# Patient Record
Sex: MALE | Race: WHITE | NOT HISPANIC OR LATINO
[De-identification: names, ages, dates, MRNs, and addresses within clinical notes are randomized per-mention and may not be internally consistent; named-entity substitution may affect disease eponyms.]

---

## 2017-06-01 ENCOUNTER — APPOINTMENT (OUTPATIENT)
Dept: NEUROSURGERY | Facility: CLINIC | Age: 82
End: 2017-06-01

## 2017-07-28 ENCOUNTER — APPOINTMENT (OUTPATIENT)
Dept: CT IMAGING | Facility: CLINIC | Age: 82
End: 2017-07-28

## 2017-08-16 ENCOUNTER — APPOINTMENT (OUTPATIENT)
Dept: UROLOGY | Facility: CLINIC | Age: 82
End: 2017-08-16
Payer: MEDICARE

## 2017-08-16 VITALS
DIASTOLIC BLOOD PRESSURE: 76 MMHG | OXYGEN SATURATION: 95 % | SYSTOLIC BLOOD PRESSURE: 152 MMHG | HEART RATE: 68 BPM | TEMPERATURE: 98 F

## 2017-08-16 VITALS — BODY MASS INDEX: 29.38 KG/M2 | WEIGHT: 185 LBS | HEIGHT: 66.5 IN

## 2017-08-16 DIAGNOSIS — N28.89 OTHER SPECIFIED DISORDERS OF KIDNEY AND URETER: ICD-10-CM

## 2017-08-16 PROCEDURE — 99213 OFFICE O/P EST LOW 20 MIN: CPT

## 2017-10-12 ENCOUNTER — OUTPATIENT (OUTPATIENT)
Dept: OUTPATIENT SERVICES | Facility: HOSPITAL | Age: 82
LOS: 1 days | Discharge: HOME | End: 2017-10-12
Payer: MEDICARE

## 2017-10-12 VITALS
TEMPERATURE: 98 F | SYSTOLIC BLOOD PRESSURE: 136 MMHG | HEIGHT: 66 IN | OXYGEN SATURATION: 98 % | RESPIRATION RATE: 20 BRPM | DIASTOLIC BLOOD PRESSURE: 82 MMHG | HEART RATE: 75 BPM

## 2017-10-12 DIAGNOSIS — E11.22 TYPE 2 DIABETES MELLITUS WITH DIABETIC CHRONIC KIDNEY DISEASE: ICD-10-CM

## 2017-10-12 DIAGNOSIS — Z96.641 PRESENCE OF RIGHT ARTIFICIAL HIP JOINT: Chronic | ICD-10-CM

## 2017-10-12 DIAGNOSIS — Z01.818 ENCOUNTER FOR OTHER PREPROCEDURAL EXAMINATION: ICD-10-CM

## 2017-10-12 DIAGNOSIS — M96.1 POSTLAMINECTOMY SYNDROME, NOT ELSEWHERE CLASSIFIED: ICD-10-CM

## 2017-10-12 DIAGNOSIS — D62 ACUTE POSTHEMORRHAGIC ANEMIA: ICD-10-CM

## 2017-10-12 DIAGNOSIS — K50.90 CROHN'S DISEASE, UNSPECIFIED, WITHOUT COMPLICATIONS: ICD-10-CM

## 2017-10-12 DIAGNOSIS — D69.59 OTHER SECONDARY THROMBOCYTOPENIA: ICD-10-CM

## 2017-10-12 DIAGNOSIS — N17.0 ACUTE KIDNEY FAILURE WITH TUBULAR NECROSIS: ICD-10-CM

## 2017-10-12 DIAGNOSIS — M48.062 SPINAL STENOSIS, LUMBAR REGION WITH NEUROGENIC CLAUDICATION: ICD-10-CM

## 2017-10-12 DIAGNOSIS — Z90.49 ACQUIRED ABSENCE OF OTHER SPECIFIED PARTS OF DIGESTIVE TRACT: Chronic | ICD-10-CM

## 2017-10-12 DIAGNOSIS — Z98.1 ARTHRODESIS STATUS: Chronic | ICD-10-CM

## 2017-10-12 DIAGNOSIS — N18.3 CHRONIC KIDNEY DISEASE, STAGE 3 (MODERATE): ICD-10-CM

## 2017-10-12 LAB
ANION GAP SERPL CALC-SCNC: 5 MMOL/L — SIGNIFICANT CHANGE UP (ref 5–17)
APPEARANCE UR: CLEAR — SIGNIFICANT CHANGE UP
APTT BLD: 26.9 SEC — LOW (ref 27.5–37.4)
BACTERIA # UR AUTO: NEGATIVE — SIGNIFICANT CHANGE UP
BASOPHILS # BLD AUTO: 0.1 K/UL — SIGNIFICANT CHANGE UP (ref 0–0.2)
BASOPHILS NFR BLD AUTO: 0.7 % — SIGNIFICANT CHANGE UP (ref 0–2)
BILIRUB UR-MCNC: NEGATIVE — SIGNIFICANT CHANGE UP
BLD GP AB SCN SERPL QL: SIGNIFICANT CHANGE UP
BUN SERPL-MCNC: 41 MG/DL — HIGH (ref 7–23)
CALCIUM SERPL-MCNC: 10.4 MG/DL — HIGH (ref 8.5–10.1)
CHLORIDE SERPL-SCNC: 108 MMOL/L — SIGNIFICANT CHANGE UP (ref 96–108)
CO2 SERPL-SCNC: 29 MMOL/L — SIGNIFICANT CHANGE UP (ref 22–31)
COLOR SPEC: YELLOW — SIGNIFICANT CHANGE UP
CREAT SERPL-MCNC: 1.46 MG/DL — HIGH (ref 0.5–1.3)
DIFF PNL FLD: NEGATIVE — SIGNIFICANT CHANGE UP
EOSINOPHIL # BLD AUTO: 0.3 K/UL — SIGNIFICANT CHANGE UP (ref 0–0.5)
EOSINOPHIL NFR BLD AUTO: 2.9 % — SIGNIFICANT CHANGE UP (ref 0–6)
EPI CELLS # UR: SIGNIFICANT CHANGE UP
GLUCOSE SERPL-MCNC: 108 MG/DL — HIGH (ref 70–99)
GLUCOSE UR QL: NEGATIVE MG/DL — SIGNIFICANT CHANGE UP
HCT VFR BLD CALC: 38.7 % — LOW (ref 39–50)
HGB BLD-MCNC: 12.9 G/DL — LOW (ref 13–17)
INR BLD: 1 RATIO — SIGNIFICANT CHANGE UP (ref 0.88–1.16)
KETONES UR-MCNC: NEGATIVE — SIGNIFICANT CHANGE UP
LEUKOCYTE ESTERASE UR-ACNC: NEGATIVE — SIGNIFICANT CHANGE UP
LYMPHOCYTES # BLD AUTO: 1.5 K/UL — SIGNIFICANT CHANGE UP (ref 1–3.3)
LYMPHOCYTES # BLD AUTO: 15 % — SIGNIFICANT CHANGE UP (ref 13–44)
MCHC RBC-ENTMCNC: 30.9 PG — SIGNIFICANT CHANGE UP (ref 27–34)
MCHC RBC-ENTMCNC: 33.2 GM/DL — SIGNIFICANT CHANGE UP (ref 32–36)
MCV RBC AUTO: 92.9 FL — SIGNIFICANT CHANGE UP (ref 80–100)
MONOCYTES # BLD AUTO: 0.7 K/UL — SIGNIFICANT CHANGE UP (ref 0–0.9)
MONOCYTES NFR BLD AUTO: 6.7 % — SIGNIFICANT CHANGE UP (ref 2–14)
NEUTROPHILS # BLD AUTO: 7.4 K/UL — SIGNIFICANT CHANGE UP (ref 1.8–7.4)
NEUTROPHILS NFR BLD AUTO: 74.7 % — SIGNIFICANT CHANGE UP (ref 43–77)
NITRITE UR-MCNC: NEGATIVE — SIGNIFICANT CHANGE UP
PH UR: 5 — SIGNIFICANT CHANGE UP (ref 5–8)
PLATELET # BLD AUTO: 243 K/UL — SIGNIFICANT CHANGE UP (ref 150–400)
POTASSIUM SERPL-MCNC: 4.5 MMOL/L — SIGNIFICANT CHANGE UP (ref 3.5–5.3)
POTASSIUM SERPL-SCNC: 4.5 MMOL/L — SIGNIFICANT CHANGE UP (ref 3.5–5.3)
PROT UR-MCNC: 15 MG/DL
PROTHROM AB SERPL-ACNC: 10.8 SEC — SIGNIFICANT CHANGE UP (ref 9.8–12.7)
RBC # BLD: 4.17 M/UL — LOW (ref 4.2–5.8)
RBC # FLD: 13.6 % — SIGNIFICANT CHANGE UP (ref 10.3–14.5)
RBC CASTS # UR COMP ASSIST: NEGATIVE /HPF — SIGNIFICANT CHANGE UP (ref 0–4)
SODIUM SERPL-SCNC: 142 MMOL/L — SIGNIFICANT CHANGE UP (ref 135–145)
SP GR SPEC: 1.01 — SIGNIFICANT CHANGE UP (ref 1.01–1.02)
TYPE + AB SCN PNL BLD: SIGNIFICANT CHANGE UP
UROBILINOGEN FLD QL: NEGATIVE MG/DL — SIGNIFICANT CHANGE UP
WBC # BLD: 9.9 K/UL — SIGNIFICANT CHANGE UP (ref 3.8–10.5)
WBC # FLD AUTO: 9.9 K/UL — SIGNIFICANT CHANGE UP (ref 3.8–10.5)
WBC UR QL: SIGNIFICANT CHANGE UP

## 2017-10-12 PROCEDURE — 71020: CPT | Mod: 26

## 2017-10-12 NOTE — H&P PST ADULT - PMH
CAD (coronary artery disease)    Depression    Diabetes mellitus    Hearing loss  left hearing aid, right ear deaf  HTN (hypertension)    Hypercholesterolemia    Hypothyroidism    Intervertebral disc disorder    Macular degeneration  right eye  Obesity    Stented coronary artery  2 stents, 20 yrs ago, 15 yrs ago CAD (coronary artery disease)    Crohn disease    Depression    Diabetes mellitus    Hearing loss  left hearing aid, right ear deaf  HTN (hypertension)    Hypercholesterolemia    Hypothyroidism    Intervertebral disc disorder    Macular degeneration  right eye  Obesity    Prostate CA    Stented coronary artery  2 stents, 20 yrs ago, 15 yrs ago

## 2017-10-12 NOTE — H&P PST ADULT - HISTORY OF PRESENT ILLNESS
81 y/o male with intervertebral discs disorder of lumbar region. S/P lumbar fusion about 10 yrs ago. Complain of lower back pain that radiates to lower legs with numbness to feet. Takes Tramadol with mild pain relief. "I was also getting shots until the last ones, they are not working anymore." Here today for PST for lumbar exploration, removal of hardware, laminectomy, instrumentation, fusion.

## 2017-10-12 NOTE — H&P PST ADULT - PSH
History of hip replacement, total, right  Revision  History of hip replacement, total, right    S/P colon resection    S/P lumbar fusion

## 2017-10-12 NOTE — H&P PST ADULT - ASSESSMENT
83 y/o male with intervertebral discs disorder of lumbar region. S/P lumbar fusion about 10 yrs ago. Complain of lower back pain that radiates to lower legs with numbness to feet. Takes Tramadol with mild pain relief. "I was also getting shots until the last ones, they are not working anymore." Scheduled for lumbar exploration, removal of hardware, laminectomy, instrumentation, fusion with Dr. Platt.    Plan  1. Stop all NSAIDS, herbal supplements and vitamins for 7 days.  2. NPO at midnight.  3. Take the following medications (thyroxine, buspar with small sips of water on the morning of your procedure/surgery.  4. Use EZ sponges as directed  5. Use mupirocin as directed 81 y/o male with intervertebral discs disorder of lumbar region. S/P lumbar fusion about 10 yrs ago. Complain of lower back pain that radiates to lower legs with numbness to feet. Takes Tramadol with mild pain relief. "I was also getting shots until the last ones, they are not working anymore." Scheduled for lumbar exploration, removal of hardware, laminectomy, instrumentation, fusion with Dr. Platt.    Plan  1. Stop all NSAIDS, herbal supplements and vitamins for 7 days.  2. NPO at midnight.  3. Take the following medications (thyroxine, buspar) with small sips of water on the morning of your procedure/surgery.  4. Use EZ sponges as directed  5. Use mupirocin as directed

## 2017-10-12 NOTE — H&P PST ADULT - NSANTHOSAYNRD_GEN_A_CORE
No. EVANGELINA screening performed.  STOP BANG Legend: 0-2 = LOW Risk; 3-4 = INTERMEDIATE Risk; 5-8 = HIGH Risk

## 2017-10-13 LAB
MRSA PCR RESULT.: SIGNIFICANT CHANGE UP
S AUREUS DNA NOSE QL NAA+PROBE: SIGNIFICANT CHANGE UP

## 2017-10-18 RX ORDER — OXYCODONE HYDROCHLORIDE 5 MG/1
10 TABLET ORAL ONCE
Qty: 0 | Refills: 0 | Status: DISCONTINUED | OUTPATIENT
Start: 2017-10-19 | End: 2017-10-19

## 2017-10-18 RX ORDER — FAMOTIDINE 10 MG/ML
20 INJECTION INTRAVENOUS ONCE
Qty: 0 | Refills: 0 | Status: COMPLETED | OUTPATIENT
Start: 2017-10-19 | End: 2017-10-19

## 2017-10-18 RX ORDER — SODIUM CHLORIDE 9 MG/ML
3 INJECTION INTRAMUSCULAR; INTRAVENOUS; SUBCUTANEOUS EVERY 8 HOURS
Qty: 0 | Refills: 0 | Status: DISCONTINUED | OUTPATIENT
Start: 2017-10-19 | End: 2017-10-27

## 2017-10-19 ENCOUNTER — RESULT REVIEW (OUTPATIENT)
Age: 82
End: 2017-10-19

## 2017-10-19 ENCOUNTER — INPATIENT (INPATIENT)
Facility: HOSPITAL | Age: 82
LOS: 7 days | Discharge: NURSING, REHAB OR LONG-TERM CARE | End: 2017-10-27
Attending: ORTHOPAEDIC SURGERY | Admitting: ORTHOPAEDIC SURGERY
Payer: MEDICARE

## 2017-10-19 VITALS
HEART RATE: 50 BPM | TEMPERATURE: 98 F | RESPIRATION RATE: 16 BRPM | DIASTOLIC BLOOD PRESSURE: 74 MMHG | OXYGEN SATURATION: 100 % | SYSTOLIC BLOOD PRESSURE: 151 MMHG | WEIGHT: 184.97 LBS | HEIGHT: 66 IN

## 2017-10-19 DIAGNOSIS — Z96.641 PRESENCE OF RIGHT ARTIFICIAL HIP JOINT: Chronic | ICD-10-CM

## 2017-10-19 DIAGNOSIS — Z90.49 ACQUIRED ABSENCE OF OTHER SPECIFIED PARTS OF DIGESTIVE TRACT: Chronic | ICD-10-CM

## 2017-10-19 DIAGNOSIS — Z98.1 ARTHRODESIS STATUS: Chronic | ICD-10-CM

## 2017-10-19 LAB
ANION GAP SERPL CALC-SCNC: 10 MMOL/L — SIGNIFICANT CHANGE UP (ref 5–17)
BASOPHILS # BLD AUTO: 0.1 K/UL — SIGNIFICANT CHANGE UP (ref 0–0.2)
BASOPHILS NFR BLD AUTO: 0.3 % — SIGNIFICANT CHANGE UP (ref 0–2)
BUN SERPL-MCNC: 31 MG/DL — HIGH (ref 7–23)
CALCIUM SERPL-MCNC: 7.5 MG/DL — LOW (ref 8.5–10.1)
CHLORIDE SERPL-SCNC: 114 MMOL/L — HIGH (ref 96–108)
CO2 SERPL-SCNC: 19 MMOL/L — LOW (ref 22–31)
CREAT SERPL-MCNC: 1.47 MG/DL — HIGH (ref 0.5–1.3)
EOSINOPHIL # BLD AUTO: 0 K/UL — SIGNIFICANT CHANGE UP (ref 0–0.5)
EOSINOPHIL NFR BLD AUTO: 0.1 % — SIGNIFICANT CHANGE UP (ref 0–6)
GLUCOSE SERPL-MCNC: 203 MG/DL — HIGH (ref 70–99)
HCT VFR BLD CALC: 43.3 % — SIGNIFICANT CHANGE UP (ref 39–50)
HGB BLD-MCNC: 14.6 G/DL — SIGNIFICANT CHANGE UP (ref 13–17)
LYMPHOCYTES # BLD AUTO: 1 K/UL — SIGNIFICANT CHANGE UP (ref 1–3.3)
LYMPHOCYTES # BLD AUTO: 5.7 % — LOW (ref 13–44)
MCHC RBC-ENTMCNC: 31.5 PG — SIGNIFICANT CHANGE UP (ref 27–34)
MCHC RBC-ENTMCNC: 33.8 GM/DL — SIGNIFICANT CHANGE UP (ref 32–36)
MCV RBC AUTO: 93.1 FL — SIGNIFICANT CHANGE UP (ref 80–100)
MONOCYTES # BLD AUTO: 0.7 K/UL — SIGNIFICANT CHANGE UP (ref 0–0.9)
MONOCYTES NFR BLD AUTO: 4.1 % — SIGNIFICANT CHANGE UP (ref 2–14)
NEUTROPHILS # BLD AUTO: 15.8 K/UL — HIGH (ref 1.8–7.4)
NEUTROPHILS NFR BLD AUTO: 89.9 % — HIGH (ref 43–77)
PLATELET # BLD AUTO: 140 K/UL — LOW (ref 150–400)
POTASSIUM SERPL-MCNC: 4.5 MMOL/L — SIGNIFICANT CHANGE UP (ref 3.5–5.3)
POTASSIUM SERPL-SCNC: 4.5 MMOL/L — SIGNIFICANT CHANGE UP (ref 3.5–5.3)
RBC # BLD: 4.65 M/UL — SIGNIFICANT CHANGE UP (ref 4.2–5.8)
RBC # FLD: 13.7 % — SIGNIFICANT CHANGE UP (ref 10.3–14.5)
SODIUM SERPL-SCNC: 143 MMOL/L — SIGNIFICANT CHANGE UP (ref 135–145)
WBC # BLD: 17.5 K/UL — HIGH (ref 3.8–10.5)
WBC # FLD AUTO: 17.5 K/UL — HIGH (ref 3.8–10.5)

## 2017-10-19 PROCEDURE — 88304 TISSUE EXAM BY PATHOLOGIST: CPT | Mod: 26

## 2017-10-19 RX ORDER — VERAPAMIL HCL 240 MG
240 CAPSULE, EXTENDED RELEASE PELLETS 24 HR ORAL DAILY
Qty: 0 | Refills: 0 | Status: DISCONTINUED | OUTPATIENT
Start: 2017-10-19 | End: 2017-10-22

## 2017-10-19 RX ORDER — ACETAMINOPHEN 500 MG
650 TABLET ORAL EVERY 4 HOURS
Qty: 0 | Refills: 0 | Status: DISCONTINUED | OUTPATIENT
Start: 2017-10-19 | End: 2017-10-27

## 2017-10-19 RX ORDER — DEXTROSE MONOHYDRATE, SODIUM CHLORIDE, AND POTASSIUM CHLORIDE 50; .745; 4.5 G/1000ML; G/1000ML; G/1000ML
1000 INJECTION, SOLUTION INTRAVENOUS
Qty: 0 | Refills: 0 | Status: DISCONTINUED | OUTPATIENT
Start: 2017-10-19 | End: 2017-10-20

## 2017-10-19 RX ORDER — ACETAMINOPHEN 500 MG
1000 TABLET ORAL ONCE
Qty: 0 | Refills: 0 | Status: DISCONTINUED | OUTPATIENT
Start: 2017-10-19 | End: 2017-10-19

## 2017-10-19 RX ORDER — OXYCODONE AND ACETAMINOPHEN 5; 325 MG/1; MG/1
1 TABLET ORAL EVERY 4 HOURS
Qty: 0 | Refills: 0 | Status: DISCONTINUED | OUTPATIENT
Start: 2017-10-19 | End: 2017-10-21

## 2017-10-19 RX ORDER — LEVOTHYROXINE SODIUM 125 MCG
75 TABLET ORAL DAILY
Qty: 0 | Refills: 0 | Status: DISCONTINUED | OUTPATIENT
Start: 2017-10-19 | End: 2017-10-27

## 2017-10-19 RX ORDER — LISINOPRIL 2.5 MG/1
20 TABLET ORAL DAILY
Qty: 0 | Refills: 0 | Status: DISCONTINUED | OUTPATIENT
Start: 2017-10-19 | End: 2017-10-20

## 2017-10-19 RX ORDER — HYDROMORPHONE HYDROCHLORIDE 2 MG/ML
0.5 INJECTION INTRAMUSCULAR; INTRAVENOUS; SUBCUTANEOUS
Qty: 0 | Refills: 0 | Status: DISCONTINUED | OUTPATIENT
Start: 2017-10-19 | End: 2017-10-21

## 2017-10-19 RX ORDER — ONDANSETRON 8 MG/1
4 TABLET, FILM COATED ORAL EVERY 6 HOURS
Qty: 0 | Refills: 0 | Status: DISCONTINUED | OUTPATIENT
Start: 2017-10-19 | End: 2017-10-21

## 2017-10-19 RX ORDER — CYCLOBENZAPRINE HYDROCHLORIDE 10 MG/1
10 TABLET, FILM COATED ORAL EVERY 8 HOURS
Qty: 0 | Refills: 0 | Status: DISCONTINUED | OUTPATIENT
Start: 2017-10-19 | End: 2017-10-27

## 2017-10-19 RX ORDER — OXYCODONE AND ACETAMINOPHEN 5; 325 MG/1; MG/1
2 TABLET ORAL EVERY 4 HOURS
Qty: 0 | Refills: 0 | Status: DISCONTINUED | OUTPATIENT
Start: 2017-10-19 | End: 2017-10-26

## 2017-10-19 RX ORDER — MUPIROCIN 20 MG/G
1 OINTMENT TOPICAL
Qty: 0 | Refills: 0 | COMMUNITY

## 2017-10-19 RX ORDER — ATORVASTATIN CALCIUM 80 MG/1
20 TABLET, FILM COATED ORAL AT BEDTIME
Qty: 0 | Refills: 0 | Status: DISCONTINUED | OUTPATIENT
Start: 2017-10-19 | End: 2017-10-27

## 2017-10-19 RX ORDER — OXYCODONE HYDROCHLORIDE 5 MG/1
5 TABLET ORAL EVERY 4 HOURS
Qty: 0 | Refills: 0 | Status: DISCONTINUED | OUTPATIENT
Start: 2017-10-19 | End: 2017-10-19

## 2017-10-19 RX ORDER — HYDROCHLOROTHIAZIDE 25 MG
25 TABLET ORAL DAILY
Qty: 0 | Refills: 0 | Status: DISCONTINUED | OUTPATIENT
Start: 2017-10-19 | End: 2017-10-20

## 2017-10-19 RX ORDER — METFORMIN HYDROCHLORIDE 850 MG/1
500 TABLET ORAL DAILY
Qty: 0 | Refills: 0 | Status: DISCONTINUED | OUTPATIENT
Start: 2017-10-19 | End: 2017-10-20

## 2017-10-19 RX ORDER — HYDROMORPHONE HYDROCHLORIDE 2 MG/ML
30 INJECTION INTRAMUSCULAR; INTRAVENOUS; SUBCUTANEOUS
Qty: 0 | Refills: 0 | Status: DISCONTINUED | OUTPATIENT
Start: 2017-10-19 | End: 2017-10-21

## 2017-10-19 RX ORDER — NALOXONE HYDROCHLORIDE 4 MG/.1ML
0.1 SPRAY NASAL
Qty: 0 | Refills: 0 | Status: DISCONTINUED | OUTPATIENT
Start: 2017-10-19 | End: 2017-10-21

## 2017-10-19 RX ORDER — ONDANSETRON 8 MG/1
4 TABLET, FILM COATED ORAL ONCE
Qty: 0 | Refills: 0 | Status: DISCONTINUED | OUTPATIENT
Start: 2017-10-19 | End: 2017-10-19

## 2017-10-19 RX ORDER — SENNA PLUS 8.6 MG/1
2 TABLET ORAL AT BEDTIME
Qty: 0 | Refills: 0 | Status: DISCONTINUED | OUTPATIENT
Start: 2017-10-19 | End: 2017-10-22

## 2017-10-19 RX ORDER — DOCUSATE SODIUM 100 MG
100 CAPSULE ORAL THREE TIMES A DAY
Qty: 0 | Refills: 0 | Status: DISCONTINUED | OUTPATIENT
Start: 2017-10-19 | End: 2017-10-22

## 2017-10-19 RX ORDER — HYDROMORPHONE HYDROCHLORIDE 2 MG/ML
2 INJECTION INTRAMUSCULAR; INTRAVENOUS; SUBCUTANEOUS EVERY 4 HOURS
Qty: 0 | Refills: 0 | Status: DISCONTINUED | OUTPATIENT
Start: 2017-10-19 | End: 2017-10-26

## 2017-10-19 RX ORDER — SODIUM CHLORIDE 9 MG/ML
1000 INJECTION INTRAMUSCULAR; INTRAVENOUS; SUBCUTANEOUS
Qty: 0 | Refills: 0 | Status: DISCONTINUED | OUTPATIENT
Start: 2017-10-19 | End: 2017-10-19

## 2017-10-19 RX ADMIN — HYDROMORPHONE HYDROCHLORIDE 0.5 MILLIGRAM(S): 2 INJECTION INTRAMUSCULAR; INTRAVENOUS; SUBCUTANEOUS at 18:40

## 2017-10-19 RX ADMIN — SODIUM CHLORIDE 75 MILLILITER(S): 9 INJECTION INTRAMUSCULAR; INTRAVENOUS; SUBCUTANEOUS at 17:41

## 2017-10-19 RX ADMIN — OXYCODONE HYDROCHLORIDE 10 MILLIGRAM(S): 5 TABLET ORAL at 07:35

## 2017-10-19 RX ADMIN — FAMOTIDINE 20 MILLIGRAM(S): 10 INJECTION INTRAVENOUS at 07:35

## 2017-10-19 RX ADMIN — SODIUM CHLORIDE 3 MILLILITER(S): 9 INJECTION INTRAMUSCULAR; INTRAVENOUS; SUBCUTANEOUS at 21:20

## 2017-10-19 RX ADMIN — HYDROMORPHONE HYDROCHLORIDE 30 MILLILITER(S): 2 INJECTION INTRAMUSCULAR; INTRAVENOUS; SUBCUTANEOUS at 17:16

## 2017-10-19 NOTE — BRIEF OPERATIVE NOTE - PROCEDURE
<<-----Click on this checkbox to enter Procedure Laminectomy and fusion of lumbar spine with instrumentation  10/19/2017    Active  ALICIA

## 2017-10-19 NOTE — PROVIDER CONTACT NOTE (OTHER) - SITUATION
Dr. Dent updated on pts status. BP 92/63, urine output 20-25ml/hr, 350ml bloody drainage total from hmv since surgery. Pt received very sleepy from PACU.

## 2017-10-19 NOTE — BRIEF OPERATIVE NOTE - PRE-OP DX
Lumbar stenosis with neurogenic claudication  10/19/2017    Active  [place] Platt  Postlaminectomy syndrome of lumbar region  10/19/2017    Active  P[place]enix Platt

## 2017-10-19 NOTE — PATIENT PROFILE ADULT. - PMH
CAD (coronary artery disease)    Crohn disease    Depression    Diabetes mellitus    Hearing loss  left hearing aid, right ear deaf  HTN (hypertension)    Hypercholesterolemia    Hypothyroidism    Intervertebral disc disorder    Macular degeneration  right eye  Obesity    Prostate CA    Stented coronary artery  2 stents, 20 yrs ago, 15 yrs ago

## 2017-10-20 DIAGNOSIS — M96.1 POSTLAMINECTOMY SYNDROME, NOT ELSEWHERE CLASSIFIED: ICD-10-CM

## 2017-10-20 LAB
ALBUMIN SERPL ELPH-MCNC: 1.7 G/DL — LOW (ref 3.3–5)
ALP SERPL-CCNC: 28 U/L — LOW (ref 40–120)
ALT FLD-CCNC: 29 U/L — SIGNIFICANT CHANGE UP (ref 12–78)
ANION GAP SERPL CALC-SCNC: 6 MMOL/L — SIGNIFICANT CHANGE UP (ref 5–17)
ANION GAP SERPL CALC-SCNC: 7 MMOL/L — SIGNIFICANT CHANGE UP (ref 5–17)
ANION GAP SERPL CALC-SCNC: 9 MMOL/L — SIGNIFICANT CHANGE UP (ref 5–17)
AST SERPL-CCNC: 50 U/L — HIGH (ref 15–37)
BILIRUB SERPL-MCNC: 0.4 MG/DL — SIGNIFICANT CHANGE UP (ref 0.2–1.2)
BUN SERPL-MCNC: 34 MG/DL — HIGH (ref 7–23)
BUN SERPL-MCNC: 34 MG/DL — HIGH (ref 7–23)
BUN SERPL-MCNC: 35 MG/DL — HIGH (ref 7–23)
CALCIUM SERPL-MCNC: 6.4 MG/DL — CRITICAL LOW (ref 8.5–10.1)
CALCIUM SERPL-MCNC: 6.5 MG/DL — CRITICAL LOW (ref 8.5–10.1)
CALCIUM SERPL-MCNC: 6.8 MG/DL — LOW (ref 8.5–10.1)
CHLORIDE SERPL-SCNC: 115 MMOL/L — HIGH (ref 96–108)
CHLORIDE SERPL-SCNC: 117 MMOL/L — HIGH (ref 96–108)
CHLORIDE SERPL-SCNC: 119 MMOL/L — HIGH (ref 96–108)
CO2 SERPL-SCNC: 18 MMOL/L — LOW (ref 22–31)
CO2 SERPL-SCNC: 19 MMOL/L — LOW (ref 22–31)
CO2 SERPL-SCNC: 22 MMOL/L — SIGNIFICANT CHANGE UP (ref 22–31)
CREAT SERPL-MCNC: 2.09 MG/DL — HIGH (ref 0.5–1.3)
CREAT SERPL-MCNC: 2.18 MG/DL — HIGH (ref 0.5–1.3)
CREAT SERPL-MCNC: 2.36 MG/DL — HIGH (ref 0.5–1.3)
GLUCOSE BLDC GLUCOMTR-MCNC: 122 MG/DL — HIGH (ref 70–99)
GLUCOSE BLDC GLUCOMTR-MCNC: 132 MG/DL — HIGH (ref 70–99)
GLUCOSE BLDC GLUCOMTR-MCNC: 176 MG/DL — HIGH (ref 70–99)
GLUCOSE BLDC GLUCOMTR-MCNC: 200 MG/DL — HIGH (ref 70–99)
GLUCOSE SERPL-MCNC: 138 MG/DL — HIGH (ref 70–99)
GLUCOSE SERPL-MCNC: 150 MG/DL — HIGH (ref 70–99)
GLUCOSE SERPL-MCNC: 183 MG/DL — HIGH (ref 70–99)
HCT VFR BLD CALC: 32.1 % — LOW (ref 39–50)
HGB BLD-MCNC: 11 G/DL — LOW (ref 13–17)
MCHC RBC-ENTMCNC: 31.6 PG — SIGNIFICANT CHANGE UP (ref 27–34)
MCHC RBC-ENTMCNC: 34.2 GM/DL — SIGNIFICANT CHANGE UP (ref 32–36)
MCV RBC AUTO: 92.4 FL — SIGNIFICANT CHANGE UP (ref 80–100)
PLATELET # BLD AUTO: 137 K/UL — LOW (ref 150–400)
POTASSIUM SERPL-MCNC: 4.2 MMOL/L — SIGNIFICANT CHANGE UP (ref 3.5–5.3)
POTASSIUM SERPL-MCNC: 4.7 MMOL/L — SIGNIFICANT CHANGE UP (ref 3.5–5.3)
POTASSIUM SERPL-MCNC: 5.4 MMOL/L — HIGH (ref 3.5–5.3)
POTASSIUM SERPL-SCNC: 4.2 MMOL/L — SIGNIFICANT CHANGE UP (ref 3.5–5.3)
POTASSIUM SERPL-SCNC: 4.7 MMOL/L — SIGNIFICANT CHANGE UP (ref 3.5–5.3)
POTASSIUM SERPL-SCNC: 5.4 MMOL/L — HIGH (ref 3.5–5.3)
PROT SERPL-MCNC: 3.8 GM/DL — LOW (ref 6–8.3)
RBC # BLD: 3.47 M/UL — LOW (ref 4.2–5.8)
RBC # FLD: 13.7 % — SIGNIFICANT CHANGE UP (ref 10.3–14.5)
SODIUM SERPL-SCNC: 143 MMOL/L — SIGNIFICANT CHANGE UP (ref 135–145)
SODIUM SERPL-SCNC: 144 MMOL/L — SIGNIFICANT CHANGE UP (ref 135–145)
SODIUM SERPL-SCNC: 145 MMOL/L — SIGNIFICANT CHANGE UP (ref 135–145)
TROPONIN I SERPL-MCNC: 0.03 NG/ML — SIGNIFICANT CHANGE UP (ref 0.01–0.04)
TROPONIN I SERPL-MCNC: 0.04 NG/ML — SIGNIFICANT CHANGE UP (ref 0.01–0.04)
WBC # BLD: 15.2 K/UL — HIGH (ref 3.8–10.5)
WBC # FLD AUTO: 15.2 K/UL — HIGH (ref 3.8–10.5)

## 2017-10-20 PROCEDURE — 93010 ELECTROCARDIOGRAM REPORT: CPT

## 2017-10-20 RX ORDER — SODIUM CHLORIDE 9 MG/ML
500 INJECTION INTRAMUSCULAR; INTRAVENOUS; SUBCUTANEOUS ONCE
Qty: 0 | Refills: 0 | Status: COMPLETED | OUTPATIENT
Start: 2017-10-20 | End: 2017-10-20

## 2017-10-20 RX ORDER — SODIUM CHLORIDE 9 MG/ML
1000 INJECTION INTRAMUSCULAR; INTRAVENOUS; SUBCUTANEOUS
Qty: 0 | Refills: 0 | Status: DISCONTINUED | OUTPATIENT
Start: 2017-10-20 | End: 2017-10-22

## 2017-10-20 RX ORDER — DEXTROSE 50 % IN WATER 50 %
25 SYRINGE (ML) INTRAVENOUS ONCE
Qty: 0 | Refills: 0 | Status: DISCONTINUED | OUTPATIENT
Start: 2017-10-20 | End: 2017-10-27

## 2017-10-20 RX ORDER — SODIUM CHLORIDE 9 MG/ML
1000 INJECTION, SOLUTION INTRAVENOUS
Qty: 0 | Refills: 0 | Status: DISCONTINUED | OUTPATIENT
Start: 2017-10-20 | End: 2017-10-22

## 2017-10-20 RX ORDER — SODIUM CHLORIDE 9 MG/ML
1000 INJECTION INTRAMUSCULAR; INTRAVENOUS; SUBCUTANEOUS ONCE
Qty: 0 | Refills: 0 | Status: COMPLETED | OUTPATIENT
Start: 2017-10-20 | End: 2017-10-20

## 2017-10-20 RX ORDER — GLUCAGON INJECTION, SOLUTION 0.5 MG/.1ML
1 INJECTION, SOLUTION SUBCUTANEOUS ONCE
Qty: 0 | Refills: 0 | Status: DISCONTINUED | OUTPATIENT
Start: 2017-10-20 | End: 2017-10-27

## 2017-10-20 RX ORDER — INSULIN LISPRO 100/ML
VIAL (ML) SUBCUTANEOUS
Qty: 0 | Refills: 0 | Status: DISCONTINUED | OUTPATIENT
Start: 2017-10-20 | End: 2017-10-27

## 2017-10-20 RX ORDER — DEXTROSE 50 % IN WATER 50 %
12.5 SYRINGE (ML) INTRAVENOUS ONCE
Qty: 0 | Refills: 0 | Status: DISCONTINUED | OUTPATIENT
Start: 2017-10-20 | End: 2017-10-27

## 2017-10-20 RX ORDER — INSULIN LISPRO 100/ML
VIAL (ML) SUBCUTANEOUS AT BEDTIME
Qty: 0 | Refills: 0 | Status: DISCONTINUED | OUTPATIENT
Start: 2017-10-20 | End: 2017-10-27

## 2017-10-20 RX ORDER — SODIUM CHLORIDE 9 MG/ML
1000 INJECTION INTRAMUSCULAR; INTRAVENOUS; SUBCUTANEOUS ONCE
Qty: 0 | Refills: 0 | Status: DISCONTINUED | OUTPATIENT
Start: 2017-10-20 | End: 2017-10-21

## 2017-10-20 RX ORDER — SODIUM CHLORIDE 9 MG/ML
1000 INJECTION INTRAMUSCULAR; INTRAVENOUS; SUBCUTANEOUS
Qty: 0 | Refills: 0 | Status: DISCONTINUED | OUTPATIENT
Start: 2017-10-20 | End: 2017-10-21

## 2017-10-20 RX ORDER — CALCIUM GLUCONATE 100 MG/ML
1 VIAL (ML) INTRAVENOUS ONCE
Qty: 0 | Refills: 0 | Status: COMPLETED | OUTPATIENT
Start: 2017-10-20 | End: 2017-10-20

## 2017-10-20 RX ORDER — COLESTIPOL HCL 5 G
1 GRANULES (GRAM) ORAL DAILY
Qty: 0 | Refills: 0 | Status: DISCONTINUED | OUTPATIENT
Start: 2017-10-20 | End: 2017-10-27

## 2017-10-20 RX ORDER — DEXTROSE 50 % IN WATER 50 %
1 SYRINGE (ML) INTRAVENOUS ONCE
Qty: 0 | Refills: 0 | Status: DISCONTINUED | OUTPATIENT
Start: 2017-10-20 | End: 2017-10-27

## 2017-10-20 RX ADMIN — Medication 200 GRAM(S): at 13:50

## 2017-10-20 RX ADMIN — Medication 75 MICROGRAM(S): at 12:23

## 2017-10-20 RX ADMIN — Medication 10 MILLIGRAM(S): at 23:09

## 2017-10-20 RX ADMIN — SENNA PLUS 2 TABLET(S): 8.6 TABLET ORAL at 23:10

## 2017-10-20 RX ADMIN — SODIUM CHLORIDE 1000 MILLILITER(S): 9 INJECTION INTRAMUSCULAR; INTRAVENOUS; SUBCUTANEOUS at 13:45

## 2017-10-20 RX ADMIN — Medication 1 TABLET(S): at 12:25

## 2017-10-20 RX ADMIN — Medication 1: at 12:26

## 2017-10-20 RX ADMIN — Medication 1 GRAM(S): at 12:23

## 2017-10-20 RX ADMIN — CYCLOBENZAPRINE HYDROCHLORIDE 10 MILLIGRAM(S): 10 TABLET, FILM COATED ORAL at 14:57

## 2017-10-20 RX ADMIN — ATORVASTATIN CALCIUM 20 MILLIGRAM(S): 80 TABLET, FILM COATED ORAL at 23:09

## 2017-10-20 RX ADMIN — SODIUM CHLORIDE 125 MILLILITER(S): 9 INJECTION INTRAMUSCULAR; INTRAVENOUS; SUBCUTANEOUS at 23:08

## 2017-10-20 RX ADMIN — CYCLOBENZAPRINE HYDROCHLORIDE 10 MILLIGRAM(S): 10 TABLET, FILM COATED ORAL at 23:09

## 2017-10-20 RX ADMIN — SODIUM CHLORIDE 1000 MILLILITER(S): 9 INJECTION INTRAMUSCULAR; INTRAVENOUS; SUBCUTANEOUS at 03:43

## 2017-10-20 RX ADMIN — Medication 100 MILLIGRAM(S): at 23:08

## 2017-10-20 RX ADMIN — SODIUM CHLORIDE 3 MILLILITER(S): 9 INJECTION INTRAMUSCULAR; INTRAVENOUS; SUBCUTANEOUS at 18:48

## 2017-10-20 RX ADMIN — DEXTROSE MONOHYDRATE, SODIUM CHLORIDE, AND POTASSIUM CHLORIDE 125 MILLILITER(S): 50; .745; 4.5 INJECTION, SOLUTION INTRAVENOUS at 04:53

## 2017-10-20 RX ADMIN — SODIUM CHLORIDE 3 MILLILITER(S): 9 INJECTION INTRAMUSCULAR; INTRAVENOUS; SUBCUTANEOUS at 05:33

## 2017-10-20 RX ADMIN — SODIUM CHLORIDE 3 MILLILITER(S): 9 INJECTION INTRAMUSCULAR; INTRAVENOUS; SUBCUTANEOUS at 23:10

## 2017-10-20 RX ADMIN — SODIUM CHLORIDE 125 MILLILITER(S): 9 INJECTION INTRAMUSCULAR; INTRAVENOUS; SUBCUTANEOUS at 08:25

## 2017-10-20 RX ADMIN — SODIUM CHLORIDE 1000 MILLILITER(S): 9 INJECTION INTRAMUSCULAR; INTRAVENOUS; SUBCUTANEOUS at 12:25

## 2017-10-20 NOTE — PHYSICAL THERAPY INITIAL EVALUATION ADULT - GENERAL OBSERVATIONS, REHAB EVAL
pt was found lying in bed, pt is willing to participate in PT, pt with tray,tel,cb,tele monitors,pulse oxy,BP cuff,o2,caballero,hemovac, PCA and IV pump

## 2017-10-20 NOTE — PHYSICAL THERAPY INITIAL EVALUATION ADULT - LIVES WITH, PROFILE
alone/has 14 steps to enter house with steps, 10 steps can be accessed through elevator but still has to tk 4 steps to enter house

## 2017-10-20 NOTE — PHYSICAL THERAPY INITIAL EVALUATION ADULT - CRITERIA FOR SKILLED THERAPEUTIC INTERVENTIONS
risk reduction/prevention/rehab potential/anticipated discharge recommendation/impairments found/therapy frequency/anticipated equipment needs at discharge

## 2017-10-20 NOTE — PHYSICAL THERAPY INITIAL EVALUATION ADULT - PERTINENT HX OF CURRENT PROBLEM, REHAB EVAL
Postlaminectomy syndrome of lumbar region, Lumbar stenosis with neurogenic claudication,  lumbar spinal stenosis who has been having increasing LBP with radiation down his legs.  He was admitted for elective lumbar laminectomy with fusion.  His hospital course was complicated by hypovolumic hypotension and acute blood loss anemia

## 2017-10-20 NOTE — PHYSICAL THERAPY INITIAL EVALUATION ADULT - DIAGNOSIS, PT EVAL
Postlaminectomy syndrome of lumbar region, Lumbar stenosis with neurogenic claudication, s/p lumbar lami with fusion, hypotension, weakness

## 2017-10-20 NOTE — PROGRESS NOTE ADULT - SUBJECTIVE AND OBJECTIVE BOX
[facility]                                                            Orthopedic Sp[facility] Progress Note      POST OPERATIVE DAY #: 1  STATUS POST:            Lumbar lami fusion TLIF     Pre-Op Dx: Postlaminectomy syndrome of lumbar region  Lumbar stenosis with neurogenic claudication    Post-Op Dx:  Postlaminectomy syndrome of lumbar region  Lumbar stenosis with neurogenic claudication    Prin. Procedure:   Lumbar lami fusion TLIF     SUBJECTIVE: Patient seen and examined.   c/o LBP    Pain (0-10): 8-9  Current Pain Management:  [x ] PCA   [ ] Po Analgesics [ ] IM /IV Anagesics     Vital Signs Last 24 Hrs  T(C): 36.5 (20 Oct 2017 08:26), Max: 37.9 (20 Oct 2017 05:37)  T(F): 97.7 (20 Oct 2017 08:26), Max: 100.2 (20 Oct 2017 05:37)  HR: 83 (20 Oct 2017 06:00) (68 - 91)  BP: 79/40 (20 Oct 2017 06:00) (78/39 - 142/71)  BP(mean): 49 (20 Oct 2017 06:00) (44 - 59)  RR: 14 (20 Oct 2017 06:00) (10 - 21)  SpO2: 90% (20 Oct 2017 04:00) (90% - 100%)  I&O's Detail    19 Oct 2017 07:01  -  20 Oct 2017 07:00  --------------------------------------------------------  IN:    Other: 4500 mL  Total IN: 4500 mL    OUT:    Drain: 780 mL.....left in    Indwelling Catheter - Urethral: 220 mL    Other: 305 mL  Total OUT: 1305 mL    Total NET: 3195 mL          OBJECTIVE: c/o LBP  moves all  4 ext        Wound /Dressing: intact    Lumbar: ROM :limited  Neurological: A/O x    3           Sensation: [ x] intact to light touch  [ ] decreased:          Motor exam: [ x ]          [ x Upper extremity    Delt      Bicp       Tri      Wrist ext  Wrst Flex       Digit Ext Digit Flex                                     R         5/5        5/5        5/5       5/5            5/5            5/5       5/5          5/5                                     L          5/5        5/5        5/5       5/5            5/5            5/5       5/5          5/5         [ x] Lower ext.     Hip Flx  Hip Ext   Hip Abd  Hip Add Quad   Hamstrg   TA       EHL      GS                              R        5/5        5/5        5/5             5/5        5/5        5/5        5/5     5/5      5/5                              L         5/5        5/5        5/5             5/5        5/5        5/5        5/5     5/5      5/5                                                        [ x] Vascular :  wnl           Tension Signs: neg           RADIOLOGY & ADDITIONAL STUDIES:                                               LABS:                        11.0   15.2  )-----------( 137      ( 20 Oct 2017 05:09 )             32.1     10-20    143  |  115<H>  |  34<H>  ----------------------------<  150<H>  5.4<H>   |  22  |  2.09<H>    Ca    6.8<L>      20 Oct 2017 05:09    TPro  3.8<L>  /  Alb  1.7<L>  /  TBili  0.4  /  DBili  x   /  AST  50<H>  /  ALT  29  /  AlkPhos  28<L>  10-20            A/P :  82y Male   s/p:   Lumbar lami fusion TLIF   -    Pain control satisfactory  -    DVT ppx: SCDs    -    Abx:  completed  -    Review labs as indicated   .;as above  -    Physical Therapy try to mobilize  -    Weight bearing status: full  -    Dispo: Home [ ]     Rehab [x ]

## 2017-10-21 LAB
ANION GAP SERPL CALC-SCNC: 6 MMOL/L — SIGNIFICANT CHANGE UP (ref 5–17)
BASOPHILS # BLD AUTO: 0.1 K/UL — SIGNIFICANT CHANGE UP (ref 0–0.2)
BASOPHILS NFR BLD AUTO: 0.7 % — SIGNIFICANT CHANGE UP (ref 0–2)
BUN SERPL-MCNC: 31 MG/DL — HIGH (ref 7–23)
CALCIUM SERPL-MCNC: 6.8 MG/DL — LOW (ref 8.5–10.1)
CHLORIDE SERPL-SCNC: 118 MMOL/L — HIGH (ref 96–108)
CO2 SERPL-SCNC: 21 MMOL/L — LOW (ref 22–31)
CREAT SERPL-MCNC: 1.88 MG/DL — HIGH (ref 0.5–1.3)
EOSINOPHIL # BLD AUTO: 0.1 K/UL — SIGNIFICANT CHANGE UP (ref 0–0.5)
EOSINOPHIL NFR BLD AUTO: 1.1 % — SIGNIFICANT CHANGE UP (ref 0–6)
GLUCOSE BLDC GLUCOMTR-MCNC: 134 MG/DL — HIGH (ref 70–99)
GLUCOSE BLDC GLUCOMTR-MCNC: 139 MG/DL — HIGH (ref 70–99)
GLUCOSE BLDC GLUCOMTR-MCNC: 185 MG/DL — HIGH (ref 70–99)
GLUCOSE BLDC GLUCOMTR-MCNC: 246 MG/DL — HIGH (ref 70–99)
GLUCOSE SERPL-MCNC: 128 MG/DL — HIGH (ref 70–99)
HBA1C BLD-MCNC: 7.1 % — HIGH (ref 4–5.6)
HCT VFR BLD CALC: 28.1 % — LOW (ref 39–50)
HGB BLD-MCNC: 9.6 G/DL — LOW (ref 13–17)
LYMPHOCYTES # BLD AUTO: 0.7 K/UL — LOW (ref 1–3.3)
LYMPHOCYTES # BLD AUTO: 6.4 % — LOW (ref 13–44)
MCHC RBC-ENTMCNC: 31.7 PG — SIGNIFICANT CHANGE UP (ref 27–34)
MCHC RBC-ENTMCNC: 34.2 GM/DL — SIGNIFICANT CHANGE UP (ref 32–36)
MCV RBC AUTO: 92.7 FL — SIGNIFICANT CHANGE UP (ref 80–100)
MONOCYTES # BLD AUTO: 0.8 K/UL — SIGNIFICANT CHANGE UP (ref 0–0.9)
MONOCYTES NFR BLD AUTO: 7.6 % — SIGNIFICANT CHANGE UP (ref 2–14)
NEUTROPHILS # BLD AUTO: 9 K/UL — HIGH (ref 1.8–7.4)
NEUTROPHILS NFR BLD AUTO: 84.3 % — HIGH (ref 43–77)
PLATELET # BLD AUTO: 128 K/UL — LOW (ref 150–400)
POTASSIUM SERPL-MCNC: 4.1 MMOL/L — SIGNIFICANT CHANGE UP (ref 3.5–5.3)
POTASSIUM SERPL-SCNC: 4.1 MMOL/L — SIGNIFICANT CHANGE UP (ref 3.5–5.3)
RBC # BLD: 3.03 M/UL — LOW (ref 4.2–5.8)
RBC # FLD: 14.2 % — SIGNIFICANT CHANGE UP (ref 10.3–14.5)
SODIUM SERPL-SCNC: 145 MMOL/L — SIGNIFICANT CHANGE UP (ref 135–145)
TROPONIN I SERPL-MCNC: 0.03 NG/ML — SIGNIFICANT CHANGE UP (ref 0.01–0.04)
WBC # BLD: 10.7 K/UL — HIGH (ref 3.8–10.5)
WBC # FLD AUTO: 10.7 K/UL — HIGH (ref 3.8–10.5)

## 2017-10-21 RX ORDER — TAMSULOSIN HYDROCHLORIDE 0.4 MG/1
0.4 CAPSULE ORAL AT BEDTIME
Qty: 0 | Refills: 0 | Status: DISCONTINUED | OUTPATIENT
Start: 2017-10-21 | End: 2017-10-24

## 2017-10-21 RX ADMIN — CYCLOBENZAPRINE HYDROCHLORIDE 10 MILLIGRAM(S): 10 TABLET, FILM COATED ORAL at 22:44

## 2017-10-21 RX ADMIN — SODIUM CHLORIDE 75 MILLILITER(S): 9 INJECTION INTRAMUSCULAR; INTRAVENOUS; SUBCUTANEOUS at 18:40

## 2017-10-21 RX ADMIN — Medication 1: at 12:22

## 2017-10-21 RX ADMIN — TAMSULOSIN HYDROCHLORIDE 0.4 MILLIGRAM(S): 0.4 CAPSULE ORAL at 22:43

## 2017-10-21 RX ADMIN — OXYCODONE AND ACETAMINOPHEN 1 TABLET(S): 5; 325 TABLET ORAL at 12:30

## 2017-10-21 RX ADMIN — OXYCODONE AND ACETAMINOPHEN 1 TABLET(S): 5; 325 TABLET ORAL at 11:11

## 2017-10-21 RX ADMIN — Medication 100 MILLIGRAM(S): at 14:12

## 2017-10-21 RX ADMIN — Medication 1 TABLET(S): at 11:10

## 2017-10-21 RX ADMIN — SODIUM CHLORIDE 125 MILLILITER(S): 9 INJECTION INTRAMUSCULAR; INTRAVENOUS; SUBCUTANEOUS at 08:17

## 2017-10-21 RX ADMIN — OXYCODONE AND ACETAMINOPHEN 2 TABLET(S): 5; 325 TABLET ORAL at 17:44

## 2017-10-21 RX ADMIN — SODIUM CHLORIDE 3 MILLILITER(S): 9 INJECTION INTRAMUSCULAR; INTRAVENOUS; SUBCUTANEOUS at 22:45

## 2017-10-21 RX ADMIN — OXYCODONE AND ACETAMINOPHEN 2 TABLET(S): 5; 325 TABLET ORAL at 23:15

## 2017-10-21 RX ADMIN — CYCLOBENZAPRINE HYDROCHLORIDE 10 MILLIGRAM(S): 10 TABLET, FILM COATED ORAL at 14:12

## 2017-10-21 RX ADMIN — ATORVASTATIN CALCIUM 20 MILLIGRAM(S): 80 TABLET, FILM COATED ORAL at 22:44

## 2017-10-21 RX ADMIN — OXYCODONE AND ACETAMINOPHEN 2 TABLET(S): 5; 325 TABLET ORAL at 22:44

## 2017-10-21 RX ADMIN — OXYCODONE AND ACETAMINOPHEN 2 TABLET(S): 5; 325 TABLET ORAL at 19:29

## 2017-10-21 RX ADMIN — SENNA PLUS 2 TABLET(S): 8.6 TABLET ORAL at 22:43

## 2017-10-21 RX ADMIN — Medication 10 MILLIGRAM(S): at 18:40

## 2017-10-21 RX ADMIN — Medication 1 GRAM(S): at 06:26

## 2017-10-21 RX ADMIN — SODIUM CHLORIDE 3 MILLILITER(S): 9 INJECTION INTRAMUSCULAR; INTRAVENOUS; SUBCUTANEOUS at 13:37

## 2017-10-21 RX ADMIN — Medication 100 MILLIGRAM(S): at 22:44

## 2017-10-21 NOTE — PROGRESS NOTE ADULT - SUBJECTIVE AND OBJECTIVE BOX
POST OPERATIVE DAY #:  2  STATUS POST: Lumbar Lami/ TLIF                     SUBJECTIVE: Patient seen and examined. Complaining of tanja-incisional pain. Blood pressure more stable this morning.    OBJECTIVE:     Vital Signs Last 24 Hrs  T(C): 37.9 (21 Oct 2017 05:54), Max: 37.9 (20 Oct 2017 18:45)  T(F): 100.2 (21 Oct 2017 05:54), Max: 100.3 (20 Oct 2017 18:45)  HR: 88 (21 Oct 2017 05:00) (86 - 106)  BP: 108/49 (21 Oct 2017 05:00) (72/31 - 124/51)  BP(mean): 65 (21 Oct 2017 05:00) (41 - 79)  RR: 19 (21 Oct 2017 05:00) (14 - 27)  SpO2: 95% (20 Oct 2017 23:00) (90% - 99%)    Awake and alert  HEENT: unremarkable  Neck: supple  Back:          Dressing: clean/dry/intact             Drains: output per shift = 90cc  Bilateral Upper Extremities:         Good Motor Strength         Sensation intact  Bilateral Lower Extremities:          Good Motor Strength          Sensation Intact                   I&O's Detail    19 Oct 2017 07:01  -  20 Oct 2017 07:00  --------------------------------------------------------  IN:    Other: 4500 mL  Total IN: 4500 mL    OUT:    Drain: 780 mL    Indwelling Catheter - Urethral: 220 mL    Other: 305 mL  Total OUT: 1305 mL    Total NET: 3195 mL      20 Oct 2017 07:01  -  21 Oct 2017 06:29  --------------------------------------------------------  IN:    sodium chloride 0.9%.: 1600 mL    sodium chloride 0.9%.: 1375 mL  Total IN: 2975 mL    OUT:    Drain: 360 mL    Indwelling Catheter - Urethral: 1000 mL  Total OUT: 1360 mL    Total NET: 1615 mL          LABS:                        11.0   15.2  )-----------( 137      ( 20 Oct 2017 05:09 )             32.1     10-20    145  |  119<H>  |  34<H>  ----------------------------<  138<H>  4.2   |  19<L>  |  2.18<H>    Ca    6.4<LL>      20 Oct 2017 17:16    TPro  3.8<L>  /  Alb  1.7<L>  /  TBili  0.4  /  DBili  x   /  AST  50<H>  /  ALT  29  /  AlkPhos  28<L>  10-20        A/P :  82y Male s/p Lumbar Lami/TLIF            POD # 2  -    Pain control, continue PCA  -    DVT ppx: SCDs    -    OOB as tolerated  -    Physical Therapy, re; ambulation  -    Continue hemovac  -    Advance diet  -    Continue present treatment

## 2017-10-21 NOTE — PROGRESS NOTE ADULT - SUBJECTIVE AND OBJECTIVE BOX
Patient is a 82y old  Male who presents with a chief complaint of "back pain", lumbar laminectomy, fusion, instrumentation (19 Oct 2017 07:11)  HPI: Patient is seen in cardiac consultation due to persistent post op hypotension after lumbar laminectomy/fusion. He had 3000 cc of intra-operative blood loss and was transfused 2 units of blood.  He denies chest pain or dyspnea. He is on IV Dilaudid PCA. He has a prior history of CAD with stents placed about 15 and 20 years ago. He is usually hypertensive and takes lisinopril-HCT and verapamil.    Pt is a 81 y/o with h/o HTN, type II diabetes, depression, hypercholesteremia hypothyroidism, depression, lumbar spinal stenosis who has been having increasing LBP with radiation down his legs.  He was admitted for elective lumbar laminectomy with fusion.  His hospital course was complicated by hypovolumic hypotension and acute blood loss anemia.  Post-op medicine consult called for comanagement.  Overnight he was hypotensive, s/p 2L NS boluses.  Presently c/o LBP from surgery, no CP or SOB.  Pt requesting his medications for his Crohn's.        Followup HPI:  10/21- No complaints. Patient nods in response to yes or no questions but will not speak or open his eyes to this examiner. .He denies chest pain or dyspnea. Nurses report he is uncooperative and verbally abusive to them.    PAST MEDICAL & SURGICAL HISTORY:  Prostate CA  Crohn disease  Hypothyroidism  Depression  Intervertebral disc disorder  Diabetes mellitus  Obesity  Hypercholesterolemia  HTN (hypertension)  Stented coronary artery: 2 stents, 20 yrs ago, 15 yrs ago  CAD (coronary artery disease)  Macular degeneration: right eye  Hearing loss: left hearing aid, right ear deaf  S/P colon resection  S/P lumbar fusion  History of hip replacement, total, right: Revision  History of hip replacement, total, right      Review of symptoms:  Negative except for as noted in today's HPI.      MEDICATIONS  (STANDING):  atorvastatin 20 milliGRAM(s) Oral at bedtime  busPIRone 10 milliGRAM(s) Oral two times a day  colestipol 1 Gram(s) Oral daily  cyclobenzaprine 10 milliGRAM(s) Oral every 8 hours  dextrose 5%. 1000 milliLiter(s) (50 mL/Hr) IV Continuous <Continuous>  dextrose 50% Injectable 12.5 Gram(s) IV Push once  dextrose 50% Injectable 25 Gram(s) IV Push once  dextrose 50% Injectable 25 Gram(s) IV Push once  docusate sodium 100 milliGRAM(s) Oral three times a day  HYDROmorphone PCA (1 mG/mL) 30 milliLiter(s) PCA Continuous PCA Continuous  insulin lispro (HumaLOG) corrective regimen sliding scale   SubCutaneous three times a day before meals  insulin lispro (HumaLOG) corrective regimen sliding scale   SubCutaneous at bedtime  levothyroxine 75 MICROGram(s) Oral daily  multivitamin 1 Tablet(s) Oral daily  senna 2 Tablet(s) Oral at bedtime  sodium chloride 0.9% Bolus 1000 milliLiter(s) IV Bolus once  sodium chloride 0.9% lock flush 3 milliLiter(s) IV Push every 8 hours  sodium chloride 0.9%. 1000 milliLiter(s) (125 mL/Hr) IV Continuous <Continuous>  sodium chloride 0.9%. 1000 milliLiter(s) (200 mL/Hr) IV Continuous <Continuous>  verapamil  milliGRAM(s) Oral daily    MEDICATIONS  (PRN):  acetaminophen   Tablet 650 milliGRAM(s) Oral every 4 hours PRN For Temp greater than 38 C (100.4 F)  acetaminophen   Tablet. 650 milliGRAM(s) Oral every 4 hours PRN Mild Pain (1 - 3)  dextrose Gel 1 Dose(s) Oral once PRN Blood Glucose LESS THAN 70 milliGRAM(s)/deciliter  glucagon  Injectable 1 milliGRAM(s) IntraMuscular once PRN Glucose LESS THAN 70 milligrams/deciliter  HYDROmorphone   Tablet 2 milliGRAM(s) Oral every 4 hours PRN Severe Pain (7 - 10)  HYDROmorphone PCA (1 mG/mL) Rescue Clinician Bolus 0.5 milliGRAM(s) IV Push every 15 minutes PRN for Pain Scale GREATER THAN 6  naloxone Injectable 0.1 milliGRAM(s) IV Push every 3 minutes PRN For ANY of the following changes in patient status:  A. RR LESS THAN 10 breaths per minute, B. Oxygen saturation LESS THAN 90%, C. Sedation score of 6  ondansetron Injectable 4 milliGRAM(s) IV Push every 6 hours PRN Nausea  oxyCODONE    5 mG/acetaminophen 325 mG 1 Tablet(s) Oral every 4 hours PRN Mild Pain (1 - 3)  oxyCODONE    5 mG/acetaminophen 325 mG 2 Tablet(s) Oral every 4 hours PRN Moderate Pain (4 - 6)          Vital Signs Last 24 Hrs  T(C): 37.9 (21 Oct 2017 05:54), Max: 37.9 (20 Oct 2017 18:45)  T(F): 100.2 (21 Oct 2017 05:54), Max: 100.3 (20 Oct 2017 18:45)  HR: 96 (21 Oct 2017 06:00) (86 - 106)  BP: 125/52 (21 Oct 2017 06:00) (72/31 - 125/52)  BP(mean): 68 (21 Oct 2017 06:00) (41 - 79)  RR: 20 (21 Oct 2017 06:00) (14 - 27)  SpO2: 95% (20 Oct 2017 23:00) (90% - 99%)    I&O's Summary    20 Oct 2017 07:01  -  21 Oct 2017 07:00  --------------------------------------------------------  IN: 3100 mL / OUT: 2040 mL / NET: 1060 mL        PHYSICAL EXAM  General Appearance: comfortable   HEENT:   Neck:   Lungs: clear  Heart: 1/6 systolic murmur LSB  Abdomen: nontender  Extremities: no peripheral edema  Neurologic:       INTERPRETATION OF TELEMETRY: sinus rhythm    ECG:    LABS:                          11.0   15.2  )-----------( 137      ( 20 Oct 2017 05:09 )             32.1     10-20    145  |  119<H>  |  34<H>  ----------------------------<  138<H>  4.2   |  19<L>  |  2.18<H>    Ca    6.4<LL>      20 Oct 2017 17:16    TPro  3.8<L>  /  Alb  1.7<L>  /  TBili  0.4  /  DBili  x   /  AST  50<H>  /  ALT  29  /  AlkPhos  28<L>  10-20    CARDIAC MARKERS ( 21 Oct 2017 00:00 )  0.030 ng/mL / x     / x     / x     / x      CARDIAC MARKERS ( 20 Oct 2017 20:21 )  0.036 ng/mL / x     / x     / x     / x      CARDIAC MARKERS ( 20 Oct 2017 17:16 )  0.030 ng/mL / x     / x     / x     / x                        RADIOLOGY & ADDITIONAL STUDIES:    IMPRESSION:  1.Hypotension resolved. No evidence of an acute cardiac event.   2.CAD- s/p stents x 2 about 15 and 20 years ago.  3.Acute kidney injury likely due to hypotension with renal hypoperfusion./ATN.  4. Lumbar laminectomy and fusion 10/19/2017.  5.DM  PLAN:  Continue atorvastatin. Remain off antihypertensives and observe BP and renal function. Suggest consider reducing or stopping IV Dilaudid since it may be contributing to the patient's behavioral issues.

## 2017-10-21 NOTE — PROGRESS NOTE ADULT - SUBJECTIVE AND OBJECTIVE BOX
BP improved, pt c/o LBP from surgery, increasing UO, no new complaints.    Vital Signs Last 24 Hrs  T(C): 37.6 (21 Oct 2017 08:19), Max: 37.9 (20 Oct 2017 18:45)  T(F): 99.6 (21 Oct 2017 08:19), Max: 100.3 (20 Oct 2017 18:45)  HR: 91 (21 Oct 2017 08:09) (88 - 106)  BP: 125/53 (21 Oct 2017 08:09) (72/31 - 125/53)  BP(mean): 70 (21 Oct 2017 08:09) (41 - 70)  RR: 24 (21 Oct 2017 08:09) (14 - 27)  SpO2: 97% (21 Oct 2017 08:09) (90% - 99%)    Daily     Daily Weight in k (21 Oct 2017 05:54)    I&O's Detail    20 Oct 2017 07:01  -  21 Oct 2017 07:00  --------------------------------------------------------  IN:    sodium chloride 0.9%: 1600 mL    sodium chloride 0.9%.: 1500 mL  Total IN: 3100 mL    OUT:    Drain: 490 mL    Indwelling Catheter - Urethral: 1550 mL  Total OUT: 2040 mL    Total NET: 1060 mL          CAPILLARY BLOOD GLUCOSE      POCT Blood Glucose.: 139 mg/dL (21 Oct 2017 08:09)  POCT Blood Glucose.: 122 mg/dL (20 Oct 2017 23:19)  POCT Blood Glucose.: 132 mg/dL (20 Oct 2017 18:22)  POCT Blood Glucose.: 176 mg/dL (20 Oct 2017 13:15)  POCT Blood Glucose.: 200 mg/dL (20 Oct 2017 12:21)          CARDIAC MARKERS ( 21 Oct 2017 00:00 )  0.030 ng/mL / x     / x     / x     / x      CARDIAC MARKERS ( 20 Oct 2017 20:21 )  0.036 ng/mL / x     / x     / x     / x      CARDIAC MARKERS ( 20 Oct 2017 17:16 )  0.030 ng/mL / x     / x     / x     / x                                  9.6    10.7  )-----------( 128      ( 21 Oct 2017 05:30 )             28.1       10-    145  |  118<H>  |  31<H>  ----------------------------<  128<H>  4.1   |  21<L>  |  1.88<H>    Ca    6.8<L>      21 Oct 2017 05:30    TPro  3.8<L>  /  Alb  1.7<L>  /  TBili  0.4  /  DBili  x   /  AST  50<H>  /  ALT  29  /  AlkPhos  28<L>  10-20          LIVER FUNCTIONS - ( 20 Oct 2017 05:09 )  Alb: 1.7 g/dL / Pro: 3.8 gm/dL / ALK PHOS: 28 U/L / ALT: 29 U/L / AST: 50 U/L / GGT: x                     MEDICATIONS  (STANDING):  atorvastatin 20 milliGRAM(s) Oral at bedtime  busPIRone 10 milliGRAM(s) Oral two times a day  colestipol 1 Gram(s) Oral daily  cyclobenzaprine 10 milliGRAM(s) Oral every 8 hours  dextrose 5%. 1000 milliLiter(s) (50 mL/Hr) IV Continuous <Continuous>  dextrose 50% Injectable 12.5 Gram(s) IV Push once  dextrose 50% Injectable 25 Gram(s) IV Push once  dextrose 50% Injectable 25 Gram(s) IV Push once  docusate sodium 100 milliGRAM(s) Oral three times a day  HYDROmorphone PCA (1 mG/mL) 30 milliLiter(s) PCA Continuous PCA Continuous  insulin lispro (HumaLOG) corrective regimen sliding scale   SubCutaneous three times a day before meals  insulin lispro (HumaLOG) corrective regimen sliding scale   SubCutaneous at bedtime  levothyroxine 75 MICROGram(s) Oral daily  multivitamin 1 Tablet(s) Oral daily  senna 2 Tablet(s) Oral at bedtime  sodium chloride 0.9% lock flush 3 milliLiter(s) IV Push every 8 hours  sodium chloride 0.9%. 1000 milliLiter(s) (75 mL/Hr) IV Continuous <Continuous>  verapamil  milliGRAM(s) Oral daily    MEDICATIONS  (PRN):  acetaminophen   Tablet 650 milliGRAM(s) Oral every 4 hours PRN For Temp greater than 38 C (100.4 F)  acetaminophen   Tablet. 650 milliGRAM(s) Oral every 4 hours PRN Mild Pain (1 - 3)  dextrose Gel 1 Dose(s) Oral once PRN Blood Glucose LESS THAN 70 milliGRAM(s)/deciliter  glucagon  Injectable 1 milliGRAM(s) IntraMuscular once PRN Glucose LESS THAN 70 milligrams/deciliter  HYDROmorphone   Tablet 2 milliGRAM(s) Oral every 4 hours PRN Severe Pain (7 - 10)  HYDROmorphone PCA (1 mG/mL) Rescue Clinician Bolus 0.5 milliGRAM(s) IV Push every 15 minutes PRN for Pain Scale GREATER THAN 6  naloxone Injectable 0.1 milliGRAM(s) IV Push every 3 minutes PRN For ANY of the following changes in patient status:  A. RR LESS THAN 10 breaths per minute, B. Oxygen saturation LESS THAN 90%, C. Sedation score of 6  ondansetron Injectable 4 milliGRAM(s) IV Push every 6 hours PRN Nausea  oxyCODONE    5 mG/acetaminophen 325 mG 1 Tablet(s) Oral every 4 hours PRN Mild Pain (1 - 3)  oxyCODONE    5 mG/acetaminophen 325 mG 2 Tablet(s) Oral every 4 hours PRN Moderate Pain (4 - 6)

## 2017-10-22 LAB
ANION GAP SERPL CALC-SCNC: 7 MMOL/L — SIGNIFICANT CHANGE UP (ref 5–17)
BASOPHILS # BLD AUTO: 0 K/UL — SIGNIFICANT CHANGE UP (ref 0–0.2)
BASOPHILS NFR BLD AUTO: 0.5 % — SIGNIFICANT CHANGE UP (ref 0–2)
BUN SERPL-MCNC: 26 MG/DL — HIGH (ref 7–23)
CALCIUM SERPL-MCNC: 7.3 MG/DL — LOW (ref 8.5–10.1)
CHLORIDE SERPL-SCNC: 118 MMOL/L — HIGH (ref 96–108)
CO2 SERPL-SCNC: 20 MMOL/L — LOW (ref 22–31)
CREAT SERPL-MCNC: 1.41 MG/DL — HIGH (ref 0.5–1.3)
EOSINOPHIL # BLD AUTO: 0.2 K/UL — SIGNIFICANT CHANGE UP (ref 0–0.5)
EOSINOPHIL NFR BLD AUTO: 2.4 % — SIGNIFICANT CHANGE UP (ref 0–6)
GLUCOSE BLDC GLUCOMTR-MCNC: 143 MG/DL — HIGH (ref 70–99)
GLUCOSE BLDC GLUCOMTR-MCNC: 158 MG/DL — HIGH (ref 70–99)
GLUCOSE BLDC GLUCOMTR-MCNC: 168 MG/DL — HIGH (ref 70–99)
GLUCOSE BLDC GLUCOMTR-MCNC: 196 MG/DL — HIGH (ref 70–99)
GLUCOSE SERPL-MCNC: 159 MG/DL — HIGH (ref 70–99)
HCT VFR BLD CALC: 25.2 % — LOW (ref 39–50)
HGB BLD-MCNC: 8.6 G/DL — LOW (ref 13–17)
LYMPHOCYTES # BLD AUTO: 0.8 K/UL — LOW (ref 1–3.3)
LYMPHOCYTES # BLD AUTO: 9.9 % — LOW (ref 13–44)
MCHC RBC-ENTMCNC: 31.4 PG — SIGNIFICANT CHANGE UP (ref 27–34)
MCHC RBC-ENTMCNC: 34 GM/DL — SIGNIFICANT CHANGE UP (ref 32–36)
MCV RBC AUTO: 92.5 FL — SIGNIFICANT CHANGE UP (ref 80–100)
MONOCYTES # BLD AUTO: 0.6 K/UL — SIGNIFICANT CHANGE UP (ref 0–0.9)
MONOCYTES NFR BLD AUTO: 6.6 % — SIGNIFICANT CHANGE UP (ref 2–14)
NEUTROPHILS # BLD AUTO: 6.7 K/UL — SIGNIFICANT CHANGE UP (ref 1.8–7.4)
NEUTROPHILS NFR BLD AUTO: 80.6 % — HIGH (ref 43–77)
PLATELET # BLD AUTO: 136 K/UL — LOW (ref 150–400)
POTASSIUM SERPL-MCNC: 3.9 MMOL/L — SIGNIFICANT CHANGE UP (ref 3.5–5.3)
POTASSIUM SERPL-SCNC: 3.9 MMOL/L — SIGNIFICANT CHANGE UP (ref 3.5–5.3)
RBC # BLD: 2.72 M/UL — LOW (ref 4.2–5.8)
RBC # FLD: 14.3 % — SIGNIFICANT CHANGE UP (ref 10.3–14.5)
SODIUM SERPL-SCNC: 145 MMOL/L — SIGNIFICANT CHANGE UP (ref 135–145)
WBC # BLD: 8.3 K/UL — SIGNIFICANT CHANGE UP (ref 3.8–10.5)
WBC # FLD AUTO: 8.3 K/UL — SIGNIFICANT CHANGE UP (ref 3.8–10.5)

## 2017-10-22 RX ORDER — VERAPAMIL HCL 240 MG
240 CAPSULE, EXTENDED RELEASE PELLETS 24 HR ORAL DAILY
Qty: 0 | Refills: 0 | Status: DISCONTINUED | OUTPATIENT
Start: 2017-10-22 | End: 2017-10-22

## 2017-10-22 RX ADMIN — OXYCODONE AND ACETAMINOPHEN 2 TABLET(S): 5; 325 TABLET ORAL at 06:05

## 2017-10-22 RX ADMIN — OXYCODONE AND ACETAMINOPHEN 2 TABLET(S): 5; 325 TABLET ORAL at 22:40

## 2017-10-22 RX ADMIN — ATORVASTATIN CALCIUM 20 MILLIGRAM(S): 80 TABLET, FILM COATED ORAL at 22:40

## 2017-10-22 RX ADMIN — SODIUM CHLORIDE 3 MILLILITER(S): 9 INJECTION INTRAMUSCULAR; INTRAVENOUS; SUBCUTANEOUS at 22:39

## 2017-10-22 RX ADMIN — Medication 1: at 12:42

## 2017-10-22 RX ADMIN — OXYCODONE AND ACETAMINOPHEN 2 TABLET(S): 5; 325 TABLET ORAL at 10:51

## 2017-10-22 RX ADMIN — CYCLOBENZAPRINE HYDROCHLORIDE 10 MILLIGRAM(S): 10 TABLET, FILM COATED ORAL at 22:40

## 2017-10-22 RX ADMIN — Medication 1: at 16:47

## 2017-10-22 RX ADMIN — CYCLOBENZAPRINE HYDROCHLORIDE 10 MILLIGRAM(S): 10 TABLET, FILM COATED ORAL at 14:19

## 2017-10-22 RX ADMIN — SODIUM CHLORIDE 3 MILLILITER(S): 9 INJECTION INTRAMUSCULAR; INTRAVENOUS; SUBCUTANEOUS at 14:18

## 2017-10-22 RX ADMIN — SODIUM CHLORIDE 3 MILLILITER(S): 9 INJECTION INTRAMUSCULAR; INTRAVENOUS; SUBCUTANEOUS at 05:32

## 2017-10-22 RX ADMIN — OXYCODONE AND ACETAMINOPHEN 2 TABLET(S): 5; 325 TABLET ORAL at 05:34

## 2017-10-22 RX ADMIN — OXYCODONE AND ACETAMINOPHEN 2 TABLET(S): 5; 325 TABLET ORAL at 19:25

## 2017-10-22 RX ADMIN — OXYCODONE AND ACETAMINOPHEN 2 TABLET(S): 5; 325 TABLET ORAL at 12:00

## 2017-10-22 RX ADMIN — OXYCODONE AND ACETAMINOPHEN 2 TABLET(S): 5; 325 TABLET ORAL at 23:10

## 2017-10-22 RX ADMIN — Medication 10 MILLIGRAM(S): at 18:35

## 2017-10-22 RX ADMIN — Medication 1: at 08:49

## 2017-10-22 RX ADMIN — OXYCODONE AND ACETAMINOPHEN 2 TABLET(S): 5; 325 TABLET ORAL at 18:35

## 2017-10-22 RX ADMIN — Medication 1 TABLET(S): at 12:43

## 2017-10-22 NOTE — PROGRESS NOTE ADULT - SUBJECTIVE AND OBJECTIVE BOX
Pt c/o LBP, no CP or SOB.    Vital Signs Last 24 Hrs  T(C): 36.8 (22 Oct 2017 08:00), Max: 37.9 (22 Oct 2017 06:49)  T(F): 98.3 (22 Oct 2017 08:00), Max: 100.3 (22 Oct 2017 06:49)  HR: 95 (22 Oct 2017 10:00) (71 - 111)  BP: 132/59 (22 Oct 2017 08:00) (101/47 - 132/59)  BP(mean): 75 (22 Oct 2017 08:00) (37 - 78)  RR: 31 (22 Oct 2017 10:00) (11 - 80)  SpO2: 91% (22 Oct 2017 08:00) (89% - 100%)    Daily     Daily     I&O's Detail    21 Oct 2017 07:01  -  22 Oct 2017 07:00  --------------------------------------------------------  IN:    sodium chloride 0.9%: 1850 mL  Total IN: 1850 mL    OUT:    Drain: 300 mL    Indwelling Catheter - Urethral: 800 mL    Voided: 1000 mL  Total OUT: 2100 mL    Total NET: -250 mL      22 Oct 2017 07:01  -  22 Oct 2017 10:13  --------------------------------------------------------  IN:  Total IN: 0 mL    OUT:    Indwelling Catheter - Urethral: 250 mL  Total OUT: 250 mL    Total NET: -250 mL          CAPILLARY BLOOD GLUCOSE      POCT Blood Glucose.: 196 mg/dL (22 Oct 2017 08:48)  POCT Blood Glucose.: 246 mg/dL (21 Oct 2017 22:52)  POCT Blood Glucose.: 134 mg/dL (21 Oct 2017 17:36)  POCT Blood Glucose.: 185 mg/dL (21 Oct 2017 11:44)          CARDIAC MARKERS ( 21 Oct 2017 00:00 )  0.030 ng/mL / x     / x     / x     / x      CARDIAC MARKERS ( 20 Oct 2017 20:21 )  0.036 ng/mL / x     / x     / x     / x      CARDIAC MARKERS ( 20 Oct 2017 17:16 )  0.030 ng/mL / x     / x     / x     / x                                  8.6    8.3   )-----------( 136      ( 22 Oct 2017 05:04 )             25.2       10-22    145  |  118<H>  |  26<H>  ----------------------------<  159<H>  3.9   |  20<L>  |  1.41<H>    Ca    7.3<L>      22 Oct 2017 05:04                        MEDICATIONS  (STANDING):  atorvastatin 20 milliGRAM(s) Oral at bedtime  busPIRone 10 milliGRAM(s) Oral two times a day  colestipol 1 Gram(s) Oral daily  cyclobenzaprine 10 milliGRAM(s) Oral every 8 hours  dextrose 50% Injectable 12.5 Gram(s) IV Push once  dextrose 50% Injectable 25 Gram(s) IV Push once  dextrose 50% Injectable 25 Gram(s) IV Push once  docusate sodium 100 milliGRAM(s) Oral three times a day  insulin lispro (HumaLOG) corrective regimen sliding scale   SubCutaneous three times a day before meals  insulin lispro (HumaLOG) corrective regimen sliding scale   SubCutaneous at bedtime  levothyroxine 75 MICROGram(s) Oral daily  multivitamin 1 Tablet(s) Oral daily  senna 2 Tablet(s) Oral at bedtime  sodium chloride 0.9% lock flush 3 milliLiter(s) IV Push every 8 hours  tamsulosin 0.4 milliGRAM(s) Oral at bedtime  verapamil  milliGRAM(s) Oral daily    MEDICATIONS  (PRN):  acetaminophen   Tablet 650 milliGRAM(s) Oral every 4 hours PRN For Temp greater than 38 C (100.4 F)  acetaminophen   Tablet. 650 milliGRAM(s) Oral every 4 hours PRN Mild Pain (1 - 3)  dextrose Gel 1 Dose(s) Oral once PRN Blood Glucose LESS THAN 70 milliGRAM(s)/deciliter  glucagon  Injectable 1 milliGRAM(s) IntraMuscular once PRN Glucose LESS THAN 70 milligrams/deciliter  HYDROmorphone   Tablet 2 milliGRAM(s) Oral every 4 hours PRN Severe Pain (7 - 10)  oxyCODONE    5 mG/acetaminophen 325 mG 1 Tablet(s) Oral every 4 hours PRN Mild Pain (1 - 3)  oxyCODONE    5 mG/acetaminophen 325 mG 2 Tablet(s) Oral every 4 hours PRN Moderate Pain (4 - 6)

## 2017-10-22 NOTE — PROGRESS NOTE ADULT - SUBJECTIVE AND OBJECTIVE BOX
Patient is a 82y old  Male who presents with a chief complaint of "back pain", lumbar laminectomy, fusion, instrumentation (19 Oct 2017 07:11)  HPI: Patient is seen in cardiac consultation due to persistent post op hypotension after lumbar laminectomy/fusion. He had 3000 cc of intra-operative blood loss and was transfused 2 units of blood.  He denies chest pain or dyspnea. He is on IV Dilaudid PCA. He has a prior history of CAD with stents placed about 15 and 20 years ago. He is usually hypertensive and takes lisinopril-HCT and verapamil.    Pt is a 81 y/o with h/o HTN, type II diabetes, depression, hypercholesteremia hypothyroidism, depression, lumbar spinal stenosis who has been having increasing LBP with radiation down his legs.  He was admitted for elective lumbar laminectomy with fusion.  His hospital course was complicated by hypovolumic hypotension and acute blood loss anemia.  Post-op medicine consult called for comanagement.  Overnight he was hypotensive, s/p 2L NS boluses.  Presently c/o LBP from surgery, no CP or SOB.  Pt requesting his medications for his Crohn's.      Followup HPI:  10/21- No complaints. Patient nods in response to yes or no questions but will not speak or open his eyes to this examiner. .He denies chest pain or dyspnea. Nurses report he is uncooperative and verbally abusive to them.  10/22- No complaints. Denies chest pain or dyspnea. Patient is off PCA since yesterday. He is much more cooperative.    PAST MEDICAL & SURGICAL HISTORY:  Prostate CA  Crohn disease  Hypothyroidism  Depression  Intervertebral disc disorder  Diabetes mellitus  Obesity  Hypercholesterolemia  HTN (hypertension)  Stented coronary artery: 2 stents, 20 yrs ago, 15 yrs ago  CAD (coronary artery disease)  Macular degeneration: right eye  Hearing loss: left hearing aid, right ear deaf  S/P colon resection  S/P lumbar fusion  History of hip replacement, total, right: Revision  History of hip replacement, total, right      Review of symptoms:  Negative except for as noted in today's HPI.      MEDICATIONS  (STANDING):  atorvastatin 20 milliGRAM(s) Oral at bedtime  busPIRone 10 milliGRAM(s) Oral two times a day  colestipol 1 Gram(s) Oral daily  cyclobenzaprine 10 milliGRAM(s) Oral every 8 hours  dextrose 5%. 1000 milliLiter(s) (50 mL/Hr) IV Continuous <Continuous>  dextrose 50% Injectable 12.5 Gram(s) IV Push once  dextrose 50% Injectable 25 Gram(s) IV Push once  dextrose 50% Injectable 25 Gram(s) IV Push once  docusate sodium 100 milliGRAM(s) Oral three times a day  insulin lispro (HumaLOG) corrective regimen sliding scale   SubCutaneous three times a day before meals  insulin lispro (HumaLOG) corrective regimen sliding scale   SubCutaneous at bedtime  levothyroxine 75 MICROGram(s) Oral daily  multivitamin 1 Tablet(s) Oral daily  senna 2 Tablet(s) Oral at bedtime  sodium chloride 0.9% lock flush 3 milliLiter(s) IV Push every 8 hours  sodium chloride 0.9%. 1000 milliLiter(s) (75 mL/Hr) IV Continuous <Continuous>  tamsulosin 0.4 milliGRAM(s) Oral at bedtime  verapamil  milliGRAM(s) Oral daily    MEDICATIONS  (PRN):  acetaminophen   Tablet 650 milliGRAM(s) Oral every 4 hours PRN For Temp greater than 38 C (100.4 F)  acetaminophen   Tablet. 650 milliGRAM(s) Oral every 4 hours PRN Mild Pain (1 - 3)  dextrose Gel 1 Dose(s) Oral once PRN Blood Glucose LESS THAN 70 milliGRAM(s)/deciliter  glucagon  Injectable 1 milliGRAM(s) IntraMuscular once PRN Glucose LESS THAN 70 milligrams/deciliter  HYDROmorphone   Tablet 2 milliGRAM(s) Oral every 4 hours PRN Severe Pain (7 - 10)  oxyCODONE    5 mG/acetaminophen 325 mG 1 Tablet(s) Oral every 4 hours PRN Mild Pain (1 - 3)  oxyCODONE    5 mG/acetaminophen 325 mG 2 Tablet(s) Oral every 4 hours PRN Moderate Pain (4 - 6)          Vital Signs Last 24 Hrs  T(C): 37.9 (22 Oct 2017 06:49), Max: 37.9 (22 Oct 2017 06:49)  T(F): 100.3 (22 Oct 2017 06:49), Max: 100.3 (22 Oct 2017 06:49)  HR: 83 (22 Oct 2017 06:00) (71 - 111)  BP: 119/50 (22 Oct 2017 06:00) (101/47 - 128/62)  BP(mean): 66 (22 Oct 2017 06:00) (37 - 78)  RR: 19 (22 Oct 2017 06:00) (11 - 80)  SpO2: 98% (22 Oct 2017 06:00) (89% - 100%)    I&O's Summary    21 Oct 2017 07:01  -  22 Oct 2017 07:00  --------------------------------------------------------  IN: 1775 mL / OUT: 2100 mL / NET: -325 mL        PHYSICAL EXAM  General Appearance: alert and oriented  HEENT:   Neck:   Lungs: clear  Heart: 1/6 systolic murmur LSB  Abdomen: nontender  Extremities: no edema  Neurologic:       INTERPRETATION OF TELEMETRY: sinus rhythm     ECG:    LABS:                          8.6    8.3   )-----------( 136      ( 22 Oct 2017 05:04 )             25.2     10-22    145  |  118<H>  |  26<H>  ----------------------------<  159<H>  3.9   |  20<L>  |  1.41<H>    Ca    7.3<L>      22 Oct 2017 05:04      CARDIAC MARKERS ( 21 Oct 2017 00:00 )  0.030 ng/mL / x     / x     / x     / x      CARDIAC MARKERS ( 20 Oct 2017 20:21 )  0.036 ng/mL / x     / x     / x     / x      CARDIAC MARKERS ( 20 Oct 2017 17:16 )  0.030 ng/mL / x     / x     / x     / x                        RADIOLOGY & ADDITIONAL STUDIES:    IMPRESSION:  1..CAD- s/p stents x 2 about 15 and 20 years ago .Stable.   2..Acute kidney injury likely due to hypotension with renal hypoperfusion./ATN- creatinine back to baseline.   3.Hypertension. Stable.   4.Acute anemia due to intraoperative blood loss.  5. Lumbar laminectomy and fusion 10/19/2017.  6.DM  PLAN:  Suggest hold verapamil if SBP less than 140. Continue atorvastatin. Mobilize patient per spine surgeon.

## 2017-10-22 NOTE — PROGRESS NOTE ADULT - SUBJECTIVE AND OBJECTIVE BOX
POST OPERATIVE DAY #:  3  STATUS POST:   Lumbar Lami / TLIF                   SUBJECTIVE: Patient seen and examined. Doing better today. Pain well controlled off PCA. Tolerating PO.    OBJECTIVE:     Vital Signs Last 24 Hrs  T(C): 37.9 (22 Oct 2017 06:49), Max: 37.9 (22 Oct 2017 06:49)  T(F): 100.3 (22 Oct 2017 06:49), Max: 100.3 (22 Oct 2017 06:49)  HR: 86 (22 Oct 2017 07:00) (71 - 111)  BP: 104/47 (22 Oct 2017 07:00) (101/47 - 128/62)  BP(mean): 60 (22 Oct 2017 07:00) (37 - 78)  RR: 17 (22 Oct 2017 07:00) (11 - 80)  SpO2: 95% (22 Oct 2017 07:00) (89% - 100%)    Awake and alert  HEENT: unremarkable  Neck: supple  Back:          Dressing: clean/dry/intact             Drains: output per shift = 110cc  Bilateral Upper Extremities:         Good Motor Strength         Sensation intact  Bilateral Lower Extremities:          Good Motor Strength          Sensation Intact                   I&O's Detail    21 Oct 2017 07:01  -  22 Oct 2017 07:00  --------------------------------------------------------  IN:    sodium chloride 0.9%.: 1775 mL  Total IN: 1775 mL    OUT:    Drain: 300 mL    Indwelling Catheter - Urethral: 800 mL    Voided: 1000 mL  Total OUT: 2100 mL    Total NET: -325 mL          LABS:                        8.6    8.3   )-----------( 136      ( 22 Oct 2017 05:04 )             25.2     10-22    145  |  118<H>  |  26<H>  ----------------------------<  159<H>  3.9   |  20<L>  |  1.41<H>    Ca    7.3<L>      22 Oct 2017 05:04      A/P :  82y Male s/p Lumbar Lami / TLIF            POD # 3   -    Pain control  -    DVT ppx: SCDs    -    OOB as tolerated  -    Physical Therapy, re; ambulation  -    Continue hemovac  -    DC [place], DC IVF  -    Continue present treatment

## 2017-10-23 LAB
ANION GAP SERPL CALC-SCNC: 7 MMOL/L — SIGNIFICANT CHANGE UP (ref 5–17)
BUN SERPL-MCNC: 19 MG/DL — SIGNIFICANT CHANGE UP (ref 7–23)
CALCIUM SERPL-MCNC: 7.9 MG/DL — LOW (ref 8.5–10.1)
CHLORIDE SERPL-SCNC: 117 MMOL/L — HIGH (ref 96–108)
CO2 SERPL-SCNC: 23 MMOL/L — SIGNIFICANT CHANGE UP (ref 22–31)
CREAT SERPL-MCNC: 1.25 MG/DL — SIGNIFICANT CHANGE UP (ref 0.5–1.3)
GLUCOSE BLDC GLUCOMTR-MCNC: 111 MG/DL — HIGH (ref 70–99)
GLUCOSE BLDC GLUCOMTR-MCNC: 129 MG/DL — HIGH (ref 70–99)
GLUCOSE BLDC GLUCOMTR-MCNC: 141 MG/DL — HIGH (ref 70–99)
GLUCOSE BLDC GLUCOMTR-MCNC: 195 MG/DL — HIGH (ref 70–99)
GLUCOSE SERPL-MCNC: 143 MG/DL — HIGH (ref 70–99)
HCT VFR BLD CALC: 25.4 % — LOW (ref 39–50)
HGB BLD-MCNC: 8.3 G/DL — LOW (ref 13–17)
MCHC RBC-ENTMCNC: 30.6 PG — SIGNIFICANT CHANGE UP (ref 27–34)
MCHC RBC-ENTMCNC: 32.9 GM/DL — SIGNIFICANT CHANGE UP (ref 32–36)
MCV RBC AUTO: 92.9 FL — SIGNIFICANT CHANGE UP (ref 80–100)
PLATELET # BLD AUTO: 168 K/UL — SIGNIFICANT CHANGE UP (ref 150–400)
POTASSIUM SERPL-MCNC: 3.9 MMOL/L — SIGNIFICANT CHANGE UP (ref 3.5–5.3)
POTASSIUM SERPL-SCNC: 3.9 MMOL/L — SIGNIFICANT CHANGE UP (ref 3.5–5.3)
RBC # BLD: 2.73 M/UL — LOW (ref 4.2–5.8)
RBC # FLD: 14.3 % — SIGNIFICANT CHANGE UP (ref 10.3–14.5)
SODIUM SERPL-SCNC: 147 MMOL/L — HIGH (ref 135–145)
SURGICAL PATHOLOGY FINAL REPORT - CH: SIGNIFICANT CHANGE UP
WBC # BLD: 7.5 K/UL — SIGNIFICANT CHANGE UP (ref 3.8–10.5)
WBC # FLD AUTO: 7.5 K/UL — SIGNIFICANT CHANGE UP (ref 3.8–10.5)

## 2017-10-23 RX ORDER — SENNA PLUS 8.6 MG/1
2 TABLET ORAL AT BEDTIME
Qty: 0 | Refills: 0 | Status: DISCONTINUED | OUTPATIENT
Start: 2017-10-23 | End: 2017-10-27

## 2017-10-23 RX ORDER — DOCUSATE SODIUM 100 MG
100 CAPSULE ORAL THREE TIMES A DAY
Qty: 0 | Refills: 0 | Status: DISCONTINUED | OUTPATIENT
Start: 2017-10-23 | End: 2017-10-27

## 2017-10-23 RX ORDER — VERAPAMIL HCL 240 MG
240 CAPSULE, EXTENDED RELEASE PELLETS 24 HR ORAL DAILY
Qty: 0 | Refills: 0 | Status: DISCONTINUED | OUTPATIENT
Start: 2017-10-23 | End: 2017-10-23

## 2017-10-23 RX ORDER — SODIUM CHLORIDE 9 MG/ML
1000 INJECTION INTRAMUSCULAR; INTRAVENOUS; SUBCUTANEOUS
Qty: 0 | Refills: 0 | Status: DISCONTINUED | OUTPATIENT
Start: 2017-10-23 | End: 2017-10-23

## 2017-10-23 RX ORDER — SODIUM CHLORIDE 9 MG/ML
1000 INJECTION, SOLUTION INTRAVENOUS
Qty: 0 | Refills: 0 | Status: DISCONTINUED | OUTPATIENT
Start: 2017-10-23 | End: 2017-10-27

## 2017-10-23 RX ADMIN — Medication 1 GRAM(S): at 06:39

## 2017-10-23 RX ADMIN — OXYCODONE AND ACETAMINOPHEN 2 TABLET(S): 5; 325 TABLET ORAL at 23:00

## 2017-10-23 RX ADMIN — CYCLOBENZAPRINE HYDROCHLORIDE 10 MILLIGRAM(S): 10 TABLET, FILM COATED ORAL at 14:20

## 2017-10-23 RX ADMIN — OXYCODONE AND ACETAMINOPHEN 2 TABLET(S): 5; 325 TABLET ORAL at 12:00

## 2017-10-23 RX ADMIN — OXYCODONE AND ACETAMINOPHEN 2 TABLET(S): 5; 325 TABLET ORAL at 06:15

## 2017-10-23 RX ADMIN — Medication 10 MILLIGRAM(S): at 17:27

## 2017-10-23 RX ADMIN — SODIUM CHLORIDE 3 MILLILITER(S): 9 INJECTION INTRAMUSCULAR; INTRAVENOUS; SUBCUTANEOUS at 22:29

## 2017-10-23 RX ADMIN — SODIUM CHLORIDE 3 MILLILITER(S): 9 INJECTION INTRAMUSCULAR; INTRAVENOUS; SUBCUTANEOUS at 14:17

## 2017-10-23 RX ADMIN — SODIUM CHLORIDE 3 MILLILITER(S): 9 INJECTION INTRAMUSCULAR; INTRAVENOUS; SUBCUTANEOUS at 05:58

## 2017-10-23 RX ADMIN — TAMSULOSIN HYDROCHLORIDE 0.4 MILLIGRAM(S): 0.4 CAPSULE ORAL at 22:32

## 2017-10-23 RX ADMIN — Medication 10 MILLIGRAM(S): at 06:16

## 2017-10-23 RX ADMIN — OXYCODONE AND ACETAMINOPHEN 2 TABLET(S): 5; 325 TABLET ORAL at 11:02

## 2017-10-23 RX ADMIN — OXYCODONE AND ACETAMINOPHEN 2 TABLET(S): 5; 325 TABLET ORAL at 17:28

## 2017-10-23 RX ADMIN — Medication 1 TABLET(S): at 14:20

## 2017-10-23 RX ADMIN — CYCLOBENZAPRINE HYDROCHLORIDE 10 MILLIGRAM(S): 10 TABLET, FILM COATED ORAL at 22:29

## 2017-10-23 RX ADMIN — Medication 75 MICROGRAM(S): at 06:16

## 2017-10-23 RX ADMIN — Medication 100 MILLIGRAM(S): at 22:29

## 2017-10-23 RX ADMIN — OXYCODONE AND ACETAMINOPHEN 2 TABLET(S): 5; 325 TABLET ORAL at 22:26

## 2017-10-23 RX ADMIN — CYCLOBENZAPRINE HYDROCHLORIDE 10 MILLIGRAM(S): 10 TABLET, FILM COATED ORAL at 06:16

## 2017-10-23 RX ADMIN — ATORVASTATIN CALCIUM 20 MILLIGRAM(S): 80 TABLET, FILM COATED ORAL at 22:29

## 2017-10-23 RX ADMIN — OXYCODONE AND ACETAMINOPHEN 2 TABLET(S): 5; 325 TABLET ORAL at 17:58

## 2017-10-23 RX ADMIN — SENNA PLUS 2 TABLET(S): 8.6 TABLET ORAL at 22:29

## 2017-10-23 RX ADMIN — OXYCODONE AND ACETAMINOPHEN 2 TABLET(S): 5; 325 TABLET ORAL at 06:42

## 2017-10-23 NOTE — PROGRESS NOTE ADULT - SUBJECTIVE AND OBJECTIVE BOX
[facility]                                                            Orthopedic Sp[facility] Progress Note      POST OPERATIVE DAY #: 4  STATUS POST:       fusion lami           Pre-Op Dx: Postlaminectomy syndrome of lumbar region  Lumbar stenosis with neurogenic claudication    Post-Op Dx:  Postlaminectomy syndrome of lumbar region  Lumbar stenosis with neurogenic claudication    Prin. Procedure:    fusion lami  revision    SUBJECTIVE: Patient seen and examined.     Pain (0-10): 3  Current Pain Management:  [ ] PCA   [x ] Po Analgesics [ ] IM /IV Anagesics     Vital Signs Last 24 Hrs  T(C): 37.1 (22 Oct 2017 20:50), Max: 37.4 (22 Oct 2017 16:46)  T(F): 98.7 (22 Oct 2017 20:50), Max: 99.4 (22 Oct 2017 16:46)  HR: 80 (23 Oct 2017 05:00) (78 - 109)  BP: 122/62 (23 Oct 2017 05:00) (105/48 - 136/58)  BP(mean): 76 (23 Oct 2017 05:00) (62 - 80)  RR: 22 (23 Oct 2017 05:00) (17 - 31)  SpO2: 96% (23 Oct 2017 02:00) (90% - 96%)  I&O's Detail    22 Oct 2017 07:01  -  23 Oct 2017 07:00  --------------------------------------------------------  IN:  Total IN: 0 mL    OUT:    Drain: 190 mL........left in place    Indwelling Catheter - Urethral: 250 mL    Voided: 1125 mL  Total OUT: 1565 mL    Total NET: -1565 mL          OBJECTIVE:looks good  BP stable        Wound /Dressing: clean/dry    Lumbar: ROM : limited  Neurological: A/O x  3             Sensation: [ x] intact to light touch  [ ] decreased:          Motor exam: [x  ]          [ x] Upper extremity    Delt      Bicp       Tri      Wrist ext  Wrst Flex       Digit Ext Digit Flex                                     R         5/5        5/5        5/5       5/5            5/5            5/5       5/5          5/5                                     L          5/5        5/5        5/5       5/5            5/5            5/5       5/5          5/5         [x ] Lower ext.     Hip Flx  Hip Ext   Hip Abd  Hip Add Quad   Hamstrg   TA       EHL      GS                              R        5/5        5/5        5/5             5/5        5/5        5/5        5/5     5/5      5/5                              L         5/5        5/5        5/5             5/5        5/5        5/5        5/5     5/5      5/5                                                        [ x] Vascular :  wnl           Tension Signs: neg          Long Tract Findings: neg    RADIOLOGY & ADDITIONAL STUDIES:                                               LABS:                        8.6    8.3   )-----------( 136      ( 22 Oct 2017 05:04 )             25.2     10-22    145  |  118<H>  |  26<H>  ----------------------------<  159<H>  3.9   |  20<L>  |  1.41<H>    Ca    7.3<L>      22 Oct 2017 05:04          Blood Culture:   Wound Culture:     A/P :  82y Male   s/p:    -    Pain control  -    DVT ppx: SCDs    -    Abx:    -    Review labs as indicated     -    Physical Therapy  -    Weight bearing status:   -    Dispo: Home [ ]     Rehab [ ]

## 2017-10-23 NOTE — PROGRESS NOTE ADULT - SUBJECTIVE AND OBJECTIVE BOX
Patient is a 82y old  Male who presents with a chief complaint of "back pain", lumbar laminectomy, fusion, instrumentation (19 Oct 2017 07:11)  Patient is seen in cardiac consultation due to persistent post op hypotension after lumbar laminectomy/fusion. He had 3000 cc of intra-operative blood loss and was transfused 2 units of blood.  He denies chest pain or dyspnea. He is on IV Dilaudid PCA. He has a prior history of CAD with stents placed about 15 and 20 years ago. He is usually hypertensive and takes lisinopril-HCT and verapamil.    Pt is a 81 y/o with h/o HTN, type II diabetes, depression, hypercholesteremia hypothyroidism, depression, lumbar spinal stenosis who has been having increasing LBP with radiation down his legs.  He was admitted for elective lumbar laminectomy with fusion.  His hospital course was complicated by hypovolumic hypotension and acute blood loss anemia.  Post-op medicine consult called for comanagement.  Overnight he was hypotensive, s/p 2L NS boluses.  Presently c/o LBP from surgery, no CP or SOB.  Pt requesting his medications for his Crohn's.        Followup HPI:  10/21- No complaints. Patient nods in response to yes or no questions but will not speak or open his eyes to this examiner. .He denies chest pain or dyspnea. Nurses report he is uncooperative and verbally abusive to them.  10/22- No complaints. Denies chest pain or dyspnea. Patient is off PCA since yesterday. He is much more cooperative.  10/23- No complaints. He did not received verapamil yesterday because BP did not meet parameters and it was subsequently discontinued.     PAST MEDICAL & SURGICAL HISTORY:  Prostate CA  Crohn disease  Hypothyroidism  Depression  Intervertebral disc disorder  Diabetes mellitus  Obesity  Hypercholesterolemia  HTN (hypertension)  Stented coronary artery: 2 stents, 20 yrs ago, 15 yrs ago  CAD (coronary artery disease)  Macular degeneration: right eye  Hearing loss: left hearing aid, right ear deaf  S/P colon resection  S/P lumbar fusion  History of hip replacement, total, right: Revision  History of hip replacement, total, right      Review of symptoms:  Negative except for as noted in today's HPI.      MEDICATIONS  (STANDING):  atorvastatin 20 milliGRAM(s) Oral at bedtime  bisacodyl Rectal Suppository - Peds 10 milliGRAM(s) Rectal daily  busPIRone 10 milliGRAM(s) Oral two times a day  colestipol 1 Gram(s) Oral daily  cyclobenzaprine 10 milliGRAM(s) Oral every 8 hours  dextrose 5%. 1000 milliLiter(s) (50 mL/Hr) IV Continuous <Continuous>  dextrose 50% Injectable 12.5 Gram(s) IV Push once  dextrose 50% Injectable 25 Gram(s) IV Push once  dextrose 50% Injectable 25 Gram(s) IV Push once  docusate sodium 100 milliGRAM(s) Oral three times a day  insulin lispro (HumaLOG) corrective regimen sliding scale   SubCutaneous three times a day before meals  insulin lispro (HumaLOG) corrective regimen sliding scale   SubCutaneous at bedtime  levothyroxine 75 MICROGram(s) Oral daily  multivitamin 1 Tablet(s) Oral daily  senna 2 Tablet(s) Oral at bedtime  sodium chloride 0.9% lock flush 3 milliLiter(s) IV Push every 8 hours  sodium chloride 0.9%. 1000 milliLiter(s) (75 mL/Hr) IV Continuous <Continuous>  tamsulosin 0.4 milliGRAM(s) Oral at bedtime    MEDICATIONS  (PRN):  acetaminophen   Tablet 650 milliGRAM(s) Oral every 4 hours PRN For Temp greater than 38 C (100.4 F)  acetaminophen   Tablet. 650 milliGRAM(s) Oral every 4 hours PRN Mild Pain (1 - 3)  dextrose Gel 1 Dose(s) Oral once PRN Blood Glucose LESS THAN 70 milliGRAM(s)/deciliter  glucagon  Injectable 1 milliGRAM(s) IntraMuscular once PRN Glucose LESS THAN 70 milligrams/deciliter  HYDROmorphone   Tablet 2 milliGRAM(s) Oral every 4 hours PRN Severe Pain (7 - 10)  oxyCODONE    5 mG/acetaminophen 325 mG 1 Tablet(s) Oral every 4 hours PRN Mild Pain (1 - 3)  oxyCODONE    5 mG/acetaminophen 325 mG 2 Tablet(s) Oral every 4 hours PRN Moderate Pain (4 - 6)          Vital Signs Last 24 Hrs  T(C): 37.1 (22 Oct 2017 20:50), Max: 37.4 (22 Oct 2017 16:46)  T(F): 98.7 (22 Oct 2017 20:50), Max: 99.4 (22 Oct 2017 16:46)  HR: 80 (23 Oct 2017 05:00) (78 - 109)  BP: 122/62 (23 Oct 2017 05:00) (105/48 - 136/58)  BP(mean): 76 (23 Oct 2017 05:00) (62 - 80)  RR: 22 (23 Oct 2017 05:00) (17 - 31)  SpO2: 96% (23 Oct 2017 02:00) (90% - 96%)    I&O's Summary    22 Oct 2017 07:01  -  23 Oct 2017 07:00  --------------------------------------------------------  IN: 0 mL / OUT: 1565 mL / NET: -1565 mL        PHYSICAL EXAM  General Appearance: comfortable  HEENT:   Neck:   Lungs: clear  Heart: 1/6 systolic murmur LSB  Abdomen: nontender  Extremities: no edema. Venodynes.  Neurologic:       INTERPRETATION OF TELEMETRY: RSR.    ECG:    LABS:                          8.6    8.3   )-----------( 136      ( 22 Oct 2017 05:04 )             25.2     10-22    145  |  118<H>  |  26<H>      I   22 Oct 2017 05:04                        RADIOLOGY & ADDITIONAL STUDIES:    IMPRESSION:  1..CAD- s/p stents x 2 about 15 and 20 years ago .Stable.   2..Acute kidney injury likely due to hypotension with renal hypoperfusion./ATN- creatinine back to baseline.   3.Hypertension. Stable off of antihypertensive meds.   4.Acute anemia due to intraoperative blood loss.  5. Lumbar laminectomy and fusion 10/19/2017.  6.DM  PLAN:  Continue atorvastatin. Observe BP and restart antihypertensives when BP is  140 systolic or greater. Mobilize patient per spine surgeon.  Will no longer follow.

## 2017-10-23 NOTE — PROGRESS NOTE ADULT - SUBJECTIVE AND OBJECTIVE BOX
Pt c/o LBP, uneventful night with no new complaints.    Vital Signs Last 24 Hrs  T(C): 37.1 (22 Oct 2017 20:50), Max: 37.4 (22 Oct 2017 16:46)  T(F): 98.7 (22 Oct 2017 20:50), Max: 99.4 (22 Oct 2017 16:46)  HR: 80 (23 Oct 2017 05:00) (78 - 109)  BP: 122/62 (23 Oct 2017 05:00) (105/48 - 136/58)  BP(mean): 76 (23 Oct 2017 05:00) (62 - 80)  RR: 22 (23 Oct 2017 05:00) (17 - 31)  SpO2: 96% (23 Oct 2017 02:00) (90% - 96%)    Daily     Daily     I&O's Detail    22 Oct 2017 07:01  -  23 Oct 2017 07:00  --------------------------------------------------------  IN:  Total IN: 0 mL    OUT:    Drain: 190 mL    Indwelling Catheter - Urethral: 250 mL    Voided: 1125 mL  Total OUT: 1565 mL    Total NET: -1565 mL          CAPILLARY BLOOD GLUCOSE      POCT Blood Glucose.: 141 mg/dL (23 Oct 2017 08:13)  POCT Blood Glucose.: 143 mg/dL (22 Oct 2017 22:44)  POCT Blood Glucose.: 158 mg/dL (22 Oct 2017 16:30)  POCT Blood Glucose.: 168 mg/dL (22 Oct 2017 11:53)                                      8.6    8.3   )-----------( 136      ( 22 Oct 2017 05:04 )             25.2       10-22    145  |  118<H>  |  26<H>  ----------------------------<  159<H>  3.9   |  20<L>  |  1.41<H>    Ca    7.3<L>      22 Oct 2017 05:04                        MEDICATIONS  (STANDING):  atorvastatin 20 milliGRAM(s) Oral at bedtime  bisacodyl Rectal Suppository - Peds 10 milliGRAM(s) Rectal daily  busPIRone 10 milliGRAM(s) Oral two times a day  colestipol 1 Gram(s) Oral daily  cyclobenzaprine 10 milliGRAM(s) Oral every 8 hours  dextrose 5%. 1000 milliLiter(s) (50 mL/Hr) IV Continuous <Continuous>  dextrose 50% Injectable 12.5 Gram(s) IV Push once  dextrose 50% Injectable 25 Gram(s) IV Push once  dextrose 50% Injectable 25 Gram(s) IV Push once  docusate sodium 100 milliGRAM(s) Oral three times a day  insulin lispro (HumaLOG) corrective regimen sliding scale   SubCutaneous three times a day before meals  insulin lispro (HumaLOG) corrective regimen sliding scale   SubCutaneous at bedtime  levothyroxine 75 MICROGram(s) Oral daily  multivitamin 1 Tablet(s) Oral daily  senna 2 Tablet(s) Oral at bedtime  sodium chloride 0.9% lock flush 3 milliLiter(s) IV Push every 8 hours  sodium chloride 0.9%. 1000 milliLiter(s) (75 mL/Hr) IV Continuous <Continuous>  tamsulosin 0.4 milliGRAM(s) Oral at bedtime    MEDICATIONS  (PRN):  acetaminophen   Tablet 650 milliGRAM(s) Oral every 4 hours PRN For Temp greater than 38 C (100.4 F)  acetaminophen   Tablet. 650 milliGRAM(s) Oral every 4 hours PRN Mild Pain (1 - 3)  dextrose Gel 1 Dose(s) Oral once PRN Blood Glucose LESS THAN 70 milliGRAM(s)/deciliter  glucagon  Injectable 1 milliGRAM(s) IntraMuscular once PRN Glucose LESS THAN 70 milligrams/deciliter  HYDROmorphone   Tablet 2 milliGRAM(s) Oral every 4 hours PRN Severe Pain (7 - 10)  oxyCODONE    5 mG/acetaminophen 325 mG 1 Tablet(s) Oral every 4 hours PRN Mild Pain (1 - 3)  oxyCODONE    5 mG/acetaminophen 325 mG 2 Tablet(s) Oral every 4 hours PRN Moderate Pain (4 - 6)

## 2017-10-24 DIAGNOSIS — R09.89 OTHER SPECIFIED SYMPTOMS AND SIGNS INVOLVING THE CIRCULATORY AND RESPIRATORY SYSTEMS: ICD-10-CM

## 2017-10-24 LAB
ANION GAP SERPL CALC-SCNC: 6 MMOL/L — SIGNIFICANT CHANGE UP (ref 5–17)
BUN SERPL-MCNC: 18 MG/DL — SIGNIFICANT CHANGE UP (ref 7–23)
CALCIUM SERPL-MCNC: 7.9 MG/DL — LOW (ref 8.5–10.1)
CHLORIDE SERPL-SCNC: 113 MMOL/L — HIGH (ref 96–108)
CO2 SERPL-SCNC: 24 MMOL/L — SIGNIFICANT CHANGE UP (ref 22–31)
CREAT SERPL-MCNC: 1.24 MG/DL — SIGNIFICANT CHANGE UP (ref 0.5–1.3)
GLUCOSE BLDC GLUCOMTR-MCNC: 142 MG/DL — HIGH (ref 70–99)
GLUCOSE BLDC GLUCOMTR-MCNC: 143 MG/DL — HIGH (ref 70–99)
GLUCOSE BLDC GLUCOMTR-MCNC: 167 MG/DL — HIGH (ref 70–99)
GLUCOSE BLDC GLUCOMTR-MCNC: 169 MG/DL — HIGH (ref 70–99)
GLUCOSE SERPL-MCNC: 128 MG/DL — HIGH (ref 70–99)
HCT VFR BLD CALC: 25.8 % — LOW (ref 39–50)
HGB BLD-MCNC: 8.9 G/DL — LOW (ref 13–17)
MCHC RBC-ENTMCNC: 32 PG — SIGNIFICANT CHANGE UP (ref 27–34)
MCHC RBC-ENTMCNC: 34.5 GM/DL — SIGNIFICANT CHANGE UP (ref 32–36)
MCV RBC AUTO: 92.8 FL — SIGNIFICANT CHANGE UP (ref 80–100)
PLATELET # BLD AUTO: 221 K/UL — SIGNIFICANT CHANGE UP (ref 150–400)
POTASSIUM SERPL-MCNC: 3.7 MMOL/L — SIGNIFICANT CHANGE UP (ref 3.5–5.3)
POTASSIUM SERPL-SCNC: 3.7 MMOL/L — SIGNIFICANT CHANGE UP (ref 3.5–5.3)
RBC # BLD: 2.78 M/UL — LOW (ref 4.2–5.8)
RBC # FLD: 14 % — SIGNIFICANT CHANGE UP (ref 10.3–14.5)
SODIUM SERPL-SCNC: 143 MMOL/L — SIGNIFICANT CHANGE UP (ref 135–145)
WBC # BLD: 8.2 K/UL — SIGNIFICANT CHANGE UP (ref 3.8–10.5)
WBC # FLD AUTO: 8.2 K/UL — SIGNIFICANT CHANGE UP (ref 3.8–10.5)

## 2017-10-24 PROCEDURE — 71010: CPT | Mod: 26

## 2017-10-24 RX ADMIN — Medication 10 MILLIGRAM(S): at 05:16

## 2017-10-24 RX ADMIN — OXYCODONE AND ACETAMINOPHEN 2 TABLET(S): 5; 325 TABLET ORAL at 13:46

## 2017-10-24 RX ADMIN — Medication 600 MILLIGRAM(S): at 17:59

## 2017-10-24 RX ADMIN — CYCLOBENZAPRINE HYDROCHLORIDE 10 MILLIGRAM(S): 10 TABLET, FILM COATED ORAL at 05:16

## 2017-10-24 RX ADMIN — Medication 1: at 17:53

## 2017-10-24 RX ADMIN — ATORVASTATIN CALCIUM 20 MILLIGRAM(S): 80 TABLET, FILM COATED ORAL at 20:24

## 2017-10-24 RX ADMIN — CYCLOBENZAPRINE HYDROCHLORIDE 10 MILLIGRAM(S): 10 TABLET, FILM COATED ORAL at 13:46

## 2017-10-24 RX ADMIN — SODIUM CHLORIDE 3 MILLILITER(S): 9 INJECTION INTRAMUSCULAR; INTRAVENOUS; SUBCUTANEOUS at 13:02

## 2017-10-24 RX ADMIN — Medication 100 MILLIGRAM(S): at 10:58

## 2017-10-24 RX ADMIN — HYDROMORPHONE HYDROCHLORIDE 2 MILLIGRAM(S): 2 INJECTION INTRAMUSCULAR; INTRAVENOUS; SUBCUTANEOUS at 20:24

## 2017-10-24 RX ADMIN — OXYCODONE AND ACETAMINOPHEN 2 TABLET(S): 5; 325 TABLET ORAL at 18:03

## 2017-10-24 RX ADMIN — Medication 1 GRAM(S): at 07:46

## 2017-10-24 RX ADMIN — OXYCODONE AND ACETAMINOPHEN 2 TABLET(S): 5; 325 TABLET ORAL at 05:13

## 2017-10-24 RX ADMIN — Medication 100 MILLIGRAM(S): at 17:58

## 2017-10-24 RX ADMIN — Medication 10 MILLIGRAM(S): at 17:58

## 2017-10-24 RX ADMIN — SODIUM CHLORIDE 3 MILLILITER(S): 9 INJECTION INTRAMUSCULAR; INTRAVENOUS; SUBCUTANEOUS at 05:17

## 2017-10-24 RX ADMIN — OXYCODONE AND ACETAMINOPHEN 2 TABLET(S): 5; 325 TABLET ORAL at 04:27

## 2017-10-24 RX ADMIN — CYCLOBENZAPRINE HYDROCHLORIDE 10 MILLIGRAM(S): 10 TABLET, FILM COATED ORAL at 20:24

## 2017-10-24 RX ADMIN — Medication 100 MILLIGRAM(S): at 05:16

## 2017-10-24 RX ADMIN — Medication 75 MICROGRAM(S): at 05:16

## 2017-10-24 RX ADMIN — Medication 100 MILLIGRAM(S): at 10:57

## 2017-10-24 RX ADMIN — Medication 1 TABLET(S): at 12:25

## 2017-10-24 RX ADMIN — Medication 600 MILLIGRAM(S): at 10:58

## 2017-10-24 NOTE — PROGRESS NOTE ADULT - SUBJECTIVE AND OBJECTIVE BOX
[facility]                                                            Orthopedic Sp[facility] Progress Note      POST OPERATIVE DAY #: 5  STATUS POST:      lami fusio           Pre-Op Dx: Postlaminectomy syndrome of lumbar region  Lumbar stenosis with neurogenic claudication    Post-Op Dx:  Postlaminectomy syndrome of lumbar region  Lumbar stenosis with neurogenic claudication    Prin. Procedure:    lami fusion      SUBJECTIVE: Patient seen and examined.   c/o LBP  c/o URI symptoms w cough  no leg pain  + flatus    Pain (0-10): 6  Current Pain Management:  [ ] PCA   [ x] Po Analgesics [ ] IM /IV Anagesics     Vital Signs Last 24 Hrs  T(C): 36.8 (24 Oct 2017 04:19), Max: 37.2 (23 Oct 2017 23:18)  T(F): 98.3 (24 Oct 2017 04:19), Max: 99 (23 Oct 2017 23:18)  HR: 80 (24 Oct 2017 04:19) (80 - 90)  BP: 120/59 (24 Oct 2017 04:19) (120/59 - 162/64)  BP(mean): 87 (23 Oct 2017 14:41) (82 - 94)  RR: 18 (24 Oct 2017 04:19) (15 - 19)  SpO2: 93% (24 Oct 2017 04:19) (92% - 96%)  I&O's Detail    23 Oct 2017 07:01  -  24 Oct 2017 07:00  --------------------------------------------------------  IN:    Oral Fluid: 350 mL  Total IN: 350 mL    OUT:    Drain: 220 mL..........90 cc last shift   left in place    Voided: 1200 mL  Total OUT: 1420 mL    Total NET: -1070 mL      24 Oct 2017 07:01  -  24 Oct 2017 09:32  --------------------------------------------------------  IN:    Oral Fluid: 240 mL  Total IN: 240 mL    OUT:    Voided: 300 mL  Total OUT: 300 mL    Total NET: -60 mL          OBJECTIVE: looks good    c/o cough    Wound /Dressing: changed  wound ok  drain left in place    Lumbar: ROM : stiff  Neurological: A/O x  3             Sensation: [x ] intact to light touch  [ ] decreased:          Motor exam: [ x ]          [ x] Upper extremity    Delt      Bicp       Tri      Wrist ext  Wrst Flex       Digit Ext Digit Flex                                     R         5/5        5/5        5/5       5/5            5/5            5/5       5/5          5/5                                     L          5/5        5/5        5/5       5/5            5/5            5/5       5/5          5/5         [ x] Lower ext.     Hip Flx  Hip Ext   Hip Abd  Hip Add Quad   Hamstrg   TA       EHL      GS                              R        5/5        5/5        5/5             5/5        5/5        5/5        5/5     5/5      5/5                              L         5/5        5/5        5/5             5/5        5/5        5/5        5/5     5/5      5/5                                                        [ x] Vascular : wnl            Tension Signs: neg          Long Tract Findings: neg    RADIOLOGY & ADDITIONAL STUDIES:                                               LABS:                        8.9    8.2   )-----------( 221      ( 24 Oct 2017 07:38 )......................STABLE             25.8     10-24    143  |  113<H>  |  18  ----------------------------<  128<H>  3.7   |  24  |  1.24    Ca    7.9<L>      24 Oct 2017 07:38            A/P :  82y Male   s/p:  lami fusion  -    Pain control..adequate  -    DVT ppx: SCDs    -    Abx:  completed  -    Review labs as indicated     -    Physical Therapy.......OOB with assist  -    Weight bearing status: full  -    Dispo: Home [ ]     Rehab x[ x]

## 2017-10-24 NOTE — PROGRESS NOTE ADULT - SUBJECTIVE AND OBJECTIVE BOX
Pt c/o sore throat coughing with yellow sputum, no CP, fevers or SOB.  Still having LBP, no using incentive spirometry.    Vital Signs Last 24 Hrs  T(C): 36.8 (24 Oct 2017 04:19), Max: 37.2 (23 Oct 2017 23:18)  T(F): 98.3 (24 Oct 2017 04:19), Max: 99 (23 Oct 2017 23:18)  HR: 80 (24 Oct 2017 04:19) (80 - 90)  BP: 120/59 (24 Oct 2017 04:19) (120/59 - 162/64)  BP(mean): 87 (23 Oct 2017 14:41) (82 - 94)  RR: 18 (24 Oct 2017 04:19) (15 - 19)  SpO2: 93% (24 Oct 2017 04:19) (92% - 96%)    Daily     Daily     I&O's Detail    23 Oct 2017 07:01  -  24 Oct 2017 07:00  --------------------------------------------------------  IN:    Oral Fluid: 350 mL  Total IN: 350 mL    OUT:    Drain: 220 mL    Voided: 1200 mL  Total OUT: 1420 mL    Total NET: -1070 mL      24 Oct 2017 07:01  -  24 Oct 2017 10:30  --------------------------------------------------------  IN:    Oral Fluid: 240 mL  Total IN: 240 mL    OUT:    Voided: 300 mL  Total OUT: 300 mL    Total NET: -60 mL          CAPILLARY BLOOD GLUCOSE      POCT Blood Glucose.: 142 mg/dL (24 Oct 2017 07:48)  POCT Blood Glucose.: 195 mg/dL (23 Oct 2017 22:21)  POCT Blood Glucose.: 111 mg/dL (23 Oct 2017 17:13)  POCT Blood Glucose.: 129 mg/dL (23 Oct 2017 13:05)                                      8.9    8.2   )-----------( 221      ( 24 Oct 2017 07:38 )             25.8       10-24    143  |  113<H>  |  18  ----------------------------<  128<H>  3.7   |  24  |  1.24    Ca    7.9<L>      24 Oct 2017 07:38                        MEDICATIONS  (STANDING):  atorvastatin 20 milliGRAM(s) Oral at bedtime  bisacodyl Suppository 10 milliGRAM(s) Rectal daily  busPIRone 10 milliGRAM(s) Oral two times a day  colestipol 1 Gram(s) Oral daily  cyclobenzaprine 10 milliGRAM(s) Oral every 8 hours  dextrose 5%. 1000 milliLiter(s) (50 mL/Hr) IV Continuous <Continuous>  dextrose 50% Injectable 12.5 Gram(s) IV Push once  dextrose 50% Injectable 25 Gram(s) IV Push once  dextrose 50% Injectable 25 Gram(s) IV Push once  docusate sodium 100 milliGRAM(s) Oral three times a day  doxycycline hyclate Capsule 100 milliGRAM(s) Oral every 12 hours  guaiFENesin  milliGRAM(s) Oral every 12 hours  insulin lispro (HumaLOG) corrective regimen sliding scale   SubCutaneous three times a day before meals  insulin lispro (HumaLOG) corrective regimen sliding scale   SubCutaneous at bedtime  levothyroxine 75 MICROGram(s) Oral daily  multivitamin 1 Tablet(s) Oral daily  senna 2 Tablet(s) Oral at bedtime  sodium chloride 0.9% lock flush 3 milliLiter(s) IV Push every 8 hours    MEDICATIONS  (PRN):  acetaminophen   Tablet 650 milliGRAM(s) Oral every 4 hours PRN For Temp greater than 38 C (100.4 F)  acetaminophen   Tablet. 650 milliGRAM(s) Oral every 4 hours PRN Mild Pain (1 - 3)  dextrose Gel 1 Dose(s) Oral once PRN Blood Glucose LESS THAN 70 milliGRAM(s)/deciliter  glucagon  Injectable 1 milliGRAM(s) IntraMuscular once PRN Glucose LESS THAN 70 milligrams/deciliter  guaiFENesin    Syrup 100 milliGRAM(s) Oral every 6 hours PRN Cough  HYDROmorphone   Tablet 2 milliGRAM(s) Oral every 4 hours PRN Severe Pain (7 - 10)  oxyCODONE    5 mG/acetaminophen 325 mG 1 Tablet(s) Oral every 4 hours PRN Mild Pain (1 - 3)  oxyCODONE    5 mG/acetaminophen 325 mG 2 Tablet(s) Oral every 4 hours PRN Moderate Pain (4 - 6)

## 2017-10-24 NOTE — PROGRESS NOTE ADULT - PROBLEM SELECTOR PLAN 1
needs PT  drain left in place; OOB walking  hold dc until drain removed and URI symptoms addressed and improved  dc flomax at pts request

## 2017-10-25 LAB
ANION GAP SERPL CALC-SCNC: 7 MMOL/L — SIGNIFICANT CHANGE UP (ref 5–17)
BUN SERPL-MCNC: 20 MG/DL — SIGNIFICANT CHANGE UP (ref 7–23)
CALCIUM SERPL-MCNC: 8.3 MG/DL — LOW (ref 8.5–10.1)
CHLORIDE SERPL-SCNC: 112 MMOL/L — HIGH (ref 96–108)
CO2 SERPL-SCNC: 23 MMOL/L — SIGNIFICANT CHANGE UP (ref 22–31)
CREAT SERPL-MCNC: 1.11 MG/DL — SIGNIFICANT CHANGE UP (ref 0.5–1.3)
GLUCOSE BLDC GLUCOMTR-MCNC: 130 MG/DL — HIGH (ref 70–99)
GLUCOSE BLDC GLUCOMTR-MCNC: 167 MG/DL — HIGH (ref 70–99)
GLUCOSE BLDC GLUCOMTR-MCNC: 169 MG/DL — HIGH (ref 70–99)
GLUCOSE BLDC GLUCOMTR-MCNC: 205 MG/DL — HIGH (ref 70–99)
GLUCOSE SERPL-MCNC: 134 MG/DL — HIGH (ref 70–99)
HCT VFR BLD CALC: 27.7 % — LOW (ref 39–50)
HGB BLD-MCNC: 9.4 G/DL — LOW (ref 13–17)
MCHC RBC-ENTMCNC: 31.3 PG — SIGNIFICANT CHANGE UP (ref 27–34)
MCHC RBC-ENTMCNC: 34 GM/DL — SIGNIFICANT CHANGE UP (ref 32–36)
MCV RBC AUTO: 92.1 FL — SIGNIFICANT CHANGE UP (ref 80–100)
PLATELET # BLD AUTO: 282 K/UL — SIGNIFICANT CHANGE UP (ref 150–400)
POTASSIUM SERPL-MCNC: 4.1 MMOL/L — SIGNIFICANT CHANGE UP (ref 3.5–5.3)
POTASSIUM SERPL-SCNC: 4.1 MMOL/L — SIGNIFICANT CHANGE UP (ref 3.5–5.3)
RBC # BLD: 3.01 M/UL — LOW (ref 4.2–5.8)
RBC # FLD: 13.7 % — SIGNIFICANT CHANGE UP (ref 10.3–14.5)
SODIUM SERPL-SCNC: 142 MMOL/L — SIGNIFICANT CHANGE UP (ref 135–145)
WBC # BLD: 10.1 K/UL — SIGNIFICANT CHANGE UP (ref 3.8–10.5)
WBC # FLD AUTO: 10.1 K/UL — SIGNIFICANT CHANGE UP (ref 3.8–10.5)

## 2017-10-25 RX ORDER — POLYETHYLENE GLYCOL 3350 17 G/17G
17 POWDER, FOR SOLUTION ORAL DAILY
Qty: 0 | Refills: 0 | Status: DISCONTINUED | OUTPATIENT
Start: 2017-10-25 | End: 2017-10-27

## 2017-10-25 RX ORDER — BUDESONIDE, MICRONIZED 100 %
0.5 POWDER (GRAM) MISCELLANEOUS EVERY 12 HOURS
Qty: 0 | Refills: 0 | Status: DISCONTINUED | OUTPATIENT
Start: 2017-10-25 | End: 2017-10-27

## 2017-10-25 RX ORDER — IPRATROPIUM/ALBUTEROL SULFATE 18-103MCG
3 AEROSOL WITH ADAPTER (GRAM) INHALATION EVERY 6 HOURS
Qty: 0 | Refills: 0 | Status: DISCONTINUED | OUTPATIENT
Start: 2017-10-25 | End: 2017-10-26

## 2017-10-25 RX ADMIN — CYCLOBENZAPRINE HYDROCHLORIDE 10 MILLIGRAM(S): 10 TABLET, FILM COATED ORAL at 13:37

## 2017-10-25 RX ADMIN — Medication 100 MILLIGRAM(S): at 17:47

## 2017-10-25 RX ADMIN — Medication 0.5 MILLIGRAM(S): at 08:31

## 2017-10-25 RX ADMIN — Medication 10 MILLIGRAM(S): at 17:49

## 2017-10-25 RX ADMIN — Medication 75 MICROGRAM(S): at 06:44

## 2017-10-25 RX ADMIN — Medication 1 TABLET(S): at 12:15

## 2017-10-25 RX ADMIN — Medication 3 MILLILITER(S): at 13:42

## 2017-10-25 RX ADMIN — Medication 40 MILLIGRAM(S): at 17:48

## 2017-10-25 RX ADMIN — SENNA PLUS 2 TABLET(S): 8.6 TABLET ORAL at 22:24

## 2017-10-25 RX ADMIN — HYDROMORPHONE HYDROCHLORIDE 2 MILLIGRAM(S): 2 INJECTION INTRAMUSCULAR; INTRAVENOUS; SUBCUTANEOUS at 15:24

## 2017-10-25 RX ADMIN — Medication 600 MILLIGRAM(S): at 17:46

## 2017-10-25 RX ADMIN — Medication 0.5 MILLIGRAM(S): at 19:45

## 2017-10-25 RX ADMIN — HYDROMORPHONE HYDROCHLORIDE 2 MILLIGRAM(S): 2 INJECTION INTRAMUSCULAR; INTRAVENOUS; SUBCUTANEOUS at 14:32

## 2017-10-25 RX ADMIN — Medication 40 MILLIGRAM(S): at 12:22

## 2017-10-25 RX ADMIN — SODIUM CHLORIDE 3 MILLILITER(S): 9 INJECTION INTRAMUSCULAR; INTRAVENOUS; SUBCUTANEOUS at 06:52

## 2017-10-25 RX ADMIN — HYDROMORPHONE HYDROCHLORIDE 2 MILLIGRAM(S): 2 INJECTION INTRAMUSCULAR; INTRAVENOUS; SUBCUTANEOUS at 20:00

## 2017-10-25 RX ADMIN — Medication 100 MILLIGRAM(S): at 22:24

## 2017-10-25 RX ADMIN — SODIUM CHLORIDE 3 MILLILITER(S): 9 INJECTION INTRAMUSCULAR; INTRAVENOUS; SUBCUTANEOUS at 13:35

## 2017-10-25 RX ADMIN — Medication 3 MILLILITER(S): at 19:46

## 2017-10-25 RX ADMIN — OXYCODONE AND ACETAMINOPHEN 2 TABLET(S): 5; 325 TABLET ORAL at 06:44

## 2017-10-25 RX ADMIN — HYDROMORPHONE HYDROCHLORIDE 2 MILLIGRAM(S): 2 INJECTION INTRAMUSCULAR; INTRAVENOUS; SUBCUTANEOUS at 10:05

## 2017-10-25 RX ADMIN — Medication 10 MILLIGRAM(S): at 06:44

## 2017-10-25 RX ADMIN — HYDROMORPHONE HYDROCHLORIDE 2 MILLIGRAM(S): 2 INJECTION INTRAMUSCULAR; INTRAVENOUS; SUBCUTANEOUS at 21:00

## 2017-10-25 RX ADMIN — HYDROMORPHONE HYDROCHLORIDE 2 MILLIGRAM(S): 2 INJECTION INTRAMUSCULAR; INTRAVENOUS; SUBCUTANEOUS at 09:30

## 2017-10-25 RX ADMIN — SODIUM CHLORIDE 3 MILLILITER(S): 9 INJECTION INTRAMUSCULAR; INTRAVENOUS; SUBCUTANEOUS at 05:36

## 2017-10-25 RX ADMIN — Medication 600 MILLIGRAM(S): at 09:30

## 2017-10-25 RX ADMIN — CYCLOBENZAPRINE HYDROCHLORIDE 10 MILLIGRAM(S): 10 TABLET, FILM COATED ORAL at 06:44

## 2017-10-25 RX ADMIN — Medication 3 MILLILITER(S): at 08:31

## 2017-10-25 RX ADMIN — Medication 100 MILLIGRAM(S): at 06:44

## 2017-10-25 RX ADMIN — ATORVASTATIN CALCIUM 20 MILLIGRAM(S): 80 TABLET, FILM COATED ORAL at 22:24

## 2017-10-25 RX ADMIN — Medication 1: at 17:22

## 2017-10-25 RX ADMIN — Medication 1: at 12:13

## 2017-10-25 RX ADMIN — OXYCODONE AND ACETAMINOPHEN 2 TABLET(S): 5; 325 TABLET ORAL at 01:42

## 2017-10-25 RX ADMIN — Medication 1 GRAM(S): at 12:14

## 2017-10-25 RX ADMIN — SODIUM CHLORIDE 3 MILLILITER(S): 9 INJECTION INTRAMUSCULAR; INTRAVENOUS; SUBCUTANEOUS at 22:16

## 2017-10-25 RX ADMIN — CYCLOBENZAPRINE HYDROCHLORIDE 10 MILLIGRAM(S): 10 TABLET, FILM COATED ORAL at 22:24

## 2017-10-25 NOTE — PROGRESS NOTE ADULT - SUBJECTIVE AND OBJECTIVE BOX
HPI: Pt is a 81 y/o with h/o HTN, type II diabetes, depression, hypercholesteremia hypothyroidism, depression, lumbar spinal stenosis who has been having increasing LBP with radiation down his legs.  He was admitted for elective lumbar laminectomy with fusion.  His hospital course was complicated by hypovolumic hypotension and acute blood loss anemia.   s/p 2L NS boluses.  Presently c/o LBP from surgery, no CP or SOB.       10/25/17 Patient with productive cough and sore throat today-oral ABX started. Had brief episode of nausea this am after meds-resolved. No pain or radicular pain    PAST MEDICAL & SURGICAL HISTORY:  Prostate CA  Crohn disease  Hypothyroidism  Depression  Intervertebral disc disorder  Diabetes mellitus  Obesity  Hypercholesterolemia  HTN (hypertension)  Stented coronary artery: 2 stents, 20 yrs ago, 15 yrs ago  CAD (coronary artery disease)  Macular degeneration: right eye  Hearing loss: left hearing aid, right ear deaf  S/P colon resection  S/P lumbar fusion  History of hip replacement, total, right: Revision  History of hip replacement, total, right      Allergies    penicillin (Hives)    Intolerances        MEDICATIONS  (STANDING):  atorvastatin 20 milliGRAM(s) Oral at bedtime  busPIRone 10 milliGRAM(s) Oral two times a day  colestipol 1 Gram(s) Oral daily  cyclobenzaprine 10 milliGRAM(s) Oral every 8 hours  docusate sodium 100 milliGRAM(s) Oral three times a day  hydrochlorothiazide 25 milliGRAM(s) Oral daily  HYDROmorphone PCA (1 mG/mL) 30 milliLiter(s) PCA Continuous PCA Continuous  levothyroxine 75 MICROGram(s) Oral daily  lisinopril 20 milliGRAM(s) Oral daily  metFORMIN 500 milliGRAM(s) Oral daily  multivitamin 1 Tablet(s) Oral daily  senna 2 Tablet(s) Oral at bedtime  sodium chloride 0.9% Bolus 1000 milliLiter(s) IV Bolus once  sodium chloride 0.9% lock flush 3 milliLiter(s) IV Push every 8 hours  sodium chloride 0.9% with potassium chloride 20 mEq/L 1000 milliLiter(s) (125 mL/Hr) IV Continuous <Continuous>  verapamil  milliGRAM(s) Oral daily    MEDICATIONS  (PRN):  acetaminophen   Tablet 650 milliGRAM(s) Oral every 4 hours PRN For Temp greater than 38 C (100.4 F)  acetaminophen   Tablet. 650 milliGRAM(s) Oral every 4 hours PRN Mild Pain (1 - 3)  HYDROmorphone   Tablet 2 milliGRAM(s) Oral every 4 hours PRN Severe Pain (7 - 10)  HYDROmorphone PCA (1 mG/mL) Rescue Clinician Bolus 0.5 milliGRAM(s) IV Push every 15 minutes PRN for Pain Scale GREATER THAN 6  naloxone Injectable 0.1 milliGRAM(s) IV Push every 3 minutes PRN For ANY of the following changes in patient status:  A. RR LESS THAN 10 breaths per minute, B. Oxygen saturation LESS THAN 90%, C. Sedation score of 6  ondansetron Injectable 4 milliGRAM(s) IV Push every 6 hours PRN Nausea  oxyCODONE    5 mG/acetaminophen 325 mG 1 Tablet(s) Oral every 4 hours PRN Mild Pain (1 - 3)  oxyCODONE    5 mG/acetaminophen 325 mG 2 Tablet(s) Oral every 4 hours PRN Moderate Pain (4 - 6)      FAMILY HISTORY:  No pertinent family history in first degree relatives      Social History: lives alone, no ETOH use, never smoked.      Vital Signs Last 24 Hrs  T(C): 37.6 (24 Oct 2017 23:48), Max: 37.6 (24 Oct 2017 23:48)  T(F): 99.6 (24 Oct 2017 23:48), Max: 99.6 (24 Oct 2017 23:48)  HR: 88 (25 Oct 2017 08:33) (88 - 94)  BP: 139/64 (24 Oct 2017 23:48) (139/64 - 157/70)  BP(mean): --  RR: 18 (24 Oct 2017 23:48) (18 - 18)  SpO2: 94% (24 Oct 2017 23:48) (94% - 94%)    PHYSICAL EXAM:  Awake and alert, answers appropriately. No significant pain, (+) productive cough  Tolerating diet  Voiding adequately  Dressing with minimal drainage around drain site  Hvac with decreasing drainage  Neuro without deficits.              Data                                     9.4    10.1  )-----------( 282      ( 25 Oct 2017 06:20 )             27.7     10-25    142  |  112<H>  |  20  ----------------------------<  134<H>  4.1   |  23  |  1.11    Ca    8.3<L>      25 Oct 2017 06:20

## 2017-10-25 NOTE — PROGRESS NOTE ADULT - SUBJECTIVE AND OBJECTIVE BOX
Pt seen earlier, feels slightly better, c/o wheezing, cough, no fevers or CP.    Vital Signs Last 24 Hrs  T(C): 37.6 (24 Oct 2017 23:48), Max: 37.6 (24 Oct 2017 23:48)  T(F): 99.6 (24 Oct 2017 23:48), Max: 99.6 (24 Oct 2017 23:48)  HR: 88 (25 Oct 2017 08:33) (88 - 94)  BP: 139/64 (24 Oct 2017 23:48) (139/64 - 157/70)  BP(mean): --  RR: 18 (24 Oct 2017 23:48) (18 - 18)  SpO2: 94% (24 Oct 2017 23:48) (94% - 94%)    Daily     Daily     I&O's Detail    24 Oct 2017 07:01  -  25 Oct 2017 07:00  --------------------------------------------------------  IN:    Oral Fluid: 480 mL  Total IN: 480 mL    OUT:    Drain: 67.5 mL    Voided: 300 mL  Total OUT: 367.5 mL    Total NET: 112.5 mL      25 Oct 2017 07:01  -  25 Oct 2017 11:01  --------------------------------------------------------  IN:    Oral Fluid: 240 mL  Total IN: 240 mL    OUT:  Total OUT: 0 mL    Total NET: 240 mL          CAPILLARY BLOOD GLUCOSE      POCT Blood Glucose.: 130 mg/dL (25 Oct 2017 07:45)  POCT Blood Glucose.: 143 mg/dL (24 Oct 2017 22:01)  POCT Blood Glucose.: 167 mg/dL (24 Oct 2017 16:29)  POCT Blood Glucose.: 169 mg/dL (24 Oct 2017 12:23)                                      9.4    10.1  )-----------( 282      ( 25 Oct 2017 06:20 )             27.7       10-25    142  |  112<H>  |  20  ----------------------------<  134<H>  4.1   |  23  |  1.11    Ca    8.3<L>      25 Oct 2017 06:20                        MEDICATIONS  (STANDING):  ALBUTerol/ipratropium for Nebulization 3 milliLiter(s) Nebulizer every 6 hours  atorvastatin 20 milliGRAM(s) Oral at bedtime  bisacodyl Suppository 10 milliGRAM(s) Rectal daily  buDESOnide   0.5 milliGRAM(s) Respule 0.5 milliGRAM(s) Inhalation every 12 hours  busPIRone 10 milliGRAM(s) Oral two times a day  colestipol 1 Gram(s) Oral daily  cyclobenzaprine 10 milliGRAM(s) Oral every 8 hours  dextrose 5%. 1000 milliLiter(s) (50 mL/Hr) IV Continuous <Continuous>  dextrose 50% Injectable 12.5 Gram(s) IV Push once  dextrose 50% Injectable 25 Gram(s) IV Push once  dextrose 50% Injectable 25 Gram(s) IV Push once  docusate sodium 100 milliGRAM(s) Oral three times a day  doxycycline hyclate Capsule 100 milliGRAM(s) Oral every 12 hours  guaiFENesin  milliGRAM(s) Oral every 12 hours  insulin lispro (HumaLOG) corrective regimen sliding scale   SubCutaneous three times a day before meals  insulin lispro (HumaLOG) corrective regimen sliding scale   SubCutaneous at bedtime  levothyroxine 75 MICROGram(s) Oral daily  methylPREDNISolone sodium succinate Injectable 40 milliGRAM(s) IV Push every 12 hours  multivitamin 1 Tablet(s) Oral daily  senna 2 Tablet(s) Oral at bedtime  sodium chloride 0.9% lock flush 3 milliLiter(s) IV Push every 8 hours    MEDICATIONS  (PRN):  acetaminophen   Tablet 650 milliGRAM(s) Oral every 4 hours PRN For Temp greater than 38 C (100.4 F)  acetaminophen   Tablet. 650 milliGRAM(s) Oral every 4 hours PRN Mild Pain (1 - 3)  dextrose Gel 1 Dose(s) Oral once PRN Blood Glucose LESS THAN 70 milliGRAM(s)/deciliter  glucagon  Injectable 1 milliGRAM(s) IntraMuscular once PRN Glucose LESS THAN 70 milligrams/deciliter  guaiFENesin    Syrup 100 milliGRAM(s) Oral every 6 hours PRN Cough  HYDROmorphone   Tablet 2 milliGRAM(s) Oral every 4 hours PRN Severe Pain (7 - 10)  oxyCODONE    5 mG/acetaminophen 325 mG 1 Tablet(s) Oral every 4 hours PRN Mild Pain (1 - 3)  oxyCODONE    5 mG/acetaminophen 325 mG 2 Tablet(s) Oral every 4 hours PRN Moderate Pain (4 - 6)  polyethylene glycol 3350 17 Gram(s) Oral daily PRN Constipation

## 2017-10-26 LAB
ANION GAP SERPL CALC-SCNC: 11 MMOL/L — SIGNIFICANT CHANGE UP (ref 5–17)
BUN SERPL-MCNC: 31 MG/DL — HIGH (ref 7–23)
CALCIUM SERPL-MCNC: 8.8 MG/DL — SIGNIFICANT CHANGE UP (ref 8.5–10.1)
CHLORIDE SERPL-SCNC: 108 MMOL/L — SIGNIFICANT CHANGE UP (ref 96–108)
CO2 SERPL-SCNC: 23 MMOL/L — SIGNIFICANT CHANGE UP (ref 22–31)
CREAT SERPL-MCNC: 1.23 MG/DL — SIGNIFICANT CHANGE UP (ref 0.5–1.3)
GLUCOSE BLDC GLUCOMTR-MCNC: 186 MG/DL — HIGH (ref 70–99)
GLUCOSE BLDC GLUCOMTR-MCNC: 190 MG/DL — HIGH (ref 70–99)
GLUCOSE BLDC GLUCOMTR-MCNC: 192 MG/DL — HIGH (ref 70–99)
GLUCOSE BLDC GLUCOMTR-MCNC: 211 MG/DL — HIGH (ref 70–99)
GLUCOSE SERPL-MCNC: 186 MG/DL — HIGH (ref 70–99)
HCT VFR BLD CALC: 30 % — LOW (ref 39–50)
HGB BLD-MCNC: 10.1 G/DL — LOW (ref 13–17)
MCHC RBC-ENTMCNC: 30.8 PG — SIGNIFICANT CHANGE UP (ref 27–34)
MCHC RBC-ENTMCNC: 33.6 GM/DL — SIGNIFICANT CHANGE UP (ref 32–36)
MCV RBC AUTO: 91.8 FL — SIGNIFICANT CHANGE UP (ref 80–100)
PLATELET # BLD AUTO: 421 K/UL — HIGH (ref 150–400)
POTASSIUM SERPL-MCNC: 3.9 MMOL/L — SIGNIFICANT CHANGE UP (ref 3.5–5.3)
POTASSIUM SERPL-SCNC: 3.9 MMOL/L — SIGNIFICANT CHANGE UP (ref 3.5–5.3)
RBC # BLD: 3.27 M/UL — LOW (ref 4.2–5.8)
RBC # FLD: 13.5 % — SIGNIFICANT CHANGE UP (ref 10.3–14.5)
SODIUM SERPL-SCNC: 142 MMOL/L — SIGNIFICANT CHANGE UP (ref 135–145)
WBC # BLD: 13.1 K/UL — HIGH (ref 3.8–10.5)
WBC # FLD AUTO: 13.1 K/UL — HIGH (ref 3.8–10.5)

## 2017-10-26 RX ORDER — IPRATROPIUM/ALBUTEROL SULFATE 18-103MCG
3 AEROSOL WITH ADAPTER (GRAM) INHALATION EVERY 4 HOURS
Qty: 0 | Refills: 0 | Status: DISCONTINUED | OUTPATIENT
Start: 2017-10-26 | End: 2017-10-27

## 2017-10-26 RX ORDER — TIOTROPIUM BROMIDE 18 UG/1
1 CAPSULE ORAL; RESPIRATORY (INHALATION) DAILY
Qty: 0 | Refills: 0 | Status: DISCONTINUED | OUTPATIENT
Start: 2017-10-26 | End: 2017-10-27

## 2017-10-26 RX ORDER — MONTELUKAST 4 MG/1
10 TABLET, CHEWABLE ORAL AT BEDTIME
Qty: 0 | Refills: 0 | Status: DISCONTINUED | OUTPATIENT
Start: 2017-10-26 | End: 2017-10-27

## 2017-10-26 RX ORDER — ALBUTEROL 90 UG/1
1 AEROSOL, METERED ORAL EVERY 4 HOURS
Qty: 0 | Refills: 0 | Status: DISCONTINUED | OUTPATIENT
Start: 2017-10-26 | End: 2017-10-27

## 2017-10-26 RX ADMIN — Medication 1 TABLET(S): at 12:18

## 2017-10-26 RX ADMIN — CYCLOBENZAPRINE HYDROCHLORIDE 10 MILLIGRAM(S): 10 TABLET, FILM COATED ORAL at 21:12

## 2017-10-26 RX ADMIN — Medication 40 MILLIGRAM(S): at 06:46

## 2017-10-26 RX ADMIN — Medication 75 MICROGRAM(S): at 06:18

## 2017-10-26 RX ADMIN — OXYCODONE AND ACETAMINOPHEN 2 TABLET(S): 5; 325 TABLET ORAL at 12:18

## 2017-10-26 RX ADMIN — Medication 1: at 17:43

## 2017-10-26 RX ADMIN — Medication 100 MILLIGRAM(S): at 17:46

## 2017-10-26 RX ADMIN — SODIUM CHLORIDE 3 MILLILITER(S): 9 INJECTION INTRAMUSCULAR; INTRAVENOUS; SUBCUTANEOUS at 14:17

## 2017-10-26 RX ADMIN — Medication 1: at 12:16

## 2017-10-26 RX ADMIN — HYDROMORPHONE HYDROCHLORIDE 2 MILLIGRAM(S): 2 INJECTION INTRAMUSCULAR; INTRAVENOUS; SUBCUTANEOUS at 02:58

## 2017-10-26 RX ADMIN — HYDROMORPHONE HYDROCHLORIDE 2 MILLIGRAM(S): 2 INJECTION INTRAMUSCULAR; INTRAVENOUS; SUBCUTANEOUS at 10:30

## 2017-10-26 RX ADMIN — OXYCODONE AND ACETAMINOPHEN 2 TABLET(S): 5; 325 TABLET ORAL at 13:00

## 2017-10-26 RX ADMIN — Medication 100 MILLIGRAM(S): at 06:18

## 2017-10-26 RX ADMIN — Medication 3 MILLILITER(S): at 11:49

## 2017-10-26 RX ADMIN — ATORVASTATIN CALCIUM 20 MILLIGRAM(S): 80 TABLET, FILM COATED ORAL at 21:12

## 2017-10-26 RX ADMIN — Medication 10 MILLIGRAM(S): at 06:18

## 2017-10-26 RX ADMIN — Medication 0.5 MILLIGRAM(S): at 07:51

## 2017-10-26 RX ADMIN — HYDROMORPHONE HYDROCHLORIDE 2 MILLIGRAM(S): 2 INJECTION INTRAMUSCULAR; INTRAVENOUS; SUBCUTANEOUS at 09:49

## 2017-10-26 RX ADMIN — Medication 1: at 08:16

## 2017-10-26 RX ADMIN — CYCLOBENZAPRINE HYDROCHLORIDE 10 MILLIGRAM(S): 10 TABLET, FILM COATED ORAL at 06:18

## 2017-10-26 RX ADMIN — Medication 3 MILLILITER(S): at 07:51

## 2017-10-26 RX ADMIN — Medication 40 MILLIGRAM(S): at 18:29

## 2017-10-26 RX ADMIN — Medication 1 GRAM(S): at 08:16

## 2017-10-26 RX ADMIN — Medication 3 MILLILITER(S): at 01:38

## 2017-10-26 RX ADMIN — CYCLOBENZAPRINE HYDROCHLORIDE 10 MILLIGRAM(S): 10 TABLET, FILM COATED ORAL at 14:16

## 2017-10-26 RX ADMIN — Medication 600 MILLIGRAM(S): at 06:18

## 2017-10-26 RX ADMIN — Medication 600 MILLIGRAM(S): at 17:46

## 2017-10-26 RX ADMIN — MONTELUKAST 10 MILLIGRAM(S): 4 TABLET, CHEWABLE ORAL at 21:12

## 2017-10-26 RX ADMIN — HYDROMORPHONE HYDROCHLORIDE 2 MILLIGRAM(S): 2 INJECTION INTRAMUSCULAR; INTRAVENOUS; SUBCUTANEOUS at 02:28

## 2017-10-26 RX ADMIN — SODIUM CHLORIDE 3 MILLILITER(S): 9 INJECTION INTRAMUSCULAR; INTRAVENOUS; SUBCUTANEOUS at 06:27

## 2017-10-26 RX ADMIN — SODIUM CHLORIDE 3 MILLILITER(S): 9 INJECTION INTRAMUSCULAR; INTRAVENOUS; SUBCUTANEOUS at 21:15

## 2017-10-26 NOTE — PROGRESS NOTE ADULT - RESPIRATORY COMMENTS
poor inspiratory efforts blt.
poor inspiratory efforts blt
blt wheezing, ronchi's with poor inspiratory efforts.
poor inspiratory efforts blt
poor inspiratory efforts

## 2017-10-26 NOTE — CONSULT NOTE ADULT - SUBJECTIVE AND OBJECTIVE BOX
HPI:    pat seen & examine & full consult.    PAST MEDICAL & SURGICAL HISTORY:  Prostate CA  Crohn disease  Hypothyroidism  Depression  Intervertebral disc disorder  Diabetes mellitus  Obesity  Hypercholesterolemia  HTN (hypertension)  Stented coronary artery: 2 stents, 20 yrs ago, 15 yrs ago  CAD (coronary artery disease)  Macular degeneration: right eye  Hearing loss: left hearing aid, right ear deaf  S/P colon resection  S/P lumbar fusion  History of hip replacement, total, right: Revision  History of hip replacement, total, right      Home Medications:  aspirin 81 mg oral tablet: 1 tab(s) orally once a day    instructed to foloow preop instructions as per surgeon and cardiologist (19 Oct 2017 07:22)  atorvastatin 20 mg oral tablet: 1 tab(s) orally once a day (19 Oct 2017 07:22)  busPIRone 10 mg oral tablet: 1 tab(s) orally 2 times a day (19 Oct 2017 07:22)  colestipol 1 g oral tablet: 1 tab(s) orally once a day (19 Oct 2017 07:22)  glimepiride 1 mg oral tablet: 1 tab(s) orally once a day (19 Oct 2017 07:22)  levothyroxine 75 mcg (0.075 mg) oral tablet: 1 tab(s) orally once a day (19 Oct 2017 07:22)  lisinopril-hydroCHLOROthiazide 20 mg-25 mg oral tablet: 1 tab(s) orally once a day (19 Oct 2017 07:22)  metFORMIN 500 mg oral tablet: 1 tab(s) orally 2 times a day (19 Oct 2017 07:22)  traMADol 50 mg oral tablet: 1 tab(s) orally 4 times a day (19 Oct 2017 07:22)  verapamil 200 mg/24 hours oral capsule, extended release: 1 cap(s) orally once a day  (19 Oct 2017 07:22)      MEDICATIONS  (STANDING):  ALBUTerol    90 MICROgram(s) HFA Inhaler 1 Puff(s) Inhalation every 4 hours  ALBUTerol/ipratropium for Nebulization 3 milliLiter(s) Nebulizer every 4 hours  atorvastatin 20 milliGRAM(s) Oral at bedtime  bisacodyl Suppository 10 milliGRAM(s) Rectal daily  buDESOnide   0.5 milliGRAM(s) Respule 0.5 milliGRAM(s) Inhalation every 12 hours  busPIRone 10 milliGRAM(s) Oral two times a day  colestipol 1 Gram(s) Oral daily  cyclobenzaprine 10 milliGRAM(s) Oral every 8 hours  dextrose 5%. 1000 milliLiter(s) (50 mL/Hr) IV Continuous <Continuous>  dextrose 50% Injectable 12.5 Gram(s) IV Push once  dextrose 50% Injectable 25 Gram(s) IV Push once  dextrose 50% Injectable 25 Gram(s) IV Push once  docusate sodium 100 milliGRAM(s) Oral three times a day  doxycycline hyclate Capsule 100 milliGRAM(s) Oral every 12 hours  guaiFENesin  milliGRAM(s) Oral every 12 hours  insulin lispro (HumaLOG) corrective regimen sliding scale   SubCutaneous three times a day before meals  insulin lispro (HumaLOG) corrective regimen sliding scale   SubCutaneous at bedtime  levothyroxine 75 MICROGram(s) Oral daily  methylPREDNISolone sodium succinate Injectable 40 milliGRAM(s) IV Push every 12 hours  montelukast 10 milliGRAM(s) Oral at bedtime  multivitamin 1 Tablet(s) Oral daily  senna 2 Tablet(s) Oral at bedtime  sodium chloride 0.9% lock flush 3 milliLiter(s) IV Push every 8 hours  tiotropium 18 MICROgram(s) Capsule 1 Capsule(s) Inhalation daily    MEDICATIONS  (PRN):  acetaminophen   Tablet 650 milliGRAM(s) Oral every 4 hours PRN For Temp greater than 38 C (100.4 F)  acetaminophen   Tablet. 650 milliGRAM(s) Oral every 4 hours PRN Mild Pain (1 - 3)  dextrose Gel 1 Dose(s) Oral once PRN Blood Glucose LESS THAN 70 milliGRAM(s)/deciliter  glucagon  Injectable 1 milliGRAM(s) IntraMuscular once PRN Glucose LESS THAN 70 milligrams/deciliter  guaiFENesin    Syrup 100 milliGRAM(s) Oral every 6 hours PRN Cough  HYDROmorphone   Tablet 2 milliGRAM(s) Oral every 4 hours PRN Severe Pain (7 - 10)  oxyCODONE    5 mG/acetaminophen 325 mG 1 Tablet(s) Oral every 4 hours PRN Mild Pain (1 - 3)  oxyCODONE    5 mG/acetaminophen 325 mG 2 Tablet(s) Oral every 4 hours PRN Moderate Pain (4 - 6)  polyethylene glycol 3350 17 Gram(s) Oral daily PRN Constipation      Allergies    penicillin (Hives)    Intolerances        SOCIAL HISTORY: Denies tobacco, etoh abuse or illicit drug use    FAMILY HISTORY:  No pertinent family history in first degree relatives      Vital Signs Last 24 Hrs  T(C): 37.5 (25 Oct 2017 23:38), Max: 37.9 (25 Oct 2017 17:34)  T(F): 99.5 (25 Oct 2017 23:38), Max: 100.2 (25 Oct 2017 17:34)  HR: 80 (26 Oct 2017 07:52) (80 - 96)  BP: 143/68 (25 Oct 2017 23:38) (143/68 - 159/71)  BP(mean): --  RR: 18 (25 Oct 2017 23:38) (18 - 18)  SpO2: 93% (25 Oct 2017 23:38) (90% - 93%)        REVIEW OF SYSTEMS:    CONSTITUTIONAL:  As per HPI.  HEENT:  Eyes:  No diplopia or blurred vision. ENT:  No earache, sore throat or runny nose.  CARDIOVASCULAR:  No pressure, squeezing, tightness, heaviness or aching about the chest, neck, axilla or epigastrium.  RESPIRATORY:  positive cough & shortness of breath,  GASTROINTESTINAL:  No nausea, vomiting or diarrhea.  GENITOURINARY:  No dysuria, frequency or urgency.  MUSCULOSKELETAL:  As per HPI.  SKIN:  No change in skin, hair or nails.  NEUROLOGIC:  No paresthesias, fasciculations, seizures or weakness.  PSYCHIATRIC:  No disorder of thought or mood.  ENDOCRINE:  No heat or cold intolerance, polyuria or polydipsia.  HEMATOLOGICAL:  No easy bruising or bleedings:  .     PHYSICAL EXAMINATION:    GENERAL APPEARANCE:  Pt. is not currently dyspneic, in no distress. Pt. is alert, oriented, and pleasant.  HEENT:  Pupils are normal and react normally. No icterus. Mucous membranes well colored.  NECK:  Supple. No lymphadenopathy. Jugular venous pressure not elevated. Carotids equal.   HEART:   The cardiac impulse has a normal quality. Regular. Normal S1 and S2. There are no murmurs, rubs or gallops noted  CHEST:  Chest is clear to auscultation. Normal respiratory effort.  ABDOMEN:  Soft and nontender.   EXTREMITIES:  There is no cyanosis, clubbing or edema.   SKIN:  No rash or significant lesions are noted.    LABS:                        9.4    10.1  )-----------( 282      ( 25 Oct 2017 06:20 )             27.7     10-25    142  |  112<H>  |  20  ----------------------------<  134<H>  4.1   |  23  |  1.11    Ca    8.3<L>      25 Oct 2017 06:20    RADIOLOGY & ADDITIONAL STUDIES:     cxr; clear lungs
Patient is a 82y old  Male who presents with a chief complaint of "back pain", lumbar laminectomy, fusion, instrumentation (19 Oct 2017 07:11)      HPI: Patient is seen in cardiac consultation due to persistent post op hypotension after lumbar laminectomy/fusion. He had 3000 cc of intra-operative blood loss and was transfused 2 units of blood.  He denies chest pain or dyspnea. He is on IV Dilaudid PCA. He has a prior history of CAD with stents placed about 15 and 20 years ago. He is usually hypertensive and takes lisinopril-HCT and verapamil.    Pt is a 83 y/o with h/o HTN, type II diabetes, depression, hypercholesteremia hypothyroidism, depression, lumbar spinal stenosis who has been having increasing LBP with radiation down his legs.  He was admitted for elective lumbar laminectomy with fusion.  His hospital course was complicated by hypovolumic hypotension and acute blood loss anemia.  Post-op medicine consult called for comanagement.  Overnight he was hypotensive, s/p 2L NS boluses.  Presently c/o LBP from surgery, no CP or SOB.  Pt requesting his medications for his Crohn's.        PAST MEDICAL & SURGICAL HISTORY:  Prostate CA  Crohn disease  Hypothyroidism  Depression  Intervertebral disc disorder  Diabetes mellitus  Obesity  Hypercholesterolemia  HTN (hypertension)  Stented coronary artery: 2 stents, 20 yrs ago, 15 yrs ago  CAD (coronary artery disease)  Macular degeneration: right eye  Hearing loss: left hearing aid, right ear deaf  S/P colon resection  S/P lumbar fusion  History of hip replacement, total, right: Revision  History of hip replacement, total, right        MEDICATIONS  (STANDING):  atorvastatin 20 milliGRAM(s) Oral at bedtime  busPIRone 10 milliGRAM(s) Oral two times a day  colestipol 1 Gram(s) Oral daily  cyclobenzaprine 10 milliGRAM(s) Oral every 8 hours  dextrose 5%. 1000 milliLiter(s) (50 mL/Hr) IV Continuous <Continuous>  dextrose 50% Injectable 12.5 Gram(s) IV Push once  dextrose 50% Injectable 25 Gram(s) IV Push once  dextrose 50% Injectable 25 Gram(s) IV Push once  docusate sodium 100 milliGRAM(s) Oral three times a day  HYDROmorphone PCA (1 mG/mL) 30 milliLiter(s) PCA Continuous PCA Continuous  insulin lispro (HumaLOG) corrective regimen sliding scale   SubCutaneous three times a day before meals  insulin lispro (HumaLOG) corrective regimen sliding scale   SubCutaneous at bedtime  levothyroxine 75 MICROGram(s) Oral daily  multivitamin 1 Tablet(s) Oral daily  senna 2 Tablet(s) Oral at bedtime  sodium chloride 0.9% Bolus 1000 milliLiter(s) IV Bolus once  sodium chloride 0.9% lock flush 3 milliLiter(s) IV Push every 8 hours  sodium chloride 0.9%. 1000 milliLiter(s) (125 mL/Hr) IV Continuous <Continuous>  sodium chloride 0.9%. 1000 milliLiter(s) (200 mL/Hr) IV Continuous <Continuous>  verapamil  milliGRAM(s) Oral daily    MEDICATIONS  (PRN):  acetaminophen   Tablet 650 milliGRAM(s) Oral every 4 hours PRN For Temp greater than 38 C (100.4 F)  acetaminophen   Tablet. 650 milliGRAM(s) Oral every 4 hours PRN Mild Pain (1 - 3)  dextrose Gel 1 Dose(s) Oral once PRN Blood Glucose LESS THAN 70 milliGRAM(s)/deciliter  glucagon  Injectable 1 milliGRAM(s) IntraMuscular once PRN Glucose LESS THAN 70 milligrams/deciliter  HYDROmorphone   Tablet 2 milliGRAM(s) Oral every 4 hours PRN Severe Pain (7 - 10)  HYDROmorphone PCA (1 mG/mL) Rescue Clinician Bolus 0.5 milliGRAM(s) IV Push every 15 minutes PRN for Pain Scale GREATER THAN 6  naloxone Injectable 0.1 milliGRAM(s) IV Push every 3 minutes PRN For ANY of the following changes in patient status:  A. RR LESS THAN 10 breaths per minute, B. Oxygen saturation LESS THAN 90%, C. Sedation score of 6  ondansetron Injectable 4 milliGRAM(s) IV Push every 6 hours PRN Nausea  oxyCODONE    5 mG/acetaminophen 325 mG 1 Tablet(s) Oral every 4 hours PRN Mild Pain (1 - 3)  oxyCODONE    5 mG/acetaminophen 325 mG 2 Tablet(s) Oral every 4 hours PRN Moderate Pain (4 - 6)      FAMILY HISTORY:  No pertinent family history in first degree relatives      SOCIAL HISTORY:  Tobacco-    former       Alcohol-   no           Illicit drugs-    no          Occupation-              Marital  status-  REVIEW OF SYSTEM:  Pertinent items are noted in HPI.    Vital Signs Last 24 Hrs  T(C): 36.1 (20 Oct 2017 14:28), Max: 37.9 (20 Oct 2017 05:37)  T(F): 97 (20 Oct 2017 14:28), Max: 100.2 (20 Oct 2017 05:37)  HR: 90 (20 Oct 2017 15:00) (68 - 97)  BP: 92/34 (20 Oct 2017 15:00) (72/31 - 142/71)  BP(mean): 47 (20 Oct 2017 15:00) (41 - 79)  RR: 21 (20 Oct 2017 15:00) (10 - 27)  SpO2: 98% (20 Oct 2017 15:00) (90% - 100%)    I&O's Summary    19 Oct 2017 07:01  -  20 Oct 2017 07:00  --------------------------------------------------------  IN: 4500 mL / OUT: 1305 mL / NET: 3195 mL    20 Oct 2017 07:01  -  20 Oct 2017 16:53  --------------------------------------------------------  IN: 400 mL / OUT: 150 mL / NET: 250 mL      PHYSICAL EXAM  General Appearance:  sleepy, mildly confused Appears well perfused. Skin warm and dry.  HEENT: PERRL, conjunctiva clear, EOM's intact, non injected pharynx, no exudate, Neck: Supple, , no adenopathy, thyroid: not enlarged, no carotid bruit or JVD  Back: not examined  Lungs: Clear to auscultation bilaterally, no adventitious breath sounds, normal   expiratory phase  Heart: Regular rate and rhythm, S1, S2 normal,2/6 systolic murmur LSB , no rub or gallop  Abdomen: Soft, non-tender, bowel sounds active , no hepatosplenomegaly  Extremities: no cyanosis or edema, no joint swelling  Skin: Skin color, texture normal, no rashes   Neurologic: no focal abnormality      INTERPRETATION OF TELEMETRY: RSR    ECG: sinus rhythm 96 BPM, RBBB , LAD, inferior MI age undetermined- no change from preop tracing.        LABS:                          11.0   15.2  )-----------( 137      ( 20 Oct 2017 05:09 )             32.1     10-20    144  |  117<H>  |  35<H>  ----------------------------<  183<H>  4.7   |  18<L>  |  2.36<H>    Ca    6.5<LL>      20 Oct 2017 12:37    TPro  3.8<L>  /  Alb  1.7<L>  /  TBili  0.4  /  DBili  x   /  AST  50<H>  /  ALT  29  /  AlkPhos  28<L>  10-20                      RADIOLOGY & ADDITIONAL STUDIES:    IMPRESSION:  1.Hypotension may be due to blood loss, but acute MI to be excluded. PCA may also be playing a role.   2.CAD- s/p stents x 2 about 15 and 20 years ago.  3.Acute kidney injury likely due to hypotension with renal hypoperfusion.  4. Lumbar laminectomy and fusion 10/19/2017.  5.DM    PLAN:  Obtain troponins x3. Follow CBC Agree with IV fluids. Avoid meds that cause hypotension. Consider ICU consult and use of pressors if BP less than 90 systolic. Will follow.
Patient is a 82y old  Male who presents with a chief complaint of "back pain"       HPI: Pt is a 83 y/o with h/o HTN, type II diabetes, depression, hypercholesteremia hypothyroidism, depression, lumbar spinal stenosis who has been having increasing LBP with radiation down his legs.  He was admitted for elective lumbar laminectomy with fusion.  His hospital course was complicated by hypovolumic hypotension and acute blood loss anemia.  Post-op medicine consult called for comanagement.  Overnight he was hypotensive, s/p 2L NS boluses.  Presently c/o LBP from surgery, no CP or SOB.  Pt requesting his medications for his Crohn's.        PAST MEDICAL & SURGICAL HISTORY:  Prostate CA  Crohn disease  Hypothyroidism  Depression  Intervertebral disc disorder  Diabetes mellitus  Obesity  Hypercholesterolemia  HTN (hypertension)  Stented coronary artery: 2 stents, 20 yrs ago, 15 yrs ago  CAD (coronary artery disease)  Macular degeneration: right eye  Hearing loss: left hearing aid, right ear deaf  S/P colon resection  S/P lumbar fusion  History of hip replacement, total, right: Revision  History of hip replacement, total, right      Allergies    penicillin (Hives)    Intolerances        MEDICATIONS  (STANDING):  atorvastatin 20 milliGRAM(s) Oral at bedtime  busPIRone 10 milliGRAM(s) Oral two times a day  colestipol 1 Gram(s) Oral daily  cyclobenzaprine 10 milliGRAM(s) Oral every 8 hours  docusate sodium 100 milliGRAM(s) Oral three times a day  hydrochlorothiazide 25 milliGRAM(s) Oral daily  HYDROmorphone PCA (1 mG/mL) 30 milliLiter(s) PCA Continuous PCA Continuous  levothyroxine 75 MICROGram(s) Oral daily  lisinopril 20 milliGRAM(s) Oral daily  metFORMIN 500 milliGRAM(s) Oral daily  multivitamin 1 Tablet(s) Oral daily  senna 2 Tablet(s) Oral at bedtime  sodium chloride 0.9% Bolus 1000 milliLiter(s) IV Bolus once  sodium chloride 0.9% lock flush 3 milliLiter(s) IV Push every 8 hours  sodium chloride 0.9% with potassium chloride 20 mEq/L 1000 milliLiter(s) (125 mL/Hr) IV Continuous <Continuous>  verapamil  milliGRAM(s) Oral daily    MEDICATIONS  (PRN):  acetaminophen   Tablet 650 milliGRAM(s) Oral every 4 hours PRN For Temp greater than 38 C (100.4 F)  acetaminophen   Tablet. 650 milliGRAM(s) Oral every 4 hours PRN Mild Pain (1 - 3)  HYDROmorphone   Tablet 2 milliGRAM(s) Oral every 4 hours PRN Severe Pain (7 - 10)  HYDROmorphone PCA (1 mG/mL) Rescue Clinician Bolus 0.5 milliGRAM(s) IV Push every 15 minutes PRN for Pain Scale GREATER THAN 6  naloxone Injectable 0.1 milliGRAM(s) IV Push every 3 minutes PRN For ANY of the following changes in patient status:  A. RR LESS THAN 10 breaths per minute, B. Oxygen saturation LESS THAN 90%, C. Sedation score of 6  ondansetron Injectable 4 milliGRAM(s) IV Push every 6 hours PRN Nausea  oxyCODONE    5 mG/acetaminophen 325 mG 1 Tablet(s) Oral every 4 hours PRN Mild Pain (1 - 3)  oxyCODONE    5 mG/acetaminophen 325 mG 2 Tablet(s) Oral every 4 hours PRN Moderate Pain (4 - 6)      FAMILY HISTORY:  No pertinent family history in first degree relatives      Social History: lives alone, no ETOH use, never smoked.      Vital Signs Last 24 Hrs  T(C): 37.9 (20 Oct 2017 05:37), Max: 37.9 (20 Oct 2017 05:37)  T(F): 100.2 (20 Oct 2017 05:37), Max: 100.2 (20 Oct 2017 05:37)  HR: 83 (20 Oct 2017 06:00) (68 - 91)  BP: 79/40 (20 Oct 2017 06:00) (78/39 - 142/71)  BP(mean): 49 (20 Oct 2017 06:00) (44 - 59)  RR: 14 (20 Oct 2017 06:00) (10 - 21)  SpO2: 90% (20 Oct 2017 04:00) (90% - 100%)    Daily     Daily Weight in k.2 (20 Oct 2017 05:37)    I&O's Summary    19 Oct 2017 07:01  -  20 Oct 2017 07:00  --------------------------------------------------------  IN: 4500 mL / OUT: 1305 mL / NET: 3195 mL          Data                          11.0   15.2  )-----------( 137      ( 20 Oct 2017 05:09 )             32.1       10    143  |  115<H>  |  34<H>  ----------------------------<  150<H>  5.4<H>   |  22  |  2.09<H>    Ca    6.8<L>      20 Oct 2017 05:09    TPro  3.8<L>  /  Alb  1.7<L>  /  TBili  0.4  /  DBili  x   /  AST  50<H>  /  ALT  29  /  AlkPhos  28<L>  10-20                LIVER FUNCTIONS - ( 20 Oct 2017 05:09 )  Alb: 1.7 g/dL / Pro: 3.8 gm/dL / ALK PHOS: 28 U/L / ALT: 29 U/L / AST: 50 U/L / GGT: x

## 2017-10-26 NOTE — PROGRESS NOTE ADULT - SUBJECTIVE AND OBJECTIVE BOX
Pt feels better, still with mild wheezing, sore throat, coughing and low grade fever.      Vital Signs Last 24 Hrs  T(C): 37.5 (25 Oct 2017 23:38), Max: 37.9 (25 Oct 2017 17:34)  T(F): 99.5 (25 Oct 2017 23:38), Max: 100.2 (25 Oct 2017 17:34)  HR: 80 (26 Oct 2017 07:52) (80 - 96)  BP: 143/68 (25 Oct 2017 23:38) (143/68 - 159/71)  BP(mean): --  RR: 18 (25 Oct 2017 23:38) (18 - 18)  SpO2: 93% (25 Oct 2017 23:38) (90% - 93%)    Daily     Daily     I&O's Detail    25 Oct 2017 07:01  -  26 Oct 2017 07:00  --------------------------------------------------------  IN:    Oral Fluid: 840 mL  Total IN: 840 mL    OUT:    Drain: 50 mL    Voided: 600 mL  Total OUT: 650 mL    Total NET: 190 mL      26 Oct 2017 07:01  -  26 Oct 2017 09:38  --------------------------------------------------------  IN:    Oral Fluid: 240 mL  Total IN: 240 mL    OUT:  Total OUT: 0 mL    Total NET: 240 mL          CAPILLARY BLOOD GLUCOSE      POCT Blood Glucose.: 190 mg/dL (26 Oct 2017 08:08)  POCT Blood Glucose.: 205 mg/dL (25 Oct 2017 22:19)  POCT Blood Glucose.: 167 mg/dL (25 Oct 2017 16:33)  POCT Blood Glucose.: 169 mg/dL (25 Oct 2017 11:44)                                      9.4    10.1  )-----------( 282      ( 25 Oct 2017 06:20 )             27.7       10-25    142  |  112<H>  |  20  ----------------------------<  134<H>  4.1   |  23  |  1.11    Ca    8.3<L>      25 Oct 2017 06:20                        MEDICATIONS  (STANDING):  ALBUTerol/ipratropium for Nebulization 3 milliLiter(s) Nebulizer every 6 hours  atorvastatin 20 milliGRAM(s) Oral at bedtime  bisacodyl Suppository 10 milliGRAM(s) Rectal daily  buDESOnide   0.5 milliGRAM(s) Respule 0.5 milliGRAM(s) Inhalation every 12 hours  busPIRone 10 milliGRAM(s) Oral two times a day  colestipol 1 Gram(s) Oral daily  cyclobenzaprine 10 milliGRAM(s) Oral every 8 hours  dextrose 5%. 1000 milliLiter(s) (50 mL/Hr) IV Continuous <Continuous>  dextrose 50% Injectable 12.5 Gram(s) IV Push once  dextrose 50% Injectable 25 Gram(s) IV Push once  dextrose 50% Injectable 25 Gram(s) IV Push once  docusate sodium 100 milliGRAM(s) Oral three times a day  doxycycline hyclate Capsule 100 milliGRAM(s) Oral every 12 hours  guaiFENesin  milliGRAM(s) Oral every 12 hours  insulin lispro (HumaLOG) corrective regimen sliding scale   SubCutaneous three times a day before meals  insulin lispro (HumaLOG) corrective regimen sliding scale   SubCutaneous at bedtime  levothyroxine 75 MICROGram(s) Oral daily  methylPREDNISolone sodium succinate Injectable 40 milliGRAM(s) IV Push every 12 hours  multivitamin 1 Tablet(s) Oral daily  senna 2 Tablet(s) Oral at bedtime  sodium chloride 0.9% lock flush 3 milliLiter(s) IV Push every 8 hours    MEDICATIONS  (PRN):  acetaminophen   Tablet 650 milliGRAM(s) Oral every 4 hours PRN For Temp greater than 38 C (100.4 F)  acetaminophen   Tablet. 650 milliGRAM(s) Oral every 4 hours PRN Mild Pain (1 - 3)  dextrose Gel 1 Dose(s) Oral once PRN Blood Glucose LESS THAN 70 milliGRAM(s)/deciliter  glucagon  Injectable 1 milliGRAM(s) IntraMuscular once PRN Glucose LESS THAN 70 milligrams/deciliter  guaiFENesin    Syrup 100 milliGRAM(s) Oral every 6 hours PRN Cough  HYDROmorphone   Tablet 2 milliGRAM(s) Oral every 4 hours PRN Severe Pain (7 - 10)  oxyCODONE    5 mG/acetaminophen 325 mG 1 Tablet(s) Oral every 4 hours PRN Mild Pain (1 - 3)  oxyCODONE    5 mG/acetaminophen 325 mG 2 Tablet(s) Oral every 4 hours PRN Moderate Pain (4 - 6)  polyethylene glycol 3350 17 Gram(s) Oral daily PRN Constipation

## 2017-10-26 NOTE — PROGRESS NOTE ADULT - SUBJECTIVE AND OBJECTIVE BOX
HPI: Pt is a 83 y/o with h/o HTN, type II diabetes, depression, hypercholesteremia hypothyroidism, depression, lumbar spinal stenosis who has been having increasing LBP with radiation down his legs.  He was admitted for elective lumbar laminectomy with fusion.  His hospital course was complicated by hypovolumic hypotension and acute blood loss anemia.   s/p 2L NS boluses.  Presently c/o LBP from surgery, no CP or SOB.       10/25/17 Patient with productive cough and sore throat today-oral ABX started. Had brief episode of nausea this am after meds-resolved. No pain or radicular pain    PAST MEDICAL & SURGICAL HISTORY:  Prostate CA  Crohn disease  Hypothyroidism  Depression  Intervertebral disc disorder  Diabetes mellitus  Obesity  Hypercholesterolemia  HTN (hypertension)  Stented coronary artery: 2 stents, 20 yrs ago, 15 yrs ago  CAD (coronary artery disease)  Macular degeneration: right eye  Hearing loss: left hearing aid, right ear deaf  S/P colon resection  S/P lumbar fusion  History of hip replacement, total, right: Revision  History of hip replacement, total, right      Allergies    penicillin (Hives)    Intolerances        MEDICATIONS  (STANDING):  atorvastatin 20 milliGRAM(s) Oral at bedtime  busPIRone 10 milliGRAM(s) Oral two times a day  colestipol 1 Gram(s) Oral daily  cyclobenzaprine 10 milliGRAM(s) Oral every 8 hours  docusate sodium 100 milliGRAM(s) Oral three times a day  hydrochlorothiazide 25 milliGRAM(s) Oral daily  HYDROmorphone PCA (1 mG/mL) 30 milliLiter(s) PCA Continuous PCA Continuous  levothyroxine 75 MICROGram(s) Oral daily  lisinopril 20 milliGRAM(s) Oral daily  metFORMIN 500 milliGRAM(s) Oral daily  multivitamin 1 Tablet(s) Oral daily  senna 2 Tablet(s) Oral at bedtime  sodium chloride 0.9% Bolus 1000 milliLiter(s) IV Bolus once  sodium chloride 0.9% lock flush 3 milliLiter(s) IV Push every 8 hours  sodium chloride 0.9% with potassium chloride 20 mEq/L 1000 milliLiter(s) (125 mL/Hr) IV Continuous <Continuous>  verapamil  milliGRAM(s) Oral daily    MEDICATIONS  (PRN):  acetaminophen   Tablet 650 milliGRAM(s) Oral every 4 hours PRN For Temp greater than 38 C (100.4 F)  acetaminophen   Tablet. 650 milliGRAM(s) Oral every 4 hours PRN Mild Pain (1 - 3)  HYDROmorphone   Tablet 2 milliGRAM(s) Oral every 4 hours PRN Severe Pain (7 - 10)  HYDROmorphone PCA (1 mG/mL) Rescue Clinician Bolus 0.5 milliGRAM(s) IV Push every 15 minutes PRN for Pain Scale GREATER THAN 6  naloxone Injectable 0.1 milliGRAM(s) IV Push every 3 minutes PRN For ANY of the following changes in patient status:  A. RR LESS THAN 10 breaths per minute, B. Oxygen saturation LESS THAN 90%, C. Sedation score of 6  ondansetron Injectable 4 milliGRAM(s) IV Push every 6 hours PRN Nausea  oxyCODONE    5 mG/acetaminophen 325 mG 1 Tablet(s) Oral every 4 hours PRN Mild Pain (1 - 3)  oxyCODONE    5 mG/acetaminophen 325 mG 2 Tablet(s) Oral every 4 hours PRN Moderate Pain (4 - 6)      FAMILY HISTORY:  No pertinent family history in first degree relatives      Social History: lives alone, no ETOH use, never smoked.      Vital Signs Last 24 Hrs  T(C): 37.6 (24 Oct 2017 23:48), Max: 37.6 (24 Oct 2017 23:48)  T(F): 99.6 (24 Oct 2017 23:48), Max: 99.6 (24 Oct 2017 23:48)  HR: 88 (25 Oct 2017 08:33) (88 - 94)  BP: 139/64 (24 Oct 2017 23:48) (139/64 - 157/70)  BP(mean): --  RR: 18 (24 Oct 2017 23:48) (18 - 18)  SpO2: 94% (24 Oct 2017 23:48) (94% - 94%)    PHYSICAL EXAM:  Awake and alert, answers appropriately. No significant pain, (+) productive cough  Tolerating diet  Voiding adequately  Dressing with minimal drainage around drain site  Hvac with decreasing drainage  Neuro without deficits.              Data                                     9.4    10.1  )-----------( 282      ( 25 Oct 2017 06:20 )             27.7     10-25    142  |  112<H>  |  20  ----------------------------<  134<H>  4.1   |  23  |  1.11    Ca    8.3<L>      25 Oct 2017 06:20 HPI: Pt is a 83 y/o with h/o HTN, type II diabetes, depression, hypercholesteremia hypothyroidism, depression, lumbar spinal stenosis who has been having increasing LBP with radiation down his legs.  He was admitted for elective lumbar laminectomy with fusion.  His hospital course was complicated by hypovolumic hypotension and acute blood loss anemia.   s/p 2L NS boluses.  Presently c/o LBP from surgery, no CP or SOB.       10/25/17 Patient with productive cough and sore throat today-oral ABX started. Had brief episode of nausea this am after meds-resolved. No pain or radicular pain    10/26/17 Patient with persistent cough-states he has not been ambulating for past few days.    PAST MEDICAL & SURGICAL HISTORY:  Prostate CA  Crohn disease  Hypothyroidism  Depression  Intervertebral disc disorder  Diabetes mellitus  Obesity  Hypercholesterolemia  HTN (hypertension)  Stented coronary artery: 2 stents, 20 yrs ago, 15 yrs ago  CAD (coronary artery disease)  Macular degeneration: right eye  Hearing loss: left hearing aid, right ear deaf  S/P colon resection  S/P lumbar fusion  History of hip replacement, total, right: Revision  History of hip replacement, total, right      Allergies    penicillin (Hives)    Intolerances        MEDICATIONS  (STANDING):  atorvastatin 20 milliGRAM(s) Oral at bedtime  busPIRone 10 milliGRAM(s) Oral two times a day  colestipol 1 Gram(s) Oral daily  cyclobenzaprine 10 milliGRAM(s) Oral every 8 hours  docusate sodium 100 milliGRAM(s) Oral three times a day  hydrochlorothiazide 25 milliGRAM(s) Oral daily  HYDROmorphone PCA (1 mG/mL) 30 milliLiter(s) PCA Continuous PCA Continuous  levothyroxine 75 MICROGram(s) Oral daily  lisinopril 20 milliGRAM(s) Oral daily  metFORMIN 500 milliGRAM(s) Oral daily  multivitamin 1 Tablet(s) Oral daily  senna 2 Tablet(s) Oral at bedtime  sodium chloride 0.9% Bolus 1000 milliLiter(s) IV Bolus once  sodium chloride 0.9% lock flush 3 milliLiter(s) IV Push every 8 hours  sodium chloride 0.9% with potassium chloride 20 mEq/L 1000 milliLiter(s) (125 mL/Hr) IV Continuous <Continuous>  verapamil  milliGRAM(s) Oral daily    MEDICATIONS  (PRN):  acetaminophen   Tablet 650 milliGRAM(s) Oral every 4 hours PRN For Temp greater than 38 C (100.4 F)  acetaminophen   Tablet. 650 milliGRAM(s) Oral every 4 hours PRN Mild Pain (1 - 3)  HYDROmorphone   Tablet 2 milliGRAM(s) Oral every 4 hours PRN Severe Pain (7 - 10)  HYDROmorphone PCA (1 mG/mL) Rescue Clinician Bolus 0.5 milliGRAM(s) IV Push every 15 minutes PRN for Pain Scale GREATER THAN 6  naloxone Injectable 0.1 milliGRAM(s) IV Push every 3 minutes PRN For ANY of the following changes in patient status:  A. RR LESS THAN 10 breaths per minute, B. Oxygen saturation LESS THAN 90%, C. Sedation score of 6  ondansetron Injectable 4 milliGRAM(s) IV Push every 6 hours PRN Nausea  oxyCODONE    5 mG/acetaminophen 325 mG 1 Tablet(s) Oral every 4 hours PRN Mild Pain (1 - 3)  oxyCODONE    5 mG/acetaminophen 325 mG 2 Tablet(s) Oral every 4 hours PRN Moderate Pain (4 - 6)      FAMILY HISTORY:  No pertinent family history in first degree relatives      Social History: lives alone, no ETOH use, never smoked.      Vital Signs Last 24 Hrs  T(C): 37.6 (24 Oct 2017 23:48), Max: 37.6 (24 Oct 2017 23:48)  T(F): 99.6 (24 Oct 2017 23:48), Max: 99.6 (24 Oct 2017 23:48)  HR: 88 (25 Oct 2017 08:33) (88 - 94)  BP: 139/64 (24 Oct 2017 23:48) (139/64 - 157/70)  BP(mean): --  RR: 18 (24 Oct 2017 23:48) (18 - 18)  SpO2: 94% (24 Oct 2017 23:48) (94% - 94%)    PHYSICAL EXAM:  Awake and alert, answers appropriately. No significant pain, (+) productive cough  Tolerating diet-no nausea today  Voiding adequately  Dressing with old bloody drainage around drain site-mostly dislodged  Hvac with minimal drainage-removed  Neuro without deficits.        Data                          9.4    10.1  )-----------( 282      ( 25 Oct 2017 06:20 )             27.7     10-25    142  |  112<H>  |  20  ----------------------------<  134<H>  4.1   |  23  |  1.11    Ca    8.3<L>      25 Oct 2017 06:20

## 2017-10-26 NOTE — CONSULT NOTE ADULT - ASSESSMENT
PROBLEMS:    Lumbar Stenosis with neurogenic claudication-s/p lumbar laminectomy with fusion  Acute Asthmatic Bronchitis with bronchospasm  Type II Diabetes  Hypothyroidism  Crohn's Disease    PLAN:    IV STEROIDS & DOXYCULIN  AEROSOLS  SINGULAIR  OOB  INCENTIVE SPIROMETRY  DVT PROPHYLASIX
Pt is a 83 y/o with h/o HTN, type II diabetes, depression, hypercholesteremia hypothyroidism, depression, lumbar spinal stenosis who has been having increasing LBP with radiation down his legs.  He was admitted for elective lumbar laminectomy with fusion.  His hospital course was complicated by hypovolumic hypotension and acute blood loss anemia.  Post-op medicine consult called for comanagement.      * Lumbar Stenosis with neurogenic claudication-s/p lumbar laminectomy with fusion.  Continue pain control, PT when able encourage use of incentive spirometry.  * Hypovolumic Hypotension-due to acute blood loss anemia, anesthesia and probably dehydration pre-op from HCTZ.  IVF bolus, monitor for now.  * Renal Failure-? ROSE ? Stage 3 CKD, given hypotension ROSE is likely due to ATN.  IVF, monitor renal function, maintain adequate MAP, repeat BMP today.  * Acute Blood Loss Anemia-surgical loss, monitor drain outpt  * Thrombocytopenia-due to consumption from above, monitor for now.  * Hyperkalemia-due to renal failure and potassium in IVF.  Stop IV potassium supplement, repeat labs today.  * Type II Diabetes-stop oral hypoglycemic agents, ISS for now.  * Hypothyroidism-synthroid  * Crohn's Disease-colestipol  * Proph- venodynes  * Disp-OOB, PT  * Comm-d/w pt, RN, Dr Platt yesterday.

## 2017-10-26 NOTE — PROGRESS NOTE ADULT - PROBLEM SELECTOR PLAN 1
needs PT  drain left in place; OOB walking  hold dc until drain removed and URI symptoms addressed and improved  dc flomax at pts request needs PT  OOB walking as tolerated  hold dc until URI symptoms improved  dc flomax at pts request

## 2017-10-27 ENCOUNTER — TRANSCRIPTION ENCOUNTER (OUTPATIENT)
Age: 82
End: 2017-10-27

## 2017-10-27 VITALS
TEMPERATURE: 98 F | OXYGEN SATURATION: 97 % | HEART RATE: 84 BPM | RESPIRATION RATE: 18 BRPM | DIASTOLIC BLOOD PRESSURE: 67 MMHG | SYSTOLIC BLOOD PRESSURE: 149 MMHG

## 2017-10-27 LAB
ANION GAP SERPL CALC-SCNC: 9 MMOL/L — SIGNIFICANT CHANGE UP (ref 5–17)
BUN SERPL-MCNC: 41 MG/DL — HIGH (ref 7–23)
CALCIUM SERPL-MCNC: 8.6 MG/DL — SIGNIFICANT CHANGE UP (ref 8.5–10.1)
CHLORIDE SERPL-SCNC: 109 MMOL/L — HIGH (ref 96–108)
CO2 SERPL-SCNC: 23 MMOL/L — SIGNIFICANT CHANGE UP (ref 22–31)
CREAT SERPL-MCNC: 1.24 MG/DL — SIGNIFICANT CHANGE UP (ref 0.5–1.3)
GLUCOSE BLDC GLUCOMTR-MCNC: 192 MG/DL — HIGH (ref 70–99)
GLUCOSE SERPL-MCNC: 212 MG/DL — HIGH (ref 70–99)
HCT VFR BLD CALC: 28.4 % — LOW (ref 39–50)
HGB BLD-MCNC: 9.6 G/DL — LOW (ref 13–17)
MCHC RBC-ENTMCNC: 31.4 PG — SIGNIFICANT CHANGE UP (ref 27–34)
MCHC RBC-ENTMCNC: 33.9 GM/DL — SIGNIFICANT CHANGE UP (ref 32–36)
MCV RBC AUTO: 92.6 FL — SIGNIFICANT CHANGE UP (ref 80–100)
PLATELET # BLD AUTO: 422 K/UL — HIGH (ref 150–400)
POTASSIUM SERPL-MCNC: 4.3 MMOL/L — SIGNIFICANT CHANGE UP (ref 3.5–5.3)
POTASSIUM SERPL-SCNC: 4.3 MMOL/L — SIGNIFICANT CHANGE UP (ref 3.5–5.3)
RBC # BLD: 3.06 M/UL — LOW (ref 4.2–5.8)
RBC # FLD: 14.1 % — SIGNIFICANT CHANGE UP (ref 10.3–14.5)
SODIUM SERPL-SCNC: 141 MMOL/L — SIGNIFICANT CHANGE UP (ref 135–145)
WBC # BLD: 15.7 K/UL — HIGH (ref 3.8–10.5)
WBC # FLD AUTO: 15.7 K/UL — HIGH (ref 3.8–10.5)

## 2017-10-27 RX ORDER — CYCLOBENZAPRINE HYDROCHLORIDE 10 MG/1
1 TABLET, FILM COATED ORAL
Qty: 0 | Refills: 0 | COMMUNITY
Start: 2017-10-27

## 2017-10-27 RX ORDER — MONTELUKAST 4 MG/1
1 TABLET, CHEWABLE ORAL
Qty: 0 | Refills: 0 | COMMUNITY
Start: 2017-10-27

## 2017-10-27 RX ORDER — OXYCODONE HYDROCHLORIDE 5 MG/1
10 TABLET ORAL EVERY 4 HOURS
Qty: 0 | Refills: 0 | Status: DISCONTINUED | OUTPATIENT
Start: 2017-10-27 | End: 2017-10-27

## 2017-10-27 RX ORDER — DOCUSATE SODIUM 100 MG
1 CAPSULE ORAL
Qty: 0 | Refills: 0 | COMMUNITY
Start: 2017-10-27

## 2017-10-27 RX ORDER — ALBUTEROL 90 UG/1
1 AEROSOL, METERED ORAL
Qty: 0 | Refills: 0 | COMMUNITY
Start: 2017-10-27

## 2017-10-27 RX ORDER — POLYETHYLENE GLYCOL 3350 17 G/17G
17 POWDER, FOR SOLUTION ORAL
Qty: 0 | Refills: 0 | COMMUNITY
Start: 2017-10-27

## 2017-10-27 RX ORDER — SENNA PLUS 8.6 MG/1
2 TABLET ORAL
Qty: 0 | Refills: 0 | COMMUNITY
Start: 2017-10-27

## 2017-10-27 RX ORDER — BUDESONIDE, MICRONIZED 100 %
0.5 POWDER (GRAM) MISCELLANEOUS
Qty: 0 | Refills: 0 | COMMUNITY
Start: 2017-10-27

## 2017-10-27 RX ORDER — OXYCODONE HYDROCHLORIDE 5 MG/1
1 TABLET ORAL
Qty: 0 | Refills: 0 | COMMUNITY
Start: 2017-10-27

## 2017-10-27 RX ADMIN — Medication 100 MILLIGRAM(S): at 06:39

## 2017-10-27 RX ADMIN — Medication 10 MILLIGRAM(S): at 06:39

## 2017-10-27 RX ADMIN — Medication 40 MILLIGRAM(S): at 06:39

## 2017-10-27 RX ADMIN — CYCLOBENZAPRINE HYDROCHLORIDE 10 MILLIGRAM(S): 10 TABLET, FILM COATED ORAL at 06:39

## 2017-10-27 RX ADMIN — Medication 3 MILLILITER(S): at 11:54

## 2017-10-27 RX ADMIN — SODIUM CHLORIDE 3 MILLILITER(S): 9 INJECTION INTRAMUSCULAR; INTRAVENOUS; SUBCUTANEOUS at 06:40

## 2017-10-27 RX ADMIN — Medication 75 MICROGRAM(S): at 06:39

## 2017-10-27 RX ADMIN — Medication 1 GRAM(S): at 07:01

## 2017-10-27 RX ADMIN — OXYCODONE AND ACETAMINOPHEN 2 TABLET(S): 5; 325 TABLET ORAL at 01:44

## 2017-10-27 RX ADMIN — Medication 1: at 07:51

## 2017-10-27 RX ADMIN — Medication 600 MILLIGRAM(S): at 06:39

## 2017-10-27 RX ADMIN — OXYCODONE HYDROCHLORIDE 10 MILLIGRAM(S): 5 TABLET ORAL at 07:17

## 2017-10-27 RX ADMIN — OXYCODONE AND ACETAMINOPHEN 2 TABLET(S): 5; 325 TABLET ORAL at 00:06

## 2017-10-27 NOTE — DISCHARGE NOTE ADULT - HOSPITAL COURSE
Patient admitted with progressive LBP and leg pain with weakness. Previous lami/fusion L4/5 several years ago. Noted adjacent level DDD and spinal stenosis. Patient failed nonoperative care and admitted for surgery. Underwent exploration fusion, removal hardware, lami L2-S1, TLIF L2/3 L3/4, L5/S1 with cages, fusion L2-S1 on 10/19/17. Patient lost 3 liters of blood intraoperatively. Required 2 units PC in OR and 1100 cc cell saver intraop to maintain pressure. Patient noted to have hypotension immediately post op requiring boluses and IVFs. Maintained on PCA for 2 days post op. Transferred to floor care on POD #4. PT initiated after patient stable. Hvac removed POD#7. Patient developed URI on POD #5 requiring po Doxy and inhalers. Placed on oral steroids by Pulmonologist. Symptoms improved by time of discharge.

## 2017-10-27 NOTE — PROGRESS NOTE ADULT - SUBJECTIVE AND OBJECTIVE BOX
Subjective:    pat much better, no more wheezing, c/o backach    Home Medications:  albuterol 90 mcg/inh inhalation aerosol: 1 puff(s) inhaled every 4 hours (27 Oct 2017 09:06)  aspirin 81 mg oral tablet: 1 tab(s) orally once a day    instructed to foloow preop instructions as per surgeon and cardiologist (19 Oct 2017 07:22)  atorvastatin 20 mg oral tablet: 1 tab(s) orally once a day (19 Oct 2017 07:22)  bisacodyl 10 mg rectal suppository: 1 suppository(ies) rectal once a day (27 Oct 2017 09:06)  budesonide 0.5 mg/2 mL inhalation suspension: 0.5 milligram(s) inhaled once a day (27 Oct 2017 09:06)  busPIRone 10 mg oral tablet: 1 tab(s) orally 2 times a day (19 Oct 2017 07:22)  colestipol 1 g oral tablet: 1 tab(s) orally once a day (19 Oct 2017 07:22)  cyclobenzaprine 10 mg oral tablet: 1 tab(s) orally every 8 hours (27 Oct 2017 09:06)  docusate sodium 100 mg oral capsule: 1 cap(s) orally 3 times a day (27 Oct 2017 09:06)  doxycycline monohydrate 100 mg oral capsule: 1 cap(s) orally every 12 hours (27 Oct 2017 09:06)  glimepiride 1 mg oral tablet: 1 tab(s) orally once a day (19 Oct 2017 07:22)  guaiFENesin 100 mg/5 mL oral liquid: 5 milliliter(s) orally every 6 hours, As needed, Cough (27 Oct 2017 09:06)  guaiFENesin 600 mg oral tablet, extended release: 1 tab(s) orally every 12 hours (27 Oct 2017 09:06)  levothyroxine 75 mcg (0.075 mg) oral tablet: 1 tab(s) orally once a day (19 Oct 2017 07:22)  lisinopril-hydroCHLOROthiazide 20 mg-25 mg oral tablet: 1 tab(s) orally once a day (19 Oct 2017 07:22)  metFORMIN 500 mg oral tablet: 1 tab(s) orally 2 times a day (19 Oct 2017 07:22)  methylPREDNISolone: 20 milligram(s) orally once a day (27 Oct 2017 09:06)  montelukast 10 mg oral tablet: 1 tab(s) orally once a day (at bedtime) (27 Oct 2017 09:06)  Multiple Vitamins oral tablet: 1 tab(s) orally once a day (27 Oct 2017 09:06)  oxyCODONE 10 mg oral tablet: 1 tab(s) orally every 4 hours, As needed, Moderate Pain (4 - 6) (27 Oct 2017 09:06)  polyethylene glycol 3350 oral powder for reconstitution: 17 gram(s) orally once a day, As needed, Constipation (27 Oct 2017 09:06)  senna oral tablet: 2 tab(s) orally once a day (at bedtime) (27 Oct 2017 09:06)  traMADol 50 mg oral tablet: 1 tab(s) orally 4 times a day (19 Oct 2017 07:22)  verapamil 200 mg/24 hours oral capsule, extended release: 1 cap(s) orally once a day  (19 Oct 2017 07:22)    MEDICATIONS  (STANDING):  ALBUTerol    90 MICROgram(s) HFA Inhaler 1 Puff(s) Inhalation every 4 hours  ALBUTerol/ipratropium for Nebulization 3 milliLiter(s) Nebulizer every 4 hours  atorvastatin 20 milliGRAM(s) Oral at bedtime  bisacodyl Suppository 10 milliGRAM(s) Rectal daily  buDESOnide   0.5 milliGRAM(s) Respule 0.5 milliGRAM(s) Inhalation every 12 hours  busPIRone 10 milliGRAM(s) Oral two times a day  colestipol 1 Gram(s) Oral daily  cyclobenzaprine 10 milliGRAM(s) Oral every 8 hours  dextrose 5%. 1000 milliLiter(s) (50 mL/Hr) IV Continuous <Continuous>  dextrose 50% Injectable 12.5 Gram(s) IV Push once  dextrose 50% Injectable 25 Gram(s) IV Push once  dextrose 50% Injectable 25 Gram(s) IV Push once  docusate sodium 100 milliGRAM(s) Oral three times a day  doxycycline hyclate Capsule 100 milliGRAM(s) Oral every 12 hours  guaiFENesin  milliGRAM(s) Oral every 12 hours  insulin lispro (HumaLOG) corrective regimen sliding scale   SubCutaneous three times a day before meals  insulin lispro (HumaLOG) corrective regimen sliding scale   SubCutaneous at bedtime  levothyroxine 75 MICROGram(s) Oral daily  methylPREDNISolone sodium succinate Injectable 40 milliGRAM(s) IV Push every 12 hours  montelukast 10 milliGRAM(s) Oral at bedtime  multivitamin 1 Tablet(s) Oral daily  senna 2 Tablet(s) Oral at bedtime  sodium chloride 0.9% lock flush 3 milliLiter(s) IV Push every 8 hours  tiotropium 18 MICROgram(s) Capsule 1 Capsule(s) Inhalation daily    MEDICATIONS  (PRN):  acetaminophen   Tablet 650 milliGRAM(s) Oral every 4 hours PRN For Temp greater than 38 C (100.4 F)  acetaminophen   Tablet. 650 milliGRAM(s) Oral every 4 hours PRN Mild Pain (1 - 3)  dextrose Gel 1 Dose(s) Oral once PRN Blood Glucose LESS THAN 70 milliGRAM(s)/deciliter  glucagon  Injectable 1 milliGRAM(s) IntraMuscular once PRN Glucose LESS THAN 70 milligrams/deciliter  guaiFENesin    Syrup 100 milliGRAM(s) Oral every 6 hours PRN Cough  oxyCODONE    IR 10 milliGRAM(s) Oral every 4 hours PRN Moderate Pain (4 - 6)  polyethylene glycol 3350 17 Gram(s) Oral daily PRN Constipation      Allergies    penicillin (Hives)    Intolerances        Vital Signs Last 24 Hrs  T(C): 36.6 (27 Oct 2017 05:16), Max: 37.3 (26 Oct 2017 11:11)  T(F): 97.9 (27 Oct 2017 05:16), Max: 99.1 (26 Oct 2017 11:11)  HR: 80 (27 Oct 2017 05:16) (80 - 89)  BP: 150/74 (27 Oct 2017 05:16) (138/68 - 164/68)  BP(mean): --  RR: 18 (27 Oct 2017 05:16) (18 - 18)  SpO2: 99% (27 Oct 2017 05:16) (96% - 99%)      PHYSICAL EXAMINATION:    NECK:  Supple. No lymphadenopathy. Jugular venous pressure not elevated. Carotids equal.   HEART:   The cardiac impulse has a normal quality. Reg., Nl S1 and S2.  There are no murmurs, rubs or gallops noted  CHEST:  Chest rhonchi to auscultation. Normal respiratory effort.  ABDOMEN:  Soft and nontender.   EXTREMITIES:  There is no edema.       LABS:                        9.6    15.7  )-----------( 422      ( 27 Oct 2017 05:58 )             28.4     10-27    141  |  109<H>  |  41<H>  ----------------------------<  212<H>  4.3   |  23  |  1.24    Ca    8.6      27 Oct 2017 05:58

## 2017-10-27 NOTE — PROGRESS NOTE ADULT - PSYCHIATRIC DETAILS
normal affect/normal behavior
normal behavior/normal affect
normal affect/normal behavior
normal affect/normal behavior
normal behavior/normal affect

## 2017-10-27 NOTE — DISCHARGE NOTE ADULT - PATIENT PORTAL LINK FT
“You can access the [employer] Patient Portal, offered by [facility], by registering with the following website: [email or web address]”

## 2017-10-27 NOTE — PROGRESS NOTE ADULT - NEGATIVE GENERAL SYMPTOMS
no anorexia/no chills/no sweating/no fever
no fever/no sweating/no chills/no anorexia
no sweating/no chills/no fever/no anorexia
no anorexia/no chills/no sweating/no fever
no fever/no sweating/no anorexia/no chills
no sweating/no fever/no chills/no anorexia
no fever/no chills/no sweating/no anorexia

## 2017-10-27 NOTE — PROGRESS NOTE ADULT - CARDIOVASCULAR DETAILS
positive S2/positive S1
positive S1/positive S2
positive S2/positive S1
positive S1/positive S2
positive S2/positive S1

## 2017-10-27 NOTE — DISCHARGE NOTE ADULT - PLAN OF CARE
improve pain and quality of life may shower   activity as tolerated normalize meds and diet modification diet modification and meds medication and diet modification medication

## 2017-10-27 NOTE — PROGRESS NOTE ADULT - MS EXT PE MLT D E PC
no cyanosis/no clubbing
pedal edema/no clubbing/no cyanosis
no clubbing/no cyanosis
no cyanosis/no clubbing
no clubbing/no cyanosis
no cyanosis/no clubbing
no cyanosis/no clubbing

## 2017-10-27 NOTE — PROGRESS NOTE ADULT - SUBJECTIVE AND OBJECTIVE BOX
Pt feels much better, less cough and SOB, wheezing resolved.  Pt wants to go to rehab.    Vital Signs Last 24 Hrs  T(C): 36.5 (27 Oct 2017 11:39), Max: 37.2 (26 Oct 2017 17:14)  T(F): 97.7 (27 Oct 2017 11:39), Max: 98.9 (26 Oct 2017 17:14)  HR: 84 (27 Oct 2017 11:39) (80 - 87)  BP: 149/67 (27 Oct 2017 11:39) (138/68 - 151/64)  BP(mean): --  RR: 18 (27 Oct 2017 11:39) (18 - 18)  SpO2: 97% (27 Oct 2017 11:39) (96% - 99%)    Daily     Daily     I&O's Detail    26 Oct 2017 07:01  -  27 Oct 2017 07:00  --------------------------------------------------------  IN:    Oral Fluid: 240 mL  Total IN: 240 mL    OUT:    Voided: 350 mL  Total OUT: 350 mL    Total NET: -110 mL          CAPILLARY BLOOD GLUCOSE      POCT Blood Glucose.: 192 mg/dL (27 Oct 2017 07:47)  POCT Blood Glucose.: 211 mg/dL (26 Oct 2017 21:08)  POCT Blood Glucose.: 186 mg/dL (26 Oct 2017 16:39)                                      9.6    15.7  )-----------( 422      ( 27 Oct 2017 05:58 )             28.4       10-27    141  |  109<H>  |  41<H>  ----------------------------<  212<H>  4.3   |  23  |  1.24    Ca    8.6      27 Oct 2017 05:58                        MEDICATIONS  (STANDING):  ALBUTerol    90 MICROgram(s) HFA Inhaler 1 Puff(s) Inhalation every 4 hours  ALBUTerol/ipratropium for Nebulization 3 milliLiter(s) Nebulizer every 4 hours  atorvastatin 20 milliGRAM(s) Oral at bedtime  bisacodyl Suppository 10 milliGRAM(s) Rectal daily  buDESOnide   0.5 milliGRAM(s) Respule 0.5 milliGRAM(s) Inhalation every 12 hours  busPIRone 10 milliGRAM(s) Oral two times a day  colestipol 1 Gram(s) Oral daily  cyclobenzaprine 10 milliGRAM(s) Oral every 8 hours  dextrose 5%. 1000 milliLiter(s) (50 mL/Hr) IV Continuous <Continuous>  dextrose 50% Injectable 12.5 Gram(s) IV Push once  dextrose 50% Injectable 25 Gram(s) IV Push once  dextrose 50% Injectable 25 Gram(s) IV Push once  docusate sodium 100 milliGRAM(s) Oral three times a day  doxycycline hyclate Capsule 100 milliGRAM(s) Oral every 12 hours  guaiFENesin  milliGRAM(s) Oral every 12 hours  insulin lispro (HumaLOG) corrective regimen sliding scale   SubCutaneous three times a day before meals  insulin lispro (HumaLOG) corrective regimen sliding scale   SubCutaneous at bedtime  levothyroxine 75 MICROGram(s) Oral daily  methylPREDNISolone sodium succinate Injectable 40 milliGRAM(s) IV Push every 12 hours  montelukast 10 milliGRAM(s) Oral at bedtime  multivitamin 1 Tablet(s) Oral daily  senna 2 Tablet(s) Oral at bedtime  sodium chloride 0.9% lock flush 3 milliLiter(s) IV Push every 8 hours  tiotropium 18 MICROgram(s) Capsule 1 Capsule(s) Inhalation daily    MEDICATIONS  (PRN):  acetaminophen   Tablet 650 milliGRAM(s) Oral every 4 hours PRN For Temp greater than 38 C (100.4 F)  acetaminophen   Tablet. 650 milliGRAM(s) Oral every 4 hours PRN Mild Pain (1 - 3)  dextrose Gel 1 Dose(s) Oral once PRN Blood Glucose LESS THAN 70 milliGRAM(s)/deciliter  glucagon  Injectable 1 milliGRAM(s) IntraMuscular once PRN Glucose LESS THAN 70 milligrams/deciliter  guaiFENesin    Syrup 100 milliGRAM(s) Oral every 6 hours PRN Cough  oxyCODONE    IR 10 milliGRAM(s) Oral every 4 hours PRN Moderate Pain (4 - 6)  polyethylene glycol 3350 17 Gram(s) Oral daily PRN Constipation

## 2017-10-27 NOTE — PROGRESS NOTE ADULT - RS GEN PE MLT RESP DETAILS PC
diminished breath sounds, L/good air movement/diminished breath sounds, R
diminished breath sounds, R/good air movement/diminished breath sounds, L
clear to auscultation bilaterally/breath sounds equal
good air movement/diminished breath sounds, L/diminished breath sounds, R
rhonchi/diminished breath sounds, L/wheezes/diminished breath sounds, R
diminished breath sounds, L/diminished breath sounds, R
diminished breath sounds, L/diminished breath sounds, R/wheezes

## 2017-10-27 NOTE — PROGRESS NOTE ADULT - COMMENTS
All other ROS negative

## 2017-10-27 NOTE — PROGRESS NOTE ADULT - ASSESSMENT
Pt is a 83 y/o with h/o HTN, type II diabetes, depression, hypercholesteremia hypothyroidism, depression, lumbar spinal stenosis who has been having increasing LBP with radiation down his legs.  He was admitted for elective lumbar laminectomy with fusion.  His hospital course was complicated by hypovolumic hypotension and acute blood loss anemia.  Post-op medicine consult called for comanagement.      * Lumbar Stenosis with neurogenic claudication-s/p lumbar laminectomy with fusion.  Continue pain control, PT, doing well, encourage use of incentive spirometry, monitor post-op labs.  * Hypovolumic Hypotension-due to acute blood loss anemia, anesthesia and probably dehydration pre-op from HCTZ.  Resolved, monitor for now, hold verapamil.  * Renal Failure-likely ROSE ? underlying Stage 3 CKD, given hypotension ROSE is likely due to ATN.  Renal fx improving, monitor renal function, maintain adequate MAP.  * Acute Blood Loss Anemia-surgical loss, monitor for now.  * Thrombocytopenia-due to consumption from above, monitor for now as its improving  * Type II Diabetes- ISS for now.  * Hypothyroidism-synthroid  * Crohn's Disease-colestipol  * Proph- Venodyne while drain in place  * Disp-OOB, PT, d/c planning, may transfer to .  * Comm-d/w pt, RN, Dr Platt
Pt is a 83 y/o with h/o HTN, type II diabetes, depression, hypercholesteremia hypothyroidism, depression, lumbar spinal stenosis who has been having increasing LBP with radiation down his legs.  He was admitted for elective lumbar laminectomy with fusion.  His hospital course was complicated by hypovolumic hypotension and acute blood loss anemia.  Post-op medicine consult called for comanagement.      * Lumbar Stenosis with neurogenic claudication-s/p lumbar laminectomy with fusion.  Continue pain control, PT, doing well, encourage use of incentive spirometry.  * Hypovolumic Hypotension-due to acute blood loss anemia, anesthesia and probably dehydration pre-op from HCTZ.  Resolved, monitor for now, continue to verapamil for now.  * Acute Bronchitis with reactive airway disease-clinically much better, taper off prednisone, complete doxycycline for total 7 days, CXR negative for pneumonia, encouraged use of incentive spirometry, Mucinex, nebs, steroids, chest PT OOB, mobilize. Pulmonary eval, repeat labs.  * Renal Failure-likely ROSE from hypotension/ ATN, resolved.  * Acute Blood Loss Anemia-surgical loss, monitor for now, stable.  * Thrombocytopenia-due to consumption from above, resolved.  * Type II Diabetes- ISS for now, will be higher on steroids.  * Hypothyroidism-synthroid  * Crohn's Disease-colestipol  * Disp-OOB, PT, d/c to rehab today  * Comm- d/w pt, RN, Mookie, Dr Mendoza
Pt is a 83 y/o with h/o HTN, type II diabetes, depression, hypercholesteremia hypothyroidism, depression, lumbar spinal stenosis who has been having increasing LBP with radiation down his legs.  He was admitted for elective lumbar laminectomy with fusion.  His hospital course was complicated by hypovolumic hypotension and acute blood loss anemia.  Post-op medicine consult called for comanagement.      * Lumbar Stenosis with neurogenic claudication-s/p lumbar laminectomy with fusion.  Continue pain control, PT, encourage use of incentive spirometry, monitor post-op labs, wean off pca per spine.  * Hypovolumic Hypotension-due to acute blood loss anemia, anesthesia and probably dehydration pre-op from HCTZ.  Resolved, dec IVF, monitor for now.  * Renal Failure-likely ROSE ? underlying Stage 3 CKD, given hypotension ROSE is likely due to ATN.  Renal fx improving, dec IVF, monitor renal function, maintain adequate MAP, repeat BMP in am.  * Acute Blood Loss Anemia-surgical loss, monitor for now.  * Thrombocytopenia-due to consumption from above, monitor for now.  * Hyperkalemia-due to renal failure and potassium in IVF, resolved.  * Type II Diabetes- ISS for now.  * Hypothyroidism-synthroid  * Crohn's Disease-colestipol  * Proph- venodynes  * Disp-OOB, PT  * Comm-d/w pt, RN staff
POD #6 lami/fusion,/TLIF L2-S1
POD #6 lami/fusion,/TLIF L2-S1
PROBLEMS:    Lumbar Stenosis with neurogenic claudication-s/p lumbar laminectomy with fusion  Acute Asthmatic Bronchitis with bronchospasm  Type II Diabetes  Hypothyroidism  Crohn's Disease    PLAN:    IV STEROIDS & DOXYCULIN  AEROSOLS  SINGULAIR  OOB  INCENTIVE SPIROMETRY  DVT PROPHYLASIX
post laminectomy syndrome  hypovolemia
s/p lami fusion doing well
stable post op    has URI symptoms
Pt is a 81 y/o with h/o HTN, type II diabetes, depression, hypercholesteremia hypothyroidism, depression, lumbar spinal stenosis who has been having increasing LBP with radiation down his legs.  He was admitted for elective lumbar laminectomy with fusion.  His hospital course was complicated by hypovolumic hypotension and acute blood loss anemia.  Post-op medicine consult called for comanagement.      * Lumbar Stenosis with neurogenic claudication-s/p lumbar laminectomy with fusion.  Continue pain control, PT, doing well, encourage use of incentive spirometry, monitor post-op labs and drain outpt.  * Hypovolumic Hypotension-due to acute blood loss anemia, anesthesia and probably dehydration pre-op from HCTZ.  Resolved, monitor for now, continue to verapamil for now.  * Acute Bronchitis-now with reactive airway disease, continue doxycycline, CXR negative for pneumonia, encouraged use of incentive spirometry, mucinex, nebs, steroids, chest PT OOB, mobilize. Pulmonary eval, repeat labs.  * Renal Failure-likely ROSE from hypotension/ ATN, resolved.  * Acute Blood Loss Anemia-surgical loss, monitor for now, stable.  * Thrombocytopenia-due to consumption from above, resolved.  * Type II Diabetes- ISS for now, will be higher on steroids.  * Hypothyroidism-synthroid  * Crohn's Disease-colestipol  * Proph- Venodyne while drain in place  * Disp-OOB, PT, d/c planning for rehab when medically stable, hopefully by tomorrow  * Comm-d/w pt, RN, Mookie, Dr Mendoza
Pt is a 81 y/o with h/o HTN, type II diabetes, depression, hypercholesteremia hypothyroidism, depression, lumbar spinal stenosis who has been having increasing LBP with radiation down his legs.  He was admitted for elective lumbar laminectomy with fusion.  His hospital course was complicated by hypovolumic hypotension and acute blood loss anemia.  Post-op medicine consult called for comanagement.      * Lumbar Stenosis with neurogenic claudication-s/p lumbar laminectomy with fusion.  Continue pain control, PT, doing well, encourage use of incentive spirometry, monitor post-op labs and drain outpt.  * Hypovolumic Hypotension-due to acute blood loss anemia, anesthesia and probably dehydration pre-op from HCTZ.  Resolved, monitor for now, continue to verapamil for now.  * Acute Bronchitis-now with reactive airway disease, continue doxycycline, CXR negative for pneumonia, encouraged use of incentive spirometry, mucinex, add nebs, steroids, chest PT OOB, mobilize.  * Renal Failure-likely ROSE from hypotension/ ATN, resolved.  * Acute Blood Loss Anemia-surgical loss, monitor for now, stable.  * Thrombocytopenia-due to consumption from above, resolved.  * Type II Diabetes- ISS for now, will be higher on steroids.  * Hypothyroidism-synthroid  * Crohn's Disease-colestipol  * Proph- Venodyne while drain in place  * Disp-OOB, PT, d/c planning for rehab when medically stable.  * Comm-d/w pt, RN, Dr Platt
Pt is a 81 y/o with h/o HTN, type II diabetes, depression, hypercholesteremia hypothyroidism, depression, lumbar spinal stenosis who has been having increasing LBP with radiation down his legs.  He was admitted for elective lumbar laminectomy with fusion.  His hospital course was complicated by hypovolumic hypotension and acute blood loss anemia.  Post-op medicine consult called for comanagement.      * Lumbar Stenosis with neurogenic claudication-s/p lumbar laminectomy with fusion.  Continue pain control, PT, encourage use of incentive spirometry, monitor post-op labs.  * Hypovolumic Hypotension-due to acute blood loss anemia, anesthesia and probably dehydration pre-op from HCTZ.  Resolved, monitor for now, stop verapamil.  * Renal Failure-likely ROSE ? underlying Stage 3 CKD, given hypotension ROSE is likely due to ATN.  Renal fx improving, monitor renal function, maintain adequate MAP, repeat BMP in am.  * Acute Blood Loss Anemia-surgical loss, monitor for now.  * Thrombocytopenia-due to consumption from above, monitor for now as its improving  * Type II Diabetes- ISS for now.  * Hypothyroidism-synthroid  * Crohn's Disease-colestipol  * Proph- Venodyne  * Disp-OOB, PT, d/c [facility], d/c planning.  * Comm-d/w pt, RN
Pt is a 81 y/o with h/o HTN, type II diabetes, depression, hypercholesteremia hypothyroidism, depression, lumbar spinal stenosis who has been having increasing LBP with radiation down his legs.  He was admitted for elective lumbar laminectomy with fusion.  His hospital course was complicated by hypovolumic hypotension and acute blood loss anemia.  Post-op medicine consult called for comanagement.      * Lumbar Stenosis with neurogenic claudication-s/p lumbar laminectomy with fusion.  Continue pain control, PT, doing well, encourage use of incentive spirometry, monitor post-op labs and drain outpt.  * Hypovolumic Hypotension-due to acute blood loss anemia, anesthesia and probably dehydration pre-op from HCTZ.  Resolved, monitor for now, continue to verapamil for now.  * Cough-suspect bronchitis, order CXR, encouraged use of incentive spirometry, add Mucinex doxycycline po, CXR, monitor.  OOB, mobilize.  * Renal Failure-likely ROSE from hypotension/ ATN.  Renal fx improving, monitor renal function, maintain adequate MAP.  * Acute Blood Loss Anemia-surgical loss, monitor for now.  * Thrombocytopenia-due to consumption from above, resolved.  * Type II Diabetes- ISS for now.  * Hypothyroidism-synthroid  * Crohn's Disease-colestipol  * Proph- Venodyne while drain in place  * Disp-OOB, PT, d/c planning for rehab  * Comm-d/w pt, RN, Dr Platt

## 2017-10-27 NOTE — PROGRESS NOTE ADULT - NEUROLOGICAL DETAILS
alert and oriented x 3

## 2017-10-27 NOTE — DISCHARGE NOTE ADULT - CARE PROVIDER_API CALL
Phoenix Platt), Orthopaedic Surgery  [address] [place] [address]  Phone: [phone]  Fax: [phone]

## 2017-10-27 NOTE — DISCHARGE NOTE ADULT - NS AS ACTIVITY OBS
Showering allowed/Stairs allowed/Walking-Outdoors allowed/Walking-Indoors allowed/No Heavy lifting/straining

## 2017-10-27 NOTE — DISCHARGE NOTE ADULT - CARE PLAN
Principal Discharge DX:	Intervertebral disc disorder  Goal:	improve pain and quality of life  Instructions for follow-up, activity and diet:	may shower   activity as tolerated  Secondary Diagnosis:	Coronary artery disease involving native heart without angina pectoris, unspecified vessel or lesion type  Goal:	normalize  Instructions for follow-up, activity and diet:	meds and diet modification  Secondary Diagnosis:	Type 2 diabetes mellitus without complication, without long-term current use of insulin  Goal:	normalize  Instructions for follow-up, activity and diet:	diet modification and meds  Secondary Diagnosis:	Hypercholesterolemia  Goal:	normalize  Instructions for follow-up, activity and diet:	diet modification and meds  Secondary Diagnosis:	Essential hypertension  Goal:	normalize  Instructions for follow-up, activity and diet:	medication and diet modification  Secondary Diagnosis:	Acquired hypothyroidism  Goal:	normalize  Instructions for follow-up, activity and diet:	medication

## 2017-10-27 NOTE — PROGRESS NOTE ADULT - PROVIDER SPECIALTY LIST ADULT
Cardiology
Internal Medicine
Orthopedics
Pulmonology
Cardiology
Cardiology
Internal Medicine

## 2017-10-27 NOTE — PROGRESS NOTE ADULT - GASTROINTESTINAL DETAILS
bowel sounds normal/soft/no distention/nontender
no distention/nontender/soft/bowel sounds normal
nontender/soft/no distention/bowel sounds normal
no distention/nontender/bowel sounds normal/soft
nontender/soft/bowel sounds normal/no distention
bowel sounds normal/nontender/no distention/soft
bowel sounds normal/no distention/nontender/soft

## 2017-10-27 NOTE — DISCHARGE NOTE ADULT - MEDICATION SUMMARY - MEDICATIONS TO STOP TAKING
I will STOP taking the medications listed below when I get home from the hospital:    mupirocin 2% nasal ointment  -- 1 application into nose 2 times a day I will STOP taking the medications listed below when I get home from the hospital:  None

## 2017-10-27 NOTE — DISCHARGE NOTE ADULT - MEDICATION SUMMARY - MEDICATIONS TO TAKE
I will START or STAY ON the medications listed below when I get home from the hospital:    methylPREDNISolone  -- 20 milligram(s) by mouth once a day  -- Indication: For Upper respiratory symptom    budesonide 0.5 mg/2 mL inhalation suspension  -- 0.5 milligram(s) inhaled once a day  -- Indication: For Upper respiratory symptom    traMADol 50 mg oral tablet  -- 1 tab(s) by mouth 4 times a day  -- Indication: For POST LAMINECTOMY SYNDROME    aspirin 81 mg oral tablet  -- 1 tab(s) by mouth once a day    instructed to foloow preop instructions as per surgeon and cardiologist  -- Indication: For CAD    oxyCODONE 10 mg oral tablet  -- 1 tab(s) by mouth every 4 hours, As needed, Moderate Pain (4 - 6)  -- Indication: For POST LAMINECTOMY SYNDROME    verapamil 200 mg/24 hours oral capsule, extended release  -- 1 cap(s) by mouth once a day   -- Indication: For htn    glimepiride 1 mg oral tablet  -- 1 tab(s) by mouth once a day  -- Indication: For DM    metFORMIN 500 mg oral tablet  -- 1 tab(s) by mouth 2 times a day  -- Indication: For DM    atorvastatin 20 mg oral tablet  -- 1 tab(s) by mouth once a day  -- Indication: For HDL    colestipol 1 g oral tablet  -- 1 tab(s) by mouth once a day  -- Indication: For HDL    lisinopril-hydroCHLOROthiazide 20 mg-25 mg oral tablet  -- 1 tab(s) by mouth once a day  -- Indication: For HTN    doxycycline monohydrate 100 mg oral capsule  -- 1 cap(s) by mouth every 12 hours  -- Indication: For Upper respiratory symptom    busPIRone 10 mg oral tablet  -- 1 tab(s) by mouth 2 times a day  -- Indication: For depression    albuterol 90 mcg/inh inhalation aerosol  -- 1 puff(s) inhaled every 4 hours  -- Indication: For Upper respiratory symptom    guaiFENesin 100 mg/5 mL oral liquid  -- 5 milliliter(s) by mouth every 6 hours, As needed, Cough  -- Indication: For Upper respiratory symptom    guaiFENesin 600 mg oral tablet, extended release  -- 1 tab(s) by mouth every 12 hours  -- Indication: For Upper respiratory symptom    bisacodyl 10 mg rectal suppository  -- 1 suppository(ies) rectally once a day  -- Indication: For POST LAMINECTOMY SYNDROME    docusate sodium 100 mg oral capsule  -- 1 cap(s) by mouth 3 times a day  -- Indication: For POST LAMINECTOMY SYNDROME    polyethylene glycol 3350 oral powder for reconstitution  -- 17 gram(s) by mouth once a day, As needed, Constipation  -- Indication: For POST LAMINECTOMY SYNDROME    senna oral tablet  -- 2 tab(s) by mouth once a day (at bedtime)  -- Indication: For POST LAMINECTOMY SYNDROME    montelukast 10 mg oral tablet  -- 1 tab(s) by mouth once a day (at bedtime)  -- Indication: For Upper respiratory symptom    cyclobenzaprine 10 mg oral tablet  -- 1 tab(s) by mouth every 8 hours  -- Indication: For POST LAMINECTOMY SYNDROME    levothyroxine 75 mcg (0.075 mg) oral tablet  -- 1 tab(s) by mouth once a day  -- Indication: For hypothyroidism    Multiple Vitamins oral tablet  -- 1 tab(s) by mouth once a day  -- Indication: For POST LAMINECTOMY SYNDROME I will START or STAY ON the medications listed below when I get home from the hospital:    budesonide 0.5 mg/2 mL inhalation suspension  -- 0.5 milligram(s) inhaled once a day  -- Indication: For Upper respiratory symptom    methylPREDNISolone  -- 20 milligram(s) by mouth once a day for 2 days than 10 mg daily for 2 days than stop.  -- Indication: For Upper respiratory symptom    traMADol 50 mg oral tablet  -- 1 tab(s) by mouth 4 times a day  -- Indication: For POST LAMINECTOMY SYNDROME    aspirin 81 mg oral tablet  -- 1 tab(s) by mouth once a day    instructed to foloow preop instructions as per surgeon and cardiologist  -- Indication: For CAD    oxyCODONE 10 mg oral tablet  -- 1 tab(s) by mouth every 4 hours, As needed, Moderate Pain (4 - 6)  -- Indication: For POST LAMINECTOMY SYNDROME    verapamil 200 mg/24 hours oral capsule, extended release  -- 1 cap(s) by mouth once a day   -- Indication: For htn    glimepiride 1 mg oral tablet  -- 1 tab(s) by mouth once a day  -- Indication: For DM    metFORMIN 500 mg oral tablet  -- 1 tab(s) by mouth 2 times a day  -- Indication: For DM    atorvastatin 20 mg oral tablet  -- 1 tab(s) by mouth once a day  -- Indication: For HDL    colestipol 1 g oral tablet  -- 1 tab(s) by mouth once a day  -- Indication: For HDL    lisinopril-hydroCHLOROthiazide 20 mg-25 mg oral tablet  -- 1 tab(s) by mouth once a day  -- Indication: For HTN    doxycycline monohydrate 100 mg oral capsule  -- 1 cap(s) by mouth every 12 hours for 4 more days  -- Indication: For Upper respiratory symptom    busPIRone 10 mg oral tablet  -- 1 tab(s) by mouth 2 times a day  -- Indication: For depression    albuterol 90 mcg/inh inhalation aerosol  -- 1 puff(s) inhaled every 4 hours  -- Indication: For Upper respiratory symptom    guaiFENesin 100 mg/5 mL oral liquid  -- 5 milliliter(s) by mouth every 6 hours, As needed, Cough  -- Indication: For Upper respiratory symptom    guaiFENesin 600 mg oral tablet, extended release  -- 1 tab(s) by mouth every 12 hours  -- Indication: For Upper respiratory symptom    bisacodyl 10 mg rectal suppository  -- 1 suppository(ies) rectally once a day  -- Indication: For POST LAMINECTOMY SYNDROME    docusate sodium 100 mg oral capsule  -- 1 cap(s) by mouth 3 times a day  -- Indication: For POST LAMINECTOMY SYNDROME    polyethylene glycol 3350 oral powder for reconstitution  -- 17 gram(s) by mouth once a day, As needed, Constipation  -- Indication: For POST LAMINECTOMY SYNDROME    senna oral tablet  -- 2 tab(s) by mouth once a day (at bedtime)  -- Indication: For POST LAMINECTOMY SYNDROME    montelukast 10 mg oral tablet  -- 1 tab(s) by mouth once a day (at bedtime)  -- Indication: For Upper respiratory symptom    cyclobenzaprine 10 mg oral tablet  -- 1 tab(s) by mouth every 8 hours  -- Indication: For POST LAMINECTOMY SYNDROME    levothyroxine 75 mcg (0.075 mg) oral tablet  -- 1 tab(s) by mouth once a day  -- Indication: For hypothyroidism    Multiple Vitamins oral tablet  -- 1 tab(s) by mouth once a day  -- Indication: For POST LAMINECTOMY SYNDROME

## 2017-10-27 NOTE — DISCHARGE NOTE ADULT - SECONDARY DIAGNOSIS.
Coronary artery disease involving native heart without angina pectoris, unspecified vessel or lesion type Type 2 diabetes mellitus without complication, without long-term current use of insulin Hypercholesterolemia Essential hypertension Acquired hypothyroidism

## 2017-11-01 ENCOUNTER — INPATIENT (INPATIENT)
Facility: HOSPITAL | Age: 82
LOS: 7 days | Discharge: OTHER INSTITUTION | DRG: 28 | End: 2017-11-09
Attending: HOSPITALIST | Admitting: HOSPITALIST
Payer: MEDICARE

## 2017-11-01 VITALS
RESPIRATION RATE: 16 BRPM | DIASTOLIC BLOOD PRESSURE: 71 MMHG | SYSTOLIC BLOOD PRESSURE: 146 MMHG | WEIGHT: 175.05 LBS | HEART RATE: 69 BPM | TEMPERATURE: 97 F | HEIGHT: 69 IN | OXYGEN SATURATION: 94 %

## 2017-11-01 DIAGNOSIS — H35.30 UNSPECIFIED MACULAR DEGENERATION: ICD-10-CM

## 2017-11-01 DIAGNOSIS — J18.9 PNEUMONIA, UNSPECIFIED ORGANISM: ICD-10-CM

## 2017-11-01 DIAGNOSIS — J98.11 ATELECTASIS: ICD-10-CM

## 2017-11-01 DIAGNOSIS — K50.90 CROHN'S DISEASE, UNSPECIFIED, WITHOUT COMPLICATIONS: ICD-10-CM

## 2017-11-01 DIAGNOSIS — F32.9 MAJOR DEPRESSIVE DISORDER, SINGLE EPISODE, UNSPECIFIED: ICD-10-CM

## 2017-11-01 DIAGNOSIS — E66.9 OBESITY, UNSPECIFIED: ICD-10-CM

## 2017-11-01 DIAGNOSIS — Z96.641 PRESENCE OF RIGHT ARTIFICIAL HIP JOINT: Chronic | ICD-10-CM

## 2017-11-01 DIAGNOSIS — M48.062 SPINAL STENOSIS, LUMBAR REGION WITH NEUROGENIC CLAUDICATION: ICD-10-CM

## 2017-11-01 DIAGNOSIS — Z85.46 PERSONAL HISTORY OF MALIGNANT NEOPLASM OF PROSTATE: ICD-10-CM

## 2017-11-01 DIAGNOSIS — E86.1 HYPOVOLEMIA: ICD-10-CM

## 2017-11-01 DIAGNOSIS — J69.0 PNEUMONITIS DUE TO INHALATION OF FOOD AND VOMIT: ICD-10-CM

## 2017-11-01 DIAGNOSIS — M51.36 OTHER INTERVERTEBRAL DISC DEGENERATION, LUMBAR REGION: ICD-10-CM

## 2017-11-01 DIAGNOSIS — M51.9 UNSPECIFIED THORACIC, THORACOLUMBAR AND LUMBOSACRAL INTERVERTEBRAL DISC DISORDER: ICD-10-CM

## 2017-11-01 DIAGNOSIS — N18.3 CHRONIC KIDNEY DISEASE, STAGE 3 (MODERATE): ICD-10-CM

## 2017-11-01 DIAGNOSIS — N17.9 ACUTE KIDNEY FAILURE, UNSPECIFIED: ICD-10-CM

## 2017-11-01 DIAGNOSIS — R41.82 ALTERED MENTAL STATUS, UNSPECIFIED: ICD-10-CM

## 2017-11-01 DIAGNOSIS — R06.2 WHEEZING: ICD-10-CM

## 2017-11-01 DIAGNOSIS — Z88.0 ALLERGY STATUS TO PENICILLIN: ICD-10-CM

## 2017-11-01 DIAGNOSIS — E87.5 HYPERKALEMIA: ICD-10-CM

## 2017-11-01 DIAGNOSIS — I10 ESSENTIAL (PRIMARY) HYPERTENSION: ICD-10-CM

## 2017-11-01 DIAGNOSIS — E78.00 PURE HYPERCHOLESTEROLEMIA, UNSPECIFIED: ICD-10-CM

## 2017-11-01 DIAGNOSIS — R33.9 RETENTION OF URINE, UNSPECIFIED: ICD-10-CM

## 2017-11-01 DIAGNOSIS — C61 MALIGNANT NEOPLASM OF PROSTATE: ICD-10-CM

## 2017-11-01 DIAGNOSIS — M96.1 POSTLAMINECTOMY SYNDROME, NOT ELSEWHERE CLASSIFIED: ICD-10-CM

## 2017-11-01 DIAGNOSIS — I95.81 POSTPROCEDURAL HYPOTENSION: ICD-10-CM

## 2017-11-01 DIAGNOSIS — E11.22 TYPE 2 DIABETES MELLITUS WITH DIABETIC CHRONIC KIDNEY DISEASE: ICD-10-CM

## 2017-11-01 DIAGNOSIS — D62 ACUTE POSTHEMORRHAGIC ANEMIA: ICD-10-CM

## 2017-11-01 DIAGNOSIS — E86.0 DEHYDRATION: ICD-10-CM

## 2017-11-01 DIAGNOSIS — E11.9 TYPE 2 DIABETES MELLITUS WITHOUT COMPLICATIONS: ICD-10-CM

## 2017-11-01 DIAGNOSIS — N32.0 BLADDER-NECK OBSTRUCTION: ICD-10-CM

## 2017-11-01 DIAGNOSIS — I25.10 ATHEROSCLEROTIC HEART DISEASE OF NATIVE CORONARY ARTERY WITHOUT ANGINA PECTORIS: ICD-10-CM

## 2017-11-01 DIAGNOSIS — N17.0 ACUTE KIDNEY FAILURE WITH TUBULAR NECROSIS: ICD-10-CM

## 2017-11-01 DIAGNOSIS — Z90.49 ACQUIRED ABSENCE OF OTHER SPECIFIED PARTS OF DIGESTIVE TRACT: Chronic | ICD-10-CM

## 2017-11-01 DIAGNOSIS — G93.41 METABOLIC ENCEPHALOPATHY: ICD-10-CM

## 2017-11-01 DIAGNOSIS — Z95.5 PRESENCE OF CORONARY ANGIOPLASTY IMPLANT AND GRAFT: ICD-10-CM

## 2017-11-01 DIAGNOSIS — G06.1 INTRASPINAL ABSCESS AND GRANULOMA: ICD-10-CM

## 2017-11-01 DIAGNOSIS — Z98.1 ARTHRODESIS STATUS: Chronic | ICD-10-CM

## 2017-11-01 DIAGNOSIS — J45.909 UNSPECIFIED ASTHMA, UNCOMPLICATED: ICD-10-CM

## 2017-11-01 DIAGNOSIS — D69.59 OTHER SECONDARY THROMBOCYTOPENIA: ICD-10-CM

## 2017-11-01 DIAGNOSIS — I12.9 HYPERTENSIVE CHRONIC KIDNEY DISEASE WITH STAGE 1 THROUGH STAGE 4 CHRONIC KIDNEY DISEASE, OR UNSPECIFIED CHRONIC KIDNEY DISEASE: ICD-10-CM

## 2017-11-01 DIAGNOSIS — Z29.9 ENCOUNTER FOR PROPHYLACTIC MEASURES, UNSPECIFIED: ICD-10-CM

## 2017-11-01 DIAGNOSIS — E03.9 HYPOTHYROIDISM, UNSPECIFIED: ICD-10-CM

## 2017-11-01 DIAGNOSIS — M51.26 OTHER INTERVERTEBRAL DISC DISPLACEMENT, LUMBAR REGION: ICD-10-CM

## 2017-11-01 LAB
ALBUMIN SERPL ELPH-MCNC: 2.5 G/DL — LOW (ref 3.3–5)
ALBUMIN SERPL ELPH-MCNC: 2.7 G/DL — LOW (ref 3.3–5)
ALP SERPL-CCNC: 78 U/L — SIGNIFICANT CHANGE UP (ref 40–120)
ALP SERPL-CCNC: 82 U/L — SIGNIFICANT CHANGE UP (ref 40–120)
ALT FLD-CCNC: 28 U/L — SIGNIFICANT CHANGE UP (ref 12–78)
ALT FLD-CCNC: 31 U/L — SIGNIFICANT CHANGE UP (ref 12–78)
AMMONIA BLD-MCNC: <17 UMOL/L — SIGNIFICANT CHANGE UP (ref 11–32)
ANION GAP SERPL CALC-SCNC: 10 MMOL/L — SIGNIFICANT CHANGE UP (ref 5–17)
ANION GAP SERPL CALC-SCNC: 10 MMOL/L — SIGNIFICANT CHANGE UP (ref 5–17)
APPEARANCE UR: CLEAR — SIGNIFICANT CHANGE UP
AST SERPL-CCNC: 19 U/L — SIGNIFICANT CHANGE UP (ref 15–37)
AST SERPL-CCNC: 21 U/L — SIGNIFICANT CHANGE UP (ref 15–37)
BACTERIA # UR AUTO: NEGATIVE — SIGNIFICANT CHANGE UP
BASOPHILS # BLD AUTO: 0.1 K/UL — SIGNIFICANT CHANGE UP (ref 0–0.2)
BASOPHILS # BLD AUTO: 0.1 K/UL — SIGNIFICANT CHANGE UP (ref 0–0.2)
BASOPHILS NFR BLD AUTO: 0.4 % — SIGNIFICANT CHANGE UP (ref 0–2)
BASOPHILS NFR BLD AUTO: 0.5 % — SIGNIFICANT CHANGE UP (ref 0–2)
BILIRUB SERPL-MCNC: 0.5 MG/DL — SIGNIFICANT CHANGE UP (ref 0.2–1.2)
BILIRUB SERPL-MCNC: 0.5 MG/DL — SIGNIFICANT CHANGE UP (ref 0.2–1.2)
BILIRUB UR-MCNC: NEGATIVE — SIGNIFICANT CHANGE UP
BUN SERPL-MCNC: 33 MG/DL — HIGH (ref 7–23)
BUN SERPL-MCNC: 34 MG/DL — HIGH (ref 7–23)
CALCIUM SERPL-MCNC: 8.9 MG/DL — SIGNIFICANT CHANGE UP (ref 8.5–10.1)
CALCIUM SERPL-MCNC: 9.2 MG/DL — SIGNIFICANT CHANGE UP (ref 8.5–10.1)
CHLORIDE SERPL-SCNC: 103 MMOL/L — SIGNIFICANT CHANGE UP (ref 96–108)
CHLORIDE SERPL-SCNC: 104 MMOL/L — SIGNIFICANT CHANGE UP (ref 96–108)
CO2 SERPL-SCNC: 26 MMOL/L — SIGNIFICANT CHANGE UP (ref 22–31)
CO2 SERPL-SCNC: 26 MMOL/L — SIGNIFICANT CHANGE UP (ref 22–31)
COLOR SPEC: YELLOW — SIGNIFICANT CHANGE UP
CREAT SERPL-MCNC: 1.3 MG/DL — SIGNIFICANT CHANGE UP (ref 0.5–1.3)
CREAT SERPL-MCNC: 1.4 MG/DL — HIGH (ref 0.5–1.3)
CRP SERPL-MCNC: 1.9 MG/DL — HIGH (ref 0–0.4)
DIFF PNL FLD: ABNORMAL
EOSINOPHIL # BLD AUTO: 0 K/UL — SIGNIFICANT CHANGE UP (ref 0–0.5)
EOSINOPHIL # BLD AUTO: 0.1 K/UL — SIGNIFICANT CHANGE UP (ref 0–0.5)
EOSINOPHIL NFR BLD AUTO: 0.2 % — SIGNIFICANT CHANGE UP (ref 0–6)
EOSINOPHIL NFR BLD AUTO: 0.6 % — SIGNIFICANT CHANGE UP (ref 0–6)
EPI CELLS # UR: NEGATIVE — SIGNIFICANT CHANGE UP
ERYTHROCYTE [SEDIMENTATION RATE] IN BLOOD: 38 MM/HR — HIGH (ref 0–20)
GLUCOSE SERPL-MCNC: 105 MG/DL — HIGH (ref 70–99)
GLUCOSE SERPL-MCNC: 88 MG/DL — SIGNIFICANT CHANGE UP (ref 70–99)
GLUCOSE UR QL: NEGATIVE — SIGNIFICANT CHANGE UP
HCT VFR BLD CALC: 30.3 % — LOW (ref 39–50)
HCT VFR BLD CALC: 33.8 % — LOW (ref 39–50)
HGB BLD-MCNC: 10.8 G/DL — LOW (ref 13–17)
HGB BLD-MCNC: 9.7 G/DL — LOW (ref 13–17)
KETONES UR-MCNC: NEGATIVE — SIGNIFICANT CHANGE UP
LACTATE SERPL-SCNC: 0.8 MMOL/L — SIGNIFICANT CHANGE UP (ref 0.7–2)
LEUKOCYTE ESTERASE UR-ACNC: NEGATIVE — SIGNIFICANT CHANGE UP
LYMPHOCYTES # BLD AUTO: 1.1 K/UL — SIGNIFICANT CHANGE UP (ref 1–3.3)
LYMPHOCYTES # BLD AUTO: 1.5 K/UL — SIGNIFICANT CHANGE UP (ref 1–3.3)
LYMPHOCYTES # BLD AUTO: 7 % — LOW (ref 13–44)
LYMPHOCYTES # BLD AUTO: 8 % — LOW (ref 13–44)
MCHC RBC-ENTMCNC: 30.7 PG — SIGNIFICANT CHANGE UP (ref 27–34)
MCHC RBC-ENTMCNC: 30.8 PG — SIGNIFICANT CHANGE UP (ref 27–34)
MCHC RBC-ENTMCNC: 31.8 GM/DL — LOW (ref 32–36)
MCHC RBC-ENTMCNC: 32 GM/DL — SIGNIFICANT CHANGE UP (ref 32–36)
MCV RBC AUTO: 96.2 FL — SIGNIFICANT CHANGE UP (ref 80–100)
MCV RBC AUTO: 96.4 FL — SIGNIFICANT CHANGE UP (ref 80–100)
MONOCYTES # BLD AUTO: 0.9 K/UL — SIGNIFICANT CHANGE UP (ref 0–0.9)
MONOCYTES # BLD AUTO: 0.9 K/UL — SIGNIFICANT CHANGE UP (ref 0–0.9)
MONOCYTES NFR BLD AUTO: 4.7 % — SIGNIFICANT CHANGE UP (ref 1–9)
MONOCYTES NFR BLD AUTO: 5.5 % — SIGNIFICANT CHANGE UP (ref 1–9)
NEUTROPHILS # BLD AUTO: 13.8 K/UL — HIGH (ref 1.8–7.4)
NEUTROPHILS # BLD AUTO: 16.2 K/UL — HIGH (ref 1.8–7.4)
NEUTROPHILS NFR BLD AUTO: 86.4 % — HIGH (ref 43–77)
NEUTROPHILS NFR BLD AUTO: 86.7 % — HIGH (ref 43–77)
NITRITE UR-MCNC: NEGATIVE — SIGNIFICANT CHANGE UP
NT-PROBNP SERPL-SCNC: 493 PG/ML — HIGH (ref 0–450)
PH UR: 5 — SIGNIFICANT CHANGE UP (ref 5–8)
PLATELET # BLD AUTO: 571 K/UL — HIGH (ref 150–400)
PLATELET # BLD AUTO: 574 K/UL — HIGH (ref 150–400)
POTASSIUM SERPL-MCNC: 3.7 MMOL/L — SIGNIFICANT CHANGE UP (ref 3.5–5.3)
POTASSIUM SERPL-MCNC: 3.9 MMOL/L — SIGNIFICANT CHANGE UP (ref 3.5–5.3)
POTASSIUM SERPL-SCNC: 3.7 MMOL/L — SIGNIFICANT CHANGE UP (ref 3.5–5.3)
POTASSIUM SERPL-SCNC: 3.9 MMOL/L — SIGNIFICANT CHANGE UP (ref 3.5–5.3)
PROT SERPL-MCNC: 5.9 G/DL — LOW (ref 6–8.3)
PROT SERPL-MCNC: 6.2 G/DL — SIGNIFICANT CHANGE UP (ref 6–8.3)
PROT UR-MCNC: NEGATIVE — SIGNIFICANT CHANGE UP
RAPID RVP RESULT: SIGNIFICANT CHANGE UP
RBC # BLD: 3.15 M/UL — LOW (ref 4.2–5.8)
RBC # BLD: 3.51 M/UL — LOW (ref 4.2–5.8)
RBC # FLD: 14.1 % — SIGNIFICANT CHANGE UP (ref 10.3–14.5)
RBC # FLD: 14.5 % — SIGNIFICANT CHANGE UP (ref 10.3–14.5)
RBC CASTS # UR COMP ASSIST: SIGNIFICANT CHANGE UP /HPF (ref 0–4)
SODIUM SERPL-SCNC: 139 MMOL/L — SIGNIFICANT CHANGE UP (ref 135–145)
SODIUM SERPL-SCNC: 140 MMOL/L — SIGNIFICANT CHANGE UP (ref 135–145)
SP GR SPEC: 1.01 — SIGNIFICANT CHANGE UP (ref 1.01–1.02)
T3 SERPL-MCNC: 64 NG/DL — LOW (ref 80–200)
T4 AB SER-ACNC: 6.9 UG/DL — SIGNIFICANT CHANGE UP (ref 4.6–12)
TSH SERPL-MCNC: 3.62 UIU/ML — SIGNIFICANT CHANGE UP (ref 0.36–3.74)
UROBILINOGEN FLD QL: NEGATIVE — SIGNIFICANT CHANGE UP
WBC # BLD: 15.9 K/UL — HIGH (ref 3.8–10.5)
WBC # BLD: 18.6 K/UL — HIGH (ref 3.8–10.5)
WBC # FLD AUTO: 15.9 K/UL — HIGH (ref 3.8–10.5)
WBC # FLD AUTO: 18.6 K/UL — HIGH (ref 3.8–10.5)
WBC UR QL: NEGATIVE — SIGNIFICANT CHANGE UP

## 2017-11-01 PROCEDURE — 99223 1ST HOSP IP/OBS HIGH 75: CPT | Mod: AI,GC

## 2017-11-01 PROCEDURE — 71010: CPT | Mod: 26

## 2017-11-01 PROCEDURE — 70450 CT HEAD/BRAIN W/O DYE: CPT | Mod: 26

## 2017-11-01 PROCEDURE — 93010 ELECTROCARDIOGRAM REPORT: CPT

## 2017-11-01 PROCEDURE — 99285 EMERGENCY DEPT VISIT HI MDM: CPT | Mod: 25

## 2017-11-01 RX ORDER — SENNA PLUS 8.6 MG/1
2 TABLET ORAL AT BEDTIME
Qty: 0 | Refills: 0 | Status: DISCONTINUED | OUTPATIENT
Start: 2017-11-01 | End: 2017-11-04

## 2017-11-01 RX ORDER — INSULIN LISPRO 100/ML
VIAL (ML) SUBCUTANEOUS AT BEDTIME
Qty: 0 | Refills: 0 | Status: DISCONTINUED | OUTPATIENT
Start: 2017-11-01 | End: 2017-11-02

## 2017-11-01 RX ORDER — DEXTROSE 50 % IN WATER 50 %
12.5 SYRINGE (ML) INTRAVENOUS ONCE
Qty: 0 | Refills: 0 | Status: DISCONTINUED | OUTPATIENT
Start: 2017-11-01 | End: 2017-11-04

## 2017-11-01 RX ORDER — LEVOTHYROXINE SODIUM 125 MCG
75 TABLET ORAL DAILY
Qty: 0 | Refills: 0 | Status: DISCONTINUED | OUTPATIENT
Start: 2017-11-01 | End: 2017-11-02

## 2017-11-01 RX ORDER — DOCUSATE SODIUM 100 MG
100 CAPSULE ORAL THREE TIMES A DAY
Qty: 0 | Refills: 0 | Status: DISCONTINUED | OUTPATIENT
Start: 2017-11-01 | End: 2017-11-04

## 2017-11-01 RX ORDER — DEXTROSE 50 % IN WATER 50 %
25 SYRINGE (ML) INTRAVENOUS ONCE
Qty: 0 | Refills: 0 | Status: DISCONTINUED | OUTPATIENT
Start: 2017-11-01 | End: 2017-11-04

## 2017-11-01 RX ORDER — ATORVASTATIN CALCIUM 80 MG/1
20 TABLET, FILM COATED ORAL AT BEDTIME
Qty: 0 | Refills: 0 | Status: DISCONTINUED | OUTPATIENT
Start: 2017-11-01 | End: 2017-11-04

## 2017-11-01 RX ORDER — ASPIRIN/CALCIUM CARB/MAGNESIUM 324 MG
81 TABLET ORAL DAILY
Qty: 0 | Refills: 0 | Status: DISCONTINUED | OUTPATIENT
Start: 2017-11-01 | End: 2017-11-02

## 2017-11-01 RX ORDER — SODIUM CHLORIDE 0.65 %
1 AEROSOL, SPRAY (ML) NASAL
Qty: 0 | Refills: 0 | Status: DISCONTINUED | OUTPATIENT
Start: 2017-11-01 | End: 2017-11-04

## 2017-11-01 RX ORDER — HYDROCHLOROTHIAZIDE 25 MG
25 TABLET ORAL DAILY
Qty: 0 | Refills: 0 | Status: DISCONTINUED | OUTPATIENT
Start: 2017-11-01 | End: 2017-11-01

## 2017-11-01 RX ORDER — SODIUM CHLORIDE 9 MG/ML
1000 INJECTION INTRAMUSCULAR; INTRAVENOUS; SUBCUTANEOUS
Qty: 0 | Refills: 0 | Status: DISCONTINUED | OUTPATIENT
Start: 2017-11-01 | End: 2017-11-01

## 2017-11-01 RX ORDER — SODIUM CHLORIDE 9 MG/ML
3 INJECTION INTRAMUSCULAR; INTRAVENOUS; SUBCUTANEOUS ONCE
Qty: 0 | Refills: 0 | Status: COMPLETED | OUTPATIENT
Start: 2017-11-01 | End: 2017-11-01

## 2017-11-01 RX ORDER — GLUCAGON INJECTION, SOLUTION 0.5 MG/.1ML
1 INJECTION, SOLUTION SUBCUTANEOUS ONCE
Qty: 0 | Refills: 0 | Status: DISCONTINUED | OUTPATIENT
Start: 2017-11-01 | End: 2017-11-04

## 2017-11-01 RX ORDER — VERAPAMIL HCL 240 MG
180 CAPSULE, EXTENDED RELEASE PELLETS 24 HR ORAL DAILY
Qty: 0 | Refills: 0 | Status: DISCONTINUED | OUTPATIENT
Start: 2017-11-01 | End: 2017-11-04

## 2017-11-01 RX ORDER — HEPARIN SODIUM 5000 [USP'U]/ML
5000 INJECTION INTRAVENOUS; SUBCUTANEOUS EVERY 12 HOURS
Qty: 0 | Refills: 0 | Status: DISCONTINUED | OUTPATIENT
Start: 2017-11-01 | End: 2017-11-02

## 2017-11-01 RX ORDER — SODIUM CHLORIDE 9 MG/ML
1000 INJECTION, SOLUTION INTRAVENOUS
Qty: 0 | Refills: 0 | Status: DISCONTINUED | OUTPATIENT
Start: 2017-11-01 | End: 2017-11-04

## 2017-11-01 RX ORDER — VERAPAMIL HCL 240 MG
160 CAPSULE, EXTENDED RELEASE PELLETS 24 HR ORAL DAILY
Qty: 0 | Refills: 0 | Status: DISCONTINUED | OUTPATIENT
Start: 2017-11-01 | End: 2017-11-01

## 2017-11-01 RX ORDER — MONTELUKAST 4 MG/1
10 TABLET, CHEWABLE ORAL AT BEDTIME
Qty: 0 | Refills: 0 | Status: DISCONTINUED | OUTPATIENT
Start: 2017-11-01 | End: 2017-11-04

## 2017-11-01 RX ORDER — TRAMADOL HYDROCHLORIDE 50 MG/1
50 TABLET ORAL
Qty: 0 | Refills: 0 | Status: DISCONTINUED | OUTPATIENT
Start: 2017-11-01 | End: 2017-11-01

## 2017-11-01 RX ORDER — DEXTROSE 50 % IN WATER 50 %
1 SYRINGE (ML) INTRAVENOUS ONCE
Qty: 0 | Refills: 0 | Status: DISCONTINUED | OUTPATIENT
Start: 2017-11-01 | End: 2017-11-04

## 2017-11-01 RX ORDER — INSULIN LISPRO 100/ML
VIAL (ML) SUBCUTANEOUS
Qty: 0 | Refills: 0 | Status: DISCONTINUED | OUTPATIENT
Start: 2017-11-01 | End: 2017-11-02

## 2017-11-01 RX ADMIN — TRAMADOL HYDROCHLORIDE 50 MILLIGRAM(S): 50 TABLET ORAL at 06:24

## 2017-11-01 RX ADMIN — HEPARIN SODIUM 5000 UNIT(S): 5000 INJECTION INTRAVENOUS; SUBCUTANEOUS at 18:15

## 2017-11-01 RX ADMIN — SODIUM CHLORIDE 3 MILLILITER(S): 9 INJECTION INTRAMUSCULAR; INTRAVENOUS; SUBCUTANEOUS at 02:32

## 2017-11-01 RX ADMIN — Medication 100 MILLIGRAM(S): at 06:47

## 2017-11-01 RX ADMIN — Medication 100 MILLIGRAM(S): at 13:59

## 2017-11-01 RX ADMIN — Medication 1 TABLET(S): at 13:59

## 2017-11-01 RX ADMIN — HEPARIN SODIUM 5000 UNIT(S): 5000 INJECTION INTRAVENOUS; SUBCUTANEOUS at 06:03

## 2017-11-01 RX ADMIN — Medication 600 MILLIGRAM(S): at 05:54

## 2017-11-01 RX ADMIN — Medication 10 MILLIGRAM(S): at 06:47

## 2017-11-01 RX ADMIN — Medication 75 MICROGRAM(S): at 06:24

## 2017-11-01 RX ADMIN — Medication 81 MILLIGRAM(S): at 13:59

## 2017-11-01 RX ADMIN — SODIUM CHLORIDE 75 MILLILITER(S): 9 INJECTION INTRAMUSCULAR; INTRAVENOUS; SUBCUTANEOUS at 08:58

## 2017-11-01 RX ADMIN — Medication 25 MILLIGRAM(S): at 06:04

## 2017-11-01 RX ADMIN — Medication 1 MILLIGRAM(S): at 05:57

## 2017-11-01 RX ADMIN — Medication 180 MILLIGRAM(S): at 06:48

## 2017-11-01 RX ADMIN — Medication 1 SPRAY(S): at 18:15

## 2017-11-01 RX ADMIN — Medication 1 MILLIGRAM(S): at 14:10

## 2017-11-01 NOTE — H&P ADULT - ASSESSMENT
82 year old male with PMHx of CAD, Crohn's disease, T2DM, HTN, HLD, and hypothyroidism admitted for pneumonia.

## 2017-11-01 NOTE — ED ADULT NURSE REASSESSMENT NOTE - NS ED NURSE REASSESS COMMENT FT1
pt agitated and medicated with [id number] ivp as ordered and am meds given pt admitted to floor awaiting disposition report given awaiting transfer
report given and pt still confused and agitated transfer to floor in no distress
pt stable wbc elevated pt reavaluated and medicated with levoquin 500 mg ivpb awaiting bed assignment

## 2017-11-01 NOTE — H&P ADULT - ATTENDING COMMENTS
82 year old male with PMHx of CAD, Crohn's disease, T2DM, HTN, HLD, and hypothyroidism admitted for pneumonia, AMS.  Pt not  himself per rehab.  Possibly altered.  Will give IV Abx for his pneumonia.  F/U Cultures and labs. 82 year old male with PMHx of CAD, Crohn's disease, T2DM, HTN, HLD, and hypothyroidism admitted for pneumonia, AMS.  Pt not  himself per rehab.  Possibly altered.  Will give IV Abx for his pneumonia.  Pt is altered, evaluate for metabolic encephalopahty.  Consulted Neuro.  Hold Tramadol.  F/U Cultures and labs.

## 2017-11-01 NOTE — H&P ADULT - HISTORY OF PRESENT ILLNESS
82 year old male with PMHx of CAD, Crohn's disease, T2DM, HTN, HLD, and hypothyroidism [facility]. Pt was in his rehab facility when the staff noticed that he was making nonsensical statements. Pt was able to answer questions at rehab but would make odd remarks, which prompted them to send him to the ED. He did not have any fevers, cough, nausea, vomiting, or diarrhea at the rehab facility. At baseline, pt is AAOx3 as per rehab staff. Pt was recently discharged from [facility] on 10/27/17 s/p laminectomy. His postoperative course was complicated by blood loss requiring transfusion as well as an URI requiring antibiotics and steroids. He was discharged on doxycycline and a steroid taper. At present, pt is difficult to arouse and constantly drifts into sleep. He responds to his name when called and follows instructions. However he does not answer any questions and the answers he gives are nonsensical.     In ED, VS were T-97.3 HR-69 BP-146/71 RR-16 SpO2-94. Labs were significant for WBC-18 Hb-10.8 Cr-1.4 Lactate-0.8. Urinalysis was unremarkable. CT Head showed no acute intracranial pathology. Preliminary CXR showerd right lower lobe consolidation.   In ED, pt recieved Levaquin 500mg x1. 82 year old male with PMHx of CAD, Crohn's disease, T2DM, HTN, HLD, and hypothyroidism [facility] Pt was in his rehab facility when the staff noticed that he was making nonsensical statements. Pt was able to answer questions at rehab but would make odd remarks, which prompted them to send him to the ED. He did not have any fevers, cough, nausea, vomiting, or diarrhea at the rehab facility. At baseline, pt is AAOx3 as per rehab staff. Pt was recently discharged from [facility] on 10/27/17 s/p laminectomy. His postoperative course was complicated by blood loss requiring transfusion as well as an URI requiring antibiotics and steroids. He was discharged on doxycycline and a steroid taper. At present, pt is difficult to arouse and constantly drifts into sleep. He responds to his name when called and follows instructions. However he does not answer any questions and the answers he gives are nonsensical.     In ED, VS were T-97.3 HR-69 BP-146/71 RR-16 SpO2-94. Labs were significant for WBC-18 Hb-10.8 Cr-1.4 Lactate-0.8. Urinalysis was unremarkable. CT Head showed no acute intracranial pathology. Preliminary CXR showerd right lower lobe infiltrate  In ED, pt recieved Levaquin 500mg x1.

## 2017-11-01 NOTE — H&P ADULT - PROBLEM SELECTOR PLAN 6
continue synthroid  f/u thyroid panel continue verapamil and HCTZ with hold parameters  hold lisinopril given ROSE

## 2017-11-01 NOTE — H&P ADULT - RS GEN PE MLT RESP DETAILS PC
diminished breath sounds, L/airway patent/no rales/no intercostal retractions/diminished breath sounds, R/respirations non-labored/rhonchi/wheezes

## 2017-11-01 NOTE — H&P ADULT - PROBLEM SELECTOR PLAN 3
s/p laminectomy on 10/25  continue tramadol hold tramadol as it may be cause of AMS  f/u neuro consult

## 2017-11-01 NOTE — CONSULT NOTE ADULT - SUBJECTIVE AND OBJECTIVE BOX
Date/Time Patient Seen:  		  Referring MD:   Data Reviewed	       Patient is a 82y old  Male who presents with a chief complaint of [facility] (01 Nov 2017 10:09)      Subjective/HPI  seen and examined  vs and meds reviewed, seen for c/o wheezing, frail and weak, neuro cx noted,     82 year old male with PMHx of CAD, Crohn's disease, T2DM, HTN, HLD, and hypothyroidism sent to ER from Health system for AMS. Pt was in his rehab facility when the staff noticed that he was making nonsensical statements. Pt was able to answer questions at rehab but would make odd remarks, which prompted them to send him to the ED. He did not have any fevers, cough, nausea, vomiting, or diarrhea at the rehab facility. At baseline, pt is AAOx3 as per rehab staff. Pt was recently discharged from [facility] on 10/27/17 s/p laminectomy. His postoperative course was complicated by blood loss requiring transfusion as well as an URI requiring antibiotics and steroids. He was discharged on doxycycline and a steroid taper. At present, pt is difficult to arouse and constantly drifts into sleep. He responds to his name when called and follows instructions. However he does not answer any questions and the answers he gives are nonsensical.        PAST MEDICAL & SURGICAL HISTORY:  Prostate CA  Crohn disease  Hypothyroidism  Depression  Intervertebral disc disorder  Diabetes mellitus  Obesity  Hypercholesterolemia  HTN (hypertension)  Stented coronary artery: 2 stents, 20 yrs ago, 15 yrs ago  CAD (coronary artery disease)  Macular degeneration: right eye  Glaucoma: right eye  Hearing loss: left hearing aid, right ear deaf  S/P colon resection  S/P lumbar fusion  History of hip replacement, total, right: Revision  History of hip replacement, total, right        Medication list         MEDICATIONS  (STANDING):  aspirin  chewable 81 milliGRAM(s) Oral daily  atorvastatin 20 milliGRAM(s) Oral at bedtime  busPIRone 10 milliGRAM(s) Oral two times a day  dextrose 5%. 1000 milliLiter(s) (50 mL/Hr) IV Continuous <Continuous>  dextrose 50% Injectable 12.5 Gram(s) IV Push once  dextrose 50% Injectable 25 Gram(s) IV Push once  dextrose 50% Injectable 25 Gram(s) IV Push once  docusate sodium 100 milliGRAM(s) Oral three times a day  guaiFENesin  milliGRAM(s) Oral every 12 hours  heparin  Injectable 5000 Unit(s) SubCutaneous every 12 hours  hydrochlorothiazide 25 milliGRAM(s) Oral daily  insulin lispro (HumaLOG) corrective regimen sliding scale   SubCutaneous three times a day before meals  insulin lispro (HumaLOG) corrective regimen sliding scale   SubCutaneous at bedtime  levothyroxine 75 MICROGram(s) Oral daily  LORazepam   Injectable 1 milliGRAM(s) IV Push once  montelukast 10 milliGRAM(s) Oral at bedtime  multivitamin 1 Tablet(s) Oral daily  senna 2 Tablet(s) Oral at bedtime  sodium chloride 0.65% Nasal 1 Spray(s) Both Nostrils two times a day  verapamil  milliGRAM(s) Oral daily    MEDICATIONS  (PRN):  dextrose Gel 1 Dose(s) Oral once PRN Blood Glucose LESS THAN 70 milliGRAM(s)/deciliter  glucagon  Injectable 1 milliGRAM(s) IntraMuscular once PRN Glucose LESS THAN 70 milligrams/deciliter         Vitals log        ICU Vital Signs Last 24 Hrs  T(C): 36.7 (01 Nov 2017 06:24), Max: 36.9 (01 Nov 2017 04:09)  T(F): 98 (01 Nov 2017 06:24), Max: 98.4 (01 Nov 2017 04:09)  HR: 82 (01 Nov 2017 06:24) (69 - 82)  BP: 159/77 (01 Nov 2017 06:24) (111/94 - 159/77)  BP(mean): --  ABP: --  ABP(mean): --  RR: 22 (01 Nov 2017 06:24) (16 - 22)  SpO2: 96% (01 Nov 2017 06:24) (94% - 96%)           Input and Output:  I&O's Detail      Lab Data                        9.7    15.9  )-----------( 571      ( 01 Nov 2017 07:46 )             30.3     11-01    140  |  104  |  33<H>  ----------------------------<  88  3.9   |  26  |  1.30    Ca    8.9      01 Nov 2017 07:46    TPro  5.9<L>  /  Alb  2.5<L>  /  TBili  0.5  /  DBili  x   /  AST  19  /  ALT  28  /  AlkPhos  78  11-01            Review of Systems	      Objective     Physical Examination    head at  heart - s1s2  lungs - dec BS  wheezing in upper airway  abd - soft      Pertinent Lab findings & Imaging      [place]:  NO   Adequate UO     I&O's Detail           Discussed with:     Cultures:	        Radiology

## 2017-11-01 NOTE — H&P ADULT - MUSCULOSKELETAL
detailed exam ROM intact/no calf tenderness/normal/no joint swelling/no joint erythema/no joint warmth

## 2017-11-01 NOTE — PROGRESS NOTE ADULT - PROBLEM SELECTOR PLAN 1
Unclear etiology, ammo is normal. CXR neg. leucocytosis improving  seen by neuro. will follow with further w/u   No sedation, asp precaution, HOB elevated.  fall prec, support with ADL, oral hygiene, skin care

## 2017-11-01 NOTE — H&P ADULT - PROBLEM SELECTOR PLAN 5
continue verapamil and HCTZ with hold parameters  hold lisinopril given ROSE accuchecks  hypoglycemia protocol  LDISS

## 2017-11-01 NOTE — ED ADULT NURSE NOTE - OBJECTIVE STATEMENT
received pt via EMS awake sent for AMS pt knows name and year and  pt evaluated ekg done and reviewed by Md blood work drawned and sent to lab pt straight cath for ua and cxs  fingerstick 101 mg/dl and taken to ct scan pt hard of hearing lt ear earring aide in place

## 2017-11-01 NOTE — H&P ADULT - PROBLEM SELECTOR PLAN 1
most likely nosocomial given recent hospital stay and rehab admission  does not meet SIRS criteria at this time  f/u CBC  f/u cultures continue Levaquin  does not meet SIRS criteria at this time  f/u CBC  f/u cultures

## 2017-11-01 NOTE — ED PROVIDER NOTE - OBJECTIVE STATEMENT
82 year old male with extensive PMH including DM, HTN, crohn's disease, CAD, prostate CA, presents with AMS.  Patient was admitted to [facility] 82 year old male with extensive PMH including DM, HTN, crohn's disease, CAD, prostate CA, presents with AMS.  Patient was admitted to [facility] 10/19-10/27/17 for spinal laminectomy.  His post-operative course was complicated by blood loss, hypotension requiring PRBCs and IVF and URI requiring abx and steroids.  He was sent to [facility] rehab after d/c for hospital.  Tonight, staff called EMS stating patient appeared confused was AO x 1 (ordinarily AO x 3), did not have an onset of start time.  No fevers, n/v/d, no cough.  He is a full code. PMD Dr. Lovett and Mely

## 2017-11-01 NOTE — PROGRESS NOTE ADULT - SUBJECTIVE AND OBJECTIVE BOX
Patient is a 82y old  Male who presents with a chief complaint of sent from [facility] for AMS (2017 10:09)      INTERVAL HPI: 82 year old male with PMHx of CAD, Crohn's disease, T2DM, HTN, HLD, and hypothyroidism admitted for AMS 2/2 to ? sepesis. pt still lethargic, not communicative.  OVERNIGHT EVENTS:  T(F): 98 (17 @ 06:24), Max: 98.4 (17 @ 04:09)  HR: 82 (17 @ 06:24) (69 - 82)  BP: 159/77 (17 @ 06:24) (111/94 - 159/77)  RR: 22 (17 @ 06:24) (16 - 22)  SpO2: 96% (17 @ 06:24) (94% - 96%)  Wt(kg): --  I&O's Summary      REVIEW OF SYSTEM:    UTO 2/2 to mental status    PHYSICAL EXAM:  GENERAL: NAD, well-developed, restless and non communicative  HEAD:  Atraumatic, Normocephalic  NECK: Supple, No JVD, Normal thyroid  HEART: Regular rate and rhythm; No murmurs, rubs, or gallops  RESPIRATORY: CTA B/L, No wheezing / rhonchi  ABDOMEN: Soft, Nontender, Nondistended; Bowel sounds present  NEUROLOGY: A&Ox0, moving all extremities.  EXTREMITIES:  2+ Peripheral Pulses, No clubbing, cyanosis, or edema  SKIN: warm, dry, normal color, no rash or abnormal lesions        LABS:                        9.7    15.9  )-----------( 571      ( 2017 07:46 )             30.3         140  |  104  |  33<H>  ----------------------------<  88  3.9   |  26  |  1.30    Ca    8.9      2017 07:46    TPro  5.9<L>  /  Alb  2.5<L>  /  TBili  0.5  /  DBili  x   /  AST  19  /  ALT  28  /  AlkPhos  78        Urinalysis Basic - ( 2017 03:18 )    Color: Yellow / Appearance: Clear / S.015 / pH: x  Gluc: x / Ketone: Negative  / Bili: Negative / Urobili: Negative   Blood: x / Protein: Negative / Nitrite: Negative   Leuk Esterase: Negative / RBC: 0-2 /HPF / WBC Negative   Sq Epi: x / Non Sq Epi: Negative / Bacteria: Negative      CAPILLARY BLOOD GLUCOSE  101 (2017 01:58)      POCT Blood Glucose.: 140 mg/dL (2017 16:33)  POCT Blood Glucose.: 101 mg/dL (2017 07:58)  POCT Blood Glucose.: 101 mg/dL (2017 02:12)              MEDICATIONS  (STANDING):  aspirin  chewable 81 milliGRAM(s) Oral daily  atorvastatin 20 milliGRAM(s) Oral at bedtime  busPIRone 10 milliGRAM(s) Oral two times a day  dextrose 5%. 1000 milliLiter(s) (50 mL/Hr) IV Continuous <Continuous>  dextrose 50% Injectable 12.5 Gram(s) IV Push once  dextrose 50% Injectable 25 Gram(s) IV Push once  dextrose 50% Injectable 25 Gram(s) IV Push once  docusate sodium 100 milliGRAM(s) Oral three times a day  guaiFENesin  milliGRAM(s) Oral every 12 hours  heparin  Injectable 5000 Unit(s) SubCutaneous every 12 hours  hydrochlorothiazide 25 milliGRAM(s) Oral daily  insulin lispro (HumaLOG) corrective regimen sliding scale   SubCutaneous three times a day before meals  insulin lispro (HumaLOG) corrective regimen sliding scale   SubCutaneous at bedtime  levothyroxine 75 MICROGram(s) Oral daily  montelukast 10 milliGRAM(s) Oral at bedtime  multivitamin 1 Tablet(s) Oral daily  senna 2 Tablet(s) Oral at bedtime  sodium chloride 0.65% Nasal 1 Spray(s) Both Nostrils two times a day  verapamil  milliGRAM(s) Oral daily    MEDICATIONS  (PRN):  dextrose Gel 1 Dose(s) Oral once PRN Blood Glucose LESS THAN 70 milliGRAM(s)/deciliter  glucagon  Injectable 1 milliGRAM(s) IntraMuscular once PRN Glucose LESS THAN 70 milligrams/deciliter

## 2017-11-01 NOTE — ED PROVIDER NOTE - DISPOSITION TYPE
Patient is requesting refills of oxycodone, Fentanyl and a new prescription of Narcan to be mailed to [facility] in [place]. Routing to MA pool for processing.    YADIRA LazarN, RN  Care Coordinator  [facility] [place]     ADMIT

## 2017-11-01 NOTE — CONSULT NOTE ADULT - ASSESSMENT
CMS -- unclear etiology at present -- check basic labs plan mri lumbar spine  hold pain meds  spoke to charisma and dimitry -- home -- [phone] cell

## 2017-11-01 NOTE — ED PROVIDER NOTE - PHYSICAL EXAMINATION
Answering questions appropriately, follows commands. At times, speaking nonsensical, stating "I had a wallet full of money because I'm going on vacation tomorrow."

## 2017-11-02 DIAGNOSIS — E66.9 OBESITY, UNSPECIFIED: ICD-10-CM

## 2017-11-02 DIAGNOSIS — K92.2 GASTROINTESTINAL HEMORRHAGE, UNSPECIFIED: ICD-10-CM

## 2017-11-02 DIAGNOSIS — K50.90 CROHN'S DISEASE, UNSPECIFIED, WITHOUT COMPLICATIONS: ICD-10-CM

## 2017-11-02 DIAGNOSIS — R11.10 VOMITING, UNSPECIFIED: ICD-10-CM

## 2017-11-02 LAB
ALBUMIN SERPL ELPH-MCNC: 2.4 G/DL — LOW (ref 3.3–5)
ALBUMIN SERPL ELPH-MCNC: 2.4 G/DL — LOW (ref 3.3–5)
ALP SERPL-CCNC: 89 U/L — SIGNIFICANT CHANGE UP (ref 40–120)
ALP SERPL-CCNC: 89 U/L — SIGNIFICANT CHANGE UP (ref 40–120)
ALT FLD-CCNC: 24 U/L — SIGNIFICANT CHANGE UP (ref 12–78)
ALT FLD-CCNC: 28 U/L — SIGNIFICANT CHANGE UP (ref 12–78)
AMYLASE P1 CFR SERPL: 66 U/L — SIGNIFICANT CHANGE UP (ref 25–115)
ANION GAP SERPL CALC-SCNC: 13 MMOL/L — SIGNIFICANT CHANGE UP (ref 5–17)
ANION GAP SERPL CALC-SCNC: 13 MMOL/L — SIGNIFICANT CHANGE UP (ref 5–17)
APTT BLD: 21.4 SEC — LOW (ref 27.5–37.4)
AST SERPL-CCNC: 17 U/L — SIGNIFICANT CHANGE UP (ref 15–37)
AST SERPL-CCNC: 21 U/L — SIGNIFICANT CHANGE UP (ref 15–37)
BASE EXCESS BLDA CALC-SCNC: 9.2 MMOL/L — HIGH (ref -2–2)
BILIRUB DIRECT SERPL-MCNC: 0.2 MG/DL — SIGNIFICANT CHANGE UP (ref 0.05–0.2)
BILIRUB INDIRECT FLD-MCNC: 0.4 MG/DL — SIGNIFICANT CHANGE UP (ref 0.2–1)
BILIRUB SERPL-MCNC: 0.6 MG/DL — SIGNIFICANT CHANGE UP (ref 0.2–1.2)
BILIRUB SERPL-MCNC: 0.6 MG/DL — SIGNIFICANT CHANGE UP (ref 0.2–1.2)
BLOOD GAS COMMENTS ARTERIAL: SIGNIFICANT CHANGE UP
BUN SERPL-MCNC: 28 MG/DL — HIGH (ref 7–23)
BUN SERPL-MCNC: 32 MG/DL — HIGH (ref 7–23)
CALCIUM SERPL-MCNC: 8.9 MG/DL — SIGNIFICANT CHANGE UP (ref 8.5–10.1)
CALCIUM SERPL-MCNC: 9 MG/DL — SIGNIFICANT CHANGE UP (ref 8.5–10.1)
CHLORIDE SERPL-SCNC: 98 MMOL/L — SIGNIFICANT CHANGE UP (ref 96–108)
CHLORIDE SERPL-SCNC: 99 MMOL/L — SIGNIFICANT CHANGE UP (ref 96–108)
CO2 SERPL-SCNC: 28 MMOL/L — SIGNIFICANT CHANGE UP (ref 22–31)
CO2 SERPL-SCNC: 29 MMOL/L — SIGNIFICANT CHANGE UP (ref 22–31)
CREAT SERPL-MCNC: 0.97 MG/DL — SIGNIFICANT CHANGE UP (ref 0.5–1.3)
CREAT SERPL-MCNC: 1.2 MG/DL — SIGNIFICANT CHANGE UP (ref 0.5–1.3)
CULTURE RESULTS: NO GROWTH — SIGNIFICANT CHANGE UP
FOLATE SERPL-MCNC: >20 NG/ML — SIGNIFICANT CHANGE UP (ref 4.8–24.2)
GLUCOSE SERPL-MCNC: 172 MG/DL — HIGH (ref 70–99)
GLUCOSE SERPL-MCNC: 173 MG/DL — HIGH (ref 70–99)
HCO3 BLDA-SCNC: 33 MMOL/L — HIGH (ref 23–27)
HCT VFR BLD CALC: 33.3 % — LOW (ref 39–50)
HCT VFR BLD CALC: 33.4 % — LOW (ref 39–50)
HCT VFR BLD CALC: 36.2 % — LOW (ref 39–50)
HGB BLD-MCNC: 10.7 G/DL — LOW (ref 13–17)
HGB BLD-MCNC: 10.8 G/DL — LOW (ref 13–17)
HGB BLD-MCNC: 11.5 G/DL — LOW (ref 13–17)
HOROWITZ INDEX BLDA+IHG-RTO: 21 — SIGNIFICANT CHANGE UP
INR BLD: 1.39 RATIO — HIGH (ref 0.88–1.16)
LACTATE SERPL-SCNC: 1 MMOL/L — SIGNIFICANT CHANGE UP (ref 0.7–2)
LACTATE SERPL-SCNC: 1.5 MMOL/L — SIGNIFICANT CHANGE UP (ref 0.7–2)
LIDOCAIN IGE QN: 140 U/L — SIGNIFICANT CHANGE UP (ref 73–393)
LYMPHOCYTES # BLD AUTO: 5 % — LOW (ref 13–44)
MAGNESIUM SERPL-MCNC: 1.8 MG/DL — SIGNIFICANT CHANGE UP (ref 1.6–2.6)
MCHC RBC-ENTMCNC: 30.3 PG — SIGNIFICANT CHANGE UP (ref 27–34)
MCHC RBC-ENTMCNC: 30.3 PG — SIGNIFICANT CHANGE UP (ref 27–34)
MCHC RBC-ENTMCNC: 30.4 PG — SIGNIFICANT CHANGE UP (ref 27–34)
MCHC RBC-ENTMCNC: 31.7 GM/DL — LOW (ref 32–36)
MCHC RBC-ENTMCNC: 32.1 GM/DL — SIGNIFICANT CHANGE UP (ref 32–36)
MCHC RBC-ENTMCNC: 32.3 GM/DL — SIGNIFICANT CHANGE UP (ref 32–36)
MCV RBC AUTO: 94 FL — SIGNIFICANT CHANGE UP (ref 80–100)
MCV RBC AUTO: 94.8 FL — SIGNIFICANT CHANGE UP (ref 80–100)
MCV RBC AUTO: 95.5 FL — SIGNIFICANT CHANGE UP (ref 80–100)
MONOCYTES NFR BLD AUTO: 5 % — SIGNIFICANT CHANGE UP (ref 1–9)
NEUTROPHILS NFR BLD AUTO: 89 % — HIGH (ref 43–77)
OB PNL STL: NEGATIVE — SIGNIFICANT CHANGE UP
PCO2 BLDA: 38 MMHG — SIGNIFICANT CHANGE UP (ref 32–46)
PH BLDA: 7.54 — HIGH (ref 7.35–7.45)
PHOSPHATE SERPL-MCNC: 3 MG/DL — SIGNIFICANT CHANGE UP (ref 2.5–4.5)
PLATELET # BLD AUTO: 615 K/UL — HIGH (ref 150–400)
PLATELET # BLD AUTO: 687 K/UL — HIGH (ref 150–400)
PLATELET # BLD AUTO: 691 K/UL — HIGH (ref 150–400)
PO2 BLDA: 40 MMHG — CRITICAL LOW (ref 74–108)
POTASSIUM SERPL-MCNC: 3.4 MMOL/L — LOW (ref 3.5–5.3)
POTASSIUM SERPL-MCNC: 3.5 MMOL/L — SIGNIFICANT CHANGE UP (ref 3.5–5.3)
POTASSIUM SERPL-SCNC: 3.4 MMOL/L — LOW (ref 3.5–5.3)
POTASSIUM SERPL-SCNC: 3.5 MMOL/L — SIGNIFICANT CHANGE UP (ref 3.5–5.3)
PROT SERPL-MCNC: 6 G/DL — SIGNIFICANT CHANGE UP (ref 6–8.3)
PROT SERPL-MCNC: 6.1 G/DL — SIGNIFICANT CHANGE UP (ref 6–8.3)
PROTHROM AB SERPL-ACNC: 15.2 SEC — HIGH (ref 9.8–12.7)
RBC # BLD: 3.53 M/UL — LOW (ref 4.2–5.8)
RBC # BLD: 3.54 M/UL — LOW (ref 4.2–5.8)
RBC # BLD: 3.79 M/UL — LOW (ref 4.2–5.8)
RBC # FLD: 14.2 % — SIGNIFICANT CHANGE UP (ref 10.3–14.5)
RBC # FLD: 14.3 % — SIGNIFICANT CHANGE UP (ref 10.3–14.5)
RBC # FLD: 14.4 % — SIGNIFICANT CHANGE UP (ref 10.3–14.5)
SAO2 % BLDA: 77 % — LOW (ref 92–96)
SODIUM SERPL-SCNC: 139 MMOL/L — SIGNIFICANT CHANGE UP (ref 135–145)
SODIUM SERPL-SCNC: 141 MMOL/L — SIGNIFICANT CHANGE UP (ref 135–145)
SPECIMEN SOURCE: SIGNIFICANT CHANGE UP
VIT B12 SERPL-MCNC: 994 PG/ML — HIGH (ref 243–894)
WBC # BLD: 18.8 K/UL — HIGH (ref 3.8–10.5)
WBC # BLD: 20.9 K/UL — HIGH (ref 3.8–10.5)
WBC # BLD: 21.5 K/UL — HIGH (ref 3.8–10.5)
WBC # FLD AUTO: 18.8 K/UL — HIGH (ref 3.8–10.5)
WBC # FLD AUTO: 20.9 K/UL — HIGH (ref 3.8–10.5)
WBC # FLD AUTO: 21.5 K/UL — HIGH (ref 3.8–10.5)

## 2017-11-02 PROCEDURE — 99233 SBSQ HOSP IP/OBS HIGH 50: CPT

## 2017-11-02 PROCEDURE — 72158 MRI LUMBAR SPINE W/O & W/DYE: CPT | Mod: 26

## 2017-11-02 PROCEDURE — 74176 CT ABD & PELVIS W/O CONTRAST: CPT | Mod: 26

## 2017-11-02 PROCEDURE — 71010: CPT | Mod: 26

## 2017-11-02 RX ORDER — ONDANSETRON 8 MG/1
4 TABLET, FILM COATED ORAL EVERY 6 HOURS
Qty: 0 | Refills: 0 | Status: DISCONTINUED | OUTPATIENT
Start: 2017-11-02 | End: 2017-11-04

## 2017-11-02 RX ORDER — LEVOTHYROXINE SODIUM 125 MCG
37.5 TABLET ORAL DAILY
Qty: 0 | Refills: 0 | Status: DISCONTINUED | OUTPATIENT
Start: 2017-11-02 | End: 2017-11-04

## 2017-11-02 RX ORDER — MEROPENEM 1 G/30ML
1000 INJECTION INTRAVENOUS EVERY 8 HOURS
Qty: 0 | Refills: 0 | Status: DISCONTINUED | OUTPATIENT
Start: 2017-11-02 | End: 2017-11-02

## 2017-11-02 RX ORDER — VANCOMYCIN HCL 1 G
1000 VIAL (EA) INTRAVENOUS EVERY 12 HOURS
Qty: 0 | Refills: 0 | Status: CANCELLED | OUTPATIENT
Start: 2017-11-03 | End: 2017-11-04

## 2017-11-02 RX ORDER — VANCOMYCIN HCL 1 G
1000 VIAL (EA) INTRAVENOUS ONCE
Qty: 0 | Refills: 0 | Status: COMPLETED | OUTPATIENT
Start: 2017-11-02 | End: 2017-11-02

## 2017-11-02 RX ORDER — IOHEXOL 300 MG/ML
500 INJECTION, SOLUTION INTRAVENOUS
Qty: 0 | Refills: 0 | Status: DISCONTINUED | OUTPATIENT
Start: 2017-11-02 | End: 2017-11-03

## 2017-11-02 RX ORDER — POTASSIUM CHLORIDE 20 MEQ
10 PACKET (EA) ORAL ONCE
Qty: 0 | Refills: 0 | Status: COMPLETED | OUTPATIENT
Start: 2017-11-02 | End: 2017-11-02

## 2017-11-02 RX ORDER — INSULIN LISPRO 100/ML
VIAL (ML) SUBCUTANEOUS EVERY 6 HOURS
Qty: 0 | Refills: 0 | Status: DISCONTINUED | OUTPATIENT
Start: 2017-11-02 | End: 2017-11-04

## 2017-11-02 RX ORDER — SODIUM CHLORIDE 9 MG/ML
1000 INJECTION INTRAMUSCULAR; INTRAVENOUS; SUBCUTANEOUS
Qty: 0 | Refills: 0 | Status: DISCONTINUED | OUTPATIENT
Start: 2017-11-02 | End: 2017-11-04

## 2017-11-02 RX ORDER — ACETAMINOPHEN 500 MG
650 TABLET ORAL EVERY 6 HOURS
Qty: 0 | Refills: 0 | Status: DISCONTINUED | OUTPATIENT
Start: 2017-11-02 | End: 2017-11-04

## 2017-11-02 RX ORDER — MEROPENEM 1 G/30ML
INJECTION INTRAVENOUS
Qty: 0 | Refills: 0 | Status: DISCONTINUED | OUTPATIENT
Start: 2017-11-02 | End: 2017-11-03

## 2017-11-02 RX ORDER — INSULIN LISPRO 100/ML
VIAL (ML) SUBCUTANEOUS EVERY 4 HOURS
Qty: 0 | Refills: 0 | Status: DISCONTINUED | OUTPATIENT
Start: 2017-11-02 | End: 2017-11-02

## 2017-11-02 RX ORDER — MEROPENEM 1 G/30ML
2000 INJECTION INTRAVENOUS ONCE
Qty: 0 | Refills: 0 | Status: COMPLETED | OUTPATIENT
Start: 2017-11-02 | End: 2017-11-02

## 2017-11-02 RX ORDER — MEROPENEM 1 G/30ML
1000 INJECTION INTRAVENOUS ONCE
Qty: 0 | Refills: 0 | Status: DISCONTINUED | OUTPATIENT
Start: 2017-11-02 | End: 2017-11-02

## 2017-11-02 RX ORDER — ALBUTEROL 90 UG/1
2.5 AEROSOL, METERED ORAL EVERY 6 HOURS
Qty: 0 | Refills: 0 | Status: DISCONTINUED | OUTPATIENT
Start: 2017-11-02 | End: 2017-11-04

## 2017-11-02 RX ORDER — PANTOPRAZOLE SODIUM 20 MG/1
40 TABLET, DELAYED RELEASE ORAL EVERY 12 HOURS
Qty: 0 | Refills: 0 | Status: DISCONTINUED | OUTPATIENT
Start: 2017-11-02 | End: 2017-11-04

## 2017-11-02 RX ORDER — MEROPENEM 1 G/30ML
INJECTION INTRAVENOUS
Qty: 0 | Refills: 0 | Status: DISCONTINUED | OUTPATIENT
Start: 2017-11-02 | End: 2017-11-02

## 2017-11-02 RX ORDER — VANCOMYCIN HCL 1 G
VIAL (EA) INTRAVENOUS
Qty: 0 | Refills: 0 | Status: DISCONTINUED | OUTPATIENT
Start: 2017-11-02 | End: 2017-11-04

## 2017-11-02 RX ADMIN — MEROPENEM 200 MILLIGRAM(S): 1 INJECTION INTRAVENOUS at 21:11

## 2017-11-02 RX ADMIN — SENNA PLUS 2 TABLET(S): 8.6 TABLET ORAL at 22:05

## 2017-11-02 RX ADMIN — ATORVASTATIN CALCIUM 20 MILLIGRAM(S): 80 TABLET, FILM COATED ORAL at 22:06

## 2017-11-02 RX ADMIN — Medication 1 SPRAY(S): at 05:16

## 2017-11-02 RX ADMIN — Medication 1: at 12:55

## 2017-11-02 RX ADMIN — Medication 100 MILLIGRAM(S): at 22:06

## 2017-11-02 RX ADMIN — Medication 100 MILLIEQUIVALENT(S): at 21:12

## 2017-11-02 RX ADMIN — ONDANSETRON 4 MILLIGRAM(S): 8 TABLET, FILM COATED ORAL at 05:15

## 2017-11-02 RX ADMIN — Medication 250 MILLIGRAM(S): at 18:57

## 2017-11-02 RX ADMIN — SODIUM CHLORIDE 75 MILLILITER(S): 9 INJECTION INTRAMUSCULAR; INTRAVENOUS; SUBCUTANEOUS at 05:16

## 2017-11-02 RX ADMIN — Medication 2 MILLIGRAM(S): at 14:39

## 2017-11-02 RX ADMIN — MONTELUKAST 10 MILLIGRAM(S): 4 TABLET, CHEWABLE ORAL at 22:06

## 2017-11-02 RX ADMIN — Medication 2: at 21:56

## 2017-11-02 RX ADMIN — Medication: at 17:18

## 2017-11-02 RX ADMIN — HEPARIN SODIUM 5000 UNIT(S): 5000 INJECTION INTRAVENOUS; SUBCUTANEOUS at 05:16

## 2017-11-02 NOTE — CONSULT NOTE ADULT - ATTENDING COMMENTS
Pt. is personally examined with the resident staff.  Personally spoke with Dr. Platt his surgeon.  Examined the patient in ICU.  He is lethargic and arousable and then follows command.  He is weaker in lower extremities 3+/5 b/l, (-) clonus and 4+/5 strength b/l in upper extremity.  His wound has minimal superficial dehiscence at a distal tip but overall does not look infected.  Therefore, I think the radiographic findings is likely going to be compressive hematoma more likely than compressive abscess. I don't have a prior recent neurologic exam to confirm if his exam is worsening or has been this way for atleast last few days and has been noted to be very limited since admission.    He is currently on a non-rebreather mask.  I spoke with the ICU hospitalist Dr. Yoo.  After examining patient my concern is patient's over physiologic stability to survive another trip to Operating room.  I am also concerned about his neurologic deficit.  However, I believe and I discussed with Medicine attending that patient's overall ability to survive the trip to operating room is more important.  From medicine they will involve both pulmonary and cardiology.  We will add case on for tomorrow and allow evaluation by anesthesia as well.  q3 hour neuro exam to evaluate for any worsening.  Multiple phone calls to patient's son who is on a out of town trip but spoke with his wife to discuss options and informed plan trip to OR if he becomes more stable.  He is at risk of permanency of neurologic defficit but his ability to survive is more important.

## 2017-11-02 NOTE — CONSULT NOTE ADULT - PROBLEM SELECTOR RECOMMENDATION 9
etiology unclear  moderately distended abdomen noted, no BMs since admission  repeat CT with oral contrast via NG tube to evaluate for obstruction/pseudo construction  NPO  if CT negative, will need EGD  monitor h/h, no significant decline in hgb to suggest large volume GI bleeding; protonix 40 mg IVP BID  transfuse as needed

## 2017-11-02 NOTE — PROVIDER CONTACT NOTE (CHANGE IN STATUS NOTIFICATION) - RECOMMENDATIONS
MD ordered CT of abdomen, OB of stool, and wound culture.
MD assessed patient and said to continue to monitor.

## 2017-11-02 NOTE — PROVIDER CONTACT NOTE (CHANGE IN STATUS NOTIFICATION) - SITUATION
Pt noted to have coffee ground emesis. Also surgical incision noted to be draining. MDs called to assess patient.

## 2017-11-02 NOTE — PROGRESS NOTE ADULT - PROBLEM SELECTOR PLAN 1
diff dx - volume overload, vs aspiration,   nebs PRN  monitor for volume overload  on IVF  monitor proBNP and clinical status  keep sat > 88 pct

## 2017-11-02 NOTE — PROVIDER CONTACT NOTE (CHANGE IN STATUS NOTIFICATION) - ASSESSMENT
Patient unable to be aroused for medication, found to have tenderness in abdomen, and blood clots noted in diaper. Patient did urinate clear yellow urine and defecated a medium sized brown BM. Patient agitated, stripping clothes off and trying to climb out of bed.

## 2017-11-02 NOTE — PROGRESS NOTE ADULT - PROBLEM SELECTOR PLAN 1
Unclear etiology, some improvement in MS  Ammo is normal. CXR neg. increase in leucocytosis  MRI of spine is pending. Ct head is neg  No sedation, asp precaution, HOB elevated.  fall prec, support with ADL, oral hygiene, skin care

## 2017-11-02 NOTE — CHART NOTE - NSCHARTNOTEFT_GEN_A_CORE
Called by RN because patient found to have vomiting coffee ground emesis. Patient seen and evaluated with coffee colored emesis over entire body and soaked through gown. Patient continues to be unarousable. As nurses were changing patient, the nurse noted laminectomy incision site not fully approximated.     T(C): 36.5 (11-02-17 @ 02:25), Max: 36.9 (11-01-17 @ 04:09)  HR: 100 (11-02-17 @ 02:25) (73 - 100)  BP: 162/87 (11-02-17 @ 02:25) (111/94 - 162/87)  RR: 21 (11-02-17 @ 02:25) (16 - 22)  SpO2: 94% (11-02-17 @ 02:25) (94% - 96%)    GENERAL:  male unarousable  HEAD: NC/AT  EYES: patient does not follow commands  ENMT: limited view of oropharynx clear but appears clear without erythema. Dark colored blood around mouth   NECK: supple, soft, no thyromegaly noted  LUNGS: Limited lung exam as patient does not follow commands, no wheezing or rhonchi appreciated  HEART: soft S1/S2, regular rate and rhythm, no murmurs noted, no lower extremity edema  GASTROINTESTINAL: abdomen is firm, questionable tenderness, mildly distended, normoactive bowel sounds, no palpable masses  INTEGUMENT: good skin turgor, no lesions noted  MUSCULOSKELETAL: no clubbing or cyanosis, no obvious deformity  NEUROLOGIC: awake, alert, oriented x3, good muscle tone in 4 extremities, no obvious sensory deficits  PSYCHIATRIC: mood is good, affect is congruent, linear and logical thought process  HEME/LYMPH: no palpable supraclavicular nodules, no obvious ecchymosis or petechiae     apply wet to dry dressing on laminectomy site     FOBT Called by RN because patient found to have vomiting coffee ground emesis. Patient seen and evaluated with coffee colored emesis appeared liquidy (no solid pieces when I evaluated patient) over entire body and soaked through gown. Patient continues to be unarousable. As nurses were changing patient, the nurse noted laminectomy incision site not fully approximated.     T(C): 36.5 (11-02-17 @ 02:25), Max: 36.9 (11-01-17 @ 04:09)  HR: 100 (11-02-17 @ 02:25) (73 - 100)  BP: 162/87 (11-02-17 @ 02:25) (111/94 - 162/87)  RR: 21 (11-02-17 @ 02:25) (16 - 22)  SpO2: 94% (11-02-17 @ 02:25) (94% - 96%)    GENERAL:  male unarousable  HEAD: NC/AT  EYES: patient does not follow commands  ENMT: limited view of oropharynx clear but appears clear without erythema. Dark colored blood around mouth   NECK: supple, soft, no thyromegaly noted  LUNGS: Limited lung exam as patient does not follow commands, no wheezing or rhonchi appreciated  HEART: soft S1/S2, regular rate and rhythm, no murmurs noted, no lower extremity edema  GASTROINTESTINAL: abdomen is firm, appears somewhat tender upon palpation of Right sided abdomen, however patient not arousable for physical exam, mildly distended, normactive bowel sounds, no palpable masses   INTEGUMENT: good skin turgor, no lesions noted  MUSCULOSKELETAL: laminectomy site on lumbar spine noted to be open at the bottom ~2cm with serosanguinous fluid oozing, no clubbing or cyanosis, no obvious deformity  NEUROLOGIC: unarousable     A/P: 82 year old male with PMHx of CAD, Crohn's disease, T2DM, HTN, HLD, and hypothyroidism admitted for pneumonia.   -coffee stained emesis, unclear etiology, H/H stat ordered which showed improving H/H 11.5/36.2.  - thrombocytosis noted, possibly elevated as acute phase reactant due to recent laminectomy 10/27/17.   -laminectomy site, apply wet to dry dressing on laminectomy site   -f/u lactate, amylase, lipase in AM   - FOBT performed. Patient had BM while FOBT was obtained. Doubt SBO.   - CTabdomen ordered STAT   -IV Zofran ordered for vomiting   -Management discussed with Dr. Espinoza (hospitalist) and Dr. Man (PGY-2). Called by RN because patient found to have vomiting coffee ground emesis. Patient seen and evaluated with coffee colored emesis appeared liquidy (no solid pieces when I evaluated patient) over entire body and soaked through gown. Patient continues to be unarousable. As nurses were changing patient, the nurse noted laminectomy incision site not fully approximated.     T(C): 36.5 (11-02-17 @ 02:25), Max: 36.9 (11-01-17 @ 04:09)  HR: 100 (11-02-17 @ 02:25) (73 - 100)  BP: 162/87 (11-02-17 @ 02:25) (111/94 - 162/87)  RR: 21 (11-02-17 @ 02:25) (16 - 22)  SpO2: 94% (11-02-17 @ 02:25) (94% - 96%)    GENERAL:  male unarousable  HEAD: NC/AT  EYES: patient does not follow commands  ENMT: limited view of oropharynx clear but appears clear without erythema. Dark colored blood around mouth   NECK: supple, soft, no thyromegaly noted  LUNGS: Limited lung exam as patient does not follow commands, no wheezing or rhonchi appreciated  HEART: soft S1/S2, regular rate and rhythm, no murmurs noted, no lower extremity edema  GASTROINTESTINAL: abdomen is firm, appears somewhat tender upon palpation of Right sided abdomen, however patient not arousable for physical exam, mildly distended, normactive bowel sounds, no palpable masses   INTEGUMENT: good skin turgor, no lesions noted  MUSCULOSKELETAL: laminectomy site on lumbar spine noted to be open at the bottom ~2cm with serosanguinous fluid oozing, no clubbing or cyanosis, no obvious deformity  NEUROLOGIC: unarousable     A/P: 82 year old male with PMHx of CAD, Crohn's disease, T2DM, HTN, HLD, and hypothyroidism admitted for pneumonia.   -coffee stained emesis, unclear etiology, H/H stat ordered which showed improving H/H 11.5/36.2.  - thrombocytosis noted, possibly elevated as acute phase reactant due to recent laminectomy 10/27/17.   -laminectomy site, apply wet to dry dressing on laminectomy site   -f/u lactate, amylase, lipase in AM   - FOBT performed. Patient had BM while FOBT was obtained. Doubt SBO.   - CTabdomen ordered STAT   -IV Zofran ordered for vomiting   -Management discussed with Dr. sEpinoza (hospitalist) and Dr. Man (PGY-2).    ADDENDUM (5:14AM): Called by RN because patient vomited again coffee colored emesis. CT abdomen results showed No small bowel obstruction. Trace bilateral pleural effusions. Cholelithiasis. Indeterminate left renal 3.2 cm isodense lesion, likely proteinaceous cyst. Follow-up outpatient nonemergent ultrasound is suggested for further characterization.  FOBT negative  CBC significant for WBC 18.8, likely multifactorial etiology, PNA vs. reactive leukocytosis s/p vomiting vs. possible infected surgical site wound?  IV Zofran given.   Patient may need lumbar puncture to r/o meningitis in setting of AMS and recent laminectomy procedure.   GI consult- Dr. Jackson  Patient made NPO.  NS @75 cc/hr for IV hydration   Patient may need Potassium repletion  Dr. Espinoza made aware of plan. Called by RN because patient found to have vomiting coffee ground emesis. Patient seen and evaluated with coffee colored emesis appeared liquidy (no solid pieces when I evaluated patient) over entire body and soaked through gown. Patient continues to be unarousable. As nurses were changing patient, the nurse noted laminectomy incision site not fully approximated.     T(C): 36.5 (11-02-17 @ 02:25), Max: 36.9 (11-01-17 @ 04:09)  HR: 100 (11-02-17 @ 02:25) (73 - 100)  BP: 162/87 (11-02-17 @ 02:25) (111/94 - 162/87)  RR: 21 (11-02-17 @ 02:25) (16 - 22)  SpO2: 94% (11-02-17 @ 02:25) (94% - 96%)    GENERAL:  male unarousable  HEAD: NC/AT  EYES: patient does not follow commands  ENMT: limited view of oropharynx clear but appears clear without erythema. Dark colored blood around mouth   NECK: supple, soft, no thyromegaly noted  LUNGS: Limited lung exam as patient does not follow commands, no wheezing or rhonchi appreciated  HEART: soft S1/S2, regular rate and rhythm, no murmurs noted, no lower extremity edema  GASTROINTESTINAL: abdomen is firm, appears somewhat tender upon palpation of Right sided abdomen, however patient not arousable for physical exam, mildly distended, normactive bowel sounds, no palpable masses   INTEGUMENT: good skin turgor, no lesions noted  MUSCULOSKELETAL: laminectomy site on lumbar spine noted to be open at the bottom ~2cm with serosanguinous fluid oozing, no clubbing or cyanosis, no obvious deformity  NEUROLOGIC: unarousable (unknown baseline mental status)     A/P: 82 year old male with PMHx of CAD, Crohn's disease, T2DM, HTN, HLD, and hypothyroidism admitted for pneumonia.   -coffee stained emesis, unclear etiology, H/H stat ordered which showed improving H/H 11.5/36.2.  - thrombocytosis noted, possibly elevated as acute phase reactant due to recent laminectomy 10/27/17.   -laminectomy site, apply wet to dry dressing on laminectomy site   -f/u lactate, amylase, lipase in AM   - FOBT performed. Patient had BM while FOBT was obtained. Doubt SBO.   - CTabdomen ordered STAT   -IV Zofran ordered for vomiting   -Management discussed with Dr. Espinoza (hospitalist) and Dr. Man (PGY-2).    ADDENDUM (5:14AM): Called by RN because patient vomited again coffee colored emesis. CT abdomen results showed No small bowel obstruction. Trace bilateral pleural effusions. Cholelithiasis. Indeterminate left renal 3.2 cm isodense lesion, likely proteinaceous cyst. Follow-up outpatient nonemergent ultrasound is suggested for further characterization.  FOBT negative  CBC significant for WBC 18.8, likely multifactorial etiology, PNA vs. reactive leukocytosis s/p vomiting vs. possible infected surgical site wound?  IV Zofran given.   Patient may need lumbar puncture to r/o meningitis in setting of AMS and recent laminectomy procedure.   GI consult- Dr. Jackson  Patient made NPO.  NS @75 cc/hr for IV hydration   Patient may need Potassium repletion  Dr. Espinoza made aware of plan. Called by RN because patient found to have vomiting coffee ground emesis. Patient seen and evaluated with coffee colored emesis appeared liquidy (no solid pieces when I evaluated patient) over entire body and soaked through gown. Patient continues to be unarousable. As nurses were changing patient, the nurse noted laminectomy incision site not fully approximated.     T(C): 36.5 (11-02-17 @ 02:25), Max: 36.9 (11-01-17 @ 04:09)  HR: 100 (11-02-17 @ 02:25) (73 - 100)  BP: 162/87 (11-02-17 @ 02:25) (111/94 - 162/87)  RR: 21 (11-02-17 @ 02:25) (16 - 22)  SpO2: 94% (11-02-17 @ 02:25) (94% - 96%)    GENERAL:  male unarousable  HEAD: NC/AT  EYES: patient does not follow commands  ENMT: limited view of oropharynx clear but appears clear without erythema. Dark colored blood around mouth   NECK: supple, soft, no thyromegaly noted  LUNGS: Limited lung exam as patient does not follow commands, no wheezing or rhonchi appreciated  HEART: soft S1/S2, regular rate and rhythm, no murmurs noted, no lower extremity edema  GASTROINTESTINAL: abdomen is firm, appears somewhat tender upon palpation of Right sided abdomen, however patient not arousable for physical exam, mildly distended, normactive bowel sounds, no palpable masses   INTEGUMENT: good skin turgor, no lesions noted  MUSCULOSKELETAL: laminectomy site on lumbar spine noted to be open at the bottom ~2cm with serosanguinous fluid oozing, no clubbing or cyanosis, no obvious deformity  NEUROLOGIC: unarousable (unknown baseline mental status)     A/P: 82 year old male with PMHx of CAD, Crohn's disease, T2DM, HTN, HLD, and hypothyroidism admitted for pneumonia.   -coffee stained emesis, unclear etiology, H/H stat ordered which showed improving H/H 11.5/36.2.  - thrombocytosis noted, possibly elevated as acute phase reactant due to recent laminectomy 10/27/17.   -laminectomy site, apply wet to dry dressing on laminectomy site   -f/u lactate, amylase, lipase in AM   - FOBT performed. Patient had BM while FOBT was obtained. Doubt SBO.   - CTabdomen ordered STAT   -IV Zofran ordered for vomiting   -Management discussed with Dr. Espinoza (hospitalist) and Dr. Man (PGY-2).    ADDENDUM (5:14AM): Called by RN because patient vomited again coffee colored emesis. CT abdomen results showed No small bowel obstruction. Trace bilateral pleural effusions. Cholelithiasis. Indeterminate left renal 3.2 cm isodense lesion, likely proteinaceous cyst. Follow-up outpatient nonemergent ultrasound is suggested for further characterization.  FOBT negative  CBC significant for WBC 18.8, likely multifactorial etiology, PNA vs. reactive leukocytosis s/p vomiting vs. possible infected surgical site wound?  IV Zofran given.   Patient may need lumbar puncture to r/o meningitis in setting of AMS and recent laminectomy procedure. f/u wound culture   GI consult- Dr. Jackson  Patient made NPO.  NS @75 cc/hr for IV hydration   Patient may need Potassium repletion  Dr. Espinoza made aware of plan.

## 2017-11-02 NOTE — PROGRESS NOTE ADULT - SUBJECTIVE AND OBJECTIVE BOX
Date/Time Patient Seen:  		  Referring MD:   Data Reviewed	       Patient is a 82y old  Male who presents with a chief complaint of sent from [facility] for AMS (01 Nov 2017 10:09)  in bed  seen and examined  vs and meds reviewed  arousable, confused,       Subjective/HPI     PAST MEDICAL & SURGICAL HISTORY:  Prostate CA  Crohn disease  Hypothyroidism  Depression  Intervertebral disc disorder  Diabetes mellitus  Obesity  Hypercholesterolemia  HTN (hypertension)  Stented coronary artery: 2 stents, 20 yrs ago, 15 yrs ago  CAD (coronary artery disease)  Macular degeneration: right eye  Glaucoma: right eye  Hearing loss: left hearing aid, right ear deaf  S/P colon resection  S/P lumbar fusion  History of hip replacement, total, right: Revision  History of hip replacement, total, right        Medication list         MEDICATIONS  (STANDING):  aspirin  chewable 81 milliGRAM(s) Oral daily  atorvastatin 20 milliGRAM(s) Oral at bedtime  busPIRone 10 milliGRAM(s) Oral two times a day  dextrose 5%. 1000 milliLiter(s) (50 mL/Hr) IV Continuous <Continuous>  dextrose 50% Injectable 12.5 Gram(s) IV Push once  dextrose 50% Injectable 25 Gram(s) IV Push once  dextrose 50% Injectable 25 Gram(s) IV Push once  docusate sodium 100 milliGRAM(s) Oral three times a day  guaiFENesin  milliGRAM(s) Oral every 12 hours  heparin  Injectable 5000 Unit(s) SubCutaneous every 12 hours  insulin lispro (HumaLOG) corrective regimen sliding scale   SubCutaneous three times a day before meals  insulin lispro (HumaLOG) corrective regimen sliding scale   SubCutaneous at bedtime  levoFLOXacin IVPB 500 milliGRAM(s) IV Intermittent every 24 hours  levothyroxine 75 MICROGram(s) Oral daily  montelukast 10 milliGRAM(s) Oral at bedtime  multivitamin 1 Tablet(s) Oral daily  senna 2 Tablet(s) Oral at bedtime  sodium chloride 0.65% Nasal 1 Spray(s) Both Nostrils two times a day  sodium chloride 0.9%. 1000 milliLiter(s) (75 mL/Hr) IV Continuous <Continuous>  verapamil  milliGRAM(s) Oral daily    MEDICATIONS  (PRN):  dextrose Gel 1 Dose(s) Oral once PRN Blood Glucose LESS THAN 70 milliGRAM(s)/deciliter  glucagon  Injectable 1 milliGRAM(s) IntraMuscular once PRN Glucose LESS THAN 70 milligrams/deciliter  ondansetron Injectable 4 milliGRAM(s) IV Push every 6 hours PRN Nausea and/or Vomiting         Vitals log        ICU Vital Signs Last 24 Hrs  T(C): 36.7 (02 Nov 2017 05:01), Max: 36.7 (01 Nov 2017 20:30)  T(F): 98.1 (02 Nov 2017 05:01), Max: 98.1 (02 Nov 2017 05:01)  HR: 96 (02 Nov 2017 05:01) (80 - 100)  BP: 162/83 (02 Nov 2017 05:01) (149/65 - 162/87)  BP(mean): --  ABP: --  ABP(mean): --  RR: 18 (02 Nov 2017 05:01) (18 - 21)  SpO2: 95% (02 Nov 2017 05:01) (94% - 95%)           Input and Output:  I&O's Detail      Lab Data                        10.7   21.5  )-----------( 615      ( 02 Nov 2017 05:21 )             33.4     11-02    139  |  98  |  28<H>  ----------------------------<  172<H>  3.5   |  28  |  0.97    Ca    9.0      02 Nov 2017 05:22    TPro  6.0  /  Alb  2.4<L>  /  TBili  0.6  /  DBili  .20  /  AST  21  /  ALT  28  /  AlkPhos  89  11-02            Review of Systems	      Objective     Physical Examination    head at  heart - s1s2  lungs - dec BS  abd - soft, protuberant      Pertinent Lab findings & Imaging      Luanne:  NO   Adequate UO     I&O's Detail           Discussed with:     Cultures:	        Radiology

## 2017-11-02 NOTE — PROGRESS NOTE ADULT - PROBLEM SELECTOR PLAN 5
s/p laminectomy on 10/25 and complicated by blood loss anemia.   received Blood transfusions.  Wound has incision small gap with sero sanguinous d/c. dressing of wound.

## 2017-11-02 NOTE — PROGRESS NOTE ADULT - SUBJECTIVE AND OBJECTIVE BOX
Patient seen and examined.     Vital Signs Last 24 Hrs  T(C): 37.6 (11-02-17 @ 18:41), Max: 37.9 (11-02-17 @ 15:56)  T(F): 99.6 (11-02-17 @ 18:41), Max: 100.3 (11-02-17 @ 15:56)  HR: 96 (11-02-17 @ 18:41) (80 - 105)  BP: 120/80 (11-02-17 @ 18:41) (120/80 - 162/87)  BP(mean): --  RR: 16 (11-02-17 @ 18:41) (16 - 21)  SpO2: 92% (11-02-17 @ 18:41) (90% - 95%)    Repeat neurological exam attempted.  Exam limited 2/2 mental status. Patient participation limited and inconsistent. Repeat sternal rub stimulation needed to get patient to cooperate. Patient able to move extremities x4.   Unable to accurately grade strength.   Not able to assess sensation.   Negative for clonus/babinski B/L LE.   Negative Reese B/L UE.   Rectal tone present but not able to get patient to perform valsalva.     81 yo M s/p TLIF L2-S1 w/ altered mental status and Lumbar abscess  Call put out to Dr. Platt, spoke briefly with him and explained situatuion, facilitated contact with Dr. Alvarez  Will update primary team when definitive plan formulated   Continue Broad spectrum IV abx per ID  Continue Care per ICU team  FU Cultures  Keep NPO  Hold DVT ppx  FU Clearance/optimization for possible OR   Will continue to monitor clinically   Further recs to follow Patient seen and examined.     Vital Signs Last 24 Hrs  T(C): 37.6 (11-02-17 @ 18:41), Max: 37.9 (11-02-17 @ 15:56)  T(F): 99.6 (11-02-17 @ 18:41), Max: 100.3 (11-02-17 @ 15:56)  HR: 96 (11-02-17 @ 18:41) (80 - 105)  BP: 120/80 (11-02-17 @ 18:41) (120/80 - 162/87)  BP(mean): --  RR: 16 (11-02-17 @ 18:41) (16 - 21)  SpO2: 92% (11-02-17 @ 18:41) (90% - 95%)    Repeat neurological exam attempted.  Exam limited 2/2 mental status. Patient participation limited and inconsistent. Repeat sternal rub stimulation needed to get patient to cooperate. Patient able to move extremities x4.   Unable to accurately grade strength.   Not able to assess sensation.   Negative for clonus/babinski B/L LE.   Negative Reese B/L UE.   Rectal tone present but not able to get patient to perform valsalva.     81 yo M s/p TLIF L2-S1 w/ altered mental status and Lumbar abscess  Call put out to Dr. Platt, spoke briefly with him and explained situatuion, facilitated contact with Dr. Alvarez  Possible transfer to [facility]   Will update primary team when definitive plan formulated   Continue Broad spectrum IV abx per ID  Continue Care per ICU team  FU Cultures  Keep NPO  Hold DVT ppx  FU Clearance/optimization for possible OR   Will continue to monitor clinically   Further recs to follow Patient seen and examined.     Vital Signs Last 24 Hrs  T(C): 37.6 (11-02-17 @ 18:41), Max: 37.9 (11-02-17 @ 15:56)  T(F): 99.6 (11-02-17 @ 18:41), Max: 100.3 (11-02-17 @ 15:56)  HR: 96 (11-02-17 @ 18:41) (80 - 105)  BP: 120/80 (11-02-17 @ 18:41) (120/80 - 162/87)  BP(mean): --  RR: 16 (11-02-17 @ 18:41) (16 - 21)  SpO2: 92% (11-02-17 @ 18:41) (90% - 95%)    Repeat neurological exam attempted.  Exam limited 2/2 mental status. Patient participation limited and inconsistent. Repeat sternal rub stimulation needed to get patient to cooperate. Patient able to move extremities x4.   Unable to accurately grade strength.   Not able to assess sensation.   Negative for clonus/babinski B/L LE.   Negative Reese B/L UE.   Rectal tone present but not able to get patient to perform valsalva.     81 yo M s/p TLIF L2-S1 w/ altered mental status and Lumbar abscess vs hematoma  Call put out to Dr. Platt, spoke briefly with him and explained situation facilitated contact with Dr. Alvarez  Will update primary team when definitive plan formulated   Continue Broad spectrum IV abx per ID  Continue Care per ICU team  FU Cultures  Keep NPO  Hold DVT ppx  FU Clearance/optimization for possible OR   Will continue to monitor clinically   Further recs to follow     Add:  Spoke with Dr. Alvarez who discussed patients case with Dr. Platt. Dr. Platt has OR privileges at [facility] and is willing to assist with washout. Case discussed with ICU NP/PA. Would prefer to take patient to OR tonight if medically stable. However, if not able to medically optimize patient tonight then will plan for OR tomorrow. Risk of terminal event must be weighed against risk of delay in washout of lumbar abscess vs hematoma. Will follow up with ICU NP/PA, and on call hospitalist.

## 2017-11-02 NOTE — CHART NOTE - NSCHARTNOTEFT_GEN_A_CORE
NG tube placed with return of     CXR orderd to follow up placement  Patient seen and examined at bedside. NG tube required for SBO. Patient states he has not had a BM in several days. Abdominal distension present   Multiple attempts to place NG tube made, unsuccessful due to patient altered mental status and pulling at ng tube. Patient was restrained and NG tube placed in right side with reflux of brown fluid.   Patient tolerated well without complications. Will continue to follow. RN to call/page if any changes. NG tube placed with return of     CXR orderd to follow up placement  Patient seen and examined at bedside. NG tube required for SBO. Patient states he has not had a BM in several days. Abdominal distension present   Multiple attempts to place NG tube made, unsuccessful due to patient altered mental status and pulling at ng tube. Patient was restrained and NG tube placed in right side with return of brown fluid. NG tube was attached to suction. CXR confirmed placement in stomach and need to be advanced.   No complications. Will continue to follow. RN to call/page if any changes.

## 2017-11-02 NOTE — CONSULT NOTE ADULT - SUBJECTIVE AND OBJECTIVE BOX
CHIEF COMPLAINT: Altered mental status    HPI:  82 yr old PMHx CAD, Crohn's dz, DM2, HTN, HLD, Hypothyroid, DDD recent hospitalization at [facility] 10/27/17 for laminectomy. That hospital course was complicated by intra-op blood loss of 3 liters requiring 2 units PRBC's and 1100 cc selfsaver and development of URI post op requiring doxycycline, bronchodilators and oral steriods. Eventually d/c'ed to rehab on doxy p.o. and tapered dose steroids from which he presented to [facility] on  with altered mental status described as confusion and inappropriate speech. At rehab pt reported as AOx3, CT brain without acute pathology. This hospital course c/b 2 espisodes of coffee ground emesis over night, agitation requiring ativan, and increase WBC to 21.5 from 18.8. Pt received MRI spine  which demonstrated collection in surgical bed after laminectomy consistent with Hx of abscess resulting in spinal stenosis at L3 level, without osteo noted. Consult called for altered Mental status, sepsis secondary to possible aspiration pmn and spinal abcess.        PAST MEDICAL & SURGICAL HISTORY:  Prostate CA  Crohn disease  Hypothyroidism  Depression  Intervertebral disc disorder  Diabetes mellitus  Obesity  Hypercholesterolemia  HTN (hypertension)  Stented coronary artery: 2 stents, 20 yrs ago, 15 yrs ago  CAD (coronary artery disease)  Macular degeneration: right eye  Hearing loss: left hearing aid, right ear deaf  S/P colon resection  S/P lumbar fusion  History of hip replacement, total, right: Revision  History of hip replacement, total, right      FAMILY HISTORY:  No pertinent family history in first degree relatives      SOCIAL HISTORY:  Smoking: [ ] Never Smoked [ ] Former Smoker (__ packs x ___ years) [ ] Current Smoker  (__ packs x ___ years)  Substance Use: [ ] Never Used [ ] Used ____  EtOH Use:  Marital Status: [ ] Single [ ]  [ ]  [ ]   Sexual History:   Occupation:  Recent Travel:  Country of Birth:  Advance Directives:    Allergies    penicillin (Hives)    Intolerances        HOME MEDICATIONS:    REVIEW OF SYSTEMS:  Constitutional: [ ] fevers [ ] chills [ ] weight loss [ ] weight gain  HEENT: [ ] dry eyes [ ] eye irritation [ ] postnasal drip [ ] nasal congestion  CV: [ ] chest pain [ ] orthopnea [ ] palpitations [ ] murmur  Resp: [ ] cough [ ] shortness of breath [ ] dyspnea [ ] wheezing [ ] sputum [ ] hemoptysis  GI: [ ] nausea [ ] vomiting [ ] diarrhea [ ] constipation [ ] abd pain [ ] dysphagia   : [ ] dysuria [ ] nocturia [ ] hematuria [ ] increased urinary frequency  Musculoskeletal: [ ] back pain [ ] myalgias [ ] arthralgias [ ] fracture  Skin: [ ] rash [ ] itch  Neurological: [ ] headache [ ] dizziness [ ] syncope [ ] weakness [ ] numbness  Psychiatric: [ ] anxiety [ ] depression  Endocrine: [ ] diabetes [ ] thyroid problem  Hematologic/Lymphatic: [ ] anemia [ ] bleeding problem  Allergic/Immunologic: [ ] itchy eyes [ ] nasal discharge [ ] hives [ ] angioedema  [ ] All other systems negative  [ ] Unable to assess ROS because ________    OBJECTIVE:  ICU Vital Signs Last 24 Hrs  T(C): 37.9 (2017 15:56), Max: 37.9 (2017 15:56)  T(F): 100.3 (2017 15:56), Max: 100.3 (2017 15:56)  HR: 105 (2017 15:56) (80 - 105)  BP: 132/76 (2017 15:56) (132/76 - 162/87)  BP(mean): --  ABP: --  ABP(mean): --  RR: 19 (2017 15:56) (18 - 21)  SpO2: 90% (2017 15:56) (90% - 95%)        CAPILLARY BLOOD GLUCOSE  101 (2017 01:58)      POCT Blood Glucose.: 166 mg/dL (2017 16:41)      PHYSICAL EXAM:  General:   HEENT:   Lymph Nodes:  Neck:   Respiratory:   Cardiovascular:   Abdomen:   Extremities:   Skin:   Neurological:  Psychiatry:    LINES:     HOSPITAL MEDICATIONS:  MEDICATIONS  (STANDING):  aspirin  chewable 81 milliGRAM(s) Oral daily  atorvastatin 20 milliGRAM(s) Oral at bedtime  busPIRone 10 milliGRAM(s) Oral two times a day  dextrose 5%. 1000 milliLiter(s) (50 mL/Hr) IV Continuous <Continuous>  dextrose 50% Injectable 12.5 Gram(s) IV Push once  dextrose 50% Injectable 25 Gram(s) IV Push once  dextrose 50% Injectable 25 Gram(s) IV Push once  docusate sodium 100 milliGRAM(s) Oral three times a day  guaiFENesin  milliGRAM(s) Oral every 12 hours  insulin lispro (HumaLOG) corrective regimen sliding scale   SubCutaneous three times a day before meals  insulin lispro (HumaLOG) corrective regimen sliding scale   SubCutaneous at bedtime  iohexol 300 mG (iodine)/mL Oral Solution 500 milliLiter(s) Oral every 1 hour  levothyroxine 75 MICROGram(s) Oral daily  meropenem IVPB      meropenem IVPB 2000 milliGRAM(s) IV Intermittent once  montelukast 10 milliGRAM(s) Oral at bedtime  multivitamin 1 Tablet(s) Oral daily  pantoprazole  Injectable 40 milliGRAM(s) IV Push every 12 hours  senna 2 Tablet(s) Oral at bedtime  sodium chloride 0.65% Nasal 1 Spray(s) Both Nostrils two times a day  sodium chloride 0.9%. 1000 milliLiter(s) (75 mL/Hr) IV Continuous <Continuous>  vancomycin  IVPB      vancomycin  IVPB 1000 milliGRAM(s) IV Intermittent once  verapamil  milliGRAM(s) Oral daily    MEDICATIONS  (PRN):  acetaminophen  Suppository 650 milliGRAM(s) Rectal every 6 hours PRN For Temp greater than 38 C (100.4 F)  ALBUTerol    0.083% 2.5 milliGRAM(s) Nebulizer every 6 hours PRN Shortness of Breath and/or Wheezing  dextrose Gel 1 Dose(s) Oral once PRN Blood Glucose LESS THAN 70 milliGRAM(s)/deciliter  glucagon  Injectable 1 milliGRAM(s) IntraMuscular once PRN Glucose LESS THAN 70 milligrams/deciliter  ondansetron Injectable 4 milliGRAM(s) IV Push every 6 hours PRN Nausea and/or Vomiting      LABS:                        10.8   20.9  )-----------( 691      ( 2017 18:14 )             33.3     Hgb Trend: 10.8<--, 10.7<--, 11.5<--, 9.7<--, 10.8<--  11-02    139  |  98  |  28<H>  ----------------------------<  172<H>  3.5   |  28  |  0.97    Ca    9.0      2017 05:22    TPro  6.0  /  Alb  2.4<L>  /  TBili  0.6  /  DBili  .20  /  AST  21  /  ALT  28  /  AlkPhos  89  11-02    Creatinine Trend: 0.97<--, 1.30<--, 1.40<--, 1.24<--, 1.23<--, 1.11<--    Urinalysis Basic - ( 2017 03:18 )    Color: Yellow / Appearance: Clear / S.015 / pH: x  Gluc: x / Ketone: Negative  / Bili: Negative / Urobili: Negative   Blood: x / Protein: Negative / Nitrite: Negative   Leuk Esterase: Negative / RBC: 0-2 /HPF / WBC Negative   Sq Epi: x / Non Sq Epi: Negative / Bacteria: Negative            MICROBIOLOGY:     RADIOLOGY:  [ ] Reviewed and interpreted by me    EKG:        Lisa ANP-BC (ext. [id number]) CHIEF COMPLAINT: Altered mental status    HPI:  82 yr old PMHx CAD, Crohn's dz, DM2, HTN, HLD, s/p coronary stents '1997, Hypothyroid, DDD recent hospitalization at [facility] 10/27/17 for laminectomy. That hospital course was complicated by intra-op blood loss of 3 liters requiring 2 units PRBC's and 1100 cc selfsaver and development of URI post op requiring doxycycline, bronchodilators and oral steriods. Eventually d/c'ed to rehab on doxy p.o. and tapered dose steroids from which he presented to [facility] on  with altered mental status described as confusion and inappropriate speech. At rehab pt reported as AOx3, CT brain without acute pathology. This hospital course c/b 2 espisodes of coffee ground emesis over night, agitation requiring ativan, and increase WBC to 21.5 from 18.8. Pt received MRI spine  which demonstrated collection in surgical bed after laminectomy consistent with Hx of abscess resulting in spinal stenosis at L3 level, without osteo noted. Consult called for altered Mental status, sepsis secondary to possible aspiration pmn and spinal abcess.        PAST MEDICAL & SURGICAL HISTORY:  Prostate CA  Crohn disease  Hypothyroidism  Depression  Intervertebral disc disorder  Diabetes mellitus  Obesity  Hypercholesterolemia  HTN (hypertension)  Stented coronary artery: 2 stents, 20 yrs ago, 15 yrs ago  CAD (coronary artery disease)  Macular degeneration: right eye  Hearing loss: left hearing aid, right ear deaf  S/P colon resection  S/P lumbar fusion  History of hip replacement, total, right: Revision  History of hip replacement, total, right      FAMILY HISTORY:  No pertinent family history in first degree relatives      SOCIAL HISTORY:  Smoking: [ ] Never Smoked [ ] Former Smoker (__ packs x ___ years) [ ] Current Smoker  (__ packs x ___ years)  Substance Use: [ ] Never Used [ ] Used ____  EtOH Use:  Marital Status: [ ] Single [ ]  [ ]  [ ]   Sexual History:   Occupation:  Recent Travel:  Country of Birth:  Advance Directives:    Allergies    penicillin (Hives)    Intolerances        HOME MEDICATIONS:    REVIEW OF SYSTEMS:    xxxx[ ] Unable to assess ROS because __aletered mental status______    OBJECTIVE:  ICU Vital Signs Last 24 Hrs  T(C): 37.9 (2017 15:56), Max: 37.9 (2017 15:56)  T(F): 100.3 (2017 15:56), Max: 100.3 (2017 15:56)  HR: 105 (2017 15:56) (80 - 105)  BP: 132/76 (2017 15:56) (132/76 - 162/87)  BP(mean): --  ABP: --  ABP(mean): --  RR: 19 (2017 15:56) (18 - 21)  SpO2: 90% (2017 15:56) (90% - 95%)        CAPILLARY BLOOD GLUCOSE  101 (2017 01:58)      POCT Blood Glucose.: 166 mg/dL (2017 16:41)      PHYSICAL EXAM:  Neuro:  lethargic, responsive to name by opening eyes and attempting to focus on examiner, follows simple command to take deep breaths, spontaneous purposeful movement of all 4 extremities 4/5, speech garbled and non sensible    Pulm:  Utilizing room air, SPO2 94 % able to take deep breaths spontaneously and upon command, breath sounds bilaterally with few bibasilar crackles bases to 1/4 up slight diminished at bases to 1/4 up    CV:  RRR, tachycardic at 10 4, s1/s2 without adventitious sounds appreciated, skin warm dry and intact, peripheral pulses palpable with radials 2+ bilat, dp/pt 2+/1+ bilat, digits warm to touch with good cap refill < 3 secs. with 1 + pitting edema in lower extremities    GI/:  abd obese soft non tender, + bowel sounds, bladder non distended non palpable                                           LINES:     HOSPITAL MEDICATIONS:  MEDICATIONS  (STANDING):  aspirin  chewable 81 milliGRAM(s) Oral daily  atorvastatin 20 milliGRAM(s) Oral at bedtime  busPIRone 10 milliGRAM(s) Oral two times a day  dextrose 5%. 1000 milliLiter(s) (50 mL/Hr) IV Continuous <Continuous>  dextrose 50% Injectable 12.5 Gram(s) IV Push once  dextrose 50% Injectable 25 Gram(s) IV Push once  dextrose 50% Injectable 25 Gram(s) IV Push once  docusate sodium 100 milliGRAM(s) Oral three times a day  guaiFENesin  milliGRAM(s) Oral every 12 hours  insulin lispro (HumaLOG) corrective regimen sliding scale   SubCutaneous three times a day before meals  insulin lispro (HumaLOG) corrective regimen sliding scale   SubCutaneous at bedtime  iohexol 300 mG (iodine)/mL Oral Solution 500 milliLiter(s) Oral every 1 hour  levothyroxine 75 MICROGram(s) Oral daily  meropenem IVPB      meropenem IVPB 2000 milliGRAM(s) IV Intermittent once  montelukast 10 milliGRAM(s) Oral at bedtime  multivitamin 1 Tablet(s) Oral daily  pantoprazole  Injectable 40 milliGRAM(s) IV Push every 12 hours  senna 2 Tablet(s) Oral at bedtime  sodium chloride 0.65% Nasal 1 Spray(s) Both Nostrils two times a day  sodium chloride 0.9%. 1000 milliLiter(s) (75 mL/Hr) IV Continuous <Continuous>  vancomycin  IVPB      vancomycin  IVPB 1000 milliGRAM(s) IV Intermittent once  verapamil  milliGRAM(s) Oral daily    MEDICATIONS  (PRN):  acetaminophen  Suppository 650 milliGRAM(s) Rectal every 6 hours PRN For Temp greater than 38 C (100.4 F)  ALBUTerol    0.083% 2.5 milliGRAM(s) Nebulizer every 6 hours PRN Shortness of Breath and/or Wheezing  dextrose Gel 1 Dose(s) Oral once PRN Blood Glucose LESS THAN 70 milliGRAM(s)/deciliter  glucagon  Injectable 1 milliGRAM(s) IntraMuscular once PRN Glucose LESS THAN 70 milligrams/deciliter  ondansetron Injectable 4 milliGRAM(s) IV Push every 6 hours PRN Nausea and/or Vomiting      LABS:                        10.8   20.9  )-----------( 691      ( 2017 18:14 )             33.3     Hgb Trend: 10.8<--, 10.7<--, 11.5<--, 9.7<--, 10.8<--  11-02    139  |  98  |  28<H>  ----------------------------<  172<H>  3.5   |  28  |  0.97    Ca    9.0      2017 05:22    TPro  6.0  /  Alb  2.4<L>  /  TBili  0.6  /  DBili  .20  /  AST  21  /  ALT  28  /  AlkPhos  89  11-    Creatinine Trend: 0.97<--, 1.30<--, 1.40<--, 1.24<--, 1.23<--, 1.11<--    Urinalysis Basic - ( 2017 03:18 )    Color: Yellow / Appearance: Clear / S.015 / pH: x  Gluc: x / Ketone: Negative  / Bili: Negative / Urobili: Negative   Blood: x / Protein: Negative / Nitrite: Negative   Leuk Esterase: Negative / RBC: 0-2 /HPF / WBC Negative   Sq Epi: x / Non Sq Epi: Negative / Bacteria: Negative            MICROBIOLOGY:     RADIOLOGY:  [ ] Reviewed and interpreted by me    EKG:        Lisa ANP-BC (ext. [id number])

## 2017-11-02 NOTE — PROGRESS NOTE ADULT - PROBLEM SELECTOR PLAN 2
monitor MS  neuro following  on emp ABX for PNA  monitor labs, replete lytes  supportive care  exact etiology is unclear

## 2017-11-02 NOTE — CHART NOTE - NSCHARTNOTEFT_GEN_A_CORE
Called by RN because RN noted "clots" in patient's diaper and patient seemed to be agitated and removing clothes. Patient was seen and evaluated lying comfortably in bed, not arousable upon sternal rub or name calling. Spoke to admitting resident who states that patient was not arousable at the time of admission. Additionally, as per chart review, Neurologist is following patient and the patient is being worked up for change in mental status. The patient's diaper with reported "clots" was disposed of before I was able to see patient. RN states that patient urinated a large amount after noticing blood clots in urine. H/H appears to be stable as per chart review. RN reports urine was isidro/yellow in color. Unable to obtain ROS due to patient's clinical condition.     T(C): 36.7 (11-01-17 @ 20:30), Max: 36.9 (11-01-17 @ 04:09)  HR: 80 (11-01-17 @ 20:30) (69 - 82)  BP: 149/65 (11-01-17 @ 20:30) (111/94 - 159/77)  RR: 18 (11-01-17 @ 20:30) (16 - 22)  SpO2: 95% (11-01-17 @ 20:30) (94% - 96%)    PE Limited due to patient's clinical condition.   GENERAL:  male appears lethargic, difficult to arouse. Patient appears to have involuntary upper extremity movements.   EYES: Pt. would not open eyes.   LUNGS: no wheezing or rhonchi appreciated  HEART: soft S1/S2, regular rate and rhythm, no murmurs noted, no lower extremity edema  GASTROINTESTINAL: abdomen is soft, nontender, nondistended, normoactive bowel sounds, no palpable masses  INTEGUMENT: good skin turgor  MUSCULOSKELETAL: no clubbing or cyanosis, no obvious deformity  NEUROLOGIC: not arousable     A/P: 82 year old male with PMHx of CAD, Crohn's disease, T2DM, HTN, HLD, and hypothyroidism admitted for pneumonia.   -change in mental status, unclear etiology. Neurology following. check basic labs plan mri lumbar spine, hold pain meds as per Dr. Parker  -reported blood clots by RN (unable to confirm as diaper was disposed of), continue to monitor urine output and BMs. H/H currently stable.   -Management discussed with Dr. Man, PGY-2. Called by RN because RN noted "clots" in patient's diaper and patient seemed to be agitated and removing clothes. Patient was seen and evaluated lying comfortably in bed, not arousable upon sternal rub or name calling. Spoke to admitting resident who states that patient was not arousable at the time of admission. Additionally, as per chart review, Neurologist is following patient and the patient is being worked up for change in mental status. The patient's diaper with reported "clots" was disposed of before I was able to see patient. RN states that patient urinated a large amount after noticing blood clots in urine. H/H appears to be stable as per chart review. RN reports urine was isidro/yellow in color. Unable to obtain ROS due to patient's clinical condition.     T(C): 36.7 (11-01-17 @ 20:30), Max: 36.9 (11-01-17 @ 04:09)  HR: 80 (11-01-17 @ 20:30) (69 - 82)  BP: 149/65 (11-01-17 @ 20:30) (111/94 - 159/77)  RR: 18 (11-01-17 @ 20:30) (16 - 22)  SpO2: 95% (11-01-17 @ 20:30) (94% - 96%)    PE Limited due to patient's clinical condition.    GENERAL:  male appears lethargic, difficult to arouse. Patient appears to have involuntary upper extremity movements.   EYES: Pt. would not open eyes.   LUNGS: no wheezing or rhonchi appreciated  HEART: soft S1/S2, regular rate and rhythm, no murmurs noted, no lower extremity edema  GASTROINTESTINAL: abdomen is soft, nontender, nondistended, normoactive bowel sounds, no palpable masses  INTEGUMENT: good skin turgor  MUSCULOSKELETAL: no clubbing or cyanosis, no obvious deformity  NEUROLOGIC: not arousable     A/P: 82 year old male with PMHx of CAD, Crohn's disease, T2DM, HTN, HLD, and hypothyroidism admitted for pneumonia.   -change in mental status, unclear etiology. Neurology following. check basic labs plan mri lumbar spine, hold pain meds as per Dr. Parker  -reported blood clots by RN (unable to confirm as diaper was disposed of), continue to monitor urine output and BMs. H/H currently stable.   -Management discussed with Dr. Man, PGY-2. Called by RN because RN noted "clots" in patient's diaper and patient seemed to be agitated and removing clothes. Patient was seen and evaluated lying comfortably in bed, not arousable upon sternal rub or name calling. Spoke to admitting resident who states that patient was not arousable at the time of admission. Additionally, as per chart review, Neurologist is following patient and the patient is being worked up for change in mental status. The patient's diaper with reported "clots" was disposed of before I was able to see patient. RN states that patient urinated a large amount after noticing blood clots in urine. H/H appears to be stable as per chart review. RN reports urine was isidro/yellow in color. Unable to obtain ROS due to patient's clinical condition.     T(C): 36.7 (11-01-17 @ 20:30), Max: 36.9 (11-01-17 @ 04:09)  HR: 80 (11-01-17 @ 20:30) (69 - 82)  BP: 149/65 (11-01-17 @ 20:30) (111/94 - 159/77)  RR: 18 (11-01-17 @ 20:30) (16 - 22)  SpO2: 95% (11-01-17 @ 20:30) (94% - 96%)    PE Limited due to patient's clinical condition.    GENERAL:  male appears lethargic, difficult to arouse. Patient appears to have involuntary upper extremity movements.   EYES: Pt. would not open eyes.   LUNGS: no wheezing or rhonchi appreciated  HEART: soft S1/S2, regular rate and rhythm, no murmurs noted, no lower extremity edema  GASTROINTESTINAL: abdomen slightly firm, nontender, nondistended, normoactive bowel sounds, no palpable masses  INTEGUMENT: good skin turgor  MUSCULOSKELETAL: no clubbing or cyanosis, no obvious deformity  NEUROLOGIC: not arousable     A/P: 82 year old male with PMHx of CAD, Crohn's disease, T2DM, HTN, HLD, and hypothyroidism admitted for pneumonia.   -change in mental status, unclear etiology. Neurology following. check basic labs plan mri lumbar spine, hold pain meds as per Dr. Parker  -reported blood clots by RN (unable to confirm as diaper was disposed of), continue to monitor urine output and BMs. H/H currently stable.   -Management discussed with Dr. Man, PGY-2.

## 2017-11-02 NOTE — CONSULT NOTE ADULT - SUBJECTIVE AND OBJECTIVE BOX
Chief Complaint:  Patient is a 82y old  Male who presents with a chief complaint of sent from [facility] for [facility] (2017 10:09)      HPI: 82 year old male with PMHx of CAD, Crohn's disease, T2DM, HTN, HLD, and hypothyroidism sent to ER from [facility]. Pt was in his rehab facility when the staff noticed that he was making nonsensical statements. Pt was able to answer questions at rehab but would make odd remarks, which prompted them to send him to the ED. He did not have any fevers, cough, nausea, vomiting, or diarrhea at the rehab facility. At baseline, pt is AAOx3 as per rehab staff. Pt was recently discharged from [facility] on 10/27/17 s/p laminectomy. His postoperative course was complicated by blood loss requiring transfusion as well as an URI requiring antibiotics and steroids. He was discharged on doxycycline and a steroid taper. At present, pt is difficult to arouse and constantly drifts into sleep. He responds to his name when called and follows instructions. However he does not answer any questions and the answers he gives are nonsensical.     In ED, VS were T-97.3 HR-69 BP-146/71 RR-16 SpO2-94. Labs were significant for WBC-18 Hb-10.8 Cr-1.4 Lactate-0.8. Urinalysis was unremarkable. CT Head showed no acute intracranial pathology. Preliminary CXR showerd right lower lobe infiltrate  In ED, pt recieved Levaquin 500mg x1.    Overnight -- patient noted to have 2 episodes of dark vomitus, no fresh blood reported. No BM since admission.     Allergies:  penicillin (Hives)      Medications:  ALBUTerol    0.083% 2.5 milliGRAM(s) Nebulizer every 6 hours PRN  aspirin  chewable 81 milliGRAM(s) Oral daily  atorvastatin 20 milliGRAM(s) Oral at bedtime  busPIRone 10 milliGRAM(s) Oral two times a day  dextrose 5%. 1000 milliLiter(s) IV Continuous <Continuous>  dextrose 50% Injectable 12.5 Gram(s) IV Push once  dextrose 50% Injectable 25 Gram(s) IV Push once  dextrose 50% Injectable 25 Gram(s) IV Push once  dextrose Gel 1 Dose(s) Oral once PRN  docusate sodium 100 milliGRAM(s) Oral three times a day  glucagon  Injectable 1 milliGRAM(s) IntraMuscular once PRN  guaiFENesin  milliGRAM(s) Oral every 12 hours  heparin  Injectable 5000 Unit(s) SubCutaneous every 12 hours  insulin lispro (HumaLOG) corrective regimen sliding scale   SubCutaneous three times a day before meals  insulin lispro (HumaLOG) corrective regimen sliding scale   SubCutaneous at bedtime  levoFLOXacin IVPB 500 milliGRAM(s) IV Intermittent every 24 hours  levothyroxine 75 MICROGram(s) Oral daily  montelukast 10 milliGRAM(s) Oral at bedtime  multivitamin 1 Tablet(s) Oral daily  ondansetron Injectable 4 milliGRAM(s) IV Push every 6 hours PRN  senna 2 Tablet(s) Oral at bedtime  sodium chloride 0.65% Nasal 1 Spray(s) Both Nostrils two times a day  sodium chloride 0.9%. 1000 milliLiter(s) IV Continuous <Continuous>  verapamil  milliGRAM(s) Oral daily      PMHX/PSHX:  Prostate CA  Crohn disease  Hypothyroidism  Depression  Intervertebral disc disorder  Diabetes mellitus  Obesity  Hypercholesterolemia  HTN (hypertension)  Stented coronary artery  CAD (coronary artery disease)  Macular degeneration  Glaucoma  Hearing loss  S/P colon resection  S/P lumbar fusion  History of hip replacement, total, right  History of hip replacement, total, right      Family history:  No pertinent family history in first degree relatives      Social History: unknown     ROS:     General:  minimally responsive      PHYSICAL EXAM:   Vital Signs:  Vital Signs Last 24 Hrs  T(C): 36.7 (2017 05:01), Max: 36.7 (2017 20:30)  T(F): 98.1 (2017 05:01), Max: 98.1 (2017 05:01)  HR: 96 (2017 05:01) (80 - 100)  BP: 162/83 (2017 05:01) (149/65 - 162/87)  BP(mean): --  RR: 18 (2017 05:) (18 - 21)  SpO2: 95% (2017 05:01) (94% - 95%)  Daily     Daily     GENERAL:  minimally responsive, not verbally communicating   HEENT:  NC/AT,  conjunctivae clear and pink, no thyromegaly, nodules, adenopathy, no JVD, sclera -anicteric  CHEST:  Full & symmetric excursion, no increased effort, breath sounds clear  HEART:  Regular rhythm, S1, S2, no murmur/rub/S3/S4, no abdominal bruit, no edema  ABDOMEN:  Soft, non-tender, moderately distended, normoactive bowel sounds,  no masses ,no hepato-splenomegaly, no signs of chronic liver disease  EXTEREMITIES:  no cyanosis,clubbing or edema  SKIN:  No rash/erythema/ecchymoses/petechiae/wounds/abscess/warm/dry  NEURO:  disoriented, +encephalopathy    LABS:                        10.7   21.5  )-----------( 615      ( 2017 05:21 )             33.4     11-02    139  |  98  |  28<H>  ----------------------------<  172<H>  3.5   |  28  |  0.97    Ca    9.0      2017 05:22    TPro  6.0  /  Alb  2.4<L>  /  TBili  0.6  /  DBili  .20  /  AST  21  /  ALT  28  /  AlkPhos  89  11-02    LIVER FUNCTIONS - ( 2017 05:22 )  Alb: 2.4 g/dL / Pro: 6.0 g/dL / ALK PHOS: 89 U/L / ALT: 28 U/L / AST: 21 U/L / GGT: x             Urinalysis Basic - ( 2017 03:18 )    Color: Yellow / Appearance: Clear / S.015 / pH: x  Gluc: x / Ketone: Negative  / Bili: Negative / Urobili: Negative   Blood: x / Protein: Negative / Nitrite: Negative   Leuk Esterase: Negative / RBC: 0-2 /HPF / WBC Negative   Sq Epi: x / Non Sq Epi: Negative / Bacteria: Negative      Amylase Serum66      Lipase [id number]       Ammonia--  Amylase Serum--      Lipase serum--       Ammonia<17      Imaging:

## 2017-11-02 NOTE — CONSULT NOTE ADULT - ASSESSMENT
81 yo M s/p TLIF L2-S1 w/ altered mental status and Lumbar abscess  Call put out to Dr. Platt, awaiting recs  D/w Dr. Alvarez orthopedic Spine attending on Call   Continue Broad spectrum IV abx per ID  Continue Care per ICU team  FU Cultures  Keep NPO  Hold DVT ppx  FU Clearance/optimization for possible OR   Will continue to monitor clinically   Further recs to follow

## 2017-11-02 NOTE — CHART NOTE - NSCHARTNOTEFT_GEN_A_CORE
Patient is an 81 yo M admitted for AMS. The patient is confused and becoming agitated. Multiple attempts were made to place an NG tube without success due to the patient grabbing at the lines. The patient will require an NG tube in order to optimize medical management. The patient will be placed on Level 1, bilateral wrist restraints for 24 hours. The patient will be re-evaluated at the end of 24 hours. If the patient is no longer displaying behavior that is harmful to his medical care, the restraints will then be removed. This plan has been discussed with Dr. Damon (attending) and Dr. Rowell (GI) and both agree with plan above. Will follow up as needed.

## 2017-11-02 NOTE — PROGRESS NOTE ADULT - SUBJECTIVE AND OBJECTIVE BOX
Neurology follow up note    JONATHAN SUMMERS82yMale      Interval History:    Patient events noted vomiting-- appears little more awake today      MEDICATIONS    ALBUTerol    0.083% 2.5 milliGRAM(s) Nebulizer every 6 hours PRN  aspirin  chewable 81 milliGRAM(s) Oral daily  atorvastatin 20 milliGRAM(s) Oral at bedtime  busPIRone 10 milliGRAM(s) Oral two times a day  dextrose 5%. 1000 milliLiter(s) IV Continuous <Continuous>  dextrose 50% Injectable 12.5 Gram(s) IV Push once  dextrose 50% Injectable 25 Gram(s) IV Push once  dextrose 50% Injectable 25 Gram(s) IV Push once  dextrose Gel 1 Dose(s) Oral once PRN  docusate sodium 100 milliGRAM(s) Oral three times a day  glucagon  Injectable 1 milliGRAM(s) IntraMuscular once PRN  guaiFENesin  milliGRAM(s) Oral every 12 hours  heparin  Injectable 5000 Unit(s) SubCutaneous every 12 hours  insulin lispro (HumaLOG) corrective regimen sliding scale   SubCutaneous three times a day before meals  insulin lispro (HumaLOG) corrective regimen sliding scale   SubCutaneous at bedtime  iohexol 300 mG (iodine)/mL Oral Solution 500 milliLiter(s) Oral every 1 hour  levoFLOXacin IVPB 500 milliGRAM(s) IV Intermittent every 24 hours  levothyroxine 75 MICROGram(s) Oral daily  montelukast 10 milliGRAM(s) Oral at bedtime  multivitamin 1 Tablet(s) Oral daily  ondansetron Injectable 4 milliGRAM(s) IV Push every 6 hours PRN  pantoprazole  Injectable 40 milliGRAM(s) IV Push every 12 hours  senna 2 Tablet(s) Oral at bedtime  sodium chloride 0.65% Nasal 1 Spray(s) Both Nostrils two times a day  sodium chloride 0.9%. 1000 milliLiter(s) IV Continuous <Continuous>  verapamil  milliGRAM(s) Oral daily      Allergies    penicillin (Hives)    Intolerances            Vital Signs Last 24 Hrs  T(C): 36.7 (2017 05:01), Max: 36.7 (2017 20:30)  T(F): 98.1 (2017 05:01), Max: 98.1 (2017 05:01)  HR: 96 (2017 05:01) (80 - 100)  BP: 162/83 (2017 05:01) (149/65 - 162/87)  BP(mean): --  RR: 18 (2017 05:01) (18 - 21)  SpO2: 95% (2017 05:01) (94% - 95%)      REVIEW OF SYSTEMS:  Limit or unable to obtain secondary to patient's poor mental status. --- denies headache + LBP      On Neurological Examination:    Mental Status - Patient is  Lethargic but arouseable ---  [facility]   oct  yr 2017         Follow simple commands    Speech -   Fluent-- mumbles at times                     Cranial Nerves - Pupils 3 mm equal and reactive to light,   extraocular eye movements intact.   smile symmetric  intact bilateral NLF    Motor Exam -   Right upper 4/5  Left upper 4/5   lower with stimuli was able to withdrawal      Muscle tone - is normal all over.  No asymmetry is seen.    	  HEENT:  normocephalic, atraumatic  		  LUNGS: Decreased bilaterally  	  HEART: Normal S1S2   No murmur RRR        	  GI/ ABDOMEN:  Soft  Non tender    EXTREMITIES:   No Edema  No Clubbing  No Cyanosis No Edema    MUSCULOSKELETAL: decreased Range of Motion all 4 extremities   	   SKIN:/ Spine -- + serosanguinous fluid               LABS:  CBC Full  -  ( 2017 05:21 )  WBC Count : 21.5 K/uL  Hemoglobin : 10.7 g/dL  Hematocrit : 33.4 %  Platelet Count - Automated : 615 K/uL  Mean Cell Volume : 94.8 fl  Mean Cell Hemoglobin : 30.4 pg  Mean Cell Hemoglobin Concentration : 32.1 gm/dL  Auto Neutrophil # : x  Auto Lymphocyte # : x  Auto Monocyte # : x  Auto Eosinophil # : x  Auto Basophil # : x  Auto Neutrophil % : x  Auto Lymphocyte % : x  Auto Monocyte % : x  Auto Eosinophil % : x  Auto Basophil % : x    Urinalysis Basic - ( 2017 03:18 )    Color: Yellow / Appearance: Clear / S.015 / pH: x  Gluc: x / Ketone: Negative  / Bili: Negative / Urobili: Negative   Blood: x / Protein: Negative / Nitrite: Negative   Leuk Esterase: Negative / RBC: 0-2 /HPF / WBC Negative   Sq Epi: x / Non Sq Epi: Negative / Bacteria: Negative          139  |  98  |  28<H>  ----------------------------<  172<H>  3.5   |  28  |  0.97    Ca    9.0      2017 05:22    TPro  6.0  /  Alb  2.4<L>  /  TBili  0.6  /  DBili  .20  /  AST  21  /  ALT  28  /  AlkPhos  89  11-02    Hemoglobin A1C:     LIVER FUNCTIONS - ( 2017 05:22 )  Alb: 2.4 g/dL / Pro: 6.0 g/dL / ALK PHOS: 89 U/L / ALT: 28 U/L / AST: 21 U/L / GGT: x           Vitamin B12 Vitamin B12, Serum: 994 pg/mL ( @ 21:19)          RADIOLOGY    ANALYSIS AND PLAN:  An 82-year-old with episodes of changes in mental status.    1.	For episodes of changes in mental status at present unclear etiology, but the patient appears to appear encephalopathic, possibly metabolic questionable infectious -- Also the patient does have a recent history of surgery with elevated white blood cell count.  Will reattempt MRI of the lumbar spine and gadolinium to rule out any type of underlying infectious type process.  He does have elevated white blood cells but no temperatures at present.    2.	Overall at present appears little more responsive   Spoke with family members.    Spoke with one relative, Tushar, telephone number is [phone] today 17.  , Rosanna, her home number is [phone].  Her cell number [phone].  No response today     Thank you for the courtesy of consultation.    30 minutes spent on total encounter; more than 50% of the visit was spent counseling and/or coordinating care by the attending physician.

## 2017-11-02 NOTE — CONSULT NOTE ADULT - SUBJECTIVE AND OBJECTIVE BOX
82y Male c/o admitted on 11/1 from [facility] with change in mental status and elevated white count.  Patient had TLIF L2-L5 with Dr. Platt at [facility] on 10/19 and was D/C'd to rehab at [facility].  Patient is currently delerious and unable to get adequate history or HPI.    PMH:  Prostate CA  Crohn disease  Hypothyroidism  Depression  Intervertebral disc disorder  Diabetes mellitus  Obesity  Hypercholesterolemia  HTN (hypertension)  Stented coronary artery  CAD (coronary artery disease)  Macular degeneration  Glaucoma  Hearing loss    PSH:  S/P colon resection  S/P lumbar fusion  History of hip replacement, total, right  History of hip replacement, total, right    AH:  penicillin (Hives)    Meds: See med rec    T(C): 37.9 (11-02-17 @ 15:56)  HR: 105 (11-02-17 @ 15:56)  BP: 132/76 (11-02-17 @ 15:56)  RR: 19 (11-02-17 @ 15:56)  SpO2: 90% (11-02-17 @ 15:56)    PE Spine:  patient moving all extremities spontaneously, unable to obtain complete exam due to patient non compliance/obtunded  Incision site on Lumbar spine, proximal aspect of incision well approximated, no erythema or drainage, distal incision not well approximated, minimal erythema with serous drainage expressed.     Imaging:  MRI w/Contrast demonstrates 9cm Lumbar abscess with Moderate-Severe cord compression

## 2017-11-02 NOTE — CONSULT NOTE ADULT - ASSESSMENT
82 yr old male with state PMHx who presented from rehab with altered mental status, agitation requiring ativan without acute pathology  demonstrated on CT scan, hospital course c/b 2 episodes of coffee ground emesis, increased leukocytosis and  MRI demonstrating Abcess resulting in spinal stenosis at L3 level, consult called for altered mental status, sepsis 2/2 to lumbar spinal abscess and possible asp pmn      Plan:  Neuro:  Altered mental status most likely 2/2 to metabolic encephalopathy due to sepsis  neuro checks q 4 hrs and prn  haldol 3 mg q 3 hrs prn for agitaion    Pulm:  supplemental O2 to maintain SPO2 >/= 92 % 82 yr old male with state PMHx who presented from rehab with altered mental status, agitation requiring ativan without acute pathology  demonstrated on CT scan, hospital course c/b 2 episodes of coffee ground emesis, increased leukocytosis and  MRI demonstrating Abcess resulting in spinal stenosis at L3 level, consult called for altered mental status, sepsis 2/2 to lumbar spinal abscess and possible asp pmn      Plan:  Neuro:  Altered mental status most likely 2/2 to metabolic encephalopathy due to sepsis  neuro checks q 4 hrs and prn  haldol 3 mg q 3 hrs prn for agitaion    Pulm:  supplemental O2 to maintain SPO2 >/= 92 %  HOB >/= 30 degree angle  bronchodilators q 6 hr prn     CV: 82 yr old male with state PMHx who presented from rehab with altered mental status, agitation requiring ativan without acute pathology  demonstrated on CT scan, hospital course c/b 2 episodes of coffee ground emesis, increased leukocytosis and  MRI demonstrating Abcess resulting in spinal stenosis at L3 level, consult called for altered mental status, sepsis 2/2 to lumbar spinal abscess and possible asp pmn      Plan:  Neuro:  Altered mental status most likely 2/2 to metabolic encephalopathy due to sepsis  neuro checks q 4 hrs and prn  hold benzos for sedation   haldol 3 mg q 3 hrs prn for agitaion    Pulm:  supplemental O2 to maintain SPO2 >/= 92 %  HOB >/= 30 degree angle  bronchodilators q 6 hr prn   CXR in a.m.    CV:  vital signs currently stable  with hx of CAD/HTN obtain ecg now and in a.m.  obtain c.e. q 8 hrs x 3  currently on verapamil will continue at present     GI/:  pt with episodes of coffee ground emesis  place NGT to low intermittent wall suction  change protonix 40 mg to q 12 hrs  obtain g.i. consult  npo at present  place caballero catheter for strict I & O's      I.D.:  with emesis as above/and spinal abscess will  d/c levoquin  add vanco 1 gm q 12 hrs titrate dose to trough of 15 to 20  vanco tough prior to 3rd dose  add meropenem 1 gm q 8 hrs   contact ortho for possible surg re: lumbar abscess  pan culture    FEN/ENDO/HEME  CBC q 8 hrs  transfuse PRBC's as needed to keep Hgb >/= 8  CMP with Mg/PO q 8 hrs  replenish electrolytes as need ( obtain K+ 4 - 4.5)  change ISS to q 4 hr maintain glucose 140 - 160  change synthroid to 37.5 ug IV qd  NS at 100 cc/hr  Hold ASA    Critical Care time: 55 minutes    reviewing data, imaging discussing with multidisciplinary team, non inclusive of procedures. goals of care with family.

## 2017-11-03 ENCOUNTER — TRANSCRIPTION ENCOUNTER (OUTPATIENT)
Age: 82
End: 2017-11-03

## 2017-11-03 DIAGNOSIS — R14.0 ABDOMINAL DISTENSION (GASEOUS): ICD-10-CM

## 2017-11-03 DIAGNOSIS — T14.8XXA OTHER INJURY OF UNSPECIFIED BODY REGION, INITIAL ENCOUNTER: ICD-10-CM

## 2017-11-03 DIAGNOSIS — M46.20 OSTEOMYELITIS OF VERTEBRA, SITE UNSPECIFIED: ICD-10-CM

## 2017-11-03 DIAGNOSIS — R41.82 ALTERED MENTAL STATUS, UNSPECIFIED: ICD-10-CM

## 2017-11-03 LAB
ABO RH CONFIRMATION: SIGNIFICANT CHANGE UP
ALBUMIN SERPL ELPH-MCNC: 2.1 G/DL — LOW (ref 3.3–5)
ALP SERPL-CCNC: 80 U/L — SIGNIFICANT CHANGE UP (ref 40–120)
ALT FLD-CCNC: 19 U/L — SIGNIFICANT CHANGE UP (ref 12–78)
ANION GAP SERPL CALC-SCNC: 9 MMOL/L — SIGNIFICANT CHANGE UP (ref 5–17)
APTT BLD: 24.3 SEC — LOW (ref 27.5–37.4)
AST SERPL-CCNC: 11 U/L — LOW (ref 15–37)
BASE EXCESS BLDA CALC-SCNC: 8.8 MMOL/L — HIGH (ref -2–2)
BILIRUB SERPL-MCNC: 0.6 MG/DL — SIGNIFICANT CHANGE UP (ref 0.2–1.2)
BLOOD GAS COMMENTS ARTERIAL: SIGNIFICANT CHANGE UP
BUN SERPL-MCNC: 36 MG/DL — HIGH (ref 7–23)
CALCIUM SERPL-MCNC: 8.5 MG/DL — SIGNIFICANT CHANGE UP (ref 8.5–10.1)
CHLORIDE SERPL-SCNC: 99 MMOL/L — SIGNIFICANT CHANGE UP (ref 96–108)
CO2 SERPL-SCNC: 35 MMOL/L — HIGH (ref 22–31)
CREAT SERPL-MCNC: 1.7 MG/DL — HIGH (ref 0.5–1.3)
CRP SERPL-MCNC: 6.6 MG/DL — HIGH (ref 0–0.4)
ERYTHROCYTE [SEDIMENTATION RATE] IN BLOOD: 34 MM/HR — HIGH (ref 0–20)
GLUCOSE SERPL-MCNC: 170 MG/DL — HIGH (ref 70–99)
HCO3 BLDA-SCNC: 32 MMOL/L — HIGH (ref 23–27)
HCT VFR BLD CALC: 32.5 % — LOW (ref 39–50)
HGB BLD-MCNC: 10.3 G/DL — LOW (ref 13–17)
HOROWITZ INDEX BLDA+IHG-RTO: 100 — SIGNIFICANT CHANGE UP
INR BLD: 1.36 RATIO — HIGH (ref 0.88–1.16)
MAGNESIUM SERPL-MCNC: 1.9 MG/DL — SIGNIFICANT CHANGE UP (ref 1.6–2.6)
MCHC RBC-ENTMCNC: 30.4 PG — SIGNIFICANT CHANGE UP (ref 27–34)
MCHC RBC-ENTMCNC: 31.7 GM/DL — LOW (ref 32–36)
MCV RBC AUTO: 95.9 FL — SIGNIFICANT CHANGE UP (ref 80–100)
PCO2 BLDA: 49 MMHG — HIGH (ref 32–46)
PH BLDA: 7.44 — SIGNIFICANT CHANGE UP (ref 7.35–7.45)
PHOSPHATE SERPL-MCNC: 3.9 MG/DL — SIGNIFICANT CHANGE UP (ref 2.5–4.5)
PLATELET # BLD AUTO: 525 K/UL — HIGH (ref 150–400)
PO2 BLDA: 225 MMHG — HIGH (ref 74–108)
POTASSIUM SERPL-MCNC: 3.7 MMOL/L — SIGNIFICANT CHANGE UP (ref 3.5–5.3)
POTASSIUM SERPL-SCNC: 3.7 MMOL/L — SIGNIFICANT CHANGE UP (ref 3.5–5.3)
PROCALCITONIN SERPL-MCNC: 4.07 NG/ML — HIGH (ref 0–0.04)
PROT SERPL-MCNC: 5.3 G/DL — LOW (ref 6–8.3)
PROTHROM AB SERPL-ACNC: 14.9 SEC — HIGH (ref 9.8–12.7)
RBC # BLD: 3.39 M/UL — LOW (ref 4.2–5.8)
RBC # FLD: 14.3 % — SIGNIFICANT CHANGE UP (ref 10.3–14.5)
SAO2 % BLDA: 99 % — HIGH (ref 92–96)
SODIUM SERPL-SCNC: 143 MMOL/L — SIGNIFICANT CHANGE UP (ref 135–145)
WBC # BLD: 23.9 K/UL — HIGH (ref 3.8–10.5)
WBC # FLD AUTO: 23.9 K/UL — HIGH (ref 3.8–10.5)

## 2017-11-03 PROCEDURE — 71250 CT THORAX DX C-: CPT | Mod: 26

## 2017-11-03 PROCEDURE — 99291 CRITICAL CARE FIRST HOUR: CPT

## 2017-11-03 PROCEDURE — 93010 ELECTROCARDIOGRAM REPORT: CPT

## 2017-11-03 PROCEDURE — 74176 CT ABD & PELVIS W/O CONTRAST: CPT | Mod: 26

## 2017-11-03 PROCEDURE — 99233 SBSQ HOSP IP/OBS HIGH 50: CPT

## 2017-11-03 PROCEDURE — 71010: CPT | Mod: 26

## 2017-11-03 RX ORDER — IOHEXOL 300 MG/ML
500 INJECTION, SOLUTION INTRAVENOUS
Qty: 0 | Refills: 0 | Status: COMPLETED | OUTPATIENT
Start: 2017-11-03 | End: 2017-11-03

## 2017-11-03 RX ORDER — MEROPENEM 1 G/30ML
1000 INJECTION INTRAVENOUS EVERY 8 HOURS
Qty: 0 | Refills: 0 | Status: DISCONTINUED | OUTPATIENT
Start: 2017-11-03 | End: 2017-11-04

## 2017-11-03 RX ADMIN — PANTOPRAZOLE SODIUM 40 MILLIGRAM(S): 20 TABLET, DELAYED RELEASE ORAL at 06:01

## 2017-11-03 RX ADMIN — Medication 1: at 06:25

## 2017-11-03 RX ADMIN — Medication 1 SPRAY(S): at 18:10

## 2017-11-03 RX ADMIN — Medication 10 MILLIGRAM(S): at 18:10

## 2017-11-03 RX ADMIN — IOHEXOL 500 MILLILITER(S): 300 INJECTION, SOLUTION INTRAVENOUS at 10:48

## 2017-11-03 RX ADMIN — Medication 37.5 MICROGRAM(S): at 06:01

## 2017-11-03 RX ADMIN — ATORVASTATIN CALCIUM 20 MILLIGRAM(S): 80 TABLET, FILM COATED ORAL at 22:26

## 2017-11-03 RX ADMIN — Medication 600 MILLIGRAM(S): at 18:12

## 2017-11-03 RX ADMIN — SENNA PLUS 2 TABLET(S): 8.6 TABLET ORAL at 22:14

## 2017-11-03 RX ADMIN — Medication 1 SPRAY(S): at 06:01

## 2017-11-03 RX ADMIN — PANTOPRAZOLE SODIUM 40 MILLIGRAM(S): 20 TABLET, DELAYED RELEASE ORAL at 18:10

## 2017-11-03 RX ADMIN — IOHEXOL 500 MILLILITER(S): 300 INJECTION, SOLUTION INTRAVENOUS at 11:28

## 2017-11-03 RX ADMIN — MEROPENEM 200 MILLIGRAM(S): 1 INJECTION INTRAVENOUS at 22:14

## 2017-11-03 RX ADMIN — MONTELUKAST 10 MILLIGRAM(S): 4 TABLET, CHEWABLE ORAL at 22:14

## 2017-11-03 RX ADMIN — Medication 250 MILLIGRAM(S): at 18:12

## 2017-11-03 RX ADMIN — SODIUM CHLORIDE 75 MILLILITER(S): 9 INJECTION INTRAMUSCULAR; INTRAVENOUS; SUBCUTANEOUS at 11:28

## 2017-11-03 RX ADMIN — Medication 250 MILLIGRAM(S): at 06:26

## 2017-11-03 RX ADMIN — Medication 100 MILLIGRAM(S): at 22:14

## 2017-11-03 NOTE — PROGRESS NOTE ADULT - ASSESSMENT
82 year old male unable to provide hx. History obtained from his medical record.  He has a h/o CAD s/p remote PCI, Crohn's disease, T2DM, HTN, HLD, and hypothyroidism.  Has a known RBBB.  Is recently s/p laminectomy, course complicated by blood loss and hypotension.   No acute cv process was felt to be present at that time.  He did not have followup evaluation after that.   Eventually discharged to rehab, and is now sent from rehab to the hospital  for an altered ms.  Was found to have a leukocytosis, and spine imaging suspicious for spinal abscess vs hematoma.  Pre-op consultation is now being obtained.    Neuro: planned for urgent surgery today. is optimized from a cv perspective for his planned surgery. Continue Broad spectrum IV abx per ID  Continue Care per ICU team  FU Cultures  Keep NPO  Hold DVT ppx  FU Clearance/optimization for possible OR   Will continue to monitor clinically   Further recs to follow 82 year old male unable to provide hx. History obtained from his medical record.  He has a h/o CAD s/p remote PCI, Crohn's disease, T2DM, HTN, HLD, and hypothyroidism.  Has a known RBBB.  Is recently s/p laminectomy, course complicated by blood loss and hypotension.   No acute cv process was felt to be present at that time.  He did not have followup evaluation after that.   Eventually discharged to rehab, and is now sent from rehab to the hospital  for an altered ms.  Was found to have a leukocytosis, and spine imaging suspicious for spinal abscess vs hematoma.  Pre-op consultation is now being obtained.    Neuro: planned for urgent surgery today. is optimized from a cv perspective for his planned surgery. no objections from Pulmonary for OR today Continue Broad spectrum IV abx per ID  Continue Care per ICU team  FU Cultures  Keep NPO  Hold DVT ppx    CV    Pulm    GI: Hold CT. npo, NG TUBE    : PALOMO    heme: hold DVT PROPHYLAXIS    Endocrine: 82 year old male with PMH of recent lumbar laminectomy on 10/27 presents with AMS from [facility]. Course complicated by coffee ground emesis, serosanguinous fluid drainage from surgical incision with increasing leukocytosis suggestive of spinal abscess, and hypoxia. Transferred to ICU for closer monitoring. Plan for OR to determine spinal abscess vs hematoma.     Neuro:   -Neurologically improving  -Continue neuro checks  -Not a good candidate for surgery today due to concern of hypoxia, will be taken to OR when medically optimized  -Continue with vanc and meropenem for spinal abscess  -avoid sedatives and narcotics    Cardiovascular:   -Continue with verapamil    Pulmonary:  -CT chest suggest aspiration pneumonia  -decrease Meropenem to 1 gram q 8 hours , check vanco trough before 4th dose   -aspiration precaution    ID  -send deep tissue cultures  from OR   -get procalcitonin , sed rate and CRP   -Continue Meropenem and Vanc    GI: Keep NPO, NG tube  pending CT abdomen with oral contrast  No surgical intervention at this time      -ROSE likely secondary to prerenal as a result of GI bleed and hypotension  -Continue fluids  -Order urine sodium and creatinine  -Monitor    Heme: DVT prophylaxis with SCD, no pharmacologic intervention    Endocrine: Sliding scale and Accuchecks

## 2017-11-03 NOTE — PROGRESS NOTE ADULT - SUBJECTIVE AND OBJECTIVE BOX
Date/Time Patient Seen:  		  Referring MD:   Data Reviewed	       Patient is a 82y old  Male who presents with a chief complaint of sent from [facility] for AMS (01 Nov 2017 10:09)  in bed  seen and examined  vs and meds reviewed  on ABX for sepsis  remains with confusion      Subjective/HPI     PAST MEDICAL & SURGICAL HISTORY:  Prostate CA  Crohn disease  Hypothyroidism  Depression  Intervertebral disc disorder  Diabetes mellitus  Obesity  Hypercholesterolemia  HTN (hypertension)  Stented coronary artery: 2 stents, 20 yrs ago, 15 yrs ago  CAD (coronary artery disease)  Macular degeneration: right eye  Glaucoma: right eye  Hearing loss: left hearing aid, right ear deaf  S/P colon resection  S/P lumbar fusion  History of hip replacement, total, right: Revision  History of hip replacement, total, right        Medication list         MEDICATIONS  (STANDING):  atorvastatin 20 milliGRAM(s) Oral at bedtime  busPIRone 10 milliGRAM(s) Oral two times a day  dextrose 5%. 1000 milliLiter(s) (50 mL/Hr) IV Continuous <Continuous>  dextrose 50% Injectable 12.5 Gram(s) IV Push once  dextrose 50% Injectable 25 Gram(s) IV Push once  dextrose 50% Injectable 25 Gram(s) IV Push once  docusate sodium 100 milliGRAM(s) Oral three times a day  guaiFENesin  milliGRAM(s) Oral every 12 hours  insulin lispro (HumaLOG) corrective regimen sliding scale   SubCutaneous every 6 hours  iohexol 300 mG (iodine)/mL Oral Solution 500 milliLiter(s) Oral every 1 hour  levothyroxine Injectable 37.5 MICROGram(s) IV Push daily  meropenem IVPB      montelukast 10 milliGRAM(s) Oral at bedtime  multivitamin 1 Tablet(s) Oral daily  pantoprazole  Injectable 40 milliGRAM(s) IV Push every 12 hours  senna 2 Tablet(s) Oral at bedtime  sodium chloride 0.65% Nasal 1 Spray(s) Both Nostrils two times a day  sodium chloride 0.9%. 1000 milliLiter(s) (75 mL/Hr) IV Continuous <Continuous>  vancomycin  IVPB      verapamil  milliGRAM(s) Oral daily    MEDICATIONS  (PRN):  acetaminophen  Suppository 650 milliGRAM(s) Rectal every 6 hours PRN For Temp greater than 38 C (100.4 F)  ALBUTerol    0.083% 2.5 milliGRAM(s) Nebulizer every 6 hours PRN Shortness of Breath and/or Wheezing  dextrose Gel 1 Dose(s) Oral once PRN Blood Glucose LESS THAN 70 milliGRAM(s)/deciliter  glucagon  Injectable 1 milliGRAM(s) IntraMuscular once PRN Glucose LESS THAN 70 milligrams/deciliter  ondansetron Injectable 4 milliGRAM(s) IV Push every 6 hours PRN Nausea and/or Vomiting         Vitals log        ICU Vital Signs Last 24 Hrs  T(C): 36.7 (03 Nov 2017 04:01), Max: 37.9 (02 Nov 2017 15:56)  T(F): 98 (03 Nov 2017 04:01), Max: 100.3 (02 Nov 2017 15:56)  HR: 77 (03 Nov 2017 06:00) (76 - 117)  BP: 130/90 (03 Nov 2017 06:00) (105/54 - 148/89)  BP(mean): 105 (03 Nov 2017 06:00) (75 - 105)  ABP: --  ABP(mean): --  RR: 29 (03 Nov 2017 06:00) (16 - 31)  SpO2: 100% (03 Nov 2017 06:00) (90% - 100%)           Input and Output:  I&O's Detail    02 Nov 2017 07:01  -  03 Nov 2017 07:00  --------------------------------------------------------  IN:    IV PiggyBack: 450 mL    sodium chloride 0.9%.: 1650 mL  Total IN: 2100 mL    OUT:  Total OUT: 0 mL    Total NET: 2100 mL          Lab Data                        10.3   23.9  )-----------( 525      ( 03 Nov 2017 05:50 )             32.5     11-03    143  |  99  |  36<H>  ----------------------------<  170<H>  3.7   |  35<H>  |  1.70<H>    Ca    8.5      03 Nov 2017 05:50  Phos  3.9     11-03  Mg     1.9     11-03    TPro  5.3<L>  /  Alb  2.1<L>  /  TBili  0.6  /  DBili  x   /  AST  11<L>  /  ALT  19  /  AlkPhos  80  11-03    ABG - ( 03 Nov 2017 05:55 )  pH: 7.44  /  pCO2: 49    /  pO2: 225   / HCO3: 32    / Base Excess: 8.8   /  SaO2: 99                      Review of Systems	      Objective     Physical Examination    head at  heart - s1s2  lungs - dec BS  abd - soft      Pertinent Lab findings & Imaging      Luanne:  NO   Adequate UO     I&O's Detail    02 Nov 2017 07:01  -  03 Nov 2017 07:00  --------------------------------------------------------  IN:    IV PiggyBack: 450 mL    sodium chloride 0.9%.: 1650 mL  Total IN: 2100 mL    OUT:  Total OUT: 0 mL    Total NET: 2100 mL               Discussed with:     Cultures:	        Radiology

## 2017-11-03 NOTE — CONSULT NOTE ADULT - ASSESSMENT
82 year old male unable to provide hx. History obtained from his medical record.  He has a h/o CAD s/p remote PCI, Crohn's disease, T2DM, HTN, HLD, and hypothyroidism.  Has a known RBBB.  Is recently s/p laminectomy, course complicated by blood loss and hypotension.   No acute cv process was felt to be present at that time.  He did not have followup evaluation after that.   Eventually discharged to rehab, and is now sent from rehab to the hospital  for an altered ms.  Was found to have a leukocytosis, and spine imaging suspicious for spinal abscess vs hematoma.  Pre-op consultation is now being obtained.    -there is no evidence of acute ischemia.  -ekg stable  -remote pci, not presently on antiplatelet agent, which cannot be started at this time in the context of spinal surgery in any case  -eventual asa  -there is no evidence of significant arrhythmia.  -cont to follow tele  -there is no evidence for meaningful  volume overload.  -follow i/o  -DVT prophylaxis  -monitor electrolytes, keep k>4, Mg>2  -follow ms  -planned for urgent surgery today. is optimized from a cv perspective for his planned surgery.  routine hemodynamic monitoring is recommended    The patient is at risk of abrupt decompensation.  35 minutes of critical care time was spent with this patient.

## 2017-11-03 NOTE — PROGRESS NOTE ADULT - ATTENDING COMMENTS
82 yr old male with CAD with distant stents, Crohn's disease, DM2, HTN, HLD, recent laminectomy on 10/19 for neurogenic claudication (course complicated by hypovolumic hypotension and acute blood loss anemia) now presenting from rehab with encephalopathy and leukocytosis, and few episodes of coffee ground emesis.  Infectious workup with lumbar MRI revealing lumbar collection, hematoma v abscess with mass effect on thecal sac.  Overnight pt developed worsening hypoxemia requiring ICU transfer.    --encephalopathy markedly improved this am, suspect was from residual ativan given for MRI  neuro exam consistent with neuro deficits, may be related to spinal collection  plan for OR exploration when optimized from medical standpoint  --hemodynamically stable  CAD, htn, continue verapamil and statin  hold ASA for OR  --hypoxemia, may be combination of hypoventilation from sedation and R pleural effusion +/- pneumonia  improved throughout the day, weaned to NC  --mild ROSE, likely prerenal  continue IVF  check urine lytes, no hydro on CT a/p  --recent coffee ground emesis, does not appear to currently have active bleeding or obstruction  continue NGT  PPI bid  no plans for scope at this time  --?spinal abscess v RLL pneumonia  continue broad spectrum Abx with tresa cruz  ID consult  --DM, cont ISS  --hold pharmacologic ppx in preparation for OR  --plan discussed with pt, ortho and ID   --pt critically ill. CC time 60min

## 2017-11-03 NOTE — CONSULT NOTE ADULT - SUBJECTIVE AND OBJECTIVE BOX
[facility]         Bre Awad, Eliane, Aylin, Kelli, Jeff Hansen        [phone] (office)    CHIEF COMPLAINT: Patient is a 82y old  Male who presents with a chief complaint of sent from [facility] rehab for AMS (01 Nov 2017 10:09)      HPI:  82 year old male unable to provide hx. History obtained from his medical record.  He has a h/o CAD s/p remote PCI, Crohn's disease, T2DM, HTN, HLD, and hypothyroidism.  Has a known RBBB.  Is recently s/p laminectomy, course complicated by blood loss and hypotension.  Was seen by cardiology Dr. Chun during that admission.  No acute cv process was felt to be present at that time.  He did not have followup evaluation after that.   Eventually discharged to rehab, and is now sent from rehab to the hospital  for an altered ms.  Was found to have a leukocytosis, and spine imaging suspicious for spinal abscess vs hematoma.  Pre-op consultation is now being obtained.    He remains confused, but denies sxs of cp and sob.      PAST MEDICAL & SURGICAL HISTORY:  Prostate CA  Crohn disease  Hypothyroidism  Depression  Intervertebral disc disorder  Diabetes mellitus  Obesity  Hypercholesterolemia  HTN (hypertension)  Stented coronary artery: 2 stents, 20 yrs ago, 15 yrs ago  CAD (coronary artery disease)  Macular degeneration: right eye  Hearing loss: left hearing aid, right ear deaf  S/P colon resection  S/P lumbar fusion  History of hip replacement, total, right: Revision  History of hip replacement, total, right      SOCIAL HISTORY: No active tobacco, alcohol or illicit drug use    FAMILY HISTORY:  No pertinent family history in first degree relatives      Outpatient medications:    MEDICATIONS  (STANDING):  atorvastatin 20 milliGRAM(s) Oral at bedtime  busPIRone 10 milliGRAM(s) Oral two times a day  dextrose 5%. 1000 milliLiter(s) (50 mL/Hr) IV Continuous <Continuous>  dextrose 50% Injectable 12.5 Gram(s) IV Push once  dextrose 50% Injectable 25 Gram(s) IV Push once  dextrose 50% Injectable 25 Gram(s) IV Push once  docusate sodium 100 milliGRAM(s) Oral three times a day  guaiFENesin  milliGRAM(s) Oral every 12 hours  insulin lispro (HumaLOG) corrective regimen sliding scale   SubCutaneous every 6 hours  iohexol 300 mG (iodine)/mL Oral Solution 500 milliLiter(s) Oral every 1 hour  levothyroxine Injectable 37.5 MICROGram(s) IV Push daily  meropenem IVPB      montelukast 10 milliGRAM(s) Oral at bedtime  multivitamin 1 Tablet(s) Oral daily  pantoprazole  Injectable 40 milliGRAM(s) IV Push every 12 hours  senna 2 Tablet(s) Oral at bedtime  sodium chloride 0.65% Nasal 1 Spray(s) Both Nostrils two times a day  sodium chloride 0.9%. 1000 milliLiter(s) (75 mL/Hr) IV Continuous <Continuous>  vancomycin  IVPB      verapamil  milliGRAM(s) Oral daily    MEDICATIONS  (PRN):  acetaminophen  Suppository 650 milliGRAM(s) Rectal every 6 hours PRN For Temp greater than 38 C (100.4 F)  ALBUTerol    0.083% 2.5 milliGRAM(s) Nebulizer every 6 hours PRN Shortness of Breath and/or Wheezing  dextrose Gel 1 Dose(s) Oral once PRN Blood Glucose LESS THAN 70 milliGRAM(s)/deciliter  glucagon  Injectable 1 milliGRAM(s) IntraMuscular once PRN Glucose LESS THAN 70 milligrams/deciliter  ondansetron Injectable 4 milliGRAM(s) IV Push every 6 hours PRN Nausea and/or Vomiting      Allergies    penicillin (Hives)    Intolerances        REVIEW OF SYSTEMS: Is negative for eye, ENT, GI, , allergic, dermatologic, musculoskeletal and neurologic, except as described above.    VITAL SIGNS:   Vital Signs Last 24 Hrs  T(C): 36.7 (03 Nov 2017 04:01), Max: 37.9 (02 Nov 2017 15:56)  T(F): 98 (03 Nov 2017 04:01), Max: 100.3 (02 Nov 2017 15:56)  HR: 76 (03 Nov 2017 05:00) (76 - 117)  BP: 122/57 (03 Nov 2017 05:00) (105/54 - 148/89)  BP(mean): 82 (03 Nov 2017 05:00) (75 - 101)  RR: 26 (03 Nov 2017 05:00) (16 - 31)  SpO2: 100% (03 Nov 2017 05:00) (90% - 100%)    I&O's Summary    02 Nov 2017 07:01  -  03 Nov 2017 06:07  --------------------------------------------------------  IN: 2025 mL / OUT: 0 mL / NET: 2025 mL        PHYSICAL EXAM:    Constitutional: NAD, awake and alert, well-developed  Eyes:  EOMI, no oral cyanosis, conjunctivae clear, anicteric.  Pulmonary: Non-labored, breath sounds are clear bilaterally, no wheezing, rales or rhonchi  Cardiovascular:  regular S1 and S2. No murmur.  No rubs, gallops or clicks  Gastrointestinal: Bowel Sounds present, soft, nontender.   Lymph: No peripheral edema.   Neurological: Alert  Skin: No obvious lesions/rashes.   Psych:  confused    LABS: All Labs Reviewed:                        10.3   23.9  )-----------( 525      ( 03 Nov 2017 05:50 )             32.5                         10.8   20.9  )-----------( 691      ( 02 Nov 2017 18:14 )             33.3                         10.7   21.5  )-----------( 615      ( 02 Nov 2017 05:21 )             33.4     02 Nov 2017 18:41    141    |  99     |  32     ----------------------------<  173    3.4     |  29     |  1.20   02 Nov 2017 05:22    139    |  98     |  28     ----------------------------<  172    3.5     |  28     |  0.97   01 Nov 2017 07:46    140    |  104    |  33     ----------------------------<  88     3.9     |  26     |  1.30     Ca    8.9        02 Nov 2017 18:41  Ca    9.0        02 Nov 2017 05:22  Ca    8.9        01 Nov 2017 07:46  Phos  3.0       02 Nov 2017 18:41  Mg     1.8       02 Nov 2017 18:41    TPro  6.1    /  Alb  2.4    /  TBili  0.6    /  DBili  x      /  AST  17     /  ALT  24     /  AlkPhos  89     02 Nov 2017 18:41  TPro  6.0    /  Alb  2.4    /  TBili  0.6    /  DBili  .20    /  AST  21     /  ALT  28     /  AlkPhos  89     02 Nov 2017 05:22  TPro  5.9    /  Alb  2.5    /  TBili  0.5    /  DBili  x      /  AST  19     /  ALT  28     /  AlkPhos  78     01 Nov 2017 07:46    PT/INR - ( 02 Nov 2017 18:41 )   PT: 15.2 sec;   INR: 1.39 ratio         PTT - ( 02 Nov 2017 18:41 )  PTT:21.4 sec      Blood Culture: Organism --  Gram Stain Blood -- Gram Stain --  Specimen Source .Blood Blood-Peripheral  Culture-Blood --    Organism --  Gram Stain Blood -- Gram Stain --  Specimen Source .Urine Catheterized  Culture-Blood --      11-01 @ 14:38  Pro Bnp 493    11-01 @ 07:46  TSH: 3.62      RADIOLOGY:    < from: CT Abdomen and Pelvis No Cont (11.02.17 @ 03:50) >    EXAM:  CT ABDOMEN AND PELVIS                            PROCEDURE DATE:  11/02/2017          INTERPRETATION:  CT ABDOMEN AND PELVIS WITHOUT CONTRAST    INDICATION: Altered mental status.    TECHNIQUE: Abdominopelvic CT without intravenous contrast.    COMPARISON: None.    FINDINGS:    ABSENCE OF INTRAVENOUS CONTRAST LIMITS EVALUATION FOR FOCAL LESIONS,   NEOPLASM, AND VASCULAR PATHOLOGY.    Lower Thorax: Trace bilateral pleural effusions. Dependent right lower   lobe atelectasis.    Liver: No suspicious lesions.      Biliary: No dilatation. Cholelithiasis.  Spleen: No suspicious lesions.      Pancreas: No inflammatory changes or ductal dilatation.      Adrenals: Normal.      Kidneys: No hydronephrosis or solid mass. 3.2 cm exophytic left renal   isodense rounded lesion.  Vessels: Normal caliber. Mild atherosclerotic disease of the aorta and   its branches.    GI tract: No evidence of small bowel obstruction. No wall thickening or   inflammatory changes. Asthma sutures in the right lowerquadrant.    Peritoneum/retroperitoneum and mesentery: No free air. No organized fluid   collection. No adenopathy.        Pelvic organs/Bladder: No pelvic masses. Bladder is distended. Streak   artifact from the patient's arthroplasty(ies) somewhat limits evaluation   of the pelvic structures.     Abdominal wall: Unremarkable.  Bones and soft tissues: No destructive lesion. Right hip arthroplasty.   Lumbar spine fusion hardware. Degenerative changes of the spine.    IMPRESSION:    No small bowelobstruction. Trace bilateral pleural effusions.   Cholelithiasis.    Indeterminate left renal 3.2 cm isodense lesion, likely proteinaceous   cyst. Follow-up outpatient nonemergent ultrasound is suggested for   further characterization.                MARIANA BELL M.D., ATTENDING RADIOLOGIST  This document has been electronically signed. Nov 2 2017  4:02AM                < end of copied text >  < from: MRI Lumbar Spine w/wo Cont (11.02.17 @ 15:41) >    EXAM:  MRI LUMBAR SPINE WO W CONT                            PROCEDURE DATE:  11/02/2017          INTERPRETATION:  MRI lumbar spine with and without contrast  History abscess  Limited comparison made with CT performed earlier the same day  ContrastGadavist 9 cc; 1 cc discarded    There is no vertebral compression deformity or subluxation or marrow   infiltration or edema.. There has been posterior plate and screw and   interbody fixation from L2 through S1. There are laminectomy defects   extending from L3 through L5. The conus is grossly normal. The L1-L2   level is unremarkable.    There is a peripherally enhancing collection dorsal to the thecal sac in   the surgical bed. It extends cranially from L2-3 and caudally to L5-S1   over a length of 9 cm. Maximum anterior to posterior depth is 3.2 cm with   a transverse dimension of approximately 3.3 cm. It exerts moderate mass   effect on the dorsal thecal sac from L2-L3 through L3-L4 resulting in   moderate to severe spinal stenosis. Themore inferior portion of the   collection does not exert significant mass effect on the dorsal thecal   sac.There is no hematocrit level. There are smaller collections dorsal   and ventral to the thecal sac at the L5 level that demonstrates slightly   bright T1 signal.    There is mild residual dorsal disc osteophyte complex at L2-L3 exerting   minimal mass effect on the ventral thecal sac. The dorsal surfaces of the   other disc levels are flat. There is no paravertebral collection.    IMPRESSION:  Collection in the surgical bed after laminectomy consistent with the   given clinical history of abscess resulting in spinal stenosis at the L3   level as above     Negative for evidence of discitis or osteomyelitis.                ISARUA OLIVA, ATTENDING RADIOLOGIST  This document has been electronically signed. Nov 2 2017  4:42PM                < end of copied text >      EKG:  sr rbbb imi ?age no change from 10/20

## 2017-11-03 NOTE — PROGRESS NOTE ADULT - PROBLEM SELECTOR PLAN 1
-tentative schedule for OR tomorrow 11/04 at 10am as epr ortho  -will need ICU clearance from resp. standpoint prior to will update attending in AM  -will continue with pain control  -NPO after midnight  -continue meropenem and vancomycin

## 2017-11-03 NOTE — PROGRESS NOTE ADULT - SUBJECTIVE AND OBJECTIVE BOX
Patient is a 82y old  Male who [facility] (01 Nov 2017 10:09)      BRIEF HOSPITAL COURSE:     82 year old male with PMH of recent lumbar laminectomy on 10/27 presents with AMS and leukocytosis, possible spinal abscess v hematoma    Events last 24 hours:   -Ortho adn anesthesia have seen patient and he will go to OR tomorrow 11/04, for eval and possible washout  -as per ortho, patient will need clearance by ICU attending in Am prior to OR (tentative schedule for 10am), because hospitalist note indicated patient wa snot stable for OR because of  hypoxia, but now patient on nasal cannula with SaO2 >95%    PAST MEDICAL & SURGICAL HISTORY:  Prostate CA  Crohn disease  Hypothyroidism  Depression  Intervertebral disc disorder  Diabetes mellitus  Obesity  Hypercholesterolemia  HTN (hypertension)  Stented coronary artery: 2 stents, 20 yrs ago, 15 yrs ago  CAD (coronary artery disease)  Macular degeneration: right eye  Hearing loss: left hearing aid, right ear deaf  S/P colon resection  S/P lumbar fusion  History of hip replacement, total, right: Revision  History of hip replacement, total, right      Review of Systems:  CONSTITUTIONAL: No fever, chills, or fatigue  EYES: No eye pain, visual disturbances, or discharge  ENMT:  No difficulty hearing, tinnitus, vertigo; No sinus or throat pain  NECK: No pain or stiffness  RESPIRATORY: No cough, wheezing, chills or hemoptysis; No shortness of breath  CARDIOVASCULAR: No chest pain, palpitations, dizziness, or leg swelling  GASTROINTESTINAL: No abdominal or epigastric pain. No nausea, vomiting, or hematemesis; No diarrhea or constipation. No melena or hematochezia.  GENITOURINARY: No dysuria, frequency, hematuria, or incontinence  NEUROLOGICAL: No headaches, memory loss, loss of strength, numbness, or tremors  SKIN: No itching, burning, rashes, or lesions   MUSCULOSKELETAL: No joint pain or swelling; No muscle, back, or extremity pain  PSYCHIATRIC: No depression, anxiety, mood swings, or difficulty sleeping      Medications:  meropenem IVPB 1000 milliGRAM(s) IV Intermittent every 8 hours  vancomycin  IVPB        verapamil  milliGRAM(s) Oral daily    ALBUTerol    0.083% 2.5 milliGRAM(s) Nebulizer every 6 hours PRN  guaiFENesin  milliGRAM(s) Oral every 12 hours  montelukast 10 milliGRAM(s) Oral at bedtime    acetaminophen  Suppository 650 milliGRAM(s) Rectal every 6 hours PRN  busPIRone 10 milliGRAM(s) Oral two times a day  ondansetron Injectable 4 milliGRAM(s) IV Push every 6 hours PRN        docusate sodium 100 milliGRAM(s) Oral three times a day  pantoprazole  Injectable 40 milliGRAM(s) IV Push every 12 hours  senna 2 Tablet(s) Oral at bedtime      atorvastatin 20 milliGRAM(s) Oral at bedtime  dextrose 50% Injectable 12.5 Gram(s) IV Push once  dextrose 50% Injectable 25 Gram(s) IV Push once  dextrose 50% Injectable 25 Gram(s) IV Push once  dextrose Gel 1 Dose(s) Oral once PRN  glucagon  Injectable 1 milliGRAM(s) IntraMuscular once PRN  insulin lispro (HumaLOG) corrective regimen sliding scale   SubCutaneous every 6 hours  levothyroxine Injectable 37.5 MICROGram(s) IV Push daily    dextrose 5%. 1000 milliLiter(s) IV Continuous <Continuous>  multivitamin 1 Tablet(s) Oral daily  sodium chloride 0.9%. 1000 milliLiter(s) IV Continuous <Continuous>      sodium chloride 0.65% Nasal 1 Spray(s) Both Nostrils two times a day            ICU Vital Signs Last 24 Hrs  T(C): 36.8 (03 Nov 2017 19:40), Max: 37.6 (03 Nov 2017 00:14)  T(F): 98.3 (03 Nov 2017 19:40), Max: 99.6 (03 Nov 2017 00:14)  HR: 83 (03 Nov 2017 19:00) (74 - 104)  BP: 152/67 (03 Nov 2017 19:00) (105/54 - 159/77)  BP(mean): 96 (03 Nov 2017 19:00) (75 - 110)  ABP: --  ABP(mean): --  RR: 14 (03 Nov 2017 19:00) (13 - 30)  SpO2: 97% (03 Nov 2017 19:00) (96% - 100%)      ABG - ( 03 Nov 2017 05:55 )  pH: 7.44  /  pCO2: 49    /  pO2: 225   / HCO3: 32    / Base Excess: 8.8   /  SaO2: 99                  I&O's Detail    02 Nov 2017 07:01  -  03 Nov 2017 07:00  --------------------------------------------------------  IN:    IV PiggyBack: 450 mL    sodium chloride 0.9%.: 1650 mL  Total IN: 2100 mL    OUT:    Nasoenteral Tube: 250 mL  Total OUT: 250 mL    Total NET: 1850 mL      03 Nov 2017 07:01  -  03 Nov 2017 21:36  --------------------------------------------------------  IN:    Enteral Tube Flush: 1000 mL    sodium chloride 0.9%.: 900 mL    Solution: 100 mL    Solution: 250 mL  Total IN: 2250 mL    OUT:    Nasoenteral Tube: 550 mL  Total OUT: 550 mL    Total NET: 1700 mL            LABS:                        10.3   23.9  )-----------( 525      ( 03 Nov 2017 05:50 )             32.5     11-03    143  |  99  |  36<H>  ----------------------------<  170<H>  3.7   |  35<H>  |  1.70<H>    Ca    8.5      03 Nov 2017 05:50  Phos  3.9     11-03  Mg     1.9     11-03    TPro  5.3<L>  /  Alb  2.1<L>  /  TBili  0.6  /  DBili  x   /  AST  11<L>  /  ALT  19  /  AlkPhos  80  11-03          CAPILLARY BLOOD GLUCOSE      POCT Blood Glucose.: 110 mg/dL (03 Nov 2017 17:21)    PT/INR - ( 03 Nov 2017 05:50 )   PT: 14.9 sec;   INR: 1.36 ratio         PTT - ( 03 Nov 2017 05:50 )  PTT:24.3 sec    CULTURES:  Culture Results:   No growth to date. (11-02-17 @ 11:44)  Culture Results:   No growth to date. (11-01-17 @ 08:40)  Culture Results:   No growth to date. (11-01-17 @ 08:40)  Culture Results:   No growth (11-01-17 @ 08:34)  Rapid RVP Result: NotDetec (11-01-17 @ 04:19)      Physical Examination:    General: No acute distress.  Alert, oriented, interactive, nonfocal    HEENT: Pupils equal, reactive to light.  Symmetric.    PULM: Clear to auscultation bilaterally, no significant sputum production    CVS: Regular rate and rhythm, no murmurs, rubs, or gallops    ABD: Soft, nondistended, nontender, normoactive bowel sounds, no masses    EXT: No edema, nontender    SKIN: Warm and well perfused, no rashes noted.

## 2017-11-03 NOTE — PROGRESS NOTE ADULT - ASSESSMENT
82 year old male with PMH of recent lumbar laminectomy on 10/27 presents with AMS from [facility]. Course complicated by coffee ground emesis, serosanguinous fluid drainage from surgical incision with increasing leukocytosis suggestive of spinal abscess, and hypoxia. Transferred to ICU for closer monitoring. Plan for OR to determine spinal abscess vs hematoma.

## 2017-11-03 NOTE — PROGRESS NOTE ADULT - PROBLEM SELECTOR PLAN 2
s/p laminectomy on 10/25 and complicated by blood loss anemia.  MRI of spine c/w spinal abscess vs hematoma.   -Continue neuro checks  -Not a good candidate for surgery today due to concern of hypoxia, will be taken to OR when medically optimized  -Continue with vanc and meropenem for spinal abscess

## 2017-11-03 NOTE — PROGRESS NOTE ADULT - SUBJECTIVE AND OBJECTIVE BOX
Patient seen and examined.     Vital Signs Last 24 Hrs  T(C): 36.7 (03 Nov 2017 04:01), Max: 37.9 (02 Nov 2017 15:56)  T(F): 98 (03 Nov 2017 04:01), Max: 100.3 (02 Nov 2017 15:56)  HR: 77 (03 Nov 2017 07:30) (76 - 117)  BP: 129/58 (03 Nov 2017 07:00) (105/54 - 148/89)  BP(mean): 83 (03 Nov 2017 07:00) (75 - 105)  RR: 21 (03 Nov 2017 07:30) (16 - 31)  SpO2: 100% (03 Nov 2017 07:30) (90% - 100%)    PE improved today as patient is a bit more responsive but still limited 2/2 to mental status   Repeat neurological exam attempted  Exam limited 2/2 mental status. Patient participation limited and inconsistent. Repeat sternal rub stimulation needed to get patient to cooperate. Patient able to move extremities x4.   Unable to accurately grade strength.   Not able to accurately assess sensation. Patient is SILT BL LE  Negative for clonus/babinski B/L LE.   Negative Reese B/L UE.   Rectal tone present but not able to get patient to perform valsalva.     81 yo M s/p TLIF L2-S1 w/ altered mental status and Lumbar abscess vs hematoma    Dr. Alvarez discussed patients case with Dr. Platt on 11/2. Dr. Platt has OR privileges at [facility] and is willing to assist with washout. Case was discussed with ICU NP/PA. We preferred to take patient to OR Wednesday evening (11/2) if medically stable. However, patient was not medically optimized and plan on OR today. Risk of terminal event must be weighed against risk of delay in washout of lumbar abscess vs hematoma. Will follow up with ICU NP/PA, and on call hospitalist.       Will update primary team when definitive plan formulated   Continue Broad spectrum IV abx per ID  Continue Care per ICU team  FU Cultures  Keep NPO  Hold DVT ppx  FU Clearance/optimization for possible OR   Anesthesia would like to hold off and wait until pulmonary status improve  neuro exam is unchanged from previous  Recommend q3 neurochecks   Will continue to monitor clinically   Further recs to follow   Plan discussed with Dr. Alvarez and Lc

## 2017-11-03 NOTE — PROGRESS NOTE ADULT - PROBLEM SELECTOR PLAN 1
improved today after NGT decompression  etiology unclear -- ? sbo/ileus, no BM   CT abd with contrast when optimized

## 2017-11-03 NOTE — PROGRESS NOTE ADULT - PROBLEM SELECTOR PLAN 5
Pt had coffee ground emesis, GI consult obtained.  Keep NPO, NG tube  pending CT abdomen with oral contrast  No surgical intervention at this time

## 2017-11-03 NOTE — PROGRESS NOTE ADULT - PROBLEM SELECTOR PLAN 1
sepsis eval  on emp ABX  supportive regimen  asp prec  keep sat > 88 pct  caution with volume overload  I and O  ICU supportive care  replete lytes  DVT p - Seq Teds

## 2017-11-03 NOTE — PROGRESS NOTE ADULT - PROBLEM SELECTOR PLAN 2
? secondary to infectious process  noted to have spinal abscess vs hematoma -- for OR today  antibiotic therapy  ICU monitoring  ortho recs

## 2017-11-03 NOTE — PROGRESS NOTE ADULT - ASSESSMENT
82 year old male with PMH of recent lumbar laminectomy on 10/27 presents with AMS and leukocytosis, possible spinal abscess v hematoma    Events last 24 hours:   -Ortho adn anesthesia have seen patient and he will go to OR tomorrow 11/04, for eval and possible washout  -as per ortho, patient will need clearance by ICU attending in Am prior to OR (tentative schedule for 10am), because hospitalist note indicated patient wa snot stable for OR because of  hypoxia, but now patient on nasal cannula with SaO2 >95%

## 2017-11-03 NOTE — PROGRESS NOTE ADULT - SUBJECTIVE AND OBJECTIVE BOX
Patient is a 82y old  Male who presents with a chief complaint of sent from [facility] for AMS (01 Nov 2017 10:09)      INTERVAL HPI: 82 year old male with PMH of recent lumbar laminectomy on 10/27 presents with AMS from [facility] facility. Course complicated by coffee ground emesis, serosanguinous fluid drainage from surgical incision with increasing leukocytosis suggestive of spinal abscess, and hypoxia. Transferred to ICU for closer monitoring. Plan for OR to determine spinal abscess vs hematoma.   OVERNIGHT EVENTS:  T(F): 98.5 (11-03-17 @ 15:25), Max: 99.6 (11-02-17 @ 18:41)  HR: 82 (11-03-17 @ 17:00) (74 - 117)  BP: 154/66 (11-03-17 @ 17:00) (105/54 - 159/77)  RR: 21 (11-03-17 @ 17:00) (13 - 31)  SpO2: 97% (11-03-17 @ 17:00) (92% - 100%)  Wt(kg): --  I&O's Summary    02 Nov 2017 07:01  -  03 Nov 2017 07:00  --------------------------------------------------------  IN: 2100 mL / OUT: 250 mL / NET: 1850 mL    03 Nov 2017 07:01  -  03 Nov 2017 17:25  --------------------------------------------------------  IN: 1850 mL / OUT: 550 mL / NET: 1300 mL        REVIEW OF SYSTEM:    Limited as pt slightly lethargy.  denies any pain or SOB.    PHYSICAL EXAM  General: Elderly male, in NAD, resting in bed  CNS: Alert and Oriented, answers questions appropriately, 5/5 strength in UE, decrease strength and sensation on left lower extremity  HEENT: Pupils equal and reactive top light, moist mucus membrane  Resp: Bilateral wheezing  CVS: RRR, S1S2  Abd: Soft, protuberant, nontender  Ext: no edema, +peripheral pulses  Skin: warm, and dry          LABS:                        10.3   23.9  )-----------( 525      ( 03 Nov 2017 05:50 )             32.5     11-03    143  |  99  |  36<H>  ----------------------------<  170<H>  3.7   |  35<H>  |  1.70<H>    Ca    8.5      03 Nov 2017 05:50  Phos  3.9     11-03  Mg     1.9     11-03    TPro  5.3<L>  /  Alb  2.1<L>  /  TBili  0.6  /  DBili  x   /  AST  11<L>  /  ALT  19  /  AlkPhos  80  11-03    PT/INR - ( 03 Nov 2017 05:50 )   PT: 14.9 sec;   INR: 1.36 ratio         PTT - ( 03 Nov 2017 05:50 )  PTT:24.3 sec    CAPILLARY BLOOD GLUCOSE      POCT Blood Glucose.: 110 mg/dL (03 Nov 2017 17:21)  POCT Blood Glucose.: 110 mg/dL (03 Nov 2017 11:32)  POCT Blood Glucose.: 154 mg/dL (03 Nov 2017 05:54)  POCT Blood Glucose.: 192 mg/dL (02 Nov 2017 21:54)    ABG - ( 03 Nov 2017 05:55 )  pH: 7.44  /  pCO2: 49    /  pO2: 225   / HCO3: 32    / Base Excess: 8.8   /  SaO2: 99                11-02 @ 11:44   No growth to date.  --  --  11-01 @ 08:40   No growth to date.  --  --  11-01 @ 08:34   No growth  --  --          MEDICATIONS  (STANDING):  atorvastatin 20 milliGRAM(s) Oral at bedtime  busPIRone 10 milliGRAM(s) Oral two times a day  dextrose 5%. 1000 milliLiter(s) (50 mL/Hr) IV Continuous <Continuous>  dextrose 50% Injectable 12.5 Gram(s) IV Push once  dextrose 50% Injectable 25 Gram(s) IV Push once  dextrose 50% Injectable 25 Gram(s) IV Push once  docusate sodium 100 milliGRAM(s) Oral three times a day  guaiFENesin  milliGRAM(s) Oral every 12 hours  insulin lispro (HumaLOG) corrective regimen sliding scale   SubCutaneous every 6 hours  levothyroxine Injectable 37.5 MICROGram(s) IV Push daily  meropenem IVPB 1000 milliGRAM(s) IV Intermittent every 8 hours  montelukast 10 milliGRAM(s) Oral at bedtime  multivitamin 1 Tablet(s) Oral daily  pantoprazole  Injectable 40 milliGRAM(s) IV Push every 12 hours  senna 2 Tablet(s) Oral at bedtime  sodium chloride 0.65% Nasal 1 Spray(s) Both Nostrils two times a day  sodium chloride 0.9%. 1000 milliLiter(s) (75 mL/Hr) IV Continuous <Continuous>  vancomycin  IVPB      verapamil  milliGRAM(s) Oral daily    MEDICATIONS  (PRN):  acetaminophen  Suppository 650 milliGRAM(s) Rectal every 6 hours PRN For Temp greater than 38 C (100.4 F)  ALBUTerol    0.083% 2.5 milliGRAM(s) Nebulizer every 6 hours PRN Shortness of Breath and/or Wheezing  dextrose Gel 1 Dose(s) Oral once PRN Blood Glucose LESS THAN 70 milliGRAM(s)/deciliter  glucagon  Injectable 1 milliGRAM(s) IntraMuscular once PRN Glucose LESS THAN 70 milligrams/deciliter  ondansetron Injectable 4 milliGRAM(s) IV Push every 6 hours PRN Nausea and/or Vomiting

## 2017-11-03 NOTE — PROGRESS NOTE ADULT - SUBJECTIVE AND OBJECTIVE BOX
Neurology follow up note    JONATHAN SUMMERS82yMale      Interval History:    Patient with no headaches     MEDICATIONS    acetaminophen  Suppository 650 milliGRAM(s) Rectal every 6 hours PRN  ALBUTerol    0.083% 2.5 milliGRAM(s) Nebulizer every 6 hours PRN  atorvastatin 20 milliGRAM(s) Oral at bedtime  busPIRone 10 milliGRAM(s) Oral two times a day  dextrose 5%. 1000 milliLiter(s) IV Continuous <Continuous>  dextrose 50% Injectable 12.5 Gram(s) IV Push once  dextrose 50% Injectable 25 Gram(s) IV Push once  dextrose 50% Injectable 25 Gram(s) IV Push once  dextrose Gel 1 Dose(s) Oral once PRN  docusate sodium 100 milliGRAM(s) Oral three times a day  glucagon  Injectable 1 milliGRAM(s) IntraMuscular once PRN  guaiFENesin  milliGRAM(s) Oral every 12 hours  insulin lispro (HumaLOG) corrective regimen sliding scale   SubCutaneous every 6 hours  levothyroxine Injectable 37.5 MICROGram(s) IV Push daily  meropenem IVPB 1000 milliGRAM(s) IV Intermittent every 8 hours  montelukast 10 milliGRAM(s) Oral at bedtime  multivitamin 1 Tablet(s) Oral daily  ondansetron Injectable 4 milliGRAM(s) IV Push every 6 hours PRN  pantoprazole  Injectable 40 milliGRAM(s) IV Push every 12 hours  senna 2 Tablet(s) Oral at bedtime  sodium chloride 0.65% Nasal 1 Spray(s) Both Nostrils two times a day  sodium chloride 0.9%. 1000 milliLiter(s) IV Continuous <Continuous>  vancomycin  IVPB      verapamil  milliGRAM(s) Oral daily      Allergies    penicillin (Hives)    Intolerances        Height (cm): 175.2 (11-03 @ 00:04)  Weight (kg): 79.4 (11-03 @ 00:04)  BMI (kg/m2): 25.9 (11-03 @ 00:04)    Vital Signs Last 24 Hrs  T(C): 36.9 (03 Nov 2017 15:25), Max: 37.6 (02 Nov 2017 18:41)  T(F): 98.5 (03 Nov 2017 15:25), Max: 99.6 (02 Nov 2017 18:41)  HR: 80 (03 Nov 2017 15:00) (74 - 117)  BP: 154/70 (03 Nov 2017 15:00) (105/54 - 159/77)  BP(mean): 102 (03 Nov 2017 15:00) (75 - 110)  RR: 16 (03 Nov 2017 15:00) (13 - 31)  SpO2: 96% (03 Nov 2017 15:00) (92% - 100%)      REVIEW OF SYSTEMS: limited         On Neurological Examination:    Mental Status - Patient is alert, awake, oriented   loc hospital .   oct   2017      Follow simple commands    Speech -   Fluent                    Cranial Nerves - Pupils 3 mm equal and reactive to light,   extraocular eye movements intact.   smile symmetric  intact bilateral NLF    Motor Exam -   Right upper 4/5  Left upper 4/5  Right lower 2/5  Left lower 2/5    Muscle tone - is normal all over.  No asymmetry is seen.      Sensory    Bilateral intact to light touch    GENERAL Exam: Nontoxic , No Acute Distress   	  HEENT:  normocephalic, atraumatic  		  LUNGS: Decreased bilaterally  	  HEART: Normal S1S2   No murmur RRR        	  GI/ ABDOMEN:  Soft  Non tender    EXTREMITIES:   No Edema  No Clubbing  No Cyanosis No Edema    MUSCULOSKELETAL: Normal Range of Motion  	   SKIN: Normal  No Ecchymosis               LABS:  CBC Full  -  ( 03 Nov 2017 05:50 )  WBC Count : 23.9 K/uL  Hemoglobin : 10.3 g/dL  Hematocrit : 32.5 %  Platelet Count - Automated : 525 K/uL  Mean Cell Volume : 95.9 fl  Mean Cell Hemoglobin : 30.4 pg  Mean Cell Hemoglobin Concentration : 31.7 gm/dL  Auto Neutrophil # : x  Auto Lymphocyte # : x  Auto Monocyte # : x  Auto Eosinophil # : x  Auto Basophil # : x  Auto Neutrophil % : x  Auto Lymphocyte % : x  Auto Monocyte % : x  Auto Eosinophil % : x  Auto Basophil % : x      11-03    143  |  99  |  36<H>  ----------------------------<  170<H>  3.7   |  35<H>  |  1.70<H>    Ca    8.5      03 Nov 2017 05:50  Phos  3.9     11-03  Mg     1.9     11-03    TPro  5.3<L>  /  Alb  2.1<L>  /  TBili  0.6  /  DBili  x   /  AST  11<L>  /  ALT  19  /  AlkPhos  80  11-03    Hemoglobin A1C:     LIVER FUNCTIONS - ( 03 Nov 2017 05:50 )  Alb: 2.1 g/dL / Pro: 5.3 g/dL / ALK PHOS: 80 U/L / ALT: 19 U/L / AST: 11 U/L / GGT: x           Vitamin B12   PT/INR - ( 03 Nov 2017 05:50 )   PT: 14.9 sec;   INR: 1.36 ratio         PTT - ( 03 Nov 2017 05:50 )  PTT:24.3 sec      RADIOLOGY    ANALYSIS AND PLAN:  An 82-year-old with episodes of changes in mental status.    1.	For episodes of changes in mental status at present unclear etiology, but the patient appears to appear encephalopathic, possibly metabolic questionable infectious -- Also the patient does have a recent history of surgery with elevated white blood cell count.  MRI of the lumbar spine noted ?? changes  He does have elevated white blood cells but no temperatures at present.  ?? PNA ID follow up antibotics as needed    2.	Overall at present appears little more responsive   Spoke with family members.    Spoke with one relativeClive telephone number is [phone] 11/2/17.  , Rosanna, her home number is [phone].  Her cell number [phone].  11/3/17    Thank you for the courtesy of consultation.    30 minutes spent on total encounter; more than 50% of the visit was spent counseling and/or coordinating care by the attending physician.

## 2017-11-03 NOTE — CONSULT NOTE ADULT - SUBJECTIVE AND OBJECTIVE BOX
Reason for Consult : Possible post op hematoma or abscess    HPI:  82 year old male with PMHx of CAD, Crohn's disease, T2DM, HTN, HLD, and hypothyroidism sent [facility] ER [facility]. Pt was in his rehab facility when the staff noticed that he was making nonsensical statements. Pt was able to answer questions at rehab but would make odd remarks, which prompted them to send him to the ED. He did not have any fevers, cough, nausea, vomiting, or diarrhea at the rehab facility. At baseline, pt is AAOx3 as per rehab staff. Pt was recently discharged from [facility] on 10/27/17 s/p laminectomy on 10/19/17 . His postoperative course was complicated by blood loss requiring transfusion as well as an URI requiring antibiotics and steroids. He was discharged on doxycycline and a steroid taper. At present, pt is difficult to arouse and constantly drifts into sleep. He responds to his name when called and follows instructions. However he does not answer any questions and the answers he gives are nonsensical. He has hearing loss on right side     He had a MRI of the spine done collection in the post op bed suggestive of infection or hematoma. he remains afebrile . He denies any headaches, nausea or vomiting . He has not ambulated here, he does not recall if he ambulated in [facility] . he had elevated WBC , so was started on antibiotics after the MRI results . His sepsis work up from admission is negative . He has allergies to PCN but has tolerated Meropenem without any reaction       Past Medical & Surgical Hx:  PAST MEDICAL & SURGICAL HISTORY:  Prostate CA  Crohn disease  Hypothyroidism  Depression  Intervertebral disc disorder  Diabetes mellitus  Obesity  Hypercholesterolemia  HTN (hypertension)  Stented coronary artery: 2 stents, 20 yrs ago, 15 yrs ago  CAD (coronary artery disease)  Macular degeneration: right eye  Hearing loss: left hearing aid, right ear deaf  S/P colon resection  S/P lumbar fusion  History of hip replacement, total, right: Revision  History of hip replacement, total, right      Social History-- Retired,   EtOH: denies   Tobacco: denies   Drug Use: denies     FAMILY HISTORY:  No pertinent family history in first degree relatives      Allergies  penicillin (Hives)    Intolerances    Home/Outpatient  Medications   Home Medications:  aspirin 81 mg oral tablet: 1 tab(s) orally once a day    instructed to foloow preop instructions as per surgeon and cardiologist (01 Nov 2017 04:20)  atorvastatin 20 mg oral tablet: 1 tab(s) orally once a day (01 Nov 2017 04:20)  bisacodyl 10 mg rectal suppository: 1 suppository(ies) rectal once a day (01 Nov 2017 04:57)  budesonide 0.5 mg/2 mL inhalation suspension: 0.5 milligram(s) inhaled once a day (01 Nov 2017 04:20)  busPIRone 10 mg oral tablet: 1 tab(s) orally 2 times a day (01 Nov 2017 04:20)  colestipol 1 g oral tablet: 1 tab(s) orally once a day (01 Nov 2017 04:20)  docusate sodium 100 mg oral tablet: 1 tab(s) orally 3 times a day (01 Nov 2017 04:57)  glimepiride 1 mg oral tablet: 1 tab(s) orally once a day (01 Nov 2017 04:20)  guaiFENesin 600 mg oral tablet, extended release: 1 tab(s) orally every 12 hours (01 Nov 2017 04:20)  levothyroxine 75 mcg (0.075 mg) oral tablet: 1 tab(s) orally once a day (01 Nov 2017 04:20)  lisinopril-hydroCHLOROthiazide 20 mg-25 mg oral tablet: 1 tab(s) orally once a day (01 Nov 2017 04:20)  metFORMIN 500 mg oral tablet: 1 tab(s) orally 2 times a day (01 Nov 2017 04:20)  montelukast 10 mg oral tablet: 1 tab(s) orally once a day (at bedtime) (01 Nov 2017 04:20)  Multiple Vitamins oral tablet: 1 tab(s) orally once a day (01 Nov 2017 04:20)  polyethylene glycol 3350 oral powder for reconstitution: 17 gram(s) orally once a day (01 Nov 2017 04:57)  senna oral tablet: 2 tab(s) orally once a day (01 Nov 2017 04:57)  traMADol 50 mg oral tablet: 1 tab(s) orally 4 times a day (01 Nov 2017 04:20)  verapamil 200 mg/24 hours oral capsule, extended release: 1 cap(s) orally once a day  (01 Nov 2017 04:20)      Current Inpatient Medications :    ANTIBIOTICS:   meropenem IVPB      vancomycin  IVPB          OTHER RELEVANT MEDICATIONS :  acetaminophen  Suppository 650 milliGRAM(s) Rectal every 6 hours PRN  ALBUTerol    0.083% 2.5 milliGRAM(s) Nebulizer every 6 hours PRN  atorvastatin 20 milliGRAM(s) Oral at bedtime  busPIRone 10 milliGRAM(s) Oral two times a day  dextrose 5%. 1000 milliLiter(s) IV Continuous <Continuous>  dextrose 50% Injectable 12.5 Gram(s) IV Push once  dextrose 50% Injectable 25 Gram(s) IV Push once  dextrose 50% Injectable 25 Gram(s) IV Push once  dextrose Gel 1 Dose(s) Oral once PRN  docusate sodium 100 milliGRAM(s) Oral three times a day  glucagon  Injectable 1 milliGRAM(s) IntraMuscular once PRN  guaiFENesin  milliGRAM(s) Oral every 12 hours  insulin lispro (HumaLOG) corrective regimen sliding scale   SubCutaneous every 6 hours  levothyroxine Injectable 37.5 MICROGram(s) IV Push daily  montelukast 10 milliGRAM(s) Oral at bedtime  multivitamin 1 Tablet(s) Oral daily  ondansetron Injectable 4 milliGRAM(s) IV Push every 6 hours PRN  pantoprazole  Injectable 40 milliGRAM(s) IV Push every 12 hours  senna 2 Tablet(s) Oral at bedtime  sodium chloride 0.65% Nasal 1 Spray(s) Both Nostrils two times a day  sodium chloride 0.9%. 1000 milliLiter(s) IV Continuous <Continuous>  verapamil  milliGRAM(s) Oral daily          REVIEW OF SYSTEMS:      ROS:  CONSTITUTIONAL:  Negative fever or chills, feels weak, poor appetite  EYES:  Negative  blurry vision or double vision  CARDIOVASCULAR:  Negative for chest pain or palpitations  RESPIRATORY:  Negative for cough, wheezing, or SOB   GASTROINTESTINAL:  Negative for nausea, vomiting, diarrhea, constipation, or abdominal pain  GENITOURINARY:  Negative frequency, urgency , dysuria or hematuria   NEUROLOGIC:  positive for confusion, dizziness, lightheadedness  All other systems were reviewed and are negative      I&O's Detail    02 Nov 2017 07:01  -  03 Nov 2017 07:00  --------------------------------------------------------  IN:    IV PiggyBack: 450 mL    sodium chloride 0.9%.: 1650 mL  Total IN: 2100 mL    OUT:    Nasoenteral Tube: 250 mL  Total OUT: 250 mL    Total NET: 1850 mL      03 Nov 2017 07:01  -  03 Nov 2017 14:51  --------------------------------------------------------  IN:    Enteral Tube Flush: 1000 mL    sodium chloride 0.9%.: 450 mL    Solution: 100 mL  Total IN: 1550 mL    OUT:    Nasoenteral Tube: 550 mL  Total OUT: 550 mL    Total NET: 1000 mL            Physical Exam:  Vital Signs Last 24 Hrs  T(C): 36.6 (03 Nov 2017 11:47), Max: 37.9 (02 Nov 2017 15:56)  T(F): 97.9 (03 Nov 2017 11:47), Max: 100.3 (02 Nov 2017 15:56)  HR: 79 (03 Nov 2017 13:00) (74 - 117)  BP: 159/77 (03 Nov 2017 13:00) (105/54 - 159/77)  BP(mean): 110 (03 Nov 2017 13:00) (75 - 110)  RR: 17 (03 Nov 2017 13:00) (13 - 31)  SpO2: 98% (03 Nov 2017 13:00) (90% - 100%)  Height (cm): 175.2 (11-03 @ 00:04)  Weight (kg): 79.4 (11-03 @ 00:04)  BMI (kg/m2): 25.9 (11-03 @ 00:04)  BSA (m2): 1.95 (11-03 @ 00:04)      GEN: NAD, pleasant , answers simple questions   HEENT: normocephalic and atraumatic. EOMI. ERNESTO. Moist mucosa. retained secretions in Posterior pharynx.  NECK: Supple. No carotid bruits.  No lymphadenopathy or thyromegaly.  LUNGS: Clear to auscultation.  HEART: Regular rate and rhythm without murmur.  ABDOMEN: Soft, nontender, and nondistended.  Positive bowel sounds.  No hepatosplenomegaly was noted.  EXTREMITIES: Without any cyanosis, clubbing, rash, lesions or edema.  NEUROLOGIC: A & O x 2, Cranial nerves II through XII are grossly intact. ? weakness left Lower extremity , SLR < 45   SKIN: No ulceration or induration present. dressing  + serous drainage       Labs:                10.3   23.9   )----------(  525       ( 03 Nov 2017 05:50 )               32.5      143    |  99     |  36     ----------------------------<  170        ( 03 Nov 2017 05:50 )  3.7     |  35     |  1.70     Ca    8.5        ( 03 Nov 2017 05:50 )  Phos  3.9       ( 03 Nov 2017 05:50 )  Mg     1.9       ( 03 Nov 2017 05:50 )    TPro  5.3    /  Alb  2.1    /  TBili  0.6    /  DBili  x      /  AST  11     /  ALT  19     /  AlkPhos  80     ( 03 Nov 2017 05:50 )    LIVER FUNCTIONS - ( 03 Nov 2017 05:50 )  Alb: 2.1 g/dL / Pro: 5.3 g/dL / ALK PHOS: 80 U/L / ALT: 19 U/L / AST: 11 U/L / GGT: x           PT/INR -  14.9 sec / 1.36 ratio   ( 03 Nov 2017 05:50 )       PTT -  24.3 sec   ( 03 Nov 2017 05:50 )      ABG - ( 03 Nov 2017 05:55 )  pH: 7.44  /  pCO2: 49    /  pO2: 225   / HCO3: 32    / Base Excess: 8.8   /  SaO2: 99          RECENT CULTURES:      Culture - Surgical Swab (collected 02 Nov 2017 11:44)  Source: .Surgical Swab surgical wound lumbar area  Preliminary Report (03 Nov 2017 08:37):    No growth to date.    Culture - Blood (collected 01 Nov 2017 08:40)  Source: .Blood Blood-Peripheral  Preliminary Report (02 Nov 2017 09:00):    No growth to date.    Culture - Blood (collected 01 Nov 2017 08:40)  Source: .Blood Blood-Peripheral  Preliminary Report (02 Nov 2017 09:00):    No growth to date.    Culture - Urine (collected 01 Nov 2017 08:34)  Source: .Urine Catheterized  Final Report (02 Nov 2017 09:50):    No growth      RADIOLOGY & ADDITIONAL STUDIES:   CT Chest No Cont (11.03.17 @ 13:06) >    Impression:    Bibasilar airspace infiltrates consistent with aspiration pneumonia in   appropriate clinical setting. Small bilateral pleural effusions.  No acute obstructive or inflammatory pathology in the abdomen and pelvis.  Findings as discussed    MRI Lumbar Spine w/wo Cont (11.02.17 @ 15:41) >    IMPRESSION:  Collection in the surgical bed after laminectomy consistent with the   given clinical history of abscess resulting in spinal stenosis at the L3   level as above     Negative for evidence of discitis or osteomyelitis.    *** ADDENDUM 11/03/2017  ***    Discussion with the treating team reports no clinical evidence of   infection. Perioperative hematoma or seroma or reactive source related to   hardware also considered for the collection in question.          Assessment :     82 yr old PMHx CAD, Crohn's dz, DM2, HTN, HLD, s/p coronary stents '02/1997, Hypothyroid, OA recent hospitalization at [facility] on 10/19/17 , underwent lumbar laminectomy L2 - L5 , his post op course complicated by acute blood loss  and was transfused . he was sent ot [facility] on 10/27/17 on PO Doxycycline and taper steroids ,    he is now admitted with AMS, sent to ER from [facility] , noted ot have leukocytosis not associated with fever . He was given sedation due ot agitation and also for MRI, became hypoxic so transferred to MICU . MRI of lumbar spine with contrast shows collection in the post op bed ? hematoma or infection.     Sepsis work up from admission is negative . he is now scheduled for I & D of the spine in am . To note he was on PO steroids on dc from hospital to rehab , his wbc on day of dc was 15.7     He most likely has post op collection likely hematoma ?? infected hematoma but would have expected to have high fevers and back pain. Agree with reexploration of the wound to rule out infection     Possibility of aspiration pneumonia should be considered       Plan :   - will decrease Meropenem to 1 gram q 8 hours , check vanco trough before 4th dose   - send deep tissue cs  from OR   - avoid sedatives and narcotics  - get procalcitonin , sed rate and CRP   - trend CBC and serial CXR   - aspiration precautions      Continue with present regime .  Appropriate use of antibiotics and adverse effects reviewed.      I have discussed the above plan of care with patient and family in detail. They expressed understanding of the treatment plan . Risks, benefits and alternatives discussed in detail. I have asked if they have any questions or concerns and appropriately addressed them to the best of my ability .      > 45 minutes spent in direct patient care reviewing  the notes, lab data/ imaging , discussion with multidisciplinary team. All questions were addressed and answered to the best of my capacity .    I will be away from 11/4 thru 11/5/17  , Dr. Lee and his group covering me .    Thank you for allowing me to participate in the care of your patient .

## 2017-11-03 NOTE — PROGRESS NOTE ADULT - SUBJECTIVE AND OBJECTIVE BOX
INTERVAL HPI/OVERNIGHT EVENTS:    (+) improved abd distention  transferred to ICU overnight    HPI:  82 year old male with PMHx of CAD, Crohn's disease, T2DM, HTN, HLD, and hypothyroidism sent [facility]. Pt was in his rehab facility when the staff noticed that he was making nonsensical statements. Pt was able to answer questions at rehab but would make odd remarks, which prompted them to send him to the ED. He did not have any fevers, cough, nausea, vomiting, or diarrhea at the rehab facility. At baseline, pt is AAOx3 as per rehab staff. Pt was recently discharged from [facility] on 10/27/17 s/p laminectomy. His postoperative course was complicated by blood loss requiring transfusion as well as an URI requiring antibiotics and steroids. He was discharged on doxycycline and a steroid taper. At present, pt is difficult to arouse and constantly drifts into sleep. He responds to his name when called and follows instructions. However he does not answer any questions and the answers he gives are nonsensical.     In ED, VS were T-97.3 HR-69 BP-146/71 RR-16 SpO2-94. Labs were significant for WBC-18 Hb-10.8 Cr-1.4 Lactate-0.8. Urinalysis was unremarkable. CT Head showed no acute intracranial pathology. Preliminary CXR showerd right lower lobe infiltrate  In ED, pt recieved Levaquin 500mg x1. (01 Nov 2017 04:02)    MEDICATIONS  (STANDING):  atorvastatin 20 milliGRAM(s) Oral at bedtime  busPIRone 10 milliGRAM(s) Oral two times a day  dextrose 5%. 1000 milliLiter(s) (50 mL/Hr) IV Continuous <Continuous>  dextrose 50% Injectable 12.5 Gram(s) IV Push once  dextrose 50% Injectable 25 Gram(s) IV Push once  dextrose 50% Injectable 25 Gram(s) IV Push once  docusate sodium 100 milliGRAM(s) Oral three times a day  guaiFENesin  milliGRAM(s) Oral every 12 hours  insulin lispro (HumaLOG) corrective regimen sliding scale   SubCutaneous every 6 hours  iohexol 300 mG (iodine)/mL Oral Solution 500 milliLiter(s) Oral every 1 hour  levothyroxine Injectable 37.5 MICROGram(s) IV Push daily  meropenem IVPB      montelukast 10 milliGRAM(s) Oral at bedtime  multivitamin 1 Tablet(s) Oral daily  pantoprazole  Injectable 40 milliGRAM(s) IV Push every 12 hours  senna 2 Tablet(s) Oral at bedtime  sodium chloride 0.65% Nasal 1 Spray(s) Both Nostrils two times a day  sodium chloride 0.9%. 1000 milliLiter(s) (75 mL/Hr) IV Continuous <Continuous>  vancomycin  IVPB      verapamil  milliGRAM(s) Oral daily    MEDICATIONS  (PRN):  acetaminophen  Suppository 650 milliGRAM(s) Rectal every 6 hours PRN For Temp greater than 38 C (100.4 F)  ALBUTerol    0.083% 2.5 milliGRAM(s) Nebulizer every 6 hours PRN Shortness of Breath and/or Wheezing  dextrose Gel 1 Dose(s) Oral once PRN Blood Glucose LESS THAN 70 milliGRAM(s)/deciliter  glucagon  Injectable 1 milliGRAM(s) IntraMuscular once PRN Glucose LESS THAN 70 milligrams/deciliter  ondansetron Injectable 4 milliGRAM(s) IV Push every 6 hours PRN Nausea and/or Vomiting      Allergies    penicillin (Hives)    Intolerances          General:  No wt loss, fevers, chills, night sweats, fatigue,   Eyes:  Good vision, no reported pain  ENT:  No sore throat, pain, runny nose, dysphagia  CV:  No pain, palpitations, hypo/hypertension  Resp:  No dyspnea, cough, tachypnea, wheezing  GI:  No pain, No nausea, No vomiting, No diarrhea, (+) constipation, No weight loss, No fever, No pruritis, No rectal bleeding, No tarry stools, No dysphagia,  :  No pain, bleeding, incontinence, nocturia  Muscle:  No pain, weakness  Neuro:  No weakness, tingling, memory problems  Psych:  No fatigue, insomnia, mood problems, depression  Endocrine:  No polyuria, polydipsia, cold/heat intolerance  Heme:  No petechiae, ecchymosis, easy bruisability  Skin:  No rash, tattoos, scars, edema      PHYSICAL EXAM:   Vital Signs:  Vital Signs Last 24 Hrs  T(C): 36.3 (03 Nov 2017 08:09), Max: 37.9 (02 Nov 2017 15:56)  T(F): 97.3 (03 Nov 2017 08:09), Max: 100.3 (02 Nov 2017 15:56)  HR: 78 (03 Nov 2017 10:00) (76 - 117)  BP: 156/69 (03 Nov 2017 10:00) (105/54 - 156/69)  BP(mean): 99 (03 Nov 2017 10:00) (75 - 105)  RR: 20 (03 Nov 2017 10:00) (16 - 31)  SpO2: 98% (03 Nov 2017 10:00) (90% - 100%)  Daily Height in cm: 175.2 (03 Nov 2017 00:04)    Daily I&O's Summary    02 Nov 2017 07:01  -  03 Nov 2017 07:00  --------------------------------------------------------  IN: 2100 mL / OUT: 0 mL / NET: 2100 mL        GENERAL: awake, lethargic  HEENT:  NC/AT,  conjunctivae clear and pink, no thyromegaly, nodules, adenopathy, no JVD, sclera -anicteric, (+) NGT  CHEST:  Full & symmetric excursion, no increased effort, breath sounds clear  HEART:  Regular rhythm, S1, S2, no murmur/rub/S3/S4, no abdominal bruit, no edema  ABDOMEN:  Soft, non-tender, mild distended, normoactive bowel sounds,  no masses ,no hepato-splenomegaly, no signs of chronic liver disease  EXTEREMITIES:  no cyanosis,clubbing or edema  SKIN:  No rash/erythema/ecchymoses/petechiae/wounds/abscess/warm/dry  NEURO:  Alert, disoriented, no asterixis, no tremor, no encephalopathy      LABS:                        10.3   23.9  )-----------( 525      ( 03 Nov 2017 05:50 )             32.5     11-03    143  |  99  |  36<H>  ----------------------------<  170<H>  3.7   |  35<H>  |  1.70<H>    Ca    8.5      03 Nov 2017 05:50  Phos  3.9     11-03  Mg     1.9     11-03    TPro  5.3<L>  /  Alb  2.1<L>  /  TBili  0.6  /  DBili  x   /  AST  11<L>  /  ALT  19  /  AlkPhos  80  11-03    PT/INR - ( 03 Nov 2017 05:50 )   PT: 14.9 sec;   INR: 1.36 ratio         PTT - ( 03 Nov 2017 05:50 )  PTT:24.3 sec    amylaseAmylase, Serum Total: 66 U/L (11-02 @ 05:22)     lipaseLipase, Serum: 140 U/L (11-02 @ 05:22)    RADIOLOGY & ADDITIONAL TESTS: INTERVAL HPI/OVERNIGHT EVENTS:    (+) improved abd distention  transferred to ICU overnight    HPI:  82 year old male with PMHx of CAD, Crohn's disease, T2DM, HTN, HLD, and hypothyroidism [facility]. Pt was in his rehab facility when the staff noticed that he was making nonsensical statements. Pt was able to answer questions at rehab but would make odd remarks, which prompted them to send him to the ED. He did not have any fevers, cough, nausea, vomiting, or diarrhea at the rehab facility. At baseline, pt is AAOx3 as per rehab staff. Pt was recently discharged from [facility] on 10/27/17 s/p laminectomy. His postoperative course was complicated by blood loss requiring transfusion as well as an URI requiring antibiotics and steroids. He was discharged on doxycycline and a steroid taper. At present, pt is difficult to arouse and constantly drifts into sleep. He responds to his name when called and follows instructions. However he does not answer any questions and the answers he gives are nonsensical.     In ED, VS were T-97.3 HR-69 BP-146/71 RR-16 SpO2-94. Labs were significant for WBC-18 Hb-10.8 Cr-1.4 Lactate-0.8. Urinalysis was unremarkable. CT Head showed no acute intracranial pathology. Preliminary CXR showerd right lower lobe infiltrate  In ED, pt recieved Levaquin 500mg x1. (01 Nov 2017 04:02)    MEDICATIONS  (STANDING):  atorvastatin 20 milliGRAM(s) Oral at bedtime  busPIRone 10 milliGRAM(s) Oral two times a day  dextrose 5%. 1000 milliLiter(s) (50 mL/Hr) IV Continuous <Continuous>  dextrose 50% Injectable 12.5 Gram(s) IV Push once  dextrose 50% Injectable 25 Gram(s) IV Push once  dextrose 50% Injectable 25 Gram(s) IV Push once  docusate sodium 100 milliGRAM(s) Oral three times a day  guaiFENesin  milliGRAM(s) Oral every 12 hours  insulin lispro (HumaLOG) corrective regimen sliding scale   SubCutaneous every 6 hours  iohexol 300 mG (iodine)/mL Oral Solution 500 milliLiter(s) Oral every 1 hour  levothyroxine Injectable 37.5 MICROGram(s) IV Push daily  meropenem IVPB      montelukast 10 milliGRAM(s) Oral at bedtime  multivitamin 1 Tablet(s) Oral daily  pantoprazole  Injectable 40 milliGRAM(s) IV Push every 12 hours  senna 2 Tablet(s) Oral at bedtime  sodium chloride 0.65% Nasal 1 Spray(s) Both Nostrils two times a day  sodium chloride 0.9%. 1000 milliLiter(s) (75 mL/Hr) IV Continuous <Continuous>  vancomycin  IVPB      verapamil  milliGRAM(s) Oral daily    MEDICATIONS  (PRN):  acetaminophen  Suppository 650 milliGRAM(s) Rectal every 6 hours PRN For Temp greater than 38 C (100.4 F)  ALBUTerol    0.083% 2.5 milliGRAM(s) Nebulizer every 6 hours PRN Shortness of Breath and/or Wheezing  dextrose Gel 1 Dose(s) Oral once PRN Blood Glucose LESS THAN 70 milliGRAM(s)/deciliter  glucagon  Injectable 1 milliGRAM(s) IntraMuscular once PRN Glucose LESS THAN 70 milligrams/deciliter  ondansetron Injectable 4 milliGRAM(s) IV Push every 6 hours PRN Nausea and/or Vomiting      Allergies    penicillin (Hives)    Intolerances          General:  No wt loss, fevers, chills, night sweats, fatigue,   Eyes:  Good vision, no reported pain  ENT:  No sore throat, pain, runny nose, dysphagia  CV:  No pain, palpitations, hypo/hypertension  Resp:  No dyspnea, cough, tachypnea, wheezing  GI:  No pain, No nausea, No vomiting, No diarrhea, (+) constipation, No weight loss, No fever, No pruritis, No rectal bleeding, No tarry stools, No dysphagia,  :  No pain, bleeding, incontinence, nocturia  Muscle:  No pain, weakness  Neuro:  No weakness, tingling, memory problems  Psych:  No fatigue, insomnia, mood problems, depression  Endocrine:  No polyuria, polydipsia, cold/heat intolerance  Heme:  No petechiae, ecchymosis, easy bruisability  Skin:  No rash, tattoos, scars, edema      PHYSICAL EXAM:   Vital Signs:  Vital Signs Last 24 Hrs  T(C): 36.3 (03 Nov 2017 08:09), Max: 37.9 (02 Nov 2017 15:56)  T(F): 97.3 (03 Nov 2017 08:09), Max: 100.3 (02 Nov 2017 15:56)  HR: 78 (03 Nov 2017 10:00) (76 - 117)  BP: 156/69 (03 Nov 2017 10:00) (105/54 - 156/69)  BP(mean): 99 (03 Nov 2017 10:00) (75 - 105)  RR: 20 (03 Nov 2017 10:00) (16 - 31)  SpO2: 98% (03 Nov 2017 10:00) (90% - 100%)  Daily Height in cm: 175.2 (03 Nov 2017 00:04)    Daily I&O's Summary    02 Nov 2017 07:01  -  03 Nov 2017 07:00  --------------------------------------------------------  IN: 2100 mL / OUT: 0 mL / NET: 2100 mL        GENERAL: awake, lethargic  HEENT:  NC/AT,  conjunctivae clear and pink, no thyromegaly, nodules, adenopathy, no JVD, sclera -anicteric, (+) NGT draining bilious material  CHEST:  Full & symmetric excursion, no increased effort, breath sounds clear  HEART:  Regular rhythm, S1, S2, no murmur/rub/S3/S4, no abdominal bruit, no edema  ABDOMEN:  Soft, non-tender, mild distended, normoactive bowel sounds,  no masses ,no hepato-splenomegaly, no signs of chronic liver disease  EXTEREMITIES:  no cyanosis,clubbing or edema  SKIN:  No rash/erythema/ecchymoses/petechiae/wounds/abscess/warm/dry  NEURO:  Alert, disoriented, no asterixis, no tremor, no encephalopathy      LABS:                        10.3   23.9  )-----------( 525      ( 03 Nov 2017 05:50 )             32.5     11-03    143  |  99  |  36<H>  ----------------------------<  170<H>  3.7   |  35<H>  |  1.70<H>    Ca    8.5      03 Nov 2017 05:50  Phos  3.9     11-03  Mg     1.9     11-03    TPro  5.3<L>  /  Alb  2.1<L>  /  TBili  0.6  /  DBili  x   /  AST  11<L>  /  ALT  19  /  AlkPhos  80  11-03    PT/INR - ( 03 Nov 2017 05:50 )   PT: 14.9 sec;   INR: 1.36 ratio         PTT - ( 03 Nov 2017 05:50 )  PTT:24.3 sec    amylaseAmylase, Serum Total: 66 U/L (11-02 @ 05:22)     lipaseLipase, Serum: 140 U/L (11-02 @ 05:22)    RADIOLOGY & ADDITIONAL TESTS:

## 2017-11-03 NOTE — PROGRESS NOTE ADULT - SUBJECTIVE AND OBJECTIVE BOX
Patient is a 82y old  Male who presents with a chief complaint of sent from [facility] for AMS (01 Nov 2017 10:09)    Brief medical history: 82 year old male with PMHx recent L5/S1  laminectomy on 10/27, CAD, Crohn's disease, T2DM, HTN, HLD, and hypothyroidism sent to ER from [facility] for AMS. Patient course complicated by coffee ground emesis and  serosanguinous fluid draininage from incision site, suspicious of spinal abscess. Transferred to ICU for closer monitoring.     24 hour events:     REVIEW OF SYSTEMS  Constitutional: No fever, chills, fatigue  Neuro: No headache, numbness, weakness  Resp: No cough, wheezing, shortness of breath  CVS: No chest pain, palpitations, leg swelling  GI: No abdominal pain, nausea, vomiting, diarrhea   : No dysuria, frequency, incontinence  Skin: No itching, burning, rashes, or lesions   Msk: No joint pain or swelling  Psych: No depression, anxiety, mood swings    T(F): 98 (11-03-17 @ 04:01), Max: 100.3 (11-02-17 @ 15:56)  HR: 77 (11-03-17 @ 07:30) (76 - 117)  BP: 129/58 (11-03-17 @ 07:00) (105/54 - 148/89)  RR: 21 (11-03-17 @ 07:30) (16 - 31)  SpO2: 100% (11-03-17 @ 07:30) (90% - 100%)    I&O's Summary    11-02 @ 07:01  -  11-03 @ 07:00  --------------------------------------------------------  IN: 2100 mL / OUT: 0 mL / NET: 2100 mL      PHYSICAL EXAM  General:   CNS:   HEENT:   Resp:   CVS:   Abd:   Ext:   Skin:     MEDICATIONS  meropenem IVPB   vancomycin  IVPB   verapamil SR Oral  atorvastatin Oral  dextrose 50% Injectable IV Push  dextrose 50% Injectable IV Push  dextrose 50% Injectable IV Push  dextrose Gel Oral PRN  glucagon  Injectable IntraMuscular PRN  insulin lispro (HumaLOG) corrective regimen sliding scale SubCutaneous  levothyroxine Injectable IV Push  ALBUTerol    0.083% Nebulizer PRN  guaiFENesin ER Oral  montelukast Oral  acetaminophen  Suppository Rectal PRN  busPIRone Oral  ondansetron Injectable IV Push PRN  docusate sodium Oral  pantoprazole  Injectable IV Push  senna Oral  dextrose 5%. IV Continuous  multivitamin Oral  sodium chloride 0.9%. IV Continuous  sodium chloride 0.65% Nasal Both Nostrils  iohexol 300 mG (iodine)/mL Oral Solution Oral                          10.3   23.9  )-----------( 525      ( 03 Nov 2017 05:50 )             32.5     Bands 1.0    11-03    143  |  99  |  36<H>  ----------------------------<  170<H>  3.7   |  35<H>  |  1.70<H>    Ca    8.5      03 Nov 2017 05:50  Phos  3.9     11-03  Mg     1.9     11-03    TPro  5.3<L>  /  Alb  2.1<L>  /  TBili  0.6  /  DBili  x   /  AST  11<L>  /  ALT  19  /  AlkPhos  80  11-03  Lactate 1.5           11-02 @ 18:14    PT/INR - ( 03 Nov 2017 05:50 )   PT: 14.9 sec;   INR: 1.36 ratio    PTT - ( 03 Nov 2017 05:50 )  PTT:24.3 sec    .Blood Blood-Peripheral   No growth to date. -- 11-01 @ 08:40  .Urine Catheterized   No growth -- 11-01 @ 08:34      Rapid RVP Result: Kristi (11-01 @ 04:19)    Radiology: ***  Bedside lung ultrasound: ***  Bedside ECHO: ***    CENTRAL LINE: Y/N          DATE INSERTED:              REMOVE: Y/N  PALOMO: Y/N                        DATE INSERTED:              REMOVE: Y/N  A-LINE: Y/N                       DATE INSERTED:              REMOVE: Y/N    GLOBAL ISSUE/BEST PRACTICE  Analgesia:   Sedation:   CAM-ICU:   HOB elevation: yes  Stress ulcer prophylaxis:   VTE prophylaxis:   Glycemic control:   Nutrition:     CODE STATUS: ***  [facility] discussion: Y Patient is a 82y old  Male who presents with a chief complaint of sent from [facility] for AMS (01 Nov 2017 10:09)    Brief medical history: 82 year old male with PMHx recent L5/S1  laminectomy on 10/27, CAD, Crohn's disease, T2DM, HTN, HLD, and hypothyroidism sent to ER from [facility] for AMS and severe sepsis. Patient course complicated by coffee ground emesis and  serosanguinous fluid draininage from incision site, suspicious of spinal abscess. Transferred to ICU for closer monitoring.     24 hour events:     REVIEW OF SYSTEMS  Constitutional: No fever, chills, fatigue  Neuro: No headache, numbness, weakness  Resp: No cough, wheezing, shortness of breath  CVS: No chest pain, palpitations, leg swelling  GI: No abdominal pain, nausea, vomiting, diarrhea   : No dysuria, frequency, incontinence  Skin: No itching, burning, rashes, or lesions   Msk: No joint pain or swelling  Psych: No depression, anxiety, mood swings    T(F): 98 (11-03-17 @ 04:01), Max: 100.3 (11-02-17 @ 15:56)  HR: 77 (11-03-17 @ 07:30) (76 - 117)  BP: 129/58 (11-03-17 @ 07:00) (105/54 - 148/89)  RR: 21 (11-03-17 @ 07:30) (16 - 31)  SpO2: 100% (11-03-17 @ 07:30) (90% - 100%)    I&O's Summary    11-02 @ 07:01  -  11-03 @ 07:00  --------------------------------------------------------  IN: 2100 mL / OUT: 0 mL / NET: 2100 mL      PHYSICAL EXAM  General:   CNS:   HEENT:   Resp:   CVS:   Abd:   Ext:   Skin:     MEDICATIONS  meropenem IVPB   vancomycin  IVPB   verapamil SR Oral  atorvastatin Oral  dextrose 50% Injectable IV Push  dextrose 50% Injectable IV Push  dextrose 50% Injectable IV Push  dextrose Gel Oral PRN  glucagon  Injectable IntraMuscular PRN  insulin lispro (HumaLOG) corrective regimen sliding scale SubCutaneous  levothyroxine Injectable IV Push  ALBUTerol    0.083% Nebulizer PRN  guaiFENesin ER Oral  montelukast Oral  acetaminophen  Suppository Rectal PRN  busPIRone Oral  ondansetron Injectable IV Push PRN  docusate sodium Oral  pantoprazole  Injectable IV Push  senna Oral  dextrose 5%. IV Continuous  multivitamin Oral  sodium chloride 0.9%. IV Continuous  sodium chloride 0.65% Nasal Both Nostrils  iohexol 300 mG (iodine)/mL Oral Solution Oral                          10.3   23.9  )-----------( 525      ( 03 Nov 2017 05:50 )             32.5     Bands 1.0    11-03    143  |  99  |  36<H>  ----------------------------<  170<H>  3.7   |  35<H>  |  1.70<H>    Ca    8.5      03 Nov 2017 05:50  Phos  3.9     11-03  Mg     1.9     11-03    TPro  5.3<L>  /  Alb  2.1<L>  /  TBili  0.6  /  DBili  x   /  AST  11<L>  /  ALT  19  /  AlkPhos  80  11-03  Lactate 1.5           11-02 @ 18:14    PT/INR - ( 03 Nov 2017 05:50 )   PT: 14.9 sec;   INR: 1.36 ratio    PTT - ( 03 Nov 2017 05:50 )  PTT:24.3 sec    .Blood Blood-Peripheral   No growth to date. -- 11-01 @ 08:40  .Urine Catheterized   No growth -- 11-01 @ 08:34      Rapid RVP Result: Myrnatec (11-01 @ 04:19)    Radiology: ***  Bedside lung ultrasound: ***  Bedside ECHO: ***    CENTRAL LINE: Y/N          DATE INSERTED:              REMOVE: Y/N  APLOMO: Y/N                        DATE INSERTED:              REMOVE: Y/N  A-LINE: Y/N                       DATE INSERTED:              REMOVE: Y/N    GLOBAL ISSUE/BEST PRACTICE  Analgesia:   Sedation:   CAM-ICU:   HOB elevation: yes  Stress ulcer prophylaxis:   VTE prophylaxis:   Glycemic control:   Nutrition:     CODE STATUS: ***  [facility] discussion: Y Patient is a 82y old  Male who presents with a chief complaint of sent from [facility] for AMS (01 Nov 2017 10:09)    Brief medical history: 82 year old male with PMHx recent L5/S1  laminectomy on 10/27, CAD, Crohn's disease, T2DM, HTN, HLD, and hypothyroidism sent to ER from [facility] for AMS. Admitted with severe sepsis, Patient course complicated by coffee ground emesis and  serosanguinous fluid draininage from incision site, suspicious of spinal abscess. Supposed to be going to OR today for washout if medically optimized.     Transferred to ICU for closer monitoring. No acute event overnight    24 hour events:     REVIEW OF SYSTEMS  Constitutional: No fever, chills, fatigue  Neuro: No headache, numbness, weakness  Resp: No cough, wheezing, shortness of breath  CVS: No chest pain, palpitations, leg swelling  GI: No abdominal pain, nausea, vomiting, diarrhea   : No dysuria, frequency, incontinence  Skin: No itching, burning, rashes, or lesions   Msk: No joint pain or swelling  Psych: No depression, anxiety, mood swings    T(F): 98 (11-03-17 @ 04:01), Max: 100.3 (11-02-17 @ 15:56)  HR: 77 (11-03-17 @ 07:30) (76 - 117)  BP: 129/58 (11-03-17 @ 07:00) (105/54 - 148/89)  RR: 21 (11-03-17 @ 07:30) (16 - 31)  SpO2: 100% (11-03-17 @ 07:30) (90% - 100%)    I&O's Summary    11-02 @ 07:01  -  11-03 @ 07:00  --------------------------------------------------------  IN: 2100 mL / OUT: 0 mL / NET: 2100 mL      PHYSICAL EXAM  General:   CNS:   HEENT:   Resp:   CVS:   Abd:   Ext:   Skin:     MEDICATIONS  meropenem IVPB   vancomycin  IVPB   verapamil SR Oral  atorvastatin Oral  dextrose 50% Injectable IV Push  dextrose 50% Injectable IV Push  dextrose 50% Injectable IV Push  dextrose Gel Oral PRN  glucagon  Injectable IntraMuscular PRN  insulin lispro (HumaLOG) corrective regimen sliding scale SubCutaneous  levothyroxine Injectable IV Push  ALBUTerol    0.083% Nebulizer PRN  guaiFENesin ER Oral  montelukast Oral  acetaminophen  Suppository Rectal PRN  busPIRone Oral  ondansetron Injectable IV Push PRN  docusate sodium Oral  pantoprazole  Injectable IV Push  senna Oral  dextrose 5%. IV Continuous  multivitamin Oral  sodium chloride 0.9%. IV Continuous  sodium chloride 0.65% Nasal Both Nostrils  iohexol 300 mG (iodine)/mL Oral Solution Oral                          10.3   23.9  )-----------( 525      ( 03 Nov 2017 05:50 )             32.5     Bands 1.0    11-03    143  |  99  |  36<H>  ----------------------------<  170<H>  3.7   |  35<H>  |  1.70<H>    Ca    8.5      03 Nov 2017 05:50  Phos  3.9     11-03  Mg     1.9     11-03    TPro  5.3<L>  /  Alb  2.1<L>  /  TBili  0.6  /  DBili  x   /  AST  11<L>  /  ALT  19  /  AlkPhos  80  11-03  Lactate 1.5           11-02 @ 18:14    PT/INR - ( 03 Nov 2017 05:50 )   PT: 14.9 sec;   INR: 1.36 ratio    PTT - ( 03 Nov 2017 05:50 )  PTT:24.3 sec    .Blood Blood-Peripheral   No growth to date. -- 11-01 @ 08:40  .Urine Catheterized   No growth -- 11-01 @ 08:34      Rapid RVP Result: Myrnatec (11-01 @ 04:19)    Radiology: ***  Bedside lung ultrasound: ***  Bedside ECHO: ***    CENTRAL LINE: Y/N          DATE INSERTED:              REMOVE: Y/N  PALOMO: Y/N                        DATE INSERTED:              REMOVE: Y/N  A-LINE: Y/N                       DATE INSERTED:              REMOVE: Y/N    GLOBAL ISSUE/BEST PRACTICE  Analgesia:   Sedation:   CAM-ICU:   HOB elevation: yes  Stress ulcer prophylaxis:   VTE prophylaxis:   Glycemic control:   Nutrition:     CODE STATUS: ***  [facility] discussion: Y Patient is a 82y old  Male who presents with a chief complaint of sent from [facility] for AMS (01 Nov 2017 10:09)    24 hour events: No acute events overnight.     REVIEW OF SYSTEMS  Constitutional: No fever, chills, fatigue  Neuro: No headache, numbness, weakness  Resp: No cough, wheezing, shortness of breath  CVS: No chest pain, palpitations, leg swelling  GI: No abdominal pain, nausea, vomiting, diarrhea   : No dysuria, frequency, incontinence  Skin: No itching, burning, rashes, or lesions   Msk: No joint pain or swelling  Psych: No depression, anxiety, mood swings    T(F): 98 (11-03-17 @ 04:01), Max: 100.3 (11-02-17 @ 15:56)  HR: 77 (11-03-17 @ 07:30) (76 - 117)  BP: 129/58 (11-03-17 @ 07:00) (105/54 - 148/89)  RR: 21 (11-03-17 @ 07:30) (16 - 31)  SpO2: 100% (11-03-17 @ 07:30) (90% - 100%)    I&O's Summary    11-02 @ 07:01  -  11-03 @ 07:00  --------------------------------------------------------  IN: 2100 mL / OUT: 0 mL / NET: 2100 mL      PHYSICAL EXAM  General: Elderly male, in NAD, resting in bed  CNS: Alert and Oriented, answers questions appropriately, 5/5 strength in UE, decrease strength and sensation on left lower extremity  HEENT: Pupils equal and reactive top light, moist mucus membrane  Resp: Bilateral wheezing  CVS: RRR, S1S2  Abd: Soft, protuberant, nontender  Ext: no edema, +peripheral pulses  Skin: warm, and dry    MEDICATIONS  meropenem IVPB   vancomycin  IVPB   verapamil SR Oral  atorvastatin Oral  dextrose 50% Injectable IV Push  dextrose 50% Injectable IV Push  dextrose 50% Injectable IV Push  dextrose Gel Oral PRN  glucagon  Injectable IntraMuscular PRN  insulin lispro (HumaLOG) corrective regimen sliding scale SubCutaneous  levothyroxine Injectable IV Push  ALBUTerol    0.083% Nebulizer PRN  guaiFENesin ER Oral  montelukast Oral  acetaminophen  Suppository Rectal PRN  busPIRone Oral  ondansetron Injectable IV Push PRN  docusate sodium Oral  pantoprazole  Injectable IV Push  senna Oral  dextrose 5%. IV Continuous  multivitamin Oral  sodium chloride 0.9%. IV Continuous  sodium chloride 0.65% Nasal Both Nostrils  iohexol 300 mG (iodine)/mL Oral Solution Oral                          10.3   23.9  )-----------( 525      ( 03 Nov 2017 05:50 )             32.5     Bands 1.0    11-03    143  |  99  |  36<H>  ----------------------------<  170<H>  3.7   |  35<H>  |  1.70<H>    Ca    8.5      03 Nov 2017 05:50  Phos  3.9     11-03  Mg     1.9     11-03    TPro  5.3<L>  /  Alb  2.1<L>  /  TBili  0.6  /  DBili  x   /  AST  11<L>  /  ALT  19  /  AlkPhos  80  11-03  Lactate 1.5           11-02 @ 18:14    PT/INR - ( 03 Nov 2017 05:50 )   PT: 14.9 sec;   INR: 1.36 ratio    PTT - ( 03 Nov 2017 05:50 )  PTT:24.3 sec    .Blood Blood-Peripheral   No growth to date. -- 11-01 @ 08:40  .Urine Catheterized   No growth -- 11-01 @ 08:34    Radiology  MRI spine   IMPRESSION:  Collection in the surgical bed after laminectomy consistent with the given  clinical history of abscess resulting in spinal stenosis at the L3 level as  above. Negative for evidence of discitis or osteomyelitis.    CT Chest No Cont   Impression: Bibasilar airspace infiltrates consistent with aspiration pneumonia in   appropriate clinical setting. Small bilateral pleural effusions.  No acute obstructive or inflammatory pathology in the abdomen and pelvis.      GLOBAL ISSUE/BEST PRACTICE  Analgesia:   Sedation:   CAM-ICU:   HOB elevation: yes  Stress ulcer prophylaxis:   VTE prophylaxis:   Glycemic control:   Nutrition:     CODE STATUS: ***  [facility] discussion: Y Patient is a 82y old  Male who presents with a chief complaint of sent from [facility] for AMS (01 Nov 2017 10:09)    24 hour events: Transferred to ICU overnight for hypoxemia and lethargy    T(F): 98 (11-03-17 @ 04:01), Max: 100.3 (11-02-17 @ 15:56)  HR: 77 (11-03-17 @ 07:30) (76 - 117)  BP: 129/58 (11-03-17 @ 07:00) (105/54 - 148/89)  RR: 21 (11-03-17 @ 07:30) (16 - 31)  SpO2: 100% (11-03-17 @ 07:30) (90% - 100%)    I&O's Summary    11-02 @ 07:01  -  11-03 @ 07:00  --------------------------------------------------------  IN: 2100 mL / OUT: 0 mL / NET: 2100 mL      PHYSICAL EXAM  General: Elderly male, in NAD, resting in bed  CNS: Alert and Oriented, answers questions appropriately, 5/5 strength in UE, decrease strength and sensation on left lower extremity  HEENT: Pupils equal and reactive top light, moist mucus membrane  Resp: Bilateral wheezing  CVS: RRR, S1S2  Abd: Soft, protuberant, nontender  Ext: no edema, +peripheral pulses  Skin: warm, and dry    MEDICATIONS  meropenem IVPB   vancomycin  IVPB   verapamil SR Oral  atorvastatin Oral  dextrose 50% Injectable IV Push  dextrose 50% Injectable IV Push  dextrose 50% Injectable IV Push  dextrose Gel Oral PRN  glucagon  Injectable IntraMuscular PRN  insulin lispro (HumaLOG) corrective regimen sliding scale SubCutaneous  levothyroxine Injectable IV Push  ALBUTerol    0.083% Nebulizer PRN  guaiFENesin ER Oral  montelukast Oral  acetaminophen  Suppository Rectal PRN  busPIRone Oral  ondansetron Injectable IV Push PRN  docusate sodium Oral  pantoprazole  Injectable IV Push  senna Oral  dextrose 5%. IV Continuous  multivitamin Oral  sodium chloride 0.9%. IV Continuous  sodium chloride 0.65% Nasal Both Nostrils  iohexol 300 mG (iodine)/mL Oral Solution Oral                          10.3   23.9  )-----------( 525      ( 03 Nov 2017 05:50 )             32.5     Bands 1.0    11-03    143  |  99  |  36<H>  ----------------------------<  170<H>  3.7   |  35<H>  |  1.70<H>    Ca    8.5      03 Nov 2017 05:50  Phos  3.9     11-03  Mg     1.9     11-03    TPro  5.3<L>  /  Alb  2.1<L>  /  TBili  0.6  /  DBili  x   /  AST  11<L>  /  ALT  19  /  AlkPhos  80  11-03  Lactate 1.5           11-02 @ 18:14    PT/INR - ( 03 Nov 2017 05:50 )   PT: 14.9 sec;   INR: 1.36 ratio    PTT - ( 03 Nov 2017 05:50 )  PTT:24.3 sec    .Blood Blood-Peripheral   No growth to date. -- 11-01 @ 08:40  .Urine Catheterized   No growth -- 11-01 @ 08:34    Radiology  MRI spine   IMPRESSION:  Collection in the surgical bed after laminectomy consistent with the given  clinical history of abscess resulting in spinal stenosis at the L3 level as  above. Negative for evidence of discitis or osteomyelitis.    CT Chest No Cont   Impression: Bibasilar airspace infiltrates consistent with aspiration pneumonia in   appropriate clinical setting. Small bilateral pleural effusions.  No acute obstructive or inflammatory pathology in the abdomen and pelvis.      GLOBAL ISSUE/BEST PRACTICE  Analgesia: N  Sedation: N  CAM-ICU: CASEY  HOB elevation: yes  Stress ulcer prophylaxis: Y  VTE prophylaxis: SCD  Glycemic control: Y  Nutrition: NPO    CODE STATUS: FULL

## 2017-11-03 NOTE — PROGRESS NOTE ADULT - PROBLEM SELECTOR PLAN 2
for Poss Or today  MRI reviewed  on ABX  Orthop following  cardio eval noted  ICU supportive management   prognosis guarded for Poss Or today  MRI reviewed  on ABX  Orthop following  cardio eval noted  ICU supportive management   prognosis guarded  no objections from Pulmonary for OR today

## 2017-11-03 NOTE — CONSULT NOTE ADULT - SUBJECTIVE AND OBJECTIVE BOX
pt seen and examined    full consultation dictated      83 yo with multiple medical problems s/p laminectomy 10/25 with AMS.  PT found to have abdominal distension.  CT showing no obstruction but dilated stomach.  NGT placed yesterday     For CT with PO contrast when stable     cont AMS workup      no acute general surgery intervention indicated at this time     will follow

## 2017-11-03 NOTE — PROGRESS NOTE ADULT - SUBJECTIVE AND OBJECTIVE BOX
Pt seen & examined. Pain controlled. Awake and Alert, answering questions following commands    All vital signs stable  Gen: NAD  Spine:    Dressing clean D&I  +Sensation C5-T1 B/L  SILT RLE, 5/5 RLE   Decreased sensation in LLE, Hip flexion 3/5 LLE, Knee extension 4/5 LLE  5/5 Dorsi and plantar flexion b/l  Moving all extremities  Distal pulses intact  Soft compartments, - calf tenderness to palpation Pt seen & examined. Pain controlled. Awake and Alert, answering questions following commands    All vital signs stable  Gen: NAD  Spine:    Dressing clean D&I  +Sensation C5-T1 B/L  SILT RLE, 5/5 RLE   Decreased sensation in LLE, Hip flexion 3/5 LLE, Knee extension 3/5 LLE  3+/5 Dorsi and plantar flexion b/l  Moving all extremities  Distal pulses intact  Soft compartments, - calf tenderness to palpation

## 2017-11-03 NOTE — PROGRESS NOTE ADULT - ASSESSMENT
81 yo M w/ Hematoma vs Abscess s/p Lami+TLIF L2-S1 (10/19)  Medically optimize for Possible OR today  Anesthesia feels that patient is not OR stable and would like to wait 24-48hrs if not emergent  Continue IV abx  FU Cultures  Keep NPO  Hold DVT ppx for now, can continue SCD's  Fu Clearance/optimization for OR  Q3 neuro exams  Further recs to follow 81 yo M w/ Hematoma vs Abscess s/p Lami+TLIF L2-S1 (10/19)  Received inputs from cardiology, anesthesia and medicine service today.  Will wait for patient to stabilize and plan for OR in AM.  Spoke with patient's son in detail via phone.  Discussed reasons for not operating yesterday, and possible plan for OR in AM.  He is stilll a very high risk surgical candidate but he appears to be more stable, alert and likely to be in better physiologic state to withstand the OR.  I discussed with him it is quiet possible, he may need to be intubated post surgery for few days and he understand that as well secondary to underlying respiratory distress in recent past.    He has neurologic weakness, it is ongoing for at least few days and goal is to decompress the spinal canal with hope that he is going to get additional recovery.  However, his survivability was more important last night when initially evaluated.  Anesthesia also recommended a day of observation unless absolutely emergent.  It is an urgent case and we plan on going to OR when patient has enough reserve.  Son/Healthcare proxy telephone consented for the planned procedure  spoke with Dr. Platt, patient's primary surgeon and he understands the plan as well.  Continue IV abx  FU Cultures  Hold DVT ppx for now, can continue SCD's  Fu Clearance/optimization for OR  Q3 neuro exams  Further recs to follow

## 2017-11-03 NOTE — PROGRESS NOTE ADULT - PROBLEM SELECTOR PLAN 1
Improving mental status  -avoid sedatives and narcotics  MRI of spine c/w spinal abscess vs hematoma.   -Continue neuro checks  --Continue with vanc and meropenem for spinal abscess

## 2017-11-04 DIAGNOSIS — J98.11 ATELECTASIS: ICD-10-CM

## 2017-11-04 DIAGNOSIS — M96.1 POSTLAMINECTOMY SYNDROME, NOT ELSEWHERE CLASSIFIED: ICD-10-CM

## 2017-11-04 DIAGNOSIS — J69.0 PNEUMONITIS DUE TO INHALATION OF FOOD AND VOMIT: ICD-10-CM

## 2017-11-04 LAB
ALBUMIN SERPL ELPH-MCNC: 1.9 G/DL — LOW (ref 3.3–5)
ALP SERPL-CCNC: 79 U/L — SIGNIFICANT CHANGE UP (ref 40–120)
ALT FLD-CCNC: 20 U/L — SIGNIFICANT CHANGE UP (ref 12–78)
ANION GAP SERPL CALC-SCNC: 10 MMOL/L — SIGNIFICANT CHANGE UP (ref 5–17)
ANION GAP SERPL CALC-SCNC: 11 MMOL/L — SIGNIFICANT CHANGE UP (ref 5–17)
APTT BLD: 23.4 SEC — LOW (ref 27.5–37.4)
AST SERPL-CCNC: 16 U/L — SIGNIFICANT CHANGE UP (ref 15–37)
BASOPHILS # BLD AUTO: 0.1 K/UL — SIGNIFICANT CHANGE UP (ref 0–0.2)
BASOPHILS # BLD AUTO: 0.1 K/UL — SIGNIFICANT CHANGE UP (ref 0–0.2)
BASOPHILS NFR BLD AUTO: 0.7 % — SIGNIFICANT CHANGE UP (ref 0–2)
BASOPHILS NFR BLD AUTO: 0.7 % — SIGNIFICANT CHANGE UP (ref 0–2)
BILIRUB SERPL-MCNC: 0.5 MG/DL — SIGNIFICANT CHANGE UP (ref 0.2–1.2)
BUN SERPL-MCNC: 25 MG/DL — HIGH (ref 7–23)
BUN SERPL-MCNC: 29 MG/DL — HIGH (ref 7–23)
CALCIUM SERPL-MCNC: 8.1 MG/DL — LOW (ref 8.5–10.1)
CALCIUM SERPL-MCNC: 8.2 MG/DL — LOW (ref 8.5–10.1)
CHLORIDE SERPL-SCNC: 102 MMOL/L — SIGNIFICANT CHANGE UP (ref 96–108)
CHLORIDE SERPL-SCNC: 103 MMOL/L — SIGNIFICANT CHANGE UP (ref 96–108)
CO2 SERPL-SCNC: 26 MMOL/L — SIGNIFICANT CHANGE UP (ref 22–31)
CO2 SERPL-SCNC: 28 MMOL/L — SIGNIFICANT CHANGE UP (ref 22–31)
CREAT ?TM UR-MCNC: 108 MG/DL — SIGNIFICANT CHANGE UP
CREAT SERPL-MCNC: 1 MG/DL — SIGNIFICANT CHANGE UP (ref 0.5–1.3)
CREAT SERPL-MCNC: 1.1 MG/DL — SIGNIFICANT CHANGE UP (ref 0.5–1.3)
EOSINOPHIL # BLD AUTO: 0.3 K/UL — SIGNIFICANT CHANGE UP (ref 0–0.5)
EOSINOPHIL # BLD AUTO: 0.4 K/UL — SIGNIFICANT CHANGE UP (ref 0–0.5)
EOSINOPHIL NFR BLD AUTO: 2.4 % — SIGNIFICANT CHANGE UP (ref 0–6)
EOSINOPHIL NFR BLD AUTO: 2.7 % — SIGNIFICANT CHANGE UP (ref 0–6)
GLUCOSE SERPL-MCNC: 110 MG/DL — HIGH (ref 70–99)
GLUCOSE SERPL-MCNC: 134 MG/DL — HIGH (ref 70–99)
HCT VFR BLD CALC: 29.8 % — LOW (ref 39–50)
HCT VFR BLD CALC: 31.6 % — LOW (ref 39–50)
HGB BLD-MCNC: 9.5 G/DL — LOW (ref 13–17)
HGB BLD-MCNC: 9.9 G/DL — LOW (ref 13–17)
INR BLD: 1.3 RATIO — HIGH (ref 0.88–1.16)
LYMPHOCYTES # BLD AUTO: 0.6 K/UL — LOW (ref 1–3.3)
LYMPHOCYTES # BLD AUTO: 0.7 K/UL — LOW (ref 1–3.3)
LYMPHOCYTES # BLD AUTO: 4.9 % — LOW (ref 13–44)
LYMPHOCYTES # BLD AUTO: 5.1 % — LOW (ref 13–44)
MAGNESIUM SERPL-MCNC: 1.8 MG/DL — SIGNIFICANT CHANGE UP (ref 1.6–2.6)
MCHC RBC-ENTMCNC: 30.1 PG — SIGNIFICANT CHANGE UP (ref 27–34)
MCHC RBC-ENTMCNC: 30.3 PG — SIGNIFICANT CHANGE UP (ref 27–34)
MCHC RBC-ENTMCNC: 31.2 GM/DL — LOW (ref 32–36)
MCHC RBC-ENTMCNC: 31.7 GM/DL — LOW (ref 32–36)
MCV RBC AUTO: 94.9 FL — SIGNIFICANT CHANGE UP (ref 80–100)
MCV RBC AUTO: 97.1 FL — SIGNIFICANT CHANGE UP (ref 80–100)
MONOCYTES # BLD AUTO: 0.6 K/UL — SIGNIFICANT CHANGE UP (ref 0–0.9)
MONOCYTES # BLD AUTO: 0.9 K/UL — SIGNIFICANT CHANGE UP (ref 0–0.9)
MONOCYTES NFR BLD AUTO: 5.7 % — SIGNIFICANT CHANGE UP (ref 1–9)
MONOCYTES NFR BLD AUTO: 6.8 % — SIGNIFICANT CHANGE UP (ref 1–9)
NEUTROPHILS # BLD AUTO: 11.6 K/UL — HIGH (ref 1.8–7.4)
NEUTROPHILS # BLD AUTO: 9.4 K/UL — HIGH (ref 1.8–7.4)
NEUTROPHILS NFR BLD AUTO: 84.9 % — HIGH (ref 43–77)
NEUTROPHILS NFR BLD AUTO: 86.2 % — HIGH (ref 43–77)
PHOSPHATE SERPL-MCNC: 1.8 MG/DL — LOW (ref 2.5–4.5)
PLATELET # BLD AUTO: 402 K/UL — HIGH (ref 150–400)
PLATELET # BLD AUTO: 423 K/UL — HIGH (ref 150–400)
POTASSIUM SERPL-MCNC: 3.5 MMOL/L — SIGNIFICANT CHANGE UP (ref 3.5–5.3)
POTASSIUM SERPL-MCNC: 3.8 MMOL/L — SIGNIFICANT CHANGE UP (ref 3.5–5.3)
POTASSIUM SERPL-SCNC: 3.5 MMOL/L — SIGNIFICANT CHANGE UP (ref 3.5–5.3)
POTASSIUM SERPL-SCNC: 3.8 MMOL/L — SIGNIFICANT CHANGE UP (ref 3.5–5.3)
PROT SERPL-MCNC: 5 G/DL — LOW (ref 6–8.3)
PROTHROM AB SERPL-ACNC: 14.3 SEC — HIGH (ref 9.8–12.7)
RBC # BLD: 3.14 M/UL — LOW (ref 4.2–5.8)
RBC # BLD: 3.25 M/UL — LOW (ref 4.2–5.8)
RBC # FLD: 14 % — SIGNIFICANT CHANGE UP (ref 10.3–14.5)
RBC # FLD: 14.3 % — SIGNIFICANT CHANGE UP (ref 10.3–14.5)
SODIUM SERPL-SCNC: 140 MMOL/L — SIGNIFICANT CHANGE UP (ref 135–145)
SODIUM SERPL-SCNC: 140 MMOL/L — SIGNIFICANT CHANGE UP (ref 135–145)
SODIUM UR-SCNC: 126 MMOL/L — SIGNIFICANT CHANGE UP
VANCOMYCIN TROUGH SERPL-MCNC: 21.3 UG/ML — HIGH (ref 10–20)
WBC # BLD: 10.9 K/UL — HIGH (ref 3.8–10.5)
WBC # BLD: 13.7 K/UL — HIGH (ref 3.8–10.5)
WBC # FLD AUTO: 10.9 K/UL — HIGH (ref 3.8–10.5)
WBC # FLD AUTO: 13.7 K/UL — HIGH (ref 3.8–10.5)

## 2017-11-04 PROCEDURE — 99233 SBSQ HOSP IP/OBS HIGH 50: CPT

## 2017-11-04 PROCEDURE — 99291 CRITICAL CARE FIRST HOUR: CPT

## 2017-11-04 PROCEDURE — 72110 X-RAY EXAM L-2 SPINE 4/>VWS: CPT | Mod: 26

## 2017-11-04 RX ORDER — BENZOCAINE AND MENTHOL 5; 1 G/100ML; G/100ML
1 LIQUID ORAL EVERY 4 HOURS
Qty: 0 | Refills: 0 | Status: DISCONTINUED | OUTPATIENT
Start: 2017-11-04 | End: 2017-11-09

## 2017-11-04 RX ORDER — DEXTROSE 50 % IN WATER 50 %
25 SYRINGE (ML) INTRAVENOUS ONCE
Qty: 0 | Refills: 0 | Status: DISCONTINUED | OUTPATIENT
Start: 2017-11-04 | End: 2017-11-05

## 2017-11-04 RX ORDER — ONDANSETRON 8 MG/1
4 TABLET, FILM COATED ORAL EVERY 6 HOURS
Qty: 0 | Refills: 0 | Status: DISCONTINUED | OUTPATIENT
Start: 2017-11-04 | End: 2017-11-04

## 2017-11-04 RX ORDER — SODIUM CHLORIDE 9 MG/ML
1000 INJECTION INTRAMUSCULAR; INTRAVENOUS; SUBCUTANEOUS
Qty: 0 | Refills: 0 | Status: DISCONTINUED | OUTPATIENT
Start: 2017-11-04 | End: 2017-11-05

## 2017-11-04 RX ORDER — ASCORBIC ACID 60 MG
500 TABLET,CHEWABLE ORAL
Qty: 0 | Refills: 0 | Status: DISCONTINUED | OUTPATIENT
Start: 2017-11-04 | End: 2017-11-04

## 2017-11-04 RX ORDER — SODIUM CHLORIDE 9 MG/ML
1000 INJECTION, SOLUTION INTRAVENOUS
Qty: 0 | Refills: 0 | Status: DISCONTINUED | OUTPATIENT
Start: 2017-11-04 | End: 2017-11-05

## 2017-11-04 RX ORDER — ACETAMINOPHEN 500 MG
650 TABLET ORAL EVERY 6 HOURS
Qty: 0 | Refills: 0 | Status: DISCONTINUED | OUTPATIENT
Start: 2017-11-04 | End: 2017-11-04

## 2017-11-04 RX ORDER — MAGNESIUM HYDROXIDE 400 MG/1
30 TABLET, CHEWABLE ORAL EVERY 12 HOURS
Qty: 0 | Refills: 0 | Status: DISCONTINUED | OUTPATIENT
Start: 2017-11-04 | End: 2017-11-09

## 2017-11-04 RX ORDER — POTASSIUM PHOSPHATE, MONOBASIC POTASSIUM PHOSPHATE, DIBASIC 236; 224 MG/ML; MG/ML
15 INJECTION, SOLUTION INTRAVENOUS ONCE
Qty: 0 | Refills: 0 | Status: COMPLETED | OUTPATIENT
Start: 2017-11-04 | End: 2017-11-04

## 2017-11-04 RX ORDER — GLUCAGON INJECTION, SOLUTION 0.5 MG/.1ML
1 INJECTION, SOLUTION SUBCUTANEOUS ONCE
Qty: 0 | Refills: 0 | Status: DISCONTINUED | OUTPATIENT
Start: 2017-11-04 | End: 2017-11-05

## 2017-11-04 RX ORDER — FOLIC ACID 0.8 MG
1 TABLET ORAL DAILY
Qty: 0 | Refills: 0 | Status: DISCONTINUED | OUTPATIENT
Start: 2017-11-04 | End: 2017-11-09

## 2017-11-04 RX ORDER — HYDROMORPHONE HYDROCHLORIDE 2 MG/ML
1 INJECTION INTRAMUSCULAR; INTRAVENOUS; SUBCUTANEOUS
Qty: 0 | Refills: 0 | Status: DISCONTINUED | OUTPATIENT
Start: 2017-11-04 | End: 2017-11-04

## 2017-11-04 RX ORDER — DEXTROSE 50 % IN WATER 50 %
12.5 SYRINGE (ML) INTRAVENOUS ONCE
Qty: 0 | Refills: 0 | Status: DISCONTINUED | OUTPATIENT
Start: 2017-11-04 | End: 2017-11-05

## 2017-11-04 RX ORDER — CYCLOBENZAPRINE HYDROCHLORIDE 10 MG/1
10 TABLET, FILM COATED ORAL EVERY 8 HOURS
Qty: 0 | Refills: 0 | Status: DISCONTINUED | OUTPATIENT
Start: 2017-11-04 | End: 2017-11-09

## 2017-11-04 RX ORDER — SODIUM CHLORIDE 9 MG/ML
1000 INJECTION, SOLUTION INTRAVENOUS
Qty: 0 | Refills: 0 | Status: DISCONTINUED | OUTPATIENT
Start: 2017-11-04 | End: 2017-11-04

## 2017-11-04 RX ORDER — OXYCODONE AND ACETAMINOPHEN 5; 325 MG/1; MG/1
1 TABLET ORAL EVERY 4 HOURS
Qty: 0 | Refills: 0 | Status: DISCONTINUED | OUTPATIENT
Start: 2017-11-04 | End: 2017-11-04

## 2017-11-04 RX ORDER — OXYCODONE HYDROCHLORIDE 5 MG/1
10 TABLET ORAL EVERY 4 HOURS
Qty: 0 | Refills: 0 | Status: DISCONTINUED | OUTPATIENT
Start: 2017-11-04 | End: 2017-11-08

## 2017-11-04 RX ORDER — OXYCODONE AND ACETAMINOPHEN 5; 325 MG/1; MG/1
2 TABLET ORAL EVERY 6 HOURS
Qty: 0 | Refills: 0 | Status: DISCONTINUED | OUTPATIENT
Start: 2017-11-04 | End: 2017-11-04

## 2017-11-04 RX ORDER — DOCUSATE SODIUM 100 MG
100 CAPSULE ORAL THREE TIMES A DAY
Qty: 0 | Refills: 0 | Status: DISCONTINUED | OUTPATIENT
Start: 2017-11-04 | End: 2017-11-09

## 2017-11-04 RX ORDER — DEXTROSE 50 % IN WATER 50 %
1 SYRINGE (ML) INTRAVENOUS ONCE
Qty: 0 | Refills: 0 | Status: DISCONTINUED | OUTPATIENT
Start: 2017-11-04 | End: 2017-11-05

## 2017-11-04 RX ORDER — ASCORBIC ACID 60 MG
500 TABLET,CHEWABLE ORAL
Qty: 0 | Refills: 0 | Status: DISCONTINUED | OUTPATIENT
Start: 2017-11-04 | End: 2017-11-09

## 2017-11-04 RX ORDER — MORPHINE SULFATE 50 MG/1
1 CAPSULE, EXTENDED RELEASE ORAL
Qty: 0 | Refills: 0 | Status: DISCONTINUED | OUTPATIENT
Start: 2017-11-04 | End: 2017-11-06

## 2017-11-04 RX ORDER — DIPHENHYDRAMINE HCL 50 MG
25 CAPSULE ORAL EVERY 6 HOURS
Qty: 0 | Refills: 0 | Status: DISCONTINUED | OUTPATIENT
Start: 2017-11-04 | End: 2017-11-04

## 2017-11-04 RX ORDER — INSULIN LISPRO 100/ML
VIAL (ML) SUBCUTANEOUS EVERY 6 HOURS
Qty: 0 | Refills: 0 | Status: DISCONTINUED | OUTPATIENT
Start: 2017-11-04 | End: 2017-11-05

## 2017-11-04 RX ORDER — FOLIC ACID 0.8 MG
1 TABLET ORAL DAILY
Qty: 0 | Refills: 0 | Status: DISCONTINUED | OUTPATIENT
Start: 2017-11-04 | End: 2017-11-04

## 2017-11-04 RX ORDER — ACETAMINOPHEN 500 MG
650 TABLET ORAL EVERY 6 HOURS
Qty: 0 | Refills: 0 | Status: DISCONTINUED | OUTPATIENT
Start: 2017-11-04 | End: 2017-11-09

## 2017-11-04 RX ORDER — OXYCODONE HYDROCHLORIDE 5 MG/1
5 TABLET ORAL EVERY 4 HOURS
Qty: 0 | Refills: 0 | Status: DISCONTINUED | OUTPATIENT
Start: 2017-11-04 | End: 2017-11-08

## 2017-11-04 RX ORDER — DIPHENHYDRAMINE HCL 50 MG
25 CAPSULE ORAL EVERY 4 HOURS
Qty: 0 | Refills: 0 | Status: DISCONTINUED | OUTPATIENT
Start: 2017-11-04 | End: 2017-11-04

## 2017-11-04 RX ORDER — DIPHENHYDRAMINE HCL 50 MG
25 CAPSULE ORAL EVERY 4 HOURS
Qty: 0 | Refills: 0 | Status: DISCONTINUED | OUTPATIENT
Start: 2017-11-04 | End: 2017-11-09

## 2017-11-04 RX ORDER — DOCUSATE SODIUM 100 MG
100 CAPSULE ORAL THREE TIMES A DAY
Qty: 0 | Refills: 0 | Status: DISCONTINUED | OUTPATIENT
Start: 2017-11-04 | End: 2017-11-04

## 2017-11-04 RX ORDER — HYDROMORPHONE HYDROCHLORIDE 2 MG/ML
1 INJECTION INTRAMUSCULAR; INTRAVENOUS; SUBCUTANEOUS
Qty: 0 | Refills: 0 | Status: DISCONTINUED | OUTPATIENT
Start: 2017-11-04 | End: 2017-11-06

## 2017-11-04 RX ORDER — SENNA PLUS 8.6 MG/1
2 TABLET ORAL AT BEDTIME
Qty: 0 | Refills: 0 | Status: DISCONTINUED | OUTPATIENT
Start: 2017-11-04 | End: 2017-11-09

## 2017-11-04 RX ORDER — BENZOCAINE AND MENTHOL 5; 1 G/100ML; G/100ML
1 LIQUID ORAL
Qty: 0 | Refills: 0 | Status: DISCONTINUED | OUTPATIENT
Start: 2017-11-04 | End: 2017-11-04

## 2017-11-04 RX ORDER — ONDANSETRON 8 MG/1
4 TABLET, FILM COATED ORAL ONCE
Qty: 0 | Refills: 0 | Status: DISCONTINUED | OUTPATIENT
Start: 2017-11-04 | End: 2017-11-09

## 2017-11-04 RX ADMIN — Medication 500 MILLIGRAM(S): at 18:56

## 2017-11-04 RX ADMIN — HYDROMORPHONE HYDROCHLORIDE 1 MILLIGRAM(S): 2 INJECTION INTRAMUSCULAR; INTRAVENOUS; SUBCUTANEOUS at 21:26

## 2017-11-04 RX ADMIN — POTASSIUM PHOSPHATE, MONOBASIC POTASSIUM PHOSPHATE, DIBASIC 62.5 MILLIMOLE(S): 236; 224 INJECTION, SOLUTION INTRAVENOUS at 08:54

## 2017-11-04 RX ADMIN — SENNA PLUS 2 TABLET(S): 8.6 TABLET ORAL at 22:05

## 2017-11-04 RX ADMIN — Medication 100 MILLIGRAM(S): at 22:05

## 2017-11-04 RX ADMIN — SODIUM CHLORIDE 75 MILLILITER(S): 9 INJECTION INTRAMUSCULAR; INTRAVENOUS; SUBCUTANEOUS at 18:57

## 2017-11-04 RX ADMIN — Medication 1 TABLET(S): at 23:50

## 2017-11-04 RX ADMIN — HYDROMORPHONE HYDROCHLORIDE 1 MILLIGRAM(S): 2 INJECTION INTRAMUSCULAR; INTRAVENOUS; SUBCUTANEOUS at 21:11

## 2017-11-04 RX ADMIN — Medication 37.5 MICROGRAM(S): at 05:38

## 2017-11-04 RX ADMIN — SODIUM CHLORIDE 75 MILLILITER(S): 9 INJECTION INTRAMUSCULAR; INTRAVENOUS; SUBCUTANEOUS at 03:30

## 2017-11-04 RX ADMIN — Medication 250 MILLIGRAM(S): at 05:40

## 2017-11-04 RX ADMIN — Medication 1 SPRAY(S): at 05:39

## 2017-11-04 RX ADMIN — PANTOPRAZOLE SODIUM 40 MILLIGRAM(S): 20 TABLET, DELAYED RELEASE ORAL at 05:38

## 2017-11-04 RX ADMIN — MEROPENEM 200 MILLIGRAM(S): 1 INJECTION INTRAVENOUS at 13:12

## 2017-11-04 RX ADMIN — CYCLOBENZAPRINE HYDROCHLORIDE 10 MILLIGRAM(S): 10 TABLET, FILM COATED ORAL at 22:05

## 2017-11-04 RX ADMIN — MEROPENEM 200 MILLIGRAM(S): 1 INJECTION INTRAVENOUS at 05:38

## 2017-11-04 NOTE — PRE-OP CHECKLIST - SELECT TESTS ORDERED
PT/PTT/Type and Cross/CXR/CMP/Results in MD note/BMP/INR/Type and Screen/Urinalysis/EKG
BMP/CBC/PT/PTT/CMP/Mag,Phosp,VT

## 2017-11-04 NOTE — BRIEF OPERATIVE NOTE - PROCEDURE
<<-----Click on this checkbox to enter Procedure Evacuation of hematoma  11/04/2017    Active  ALICIA  Laminectomy  11/04/2017    Active  ALICIA

## 2017-11-04 NOTE — PROGRESS NOTE ADULT - SUBJECTIVE AND OBJECTIVE BOX
Pt seen & examined. Pain controlled. Awake and Alert, answering questions following commands    All vital signs stable  Gen: NAD  Spine:    Dressing clean D&I  +Sensation C5-T1 B/L  SILT RLE, 5/5 RLE   Decreased sensation in LLE, Hip flexion 3/5 LLE, Knee extension 3/5 LLE  3+/5 Dorsi and plantar flexion b/l  Moving all extremities  Distal pulses intact  Soft compartments, - calf tenderness to palpation

## 2017-11-04 NOTE — PROGRESS NOTE ADULT - PROBLEM SELECTOR PLAN 1
Improving mental status  -avoid sedatives and narcotics  MRI of spine c/w spinal abscess vs hematoma.   -Continue neuro checks  --Continue with vanc and meropenem for spinal abscess Improving mental status, likely Metabolic  encephalopathy?   -avoid sedatives and narcotics  MRI of spine c/w spinal abscess vs hematoma.   -Continue neuro checks  --Continue with vanc and meropenem for spinal abscess

## 2017-11-04 NOTE — PROGRESS NOTE ADULT - SUBJECTIVE AND OBJECTIVE BOX
Date/Time Patient Seen:  		  Referring MD:   Data Reviewed	       Patient is a 82y old  Male who presents with a chief complaint of sent from [facility] for AMS (01 Nov 2017 10:09)  in bed  seen and examined  vs and meds reviewed  on ABX  ID following  for possible OR today        Subjective/HPI     PAST MEDICAL & SURGICAL HISTORY:  Prostate CA  Crohn disease  Hypothyroidism  Depression  Intervertebral disc disorder  Diabetes mellitus  Obesity  Hypercholesterolemia  HTN (hypertension)  Stented coronary artery: 2 stents, 20 yrs ago, 15 yrs ago  CAD (coronary artery disease)  Macular degeneration: right eye  Glaucoma: right eye  Hearing loss: left hearing aid, right ear deaf  S/P colon resection  S/P lumbar fusion  History of hip replacement, total, right: Revision  History of hip replacement, total, right        Medication list         MEDICATIONS  (STANDING):  atorvastatin 20 milliGRAM(s) Oral at bedtime  busPIRone 10 milliGRAM(s) Oral two times a day  dextrose 5%. 1000 milliLiter(s) (50 mL/Hr) IV Continuous <Continuous>  dextrose 50% Injectable 12.5 Gram(s) IV Push once  dextrose 50% Injectable 25 Gram(s) IV Push once  dextrose 50% Injectable 25 Gram(s) IV Push once  docusate sodium 100 milliGRAM(s) Oral three times a day  guaiFENesin  milliGRAM(s) Oral every 12 hours  insulin lispro (HumaLOG) corrective regimen sliding scale   SubCutaneous every 6 hours  levothyroxine Injectable 37.5 MICROGram(s) IV Push daily  meropenem IVPB 1000 milliGRAM(s) IV Intermittent every 8 hours  montelukast 10 milliGRAM(s) Oral at bedtime  multivitamin 1 Tablet(s) Oral daily  pantoprazole  Injectable 40 milliGRAM(s) IV Push every 12 hours  senna 2 Tablet(s) Oral at bedtime  sodium chloride 0.65% Nasal 1 Spray(s) Both Nostrils two times a day  sodium chloride 0.9%. 1000 milliLiter(s) (75 mL/Hr) IV Continuous <Continuous>  vancomycin  IVPB      verapamil  milliGRAM(s) Oral daily    MEDICATIONS  (PRN):  acetaminophen  Suppository 650 milliGRAM(s) Rectal every 6 hours PRN For Temp greater than 38 C (100.4 F)  ALBUTerol    0.083% 2.5 milliGRAM(s) Nebulizer every 6 hours PRN Shortness of Breath and/or Wheezing  dextrose Gel 1 Dose(s) Oral once PRN Blood Glucose LESS THAN 70 milliGRAM(s)/deciliter  glucagon  Injectable 1 milliGRAM(s) IntraMuscular once PRN Glucose LESS THAN 70 milligrams/deciliter  ondansetron Injectable 4 milliGRAM(s) IV Push every 6 hours PRN Nausea and/or Vomiting         Vitals log        ICU Vital Signs Last 24 Hrs  T(C): 37.4 (04 Nov 2017 04:00), Max: 37.4 (03 Nov 2017 23:54)  T(F): 99.3 (04 Nov 2017 04:00), Max: 99.3 (03 Nov 2017 23:54)  HR: 86 (04 Nov 2017 04:00) (74 - 86)  BP: 171/72 (04 Nov 2017 04:00) (124/60 - 171/72)  BP(mean): 104 (04 Nov 2017 04:00) (83 - 110)  ABP: --  ABP(mean): --  RR: 40 (04 Nov 2017 04:00) (13 - 40)  SpO2: 98% (04 Nov 2017 04:00) (96% - 100%)           Input and Output:  I&O's Detail    02 Nov 2017 07:01  -  03 Nov 2017 07:00  --------------------------------------------------------  IN:    IV PiggyBack: 450 mL    sodium chloride 0.9%.: 1650 mL  Total IN: 2100 mL    OUT:    Nasoenteral Tube: 250 mL  Total OUT: 250 mL    Total NET: 1850 mL      03 Nov 2017 07:01  -  04 Nov 2017 06:09  --------------------------------------------------------  IN:    Enteral Tube Flush: 1000 mL    sodium chloride 0.9%.: 1425 mL    Solution: 250 mL    Solution: 200 mL  Total IN: 2875 mL    OUT:    Nasoenteral Tube: 550 mL  Total OUT: 550 mL    Total NET: 2325 mL          Lab Data                        10.3   23.9  )-----------( 525      ( 03 Nov 2017 05:50 )             32.5     11-03    143  |  99  |  36<H>  ----------------------------<  170<H>  3.7   |  35<H>  |  1.70<H>    Ca    8.5      03 Nov 2017 05:50  Phos  3.9     11-03  Mg     1.9     11-03    TPro  5.3<L>  /  Alb  2.1<L>  /  TBili  0.6  /  DBili  x   /  AST  11<L>  /  ALT  19  /  AlkPhos  80  11-03    ABG - ( 03 Nov 2017 05:55 )  pH: 7.44  /  pCO2: 49    /  pO2: 225   / HCO3: 32    / Base Excess: 8.8   /  SaO2: 99                      Review of Systems	      Objective     Physical Examination    head at, heart - s1s2, lungs - dec BS, abd - soft,     Pertinent Lab findings & Imaging      Luanne:  NO   Adequate UO     I&O's Detail    02 Nov 2017 07:01  -  03 Nov 2017 07:00  --------------------------------------------------------  IN:    IV PiggyBack: 450 mL    sodium chloride 0.9%.: 1650 mL  Total IN: 2100 mL    OUT:    Nasoenteral Tube: 250 mL  Total OUT: 250 mL    Total NET: 1850 mL      03 Nov 2017 07:01  -  04 Nov 2017 06:09  --------------------------------------------------------  IN:    Enteral Tube Flush: 1000 mL    sodium chloride 0.9%.: 1425 mL    Solution: 250 mL    Solution: 200 mL  Total IN: 2875 mL    OUT:    Nasoenteral Tube: 550 mL  Total OUT: 550 mL    Total NET: 2325 mL               Discussed with:     Cultures:	        Radiology

## 2017-11-04 NOTE — PROGRESS NOTE ADULT - ASSESSMENT
82 year old male with PMH of recent lumbar laminectomy on 10/27 presents with AMS from [facility]. Course complicated by coffee ground emesis, serosanguinous fluid drainage from surgical incision with increasing leukocytosis suggestive of spinal abscess, and hypoxia. Transferred to ICU for closer monitoring. Plan for OR to determine spinal abscess vs hematoma. 82 year old male with PMH of recent lumbar laminectomy on 10/27 [facility] likely secondary to Encephalopathy .   Course complicated by coffee ground emesis, serosanguinous fluid drainage from surgical incision with increasing leukocytosis suggestive of spinal abscess, and hypoxia. Transferred to ICU for closer monitoring. Plan for OR to determine spinal abscess vs hematoma.

## 2017-11-04 NOTE — CHART NOTE - NSCHARTNOTEFT_GEN_A_CORE
pt for OR this am  optimized from Pulmonary point  moderate risk, however, necessary risk  evaluated by ICU MD this am  discussed with Anesthesia  will follow  thank you

## 2017-11-04 NOTE — PROGRESS NOTE ADULT - PROBLEM SELECTOR PLAN 5
albuterol PRN  differential dx in wheezing, volume overload,   monitor I and O  nebs PRN  monitor sat

## 2017-11-04 NOTE — BRIEF OPERATIVE NOTE - POST-OP DX
Epidural hematoma  11/04/2017    Active  [place] Platt  Lumbar stenosis with neurogenic claudication  11/04/2017    Active  [place] Platt

## 2017-11-04 NOTE — PROGRESS NOTE ADULT - SUBJECTIVE AND OBJECTIVE BOX
Subjective: Pt without abdominal complaints.    Objective:  Vital Signs Last 24 Hrs  T(C): 37.1 (04 Nov 2017 08:05), Max: 37.4 (03 Nov 2017 23:54)  T(F): 98.8 (04 Nov 2017 08:05), Max: 99.3 (03 Nov 2017 23:54)  HR: 87 (04 Nov 2017 10:00) (78 - 87)  BP: 135/62 (04 Nov 2017 10:00) (135/62 - 171/72)  BP(mean): 89 (04 Nov 2017 10:00) (89 - 110)  RR: 17 (04 Nov 2017 10:00) (9 - 40)  SpO2: 97% (04 Nov 2017 10:00) (95% - 99%)    Heent: WHITNEY, FREOM  Pul: clear  Cor: RSR, without murmurs  Abdomen: normal bowel sounds, without distension or tenderness  Extremities: without edema, motor/sensory intact, without calf pain, Homans sign negative.                        9.5    10.9  )-----------( 402      ( 04 Nov 2017 06:12 )             29.8       11-04    140  |  102  |  29<H>  ----------------------------<  110<H>  3.5   |  28  |  1.10    Ca    8.2<L>      04 Nov 2017 06:12  Phos  1.8     11-04  Mg     1.8     11-04    TPro  5.0<L>  /  Alb  1.9<L>  /  TBili  0.5  /  DBili  x   /  AST  16  /  ALT  20  /  AlkPhos  79  11-04 11-03 @ 07:01  -  11-04 @ 07:00  --------------------------------------------------------  IN:    Enteral Tube Flush: 1000 mL    sodium chloride 0.9%.: 1800 mL    Solution: 300 mL    Solution: 500 mL  Total IN: 3600 mL    OUT:    Nasoenteral Tube: 700 mL  Total OUT: 700 mL    Total NET: 2900 mL      11-04 @ 08:01  -  11-04 @ 11:15  --------------------------------------------------------  IN:    IV PiggyBack: 250 mL    sodium chloride 0.9%.: 225 mL  Total IN: 475 mL    OUT:    Nasoenteral Tube: 15 mL  Total OUT: 15 mL    Total NET: 460 mL

## 2017-11-04 NOTE — PROGRESS NOTE ADULT - PROBLEM SELECTOR PLAN 2
s/p laminectomy on 10/25 and complicated by blood loss anemia.  MRI of spine c/w spinal abscess vs hematoma.   -Continue neuro checks  -Patient is medically optimized for this urgent procedure.   -Continue with vanco and meropenem for spinal abscess. Monitor renal function, vanco trough.

## 2017-11-04 NOTE — PROGRESS NOTE ADULT - SUBJECTIVE AND OBJECTIVE BOX
24 hour events:   no overnight events    T(F): 98.8 (11-04-17 @ 08:05), Max: 99.3 (11-03-17 @ 23:54)  HR: 87 (11-04-17 @ 06:00) (74 - 87)  BP: 149/65 (11-04-17 @ 06:00) (124/60 - 171/72)  RR: 19 (11-04-17 @ 06:00) (13 - 40)  SpO2: 96% (11-04-17 @ 06:00) (96% - 99%)      CAPILLARY BLOOD GLUCOSE  125 (04 Nov 2017 06:00)      POCT Blood Glucose.: 125 mg/dL (04 Nov 2017 06:05)      I&O's Summary    03 Nov 2017 07:01  -  04 Nov 2017 07:00  --------------------------------------------------------  IN: 3600 mL / OUT: 700 mL / NET: 2900 mL    NGT-- 700cc    Physical Exam:   Gen: comfortable, lying in bed  Neuro: alert, appropriate, decreased sensation to light touch in left leg  HEENT: PERRL  Resp: CTABL  CVS: RRR  Abd: soft, NTND  Ext: no edema    Meds:  meropenem IVPB IV Intermittent  vancomycin  IVPB   verapamil SR Oral  atorvastatin Oral  dextrose 50% Injectable IV Push  dextrose 50% Injectable IV Push  dextrose 50% Injectable IV Push  dextrose Gel Oral PRN  glucagon  Injectable IntraMuscular PRN  insulin lispro (HumaLOG) corrective regimen sliding scale SubCutaneous  levothyroxine Injectable IV Push    ALBUTerol    0.083% Nebulizer PRN  guaiFENesin ER Oral  montelukast Oral    acetaminophen  Suppository Rectal PRN  busPIRone Oral  ondansetron Injectable IV Push PRN        docusate sodium Oral  pantoprazole  Injectable IV Push  senna Oral      dextrose 5%. IV Continuous  multivitamin Oral  potassium phosphate IVPB IV Intermittent  sodium chloride 0.9%. IV Continuous      sodium chloride 0.65% Nasal Both Nostrils                              9.5    10.9  )-----------( 402      ( 04 Nov 2017 06:12 )             29.8       11-04    140  |  102  |  29<H>  ----------------------------<  110<H>  3.5   |  28  |  1.10    Ca    8.2<L>      04 Nov 2017 06:12  Phos  1.8     11-04  Mg     1.8     11-04    TPro  5.0<L>  /  Alb  1.9<L>  /  TBili  0.5  /  DBili  x   /  AST  16  /  ALT  20  /  AlkPhos  79  11-04          PT/INR - ( 04 Nov 2017 06:12 )   PT: 14.3 sec;   INR: 1.30 ratio         PTT - ( 04 Nov 2017 06:12 )  PTT:23.4 sec    .Blood Blood-Peripheral   No growth to date. -- 11-03 @ 00:51  .Blood Blood-Peripheral   No growth to date. -- 11-02 @ 22:53  .Surgical Swab surgical wound lumbar area   No growth to date. -- 11-02 @ 11:44  .Blood Blood-Peripheral   No growth to date. -- 11-01 @ 08:40  .Urine Catheterized   No growth -- 11-01 @ 08:34      Rapid RVP Result: NotDetec (11-01 @ 04:19)    Radiology:   CT Chest No Cont (11.03.17 @ 13:06) >  Impression:    Bibasilar airspace infiltrates consistent with aspiration pneumonia in   appropriate clinical setting. Small bilateral pleural effusions.  No acute obstructive or inflammatory pathology in the abdomen and pelvis.  Findings as discussed    < end of copied text >        CENTRAL LINE: N/Y          DATE INSERTED:              REMOVE: Y/N  PALOMO: N/Y                       DATE INSERTED:              REMOVE: Y/N  A-LINE: N/Y                       DATE INSERTED:              REMOVE: Y/N    GLOBAL ISSUE/BEST PRACTICE:  Analgesia:  Sedation:  CAM-ICU:   HOB elevation: yes  Stress ulcer prophylaxis:  VTE prophylaxis:  Glycemic control:  Nutrition:    CODE STATUS: *** 24 hour events:   no overnight events    T(F): 98.8 (11-04-17 @ 08:05), Max: 99.3 (11-03-17 @ 23:54)  HR: 87 (11-04-17 @ 06:00) (74 - 87)  BP: 149/65 (11-04-17 @ 06:00) (124/60 - 171/72)  RR: 19 (11-04-17 @ 06:00) (13 - 40)  SpO2: 96% (11-04-17 @ 06:00) (96% - 99%)      CAPILLARY BLOOD GLUCOSE  125 (04 Nov 2017 06:00)      POCT Blood Glucose.: 125 mg/dL (04 Nov 2017 06:05)      I&O's Summary    03 Nov 2017 07:01  -  04 Nov 2017 07:00  --------------------------------------------------------  IN: 3600 mL / OUT: 700 mL / NET: 2900 mL    NGT-- 700cc    Physical Exam:   Gen: comfortable, lying in bed  Neuro: alert, appropriate, decreased sensation to light touch in left leg  HEENT: PERRL  Resp: CTABL  CVS: RRR  Abd: soft, NTND  Ext: no edema    Meds:  meropenem IVPB IV Intermittent  vancomycin  IVPB   verapamil SR Oral  atorvastatin Oral  dextrose 50% Injectable IV Push  dextrose 50% Injectable IV Push  dextrose 50% Injectable IV Push  dextrose Gel Oral PRN  glucagon  Injectable IntraMuscular PRN  insulin lispro (HumaLOG) corrective regimen sliding scale SubCutaneous  levothyroxine Injectable IV Push    ALBUTerol    0.083% Nebulizer PRN  guaiFENesin ER Oral  montelukast Oral    acetaminophen  Suppository Rectal PRN  busPIRone Oral  ondansetron Injectable IV Push PRN        docusate sodium Oral  pantoprazole  Injectable IV Push  senna Oral      dextrose 5%. IV Continuous  multivitamin Oral  potassium phosphate IVPB IV Intermittent  sodium chloride 0.9%. IV Continuous      sodium chloride 0.65% Nasal Both Nostrils                              9.5    10.9  )-----------( 402      ( 04 Nov 2017 06:12 )             29.8       11-04    140  |  102  |  29<H>  ----------------------------<  110<H>  3.5   |  28  |  1.10    Ca    8.2<L>      04 Nov 2017 06:12  Phos  1.8     11-04  Mg     1.8     11-04    TPro  5.0<L>  /  Alb  1.9<L>  /  TBili  0.5  /  DBili  x   /  AST  16  /  ALT  20  /  AlkPhos  79  11-04          PT/INR - ( 04 Nov 2017 06:12 )   PT: 14.3 sec;   INR: 1.30 ratio         PTT - ( 04 Nov 2017 06:12 )  PTT:23.4 sec    .Blood Blood-Peripheral   No growth to date. -- 11-03 @ 00:51  .Blood Blood-Peripheral   No growth to date. -- 11-02 @ 22:53  .Surgical Swab surgical wound lumbar area   No growth to date. -- 11-02 @ 11:44  .Blood Blood-Peripheral   No growth to date. -- 11-01 @ 08:40  .Urine Catheterized   No growth -- 11-01 @ 08:34      Rapid RVP Result: NotDetec (11-01 @ 04:19)    Radiology:   CT Chest No Cont (11.03.17 @ 13:06) >  Impression:    Bibasilar airspace infiltrates consistent with aspiration pneumonia in   appropriate clinical setting. Small bilateral pleural effusions.  No acute obstructive or inflammatory pathology in the abdomen and pelvis.  Findings as discussed    < end of copied text >        CENTRAL LINE: N  PALOMO: N  A-LINE: N    GLOBAL ISSUE/BEST PRACTICE:  Analgesia: N  Sedation:N  HOB elevation: yes  Stress ulcer prophylaxis: Y  VTE prophylaxis: SCD  Glycemic control: Y  Nutrition: NPO    CODE STATUS: FULL 24 hour events:   no overnight events    T(F): 98.8 (11-04-17 @ 08:05), Max: 99.3 (11-03-17 @ 23:54)  HR: 87 (11-04-17 @ 06:00) (74 - 87)  BP: 149/65 (11-04-17 @ 06:00) (124/60 - 171/72)  RR: 19 (11-04-17 @ 06:00) (13 - 40)  SpO2: 96% (11-04-17 @ 06:00) (96% - 99%)      CAPILLARY BLOOD GLUCOSE  125 (04 Nov 2017 06:00)      POCT Blood Glucose.: 125 mg/dL (04 Nov 2017 06:05)      I&O's Summary    03 Nov 2017 07:01  -  04 Nov 2017 07:00  --------------------------------------------------------  IN: 3600 mL / OUT: 700 mL / NET: 2900 mL    NGT-- 700cc    Physical Exam:   Gen: comfortable, lying in bed  Neuro: alert, appropriate, decreased sensation to light touch in left leg  HEENT: PERRL  Resp: CTABL  CVS: RRR  Abd: soft, NTND  Ext: no edema    Meds:  meropenem IVPB IV Intermittent  vancomycin  IVPB   verapamil SR Oral  atorvastatin Oral  dextrose 50% Injectable IV Push  dextrose 50% Injectable IV Push  dextrose 50% Injectable IV Push  dextrose Gel Oral PRN  glucagon  Injectable IntraMuscular PRN  insulin lispro (HumaLOG) corrective regimen sliding scale SubCutaneous  levothyroxine Injectable IV Push    ALBUTerol    0.083% Nebulizer PRN  guaiFENesin ER Oral  montelukast Oral    acetaminophen  Suppository Rectal PRN  busPIRone Oral  ondansetron Injectable IV Push PRN        docusate sodium Oral  pantoprazole  Injectable IV Push  senna Oral      dextrose 5%. IV Continuous  multivitamin Oral  potassium phosphate IVPB IV Intermittent  sodium chloride 0.9%. IV Continuous      sodium chloride 0.65% Nasal Both Nostrils                              9.5    10.9  )-----------( 402      ( 04 Nov 2017 06:12 )             29.8       11-04    140  |  102  |  29<H>  ----------------------------<  110<H>  3.5   |  28  |  1.10    Ca    8.2<L>      04 Nov 2017 06:12  Phos  1.8     11-04  Mg     1.8     11-04    TPro  5.0<L>  /  Alb  1.9<L>  /  TBili  0.5  /  DBili  x   /  AST  16  /  ALT  20  /  AlkPhos  79  11-04          PT/INR - ( 04 Nov 2017 06:12 )   PT: 14.3 sec;   INR: 1.30 ratio         PTT - ( 04 Nov 2017 06:12 )  PTT:23.4 sec    .Blood Blood-Peripheral   No growth to date. -- 11-03 @ 00:51  .Blood Blood-Peripheral   No growth to date. -- 11-02 @ 22:53  .Surgical Swab surgical wound lumbar area   No growth to date. -- 11-02 @ 11:44  .Blood Blood-Peripheral   No growth to date. -- 11-01 @ 08:40  .Urine Catheterized   No growth -- 11-01 @ 08:34      Rapid RVP Result: NotDetec (11-01 @ 04:19)    Radiology:   CT Chest No Cont (11.03.17 @ 13:06) >  Impression:    Bibasilar airspace infiltrates consistent with aspiration pneumonia in   appropriate clinical setting. Small bilateral pleural effusions.  No acute obstructive or inflammatory pathology in the abdomen and pelvis.  Findings as discussed    < end of copied text >        MRI Lumbar Spine w/wo Cont (11.02.17 @ 15:41) >  *** ADDENDUM 11/03/2017  ***    Discussion with the treating team reports no clinical evidence of   infection. Perioperative hematoma or seroma or reactive source related to   hardware also considered for the collection in question.      *** END OF ADDENDUM 11/03/2017  ***        IMPRESSION:  Collection in the surgical bed after laminectomy consistent with the   given clinical history of abscess resulting in spinal stenosis at the L3   level as above     Negative for evidence of discitis or osteomyelitis.      ***Please see the addendum at the top of this report. It may contain   additional important information or changes.****    < end of copied text >    CENTRAL LINE: N  PALOMO: N  A-LINE: N    GLOBAL ISSUE/BEST PRACTICE:  Analgesia: N  Sedation:N  HOB elevation: yes  Stress ulcer prophylaxis: Y  VTE prophylaxis: SCD  Glycemic control: Y  Nutrition: NPO    CODE STATUS: FULL

## 2017-11-04 NOTE — PROGRESS NOTE ADULT - PROBLEM SELECTOR PLAN 2
on emp ABX  ID following  for Poss OR today  pt curr NPO and on IVF  orthop following  monitor sat and HD  am labs pending

## 2017-11-04 NOTE — PROGRESS NOTE ADULT - ASSESSMENT
82 year old male unable to provide hx. History obtained from his medical record.  He has a h/o CAD s/p remote PCI, Crohn's disease, T2DM, HTN, HLD, and hypothyroidism.  Has a known RBBB.  Is recently s/p laminectomy, course complicated by blood loss and hypotension.   No acute cv process was felt to be present at that time.  He did not have followup evaluation after that.   Eventually discharged to rehab, and is now sent from rehab to the hospital  for an altered ms.  Was found to have a leukocytosis, and spine imaging suspicious for spinal abscess vs hematoma.       -there is no evidence of acute ischemia.  -ekg stable  -remote pci, not presently on antiplatelet agent, which cannot be started at this time in the context of spinal surgery in any case  -eventual asa  -there is no evidence of significant arrhythmia.  -cont to follow tele  -there is no evidence for meaningful  volume overload.  -follow i/o  -DVT prophylaxis  -monitor electrolytes, keep k>4, Mg>2  -follow ms  -planned for urgent surgery today. is optimized from a cv perspective for his planned surgery.  routine hemodynamic monitoring is recommended  -will follow    The patient is at risk of abrupt decompensation.  35 minutes of critical care time was spent with this patient.

## 2017-11-04 NOTE — PROGRESS NOTE ADULT - SUBJECTIVE AND OBJECTIVE BOX
Patient is a 82y old  Male who presents with a chief complaint of sent from [facility] for AMS (01 Nov 2017 10:09)       INTERVAL HPI/ VERNIGHT EVENTS: No new events. Denied pain or any other symptoms.    MEDICATIONS  (STANDING):  atorvastatin 20 milliGRAM(s) Oral at bedtime  BACItracin 50,000 Unit(s) Injectable (Rx Quick Charge) 406538 Unit(s) IntraMuscular   busPIRone 10 milliGRAM(s) Oral two times a day  dextrose 5%. 1000 milliLiter(s) (50 mL/Hr) IV Continuous <Continuous>  dextrose 50% Injectable 12.5 Gram(s) IV Push once  dextrose 50% Injectable 25 Gram(s) IV Push once  dextrose 50% Injectable 25 Gram(s) IV Push once  docusate sodium 100 milliGRAM(s) Oral three times a day  guaiFENesin  milliGRAM(s) Oral every 12 hours  insulin lispro (HumaLOG) corrective regimen sliding scale   SubCutaneous every 6 hours  levothyroxine Injectable 37.5 MICROGram(s) IV Push daily  meropenem IVPB 1000 milliGRAM(s) IV Intermittent every 8 hours  montelukast 10 milliGRAM(s) Oral at bedtime  multivitamin 1 Tablet(s) Oral daily  pantoprazole  Injectable 40 milliGRAM(s) IV Push every 12 hours  senna 2 Tablet(s) Oral at bedtime  sodium chloride 0.65% Nasal 1 Spray(s) Both Nostrils two times a day  sodium chloride 0.9%. 1000 milliLiter(s) (75 mL/Hr) IV Continuous <Continuous>  vancomycin  IVPB      verapamil  milliGRAM(s) Oral daily    MEDICATIONS  (PRN):  acetaminophen  Suppository 650 milliGRAM(s) Rectal every 6 hours PRN For Temp greater than 38 C (100.4 F)  ALBUTerol    0.083% 2.5 milliGRAM(s) Nebulizer every 6 hours PRN Shortness of Breath and/or Wheezing  dextrose Gel 1 Dose(s) Oral once PRN Blood Glucose LESS THAN 70 milliGRAM(s)/deciliter  glucagon  Injectable 1 milliGRAM(s) IntraMuscular once PRN Glucose LESS THAN 70 milligrams/deciliter  ondansetron Injectable 4 milliGRAM(s) IV Push every 6 hours PRN Nausea and/or Vomiting      Allergies    penicillin (Hives)    Intolerances        REVIEW OF SYSTEMS:  CONSTITUTIONAL: No fever,  or fatigue  EYES: No eye pain, visual disturbances, or discharge  ENMT:  No difficulty hearing, tinnitus, vertigo; No sinus or throat pain  NECK: No pain or stiffness  RESPIRATORY: No cough, wheezing, chills or hemoptysis; No shortness of breath  CARDIOVASCULAR: No chest pain, palpitations, dizziness, or leg swelling  GASTROINTESTINAL: No abdominal or epigastric pain. No nausea, vomiting, or hematemesis; No diarrhea or constipation. No melena or hematochezia.  GENITOURINARY: No dysuria, frequency, hematuria, or incontinence  NEUROLOGICAL: No headaches, memory loss, loss of strength, numbness, or tremors  SKIN: No itching, burning, rashes, or lesions   LYMPH NODES: No enlarged glands  ENDOCRINE: No heat or cold intolerance; No hair loss; No polydipsia or polyuria  MUSCULOSKELETAL: No joint pain or swelling; No muscle, back, or extremity pain  HEME/LYMPH: No easy bruising, or bleeding gums  ALLERGY AND IMMUNOLOGIC: No hives or eczema    Vital Signs Last 24 Hrs  T(C): 37.1 (04 Nov 2017 08:05), Max: 37.4 (03 Nov 2017 23:54)  T(F): 98.8 (04 Nov 2017 08:05), Max: 99.3 (03 Nov 2017 23:54)  HR: 83 (04 Nov 2017 09:00) (74 - 87)  BP: 145/65 (04 Nov 2017 09:00) (138/63 - 171/72)  BP(mean): 94 (04 Nov 2017 09:00) (91 - 110)  RR: 9 (04 Nov 2017 09:00) (9 - 40)  SpO2: 98% (04 Nov 2017 09:00) (95% - 99%)    PHYSICAL EXAM:  GENERAL: NAD,  well-developed  HEAD:  Atraumatic, Normocephalic  EYES: EOMI, PERRLA, conjunctiva and sclera clear  ENMT: No tonsillar erythema, exudates, or enlargement; Moist mucous membranes  NECK: Supple, No JVD, Normal thyroid  NERVOUS SYSTEM:  Alert & Awake. CN 2 to 12 grossly intact.   CHEST/LUNG: Clear to auscultation bilaterally; No rales, rhonchi, wheezing, or rubs  HEART: Regular rate and rhythm; No murmurs, rubs, or gallops  ABDOMEN: Soft, Nontender, Nondistended; Bowel sounds present  EXTREMITIES:  2+ Peripheral Pulses, No clubbing, cyanosis, or edema  LYMPH: No lymphadenopathy noted  SKIN: No rashes or lesions    LABS:                        9.5    10.9  )-----------( 402      ( 04 Nov 2017 06:12 )             29.8     04 Nov 2017 06:12    140    |  102    |  29     ----------------------------<  110    3.5     |  28     |  1.10     Ca    8.2        04 Nov 2017 06:12  Phos  1.8       04 Nov 2017 06:12  Mg     1.8       04 Nov 2017 06:12    TPro  5.0    /  Alb  1.9    /  TBili  0.5    /  DBili  x      /  AST  16     /  ALT  20     /  AlkPhos  79     04 Nov 2017 06:12    PT/INR - ( 04 Nov 2017 06:12 )   PT: 14.3 sec;   INR: 1.30 ratio         PTT - ( 04 Nov 2017 06:12 )  PTT:23.4 sec  CAPILLARY BLOOD GLUCOSE  125 (04 Nov 2017 06:00)  125 (04 Nov 2017 01:44)  112 (04 Nov 2017 00:00)      POCT Blood Glucose.: 125 mg/dL (04 Nov 2017 06:05)  POCT Blood Glucose.: 112 mg/dL (04 Nov 2017 00:03)  POCT Blood Glucose.: 110 mg/dL (03 Nov 2017 17:21)  POCT Blood Glucose.: 110 mg/dL (03 Nov 2017 11:32)    BLOOD CULTURE  11-03 @ 00:51   No growth to date.  --  --  11-02 @ 22:53   No growth to date.  --  --  11-02 @ 11:44   No growth to date.  --  --  11-01 @ 08:40   No growth to date.  --  --  11-01 @ 08:34   No growth  --  --    RADIOLOGY & ADDITIONAL TESTS:    Imaging Personally Reviewed:  [ ] YES     Consultant(s) Notes Reviewed:      Care Discussed with Consultants/Other Providers:

## 2017-11-04 NOTE — PROGRESS NOTE ADULT - ASSESSMENT
83 yo M w/ Hematoma vs Abscess s/p Lami+TLIF L2-S1 (10/19)  NPO  IVF while NPO  Hold all anticoagulation   awaiting clearance from ICU team  Dr. Alvarez Spoke with patient's son in detail via phone.  Discussed reasons for not operating yesterday, and possible plan for OR in AM.  He is stilll a very high risk surgical candidate but he appears to be more stable, alert and likely to be in better physiologic state to withstand the OR.  Dr. Alvarez discussed with him it is quiet possible, he may need to be intubated post surgery for few days and he understand that as well secondary to underlying respiratory distress in recent past.    He has neurologic weakness, it is ongoing for at least few days and goal is to decompress the spinal canal with hope that he is going to get additional recovery.  However, his survivability was more important last night when initially evaluated.  Anesthesia also recommended a day of observation unless absolutely emergent.  It is an urgent case and we plan on going to OR when patient has enough reserve.  Son/Healthcare proxy telephone consented for the planned procedure  spoke with Dr. Platt, patient's primary surgeon and he understands the plan as well.  Continue IV abx  FU Cultures  Hold DVT ppx for now, can continue SCD's  Fu Clearance/optimization for OR  Q3 neuro exams  Further recs to follow

## 2017-11-04 NOTE — PROGRESS NOTE ADULT - ASSESSMENT
82M with recent laminectomy here with encephalopathy and leukocytosis  r/o infectious abscess    cont empiric antibx meropenem and vancomycin  follow vanc  levels    for I and D today  send for cx.      Chest CT with infiltrates consistent with aspiration pna.  cont meropenem 82M with recent laminectomy here with encephalopathy and leukocytosis  r/o infectious abscess-- wbc with rapid decline    cont empiric antibx meropenem and vancomycin  follow vanc  levels    for I and D today  send for cx.      Chest CT with infiltrates consistent with aspiration pna.  cont meropenem

## 2017-11-04 NOTE — PROGRESS NOTE ADULT - SUBJECTIVE AND OBJECTIVE BOX
INTERVAL HPI/OVERNIGHT EVENTS: Pt seen and examined at bedside, still with NGT, can't hear well because he lost his earing aides downstairs     MEDICATIONS  (STANDING):  atorvastatin 20 milliGRAM(s) Oral at bedtime  BACItracin 50,000 Unit(s) Injectable (Rx Quick Charge) 407972 Unit(s) IntraMuscular   busPIRone 10 milliGRAM(s) Oral two times a day  dextrose 5%. 1000 milliLiter(s) (50 mL/Hr) IV Continuous <Continuous>  dextrose 50% Injectable 12.5 Gram(s) IV Push once  dextrose 50% Injectable 25 Gram(s) IV Push once  dextrose 50% Injectable 25 Gram(s) IV Push once  docusate sodium 100 milliGRAM(s) Oral three times a day  guaiFENesin  milliGRAM(s) Oral every 12 hours  insulin lispro (HumaLOG) corrective regimen sliding scale   SubCutaneous every 6 hours  levothyroxine Injectable 37.5 MICROGram(s) IV Push daily  meropenem IVPB 1000 milliGRAM(s) IV Intermittent every 8 hours  montelukast 10 milliGRAM(s) Oral at bedtime  multivitamin 1 Tablet(s) Oral daily  pantoprazole  Injectable 40 milliGRAM(s) IV Push every 12 hours  senna 2 Tablet(s) Oral at bedtime  sodium chloride 0.65% Nasal 1 Spray(s) Both Nostrils two times a day  sodium chloride 0.9%. 1000 milliLiter(s) (75 mL/Hr) IV Continuous <Continuous>  vancomycin  IVPB      verapamil  milliGRAM(s) Oral daily    MEDICATIONS  (PRN):  acetaminophen  Suppository 650 milliGRAM(s) Rectal every 6 hours PRN For Temp greater than 38 C (100.4 F)  ALBUTerol    0.083% 2.5 milliGRAM(s) Nebulizer every 6 hours PRN Shortness of Breath and/or Wheezing  dextrose Gel 1 Dose(s) Oral once PRN Blood Glucose LESS THAN 70 milliGRAM(s)/deciliter  glucagon  Injectable 1 milliGRAM(s) IntraMuscular once PRN Glucose LESS THAN 70 milligrams/deciliter  ondansetron Injectable 4 milliGRAM(s) IV Push every 6 hours PRN Nausea and/or Vomiting      Allergies    penicillin (Hives)    Intolerances        Review of Systems:    General:  No wt loss, fevers, chills, night sweats,fatigue,   Eyes:  Good vision, no reported pain  ENT:  No sore throat, pain, runny nose, dysphagia  CV:  No pain, palpitatioins, hypo/hypertension  Resp:  No dyspnea, cough, tachypnea, wheezing  GI:  No pain, No nausea, No vomiting, No diarrhea, No constipatiion, No weight loss, No fever, No pruritis, No rectal bleeding, No tarry stools, No dysphagia,  :  No pain, bleeding, incontinence, nocturia  Muscle:  No pain, weakness  Neuro:  No weakness, tingling, memory problems  Psych:  No fatigue, insomnia, mood problems, depression  Endocrine:  No polyuria, polydypsia, cold/heat intolerance  Heme:  No petechiae, ecchymosis, easy bruisability  Skin:  No rash, tattoos, scars, edema      Vital Signs Last 24 Hrs  T(C): 37.1 (04 Nov 2017 08:05), Max: 37.4 (03 Nov 2017 23:54)  T(F): 98.8 (04 Nov 2017 08:05), Max: 99.3 (03 Nov 2017 23:54)  HR: 87 (04 Nov 2017 10:00) (78 - 87)  BP: 135/62 (04 Nov 2017 10:00) (135/62 - 171/72)  BP(mean): 89 (04 Nov 2017 10:00) (89 - 110)  RR: 17 (04 Nov 2017 10:00) (9 - 40)  SpO2: 97% (04 Nov 2017 10:00) (95% - 99%)    PHYSICAL EXAM:    Constitutional: NAD, well-developed  HEENT: EOMI, throat clear  Neck: No LAD, supple  Respiratory: CTA and P  Cardiovascular: S1 and S2, RRR, no M  Gastrointestinal: BS+, soft, NT/ND, neg HSM,  Extremities: No peripheral edema, neg clubing, cyanosis  Vascular: 2+ peripheral pulses  Neurological: A/O x 3, no focal deficits  Psychiatric: Normal mood, normal affect  Skin: No rashes      LABS:                        9.5    10.9  )-----------( 402      ( 04 Nov 2017 06:12 )             29.8     11-04    140  |  102  |  29<H>  ----------------------------<  110<H>  3.5   |  28  |  1.10    Ca    8.2<L>      04 Nov 2017 06:12  Phos  1.8     11-04  Mg     1.8     11-04    TPro  5.0<L>  /  Alb  1.9<L>  /  TBili  0.5  /  DBili  x   /  AST  16  /  ALT  20  /  AlkPhos  79  11-04    PT/INR - ( 04 Nov 2017 06:12 )   PT: 14.3 sec;   INR: 1.30 ratio         PTT - ( 04 Nov 2017 06:12 )  PTT:23.4 sec      RADIOLOGY & ADDITIONAL TESTS:

## 2017-11-04 NOTE — PROGRESS NOTE ADULT - SUBJECTIVE AND OBJECTIVE BOX
pt well known to me 2+ weeks s/p lami fusion  now w epidural hematoma and weakness left lower ext    I proceeded with revison lami [id number] and evacuation of hematoma    Pt saw me preop and agreed to proceed w surgical treatment    all questions were answered    surgery was uneventful

## 2017-11-04 NOTE — PROGRESS NOTE ADULT - PROBLEM SELECTOR PLAN 2
? secondary to infectious process  noted to have spinal abscess vs hematoma -- for OR today for washout  antibiotic therapy  ICU monitoring  ortho recs

## 2017-11-04 NOTE — PROGRESS NOTE ADULT - SUBJECTIVE AND OBJECTIVE BOX
[facility], Division of Infectious Diseases  [facility] A. Lee  [phone]    Name: JONATHAN SUMMERS  Age: 82y  Gender: Male  MRN: [id number]    Interval History--  Notes reviewed hard of hearing  awake, uncomfortable. awaiting surgery  Past Medical History--  Prostate CA  Crohn disease  Hypothyroidism  Depression  Intervertebral disc disorder  Diabetes mellitus  Obesity  Hypercholesterolemia  HTN (hypertension)  Stented coronary artery  CAD (coronary artery disease)  Macular degeneration  Glaucoma  Hearing loss  S/P colon resection  S/P lumbar fusion  History of hip replacement, total, right  History of hip replacement, total, right      For details regarding the patient's social history, family history, and other miscellaneous elements, please refer the initial infectious diseases consultation and/or the admitting history and physical examination for this admission.    Allergies    penicillin (Hives)    Intolerances        Medications--  Antibiotics:  meropenem IVPB 1000 milliGRAM(s) IV Intermittent every 8 hours  vancomycin  IVPB        Immunologic:    Other:  acetaminophen  Suppository PRN  ALBUTerol    0.083% PRN  atorvastatin  BACItracin 50,000 Unit(s) Injectable (Rx Quick Charge)  busPIRone  dextrose 5%.  dextrose 50% Injectable  dextrose 50% Injectable  dextrose 50% Injectable  dextrose Gel PRN  docusate sodium  glucagon  Injectable PRN  guaiFENesin ER  insulin lispro (HumaLOG) corrective regimen sliding scale  levothyroxine Injectable  montelukast  multivitamin  ondansetron Injectable PRN  pantoprazole  Injectable  senna  sodium chloride 0.65% Nasal  sodium chloride 0.9%.  verapamil SR      Review of Systems--  A 10-point review of systems was obtained.   unchanged  Review of systems otherwise negative except as previously noted.    Physical Examination--  Vital Signs: T(F): 98.8 (11-04-17 @ 08:05), Max: 99.3 (11-03-17 @ 23:54)  HR: 87 (11-04-17 @ 10:00)  BP: 135/62 (11-04-17 @ 10:00)  RR: 17 (11-04-17 @ 10:00)  SpO2: 97% (11-04-17 @ 10:00)  Wt(kg): --  General: Nontoxic-appearing Male in no acute distress.  HEENT: AT/NC. ++ NGT  Neck: Not rigid. No sense of mass.  Nodes: None palpable.  Lungs: Clear bilaterally without rales, wheezing or rhonchi  Heart: Regular rate and rhythm. No Murmur. No rub. No gallop. No palpable thrill.  Abdomen: Bowel sounds present and normoactive. Soft. Nondistended.   Back: No spinal tenderness. No costovertebral angle tenderness.   Extremities: scd boots  Skin: Warm. Dry. Good turgor. No rash. No vasculitic stigmata.      Laboratory Studies--  CBC                        9.5    10.9  )-----------( 402      ( 04 Nov 2017 06:12 )             29.8       Chemistries  11-04    140  |  102  |  29<H>  ----------------------------<  110<H>  3.5   |  28  |  1.10    Ca    8.2<L>      04 Nov 2017 06:12  Phos  1.8     11-04  Mg     1.8     11-04    TPro  5.0<L>  /  Alb  1.9<L>  /  TBili  0.5  /  DBili  x   /  AST  16  /  ALT  20  /  AlkPhos  79  11-04      Culture Data    Culture - Blood (11.03.17 @ 00:51)    Specimen Source: .Blood Blood-Peripheral    Culture Results:   No growth to date.      < from: CT Chest No Cont (11.03.17 @ 13:06) >      PROCEDURE DATE:  11/03/2017          INTERPRETATION:  History: Hypoxia. Rule out small bowel obstruction    CT chest abdomen and pelvis. Oral contrast only.    Bibasilar dependent atelectasis and airspace infiltrates with air   bronchograms suggestive of aspiration pneumonia in the appropriate   clinical setting. Small layering bilateral pleural effusions right larger   than left.  Nasogastric tube in position. Central airways patent. Normal caliber   thoracic aorta. No mediastinal adenopathy.  Borderline to mild cardiomegaly with coronary artery calcification.  Calcified gallstones. No biliary dilatation. Unenhanced liver spleen not   remarkable. Atrophic pancreas.  No hydronephrosis or urolithiasis. Left renal cortical hypodensity cannot   be definitively characterize probable cyst. Ultrasound can be obtained   for confirmation.  No suspicious retroperitoneal mesenteric adenopathy.  No actively inflamed or obstructed bowel. Sigmoid diverticula, no   diverticulitis. Lipomatosis ileocecal valve.  No extraluminal fluid or gas.  The prostate not enlarged. Prostate radiation seeds noted.  Markedly distended urinary bladder.  Status post lower lumbar laminectomy and fusion. Fluid collection noted   at operative site, seen to better advantage on lumbar MR of 11/2/2017.    Impression:    Bibasilar airspace infiltrates consistent with aspiration pneumonia in   appropriate clinical setting. Small bilateral pleural effusions.  No acute obstructive or inflammatory pathology in the abdomen and pelvis.  Findings as discussed        < end of copied text >

## 2017-11-04 NOTE — PROGRESS NOTE ADULT - PROBLEM SELECTOR PLAN 5
Pt had coffee ground emesis, GI consult obtained.  Keep NPO, NG tube  Serial cbc.   No surgical intervention at this time

## 2017-11-04 NOTE — PROGRESS NOTE ADULT - SUBJECTIVE AND OBJECTIVE BOX
Patient seen and examined. Pain controlled. No acute events.    Vital Signs Last 24 Hrs  T(C): 36.4 (11-04-17 @ 16:45), Max: 37.4 (11-03-17 @ 23:54)  T(F): 97.5 (11-04-17 @ 16:45), Max: 99.3 (11-03-17 @ 23:54)  HR: 90 (11-04-17 @ 17:14) (80 - 90)  BP: 142/69 (11-04-17 @ 17:14) (135/62 - 171/72)  BP(mean): 99 (11-04-17 @ 17:14) (14 - 106)  RR: 19 (11-04-17 @ 17:14) (9 - 40)  SpO2: 93% (11-04-17 @ 17:14) (90% - 99%)    Physical Exam    Gen: NAD    Spine:   Dressing c/d/i  Moving extremities x 4  SILT L3-S1  DP +  Compartments soft  No calf ttp    A/P: 82y Male s/p evacuation of lumbar hematoma POD 0  patient will remain in the ICU until stabilized   Pain control  DVT ppx- SCDs  PT/OT, WBAT  FU labs  Dispo planning  D/w attending

## 2017-11-05 LAB
ANION GAP SERPL CALC-SCNC: 7 MMOL/L — SIGNIFICANT CHANGE UP (ref 5–17)
BASOPHILS # BLD AUTO: 0.1 K/UL — SIGNIFICANT CHANGE UP (ref 0–0.2)
BASOPHILS NFR BLD AUTO: 1.2 % — SIGNIFICANT CHANGE UP (ref 0–2)
BUN SERPL-MCNC: 24 MG/DL — HIGH (ref 7–23)
CALCIUM SERPL-MCNC: 7.5 MG/DL — LOW (ref 8.5–10.1)
CHLORIDE SERPL-SCNC: 106 MMOL/L — SIGNIFICANT CHANGE UP (ref 96–108)
CO2 SERPL-SCNC: 28 MMOL/L — SIGNIFICANT CHANGE UP (ref 22–31)
CREAT SERPL-MCNC: 0.97 MG/DL — SIGNIFICANT CHANGE UP (ref 0.5–1.3)
EOSINOPHIL # BLD AUTO: 0.4 K/UL — SIGNIFICANT CHANGE UP (ref 0–0.5)
EOSINOPHIL NFR BLD AUTO: 3.6 % — SIGNIFICANT CHANGE UP (ref 0–6)
GLUCOSE SERPL-MCNC: 110 MG/DL — HIGH (ref 70–99)
HCT VFR BLD CALC: 25.9 % — LOW (ref 39–50)
HCT VFR BLD CALC: 31 % — LOW (ref 39–50)
HGB BLD-MCNC: 8.5 G/DL — LOW (ref 13–17)
HGB BLD-MCNC: 9.7 G/DL — LOW (ref 13–17)
LYMPHOCYTES # BLD AUTO: 0.7 K/UL — LOW (ref 1–3.3)
LYMPHOCYTES # BLD AUTO: 6.9 % — LOW (ref 13–44)
MCHC RBC-ENTMCNC: 30.1 PG — SIGNIFICANT CHANGE UP (ref 27–34)
MCHC RBC-ENTMCNC: 31.1 PG — SIGNIFICANT CHANGE UP (ref 27–34)
MCHC RBC-ENTMCNC: 31.4 GM/DL — LOW (ref 32–36)
MCHC RBC-ENTMCNC: 32.9 GM/DL — SIGNIFICANT CHANGE UP (ref 32–36)
MCV RBC AUTO: 94.4 FL — SIGNIFICANT CHANGE UP (ref 80–100)
MCV RBC AUTO: 95.9 FL — SIGNIFICANT CHANGE UP (ref 80–100)
MONOCYTES # BLD AUTO: 1 K/UL — HIGH (ref 0–0.9)
MONOCYTES NFR BLD AUTO: 10.2 % — HIGH (ref 1–9)
NEUTROPHILS # BLD AUTO: 7.8 K/UL — HIGH (ref 1.8–7.4)
NEUTROPHILS NFR BLD AUTO: 78.1 % — HIGH (ref 43–77)
PLATELET # BLD AUTO: 372 K/UL — SIGNIFICANT CHANGE UP (ref 150–400)
PLATELET # BLD AUTO: 432 K/UL — HIGH (ref 150–400)
POTASSIUM SERPL-MCNC: 3.9 MMOL/L — SIGNIFICANT CHANGE UP (ref 3.5–5.3)
POTASSIUM SERPL-SCNC: 3.9 MMOL/L — SIGNIFICANT CHANGE UP (ref 3.5–5.3)
RBC # BLD: 2.74 M/UL — LOW (ref 4.2–5.8)
RBC # BLD: 3.24 M/UL — LOW (ref 4.2–5.8)
RBC # FLD: 13.9 % — SIGNIFICANT CHANGE UP (ref 10.3–14.5)
RBC # FLD: 14.2 % — SIGNIFICANT CHANGE UP (ref 10.3–14.5)
SODIUM SERPL-SCNC: 141 MMOL/L — SIGNIFICANT CHANGE UP (ref 135–145)
VANCOMYCIN TROUGH SERPL-MCNC: 16 UG/ML — SIGNIFICANT CHANGE UP (ref 10–20)
WBC # BLD: 10 K/UL — SIGNIFICANT CHANGE UP (ref 3.8–10.5)
WBC # BLD: 11.3 K/UL — HIGH (ref 3.8–10.5)
WBC # FLD AUTO: 10 K/UL — SIGNIFICANT CHANGE UP (ref 3.8–10.5)
WBC # FLD AUTO: 11.3 K/UL — HIGH (ref 3.8–10.5)

## 2017-11-05 PROCEDURE — 99233 SBSQ HOSP IP/OBS HIGH 50: CPT

## 2017-11-05 PROCEDURE — 99291 CRITICAL CARE FIRST HOUR: CPT

## 2017-11-05 RX ORDER — INSULIN LISPRO 100/ML
VIAL (ML) SUBCUTANEOUS AT BEDTIME
Qty: 0 | Refills: 0 | Status: DISCONTINUED | OUTPATIENT
Start: 2017-11-05 | End: 2017-11-09

## 2017-11-05 RX ORDER — DEXTROSE 50 % IN WATER 50 %
1 SYRINGE (ML) INTRAVENOUS ONCE
Qty: 0 | Refills: 0 | Status: DISCONTINUED | OUTPATIENT
Start: 2017-11-05 | End: 2017-11-09

## 2017-11-05 RX ORDER — INSULIN LISPRO 100/ML
VIAL (ML) SUBCUTANEOUS
Qty: 0 | Refills: 0 | Status: DISCONTINUED | OUTPATIENT
Start: 2017-11-05 | End: 2017-11-09

## 2017-11-05 RX ORDER — MEROPENEM 1 G/30ML
INJECTION INTRAVENOUS
Qty: 0 | Refills: 0 | Status: DISCONTINUED | OUTPATIENT
Start: 2017-11-05 | End: 2017-11-07

## 2017-11-05 RX ORDER — DEXTROSE 50 % IN WATER 50 %
25 SYRINGE (ML) INTRAVENOUS ONCE
Qty: 0 | Refills: 0 | Status: DISCONTINUED | OUTPATIENT
Start: 2017-11-05 | End: 2017-11-09

## 2017-11-05 RX ORDER — DEXTROSE 50 % IN WATER 50 %
12.5 SYRINGE (ML) INTRAVENOUS ONCE
Qty: 0 | Refills: 0 | Status: DISCONTINUED | OUTPATIENT
Start: 2017-11-05 | End: 2017-11-09

## 2017-11-05 RX ORDER — LEVOTHYROXINE SODIUM 125 MCG
18.75 TABLET ORAL DAILY
Qty: 0 | Refills: 0 | Status: DISCONTINUED | OUTPATIENT
Start: 2017-11-05 | End: 2017-11-06

## 2017-11-05 RX ORDER — PANTOPRAZOLE SODIUM 20 MG/1
40 TABLET, DELAYED RELEASE ORAL
Qty: 0 | Refills: 0 | Status: DISCONTINUED | OUTPATIENT
Start: 2017-11-05 | End: 2017-11-09

## 2017-11-05 RX ORDER — GLUCAGON INJECTION, SOLUTION 0.5 MG/.1ML
1 INJECTION, SOLUTION SUBCUTANEOUS ONCE
Qty: 0 | Refills: 0 | Status: DISCONTINUED | OUTPATIENT
Start: 2017-11-05 | End: 2017-11-09

## 2017-11-05 RX ORDER — SODIUM CHLORIDE 9 MG/ML
1000 INJECTION, SOLUTION INTRAVENOUS
Qty: 0 | Refills: 0 | Status: DISCONTINUED | OUTPATIENT
Start: 2017-11-05 | End: 2017-11-09

## 2017-11-05 RX ORDER — LEVOTHYROXINE SODIUM 125 MCG
37.5 TABLET ORAL DAILY
Qty: 0 | Refills: 0 | Status: DISCONTINUED | OUTPATIENT
Start: 2017-11-05 | End: 2017-11-05

## 2017-11-05 RX ORDER — MEROPENEM 1 G/30ML
1000 INJECTION INTRAVENOUS EVERY 8 HOURS
Qty: 0 | Refills: 0 | Status: DISCONTINUED | OUTPATIENT
Start: 2017-11-05 | End: 2017-11-07

## 2017-11-05 RX ORDER — MEROPENEM 1 G/30ML
1000 INJECTION INTRAVENOUS ONCE
Qty: 0 | Refills: 0 | Status: COMPLETED | OUTPATIENT
Start: 2017-11-05 | End: 2017-11-05

## 2017-11-05 RX ORDER — VERAPAMIL HCL 240 MG
180 CAPSULE, EXTENDED RELEASE PELLETS 24 HR ORAL DAILY
Qty: 0 | Refills: 0 | Status: DISCONTINUED | OUTPATIENT
Start: 2017-11-05 | End: 2017-11-09

## 2017-11-05 RX ADMIN — Medication 180 MILLIGRAM(S): at 13:08

## 2017-11-05 RX ADMIN — Medication 18.75 MICROGRAM(S): at 06:42

## 2017-11-05 RX ADMIN — SENNA PLUS 2 TABLET(S): 8.6 TABLET ORAL at 22:12

## 2017-11-05 RX ADMIN — Medication 1: at 12:28

## 2017-11-05 RX ADMIN — Medication 1 TABLET(S): at 14:15

## 2017-11-05 RX ADMIN — MEROPENEM 200 MILLIGRAM(S): 1 INJECTION INTRAVENOUS at 14:14

## 2017-11-05 RX ADMIN — PANTOPRAZOLE SODIUM 40 MILLIGRAM(S): 20 TABLET, DELAYED RELEASE ORAL at 13:08

## 2017-11-05 RX ADMIN — Medication 1 MILLIGRAM(S): at 12:14

## 2017-11-05 RX ADMIN — OXYCODONE HYDROCHLORIDE 10 MILLIGRAM(S): 5 TABLET ORAL at 12:17

## 2017-11-05 RX ADMIN — Medication 500 MILLIGRAM(S): at 06:17

## 2017-11-05 RX ADMIN — SODIUM CHLORIDE 75 MILLILITER(S): 9 INJECTION INTRAMUSCULAR; INTRAVENOUS; SUBCUTANEOUS at 06:17

## 2017-11-05 RX ADMIN — CYCLOBENZAPRINE HYDROCHLORIDE 10 MILLIGRAM(S): 10 TABLET, FILM COATED ORAL at 14:22

## 2017-11-05 RX ADMIN — Medication 1 TABLET(S): at 22:12

## 2017-11-05 RX ADMIN — HYDROMORPHONE HYDROCHLORIDE 1 MILLIGRAM(S): 2 INJECTION INTRAMUSCULAR; INTRAVENOUS; SUBCUTANEOUS at 08:22

## 2017-11-05 RX ADMIN — HYDROMORPHONE HYDROCHLORIDE 1 MILLIGRAM(S): 2 INJECTION INTRAMUSCULAR; INTRAVENOUS; SUBCUTANEOUS at 03:05

## 2017-11-05 RX ADMIN — Medication 1 TABLET(S): at 06:17

## 2017-11-05 RX ADMIN — Medication 500 MILLIGRAM(S): at 17:10

## 2017-11-05 RX ADMIN — OXYCODONE HYDROCHLORIDE 10 MILLIGRAM(S): 5 TABLET ORAL at 12:32

## 2017-11-05 RX ADMIN — HYDROMORPHONE HYDROCHLORIDE 1 MILLIGRAM(S): 2 INJECTION INTRAMUSCULAR; INTRAVENOUS; SUBCUTANEOUS at 17:10

## 2017-11-05 RX ADMIN — CYCLOBENZAPRINE HYDROCHLORIDE 10 MILLIGRAM(S): 10 TABLET, FILM COATED ORAL at 22:12

## 2017-11-05 RX ADMIN — HYDROMORPHONE HYDROCHLORIDE 1 MILLIGRAM(S): 2 INJECTION INTRAMUSCULAR; INTRAVENOUS; SUBCUTANEOUS at 03:20

## 2017-11-05 RX ADMIN — CYCLOBENZAPRINE HYDROCHLORIDE 10 MILLIGRAM(S): 10 TABLET, FILM COATED ORAL at 06:17

## 2017-11-05 RX ADMIN — Medication 100 MILLIGRAM(S): at 22:12

## 2017-11-05 RX ADMIN — Medication 1: at 17:07

## 2017-11-05 RX ADMIN — Medication 100 MILLIGRAM(S): at 14:15

## 2017-11-05 RX ADMIN — HYDROMORPHONE HYDROCHLORIDE 1 MILLIGRAM(S): 2 INJECTION INTRAMUSCULAR; INTRAVENOUS; SUBCUTANEOUS at 08:37

## 2017-11-05 RX ADMIN — MEROPENEM 200 MILLIGRAM(S): 1 INJECTION INTRAVENOUS at 22:11

## 2017-11-05 RX ADMIN — Medication 100 MILLIGRAM(S): at 06:17

## 2017-11-05 RX ADMIN — Medication 1 TABLET(S): at 12:14

## 2017-11-05 NOTE — PROGRESS NOTE ADULT - PROBLEM SELECTOR PLAN 2
s/p laminectomy on 10/25 and complicated by blood loss anemia.  MRI of spine c/w spinal hematoma likely ? s/p surgery yesterday   -Continue neuro checks  - Antibiotics per ICU/ ID   - PT/ OOB as tolerated

## 2017-11-05 NOTE — PROGRESS NOTE ADULT - SUBJECTIVE AND OBJECTIVE BOX
INTERVAL HPI/OVERNIGHT EVENTS: Pt seen and examined at bedside, being evaluated by physical therapy at present, sitting in bed, no new complaints    MEDICATIONS  (STANDING):  ascorbic acid 500 milliGRAM(s) Oral two times a day  BACItracin 50,000 Unit(s) Injectable (Rx Quick Charge) 188545 Unit(s) IntraMuscular   calcium carbonate 1250 mG + Vitamin D (OsCal 500 + D) 1 Tablet(s) Oral three times a day  cyclobenzaprine 10 milliGRAM(s) Oral every 8 hours  dextrose 5%. 1000 milliLiter(s) (50 mL/Hr) IV Continuous <Continuous>  dextrose 50% Injectable 12.5 Gram(s) IV Push once  dextrose 50% Injectable 25 Gram(s) IV Push once  dextrose 50% Injectable 25 Gram(s) IV Push once  docusate sodium 100 milliGRAM(s) Oral three times a day  folic acid 1 milliGRAM(s) Oral daily  insulin lispro (HumaLOG) corrective regimen sliding scale   SubCutaneous every 6 hours  levothyroxine Injectable 18.75 MICROGram(s) IV Push daily  multivitamin 1 Tablet(s) Oral daily  senna 2 Tablet(s) Oral at bedtime  sodium chloride 0.9%. 1000 milliLiter(s) (75 mL/Hr) IV Continuous <Continuous>    MEDICATIONS  (PRN):  acetaminophen   Tablet 650 milliGRAM(s) Oral every 6 hours PRN For Temp greater than 38 C (100.4 F)  benzocaine 15 mG/menthol 3.6 mG Lozenge 1 Lozenge Oral every 4 hours PRN Sore Throat  dextrose Gel 1 Dose(s) Oral once PRN Blood Glucose LESS THAN 70 milliGRAM(s)/deciLiter  diphenhydrAMINE   Capsule 25 milliGRAM(s) Oral every 4 hours PRN Insomnia  glucagon  Injectable 1 milliGRAM(s) IntraMuscular once PRN Glucose <70 milliGRAM(s)/deciLiter  HYDROmorphone  Injectable 1 milliGRAM(s) SubCutaneous every 3 hours PRN Severe Pain (7 - 10)  magnesium hydroxide Suspension 30 milliLiter(s) Oral every 12 hours PRN Constipation  morphine  - Injectable 1 milliGRAM(s) IV Push every 10 minutes PRN Moderate Pain (4 - 6)  ondansetron Injectable 4 milliGRAM(s) IV Push once PRN Nausea and/or Vomiting  oxyCODONE    IR 5 milliGRAM(s) Oral every 4 hours PRN Mild Pain (1 - 3)  oxyCODONE    IR 10 milliGRAM(s) Oral every 4 hours PRN Moderate Pain (4 - 6)      Allergies    penicillin (Hives)    Intolerances        Review of Systems:    General:  No wt loss, fevers, chills, night sweats,fatigue,   Eyes:  Good vision, no reported pain  ENT:  No sore throat, pain, runny nose, dysphagia  CV:  No pain, palpitatioins, hypo/hypertension  Resp:  No dyspnea, cough, tachypnea, wheezing  GI:  No pain, No nausea, No vomiting, No diarrhea, No constipatiion, No weight loss, No fever, No pruritis, No rectal bleeding, No tarry stools, No dysphagia,  :  No pain, bleeding, incontinence, nocturia  Muscle:  No pain, weakness  Neuro:  No weakness, tingling, memory problems  Psych:  No fatigue, insomnia, mood problems, depression  Endocrine:  No polyuria, polydypsia, cold/heat intolerance  Heme:  No petechiae, ecchymosis, easy bruisability  Skin:  No rash, tattoos, scars, edema      Vital Signs Last 24 Hrs  T(C): 36.8 (05 Nov 2017 08:24), Max: 37.4 (04 Nov 2017 20:45)  T(F): 98.3 (05 Nov 2017 08:24), Max: 99.3 (04 Nov 2017 20:45)  HR: 99 (05 Nov 2017 09:00) (80 - 99)  BP: 131/66 (05 Nov 2017 09:00) (118/58 - 154/71)  BP(mean): 85 (05 Nov 2017 09:00) (14 - 103)  RR: 21 (05 Nov 2017 09:00) (14 - 27)  SpO2: 94% (05 Nov 2017 09:00) (90% - 100%)    PHYSICAL EXAM:    Constitutional: NAD, well-developed  HEENT: EOMI, throat clear  Neck: No LAD, supple  Respiratory: CTA and P  Cardiovascular: S1 and S2, RRR, no M  Gastrointestinal: BS+, soft, NT/ND, neg HSM,  Extremities: No peripheral edema, neg clubing, cyanosis  Vascular: 2+ peripheral pulses  Neurological: A/O x 3, no focal deficits  Psychiatric: Normal mood, normal affect  Skin: No rashes      LABS:                        8.5    10.0  )-----------( 372      ( 05 Nov 2017 05:51 )             25.9     11-05    141  |  106  |  24<H>  ----------------------------<  110<H>  3.9   |  28  |  0.97    Ca    7.5<L>      05 Nov 2017 05:51  Phos  1.8     11-04  Mg     1.8     11-04    TPro  5.0<L>  /  Alb  1.9<L>  /  TBili  0.5  /  DBili  x   /  AST  16  /  ALT  20  /  AlkPhos  79  11-04    PT/INR - ( 04 Nov 2017 06:12 )   PT: 14.3 sec;   INR: 1.30 ratio         PTT - ( 04 Nov 2017 06:12 )  PTT:23.4 sec      RADIOLOGY & ADDITIONAL TESTS:

## 2017-11-05 NOTE — PROGRESS NOTE ADULT - ASSESSMENT
A/P: 82y Male s/p evacuation of lumbar hematoma POD 1  patient will remain in the ICU until stabilized   Pain control  DVT ppx- SCDs  PT/OT, WBAT  FU labs  FU Drain output   Dispo planning  Continue neuro checks to asses for improvement   D/w attending

## 2017-11-05 NOTE — DISCHARGE NOTE ADULT - NSTOBACCOHOTLINE_GEN_A_CS
[facility] Smokers Quitline ([phone]) [facility] Smokers Quitline ([phone])

## 2017-11-05 NOTE — PROGRESS NOTE ADULT - ATTENDING COMMENTS
stable  oxycodone, dilaudid prn for pain  meropenem, no need for vanc as no abscess  IS  OOB  acballero, DVT ppx per ortho  monitor hemovac output  restart verapamil, protonix  transfer to 2N 82 M PMHx CAD, PCI, DM2 (no insulin), Crohn's disease, HTN, HLD, recent lumbar laminectomy at [facility] (10/19/17) admitted with encephalopathy, aspiration pneumonia, post-op hematoma, s/p I&D 11/4. Clinically stable. Prn oxycodone, dilaudid for pain. OOB, IS. Monitor hemovac output. Meropenem for aspiration pna, no more vanc as cx neg and no epidural abscess. Stroud mx and chemical DVT ppx per ortho. PT eval. Tolerating po diet. Resume verapamil. Stable for surgical floor.

## 2017-11-05 NOTE — PROGRESS NOTE ADULT - SUBJECTIVE AND OBJECTIVE BOX
Patient seen and examined. Pain controlled. No acute events.    ICU Vital Signs Last 24 Hrs  T(C): 37.1 (05 Nov 2017 04:39), Max: 37.4 (04 Nov 2017 20:45)  T(F): 98.8 (05 Nov 2017 04:39), Max: 99.3 (04 Nov 2017 20:45)  HR: 88 (05 Nov 2017 06:00) (80 - 97)  BP: 118/58 (05 Nov 2017 06:00) (118/58 - 154/71)  BP(mean): 82 (05 Nov 2017 06:00) (14 - 103)  RR: 16 (05 Nov 2017 06:00) (9 - 27)  SpO2: 100% (05 Nov 2017 06:00) (90% - 100%)      Physical Exam    Gen: NAD    Spine:   Dressing c/d/i  Moving extremities x 4, R>L, RLE 4/5, LLE 3/5 with HIp flexion and knee extension   SILT L3-S1, improved on LLE  DP +  Compartments soft  No calf ttp

## 2017-11-05 NOTE — PROGRESS NOTE ADULT - ASSESSMENT
82 year old male unable to provide hx. History obtained from his medical record.  He has a h/o CAD s/p remote PCI, Crohn's disease, T2DM, HTN, HLD, and hypothyroidism.  Has a known RBBB.  Is recently s/p laminectomy, course complicated by blood loss and hypotension.   No acute cv process was felt to be present at that time.  He did not have followup evaluation after that.   Eventually discharged to rehab, and is now sent from rehab to the hospital  for an altered ms.  Was found to have a leukocytosis, and spine imaging suspicious for spinal abscess vs hematoma.       -tolerated yesterday's procedure without difficulty  -remains confused  -there is no evidence of acute ischemia.  -remote pci, not presently on antiplatelet agent, which cannot be started at this time in the context of spinal surgery, though we will need to resume at some point  -there is no evidence of significant arrhythmia.  -cont to follow tele  -there is no evidence for meaningful  volume overload.  -follow i/o  -DVT prophylaxis  -monitor electrolytes, keep k>4, Mg>2  -will follow    The patient is at risk of abrupt decompensation.  35 minutes of critical care time was spent with this patient.

## 2017-11-05 NOTE — DISCHARGE NOTE ADULT - HOSPITAL COURSE
82 M PMHx CAD, PCI, DM2 (no insulin), Crohn's disease, HTN, HLD, recent lumbar laminectomy at [place] (10/19/17) admitted with encephalopathy, aspiration pneumonia, post-op hematoma, s/p I&D 11/4.  Meropenem for aspiration pneumonia. Found to have  abdominal distension,  improved after NGT decompression , later  NGT was removed. Likely had post op ileus per GI. Started on diet, tolerating well. 82 M PMHx CAD, PCI, DM2 (no insulin), Crohn's disease, HTN, HLD, recent lumbar laminectomy at [place] (10/19/17) admitted with encephalopathy, aspiration pneumonia, post-op hematoma, s/p I&D 11/4.  Meropenem for aspiration pneumonia was started. Found to have  abdominal distension,  improved after NGT decompression , later  NGT was removed. Likely had post op ileus per GI. Started on diet, tolerated well. Bowel regimen was adjusted eventually had BM. Hemovac removed by ortho. Antibiotics switched to  po . Patient medically stable and cleared for discharge to [facility]. Will f/u with all the doctors as out patient

## 2017-11-05 NOTE — PHYSICAL THERAPY INITIAL EVALUATION ADULT - PERTINENT HX OF CURRENT PROBLEM, REHAB EVAL
Pt presented to ED with change in mental status from rehab, also noted to have increasing LE weakness.

## 2017-11-05 NOTE — DISCHARGE NOTE ADULT - PATIENT PORTAL LINK FT
“You can access the [employer] Patient Portal, offered by [facility], by registering with the following website: [email or web address]”

## 2017-11-05 NOTE — PROGRESS NOTE ADULT - SUBJECTIVE AND OBJECTIVE BOX
[facility] Spine Care of                                                            Orthopedic Spine Progress Note      POST OPERATIVE DAY #: 1  STATUS POST:            I & D Lumbar spine wound  revision lami     Pre-Op Dx: Epidural hematoma    Post-Op Dx:  Lumbar stenosis with neurogenic claudication  Epidural hematoma    Prin. Procedure:   I & D Lumbar spine wound  revision lami       SUBJECTIVE: Patient seen and examined.   OOB to chair  alert   somewhat somnolent    Pain (0-10): 5  Current Pain Management:  [ ] PCA   [ x] Po Analgesics [ ] IM /IV Anagesics     Vital Signs Last 24 Hrs  T(C): 36.8 (05 Nov 2017 15:49), Max: 37.4 (04 Nov 2017 20:45)  T(F): 98.3 (05 Nov 2017 15:49), Max: 99.3 (04 Nov 2017 20:45)  HR: 98 (05 Nov 2017 15:00) (80 - 102)  BP: 137/74 (05 Nov 2017 15:00) (118/58 - 149/98)  BP(mean): 98 (05 Nov 2017 15:00) (82 - 119)  RR: 19 (05 Nov 2017 15:00) (14 - 28)  SpO2: 99% (05 Nov 2017 15:00) (90% - 100%)  I&O's Detail    04 Nov 2017 08:01  -  05 Nov 2017 07:00  --------------------------------------------------------  IN:    IV PiggyBack: 250 mL    Oral Fluid: 90 mL    sodium chloride 0.9%: 1125 mL    sodium chloride 0.9%: 450 mL    Solution: 100 mL  Total IN: 2015 mL    OUT:    Evacuated Tube System: 210 mL    Indwelling Catheter - Urethral: 585 mL    Nasoenteral Tube: 20 mL  Total OUT: 815 mL    Total NET: 1200 mL      05 Nov 2017 07:01  -  05 Nov 2017 16:00  --------------------------------------------------------  IN:    Oral Fluid: 220 mL    sodium chloride 0.9%: 375 mL    Solution: 100 mL  Total IN: 695 mL    OUT:  Total OUT: 0 mL    Total NET: 695 mL          OBJECTIVE: looks better from yesterday        Wound /Dressing: dry and intact    Neurological: A/O x    3           Sensation: [ x] intact to light touch  [ ] decreased:          Motor exam: [ x ]          [x ] Upper extremity    Delt      Bicp       Tri      Wrist ext  Wrst Flex       Digit Ext Digit Flex                                     R         5/5        5/5        5/5       5/5            5/5            5/5       5/5          5/5                                     L          5/5        5/5        5/5       5/5            5/5            5/5       5/5          5/5         [x ] Lower ext.     Hip Flx  Hip Ext   Hip Abd  Hip Add Quad   Hamstrg   TA       EHL      GS                              R        4+/5        5/5        5/5             5/5        5/5        5/5        5/5     5/5      5/5                              L         4/5        5/5        5/5             5/5        5/5        5/5        5/5     5/5      5/5                                                        [ x] Vascular :             Tension Signs: neg          Long Tract Findings: neg    RADIOLOGY & ADDITIONAL STUDIES:                                               LABS:                        9.7    11.3  )-----------( 432      ( 05 Nov 2017 12:44 )...............BETTER than this am             31.0     11-05    141  |  106  |  24<H>  ----------------------------<  110<H>  3.9   |  28  |  0.97    Ca    7.5<L>      05 Nov 2017 05:51  Phos  1.8     11-04  Mg     1.8     11-04    TPro  5.0<L>  /  Alb  1.9<L>  /  TBili  0.5  /  DBili  x   /  AST  16  /  ALT  20  /  AlkPhos  79  11-04    PT/INR - ( 04 Nov 2017 06:12 )   PT: 14.3 sec;   INR: 1.30 ratio         PTT - ( 04 Nov 2017 06:12 )  PTT:23.4 sec      Wound Culture: pending    A/P :  82y Male   s/p:  I & D lumbar spine wound  -    Pain control ..adequate  -    DVT ppx: SCDs    -    Abx:  continues  -    Review labs as indicated  ...IMPROVED   -    Physical Therapy.....OOB walking tomorrow  -    Weight bearing status: full  -    Dispo: Home [ ]     Rehab [ x]

## 2017-11-05 NOTE — PROGRESS NOTE ADULT - PROBLEM SELECTOR PLAN 2
? secondary to infectious process, vs. no hearing aide (pt says he lost them)  s/p spinal abscess washout, now w/ hemovac draining as per ortho

## 2017-11-05 NOTE — DISCHARGE NOTE ADULT - CARE PLAN
Principal Discharge DX:	Spinal cord compression  Goal:	Return To ADL's  Instructions for follow-up, activity and diet:	1.	Pain Control  2.	Walking with full weight bearing as tolerated, with assistive devices (walker/Cane as Needed)  3.	PT as needed  4.	Follow up with Dr. Platt as outpatient in 7-10 days after discharge from the hospital or rehab. Call office for appointment.  5.	Keep dressing clean and dry. Remove on Post Op Day three.  6.	No baths/hot tubs or soaks. Principal Discharge DX:	Hematoma  Goal:	Return To ADL's  Instructions for follow-up, activity and diet:	1. Post op spinal hematoma, s/p I&D.  2. Walking with full weight bearing as tolerated, with assistive devices (walker/Cane as Needed)  3.	PT as needed  4.	Follow up with Dr. Platt as outpatient in 7-10 days after discharge from the hospital or rehab. Call office for appointment.  5.	Keep dressing clean and dry. Remove on Post Op Day three.  6.	No baths/hot tubs or soaks.  Secondary Diagnosis:	Abdominal distension  Instructions for follow-up, activity and diet:	Likely secondary to post op ileus , resolved . f/u with GI  Secondary Diagnosis:	Metabolic encephalopathy  Instructions for follow-up, activity and diet:	Resolved. F/U with PCP.  Secondary Diagnosis:	Pneumonia  Instructions for follow-up, activity and diet:	Complete course of antibiotics. f/U with PCP.  Secondary Diagnosis:	Prostate CA  Instructions for follow-up, activity and diet:	F/U with your doctor  Secondary Diagnosis:	S/P lumbar fusion  Instructions for follow-up, activity and diet:	PT. F/u with ortho  Secondary Diagnosis:	Crohn disease  Instructions for follow-up, activity and diet:	Pleas f/u with GI Principal Discharge DX:	Hematoma  Goal:	Return To ADL's  Instructions for follow-up, activity and diet:	1. Post op spinal hematoma, s/p I&D.  2. Walking with full weight bearing as tolerated, with assistive devices (walker/Cane as Needed)  3.	PT as needed  4.	Follow up with Dr. Platt as outpatient in 7-10 days after discharge from the hospital or rehab. Call office for appointment.  5.	Keep dressing clean and dry. Remove on Post Op Day three.  6.	No baths/hot tubs or soaks.  7.            patient Can restart ASA 81mg therapy on 11/11/17 per Dr. Alvarez.  Secondary Diagnosis:	Abdominal distension  Instructions for follow-up, activity and diet:	Likely secondary to post op ileus , resolved . f/u with GI  Secondary Diagnosis:	Metabolic encephalopathy  Instructions for follow-up, activity and diet:	Resolved. F/U with PCP.  Secondary Diagnosis:	Pneumonia  Instructions for follow-up, activity and diet:	Complete course of antibiotics. f/U with PCP.  Secondary Diagnosis:	Prostate CA  Instructions for follow-up, activity and diet:	F/U with your doctor  Secondary Diagnosis:	S/P lumbar fusion  Instructions for follow-up, activity and diet:	PT. F/u with ortho  Secondary Diagnosis:	Crohn disease  Instructions for follow-up, activity and diet:	Pleas f/u with GI Principal Discharge DX:	Hematoma  Goal:	Return To ADL's  Instructions for follow-up, activity and diet:	1. Post op spinal hematoma, s/p I&D.  2. Walking with full weight bearing as tolerated, with assistive devices (walker/Cane as Needed)  3.	PT as needed  4.	Follow up with Dr. Platt as outpatient in 7-10 days after discharge from the hospital or rehab. Call office for appointment.  5.	Keep dressing clean and dry. Remove on Post Op Day three.  6.	No baths/hot tubs or soaks.  7.            patient Can restart ASA 81mg therapy on 11/11/17 per Dr. Alvarez.  Secondary Diagnosis:	Abdominal distension  Instructions for follow-up, activity and diet:	Likely secondary to post op ileus , resolved . f/u with GI  Continue bowel regimen  Secondary Diagnosis:	Metabolic encephalopathy  Instructions for follow-up, activity and diet:	Resolved. F/U with PCP.  Secondary Diagnosis:	Pneumonia  Instructions for follow-up, activity and diet:	Complete course of antibiotics. f/U with PCP.  Secondary Diagnosis:	Prostate CA  Instructions for follow-up, activity and diet:	F/U with your doctor  Secondary Diagnosis:	S/P lumbar fusion  Instructions for follow-up, activity and diet:	PT. F/u with ortho  Secondary Diagnosis:	Crohn disease  Instructions for follow-up, activity and diet:	Pleas f/u with GI

## 2017-11-05 NOTE — DISCHARGE NOTE ADULT - PLAN OF CARE
Return To ADL's 1.	Pain Control  2.	Walking with full weight bearing as tolerated, with assistive devices (walker/Cane as Needed)  3.	PT as needed  4.	Follow up with Dr. Platt as outpatient in 7-10 days after discharge from the hospital or rehab. Call office for appointment.  5.	Keep dressing clean and dry. Remove on Post Op Day three.  6.	No baths/hot tubs or soaks. 1. Post op spinal hematoma, s/p I&D.  2. Walking with full weight bearing as tolerated, with assistive devices (walker/Cane as Needed)  3.	PT as needed  4.	Follow up with Dr. Platt as outpatient in 7-10 days after discharge from the hospital or rehab. Call office for appointment.  5.	Keep dressing clean and dry. Remove on Post Op Day three.  6.	No baths/hot tubs or soaks. Likely secondary to post op ileus , resolved . f/u with GI Resolved. F/U with PCP. Complete course of antibiotics. f/U with PCP. F/U with your doctor PT. F/u with ortho Pleas f/u with GI 1. Post op spinal hematoma, s/p I&D.  2. Walking with full weight bearing as tolerated, with assistive devices (walker/Cane as Needed)  3.	PT as needed  4.	Follow up with Dr. Platt as outpatient in 7-10 days after discharge from the hospital or rehab. Call office for appointment.  5.	Keep dressing clean and dry. Remove on Post Op Day three.  6.	No baths/hot tubs or soaks.  7.            patient Can restart ASA 81mg therapy on 11/11/17 per Dr. Alvarez. Likely secondary to post op ileus , resolved . f/u with GI  Continue bowel regimen

## 2017-11-05 NOTE — PROGRESS NOTE ADULT - ASSESSMENT
82 year old male with PMH of recent lumbar laminectomy on 10/27 presents with AMS and leukocytosis, possible spinal abscess v hematoma        Events last 24 hours:   -POD#2 from hematoma evacuation

## 2017-11-05 NOTE — PROGRESS NOTE ADULT - ASSESSMENT
82M with recent laminectomy here with encephalopathy and leukocytosis  r/o infectious abscess-- wbc with rapid decline    s/p I and D of epidural collection hematoma cx sent   pt with no fever   no back pain, clinically doing well with normal wbc  all cx neg to date  cultures from OR 11/4 pending  can cont antibx until cx back      Chest CT with infiltrates consistent with aspiration pna.  currently without any resp symptoms  cont meropenem to complete course for aspiration pna

## 2017-11-05 NOTE — PROGRESS NOTE ADULT - SUBJECTIVE AND OBJECTIVE BOX
Patient is a 82y old  Male who presents with a chief complaint of Spinal Compression w/ Neuro Defecits/AMS (05 Nov 2017 11:11)    24 hour events: s/p hematoma evacuation 11/4  c/o intermittent back pain    REVIEW OF SYSTEMS  Constitutional: No fever, chills, fatigue  Neuro: No headache, numbness, weakness  Resp: No cough, wheezing, shortness of breath  CVS: No chest pain, palpitations, leg swelling  GI: No abdominal pain, nausea, vomiting, diarrhea   : No dysuria, frequency, incontinence  Skin: No itching, burning, rashes, or lesions   Msk: +back pain at surgical site  Psych: No depression, anxiety, mood swings    T(F): 98.3 (11-05-17 @ 08:24), Max: 99.3 (11-04-17 @ 20:45)  HR: 99 (11-05-17 @ 12:00) (80 - 99)  BP: 135/63 (11-05-17 @ 12:00) (118/58 - 146/65)  RR: 17 (11-05-17 @ 12:00) (14 - 28)  SpO2: 100% (11-05-17 @ 12:00) (90% - 100%)    CAPILLARY BLOOD GLUCOSE  111 (11-05 @ 06:00), 120 (11-05 @ 00:00), 112 (11-04 @ 18:00)    I&O's Summary    11-04 @ 08:01  -  11-05 @ 07:00  --------------------------------------------------------  IN: 2015 mL / OUT: 815 mL / NET: 1200 mL    PHYSICAL EXAM  General: NAD  CNS: AAO x 3, no focal deficits, +hemovac  HEENT: PERRL  Resp: clear  CVS: S1S2, regular  Abd: soft, NT, +BS  Ext: no edema  Skin: warm    MEDICATIONS  meropenem IVPB   insulin lispro (HumaLOG) corrective regimen sliding scale SubCutaneous  levothyroxine Injectable IV Push  acetaminophen   Tablet Oral PRN  cyclobenzaprine Oral  diphenhydrAMINE   Capsule Oral PRN  HYDROmorphone  Injectable SubCutaneous PRN  morphine  - Injectable IV Push PRN  ondansetron Injectable IV Push PRN  oxyCODONE    IR Oral PRN  docusate sodium Oral  magnesium hydroxide Suspension Oral PRN  senna Oral  ascorbic acid Oral  calcium carbonate 1250 mG + Vitamin D (OsCal 500 + D) Oral  folic acid Oral  multivitamin Oral  benzocaine 15 mG/menthol 3.6 mG Lozenge Oral PRN  BACItracin 50,000 Unit(s) Injectable (Rx Quick Charge) IntraMuscular                        9.7    11.3  )-----------( 432      ( 05 Nov 2017 12:44 )             31.0     11-05    141  |  106  |  24<H>  ----------------------------<  110<H>  3.9   |  28  |  0.97    Ca    7.5<L>      05 Nov 2017 05:51  Phos  1.8     11-04  Mg     1.8     11-04    TPro  5.0<L>  /  Alb  1.9<L>  /  TBili  0.5  /  DBili  x   /  AST  16  /  ALT  20  /  AlkPhos  79  11-04    PT/INR - ( 04 Nov 2017 06:12 )   PT: 14.3 sec;   INR: 1.30 ratio       PTT - ( 04 Nov 2017 06:12 )  PTT:23.4 sec    .Blood Blood-Peripheral   No growth to date. -- 11-03 @ 00:51  .Blood Blood-Peripheral   No growth to date. -- 11-02 @ 22:53  .Surgical Swab surgical wound lumbar area   No growth to date. -- 11-02 @ 11:44  .Blood Blood-Peripheral   No growth to date. -- 11-01 @ 08:40  .Urine Catheterized   No growth -- 11-01 @ 08:34    Rapid RVP Result: NotDetec (11-01 @ 04:19)    CENTRAL LINE: N      PALOMO: Y                         REMOVE: N (defer to ortho)  A-LINE: N                        GLOBAL ISSUE/BEST PRACTICE  Analgesia: Y  Sedation: NA  CAM-ICU: neg  HOB elevation: yes  Stress ulcer prophylaxis: NA  VTE prophylaxis: N (defer to ortho)  Glycemic control: Y  Nutrition: Y    CODE STATUS: full  GOC discussion: Y

## 2017-11-05 NOTE — PROGRESS NOTE ADULT - ASSESSMENT
82 year old male with PMH of recent lumbar laminectomy on 10/27 [facility] .   Course complicated by coffee ground emesis, serosanguinous fluid drainage from surgical incision with increasing leukocytosis suggestive of spinal abscess, and hypoxia. Transferred to ICU for closer monitoring. S/P I&D POD#1

## 2017-11-05 NOTE — PHYSICAL THERAPY INITIAL EVALUATION ADULT - GENERAL OBSERVATIONS, REHAB EVAL
Pt received in bed, O2 in place, monitors, bilateral SCD's, hemovac to lumbar spine, IV in place, NAD.

## 2017-11-05 NOTE — PROGRESS NOTE ADULT - SUBJECTIVE AND OBJECTIVE BOX
Subjective: NGT out and pt tolerating liquids    Objective:  Vital Signs Last 24 Hrs  T(C): 36.8 (05 Nov 2017 08:24), Max: 37.4 (04 Nov 2017 20:45)  T(F): 98.3 (05 Nov 2017 08:24), Max: 99.3 (04 Nov 2017 20:45)  HR: 99 (05 Nov 2017 12:00) (80 - 99)  BP: 135/63 (05 Nov 2017 12:00) (118/58 - 146/65)  BP(mean): 91 (05 Nov 2017 12:00) (14 - 103)  RR: 17 (05 Nov 2017 12:00) (14 - 28)  SpO2: 100% (05 Nov 2017 12:00) (90% - 100%)    Heent: GEORGE OLSON  Pul: clear  Cor: RSR, without murmurs  Abdomen: normal bowel sounds, without distension or tenderness  Extremities: without edema, motor/sensory intact, without calf pain, Homans sign negative.                        8.5    10.0  )-----------( 372      ( 05 Nov 2017 05:51 )             25.9       11-05    141  |  106  |  24<H>  ----------------------------<  110<H>  3.9   |  28  |  0.97    Ca    7.5<L>      05 Nov 2017 05:51  Phos  1.8     11-04  Mg     1.8     11-04    TPro  5.0<L>  /  Alb  1.9<L>  /  TBili  0.5  /  DBili  x   /  AST  16  /  ALT  20  /  AlkPhos  79  11-04 11-04 @ 08:01  -  11-05 @ 07:00  --------------------------------------------------------  IN:    IV PiggyBack: 250 mL    Oral Fluid: 90 mL    sodium chloride 0.9%: 450 mL    sodium chloride 0.9%.: 1125 mL    Solution: 100 mL  Total IN: 2015 mL    OUT:    Evacuated Tube System: 210 mL    Indwelling Catheter - Urethral: 585 mL    Nasoenteral Tube: 20 mL  Total OUT: 815 mL    Total NET: 1200 mL

## 2017-11-05 NOTE — PROGRESS NOTE ADULT - SUBJECTIVE AND OBJECTIVE BOX
Patient is a 82y old  Male who presents with a chief complaint of sent from [facility] for AMS (01 Nov 2017 10:09)       INTERVAL HPI/ OVERNIGHT EVENTS: Patient seen and examined at bedside. No new events. POD #1    MEDICATIONS  (STANDING):  ascorbic acid 500 milliGRAM(s) Oral two times a day  BACItracin 50,000 Unit(s) Injectable (Rx Quick Charge) 791176 Unit(s) IntraMuscular   calcium carbonate 1250 mG + Vitamin D (OsCal 500 + D) 1 Tablet(s) Oral three times a day  cyclobenzaprine 10 milliGRAM(s) Oral every 8 hours  dextrose 5%. 1000 milliLiter(s) (50 mL/Hr) IV Continuous <Continuous>  dextrose 50% Injectable 12.5 Gram(s) IV Push once  dextrose 50% Injectable 25 Gram(s) IV Push once  dextrose 50% Injectable 25 Gram(s) IV Push once  docusate sodium 100 milliGRAM(s) Oral three times a day  folic acid 1 milliGRAM(s) Oral daily  insulin lispro (HumaLOG) corrective regimen sliding scale   SubCutaneous every 6 hours  levothyroxine Injectable 18.75 MICROGram(s) IV Push daily  multivitamin 1 Tablet(s) Oral daily  senna 2 Tablet(s) Oral at bedtime  sodium chloride 0.9%. 1000 milliLiter(s) (75 mL/Hr) IV Continuous <Continuous>    MEDICATIONS  (PRN):  acetaminophen   Tablet 650 milliGRAM(s) Oral every 6 hours PRN For Temp greater than 38 C (100.4 F)  benzocaine 15 mG/menthol 3.6 mG Lozenge 1 Lozenge Oral every 4 hours PRN Sore Throat  dextrose Gel 1 Dose(s) Oral once PRN Blood Glucose LESS THAN 70 milliGRAM(s)/deciLiter  diphenhydrAMINE   Capsule 25 milliGRAM(s) Oral every 4 hours PRN Insomnia  glucagon  Injectable 1 milliGRAM(s) IntraMuscular once PRN Glucose <70 milliGRAM(s)/deciLiter  HYDROmorphone  Injectable 1 milliGRAM(s) SubCutaneous every 3 hours PRN Severe Pain (7 - 10)  magnesium hydroxide Suspension 30 milliLiter(s) Oral every 12 hours PRN Constipation  morphine  - Injectable 1 milliGRAM(s) IV Push every 10 minutes PRN Moderate Pain (4 - 6)  ondansetron Injectable 4 milliGRAM(s) IV Push once PRN Nausea and/or Vomiting  oxyCODONE    IR 5 milliGRAM(s) Oral every 4 hours PRN Mild Pain (1 - 3)  oxyCODONE    IR 10 milliGRAM(s) Oral every 4 hours PRN Moderate Pain (4 - 6)      Allergies    penicillin (Hives)    Intolerances        REVIEW OF SYSTEMS:  CONSTITUTIONAL: No fever,  or fatigue  EYES: No eye pain, visual disturbances, or discharge  ENMT:  No difficulty hearing, tinnitus, vertigo; No sinus or throat pain  NECK: No pain or stiffness  RESPIRATORY: No cough, wheezing, chills or hemoptysis; No shortness of breath  CARDIOVASCULAR: No chest pain, palpitations, dizziness, or leg swelling  GASTROINTESTINAL: No abdominal or epigastric pain. No nausea, vomiting, or hematemesis; No diarrhea or constipation. No melena or hematochezia.  GENITOURINARY: No dysuria, frequency, hematuria, or incontinence  NEUROLOGICAL: No headaches, memory loss, loss of strength, numbness, or tremors  SKIN: No itching, burning, rashes, or lesions   LYMPH NODES: No enlarged glands  ENDOCRINE: No heat or cold intolerance; No hair loss; No polydipsia or polyuria  MUSCULOSKELETAL: No joint pain or swelling; No muscle, back, or extremity pain  HEME/LYMPH: No easy bruising, or bleeding gums  ALLERGY AND IMMUNOLOGIC: No hives or eczema    Vital Signs Last 24 Hrs  T(C): 36.8 (05 Nov 2017 08:24), Max: 37.4 (04 Nov 2017 20:45)  T(F): 98.3 (05 Nov 2017 08:24), Max: 99.3 (04 Nov 2017 20:45)  HR: 98 (05 Nov 2017 10:00) (80 - 99)  BP: 129/59 (05 Nov 2017 10:00) (118/58 - 154/71)  BP(mean): 83 (05 Nov 2017 10:00) (14 - 103)  RR: 28 (05 Nov 2017 10:00) (14 - 28)  SpO2: 98% (05 Nov 2017 10:00) (90% - 100%)    PHYSICAL EXAM:  GENERAL: NAD,  well-developed  HEAD:  Atraumatic, Normocephalic  EYES: EOMI, PERRLA, conjunctiva and sclera clear  ENMT: No tonsillar erythema, exudates, or enlargement; Moist mucous membranes  NECK: Supple, No JVD, Normal thyroid  NERVOUS SYSTEM:  Alert & Oriented X3, No focal deficits.   CHEST/LUNG: Clear to auscultation bilaterally; No rales, rhonchi, wheezing, or rubs  HEART: Regular rate and rhythm; No murmurs, rubs, or gallops  ABDOMEN: Soft, Nontender, Nondistended; Bowel sounds present  EXTREMITIES:  2+ Peripheral Pulses, No clubbing, cyanosis, or edema  LYMPH: No lymphadenopathy noted  SKIN: No rashes or lesions    LABS:                        8.5    10.0  )-----------( 372      ( 05 Nov 2017 05:51 )             25.9     05 Nov 2017 05:51    141    |  106    |  24     ----------------------------<  110    3.9     |  28     |  0.97     Ca    7.5        05 Nov 2017 05:51      PT/INR - ( 04 Nov 2017 06:12 )   PT: 14.3 sec;   INR: 1.30 ratio         PTT - ( 04 Nov 2017 06:12 )  PTT:23.4 sec  CAPILLARY BLOOD GLUCOSE  111 (05 Nov 2017 06:00)  120 (05 Nov 2017 00:00)  112 (04 Nov 2017 18:00)      POCT Blood Glucose.: 111 mg/dL (05 Nov 2017 06:22)  POCT Blood Glucose.: 120 mg/dL (04 Nov 2017 23:57)  POCT Blood Glucose.: 112 mg/dL (04 Nov 2017 18:05)    BLOOD CULTURE  11-03 @ 00:51   No growth to date.  --  --  11-02 @ 22:53   No growth to date.  --  --  11-02 @ 11:44   No growth to date.  --  --    RADIOLOGY & ADDITIONAL TESTS:    Imaging Personally Reviewed:  [ ] YES     Consultant(s) Notes Reviewed:      Care Discussed with Consultants/Other Providers:

## 2017-11-05 NOTE — DISCHARGE NOTE ADULT - CARE PROVIDER_API CALL
Phoenix Platt), Orthopaedic Surgery  [address] [place] [address]  Phone: [phone]  Fax: [phone]

## 2017-11-05 NOTE — PROGRESS NOTE ADULT - PROBLEM SELECTOR PLAN 1
improved today after NGT decompression  now s/p NGT removal, will need CT A/P w/ oral contrast when able to tolerate  on CLD advance as tolerated

## 2017-11-05 NOTE — PROGRESS NOTE ADULT - SUBJECTIVE AND OBJECTIVE BOX
Patient is a 82y old  Male who presents with a chief complaint of Spinal Compression w/ Neuro Defecits/AMS (05 Nov 2017 11:11)      BRIEF HOSPITAL COURSE:     82 year old male with PMH of recent lumbar laminectomy on 10/27 presents with AMS and leukocytosis, possible spinal abscess v hematoma        Events last 24 hours:   -POD#2 from hematoma evacuation    PAST MEDICAL & SURGICAL HISTORY:  Prostate CA  Crohn disease  Hypothyroidism  Depression  Intervertebral disc disorder  Diabetes mellitus  Obesity  Hypercholesterolemia  HTN (hypertension)  Stented coronary artery: 2 stents, 20 yrs ago, 15 yrs ago  CAD (coronary artery disease)  Macular degeneration: right eye  Hearing loss: left hearing aid, right ear deaf  S/P colon resection  S/P lumbar fusion  History of hip replacement, total, right: Revision  History of hip replacement, total, right      Review of Systems:  CONSTITUTIONAL: No fever, chills, or fatigue  EYES: No eye pain, visual disturbances, or discharge  ENMT:  No difficulty hearing, tinnitus, vertigo; No sinus or throat pain  NECK: No pain or stiffness  RESPIRATORY: No cough, wheezing, chills or hemoptysis; No shortness of breath  CARDIOVASCULAR: No chest pain, palpitations, dizziness, or leg swelling  GASTROINTESTINAL: No abdominal or epigastric pain. No nausea, vomiting, or hematemesis; No diarrhea or constipation. No melena or hematochezia.  GENITOURINARY: No dysuria, frequency, hematuria, or incontinence  NEUROLOGICAL: No headaches, memory loss, loss of strength, numbness, or tremors  SKIN: No itching, burning, rashes, or lesions   MUSCULOSKELETAL: No joint pain or swelling; No muscle, back, or extremity pain  PSYCHIATRIC: No depression, anxiety, mood swings, or difficulty sleeping      Medications:  meropenem IVPB 1000 milliGRAM(s) IV Intermittent every 8 hours  meropenem IVPB        verapamil  milliGRAM(s) Oral daily      acetaminophen   Tablet 650 milliGRAM(s) Oral every 6 hours PRN  cyclobenzaprine 10 milliGRAM(s) Oral every 8 hours  diphenhydrAMINE   Capsule 25 milliGRAM(s) Oral every 4 hours PRN  HYDROmorphone  Injectable 1 milliGRAM(s) SubCutaneous every 3 hours PRN  morphine  - Injectable 1 milliGRAM(s) IV Push every 10 minutes PRN  ondansetron Injectable 4 milliGRAM(s) IV Push once PRN  oxyCODONE    IR 5 milliGRAM(s) Oral every 4 hours PRN  oxyCODONE    IR 10 milliGRAM(s) Oral every 4 hours PRN        docusate sodium 100 milliGRAM(s) Oral three times a day  magnesium hydroxide Suspension 30 milliLiter(s) Oral every 12 hours PRN  pantoprazole    Tablet 40 milliGRAM(s) Oral before breakfast  senna 2 Tablet(s) Oral at bedtime      dextrose 50% Injectable 12.5 Gram(s) IV Push once  dextrose 50% Injectable 25 Gram(s) IV Push once  dextrose 50% Injectable 25 Gram(s) IV Push once  dextrose Gel 1 Dose(s) Oral once PRN  glucagon  Injectable 1 milliGRAM(s) IntraMuscular once PRN  insulin lispro (HumaLOG) corrective regimen sliding scale   SubCutaneous three times a day before meals  insulin lispro (HumaLOG) corrective regimen sliding scale   SubCutaneous at bedtime  levothyroxine Injectable 18.75 MICROGram(s) IV Push daily    ascorbic acid 500 milliGRAM(s) Oral two times a day  calcium carbonate 1250 mG + Vitamin D (OsCal 500 + D) 1 Tablet(s) Oral three times a day  dextrose 5%. 1000 milliLiter(s) IV Continuous <Continuous>  folic acid 1 milliGRAM(s) Oral daily  multivitamin 1 Tablet(s) Oral daily      benzocaine 15 mG/menthol 3.6 mG Lozenge 1 Lozenge Oral every 4 hours PRN    BACItracin 50,000 Unit(s) Injectable (Rx Quick Charge) [id number] Unit(s) IntraMuscular           ICU Vital Signs Last 24 Hrs  T(C): 37.1 (05 Nov 2017 20:30), Max: 37.3 (05 Nov 2017 12:01)  T(F): 98.7 (05 Nov 2017 20:30), Max: 99.2 (05 Nov 2017 12:01)  HR: 95 (05 Nov 2017 20:00) (80 - 102)  BP: 131/58 (05 Nov 2017 20:00) (104/55 - 149/98)  BP(mean): 83 (05 Nov 2017 20:00) (75 - 119)  ABP: --  ABP(mean): --  RR: 16 (05 Nov 2017 20:00) (14 - 28)  SpO2: 95% (05 Nov 2017 20:00) (92% - 100%)          I&O's Detail    04 Nov 2017 08:01  -  05 Nov 2017 07:00  --------------------------------------------------------  IN:    IV PiggyBack: 250 mL    Oral Fluid: 90 mL    sodium chloride 0.9%: 1125 mL    sodium chloride 0.9%: 450 mL    Solution: 100 mL  Total IN: 2015 mL    OUT:    Evacuated Tube System: 210 mL    Indwelling Catheter - Urethral: 585 mL    Nasoenteral Tube: 20 mL  Total OUT: 815 mL    Total NET: 1200 mL      05 Nov 2017 07:01  -  05 Nov 2017 20:59  --------------------------------------------------------  IN:    Oral Fluid: 510 mL    sodium chloride 0.9%: 375 mL    Solution: 100 mL  Total IN: 985 mL    OUT:    Evacuated Tube System: 5 mL    Indwelling Catheter - Urethral: 385 mL  Total OUT: 390 mL    Total NET: 595 mL            LABS:                        9.7    11.3  )-----------( 432      ( 05 Nov 2017 12:44 )             31.0     11-05    141  |  106  |  24<H>  ----------------------------<  110<H>  3.9   |  28  |  0.97    Ca    7.5<L>      05 Nov 2017 05:51  Phos  1.8     11-04  Mg     1.8     11-04    TPro  5.0<L>  /  Alb  1.9<L>  /  TBili  0.5  /  DBili  x   /  AST  16  /  ALT  20  /  AlkPhos  79  11-04          CAPILLARY BLOOD GLUCOSE  111 (05 Nov 2017 06:00)      POCT Blood Glucose.: 164 mg/dL (05 Nov 2017 17:01)    PT/INR - ( 04 Nov 2017 06:12 )   PT: 14.3 sec;   INR: 1.30 ratio         PTT - ( 04 Nov 2017 06:12 )  PTT:23.4 sec    CULTURES:  Culture Results:   No growth to date (11-04-17 @ 21:34)  Culture Results:   No growth to date (11-04-17 @ 21:33)  Culture Results:   No growth to date (11-04-17 @ 21:33)  Culture Results:   No growth to date. (11-03-17 @ 00:51)  Culture Results:   No growth to date. (11-02-17 @ 22:53)  Culture Results:   No growth to date. (11-02-17 @ 11:44)  Culture Results:   No growth to date. (11-01-17 @ 08:40)  Culture Results:   No growth to date. (11-01-17 @ 08:40)  Culture Results:   No growth (11-01-17 @ 08:34)  Rapid RVP Result: NotDetec (11-01-17 @ 04:19)      Physical Examination:    General: No acute distress.  Alert, oriented, interactive, nonfocal    HEENT: Pupils equal, reactive to light.  Symmetric.    PULM: Clear to auscultation bilaterally, no significant sputum production    CVS: Regular rate and rhythm, no murmurs, rubs, or gallops    ABD: Soft, nondistended, nontender, normoactive bowel sounds, no masses    EXT: No edema, nontender    SKIN: Warm and well perfused, no rashes noted.

## 2017-11-05 NOTE — PHYSICAL THERAPY INITIAL EVALUATION ADULT - MANUAL MUSCLE TESTING RESULTS, REHAB EVAL
except bilateral SLR 2-/5, bilateral knee flexion 2/5, knee extension 3/5/no strength deficits were identified

## 2017-11-05 NOTE — PROGRESS NOTE ADULT - SUBJECTIVE AND OBJECTIVE BOX
[facility]    Bre Awad, Eliane, Aylin, Kelli, [place], Jeff      [phone]    CHIEF COMPLAINT: Patient is a 82y old  Male who presents with a chief complaint of sent from [facility] for [facility] (01 Nov 2017 10:09)      Follow Up: cad, pci, htn, recent laminectomy with hematoma, requiring epidural decompression    Interim history: confused. tolerated surgery yesterday without difficulty. denies cp and sob    MEDICATIONS  (STANDING):  ascorbic acid 500 milliGRAM(s) Oral two times a day  BACItracin 50,000 Unit(s) Injectable (Rx Quick Charge) [id number] Unit(s) IntraMuscular   calcium carbonate 1250 mG + Vitamin D (OsCal 500 + D) 1 Tablet(s) Oral three times a day  cyclobenzaprine 10 milliGRAM(s) Oral every 8 hours  dextrose 5%. 1000 milliLiter(s) (50 mL/Hr) IV Continuous <Continuous>  dextrose 50% Injectable 12.5 Gram(s) IV Push once  dextrose 50% Injectable 25 Gram(s) IV Push once  dextrose 50% Injectable 25 Gram(s) IV Push once  docusate sodium 100 milliGRAM(s) Oral three times a day  folic acid 1 milliGRAM(s) Oral daily  insulin lispro (HumaLOG) corrective regimen sliding scale   SubCutaneous every 6 hours  levothyroxine Injectable 18.75 MICROGram(s) IV Push daily  multivitamin 1 Tablet(s) Oral daily  senna 2 Tablet(s) Oral at bedtime  sodium chloride 0.9%. 1000 milliLiter(s) (75 mL/Hr) IV Continuous <Continuous>    MEDICATIONS  (PRN):  acetaminophen   Tablet 650 milliGRAM(s) Oral every 6 hours PRN For Temp greater than 38 C (100.4 F)  benzocaine 15 mG/menthol 3.6 mG Lozenge 1 Lozenge Oral every 4 hours PRN Sore Throat  dextrose Gel 1 Dose(s) Oral once PRN Blood Glucose LESS THAN 70 milliGRAM(s)/deciLiter  diphenhydrAMINE   Capsule 25 milliGRAM(s) Oral every 4 hours PRN Insomnia  glucagon  Injectable 1 milliGRAM(s) IntraMuscular once PRN Glucose <70 milliGRAM(s)/deciLiter  HYDROmorphone  Injectable 1 milliGRAM(s) SubCutaneous every 3 hours PRN Severe Pain (7 - 10)  magnesium hydroxide Suspension 30 milliLiter(s) Oral every 12 hours PRN Constipation  morphine  - Injectable 1 milliGRAM(s) IV Push every 10 minutes PRN Moderate Pain (4 - 6)  ondansetron Injectable 4 milliGRAM(s) IV Push once PRN Nausea and/or Vomiting  oxyCODONE    IR 5 milliGRAM(s) Oral every 4 hours PRN Mild Pain (1 - 3)  oxyCODONE    IR 10 milliGRAM(s) Oral every 4 hours PRN Moderate Pain (4 - 6)      REVIEW OF SYSTEMS: unable    Vital Signs Last 24 Hrs  T(C): 36.8 (05 Nov 2017 08:24), Max: 37.4 (04 Nov 2017 20:45)  T(F): 98.3 (05 Nov 2017 08:24), Max: 99.3 (04 Nov 2017 20:45)  HR: 99 (05 Nov 2017 09:00) (80 - 99)  BP: 131/66 (05 Nov 2017 09:00) (118/58 - 154/71)  BP(mean): 85 (05 Nov 2017 09:00) (14 - 103)  RR: 21 (05 Nov 2017 09:00) (14 - 27)  SpO2: 94% (05 Nov 2017 09:00) (90% - 100%)    I&O's Summary    04 Nov 2017 08:01  -  05 Nov 2017 07:00  --------------------------------------------------------  IN: 2015 mL / OUT: 815 mL / NET: 1200 mL        Telemetry past 24h: sr    PHYSICAL EXAM:    Constitutional: well-nourished, well-developed, NAD   HEENT:  MMM, sclerae anicteric, conjunctivae clear, no oral cyanosis.  Pulmonary: Non-labored, breath sounds are clear bilaterally, No wheezing, rales or rhonchi  Cardiovascular: Regular, S1 and S2.  No murmur.  No rubs, gallops or clicks  Gastrointestinal: Bowel Sounds present, soft, nontender.   Lymph: No peripheral edema.   Neurological: Alert, confused  Skin: No rashes.  Psych:  confused    LABS: All Labs Reviewed:                        8.5    10.0  )-----------( 372      ( 05 Nov 2017 05:51 )             25.9                         9.9    13.7  )-----------( 423      ( 04 Nov 2017 18:36 )             31.6                         9.5    10.9  )-----------( 402      ( 04 Nov 2017 06:12 )             29.8     05 Nov 2017 05:51    141    |  106    |  24     ----------------------------<  110    3.9     |  28     |  0.97   04 Nov 2017 18:36    140    |  103    |  25     ----------------------------<  134    3.8     |  26     |  1.00   04 Nov 2017 06:12    140    |  102    |  29     ----------------------------<  110    3.5     |  28     |  1.10     Ca    7.5        05 Nov 2017 05:51  Ca    8.1        04 Nov 2017 18:36  Ca    8.2        04 Nov 2017 06:12  Phos  1.8       04 Nov 2017 06:12  Phos  3.9       03 Nov 2017 05:50  Phos  3.0       02 Nov 2017 18:41  Mg     1.8       04 Nov 2017 06:12  Mg     1.9       03 Nov 2017 05:50  Mg     1.8       02 Nov 2017 18:41    TPro  5.0    /  Alb  1.9    /  TBili  0.5    /  DBili  x      /  AST  16     /  ALT  20     /  AlkPhos  79     04 Nov 2017 06:12  TPro  5.3    /  Alb  2.1    /  TBili  0.6    /  DBili  x      /  AST  11     /  ALT  19     /  AlkPhos  80     03 Nov 2017 05:50  TPro  6.1    /  Alb  2.4    /  TBili  0.6    /  DBili  x      /  AST  17     /  ALT  24     /  AlkPhos  89     02 Nov 2017 18:41    PT/INR - ( 04 Nov 2017 06:12 )   PT: 14.3 sec;   INR: 1.30 ratio         PTT - ( 04 Nov 2017 06:12 )  PTT:23.4 sec      Blood Culture: Organism --  Gram Stain Blood -- Gram Stain --  Specimen Source .Blood Blood-Peripheral  Culture-Blood --    Organism --  Gram Stain Blood -- Gram Stain --  Specimen Source .Blood Blood-Peripheral  Culture-Blood --    Organism --  Gram Stain Blood -- Gram Stain --  Specimen Source .Surgical Swab surgical wound lumbar area  Culture-Blood --    Organism --  Gram Stain Blood -- Gram Stain --  Specimen Source .Blood Blood-Peripheral  Culture-Blood --    Organism --  Gram Stain Blood -- Gram Stain --  Specimen Source .Urine Catheterized  Culture-Blood --            RADIOLOGY:    EKG:    Echo:

## 2017-11-05 NOTE — PROGRESS NOTE ADULT - SUBJECTIVE AND OBJECTIVE BOX
[facility], Division of Infectious Diseases  CONNER Kim A. Lee  [phone]  Name: JONATHAN SUMMERS  Age: 82y  Gender: Male  MRN: [id number]    Interval History--  Notes reviewed  feels tired and weak  back feels ok    Past Medical History--  Prostate CA  Crohn disease  Hypothyroidism  Depression  Intervertebral disc disorder  Diabetes mellitus  Obesity  Hypercholesterolemia  HTN (hypertension)  Stented coronary artery  CAD (coronary artery disease)  Macular degeneration  Glaucoma  Hearing loss  S/P colon resection  S/P lumbar fusion  History of hip replacement, total, right  History of hip replacement, total, right      For details regarding the patient's social history, family history, and other miscellaneous elements, please refer the initial infectious diseases consultation and/or the admitting history and physical examination for this admission.    Allergies    penicillin (Hives)    Intolerances        Medications--  Antibiotics:    Immunologic:    Other:  acetaminophen   Tablet PRN  ascorbic acid  BACItracin 50,000 Unit(s) Injectable (Rx Quick Charge)  benzocaine 15 mG/menthol 3.6 mG Lozenge PRN  calcium carbonate 1250 mG + Vitamin D (OsCal 500 + D)  cyclobenzaprine  dextrose 5%.  dextrose 50% Injectable  dextrose 50% Injectable  dextrose 50% Injectable  dextrose Gel PRN  diphenhydrAMINE   Capsule PRN  docusate sodium  folic acid  glucagon  Injectable PRN  HYDROmorphone  Injectable PRN  insulin lispro (HumaLOG) corrective regimen sliding scale  levothyroxine Injectable  magnesium hydroxide Suspension PRN  morphine  - Injectable PRN  multivitamin  ondansetron Injectable PRN  oxyCODONE    IR PRN  oxyCODONE    IR PRN  senna  sodium chloride 0.9%.      Review of Systems--  A 10-point review of systems was obtained.     Pertinent positives and negatives--  Constitutional: No fevers. No Chills. No Rigors.   Cardiovascular: No chest pain. No palpitations.  Respiratory: No shortness of breath. No cough.  Gastrointestinal: No nausea or vomiting. No diarrhea or constipation.   Psychiatric: no anxiety    Review of systems otherwise negative except as previously noted.    Physical Examination--  Vital Signs: T(F): 98.3 (11-05-17 @ 08:24), Max: 99.3 (11-04-17 @ 20:45)  HR: 97 (11-05-17 @ 11:00)  BP: 125/64 (11-05-17 @ 11:00)  RR: 17 (11-05-17 @ 11:00)  SpO2: 100% (11-05-17 @ 11:00)  Wt(kg): --  General: Nontoxic-appearing Male in no acute distress.  HEENT: AT/NC. . Anicteric. Conjunctiva pink and moist. Oropharynx clear. Dentition fair.  Neck: Not rigid. No sense of mass.  Nodes: None palpable.  Lungs: Clear bilaterally without rales, wheezing or rhonchi  Heart: Regular rate and rhythm. No Murmur. No rub.  Abdomen: Bowel sounds present and normoactive. Soft. Nondistended. Nontender.   Back: No spinal tenderness. No costovertebral angle tenderness.   Extremities: No cyanosis or clubbing. trace edema.   Skin: Warm. Dry. Good turgor. No rash. No vasculitic stigmata.  Psychiatric: Appropriate affect and mood for situation.         Laboratory Studies--  CBC                        8.5    10.0  )-----------( 372      ( 05 Nov 2017 05:51 )             25.9       Chemistries  11-05    141  |  106  |  24<H>  ----------------------------<  110<H>  3.9   |  28  |  0.97    Ca    7.5<L>      05 Nov 2017 05:51  Phos  1.8     11-04  Mg     1.8     11-04    TPro  5.0<L>  /  Alb  1.9<L>  /  TBili  0.5  /  DBili  x   /  AST  16  /  ALT  20  /  AlkPhos  79  11-04      Culture Data    Culture - Blood (11.03.17 @ 00:51)    Specimen Source: .Blood Blood-Peripheral    Culture Results:   No growth to date.

## 2017-11-05 NOTE — PROGRESS NOTE ADULT - PROBLEM SELECTOR PLAN 1
-POD#2  -caballero removed  -tolerating PO diet  -not requiring much pain meds    -on transfer to [address] [place] [address]

## 2017-11-05 NOTE — PROGRESS NOTE ADULT - SUBJECTIVE AND OBJECTIVE BOX
Neurology follow up note    JONATHAN SUMMERS82yMale      Interval History:    Patient feels ok no new complaints sitting in chair     MEDICATIONS    acetaminophen   Tablet 650 milliGRAM(s) Oral every 6 hours PRN  ascorbic acid 500 milliGRAM(s) Oral two times a day  BACItracin 50,000 Unit(s) Injectable (Rx Quick Charge) 457178 Unit(s) IntraMuscular   benzocaine 15 mG/menthol 3.6 mG Lozenge 1 Lozenge Oral every 4 hours PRN  calcium carbonate 1250 mG + Vitamin D (OsCal 500 + D) 1 Tablet(s) Oral three times a day  cyclobenzaprine 10 milliGRAM(s) Oral every 8 hours  dextrose 5%. 1000 milliLiter(s) IV Continuous <Continuous>  dextrose 50% Injectable 12.5 Gram(s) IV Push once  dextrose 50% Injectable 25 Gram(s) IV Push once  dextrose 50% Injectable 25 Gram(s) IV Push once  dextrose Gel 1 Dose(s) Oral once PRN  diphenhydrAMINE   Capsule 25 milliGRAM(s) Oral every 4 hours PRN  docusate sodium 100 milliGRAM(s) Oral three times a day  folic acid 1 milliGRAM(s) Oral daily  glucagon  Injectable 1 milliGRAM(s) IntraMuscular once PRN  HYDROmorphone  Injectable 1 milliGRAM(s) SubCutaneous every 3 hours PRN  insulin lispro (HumaLOG) corrective regimen sliding scale   SubCutaneous three times a day before meals  insulin lispro (HumaLOG) corrective regimen sliding scale   SubCutaneous at bedtime  levothyroxine Injectable 18.75 MICROGram(s) IV Push daily  magnesium hydroxide Suspension 30 milliLiter(s) Oral every 12 hours PRN  meropenem IVPB 1000 milliGRAM(s) IV Intermittent every 8 hours  meropenem IVPB      morphine  - Injectable 1 milliGRAM(s) IV Push every 10 minutes PRN  multivitamin 1 Tablet(s) Oral daily  ondansetron Injectable 4 milliGRAM(s) IV Push once PRN  oxyCODONE    IR 5 milliGRAM(s) Oral every 4 hours PRN  oxyCODONE    IR 10 milliGRAM(s) Oral every 4 hours PRN  pantoprazole    Tablet 40 milliGRAM(s) Oral before breakfast  senna 2 Tablet(s) Oral at bedtime  verapamil  milliGRAM(s) Oral daily      Allergies    penicillin (Hives)    Intolerances            Vital Signs Last 24 Hrs  T(C): 37.3 (05 Nov 2017 12:01), Max: 37.4 (04 Nov 2017 20:45)  T(F): 99.2 (05 Nov 2017 12:01), Max: 99.3 (04 Nov 2017 20:45)  HR: 102 (05 Nov 2017 14:00) (80 - 102)  BP: 133/72 (05 Nov 2017 14:00) (118/58 - 149/98)  BP(mean): 89 (05 Nov 2017 14:00) (82 - 119)  RR: 20 (05 Nov 2017 14:00) (14 - 28)  SpO2: 100% (05 Nov 2017 14:00) (90% - 100%)      REVIEW OF SYSTEMS: no headaches feels ok         On Neurological Examination:    Mental Status - Patient is alert, awake, oriented   [facility] hospital .   oct   2017      Follow simple commands    Speech -   Fluent                    Cranial Nerves - Pupils 3 mm equal and reactive to light,   extraocular eye movements intact.   smile symmetric  intact bilateral NLF    Motor Exam -   Right upper 4/5  Left upper 4/5  Right lower 2/5  Left lower 2/5    Muscle tone - is normal all over.  No asymmetry is seen.      Sensory    Bilateral intact to light touch    GENERAL Exam: Nontoxic , No Acute Distress   	  HEENT:  normocephalic, atraumatic  		  LUNGS: Decreased bilaterally  	  HEART: Normal S1S2   No murmur RRR        	  GI/ ABDOMEN:  Soft  Non tender    EXTREMITIES:   No Edema  No Clubbing  No Cyanosis No Edema    MUSCULOSKELETAL: Normal Range of Motion  	   SKIN: Normal  No Ecchymosis               LABS:  CBC Full  -  ( 05 Nov 2017 12:44 )  WBC Count : 11.3 K/uL  Hemoglobin : 9.7 g/dL  Hematocrit : 31.0 %  Platelet Count - Automated : 432 K/uL  Mean Cell Volume : 95.9 fl  Mean Cell Hemoglobin : 30.1 pg  Mean Cell Hemoglobin Concentration : 31.4 gm/dL  Auto Neutrophil # : x  Auto Lymphocyte # : x  Auto Monocyte # : x  Auto Eosinophil # : x  Auto Basophil # : x  Auto Neutrophil % : x  Auto Lymphocyte % : x  Auto Monocyte % : x  Auto Eosinophil % : x  Auto Basophil % : x      11-05    141  |  106  |  24<H>  ----------------------------<  110<H>  3.9   |  28  |  0.97    Ca    7.5<L>      05 Nov 2017 05:51  Phos  1.8     11-04  Mg     1.8     11-04    TPro  5.0<L>  /  Alb  1.9<L>  /  TBili  0.5  /  DBili  x   /  AST  16  /  ALT  20  /  AlkPhos  79  11-04    Hemoglobin A1C:     LIVER FUNCTIONS - ( 04 Nov 2017 06:12 )  Alb: 1.9 g/dL / Pro: 5.0 g/dL / ALK PHOS: 79 U/L / ALT: 20 U/L / AST: 16 U/L / GGT: x           Vitamin B12   PT/INR - ( 04 Nov 2017 06:12 )   PT: 14.3 sec;   INR: 1.30 ratio         PTT - ( 04 Nov 2017 06:12 )  PTT:23.4 sec      RADIOLOGY      ANALYSIS AND PLAN:  An 82-year-old with episodes of changes in mental status.    1.	For episodes of changes in mental status better h/o PNA awaiting lumbar cultures  ID follow up antibotics as needed    2.	Overall at present  more responsive   Spoke with family members.    Spoke with one relative, Clive telephone number is [phone] 11/2/17.  , Rosanna, her home number is [phone].  Her cell number [phone].  11/3/17 no response today     Thank you for the courtesy of consultation.    30 minutes spent on total encounter; more than 50% of the visit was spent counseling and/or coordinating care by the attending physician.

## 2017-11-05 NOTE — PROGRESS NOTE ADULT - PROBLEM SELECTOR PLAN 1
Improving mental status, likely Metabolic  encephalopathy?   -avoid sedatives and narcotics  -Continue neuro checks

## 2017-11-06 LAB
ANION GAP SERPL CALC-SCNC: 10 MMOL/L — SIGNIFICANT CHANGE UP (ref 5–17)
BASOPHILS # BLD AUTO: 0.1 K/UL — SIGNIFICANT CHANGE UP (ref 0–0.2)
BASOPHILS NFR BLD AUTO: 0.6 % — SIGNIFICANT CHANGE UP (ref 0–2)
BUN SERPL-MCNC: 24 MG/DL — HIGH (ref 7–23)
CALCIUM SERPL-MCNC: 8.3 MG/DL — LOW (ref 8.5–10.1)
CHLORIDE SERPL-SCNC: 105 MMOL/L — SIGNIFICANT CHANGE UP (ref 96–108)
CK SERPL-CCNC: 47 U/L — SIGNIFICANT CHANGE UP (ref 26–308)
CO2 SERPL-SCNC: 26 MMOL/L — SIGNIFICANT CHANGE UP (ref 22–31)
CREAT SERPL-MCNC: 0.95 MG/DL — SIGNIFICANT CHANGE UP (ref 0.5–1.3)
CULTURE RESULTS: SIGNIFICANT CHANGE UP
CULTURE RESULTS: SIGNIFICANT CHANGE UP
EOSINOPHIL # BLD AUTO: 0.2 K/UL — SIGNIFICANT CHANGE UP (ref 0–0.5)
EOSINOPHIL NFR BLD AUTO: 1.9 % — SIGNIFICANT CHANGE UP (ref 0–6)
GLUCOSE SERPL-MCNC: 124 MG/DL — HIGH (ref 70–99)
HCT VFR BLD CALC: 26.7 % — LOW (ref 39–50)
HGB BLD-MCNC: 8.5 G/DL — LOW (ref 13–17)
LYMPHOCYTES # BLD AUTO: 1.1 K/UL — SIGNIFICANT CHANGE UP (ref 1–3.3)
LYMPHOCYTES # BLD AUTO: 11.6 % — LOW (ref 13–44)
MAGNESIUM SERPL-MCNC: 1.9 MG/DL — SIGNIFICANT CHANGE UP (ref 1.6–2.6)
MCHC RBC-ENTMCNC: 30.5 PG — SIGNIFICANT CHANGE UP (ref 27–34)
MCHC RBC-ENTMCNC: 32 GM/DL — SIGNIFICANT CHANGE UP (ref 32–36)
MCV RBC AUTO: 95.4 FL — SIGNIFICANT CHANGE UP (ref 80–100)
MONOCYTES # BLD AUTO: 0.8 K/UL — SIGNIFICANT CHANGE UP (ref 0–0.9)
MONOCYTES NFR BLD AUTO: 8.7 % — SIGNIFICANT CHANGE UP (ref 1–9)
NEUTROPHILS # BLD AUTO: 7 K/UL — SIGNIFICANT CHANGE UP (ref 1.8–7.4)
NEUTROPHILS NFR BLD AUTO: 77.2 % — HIGH (ref 43–77)
PHOSPHATE SERPL-MCNC: 1.6 MG/DL — LOW (ref 2.5–4.5)
PLATELET # BLD AUTO: 368 K/UL — SIGNIFICANT CHANGE UP (ref 150–400)
POTASSIUM SERPL-MCNC: 4 MMOL/L — SIGNIFICANT CHANGE UP (ref 3.5–5.3)
POTASSIUM SERPL-SCNC: 4 MMOL/L — SIGNIFICANT CHANGE UP (ref 3.5–5.3)
RBC # BLD: 2.8 M/UL — LOW (ref 4.2–5.8)
RBC # FLD: 14.2 % — SIGNIFICANT CHANGE UP (ref 10.3–14.5)
SODIUM SERPL-SCNC: 141 MMOL/L — SIGNIFICANT CHANGE UP (ref 135–145)
SPECIMEN SOURCE: SIGNIFICANT CHANGE UP
SPECIMEN SOURCE: SIGNIFICANT CHANGE UP
WBC # BLD: 9.1 K/UL — SIGNIFICANT CHANGE UP (ref 3.8–10.5)
WBC # FLD AUTO: 9.1 K/UL — SIGNIFICANT CHANGE UP (ref 3.8–10.5)

## 2017-11-06 PROCEDURE — 71010: CPT | Mod: 26

## 2017-11-06 PROCEDURE — 99233 SBSQ HOSP IP/OBS HIGH 50: CPT

## 2017-11-06 RX ORDER — OXYCODONE HYDROCHLORIDE 5 MG/1
1 TABLET ORAL
Qty: 0 | Refills: 0 | COMMUNITY
Start: 2017-11-06

## 2017-11-06 RX ORDER — CYCLOBENZAPRINE HYDROCHLORIDE 10 MG/1
1 TABLET, FILM COATED ORAL
Qty: 0 | Refills: 0 | COMMUNITY
Start: 2017-11-06

## 2017-11-06 RX ORDER — TAMSULOSIN HYDROCHLORIDE 0.4 MG/1
0.4 CAPSULE ORAL AT BEDTIME
Qty: 0 | Refills: 0 | Status: DISCONTINUED | OUTPATIENT
Start: 2017-11-06 | End: 2017-11-09

## 2017-11-06 RX ORDER — ASCORBIC ACID 60 MG
1 TABLET,CHEWABLE ORAL
Qty: 0 | Refills: 0 | DISCHARGE
Start: 2017-11-06

## 2017-11-06 RX ORDER — TRAMADOL HYDROCHLORIDE 50 MG/1
1 TABLET ORAL
Qty: 0 | Refills: 0 | COMMUNITY

## 2017-11-06 RX ORDER — FOLIC ACID 0.8 MG
1 TABLET ORAL
Qty: 0 | Refills: 0 | COMMUNITY
Start: 2017-11-06

## 2017-11-06 RX ORDER — LEVOTHYROXINE SODIUM 125 MCG
75 TABLET ORAL DAILY
Qty: 0 | Refills: 0 | Status: DISCONTINUED | OUTPATIENT
Start: 2017-11-06 | End: 2017-11-09

## 2017-11-06 RX ORDER — PANTOPRAZOLE SODIUM 20 MG/1
1 TABLET, DELAYED RELEASE ORAL
Qty: 0 | Refills: 0 | COMMUNITY
Start: 2017-11-06

## 2017-11-06 RX ADMIN — MEROPENEM 200 MILLIGRAM(S): 1 INJECTION INTRAVENOUS at 15:07

## 2017-11-06 RX ADMIN — Medication 1: at 16:57

## 2017-11-06 RX ADMIN — Medication 1: at 12:44

## 2017-11-06 RX ADMIN — OXYCODONE HYDROCHLORIDE 10 MILLIGRAM(S): 5 TABLET ORAL at 18:32

## 2017-11-06 RX ADMIN — Medication 500 MILLIGRAM(S): at 17:44

## 2017-11-06 RX ADMIN — CYCLOBENZAPRINE HYDROCHLORIDE 10 MILLIGRAM(S): 10 TABLET, FILM COATED ORAL at 14:55

## 2017-11-06 RX ADMIN — Medication 25 MILLIGRAM(S): at 21:11

## 2017-11-06 RX ADMIN — Medication 500 MILLIGRAM(S): at 08:00

## 2017-11-06 RX ADMIN — Medication 1 TABLET(S): at 21:11

## 2017-11-06 RX ADMIN — HYDROMORPHONE HYDROCHLORIDE 1 MILLIGRAM(S): 2 INJECTION INTRAMUSCULAR; INTRAVENOUS; SUBCUTANEOUS at 13:07

## 2017-11-06 RX ADMIN — Medication 100 MILLIGRAM(S): at 08:00

## 2017-11-06 RX ADMIN — CYCLOBENZAPRINE HYDROCHLORIDE 10 MILLIGRAM(S): 10 TABLET, FILM COATED ORAL at 08:01

## 2017-11-06 RX ADMIN — Medication 1 TABLET(S): at 12:44

## 2017-11-06 RX ADMIN — OXYCODONE HYDROCHLORIDE 10 MILLIGRAM(S): 5 TABLET ORAL at 03:30

## 2017-11-06 RX ADMIN — Medication 1 MILLIGRAM(S): at 12:44

## 2017-11-06 RX ADMIN — SENNA PLUS 2 TABLET(S): 8.6 TABLET ORAL at 21:11

## 2017-11-06 RX ADMIN — OXYCODONE HYDROCHLORIDE 10 MILLIGRAM(S): 5 TABLET ORAL at 17:44

## 2017-11-06 RX ADMIN — MEROPENEM 200 MILLIGRAM(S): 1 INJECTION INTRAVENOUS at 06:07

## 2017-11-06 RX ADMIN — Medication 180 MILLIGRAM(S): at 08:01

## 2017-11-06 RX ADMIN — MEROPENEM 200 MILLIGRAM(S): 1 INJECTION INTRAVENOUS at 21:10

## 2017-11-06 RX ADMIN — PANTOPRAZOLE SODIUM 40 MILLIGRAM(S): 20 TABLET, DELAYED RELEASE ORAL at 08:00

## 2017-11-06 RX ADMIN — CYCLOBENZAPRINE HYDROCHLORIDE 10 MILLIGRAM(S): 10 TABLET, FILM COATED ORAL at 21:11

## 2017-11-06 RX ADMIN — Medication 1 TABLET(S): at 07:59

## 2017-11-06 RX ADMIN — HYDROMORPHONE HYDROCHLORIDE 1 MILLIGRAM(S): 2 INJECTION INTRAMUSCULAR; INTRAVENOUS; SUBCUTANEOUS at 12:37

## 2017-11-06 RX ADMIN — Medication 100 MILLIGRAM(S): at 14:55

## 2017-11-06 RX ADMIN — OXYCODONE HYDROCHLORIDE 10 MILLIGRAM(S): 5 TABLET ORAL at 03:00

## 2017-11-06 RX ADMIN — Medication 1 TABLET(S): at 14:55

## 2017-11-06 RX ADMIN — Medication 18.75 MICROGRAM(S): at 06:07

## 2017-11-06 RX ADMIN — TAMSULOSIN HYDROCHLORIDE 0.4 MILLIGRAM(S): 0.4 CAPSULE ORAL at 21:11

## 2017-11-06 NOTE — PROGRESS NOTE ADULT - ATTENDING COMMENTS
patient is doing well post surgery.  He is alert and oriented today.  Drain out put is noted.  Plan for discontinue drain in am if output continues to be low and will reeevaluate in AM  pain control - adequate    Urology follow up noted for BPH and medication mgmt noted, continue caballero catheter.

## 2017-11-06 NOTE — PROGRESS NOTE ADULT - SUBJECTIVE AND OBJECTIVE BOX
Patient is a 82y old  Male who presents with a chief complaint of Spinal Compression w/ Neuro Defecits/AMS (05 Nov 2017 11:11)       INTERVAL HPI/OVERNIGHT EVENTS: Seen and examined at bedside Feeling better. denies pain.    MEDICATIONS  (STANDING):  ascorbic acid 500 milliGRAM(s) Oral two times a day  BACItracin 50,000 Unit(s) Injectable (Rx Quick Charge) 664872 Unit(s) IntraMuscular   calcium carbonate 1250 mG + Vitamin D (OsCal 500 + D) 1 Tablet(s) Oral three times a day  cyclobenzaprine 10 milliGRAM(s) Oral every 8 hours  dextrose 5%. 1000 milliLiter(s) (50 mL/Hr) IV Continuous <Continuous>  dextrose 50% Injectable 12.5 Gram(s) IV Push once  dextrose 50% Injectable 25 Gram(s) IV Push once  dextrose 50% Injectable 25 Gram(s) IV Push once  docusate sodium 100 milliGRAM(s) Oral three times a day  folic acid 1 milliGRAM(s) Oral daily  insulin lispro (HumaLOG) corrective regimen sliding scale   SubCutaneous three times a day before meals  insulin lispro (HumaLOG) corrective regimen sliding scale   SubCutaneous at bedtime  levothyroxine Injectable 18.75 MICROGram(s) IV Push daily  meropenem IVPB 1000 milliGRAM(s) IV Intermittent every 8 hours  meropenem IVPB      multivitamin 1 Tablet(s) Oral daily  pantoprazole    Tablet 40 milliGRAM(s) Oral before breakfast  senna 2 Tablet(s) Oral at bedtime  verapamil  milliGRAM(s) Oral daily    MEDICATIONS  (PRN):  acetaminophen   Tablet 650 milliGRAM(s) Oral every 6 hours PRN For Temp greater than 38 C (100.4 F)  benzocaine 15 mG/menthol 3.6 mG Lozenge 1 Lozenge Oral every 4 hours PRN Sore Throat  dextrose Gel 1 Dose(s) Oral once PRN Blood Glucose LESS THAN 70 milliGRAM(s)/deciliter  diphenhydrAMINE   Capsule 25 milliGRAM(s) Oral every 4 hours PRN Insomnia  glucagon  Injectable 1 milliGRAM(s) IntraMuscular once PRN Glucose LESS THAN 70 milligrams/deciliter  HYDROmorphone  Injectable 1 milliGRAM(s) SubCutaneous every 3 hours PRN Severe Pain (7 - 10)  magnesium hydroxide Suspension 30 milliLiter(s) Oral every 12 hours PRN Constipation  morphine  - Injectable 1 milliGRAM(s) IV Push every 10 minutes PRN Moderate Pain (4 - 6)  ondansetron Injectable 4 milliGRAM(s) IV Push once PRN Nausea and/or Vomiting  oxyCODONE    IR 5 milliGRAM(s) Oral every 4 hours PRN Mild Pain (1 - 3)  oxyCODONE    IR 10 milliGRAM(s) Oral every 4 hours PRN Moderate Pain (4 - 6)      Allergies    penicillin (Hives)    Intolerances        REVIEW OF SYSTEMS:  CONSTITUTIONAL: No fever, weight loss, or fatigue  EYES: No eye pain, visual disturbances, or discharge  ENMT:  No difficulty hearing, tinnitus, vertigo; No sinus or throat pain  NECK: No pain or stiffness  RESPIRATORY: No cough, wheezing, chills or hemoptysis; No shortness of breath  CARDIOVASCULAR: No chest pain, palpitations, dizziness, or leg swelling  GASTROINTESTINAL: No abdominal or epigastric pain. No nausea, vomiting, or hematemesis; No diarrhea or constipation. No melena or hematochezia.  GENITOURINARY: No dysuria, frequency, hematuria, or incontinence  NEUROLOGICAL: No headaches, memory loss, loss of strength, numbness, or tremors  SKIN: No itching, burning, rashes, or lesions   LYMPH NODES: No enlarged glands  ENDOCRINE: No heat or cold intolerance; No hair loss; No polydipsia or polyuria  MUSCULOSKELETAL: No joint pain or swelling; No muscle, back, or extremity pain  HEME/LYMPH: No easy bruising, or bleeding gums  ALLERGY AND IMMUNOLOGIC: No hives or eczema    Vital Signs Last 24 Hrs  T(C): 37.1 (06 Nov 2017 07:52), Max: 37.3 (05 Nov 2017 12:01)  T(F): 98.8 (06 Nov 2017 07:52), Max: 99.2 (05 Nov 2017 12:01)  HR: 85 (06 Nov 2017 07:52) (82 - 102)  BP: 134/79 (06 Nov 2017 07:52) (104/55 - 149/98)  BP(mean): 93 (06 Nov 2017 03:00) (75 - 119)  RR: 17 (06 Nov 2017 07:52) (16 - 28)  SpO2: 95% (06 Nov 2017 07:52) (92% - 100%)    PHYSICAL EXAM:  GENERAL: NAD, well-groomed, well-developed  HEAD:  Atraumatic, Normocephalic  EYES: EOMI, PERRLA, conjunctiva and sclera clear  ENMT: No tonsillar erythema, exudates, or enlargement; Moist mucous membranes  NECK: Supple, No JVD, Normal thyroid  NERVOUS SYSTEM:  Alert & Oriented X3, Good concentration; Motor Strength 5/5 B/L upper and lower extremities; DTRs 2+ intact and symmetric  CHEST/LUNG: Clear to auscultation bilaterally; No rales, rhonchi, wheezing, or rubs  HEART: Regular rate and rhythm; No murmurs, rubs, or gallops  ABDOMEN: Soft, Nontender, Nondistended; Bowel sounds present  EXTREMITIES:  2+ Peripheral Pulses, No clubbing, cyanosis, or edema  LYMPH: No lymphadenopathy noted  SKIN: No rashes or lesions, + bandage back    LABS:                        8.5    9.1   )-----------( 368      ( 06 Nov 2017 06:03 )             26.7     06 Nov 2017 06:03    141    |  105    |  24     ----------------------------<  124    4.0     |  26     |  0.95     Ca    8.3        06 Nov 2017 06:03  Phos  1.6       06 Nov 2017 06:03  Mg     1.9       06 Nov 2017 06:03        CAPILLARY BLOOD GLUCOSE  140 (05 Nov 2017 22:00)      POCT Blood Glucose.: 130 mg/dL (06 Nov 2017 07:37)  POCT Blood Glucose.: 140 mg/dL (05 Nov 2017 22:14)  POCT Blood Glucose.: 164 mg/dL (05 Nov 2017 17:01)  POCT Blood Glucose.: 163 mg/dL (05 Nov 2017 12:21)    BLOOD CULTURE  11-04 @ 21:34   No growth to date  --  --  11-04 @ 21:33   No growth to date  --  --  11-03 @ 00:51   No growth to date.  --  --  11-02 @ 22:53   No growth to date.  --  --  11-02 @ 11:44   No growth to date.  --  --    RADIOLOGY & ADDITIONAL TESTS:    Imaging Personally Reviewed:  [ ] YES     Consultant(s) Notes Reviewed:      Care Discussed with Consultants/Other Providers:

## 2017-11-06 NOTE — PROGRESS NOTE ADULT - PROBLEM SELECTOR PLAN 5
Monitor cbc  No surgical intervention indicated   GI following Monitor cbc  No surgical intervention indicated   GI following, NGT removed, on diet.

## 2017-11-06 NOTE — PROGRESS NOTE ADULT - PROBLEM SELECTOR PLAN 1
likely secondary to ileus  patient able to pass flatulence, no BM since I/D  continue senna, colace  add miralax 17 grams daily   monitor lytes -- k goal >4, mg goal >2  monitor for BM  serial abdominal exams

## 2017-11-06 NOTE — PROGRESS NOTE ADULT - SUBJECTIVE AND OBJECTIVE BOX
Patient seen and examined. Pain controlled. No acute events.    Vital Signs Last 24 Hrs  T(C): 36.8 (06 Nov 2017 04:00), Max: 37.3 (05 Nov 2017 12:01)  T(F): 98.2 (06 Nov 2017 04:00), Max: 99.2 (05 Nov 2017 12:01)  HR: 84 (06 Nov 2017 04:00) (82 - 102)  BP: 130/77 (06 Nov 2017 04:00) (104/55 - 149/98)  BP(mean): 93 (06 Nov 2017 03:00) (75 - 119)  RR: 20 (06 Nov 2017 04:00) (14 - 28)  SpO2: 96% (06 Nov 2017 04:00) (92% - 100%)    Physical Exam    Gen: NAD    Spine:   Dressing c/d/i  Moving extremities x 4, R>L, RLE 4/5, LLE 3/5 with HIp flexion and knee extension  SILT L3-S1, improved on LLE  DP +  Compartments soft  No calf ttp

## 2017-11-06 NOTE — CONSULT NOTE ADULT - SUBJECTIVE AND OBJECTIVE BOX
81 yo male with signif medical and surgical history, and hx of Prostate Ca.  Pt underwent laminectomy 10/19/17 and went to rehab for post op therapy.  He was then admitted here for signif change in mental status and r/o sepsis.  Per Dr. VANESSA Rowell (ID), sepsis workup has been neg.  Pt underwent laminectomy and evacuation of epidural hematoma 11/4/17.  Today he failed TOV, had 425 cc in bladder, unclear if pt was uncomfortable at that time.  He is currently santy O chair.    PMH--see H & P  Meds--see H & P  All's--See H & P  ROS--See H & P    PE--Afeb, pt frustrated about his current medical status and no further hx of his Prostate Ca could be obtained         Abd soft, nontender       Gen--caballero per urethra    Labs today WBC 9100, BMP wnl

## 2017-11-06 NOTE — CONSULT NOTE ADULT - ASSESSMENT
There are likely several factors relating to failure to void.  However, until is able to ambulate well, would likely rec. leaving in caballero, even if he goes to rehab with the caballero.  Will rx alpha blocker and follow.  Thank you.

## 2017-11-06 NOTE — PROGRESS NOTE ADULT - ASSESSMENT
82 year old male unable to provide hx. History obtained from his medical record.  He has a h/o CAD s/p remote PCI, Crohn's disease, T2DM, HTN, HLD, and hypothyroidism.  Has a known RBBB.  Is recently s/p laminectomy, course complicated by blood loss and hypotension.   No acute cv process was felt to be present at that time.  He did not have followup evaluation after that.   Eventually discharged to rehab, and is now sent from rehab to the hospital  for an altered ms.  Was found to have a leukocytosis, and spine imaging suspicious for spinal abscess vs hematoma.       -tolerated procedure with no evidence of cardiac complications  -remote pci, not presently on antiplatelet agent, which cannot be started at this time in the context of spinal surgery, though we will need to resume at some point  -there is no evidence of significant arrhythmia, volume overload, or active ischemia  -follow i/o  -monitor electrolytes, keep k>4, Mg>2  -Continue verapamil 180 QD  -To follow with you closely

## 2017-11-06 NOTE — PROGRESS NOTE ADULT - ASSESSMENT
81 yo M S/P I & D lumbar wound POD 2  OR cx NGTD  Analgesia  Exam improving but LLE still limited, patient states that is always weak leg  SCD's  WBAT   PT today, OOBTC  Continue to Monitor HMV output   Dispo Planning

## 2017-11-06 NOTE — PROGRESS NOTE ADULT - PROBLEM SELECTOR PLAN 2
s/p laminectomy on 10/25 and complicated by blood loss anemia.  MRI of spine c/w spinal hematoma likely ? s/p surgery yesterday   -Continue neuro checks  - Cultures negative will dc antibiotics if ok with ID.   - PT/ OOB as tolerated

## 2017-11-06 NOTE — PROGRESS NOTE ADULT - SUBJECTIVE AND OBJECTIVE BOX
[facility] - Bre Awad, Eliane, Aylin, Kelli, Jeff Hansen  Office Number:  [phone]    Patient resting comfortably in bed in NAD.  Laying flat with no respiratory distress.  No complaints of chest pain, dyspnea, palpitations, PND, or orthopnea.    MEDICATIONS  (STANDING):  ascorbic acid 500 milliGRAM(s) Oral two times a day  BACItracin 50,000 Unit(s) Injectable (Rx Quick Charge) 646160 Unit(s) IntraMuscular   calcium carbonate 1250 mG + Vitamin D (OsCal 500 + D) 1 Tablet(s) Oral three times a day  cyclobenzaprine 10 milliGRAM(s) Oral every 8 hours  dextrose 5%. 1000 milliLiter(s) (50 mL/Hr) IV Continuous <Continuous>  dextrose 50% Injectable 12.5 Gram(s) IV Push once  dextrose 50% Injectable 25 Gram(s) IV Push once  dextrose 50% Injectable 25 Gram(s) IV Push once  docusate sodium 100 milliGRAM(s) Oral three times a day  folic acid 1 milliGRAM(s) Oral daily  insulin lispro (HumaLOG) corrective regimen sliding scale   SubCutaneous three times a day before meals  insulin lispro (HumaLOG) corrective regimen sliding scale   SubCutaneous at bedtime  levothyroxine Injectable 18.75 MICROGram(s) IV Push daily  meropenem IVPB 1000 milliGRAM(s) IV Intermittent every 8 hours  meropenem IVPB      multivitamin 1 Tablet(s) Oral daily  pantoprazole    Tablet 40 milliGRAM(s) Oral before breakfast  senna 2 Tablet(s) Oral at bedtime  verapamil  milliGRAM(s) Oral daily    MEDICATIONS  (PRN):  acetaminophen   Tablet 650 milliGRAM(s) Oral every 6 hours PRN For Temp greater than 38 C (100.4 F)  benzocaine 15 mG/menthol 3.6 mG Lozenge 1 Lozenge Oral every 4 hours PRN Sore Throat  dextrose Gel 1 Dose(s) Oral once PRN Blood Glucose LESS THAN 70 milliGRAM(s)/deciliter  diphenhydrAMINE   Capsule 25 milliGRAM(s) Oral every 4 hours PRN Insomnia  glucagon  Injectable 1 milliGRAM(s) IntraMuscular once PRN Glucose LESS THAN 70 milligrams/deciliter  HYDROmorphone  Injectable 1 milliGRAM(s) SubCutaneous every 3 hours PRN Severe Pain (7 - 10)  magnesium hydroxide Suspension 30 milliLiter(s) Oral every 12 hours PRN Constipation  morphine  - Injectable 1 milliGRAM(s) IV Push every 10 minutes PRN Moderate Pain (4 - 6)  ondansetron Injectable 4 milliGRAM(s) IV Push once PRN Nausea and/or Vomiting  oxyCODONE    IR 5 milliGRAM(s) Oral every 4 hours PRN Mild Pain (1 - 3)  oxyCODONE    IR 10 milliGRAM(s) Oral every 4 hours PRN Moderate Pain (4 - 6)      Allergies    penicillin (Hives)        Vital Signs Last 24 Hrs  T(C): 37.1 (06 Nov 2017 07:52), Max: 37.3 (05 Nov 2017 12:01)  T(F): 98.8 (06 Nov 2017 07:52), Max: 99.2 (05 Nov 2017 12:01)  HR: 85 (06 Nov 2017 07:52) (82 - 102)  BP: 134/79 (06 Nov 2017 07:52) (104/55 - 149/98)  BP(mean): 93 (06 Nov 2017 03:00) (75 - 119)  RR: 17 (06 Nov 2017 07:52) (16 - 28)  SpO2: 95% (06 Nov 2017 07:52) (92% - 100%)    I&O's Summary    05 Nov 2017 07:01  -  06 Nov 2017 07:00  --------------------------------------------------------  IN: 1245 mL / OUT: 603 mL / NET: 642 mL    06 Nov 2017 07:01  -  06 Nov 2017 09:21  --------------------------------------------------------  IN: 0 mL / OUT: 70 mL / NET: -70 mL        ON EXAM:    General: NAD, awake and alert  HEENT: Mucous membranes are dry, anicteric  Lungs: Non-labored, breath sounds are clear bilaterally, No wheezing, rales or rhonchi  Cardiovascular: Regular, S1 and S2, no murmurs, rubs, or gallops  Gastrointestinal: Bowel Sounds present, soft, nontender.   Lymph: No peripheral edema. No lymphadenopathy.  Skin: No rashes or ulcers  Psych:  Mood & affect appropriate    LABS: All Labs Reviewed:                        8.5    9.1   )-----------( 368      ( 06 Nov 2017 06:03 )             26.7                         9.7    11.3  )-----------( 432      ( 05 Nov 2017 12:44 )             31.0                         8.5    10.0  )-----------( 372      ( 05 Nov 2017 05:51 )             25.9     06 Nov 2017 06:03    141    |  105    |  24     ----------------------------<  124    4.0     |  26     |  0.95   05 Nov 2017 05:51    141    |  106    |  24     ----------------------------<  110    3.9     |  28     |  0.97   04 Nov 2017 18:36    140    |  103    |  25     ----------------------------<  134    3.8     |  26     |  1.00     Ca    8.3        06 Nov 2017 06:03  Ca    7.5        05 Nov 2017 05:51  Ca    8.1        04 Nov 2017 18:36  Phos  1.6       06 Nov 2017 06:03  Phos  1.8       04 Nov 2017 06:12  Mg     1.9       06 Nov 2017 06:03  Mg     1.8       04 Nov 2017 06:12    TPro  5.0    /  Alb  1.9    /  TBili  0.5    /  DBili  x      /  AST  16     /  ALT  20     /  AlkPhos  79     04 Nov 2017 06:12      CARDIAC MARKERS ( 06 Nov 2017 06:03 )  x     / x     / 47 U/L / x     / x          Blood Culture: Organism --  Gram Stain Blood -- Gram Stain --  Specimen Source .Surgical Swab deep cultrue #2  Culture-Blood --    Organism --  Gram Stain Blood -- Gram Stain --  Specimen Source .Surgical Swab deep cutlture #[id number] EPIDURA  Culture-Blood --    Organism --  Gram Stain Blood -- Gram Stain --  Specimen Source .Blood Blood-Peripheral  Culture-Blood --    Organism --  Gram Stain Blood -- Gram Stain --  Specimen Source .Blood Blood-Peripheral  Culture-Blood --    Organism --  Gram Stain Blood -- Gram Stain --  Specimen Source .Surgical Swab surgical wound lumbar area  Culture-Blood --

## 2017-11-06 NOTE — PROGRESS NOTE ADULT - SUBJECTIVE AND OBJECTIVE BOX
Subjective: Pt without complaints, tolerating a regular diet.    Objective:  Vital Signs Last 24 Hrs  T(C): 37.1 (06 Nov 2017 07:52), Max: 37.3 (05 Nov 2017 12:01)  T(F): 98.8 (06 Nov 2017 07:52), Max: 99.2 (05 Nov 2017 12:01)  HR: 85 (06 Nov 2017 07:52) (82 - 102)  BP: 134/79 (06 Nov 2017 07:52) (104/55 - 149/98)  BP(mean): 93 (06 Nov 2017 03:00) (75 - 119)  RR: 17 (06 Nov 2017 07:52) (16 - 28)  SpO2: 95% (06 Nov 2017 07:52) (92% - 100%)    Heent: GEORGE OLSON  Pul: clear  Cor: RSR, without murmurs  Abdomen: normal bowel sounds, without distension or tenderness  Extremities: without edema, motor/sensory intact, without calf pain, Homans sign negative.                        8.5    9.1   )-----------( 368      ( 06 Nov 2017 06:03 )             26.7       11-06    141  |  105  |  24<H>  ----------------------------<  124<H>  4.0   |  26  |  0.95    Ca    8.3<L>      06 Nov 2017 06:03  Phos  1.6     11-06  Mg     1.9     11-06 11-05 @ 07:01  -  11-06 @ 07:00  --------------------------------------------------------  IN:    Oral Fluid: 570 mL    sodium chloride 0.9%: 375 mL    Solution: 300 mL  Total IN: 1245 mL    OUT:    Evacuated Tube System: 8 mL    Indwelling Catheter - Urethral: 595 mL  Total OUT: 603 mL    Total NET: 642 mL      11-06 @ 07:01  -  11-06 @ 09:45  --------------------------------------------------------  IN:  Total IN: 0 mL    OUT:    Evacuated Tube System: 70 mL  Total OUT: 70 mL    Total NET: -70 mL

## 2017-11-06 NOTE — PROGRESS NOTE ADULT - SUBJECTIVE AND OBJECTIVE BOX
Neurology follow up note    JONATHAN SUMMERS82yMale      Interval History:    Patient feels ok no new complaints.    MEDICATIONS    acetaminophen   Tablet 650 milliGRAM(s) Oral every 6 hours PRN  ascorbic acid 500 milliGRAM(s) Oral two times a day  BACItracin 50,000 Unit(s) Injectable (Rx Quick Charge) 670982 Unit(s) IntraMuscular   benzocaine 15 mG/menthol 3.6 mG Lozenge 1 Lozenge Oral every 4 hours PRN  calcium carbonate 1250 mG + Vitamin D (OsCal 500 + D) 1 Tablet(s) Oral three times a day  cyclobenzaprine 10 milliGRAM(s) Oral every 8 hours  dextrose 5%. 1000 milliLiter(s) IV Continuous <Continuous>  dextrose 50% Injectable 12.5 Gram(s) IV Push once  dextrose 50% Injectable 25 Gram(s) IV Push once  dextrose 50% Injectable 25 Gram(s) IV Push once  dextrose Gel 1 Dose(s) Oral once PRN  diphenhydrAMINE   Capsule 25 milliGRAM(s) Oral every 4 hours PRN  docusate sodium 100 milliGRAM(s) Oral three times a day  folic acid 1 milliGRAM(s) Oral daily  glucagon  Injectable 1 milliGRAM(s) IntraMuscular once PRN  insulin lispro (HumaLOG) corrective regimen sliding scale   SubCutaneous three times a day before meals  insulin lispro (HumaLOG) corrective regimen sliding scale   SubCutaneous at bedtime  levothyroxine 75 MICROGram(s) Oral daily  magnesium hydroxide Suspension 30 milliLiter(s) Oral every 12 hours PRN  meropenem IVPB 1000 milliGRAM(s) IV Intermittent every 8 hours  meropenem IVPB      multivitamin 1 Tablet(s) Oral daily  ondansetron Injectable 4 milliGRAM(s) IV Push once PRN  oxyCODONE    IR 5 milliGRAM(s) Oral every 4 hours PRN  oxyCODONE    IR 10 milliGRAM(s) Oral every 4 hours PRN  pantoprazole    Tablet 40 milliGRAM(s) Oral before breakfast  senna 2 Tablet(s) Oral at bedtime  verapamil  milliGRAM(s) Oral daily      Allergies    penicillin (Hives)    Intolerances            Vital Signs Last 24 Hrs  T(C): 37 (06 Nov 2017 09:30), Max: 37.1 (05 Nov 2017 20:30)  T(F): 98.6 (06 Nov 2017 09:30), Max: 98.8 (06 Nov 2017 07:52)  HR: 86 (06 Nov 2017 09:30) (82 - 98)  BP: 125/60 (06 Nov 2017 09:30) (104/55 - 148/68)  BP(mean): 93 (06 Nov 2017 03:00) (75 - 98)  RR: 23 (06 Nov 2017 09:30) (16 - 23)  SpO2: 98% (06 Nov 2017 09:30) (92% - 100%)    REVIEW OF SYSTEMS: no headaches feels ok   no significant back pain       On Neurological Examination:    Mental Status - Patient is alert, awake, oriented   [facility] hospital .   oct   2017      Follow simple commands    Speech -   Fluent                    Cranial Nerves - Pupils 3 mm equal and reactive to light,   extraocular eye movements intact.   smile symmetric  intact bilateral NLF    Motor Exam -   Right upper 4/5  Left upper 4/5  Right lower 2/5  Left lower 2/5    Muscle tone - is normal all over.  No asymmetry is seen.      Sensory    Bilateral intact to light touch    GENERAL Exam: Nontoxic , No Acute Distress   	  HEENT:  normocephalic, atraumatic  		  LUNGS: Decreased bilaterally  	  HEART: Normal S1S2   No murmur RRR        	  GI/ ABDOMEN:  Soft  Non tender    EXTREMITIES:   No Edema  No Clubbing  No Cyanosis No Edema    MUSCULOSKELETAL: Normal Range of Motion  	   SKIN: Normal  No Ecchymosis               LABS:  CBC Full  -  ( 06 Nov 2017 06:03 )  WBC Count : 9.1 K/uL  Hemoglobin : 8.5 g/dL  Hematocrit : 26.7 %  Platelet Count - Automated : 368 K/uL  Mean Cell Volume : 95.4 fl  Mean Cell Hemoglobin : 30.5 pg  Mean Cell Hemoglobin Concentration : 32.0 gm/dL  Auto Neutrophil # : 7.0 K/uL  Auto Lymphocyte # : 1.1 K/uL  Auto Monocyte # : 0.8 K/uL  Auto Eosinophil # : 0.2 K/uL  Auto Basophil # : 0.1 K/uL  Auto Neutrophil % : 77.2 %  Auto Lymphocyte % : 11.6 %  Auto Monocyte % : 8.7 %  Auto Eosinophil % : 1.9 %  Auto Basophil % : 0.6 %      11-06    141  |  105  |  24<H>  ----------------------------<  124<H>  4.0   |  26  |  0.95    Ca    8.3<L>      06 Nov 2017 06:03  Phos  1.6     11-06  Mg     1.9     11-06      Hemoglobin A1C:       Vitamin B12         RADIOLOGY    ANALYSIS AND PLAN:  An 82-year-old with episodes of changes in mental status.    1.	For episodes of changes in mental status better h/o PNA awaiting lumbar cultures  ID follow up antibotics as needed    2.	Overall at present  more responsive   3.	neurology wise stable   Spoke with family members.    Spoke with one relative, Clive telephone number is [phone] 11/2/17.  , Rosanna, her home number is [phone].  Her cell number [phone].  11/3/17   Thank you for the courtesy of consultation.    30 minutes spent on total encounter; more than 50% of the visit was spent counseling and/or coordinating care by the attending physician.

## 2017-11-06 NOTE — PROGRESS NOTE ADULT - ATTENDING COMMENTS
DC Planning to [facility] once cleared by ID. DC Planning to [facility] once cleared by ID. Stroud placed for urinary retention. DIEGO Cummins called.

## 2017-11-06 NOTE — PROGRESS NOTE ADULT - SUBJECTIVE AND OBJECTIVE BOX
INTERVAL HPI/OVERNIGHT EVENTS:    denies abd pain, n/v      HPI:  82 year old male with PMHx of CAD, Crohn's disease, T2DM, HTN, HLD, and hypothyroidism sent to ER from [facility] for AMS. Pt was in his rehab facility when the staff noticed that he was making nonsensical statements. Pt was able to answer questions at rehab but would make odd remarks, which prompted them to send him to the ED. He did not have any fevers, cough, nausea, vomiting, or diarrhea at the rehab facility. At baseline, pt is AAOx3 as per rehab staff. Pt was recently discharged from [facility] on 10/27/17 s/p laminectomy. His postoperative course was complicated by blood loss requiring transfusion as well as an URI requiring antibiotics and steroids. He was discharged on doxycycline and a steroid taper. At present, pt is difficult to arouse and constantly drifts into sleep. He responds to his name when called and follows instructions. However he does not answer any questions and the answers he gives are nonsensical.     In ED, VS were T-97.3 HR-69 BP-146/71 RR-16 SpO2-94. Labs were significant for WBC-18 Hb-10.8 Cr-1.4 Lactate-0.8. Urinalysis was unremarkable. CT Head showed no acute intracranial pathology. Preliminary CXR showerd right lower lobe infiltrate  In ED, pt recieved Levaquin 500mg x1. (01 Nov 2017 04:02)    MEDICATIONS  (STANDING):  ascorbic acid 500 milliGRAM(s) Oral two times a day  BACItracin 50,000 Unit(s) Injectable (Rx Quick Charge) 459246 Unit(s) IntraMuscular   calcium carbonate 1250 mG + Vitamin D (OsCal 500 + D) 1 Tablet(s) Oral three times a day  cyclobenzaprine 10 milliGRAM(s) Oral every 8 hours  dextrose 5%. 1000 milliLiter(s) (50 mL/Hr) IV Continuous <Continuous>  dextrose 50% Injectable 12.5 Gram(s) IV Push once  dextrose 50% Injectable 25 Gram(s) IV Push once  dextrose 50% Injectable 25 Gram(s) IV Push once  docusate sodium 100 milliGRAM(s) Oral three times a day  folic acid 1 milliGRAM(s) Oral daily  insulin lispro (HumaLOG) corrective regimen sliding scale   SubCutaneous three times a day before meals  insulin lispro (HumaLOG) corrective regimen sliding scale   SubCutaneous at bedtime  levothyroxine Injectable 18.75 MICROGram(s) IV Push daily  meropenem IVPB 1000 milliGRAM(s) IV Intermittent every 8 hours  meropenem IVPB      multivitamin 1 Tablet(s) Oral daily  pantoprazole    Tablet 40 milliGRAM(s) Oral before breakfast  senna 2 Tablet(s) Oral at bedtime  verapamil  milliGRAM(s) Oral daily    MEDICATIONS  (PRN):  acetaminophen   Tablet 650 milliGRAM(s) Oral every 6 hours PRN For Temp greater than 38 C (100.4 F)  benzocaine 15 mG/menthol 3.6 mG Lozenge 1 Lozenge Oral every 4 hours PRN Sore Throat  dextrose Gel 1 Dose(s) Oral once PRN Blood Glucose LESS THAN 70 milliGRAM(s)/deciliter  diphenhydrAMINE   Capsule 25 milliGRAM(s) Oral every 4 hours PRN Insomnia  glucagon  Injectable 1 milliGRAM(s) IntraMuscular once PRN Glucose LESS THAN 70 milligrams/deciliter  HYDROmorphone  Injectable 1 milliGRAM(s) SubCutaneous every 3 hours PRN Severe Pain (7 - 10)  magnesium hydroxide Suspension 30 milliLiter(s) Oral every 12 hours PRN Constipation  morphine  - Injectable 1 milliGRAM(s) IV Push every 10 minutes PRN Moderate Pain (4 - 6)  ondansetron Injectable 4 milliGRAM(s) IV Push once PRN Nausea and/or Vomiting  oxyCODONE    IR 5 milliGRAM(s) Oral every 4 hours PRN Mild Pain (1 - 3)  oxyCODONE    IR 10 milliGRAM(s) Oral every 4 hours PRN Moderate Pain (4 - 6)      Allergies    penicillin (Hives)    Intolerances          General:  No wt loss, fevers, chills, night sweats, fatigue,   Eyes:  Good vision, no reported pain  ENT:  No sore throat, pain, runny nose, dysphagia  CV:  No pain, palpitations, hypo/hypertension  Resp:  No dyspnea, cough, tachypnea, wheezing  GI:  No pain, No nausea, No vomiting, No diarrhea, No constipation, No weight loss, No fever, No pruritis, No rectal bleeding, No tarry stools, No dysphagia,  :  No pain, bleeding, incontinence, nocturia  Muscle:  No pain, weakness  Neuro:  No weakness, tingling, memory problems  Psych:  No fatigue, insomnia, mood problems, depression  Endocrine:  No polyuria, polydipsia, cold/heat intolerance  Heme:  No petechiae, ecchymosis, easy bruisability  Skin:  No rash, tattoos, scars, edema      PHYSICAL EXAM:   Vital Signs:  Vital Signs Last 24 Hrs  T(C): 37 (06 Nov 2017 09:30), Max: 37.1 (05 Nov 2017 20:30)  T(F): 98.6 (06 Nov 2017 09:30), Max: 98.8 (06 Nov 2017 07:52)  HR: 86 (06 Nov 2017 09:30) (82 - 102)  BP: 125/60 (06 Nov 2017 09:30) (104/55 - 149/98)  BP(mean): 93 (06 Nov 2017 03:00) (75 - 119)  RR: 23 (06 Nov 2017 09:30) (16 - 23)  SpO2: 98% (06 Nov 2017 09:30) (92% - 100%)  Daily     Daily I&O's Summary    05 Nov 2017 07:01  -  06 Nov 2017 07:00  --------------------------------------------------------  IN: 1245 mL / OUT: 603 mL / NET: 642 mL    06 Nov 2017 07:01  -  06 Nov 2017 12:59  --------------------------------------------------------  IN: 0 mL / OUT: 70 mL / NET: -70 mL        GENERAL:  Appears stated age, well-groomed, well-nourished, no distress  HEENT:  NC/AT,  conjunctivae clear and pink, no thyromegaly, nodules, adenopathy, no JVD, sclera -anicteric  CHEST:  Full & symmetric excursion, no increased effort, breath sounds clear  HEART:  Regular rhythm, S1, S2, no murmur/rub/S3/S4, no abdominal bruit, no edema  ABDOMEN:  Soft, non-tender, (+)distended, normoactive bowel sounds,  no masses ,no hepato-splenomegaly, no signs of chronic liver disease  EXTEREMITIES:  no cyanosis,clubbing or edema  SKIN:  No rash/erythema/ecchymoses/petechiae/wounds/abscess/warm/dry  NEURO:  Alert, oriented, no asterixis, no tremor, no encephalopathy      LABS:                        8.5    9.1   )-----------( 368      ( 06 Nov 2017 06:03 )             26.7     11-06    141  |  105  |  24<H>  ----------------------------<  124<H>  4.0   |  26  |  0.95    Ca    8.3<L>      06 Nov 2017 06:03  Phos  1.6     11-06  Mg     1.9     11-06          amylaseAmylase, Serum Total: 66 U/L (11-02 @ 05:22)     lipaseLipase, Serum: 140 U/L (11-02 @ 05:22)    RADIOLOGY & ADDITIONAL TESTS:

## 2017-11-07 DIAGNOSIS — R33.9 RETENTION OF URINE, UNSPECIFIED: ICD-10-CM

## 2017-11-07 LAB
ANION GAP SERPL CALC-SCNC: 9 MMOL/L — SIGNIFICANT CHANGE UP (ref 5–17)
BASOPHILS # BLD AUTO: 0.1 K/UL — SIGNIFICANT CHANGE UP (ref 0–0.2)
BASOPHILS NFR BLD AUTO: 0.6 % — SIGNIFICANT CHANGE UP (ref 0–2)
BUN SERPL-MCNC: 21 MG/DL — SIGNIFICANT CHANGE UP (ref 7–23)
CALCIUM SERPL-MCNC: 8.9 MG/DL — SIGNIFICANT CHANGE UP (ref 8.5–10.1)
CHLORIDE SERPL-SCNC: 107 MMOL/L — SIGNIFICANT CHANGE UP (ref 96–108)
CO2 SERPL-SCNC: 24 MMOL/L — SIGNIFICANT CHANGE UP (ref 22–31)
CREAT SERPL-MCNC: 0.87 MG/DL — SIGNIFICANT CHANGE UP (ref 0.5–1.3)
CULTURE RESULTS: SIGNIFICANT CHANGE UP
EOSINOPHIL # BLD AUTO: 0.2 K/UL — SIGNIFICANT CHANGE UP (ref 0–0.5)
EOSINOPHIL NFR BLD AUTO: 2.3 % — SIGNIFICANT CHANGE UP (ref 0–6)
GLUCOSE SERPL-MCNC: 132 MG/DL — HIGH (ref 70–99)
HCT VFR BLD CALC: 31.9 % — LOW (ref 39–50)
HGB BLD-MCNC: 10.3 G/DL — LOW (ref 13–17)
LYMPHOCYTES # BLD AUTO: 1.6 K/UL — SIGNIFICANT CHANGE UP (ref 1–3.3)
LYMPHOCYTES # BLD AUTO: 15.3 % — SIGNIFICANT CHANGE UP (ref 13–44)
MCHC RBC-ENTMCNC: 30.3 PG — SIGNIFICANT CHANGE UP (ref 27–34)
MCHC RBC-ENTMCNC: 32.2 GM/DL — SIGNIFICANT CHANGE UP (ref 32–36)
MCV RBC AUTO: 94.3 FL — SIGNIFICANT CHANGE UP (ref 80–100)
MONOCYTES # BLD AUTO: 0.6 K/UL — SIGNIFICANT CHANGE UP (ref 0–0.9)
MONOCYTES NFR BLD AUTO: 6.1 % — SIGNIFICANT CHANGE UP (ref 1–9)
NEUTROPHILS # BLD AUTO: 7.9 K/UL — HIGH (ref 1.8–7.4)
NEUTROPHILS NFR BLD AUTO: 75.7 % — SIGNIFICANT CHANGE UP (ref 43–77)
PLATELET # BLD AUTO: 329 K/UL — SIGNIFICANT CHANGE UP (ref 150–400)
POTASSIUM SERPL-MCNC: 4.2 MMOL/L — SIGNIFICANT CHANGE UP (ref 3.5–5.3)
POTASSIUM SERPL-SCNC: 4.2 MMOL/L — SIGNIFICANT CHANGE UP (ref 3.5–5.3)
RBC # BLD: 3.39 M/UL — LOW (ref 4.2–5.8)
RBC # FLD: 14.1 % — SIGNIFICANT CHANGE UP (ref 10.3–14.5)
SODIUM SERPL-SCNC: 140 MMOL/L — SIGNIFICANT CHANGE UP (ref 135–145)
SPECIMEN SOURCE: SIGNIFICANT CHANGE UP
WBC # BLD: 10.4 K/UL — SIGNIFICANT CHANGE UP (ref 3.8–10.5)
WBC # FLD AUTO: 10.4 K/UL — SIGNIFICANT CHANGE UP (ref 3.8–10.5)

## 2017-11-07 PROCEDURE — 99233 SBSQ HOSP IP/OBS HIGH 50: CPT

## 2017-11-07 RX ORDER — TAMSULOSIN HYDROCHLORIDE 0.4 MG/1
1 CAPSULE ORAL
Qty: 0 | Refills: 0 | COMMUNITY
Start: 2017-11-07

## 2017-11-07 RX ADMIN — CYCLOBENZAPRINE HYDROCHLORIDE 10 MILLIGRAM(S): 10 TABLET, FILM COATED ORAL at 05:31

## 2017-11-07 RX ADMIN — MEROPENEM 200 MILLIGRAM(S): 1 INJECTION INTRAVENOUS at 05:30

## 2017-11-07 RX ADMIN — Medication 75 MICROGRAM(S): at 05:31

## 2017-11-07 RX ADMIN — Medication 1: at 17:18

## 2017-11-07 RX ADMIN — Medication 1 TABLET(S): at 05:31

## 2017-11-07 RX ADMIN — SENNA PLUS 2 TABLET(S): 8.6 TABLET ORAL at 22:13

## 2017-11-07 RX ADMIN — CYCLOBENZAPRINE HYDROCHLORIDE 10 MILLIGRAM(S): 10 TABLET, FILM COATED ORAL at 22:13

## 2017-11-07 RX ADMIN — Medication 500 MILLIGRAM(S): at 17:19

## 2017-11-07 RX ADMIN — Medication 100 MILLIGRAM(S): at 05:31

## 2017-11-07 RX ADMIN — CYCLOBENZAPRINE HYDROCHLORIDE 10 MILLIGRAM(S): 10 TABLET, FILM COATED ORAL at 14:53

## 2017-11-07 RX ADMIN — TAMSULOSIN HYDROCHLORIDE 0.4 MILLIGRAM(S): 0.4 CAPSULE ORAL at 22:13

## 2017-11-07 RX ADMIN — PANTOPRAZOLE SODIUM 40 MILLIGRAM(S): 20 TABLET, DELAYED RELEASE ORAL at 05:32

## 2017-11-07 RX ADMIN — Medication 1 TABLET(S): at 22:13

## 2017-11-07 RX ADMIN — OXYCODONE HYDROCHLORIDE 5 MILLIGRAM(S): 5 TABLET ORAL at 16:32

## 2017-11-07 RX ADMIN — Medication 1 TABLET(S): at 14:54

## 2017-11-07 RX ADMIN — Medication 1 MILLIGRAM(S): at 14:52

## 2017-11-07 RX ADMIN — Medication 180 MILLIGRAM(S): at 05:31

## 2017-11-07 RX ADMIN — Medication 500 MILLIGRAM(S): at 05:31

## 2017-11-07 RX ADMIN — Medication 25 MILLIGRAM(S): at 22:13

## 2017-11-07 RX ADMIN — OXYCODONE HYDROCHLORIDE 5 MILLIGRAM(S): 5 TABLET ORAL at 15:32

## 2017-11-07 RX ADMIN — Medication 1 TABLET(S): at 14:55

## 2017-11-07 RX ADMIN — MEROPENEM 200 MILLIGRAM(S): 1 INJECTION INTRAVENOUS at 14:54

## 2017-11-07 NOTE — PROGRESS NOTE ADULT - SUBJECTIVE AND OBJECTIVE BOX
INTERVAL HPI/OVERNIGHT EVENTS:    denies abd pain  (+) back pain    HPI:  82 year old male with PMHx of CAD, Crohn's disease, T2DM, HTN, HLD, and hypothyroidism sent to ER from [facility] for AMS. Pt was in his rehab facility when the staff noticed that he was making nonsensical statements. Pt was able to answer questions at rehab but would make odd remarks, which prompted them to send him to the ED. He did not have any fevers, cough, nausea, vomiting, or diarrhea at the rehab facility. At baseline, pt is AAOx3 as per rehab staff. Pt was recently discharged from [facility] on 10/27/17 s/p laminectomy. His postoperative course was complicated by blood loss requiring transfusion as well as an URI requiring antibiotics and steroids. He was discharged on doxycycline and a steroid taper. At present, pt is difficult to arouse and constantly drifts into sleep. He responds to his name when called and follows instructions. However he does not answer any questions and the answers he gives are nonsensical.     In ED, VS were T-97.3 HR-69 BP-146/71 RR-16 SpO2-94. Labs were significant for WBC-18 Hb-10.8 Cr-1.4 Lactate-0.8. Urinalysis was unremarkable. CT Head showed no acute intracranial pathology. Preliminary CXR showerd right lower lobe infiltrate  In ED, pt recieved Levaquin 500mg x1. (01 Nov 2017 04:02)    MEDICATIONS  (STANDING):  ascorbic acid 500 milliGRAM(s) Oral two times a day  BACItracin 50,000 Unit(s) Injectable (Rx Quick Charge) 307626 Unit(s) IntraMuscular   calcium carbonate 1250 mG + Vitamin D (OsCal 500 + D) 1 Tablet(s) Oral three times a day  cyclobenzaprine 10 milliGRAM(s) Oral every 8 hours  dextrose 5%. 1000 milliLiter(s) (50 mL/Hr) IV Continuous <Continuous>  dextrose 50% Injectable 12.5 Gram(s) IV Push once  dextrose 50% Injectable 25 Gram(s) IV Push once  dextrose 50% Injectable 25 Gram(s) IV Push once  docusate sodium 100 milliGRAM(s) Oral three times a day  folic acid 1 milliGRAM(s) Oral daily  insulin lispro (HumaLOG) corrective regimen sliding scale   SubCutaneous three times a day before meals  insulin lispro (HumaLOG) corrective regimen sliding scale   SubCutaneous at bedtime  levoFLOXacin  Tablet 500 milliGRAM(s) Oral every 24 hours  levothyroxine 75 MICROGram(s) Oral daily  multivitamin 1 Tablet(s) Oral daily  pantoprazole    Tablet 40 milliGRAM(s) Oral before breakfast  senna 2 Tablet(s) Oral at bedtime  tamsulosin 0.4 milliGRAM(s) Oral at bedtime  verapamil  milliGRAM(s) Oral daily    MEDICATIONS  (PRN):  acetaminophen   Tablet 650 milliGRAM(s) Oral every 6 hours PRN For Temp greater than 38 C (100.4 F)  benzocaine 15 mG/menthol 3.6 mG Lozenge 1 Lozenge Oral every 4 hours PRN Sore Throat  dextrose Gel 1 Dose(s) Oral once PRN Blood Glucose LESS THAN 70 milliGRAM(s)/deciliter  diphenhydrAMINE   Capsule 25 milliGRAM(s) Oral every 4 hours PRN Insomnia  glucagon  Injectable 1 milliGRAM(s) IntraMuscular once PRN Glucose LESS THAN 70 milligrams/deciliter  magnesium hydroxide Suspension 30 milliLiter(s) Oral every 12 hours PRN Constipation  ondansetron Injectable 4 milliGRAM(s) IV Push once PRN Nausea and/or Vomiting  oxyCODONE    IR 5 milliGRAM(s) Oral every 4 hours PRN Mild Pain (1 - 3)  oxyCODONE    IR 10 milliGRAM(s) Oral every 4 hours PRN Moderate Pain (4 - 6)      Allergies    penicillin (Hives)    Intolerances          General:  No wt loss, fevers, chills, night sweats, fatigue,   Eyes:  Good vision, no reported pain  ENT:  No sore throat, pain, runny nose, dysphagia  CV:  No pain, palpitations, hypo/hypertension  Resp:  No dyspnea, cough, tachypnea, wheezing  GI:  No pain, No nausea, No vomiting, No diarrhea, No constipation, No weight loss, No fever, No pruritis, No rectal bleeding, No tarry stools, No dysphagia,  :  No pain, bleeding, incontinence, nocturia  Muscle:  + back pain  Neuro:  No weakness, tingling, memory problems  Psych:  No fatigue, insomnia, mood problems, depression  Endocrine:  No polyuria, polydipsia, cold/heat intolerance  Heme:  No petechiae, ecchymosis, easy bruisability  Skin:  No rash, tattoos, scars, edema      PHYSICAL EXAM:   Vital Signs:  Vital Signs Last 24 Hrs  T(C): 37.1 (07 Nov 2017 16:25), Max: 37.1 (07 Nov 2017 05:26)  T(F): 98.7 (07 Nov 2017 16:25), Max: 98.7 (07 Nov 2017 05:26)  HR: 86 (07 Nov 2017 16:25) (74 - 86)  BP: 151/78 (07 Nov 2017 16:25) (132/72 - 151/78)  BP(mean): --  RR: 16 (07 Nov 2017 16:25) (16 - 17)  SpO2: 94% (07 Nov 2017 16:25) (94% - 95%)  Daily     Daily I&O's Summary    06 Nov 2017 07:01  -  07 Nov 2017 07:00  --------------------------------------------------------  IN: 200 mL / OUT: 1115 mL / NET: -915 mL    07 Nov 2017 07:01  -  07 Nov 2017 17:05  --------------------------------------------------------  IN: 0 mL / OUT: 420 mL / NET: -420 mL        GENERAL:  Appears stated age, well-groomed, well-nourished, no distress  HEENT:  NC/AT,  conjunctivae clear and pink, no thyromegaly, nodules, adenopathy, no JVD, sclera -anicteric  CHEST:  Full & symmetric excursion, no increased effort, breath sounds clear  HEART:  Regular rhythm, S1, S2, no murmur/rub/S3/S4, no abdominal bruit, no edema  ABDOMEN:  Soft, non-tender, non-distended, normoactive bowel sounds,  no masses ,no hepato-splenomegaly, no signs of chronic liver disease  EXTEREMITIES:  no cyanosis,clubbing or edema  SKIN:  No rash/erythema/ecchymoses/petechiae/wounds/abscess/warm/dry  NEURO:  Alert, oriented x 2, no asterixis, no tremor, no encephalopathy      LABS:                        10.3   10.4  )-----------( 329      ( 07 Nov 2017 08:27 )             31.9     11-07    140  |  107  |  21  ----------------------------<  132<H>  4.2   |  24  |  0.87    Ca    8.9      07 Nov 2017 08:27  Phos  1.6     11-06  Mg     1.9     11-06          amylase   lipase  RADIOLOGY & ADDITIONAL TESTS:

## 2017-11-07 NOTE — PROGRESS NOTE ADULT - PROBLEM SELECTOR PLAN 1
Due to immobility and bladder outlet obstruction. Continue tamsulosin 0.4 mg daily. Patient should have voiding trial when ambulatory either in hospital or subacute rehab facility.

## 2017-11-07 NOTE — PROGRESS NOTE ADULT - SUBJECTIVE AND OBJECTIVE BOX
[facility]                                                            Orthopedic Spine Progress Note      POST OPERATIVE DAY #: 3  STATUS POST:   evac hematoma/rev lami              Pre-Op Dx: Epidural hematoma    Post-Op Dx:  Lumbar stenosis with neurogenic claudication  Epidural hematoma    Prin. Procedure:  evac hematoma/rev lami     SUBJECTIVE: Patient seen and examined. .....frustrated w progress    Pain (0-10): 2-3  Current Pain Management:  [ ] PCA   [x ] Po Analgesics [ ] IM /IV Anagesics     Vital Signs Last 24 Hrs  T(C): 36.8 (07 Nov 2017 07:16), Max: 37.2 (06 Nov 2017 15:59)  T(F): 98.2 (07 Nov 2017 07:16), Max: 98.9 (06 Nov 2017 15:59)  HR: 78 (07 Nov 2017 07:16) (74 - 86)  BP: 146/78 (07 Nov 2017 07:16) (125/60 - 146/78)  BP(mean): --  RR: 16 (07 Nov 2017 07:16) (16 - 23)  SpO2: 95% (07 Nov 2017 07:16) (94% - 98%)  I&O's Detail    06 Nov 2017 07:01  -  07 Nov 2017 07:00  --------------------------------------------------------  IN:    Solution: 200 mL  Total IN: 200 mL    OUT:    Accordian: 75 mL.........left in.... probably remove tomorrow    Evacuated Tube System: 190 mL    Ureteral Catheter: 850 mL...........caballero left in as per urology  Total OUT: 1115 mL    Total NET: -915 mL          OBJECTIVE:         Wound /Dressing: clean/dry    Neurological: A/O x   3            Sensation: [x] intact to light touch  [ ] decreased:          Motor exam: [  ]          [x ] Upper extremity    Delt      Bicp       Tri      Wrist ext  Wrst Flex       Digit Ext Digit Flex                                     R         5/5        5/5        5/5       5/5            5/5            5/5       5/5          5/5                                     L          5/5        5/5        5/5       5/5            5/5            5/5       5/5          5/5         [ x] Lower ext.     Hip Flx  Hip Ext   Hip Abd  Hip Add Quad   Hamstrg   TA       EHL      GS                              R        4/5        5/5        5/5             3/5        5/5        5/5        5/5     5/5      5/5                              L         4/5        5/5        5/5             3/5        5/5        5/5        5/5     5/5      5/5                                                        [ x] Vascular :  wnl        RADIOLOGY & ADDITIONAL STUDIES:                                               LABS:                        8.5    9.1   )-----------( 368      ( 06 Nov 2017 06:03 )........stable...recheck in am             26.7     11-07    140  |  107  |  21  ----------------------------<  132<H>  4.2   |  24  |  0.87    Ca    8.9      07 Nov 2017 08:27  Phos  1.6     11-06  Mg     1.9     11-06            Wound Culture: neg    A/P :  82y Male   s/p:  evac hematoma/rev lami   -    Pain control adequate  -    DVT ppx: SCDs    -    Abx:  continues  -    Review labs as indicated     -    Physical Therapy............oob walking  -    Weight bearing status: full   -    Dispo: Home [ ]     Rehab [ x]

## 2017-11-07 NOTE — PROGRESS NOTE ADULT - SUBJECTIVE AND OBJECTIVE BOX
JONATHAN SUMMERS is a yMale , patient examined and chart reviewed. Patient seen and examined today being followed for possible post op hematoma       INTERVAL HPI/ OVERNIGHT EVENTS: Events noted , doing well. Still with hemovac .    PAST MEDICAL & SURGICAL HISTORY:  Prostate CA  Crohn disease  Hypothyroidism  Depression  Intervertebral disc disorder  Diabetes mellitus  Obesity  Hypercholesterolemia  HTN (hypertension)  Stented coronary artery: 2 stents, 20 yrs ago, 15 yrs ago  CAD (coronary artery disease)  Macular degeneration: right eye  Hearing loss: left hearing aid, right ear deaf  S/P colon resection  S/P lumbar fusion  History of hip replacement, total, right: Revision  History of hip replacement, total, right      For details regarding the patient's social history, family history, and other miscellaneous elements, please refer the initial infectious diseases consultation and/or the admitting history and physical examination for this admission.    ROS:  CONSTITUTIONAL:  Negative fever or chills, feels well, good appetite  EYES:  Negative  blurry vision or double vision  CARDIOVASCULAR:  Negative for chest pain or palpitations  RESPIRATORY:  Negative for cough, wheezing, or SOB   GASTROINTESTINAL:  Negative for nausea, vomiting, diarrhea, constipation, or abdominal pain  GENITOURINARY:  Negative frequency, urgency or dysuria  NEUROLOGIC:  No headache, confusion, dizziness, lightheadedness  All other systems were reviewed and are negative     Allergies :  penicillin (Hives)      Current inpatient medications :    ANTIBIOTICS/RELEVANT:  BACItracin 50,000 Unit(s) Injectable (Rx Quick Charge) 006226 Unit(s) IntraMuscular   meropenem IVPB 1000 milliGRAM(s) IV Intermittent every 8 hours  meropenem IVPB          acetaminophen   Tablet 650 milliGRAM(s) Oral every 6 hours PRN  ascorbic acid 500 milliGRAM(s) Oral two times a day  benzocaine 15 mG/menthol 3.6 mG Lozenge 1 Lozenge Oral every 4 hours PRN  calcium carbonate 1250 mG + Vitamin D (OsCal 500 + D) 1 Tablet(s) Oral three times a day  cyclobenzaprine 10 milliGRAM(s) Oral every 8 hours  dextrose 5%. 1000 milliLiter(s) IV Continuous <Continuous>  dextrose 50% Injectable 12.5 Gram(s) IV Push once  dextrose 50% Injectable 25 Gram(s) IV Push once  dextrose 50% Injectable 25 Gram(s) IV Push once  dextrose Gel 1 Dose(s) Oral once PRN  diphenhydrAMINE   Capsule 25 milliGRAM(s) Oral every 4 hours PRN  docusate sodium 100 milliGRAM(s) Oral three times a day  folic acid 1 milliGRAM(s) Oral daily  glucagon  Injectable 1 milliGRAM(s) IntraMuscular once PRN  insulin lispro (HumaLOG) corrective regimen sliding scale   SubCutaneous three times a day before meals  insulin lispro (HumaLOG) corrective regimen sliding scale   SubCutaneous at bedtime  levothyroxine 75 MICROGram(s) Oral daily  magnesium hydroxide Suspension 30 milliLiter(s) Oral every 12 hours PRN  multivitamin 1 Tablet(s) Oral daily  ondansetron Injectable 4 milliGRAM(s) IV Push once PRN  oxyCODONE    IR 5 milliGRAM(s) Oral every 4 hours PRN  oxyCODONE    IR 10 milliGRAM(s) Oral every 4 hours PRN  pantoprazole    Tablet 40 milliGRAM(s) Oral before breakfast  senna 2 Tablet(s) Oral at bedtime  tamsulosin 0.4 milliGRAM(s) Oral at bedtime  verapamil  milliGRAM(s) Oral daily      Objective:    11-06 @ 07:01  -  11-07 @ 07:00  --------------------------------------------------------  IN: 200 mL / OUT: 1115 mL / NET: -915 mL    11-07 @ 07:01  -  11-07 @ 14:49  --------------------------------------------------------  IN: 0 mL / OUT: 420 mL / NET: -420 mL      T(C): 36.8 (11-07-17 @ 07:16), Max: 37.2 (11-06-17 @ 15:59)  HR: 78 (11-07-17 @ 07:16) (74 - 81)  BP: 146/78 (11-07-17 @ 07:16) (128/68 - 146/78)  RR: 16 (11-07-17 @ 07:16) (16 - 17)  SpO2: 95% (11-07-17 @ 07:16) (94% - 96%)  Wt(kg): --      Physical Exam:  General: well developed well nourished, in no acute distress  Eyes: sclera anicteric, pupils equal and reactive to light  ENMT: buccal mucosa moist, pharynx not injected  Neck: supple, trachea midline  Lungs: clear, no wheeze/rhonchi  Cardiovascular: regular rate and rhythm, S1 S2  Abdomen: soft, nontender, no organomegaly present, bowel sounds normal  Neurological: alert and oriented x3, Cranial Nerves II-XII grossly intact  Skin: no increased ecchymosis/petechiae/purpura  Lymph Nodes: no palpable cervical/supraclavicular lymph nodes enlargements  Extremities: no cyanosis/clubbing/edema            LABS:                          10.3   10.4  )-----------( 329      ( 07 Nov 2017 08:27 )             31.9       11-07    140  |  107  |  21  ----------------------------<  132<H>  4.2   |  24  |  0.87    Ca    8.9      07 Nov 2017 08:27  Phos  1.6     11-06  Mg     1.9     11-06        RECENT CULTURES:    Culture - Surgical Swab (collected 04 Nov 2017 21:34)  Source: .Surgical Swab deep cultrue #2  Preliminary Report (05 Nov 2017 17:12):    No growth to date    Culture - Surgical Swab (collected 04 Nov 2017 21:33)  Source: .Surgical Swab deep culture #3  Preliminary Report (05 Nov 2017 17:14):    No growth to date    Culture - Surgical Swab (collected 04 Nov 2017 21:33)  Source: .Surgical Swab deep cutlture #[id number] EPIDURA  Preliminary Report (05 Nov 2017 17:13):    No growth to date      RADIOLOGY & ADDITIONAL STUDIES:    Xray Chest 1 View AP- PORTABLE-Urgent (11.06.17 @ 12:38) >    IMPRESSION:   Interval improvement/decrease in bibasilar airspace haziness.  Persistent trace left pleural effusion.      Assessment :      82 yr old PMHx CAD, Crohn's dz, DM2, HTN, HLD, s/p coronary stents '02/1997, Hypothyroid, OA recent hospitalization at [facility] on 10/19/17 , underwent lumbar laminectomy L2 - L5 , his post op course complicated by acute blood loss  and was transfused . he was sent ot [facility] on 10/27/17 on PO Doxycycline and taper steroids ,    he is now admitted with AMS, sent to ER from [facility] , noted ot have leukocytosis not associated with fever . He was given sedation due ot agitation and also for MRI, became hypoxic so transferred to MICU . MRI of lumbar spine with contrast shows collection in the post op bed ? hematoma or infection.     Sepsis work up from admission is negative . To note he was on PO steroids on dc from hospital to rehab , his wbc on day of dc was 15.7     He has I & D  post op epidural  hematoma , no sign of infection , all OR cs are negative      Possibility of aspiration pneumonia should be considered       Plan :   - will change to Po Augmentin 875 mg bid x 4 days   - avoid sedatives and narcotics  - aspiration precautions    - increase activity   - Dc Planning     Continue with present regime .  Appropriate use of antibiotics and adverse effects reviewed.     I have discussed the above plan of care with patient in detail. he expressed understanding of the  treatment plan . Risks, benefits and alternatives discussed in detail. I have asked if he has any questions or concerns and appropriately addressed them to the best of my ability .    > 25 minutes were spent in direct patient care reviewing notes, medications ,labs data/ imaging , discussion with multidisciplinary team.    Thank you for allowing me to participate in care of your patient .    Elio Rowell MD  [phone] JONATHAN SUMMERS is a yMale , patient examined and chart reviewed. Patient seen and examined today being followed for possible post op hematoma       INTERVAL HPI/ OVERNIGHT EVENTS: Events noted , doing well. Still with hemovac .    PAST MEDICAL & SURGICAL HISTORY:  Prostate CA  Crohn disease  Hypothyroidism  Depression  Intervertebral disc disorder  Diabetes mellitus  Obesity  Hypercholesterolemia  HTN (hypertension)  Stented coronary artery: 2 stents, 20 yrs ago, 15 yrs ago  CAD (coronary artery disease)  Macular degeneration: right eye  Hearing loss: left hearing aid, right ear deaf  S/P colon resection  S/P lumbar fusion  History of hip replacement, total, right: Revision  History of hip replacement, total, right      For details regarding the patient's social history, family history, and other miscellaneous elements, please refer the initial infectious diseases consultation and/or the admitting history and physical examination for this admission.    ROS:  CONSTITUTIONAL:  Negative fever or chills, feels well, good appetite  EYES:  Negative  blurry vision or double vision  CARDIOVASCULAR:  Negative for chest pain or palpitations  RESPIRATORY:  Negative for cough, wheezing, or SOB   GASTROINTESTINAL:  Negative for nausea, vomiting, diarrhea, constipation, or abdominal pain  GENITOURINARY:  Negative frequency, urgency or dysuria  NEUROLOGIC:  No headache, confusion, dizziness, lightheadedness  All other systems were reviewed and are negative     Allergies :  penicillin (Hives)      Current inpatient medications :    ANTIBIOTICS/RELEVANT:  BACItracin 50,000 Unit(s) Injectable (Rx Quick Charge) 003765 Unit(s) IntraMuscular   meropenem IVPB 1000 milliGRAM(s) IV Intermittent every 8 hours  meropenem IVPB          acetaminophen   Tablet 650 milliGRAM(s) Oral every 6 hours PRN  ascorbic acid 500 milliGRAM(s) Oral two times a day  benzocaine 15 mG/menthol 3.6 mG Lozenge 1 Lozenge Oral every 4 hours PRN  calcium carbonate 1250 mG + Vitamin D (OsCal 500 + D) 1 Tablet(s) Oral three times a day  cyclobenzaprine 10 milliGRAM(s) Oral every 8 hours  dextrose 5%. 1000 milliLiter(s) IV Continuous <Continuous>  dextrose 50% Injectable 12.5 Gram(s) IV Push once  dextrose 50% Injectable 25 Gram(s) IV Push once  dextrose 50% Injectable 25 Gram(s) IV Push once  dextrose Gel 1 Dose(s) Oral once PRN  diphenhydrAMINE   Capsule 25 milliGRAM(s) Oral every 4 hours PRN  docusate sodium 100 milliGRAM(s) Oral three times a day  folic acid 1 milliGRAM(s) Oral daily  glucagon  Injectable 1 milliGRAM(s) IntraMuscular once PRN  insulin lispro (HumaLOG) corrective regimen sliding scale   SubCutaneous three times a day before meals  insulin lispro (HumaLOG) corrective regimen sliding scale   SubCutaneous at bedtime  levothyroxine 75 MICROGram(s) Oral daily  magnesium hydroxide Suspension 30 milliLiter(s) Oral every 12 hours PRN  multivitamin 1 Tablet(s) Oral daily  ondansetron Injectable 4 milliGRAM(s) IV Push once PRN  oxyCODONE    IR 5 milliGRAM(s) Oral every 4 hours PRN  oxyCODONE    IR 10 milliGRAM(s) Oral every 4 hours PRN  pantoprazole    Tablet 40 milliGRAM(s) Oral before breakfast  senna 2 Tablet(s) Oral at bedtime  tamsulosin 0.4 milliGRAM(s) Oral at bedtime  verapamil  milliGRAM(s) Oral daily      Objective:    11-06 @ 07:01  -  11-07 @ 07:00  --------------------------------------------------------  IN: 200 mL / OUT: 1115 mL / NET: -915 mL    11-07 @ 07:01  -  11-07 @ 14:49  --------------------------------------------------------  IN: 0 mL / OUT: 420 mL / NET: -420 mL      T(C): 36.8 (11-07-17 @ 07:16), Max: 37.2 (11-06-17 @ 15:59)  HR: 78 (11-07-17 @ 07:16) (74 - 81)  BP: 146/78 (11-07-17 @ 07:16) (128/68 - 146/78)  RR: 16 (11-07-17 @ 07:16) (16 - 17)  SpO2: 95% (11-07-17 @ 07:16) (94% - 96%)  Wt(kg): --      Physical Exam:  General: well developed well nourished, in no acute distress  Eyes: sclera anicteric, pupils equal and reactive to light  ENMT: buccal mucosa moist, pharynx not injected  Neck: supple, trachea midline  Lungs: clear, no wheeze/rhonchi  Cardiovascular: regular rate and rhythm, S1 S2  Abdomen: soft, nontender, no organomegaly present, bowel sounds normal  Neurological: alert and oriented x3, Cranial Nerves II-XII grossly intact  Skin: no increased ecchymosis/petechiae/purpura  Lymph Nodes: no palpable cervical/supraclavicular lymph nodes enlargements  Extremities: no cyanosis/clubbing/edema            LABS:                          10.3   10.4  )-----------( 329      ( 07 Nov 2017 08:27 )             31.9       11-07    140  |  107  |  21  ----------------------------<  132<H>  4.2   |  24  |  0.87    Ca    8.9      07 Nov 2017 08:27  Phos  1.6     11-06  Mg     1.9     11-06        RECENT CULTURES:    Culture - Surgical Swab (collected 04 Nov 2017 21:34)  Source: .Surgical Swab deep cultrue #2  Preliminary Report (05 Nov 2017 17:12):    No growth to date    Culture - Surgical Swab (collected 04 Nov 2017 21:33)  Source: .Surgical Swab deep culture #3  Preliminary Report (05 Nov 2017 17:14):    No growth to date    Culture - Surgical Swab (collected 04 Nov 2017 21:33)  Source: .Surgical Swab deep cutlture #[id number] EPIDURA  Preliminary Report (05 Nov 2017 17:13):    No growth to date      RADIOLOGY & ADDITIONAL STUDIES:    Xray Chest 1 View AP- PORTABLE-Urgent (11.06.17 @ 12:38) >    IMPRESSION:   Interval improvement/decrease in bibasilar airspace haziness.  Persistent trace left pleural effusion.      Assessment :      82 yr old PMHx CAD, Crohn's dz, DM2, HTN, HLD, s/p coronary stents '02/1997, Hypothyroid, OA recent hospitalization at [facility] on 10/19/17 , underwent lumbar laminectomy L2 - L5 , his post op course complicated by acute blood loss  and was transfused . he was sent ot [facility] on 10/27/17 on PO Doxycycline and taper steroids ,    he is now admitted with AMS, sent to ER from [facility] , noted ot have leukocytosis not associated with fever . He was given sedation due ot agitation and also for MRI, became hypoxic so transferred to MICU . MRI of lumbar spine with contrast shows collection in the post op bed ? hematoma or infection.     Sepsis work up from admission is negative . To note he was on PO steroids on dc from hospital to rehab , his wbc on day of dc was 15.7     He has I & D  post op epidural  hematoma , no sign of infection , all OR cs are negative      Possibility of aspiration pneumonia should be considered       Plan :   - will change to Po Levaquin  x 4 days   - avoid sedatives and narcotics  - aspiration precautions    - increase activity   - Dc Planning     Continue with present regime .  Appropriate use of antibiotics and adverse effects reviewed.     I have discussed the above plan of care with patient in detail. he expressed understanding of the  treatment plan . Risks, benefits and alternatives discussed in detail. I have asked if he has any questions or concerns and appropriately addressed them to the best of my ability .    > 25 minutes were spent in direct patient care reviewing notes, medications ,labs data/ imaging , discussion with multidisciplinary team.    Thank you for allowing me to participate in care of your patient .    Elio Rowell MD  [phone]

## 2017-11-07 NOTE — PROGRESS NOTE ADULT - ASSESSMENT
82 year old male with PMH of recent lumbar laminectomy on 10/27 presents with AMS from [facility] facility likely secondary to Encephalopathy .   Course complicated by coffee ground emesis, serosanguinous fluid drainage from surgical incision with increasing leukocytosis suggestive of spinal abscess, and hypoxia. Transferred to ICU for closer monitoring. S/P I&D POD#2, consistent with hematoma, abscess ruled out, on antibiotics for aspiration pneumonia

## 2017-11-07 NOTE — PROGRESS NOTE ADULT - PROBLEM SELECTOR PLAN 1
likely secondary to ileus  patient able to pass flatulence, no BM   continue senna, colace  add miralax 17 grams daily   monitor lytes -- k goal >4, mg goal >2  monitor for BM  serial abdominal exams, currently benign

## 2017-11-07 NOTE — PROGRESS NOTE ADULT - SUBJECTIVE AND OBJECTIVE BOX
INTERVAL HPI/OVERNIGHT EVENTS: Stroud draining well. Patient not ambulating    MEDICATIONS  (STANDING):  ascorbic acid 500 milliGRAM(s) Oral two times a day  BACItracin 50,000 Unit(s) Injectable (Rx Quick Charge) [id number] Unit(s) IntraMuscular   calcium carbonate 1250 mG + Vitamin D (OsCal 500 + D) 1 Tablet(s) Oral three times a day  cyclobenzaprine 10 milliGRAM(s) Oral every 8 hours  dextrose 5%. 1000 milliLiter(s) (50 mL/Hr) IV Continuous <Continuous>  dextrose 50% Injectable 12.5 Gram(s) IV Push once  dextrose 50% Injectable 25 Gram(s) IV Push once  dextrose 50% Injectable 25 Gram(s) IV Push once  docusate sodium 100 milliGRAM(s) Oral three times a day  folic acid 1 milliGRAM(s) Oral daily  insulin lispro (HumaLOG) corrective regimen sliding scale   SubCutaneous three times a day before meals  insulin lispro (HumaLOG) corrective regimen sliding scale   SubCutaneous at bedtime  levothyroxine 75 MICROGram(s) Oral daily  meropenem IVPB 1000 milliGRAM(s) IV Intermittent every 8 hours  meropenem IVPB      multivitamin 1 Tablet(s) Oral daily  pantoprazole    Tablet 40 milliGRAM(s) Oral before breakfast  senna 2 Tablet(s) Oral at bedtime  tamsulosin 0.4 milliGRAM(s) Oral at bedtime  verapamil  milliGRAM(s) Oral daily    MEDICATIONS  (PRN):  acetaminophen   Tablet 650 milliGRAM(s) Oral every 6 hours PRN For Temp greater than 38 C (100.4 F)  benzocaine 15 mG/menthol 3.6 mG Lozenge 1 Lozenge Oral every 4 hours PRN Sore Throat  dextrose Gel 1 Dose(s) Oral once PRN Blood Glucose LESS THAN 70 milliGRAM(s)/deciliter  diphenhydrAMINE   Capsule 25 milliGRAM(s) Oral every 4 hours PRN Insomnia  glucagon  Injectable 1 milliGRAM(s) IntraMuscular once PRN Glucose LESS THAN 70 milligrams/deciliter  magnesium hydroxide Suspension 30 milliLiter(s) Oral every 12 hours PRN Constipation  ondansetron Injectable 4 milliGRAM(s) IV Push once PRN Nausea and/or Vomiting  oxyCODONE    IR 5 milliGRAM(s) Oral every 4 hours PRN Mild Pain (1 - 3)  oxyCODONE    IR 10 milliGRAM(s) Oral every 4 hours PRN Moderate Pain (4 - 6)        Vital Signs Last 24 Hrs  T(C): 37.1 (07 Nov 2017 05:26), Max: 37.2 (06 Nov 2017 15:59)  T(F): 98.7 (07 Nov 2017 05:26), Max: 98.9 (06 Nov 2017 15:59)  HR: 74 (07 Nov 2017 05:26) (74 - 86)  BP: 143/66 (07 Nov 2017 05:26) (125/60 - 143/66)  BP(mean): --  RR: 16 (07 Nov 2017 05:26) (16 - 23)  SpO2: 95% (07 Nov 2017 05:26) (94% - 98%)    PHYSICAL EXAM:    ABDOMEN: Abdomen soft, No SP distention.  GENITALIA: Stroud draining well. Urine clear.    LABS:                        8.5    9.1   )-----------( 368      ( 06 Nov 2017 06:03 )             26.7     11-06    141  |  105  |  24<H>  ----------------------------<  124<H>  4.0   |  26  |  0.95    Ca    8.3<L>      06 Nov 2017 06:03  Phos  1.6     11-06  Mg     1.9     11-06          Urine culture:  11-04 @ 21:34 --   No growth to date  Urine culture:  11-04 @ 21:33 --   No growth to date      RADIOLOGY & ADDITIONAL TESTS:

## 2017-11-07 NOTE — PROGRESS NOTE ADULT - SUBJECTIVE AND OBJECTIVE BOX
Patient seen and examined. Pain controlled. No acute events.    ICU Vital Signs Last 24 Hrs  T(C): 37.1 (07 Nov 2017 05:26), Max: 37.2 (06 Nov 2017 15:59)  T(F): 98.7 (07 Nov 2017 05:26), Max: 98.9 (06 Nov 2017 15:59)  HR: 74 (07 Nov 2017 05:26) (74 - 86)  BP: 143/66 (07 Nov 2017 05:26) (125/60 - 143/66)  RR: 16 (07 Nov 2017 05:26) (16 - 23)  SpO2: 95% (07 Nov 2017 05:26) (94% - 98%)      Physical Exam    Gen: NAD    Spine:   Dressing c/d/i, dressing changed today, HMV in place, Incision site void of erythema or drainage   Moving extremities x 4, R>L, RLE 4/5, LLE 3/5 with HIp flexion and knee extension  SILT L3-S1, improved on LLE  DP +  Compartments soft  No calf ttp

## 2017-11-07 NOTE — PROGRESS NOTE ADULT - PROBLEM SELECTOR PLAN 1
Resolved.  likely Metabolic  encephalopathy?   -avoid sedatives and narcotics  -Continue neuro checks

## 2017-11-07 NOTE — PROGRESS NOTE ADULT - ASSESSMENT
82 year old male unable to provide hx. History obtained from his medical record.  He has a h/o CAD s/p remote PCI, Crohn's disease, T2DM, HTN, HLD, and hypothyroidism.  Has a known RBBB.  Is recently s/p laminectomy, course complicated by blood loss and hypotension.   No acute cv process was felt to be present at that time.  He did not have followup evaluation after that.   Eventually discharged to rehab, and is now sent from rehab to the hospital  for an altered ms.  Was found to have a leukocytosis, and spine imaging suspicious for spinal abscess vs hematoma.       -tolerated procedure with no evidence of cardiac complications  -remote pci, not presently on antiplatelet agent, which cannot be started at this time in the context of spinal surgery, though we will need to resume at some point with ASA 81mg Qday.   -there is no evidence of significant arrhythmia, volume overload, or active ischemia  -follow i/o  -monitor electrolytes, keep k>4, Mg>2  -Continue verapamil 180 QD  -To follow with you closely

## 2017-11-07 NOTE — PROGRESS NOTE ADULT - SUBJECTIVE AND OBJECTIVE BOX
Neurology follow up note    JONATHAN SUMMERS82yMale      Interval History:    Patient feels ok no new complaints.    MEDICATIONS    acetaminophen   Tablet 650 milliGRAM(s) Oral every 6 hours PRN  ascorbic acid 500 milliGRAM(s) Oral two times a day  BACItracin 50,000 Unit(s) Injectable (Rx Quick Charge) 067769 Unit(s) IntraMuscular   benzocaine 15 mG/menthol 3.6 mG Lozenge 1 Lozenge Oral every 4 hours PRN  calcium carbonate 1250 mG + Vitamin D (OsCal 500 + D) 1 Tablet(s) Oral three times a day  cyclobenzaprine 10 milliGRAM(s) Oral every 8 hours  dextrose 5%. 1000 milliLiter(s) IV Continuous <Continuous>  dextrose 50% Injectable 12.5 Gram(s) IV Push once  dextrose 50% Injectable 25 Gram(s) IV Push once  dextrose 50% Injectable 25 Gram(s) IV Push once  dextrose Gel 1 Dose(s) Oral once PRN  diphenhydrAMINE   Capsule 25 milliGRAM(s) Oral every 4 hours PRN  docusate sodium 100 milliGRAM(s) Oral three times a day  folic acid 1 milliGRAM(s) Oral daily  glucagon  Injectable 1 milliGRAM(s) IntraMuscular once PRN  insulin lispro (HumaLOG) corrective regimen sliding scale   SubCutaneous three times a day before meals  insulin lispro (HumaLOG) corrective regimen sliding scale   SubCutaneous at bedtime  levothyroxine 75 MICROGram(s) Oral daily  magnesium hydroxide Suspension 30 milliLiter(s) Oral every 12 hours PRN  meropenem IVPB 1000 milliGRAM(s) IV Intermittent every 8 hours  meropenem IVPB      multivitamin 1 Tablet(s) Oral daily  ondansetron Injectable 4 milliGRAM(s) IV Push once PRN  oxyCODONE    IR 5 milliGRAM(s) Oral every 4 hours PRN  oxyCODONE    IR 10 milliGRAM(s) Oral every 4 hours PRN  pantoprazole    Tablet 40 milliGRAM(s) Oral before breakfast  senna 2 Tablet(s) Oral at bedtime  tamsulosin 0.4 milliGRAM(s) Oral at bedtime  verapamil  milliGRAM(s) Oral daily      Allergies    penicillin (Hives)    Intolerances            Vital Signs Last 24 Hrs  T(C): 36.8 (07 Nov 2017 07:16), Max: 37.2 (06 Nov 2017 15:59)  T(F): 98.2 (07 Nov 2017 07:16), Max: 98.9 (06 Nov 2017 15:59)  HR: 78 (07 Nov 2017 07:16) (74 - 86)  BP: 146/78 (07 Nov 2017 07:16) (125/60 - 146/78)  BP(mean): --  RR: 16 (07 Nov 2017 07:16) (16 - 23)  SpO2: 95% (07 Nov 2017 07:16) (94% - 98%)      REVIEW OF SYSTEMS: no headaches feels ok   no significant back pain   does feel sad today       On Neurological Examination:    Mental Status - Patient is alert, awake, oriented   [facility] .   [facility]   2017      Follow simple commands    Speech -   Fluent                    Cranial Nerves - Pupils 3 mm equal and reactive to light,   extraocular eye movements intact.   smile symmetric  intact bilateral NLF    Motor Exam -   Right upper 4/5  Left upper 4/5  Right lower 2/5  Left lower 2/5    Muscle tone - is normal all over.  No asymmetry is seen.      Sensory    Bilateral intact to light touch    GENERAL Exam: Nontoxic , No Acute Distress   	  HEENT:  normocephalic, atraumatic  		  LUNGS: Decreased bilaterally  	  HEART: Normal S1S2   No murmur RRR        	  GI/ ABDOMEN:  Soft  Non tender    EXTREMITIES:   No Edema  No Clubbing  No Cyanosis No Edema    MUSCULOSKELETAL: Normal Range of Motion  	   SKIN: Normal  No Ecchymosis             LABS:  CBC Full  -  ( 06 Nov 2017 06:03 )  WBC Count : 9.1 K/uL  Hemoglobin : 8.5 g/dL  Hematocrit : 26.7 %  Platelet Count - Automated : 368 K/uL  Mean Cell Volume : 95.4 fl  Mean Cell Hemoglobin : 30.5 pg  Mean Cell Hemoglobin Concentration : 32.0 gm/dL  Auto Neutrophil # : 7.0 K/uL  Auto Lymphocyte # : 1.1 K/uL  Auto Monocyte # : 0.8 K/uL  Auto Eosinophil # : 0.2 K/uL  Auto Basophil # : 0.1 K/uL  Auto Neutrophil % : 77.2 %  Auto Lymphocyte % : 11.6 %  Auto Monocyte % : 8.7 %  Auto Eosinophil % : 1.9 %  Auto Basophil % : 0.6 %      11-06    141  |  105  |  24<H>  ----------------------------<  124<H>  4.0   |  26  |  0.95    Ca    8.3<L>      06 Nov 2017 06:03  Phos  1.6     11-06  Mg     1.9     11-06      Hemoglobin A1C:       Vitamin B12         RADIOLOGY    ANALYSIS AND PLAN:  An 82-year-old with episodes of changes in mental status.    1.	For episodes of changes in mental status better h/o PNA awaiting lumbar cultures  ID follow up antibotics as needed    2.	Overall at present  more responsive   3.	neurology wise stable   4.	monitor psych status   Spoke with family members.    Spoke with one relative, Clive telephone number is [phone] 11/7/17.  , Rosanna, her home number is [phone].  Her cell number [phone].  11/3/17   Thank you for the courtesy of consultation.    30 minutes spent on total encounter; more than 50% of the visit was spent counseling and/or coordinating care by the attending physic

## 2017-11-07 NOTE — PROGRESS NOTE ADULT - SUBJECTIVE AND OBJECTIVE BOX
[facility] -- Bre Awad, Eliane, Aylin, Tyrone Pereira Savella  Office # [phone]      Follow Up:  CAD    Subjective/Observations: Patient seen and examined. Events noted. Resting comfortably in bed. No complaints of chest pain, dyspnea, or palpitations reported.       REVIEW OF SYSTEMS: All other review of systems is negative unless indicated above    PAST MEDICAL & SURGICAL HISTORY:  Prostate CA  Crohn disease  Hypothyroidism  Depression  Intervertebral disc disorder  Diabetes mellitus  Obesity  Hypercholesterolemia  HTN (hypertension)  Stented coronary artery: 2 stents, 20 yrs ago, 15 yrs ago  CAD (coronary artery disease)  Macular degeneration: right eye  Hearing loss: left hearing aid, right ear deaf  S/P colon resection  S/P lumbar fusion  History of hip replacement, total, right: Revision  History of hip replacement, total, right      MEDICATIONS  (STANDING):  ascorbic acid 500 milliGRAM(s) Oral two times a day  BACItracin 50,000 Unit(s) Injectable (Rx Quick Charge) 973547 Unit(s) IntraMuscular   calcium carbonate 1250 mG + Vitamin D (OsCal 500 + D) 1 Tablet(s) Oral three times a day  cyclobenzaprine 10 milliGRAM(s) Oral every 8 hours  dextrose 5%. 1000 milliLiter(s) (50 mL/Hr) IV Continuous <Continuous>  dextrose 50% Injectable 12.5 Gram(s) IV Push once  dextrose 50% Injectable 25 Gram(s) IV Push once  dextrose 50% Injectable 25 Gram(s) IV Push once  docusate sodium 100 milliGRAM(s) Oral three times a day  folic acid 1 milliGRAM(s) Oral daily  insulin lispro (HumaLOG) corrective regimen sliding scale   SubCutaneous three times a day before meals  insulin lispro (HumaLOG) corrective regimen sliding scale   SubCutaneous at bedtime  levoFLOXacin  Tablet 500 milliGRAM(s) Oral every 24 hours  levothyroxine 75 MICROGram(s) Oral daily  multivitamin 1 Tablet(s) Oral daily  pantoprazole    Tablet 40 milliGRAM(s) Oral before breakfast  senna 2 Tablet(s) Oral at bedtime  tamsulosin 0.4 milliGRAM(s) Oral at bedtime  verapamil  milliGRAM(s) Oral daily    MEDICATIONS  (PRN):  acetaminophen   Tablet 650 milliGRAM(s) Oral every 6 hours PRN For Temp greater than 38 C (100.4 F)  benzocaine 15 mG/menthol 3.6 mG Lozenge 1 Lozenge Oral every 4 hours PRN Sore Throat  dextrose Gel 1 Dose(s) Oral once PRN Blood Glucose LESS THAN 70 milliGRAM(s)/deciliter  diphenhydrAMINE   Capsule 25 milliGRAM(s) Oral every 4 hours PRN Insomnia  glucagon  Injectable 1 milliGRAM(s) IntraMuscular once PRN Glucose LESS THAN 70 milligrams/deciliter  magnesium hydroxide Suspension 30 milliLiter(s) Oral every 12 hours PRN Constipation  ondansetron Injectable 4 milliGRAM(s) IV Push once PRN Nausea and/or Vomiting  oxyCODONE    IR 5 milliGRAM(s) Oral every 4 hours PRN Mild Pain (1 - 3)  oxyCODONE    IR 10 milliGRAM(s) Oral every 4 hours PRN Moderate Pain (4 - 6)      Allergies    penicillin (Hives)    Intolerances            Vital Signs Last 24 Hrs  T(C): 36.8 (07 Nov 2017 07:16), Max: 37.2 (06 Nov 2017 15:59)  T(F): 98.2 (07 Nov 2017 07:16), Max: 98.9 (06 Nov 2017 15:59)  HR: 78 (07 Nov 2017 07:16) (74 - 81)  BP: 146/78 (07 Nov 2017 07:16) (128/68 - 146/78)  BP(mean): --  RR: 16 (07 Nov 2017 07:16) (16 - 17)  SpO2: 95% (07 Nov 2017 07:16) (94% - 96%)    I&O's Summary    06 Nov 2017 07:01  -  07 Nov 2017 07:00  --------------------------------------------------------  IN: 200 mL / OUT: 1115 mL / NET: -915 mL    07 Nov 2017 07:01  -  07 Nov 2017 15:29  --------------------------------------------------------  IN: 0 mL / OUT: 420 mL / NET: -420 mL          PHYSICAL EXAM:     Constitutional: NAD, awake and alert, well-developed  HEENT: Moist Mucous Membranes, Anicteric  Pulmonary: Decreased breath sounds b/l.   Cardiovascular: Regular, S1 and S2, No murmurs, rubs, gallops or clicks  Gastrointestinal: Bowel Sounds present, soft, nontender. distended  Lymph: No peripheral edema. No lymphadenopathy.  Skin: No visible rashes or ulcers.  Psych:  Mood & affect appropriate    LABS: All Labs Reviewed:                        10.3   10.4  )-----------( 329      ( 07 Nov 2017 08:27 )             31.9                         8.5    9.1   )-----------( 368      ( 06 Nov 2017 06:03 )             26.7                         9.7    11.3  )-----------( 432      ( 05 Nov 2017 12:44 )             31.0     07 Nov 2017 08:27    140    |  107    |  21     ----------------------------<  132    4.2     |  24     |  0.87   06 Nov 2017 06:03    141    |  105    |  24     ----------------------------<  124    4.0     |  26     |  0.95   05 Nov 2017 05:51    141    |  106    |  24     ----------------------------<  110    3.9     |  28     |  0.97     Ca    8.9        07 Nov 2017 08:27  Ca    8.3        06 Nov 2017 06:03  Ca    7.5        05 Nov 2017 05:51  Phos  1.6       06 Nov 2017 06:03  Mg     1.9       06 Nov 2017 06:03        CARDIAC MARKERS ( 06 Nov 2017 06:03 )  x     / x     / 47 U/L / x     / x

## 2017-11-07 NOTE — PROGRESS NOTE ADULT - SUBJECTIVE AND OBJECTIVE BOX
Patient is a 82y old  Male who presents with a chief complaint of Spinal Compression w/ Neuro Defecits/AMS (05 Nov 2017 11:11)       INTERVAL HPI/OVERNIGHT EVENTS: No new symptoms, complaints     MEDICATIONS  (STANDING):  ascorbic acid 500 milliGRAM(s) Oral two times a day  BACItracin 50,000 Unit(s) Injectable (Rx Quick Charge) 869077 Unit(s) IntraMuscular   calcium carbonate 1250 mG + Vitamin D (OsCal 500 + D) 1 Tablet(s) Oral three times a day  cyclobenzaprine 10 milliGRAM(s) Oral every 8 hours  dextrose 5%. 1000 milliLiter(s) (50 mL/Hr) IV Continuous <Continuous>  dextrose 50% Injectable 12.5 Gram(s) IV Push once  dextrose 50% Injectable 25 Gram(s) IV Push once  dextrose 50% Injectable 25 Gram(s) IV Push once  docusate sodium 100 milliGRAM(s) Oral three times a day  folic acid 1 milliGRAM(s) Oral daily  insulin lispro (HumaLOG) corrective regimen sliding scale   SubCutaneous three times a day before meals  insulin lispro (HumaLOG) corrective regimen sliding scale   SubCutaneous at bedtime  levothyroxine 75 MICROGram(s) Oral daily  meropenem IVPB 1000 milliGRAM(s) IV Intermittent every 8 hours  meropenem IVPB      multivitamin 1 Tablet(s) Oral daily  pantoprazole    Tablet 40 milliGRAM(s) Oral before breakfast  senna 2 Tablet(s) Oral at bedtime  tamsulosin 0.4 milliGRAM(s) Oral at bedtime  verapamil  milliGRAM(s) Oral daily    MEDICATIONS  (PRN):  acetaminophen   Tablet 650 milliGRAM(s) Oral every 6 hours PRN For Temp greater than 38 C (100.4 F)  benzocaine 15 mG/menthol 3.6 mG Lozenge 1 Lozenge Oral every 4 hours PRN Sore Throat  dextrose Gel 1 Dose(s) Oral once PRN Blood Glucose LESS THAN 70 milliGRAM(s)/deciliter  diphenhydrAMINE   Capsule 25 milliGRAM(s) Oral every 4 hours PRN Insomnia  glucagon  Injectable 1 milliGRAM(s) IntraMuscular once PRN Glucose LESS THAN 70 milligrams/deciliter  magnesium hydroxide Suspension 30 milliLiter(s) Oral every 12 hours PRN Constipation  ondansetron Injectable 4 milliGRAM(s) IV Push once PRN Nausea and/or Vomiting  oxyCODONE    IR 5 milliGRAM(s) Oral every 4 hours PRN Mild Pain (1 - 3)  oxyCODONE    IR 10 milliGRAM(s) Oral every 4 hours PRN Moderate Pain (4 - 6)      Allergies    penicillin (Hives)    Intolerances        REVIEW OF SYSTEMS:  CONSTITUTIONAL: No fever,  or fatigue  EYES: No eye pain, visual disturbances, or discharge  ENMT:  No difficulty hearing, tinnitus, vertigo; No sinus or throat pain  NECK: No pain or stiffness  RESPIRATORY: No cough, wheezing, chills or hemoptysis; No shortness of breath  CARDIOVASCULAR: No chest pain, palpitations, dizziness, or leg swelling  GASTROINTESTINAL: No abdominal or epigastric pain. No nausea, vomiting, or hematemesis; No diarrhea or constipation. No melena or hematochezia.  GENITOURINARY: No dysuria, frequency, hematuria, or incontinence  NEUROLOGICAL: No headaches, memory loss, loss of strength, numbness, or tremors  SKIN: No itching, burning, rashes, or lesions   LYMPH NODES: No enlarged glands  ENDOCRINE: No heat or cold intolerance; No hair loss; No polydipsia or polyuria  MUSCULOSKELETAL: No joint pain or swelling; No muscle, back, or extremity pain  HEME/LYMPH: No easy bruising, or bleeding gums  ALLERGY AND IMMUNOLOGIC: No hives or eczema    Vital Signs Last 24 Hrs  T(C): 36.8 (07 Nov 2017 07:16), Max: 37.2 (06 Nov 2017 15:59)  T(F): 98.2 (07 Nov 2017 07:16), Max: 98.9 (06 Nov 2017 15:59)  HR: 78 (07 Nov 2017 07:16) (74 - 86)  BP: 146/78 (07 Nov 2017 07:16) (125/60 - 146/78)  BP(mean): --  RR: 16 (07 Nov 2017 07:16) (16 - 23)  SpO2: 95% (07 Nov 2017 07:16) (94% - 98%)    PHYSICAL EXAM:  GENERAL: NAD,  well-developed  HEAD:  Atraumatic, Normocephalic  EYES: EOMI, PERRLA, conjunctiva and sclera clear  ENMT: No tonsillar erythema, exudates, or enlargement; Moist mucous membranes  NECK: Supple, No JVD, Normal thyroid  NERVOUS SYSTEM:  Alert & Oriented X3, Good concentration; Motor Strength 5/5 B/L upper and lower extremities; DTRs 2+ intact and symmetric  CHEST/LUNG: Clear to auscultation bilaterally; No rales, rhonchi, wheezing, or rubs  HEART: Regular rate and rhythm; No murmurs, rubs, or gallops  ABDOMEN: Soft, Nontender, Nondistended; Bowel sounds present  EXTREMITIES:  2+ Peripheral Pulses, No clubbing, cyanosis, or edema  LYMPH: No lymphadenopathy noted    LABS:      Ca    8.3        06 Nov 2017 06:03        CAPILLARY BLOOD GLUCOSE      POCT Blood Glucose.: 132 mg/dL (07 Nov 2017 07:49)  POCT Blood Glucose.: 188 mg/dL (06 Nov 2017 21:11)  POCT Blood Glucose.: 158 mg/dL (06 Nov 2017 16:39)  POCT Blood Glucose.: 163 mg/dL (06 Nov 2017 11:50)    BLOOD CULTURE  11-04 @ 21:34   No growth to date  --  --  11-04 @ 21:33   No growth to date  --  --    RADIOLOGY & ADDITIONAL TESTS:    Imaging Personally Reviewed:  [ ] YES     Consultant(s) Notes Reviewed:      Care Discussed with Consultants/Other Providers:

## 2017-11-07 NOTE — PROGRESS NOTE ADULT - ASSESSMENT
81 yo M S/P I & D lumbar wound POD 2  OR cx NGTD- continue to follow   Analgesia  Exam improving but LLE still limited, patient states that is always weak leg  SCD's  WBAT   PT today, OOBTC  Continue to Monitor HMV output   Dispo Planning- PATRICK

## 2017-11-08 DIAGNOSIS — K91.89 OTHER POSTPROCEDURAL COMPLICATIONS AND DISORDERS OF DIGESTIVE SYSTEM: ICD-10-CM

## 2017-11-08 DIAGNOSIS — D50.0 IRON DEFICIENCY ANEMIA SECONDARY TO BLOOD LOSS (CHRONIC): ICD-10-CM

## 2017-11-08 LAB
ANION GAP SERPL CALC-SCNC: 8 MMOL/L — SIGNIFICANT CHANGE UP (ref 5–17)
BASOPHILS # BLD AUTO: 0.1 K/UL — SIGNIFICANT CHANGE UP (ref 0–0.2)
BASOPHILS NFR BLD AUTO: 0.8 % — SIGNIFICANT CHANGE UP (ref 0–2)
BUN SERPL-MCNC: 18 MG/DL — SIGNIFICANT CHANGE UP (ref 7–23)
CALCIUM SERPL-MCNC: 8.6 MG/DL — SIGNIFICANT CHANGE UP (ref 8.5–10.1)
CHLORIDE SERPL-SCNC: 106 MMOL/L — SIGNIFICANT CHANGE UP (ref 96–108)
CO2 SERPL-SCNC: 28 MMOL/L — SIGNIFICANT CHANGE UP (ref 22–31)
CREAT SERPL-MCNC: 0.88 MG/DL — SIGNIFICANT CHANGE UP (ref 0.5–1.3)
CULTURE RESULTS: SIGNIFICANT CHANGE UP
EOSINOPHIL # BLD AUTO: 0.3 K/UL — SIGNIFICANT CHANGE UP (ref 0–0.5)
EOSINOPHIL NFR BLD AUTO: 3.5 % — SIGNIFICANT CHANGE UP (ref 0–6)
GLUCOSE SERPL-MCNC: 133 MG/DL — HIGH (ref 70–99)
HCT VFR BLD CALC: 27.4 % — LOW (ref 39–50)
HGB BLD-MCNC: 8.4 G/DL — LOW (ref 13–17)
LYMPHOCYTES # BLD AUTO: 1.2 K/UL — SIGNIFICANT CHANGE UP (ref 1–3.3)
LYMPHOCYTES # BLD AUTO: 15.8 % — SIGNIFICANT CHANGE UP (ref 13–44)
MCHC RBC-ENTMCNC: 29.3 PG — SIGNIFICANT CHANGE UP (ref 27–34)
MCHC RBC-ENTMCNC: 30.8 GM/DL — LOW (ref 32–36)
MCV RBC AUTO: 95.2 FL — SIGNIFICANT CHANGE UP (ref 80–100)
MONOCYTES # BLD AUTO: 0.5 K/UL — SIGNIFICANT CHANGE UP (ref 0–0.9)
MONOCYTES NFR BLD AUTO: 7 % — SIGNIFICANT CHANGE UP (ref 1–9)
NEUTROPHILS # BLD AUTO: 5.7 K/UL — SIGNIFICANT CHANGE UP (ref 1.8–7.4)
NEUTROPHILS NFR BLD AUTO: 72.9 % — SIGNIFICANT CHANGE UP (ref 43–77)
PLATELET # BLD AUTO: 335 K/UL — SIGNIFICANT CHANGE UP (ref 150–400)
POTASSIUM SERPL-MCNC: 3.7 MMOL/L — SIGNIFICANT CHANGE UP (ref 3.5–5.3)
POTASSIUM SERPL-SCNC: 3.7 MMOL/L — SIGNIFICANT CHANGE UP (ref 3.5–5.3)
RBC # BLD: 2.88 M/UL — LOW (ref 4.2–5.8)
RBC # FLD: 14.4 % — SIGNIFICANT CHANGE UP (ref 10.3–14.5)
SODIUM SERPL-SCNC: 142 MMOL/L — SIGNIFICANT CHANGE UP (ref 135–145)
SPECIMEN SOURCE: SIGNIFICANT CHANGE UP
WBC # BLD: 7.9 K/UL — SIGNIFICANT CHANGE UP (ref 3.8–10.5)
WBC # FLD AUTO: 7.9 K/UL — SIGNIFICANT CHANGE UP (ref 3.8–10.5)

## 2017-11-08 PROCEDURE — 99233 SBSQ HOSP IP/OBS HIGH 50: CPT

## 2017-11-08 PROCEDURE — 74000: CPT | Mod: 26

## 2017-11-08 PROCEDURE — 99232 SBSQ HOSP IP/OBS MODERATE 35: CPT

## 2017-11-08 RX ORDER — POLYETHYLENE GLYCOL 3350 17 G/17G
17 POWDER, FOR SOLUTION ORAL DAILY
Qty: 0 | Refills: 0 | Status: DISCONTINUED | OUTPATIENT
Start: 2017-11-08 | End: 2017-11-09

## 2017-11-08 RX ORDER — OXYCODONE HYDROCHLORIDE 5 MG/1
5 TABLET ORAL EVERY 6 HOURS
Qty: 0 | Refills: 0 | Status: DISCONTINUED | OUTPATIENT
Start: 2017-11-08 | End: 2017-11-09

## 2017-11-08 RX ADMIN — OXYCODONE HYDROCHLORIDE 5 MILLIGRAM(S): 5 TABLET ORAL at 08:15

## 2017-11-08 RX ADMIN — Medication 500 MILLIGRAM(S): at 06:23

## 2017-11-08 RX ADMIN — OXYCODONE HYDROCHLORIDE 5 MILLIGRAM(S): 5 TABLET ORAL at 07:13

## 2017-11-08 RX ADMIN — Medication 2: at 12:20

## 2017-11-08 RX ADMIN — Medication 4: at 17:47

## 2017-11-08 RX ADMIN — CYCLOBENZAPRINE HYDROCHLORIDE 10 MILLIGRAM(S): 10 TABLET, FILM COATED ORAL at 22:17

## 2017-11-08 RX ADMIN — PANTOPRAZOLE SODIUM 40 MILLIGRAM(S): 20 TABLET, DELAYED RELEASE ORAL at 06:22

## 2017-11-08 RX ADMIN — Medication 100 MILLIGRAM(S): at 06:26

## 2017-11-08 RX ADMIN — OXYCODONE HYDROCHLORIDE 10 MILLIGRAM(S): 5 TABLET ORAL at 12:14

## 2017-11-08 RX ADMIN — Medication 100 MILLIGRAM(S): at 14:37

## 2017-11-08 RX ADMIN — Medication 1 TABLET(S): at 12:21

## 2017-11-08 RX ADMIN — POLYETHYLENE GLYCOL 3350 17 GRAM(S): 17 POWDER, FOR SOLUTION ORAL at 12:22

## 2017-11-08 RX ADMIN — Medication 100 MILLIGRAM(S): at 22:17

## 2017-11-08 RX ADMIN — Medication 1 MILLIGRAM(S): at 12:21

## 2017-11-08 RX ADMIN — Medication 500 MILLIGRAM(S): at 17:47

## 2017-11-08 RX ADMIN — SENNA PLUS 2 TABLET(S): 8.6 TABLET ORAL at 22:17

## 2017-11-08 RX ADMIN — Medication 1 TABLET(S): at 06:22

## 2017-11-08 RX ADMIN — TAMSULOSIN HYDROCHLORIDE 0.4 MILLIGRAM(S): 0.4 CAPSULE ORAL at 22:17

## 2017-11-08 RX ADMIN — Medication 1 TABLET(S): at 14:38

## 2017-11-08 RX ADMIN — Medication 180 MILLIGRAM(S): at 06:22

## 2017-11-08 RX ADMIN — Medication 1 TABLET(S): at 22:17

## 2017-11-08 RX ADMIN — Medication 75 MICROGRAM(S): at 06:22

## 2017-11-08 RX ADMIN — CYCLOBENZAPRINE HYDROCHLORIDE 10 MILLIGRAM(S): 10 TABLET, FILM COATED ORAL at 06:21

## 2017-11-08 RX ADMIN — CYCLOBENZAPRINE HYDROCHLORIDE 10 MILLIGRAM(S): 10 TABLET, FILM COATED ORAL at 14:37

## 2017-11-08 RX ADMIN — OXYCODONE HYDROCHLORIDE 10 MILLIGRAM(S): 5 TABLET ORAL at 13:10

## 2017-11-08 NOTE — PROGRESS NOTE ADULT - PROBLEM SELECTOR PLAN 1
likely secondary to ileus  unclear if patient having BMs  continue senna, colace  miralax 17 grams daily   monitor lytes -- k goal >4, mg goal >2  monitor for BM  f/u AXR  dc opioids  may need methylnaltrexone

## 2017-11-08 NOTE — PROGRESS NOTE ADULT - SUBJECTIVE AND OBJECTIVE BOX
Patient is a 82y old  Male who presents with a chief complaint of Spinal Compression w/ Neuro Defecits/AMS (05 Nov 2017 11:11)       INTERVAL HPI/OVERNIGHT EVENTS: Patient seen and examined at bedside. No new symptoms, complaints     MEDICATIONS  (STANDING):  ascorbic acid 500 milliGRAM(s) Oral two times a day  BACItracin 50,000 Unit(s) Injectable (Rx Quick Charge) [id number] Unit(s) IntraMuscular   calcium carbonate 1250 mG + Vitamin D (OsCal 500 + D) 1 Tablet(s) Oral three times a day  cyclobenzaprine 10 milliGRAM(s) Oral every 8 hours  dextrose 5%. 1000 milliLiter(s) (50 mL/Hr) IV Continuous <Continuous>  dextrose 50% Injectable 12.5 Gram(s) IV Push once  dextrose 50% Injectable 25 Gram(s) IV Push once  dextrose 50% Injectable 25 Gram(s) IV Push once  docusate sodium 100 milliGRAM(s) Oral three times a day  folic acid 1 milliGRAM(s) Oral daily  insulin lispro (HumaLOG) corrective regimen sliding scale   SubCutaneous three times a day before meals  insulin lispro (HumaLOG) corrective regimen sliding scale   SubCutaneous at bedtime  levoFLOXacin  Tablet 500 milliGRAM(s) Oral every 24 hours  levothyroxine 75 MICROGram(s) Oral daily  multivitamin 1 Tablet(s) Oral daily  pantoprazole    Tablet 40 milliGRAM(s) Oral before breakfast  senna 2 Tablet(s) Oral at bedtime  tamsulosin 0.4 milliGRAM(s) Oral at bedtime  verapamil  milliGRAM(s) Oral daily    MEDICATIONS  (PRN):  acetaminophen   Tablet 650 milliGRAM(s) Oral every 6 hours PRN For Temp greater than 38 C (100.4 F)  benzocaine 15 mG/menthol 3.6 mG Lozenge 1 Lozenge Oral every 4 hours PRN Sore Throat  dextrose Gel 1 Dose(s) Oral once PRN Blood Glucose LESS THAN 70 milliGRAM(s)/deciliter  diphenhydrAMINE   Capsule 25 milliGRAM(s) Oral every 4 hours PRN Insomnia  glucagon  Injectable 1 milliGRAM(s) IntraMuscular once PRN Glucose LESS THAN 70 milligrams/deciliter  magnesium hydroxide Suspension 30 milliLiter(s) Oral every 12 hours PRN Constipation  ondansetron Injectable 4 milliGRAM(s) IV Push once PRN Nausea and/or Vomiting  oxyCODONE    IR 5 milliGRAM(s) Oral every 4 hours PRN Mild Pain (1 - 3)  oxyCODONE    IR 10 milliGRAM(s) Oral every 4 hours PRN Moderate Pain (4 - 6)      Allergies    penicillin (Hives)    Intolerances        REVIEW OF SYSTEMS:  CONSTITUTIONAL: No fever, or fatigue  EYES: No eye pain, visual disturbances, or discharge  ENMT:  No difficulty hearing, tinnitus, vertigo; No sinus or throat pain  NECK: No pain or stiffness  RESPIRATORY: No cough, wheezing, chills or hemoptysis; No shortness of breath  CARDIOVASCULAR: No chest pain, palpitations, dizziness, or leg swelling  GASTROINTESTINAL: No abdominal or epigastric pain. No nausea, vomiting, or hematemesis; No diarrhea or constipation. No melena or hematochezia.  GENITOURINARY: No dysuria, frequency, hematuria, or incontinence  NEUROLOGICAL: No headaches, memory loss, loss of strength, numbness, or tremors  SKIN: No itching, burning, rashes, or lesions   LYMPH NODES: No enlarged glands  ENDOCRINE: No heat or cold intolerance; No hair loss; No polydipsia or polyuria  MUSCULOSKELETAL: No joint pain or swelling; No muscle, back, or extremity pain  HEME/LYMPH: No easy bruising, or bleeding gums  ALLERGY AND IMMUNOLOGIC: No hives or eczema    Vital Signs Last 24 Hrs  T(C): 36.8 (08 Nov 2017 07:23), Max: 37.1 (07 Nov 2017 16:25)  T(F): 98.3 (08 Nov 2017 07:23), Max: 98.7 (07 Nov 2017 16:25)  HR: 78 (08 Nov 2017 07:23) (78 - 86)  BP: 149/77 (08 Nov 2017 07:23) (135/71 - 151/78)  BP(mean): --  RR: 16 (08 Nov 2017 07:23) (16 - 16)  SpO2: 95% (08 Nov 2017 07:23) (94% - 97%)    PHYSICAL EXAM:  GENERAL: NAD, well-developed  HEAD:  Atraumatic, Normocephalic  EYES: EOMI, PERRLA, conjunctiva and sclera clear  ENMT: No tonsillar erythema, exudates, or enlargement; Moist mucous membranes.  NECK: Supple, No JVD, Normal thyroid  NERVOUS SYSTEM:  Alert & Oriented X3, Good concentration; Non focal.  CHEST/LUNG: Clear to auscultation bilaterally; No rales, rhonchi, wheezing, or rubs  HEART: Regular rate and rhythm; No murmurs, rubs, or gallops  ABDOMEN: Soft, Nontender, Nondistended; Bowel sounds present  EXTREMITIES:  2+ Peripheral Pulses, No clubbing, cyanosis, or edema  LYMPH: No lymphadenopathy noted  SKIN: No rashes or lesions,+ Hemovac    LABS:                        8.4    7.9   )-----------( 335      ( 08 Nov 2017 06:54 )             27.4     08 Nov 2017 06:54    142    |  106    |  18     ----------------------------<  133    3.7     |  28     |  0.88     Ca    8.6        08 Nov 2017 06:54        CAPILLARY BLOOD GLUCOSE      POCT Blood Glucose.: 145 mg/dL (08 Nov 2017 07:11)  POCT Blood Glucose.: 154 mg/dL (07 Nov 2017 21:10)  POCT Blood Glucose.: 181 mg/dL (07 Nov 2017 16:28)  POCT Blood Glucose.: 161 mg/dL (07 Nov 2017 10:57)    BLOOD CULTURE  11-04 @ 21:34   No growth to date  --  --  11-04 @ 21:33   No growth to date  --  --    RADIOLOGY & ADDITIONAL TESTS:    Imaging Personally Reviewed:  [ ] YES     Consultant(s) Notes Reviewed:      Care Discussed with Consultants/Other Providers:

## 2017-11-08 NOTE — PROGRESS NOTE ADULT - PROBLEM SELECTOR PLAN 1
mobilize  poss dc to rehab today  will need sutures out in 10-14 days mobilize  poss dc to rehab today  will need sutures out in 10-14 days  doing much better

## 2017-11-08 NOTE — PROGRESS NOTE ADULT - SUBJECTIVE AND OBJECTIVE BOX
[facility]                                                            Orthopedic Sp[facility] Progress Note      POST OPERATIVE DAY #: 4  STATUS POST:    Evacuation hematoma/rev lami           Pre-Op Dx: Epidural hematoma    Post-Op Dx:  Lumbar stenosis with neurogenic claudication  Epidural hematoma    Prin. Procedure:   Evacuation hematoma/rev lami             SUBJECTIVE: Patient seen and examined.     Pain (0-10): 2-3  Current Pain Management:  [ ] PCA   [x ] Po Analgesics [ ] IM /IV Anagesics     Vital Signs Last 24 Hrs  T(C): 36.8 (08 Nov 2017 07:23), Max: 37.1 (07 Nov 2017 16:25)  T(F): 98.3 (08 Nov 2017 07:23), Max: 98.7 (07 Nov 2017 16:25)  HR: 78 (08 Nov 2017 07:23) (78 - 86)  BP: 149/77 (08 Nov 2017 07:23) (135/71 - 151/78)  BP(mean): --  RR: 16 (08 Nov 2017 07:23) (16 - 16)  SpO2: 95% (08 Nov 2017 07:23) (94% - 97%)  I&O's Detail    07 Nov 2017 07:01  -  08 Nov 2017 07:00  --------------------------------------------------------  IN:  Total IN: 0 mL    OUT:    Accordian: 105 mL............not working..........removed    Indwelling Catheter - Urethral: 750 mL    Ureteral Catheter: 350 mL  Total OUT: 1205 mL    Total NET: -1205 mL          OBJECTIVE: looks good  alert oriented        Wound /Dressing: wound clean/drain removed  sutures intact    Lumbar: ROM :  Neurological: A/O x 3              Sensation: [ ] intact to light touch  [ ] decreased:          Motor exam: [  ]          [x ] Upper extremity    Delt      Bicp       Tri      Wrist ext  Wrst Flex       Digit Ext Digit Flex                                     R         5/5        5/5        5/5       5/5            5/5            5/5       5/5          5/5                                     L          5/5        5/5        5/5       5/5            5/5            5/5       5/5          5/5         [ x] Lower ext.     Hip Flx  Hip Ext   Hip Abd  Hip Add Quad   Hamstrg   TA       EHL      GS                              R        5/5        5/5        5/5             5/5        5/5        5/5        4/5     5/5      5/5                              L         5/5        5/5        5/5             5/5        5/5        5/5        4/5     5/5      5/5                                                       [ x] Vascular :             Tension Signs: neg          Long Tract Findings: neg    RADIOLOGY & ADDITIONAL STUDIES:                                               LABS:                        8.4    7.9   )-----------( 335      ( 08 Nov 2017 06:54 )....................improved             27.4 ................    11-08    142  |  106  |  18  ----------------------------<  133<H>  3.7   |  28  |  0.88    Ca    8.6      08 Nov 2017 06:54          Blood Culture: neg  Wound Culture: neg    A/P :  82y Male   s/p:   Evacuation hematoma/rev lami           -    Pain control  -    DVT ppx: SCDs    -    Abx:  continues  -    Review labs as indicated     -    Physical Therapy.....OOB walking  -    Weight bearing status: full  -    Dispo: Home [ ]     Rehab [x ] [facility]                                                            [facility] Progress Note      POST OPERATIVE DAY #: 4  STATUS POST:    Evacuation hematoma/rev lami           Pre-Op Dx: Epidural hematoma    Post-Op Dx:  Lumbar stenosis with neurogenic claudication  Epidural hematoma    Prin. Procedure:   Evacuation hematoma/rev lami             SUBJECTIVE: Patient seen and examined. ........feeling much better    Pain (0-10): 2-3  Current Pain Management:  [ ] PCA   [x ] Po Analgesics [ ] IM /IV Anagesics     Vital Signs Last 24 Hrs  T(C): 36.8 (08 Nov 2017 07:23), Max: 37.1 (07 Nov 2017 16:25)  T(F): 98.3 (08 Nov 2017 07:23), Max: 98.7 (07 Nov 2017 16:25)  HR: 78 (08 Nov 2017 07:23) (78 - 86)  BP: 149/77 (08 Nov 2017 07:23) (135/71 - 151/78)  BP(mean): --  RR: 16 (08 Nov 2017 07:23) (16 - 16)  SpO2: 95% (08 Nov 2017 07:23) (94% - 97%)  I&O's Detail    07 Nov 2017 07:01  -  08 Nov 2017 07:00  --------------------------------------------------------  IN:  Total IN: 0 mL    OUT:    Accordian: 105 mL............not working..........removed    Indwelling Catheter - Urethral: 750 mL    Ureteral Catheter: 350 mL  Total OUT: 1205 mL    Total NET: -1205 mL          OBJECTIVE: looks good  alert oriented        Wound /Dressing: wound clean/drain removed  sutures intact    Lumbar: ROM :  Neurological: A/O x 3              Sensation: [ ] intact to light touch  [ ] decreased:          Motor exam: [  ]          [x ] Upper extremity    Delt      Bicp       Tri      Wrist ext  Wrst Flex       Digit Ext Digit Flex                                     R         5/5        5/5        5/5       5/5            5/5            5/5       5/5          5/5                                     L          5/5        5/5        5/5       5/5            5/5            5/5       5/5          5/5         [ x] Lower ext.     Hip Flx  Hip Ext   Hip Abd  Hip Add Quad   Hamstrg   TA       EHL      GS                              R        5/5        5/5        5/5             5/5        5/5        5/5        4/5     5/5      5/5                              L         5/5        5/5        5/5             5/5        5/5        5/5        4/5     5/5      5/5                                                       [ x] Vascular :             Tension Signs: neg          Long Tract Findings: neg    RADIOLOGY & ADDITIONAL STUDIES:                                               LABS:                        8.4    7.9   )-----------( 335      ( 08 Nov 2017 06:54 )....................improved             27.4 ................    11-08    142  |  106  |  18  ----------------------------<  133<H>  3.7   |  28  |  0.88    Ca    8.6      08 Nov 2017 06:54          Blood Culture: neg  Wound Culture: neg    A/P :  82y Male   s/p:   Evacuation hematoma/rev lami       ..............doing much better    -    Pain control  -    DVT ppx: SCDs    -    Abx:  continues  -    Review labs as indicated     -    Physical Therapy.....OOB walking  -    Weight bearing status: full  -    Dispo: Home [ ]     Rehab [x ]

## 2017-11-08 NOTE — PROGRESS NOTE ADULT - ASSESSMENT
82 year old male with PMH of recent lumbar laminectomy on 10/27 presents with AMS from [facility] facility likely secondary to Encephalopathy .   Course complicated by coffee ground emesis, serosanguinous fluid drainage from surgical incision with increasing leukocytosis suggestive of spinal abscess, and hypoxia. Transferred to ICU for closer monitoring. S/P I&D POD#2, consistent with hematoma, abscess ruled out, on antibiotics for aspiration pneumonia

## 2017-11-08 NOTE — PROGRESS NOTE ADULT - SUBJECTIVE AND OBJECTIVE BOX
Neurology follow up note    JONATHAN SUMMERS82yMale      Interval History:    Patient feels ok no new complaints.    MEDICATIONS    acetaminophen   Tablet 650 milliGRAM(s) Oral every 6 hours PRN  ascorbic acid 500 milliGRAM(s) Oral two times a day  BACItracin 50,000 Unit(s) Injectable (Rx Quick Charge) 297644 Unit(s) IntraMuscular   benzocaine 15 mG/menthol 3.6 mG Lozenge 1 Lozenge Oral every 4 hours PRN  calcium carbonate 1250 mG + Vitamin D (OsCal 500 + D) 1 Tablet(s) Oral three times a day  cyclobenzaprine 10 milliGRAM(s) Oral every 8 hours  dextrose 5%. 1000 milliLiter(s) IV Continuous <Continuous>  dextrose 50% Injectable 12.5 Gram(s) IV Push once  dextrose 50% Injectable 25 Gram(s) IV Push once  dextrose 50% Injectable 25 Gram(s) IV Push once  dextrose Gel 1 Dose(s) Oral once PRN  diphenhydrAMINE   Capsule 25 milliGRAM(s) Oral every 4 hours PRN  docusate sodium 100 milliGRAM(s) Oral three times a day  folic acid 1 milliGRAM(s) Oral daily  glucagon  Injectable 1 milliGRAM(s) IntraMuscular once PRN  insulin lispro (HumaLOG) corrective regimen sliding scale   SubCutaneous three times a day before meals  insulin lispro (HumaLOG) corrective regimen sliding scale   SubCutaneous at bedtime  levoFLOXacin  Tablet 500 milliGRAM(s) Oral every 24 hours  levothyroxine 75 MICROGram(s) Oral daily  magnesium hydroxide Suspension 30 milliLiter(s) Oral every 12 hours PRN  multivitamin 1 Tablet(s) Oral daily  ondansetron Injectable 4 milliGRAM(s) IV Push once PRN  oxyCODONE    IR 5 milliGRAM(s) Oral every 4 hours PRN  oxyCODONE    IR 10 milliGRAM(s) Oral every 4 hours PRN  pantoprazole    Tablet 40 milliGRAM(s) Oral before breakfast  senna 2 Tablet(s) Oral at bedtime  tamsulosin 0.4 milliGRAM(s) Oral at bedtime  verapamil  milliGRAM(s) Oral daily      Allergies    penicillin (Hives)    Intolerances            Vital Signs Last 24 Hrs  T(C): 36.8 (08 Nov 2017 07:23), Max: 37.1 (07 Nov 2017 16:25)  T(F): 98.3 (08 Nov 2017 07:23), Max: 98.7 (07 Nov 2017 16:25)  HR: 78 (08 Nov 2017 07:23) (78 - 86)  BP: 149/77 (08 Nov 2017 07:23) (135/71 - 151/78)  BP(mean): --  RR: 16 (08 Nov 2017 07:23) (16 - 16)  SpO2: 95% (08 Nov 2017 07:23) (94% - 97%)        REVIEW OF SYSTEMS: no headaches feels ok   no significant back pain   does feel sad today       On Neurological Examination:    Mental Status - Patient is alert, awake, oriented   loc hospital .   oct   2017      Follow simple commands    Speech -   Fluent                    Cranial Nerves - Pupils 3 mm equal and reactive to light,   extraocular eye movements intact.   smile symmetric  intact bilateral NLF    Motor Exam -   Right upper 4/5  Left upper 4/5  Right lower 2/5  Left lower 2/5    Muscle tone - is normal all over.  No asymmetry is seen.      Sensory    Bilateral intact to light touch    GENERAL Exam: Nontoxic , No Acute Distress   	  HEENT:  normocephalic, atraumatic  		  LUNGS: Decreased bilaterally  	  HEART: Normal S1S2   No murmur RRR        	  GI/ ABDOMEN:  Soft  Non tender    EXTREMITIES:   No Edema  No Clubbing  No Cyanosis No Edema    MUSCULOSKELETAL: Normal Range of Motion  	   SKIN: Normal  No Ecchymosis              LABS:  CBC Full  -  ( 08 Nov 2017 06:54 )  WBC Count : 7.9 K/uL  Hemoglobin : 8.4 g/dL  Hematocrit : 27.4 %  Platelet Count - Automated : 335 K/uL  Mean Cell Volume : 95.2 fl  Mean Cell Hemoglobin : 29.3 pg  Mean Cell Hemoglobin Concentration : 30.8 gm/dL  Auto Neutrophil # : 5.7 K/uL  Auto Lymphocyte # : 1.2 K/uL  Auto Monocyte # : 0.5 K/uL  Auto Eosinophil # : 0.3 K/uL  Auto Basophil # : 0.1 K/uL  Auto Neutrophil % : 72.9 %  Auto Lymphocyte % : 15.8 %  Auto Monocyte % : 7.0 %  Auto Eosinophil % : 3.5 %  Auto Basophil % : 0.8 %      11-08    142  |  106  |  18  ----------------------------<  133<H>  3.7   |  28  |  0.88    Ca    8.6      08 Nov 2017 06:54      Hemoglobin A1C:       Vitamin B12         RADIOLOGY    ANALYSIS AND PLAN:  An 82-year-old with episodes of changes in mental status.    1.	For episodes of changes in mental status better h/o PNA awaiting lumbar cultures  ID follow up antibotics as needed    2.	Overall at present  more responsive   3.	neurology wise stable   4.	monitor psych status today doing better   Spoke with family members.    Spoke with one relative, Clive telephone number is [phone] 11/7/17.  , Rosanna, her home number is [phone].  Her cell number [phone].  11/3/17   Thank you for the courtesy of consultation.    30 minutes spent on total encounter; more than 50% of the visit was spent counseling and/or coordinating care by the attending physic

## 2017-11-08 NOTE — PROGRESS NOTE ADULT - SUBJECTIVE AND OBJECTIVE BOX
HPI:  82 year old male with PMHx of CAD, Crohn's disease, T2DM, HTN, HLD, and hypothyroidism [facility]. Pt was in his rehab facility when the staff noticed that he was making nonsensical statements. Pt was able to answer questions at rehab but would make odd remarks, which prompted them to send him to the ED. He did not have any fevers, cough, nausea, vomiting, or diarrhea at the rehab facility. At baseline, pt is AAOx3 as per rehab staff. Pt was recently discharged from [facility] on 10/27/17 s/p laminectomy. His postoperative course was complicated by blood loss requiring transfusion as well as an URI requiring antibiotics and steroids. He was discharged on doxycycline and a steroid taper. At present, pt is difficult to arouse and constantly drifts into sleep. He responds to his name when called and follows instructions. However he does not answer any questions and the answers he gives are nonsensical.     In ED, VS were T-97.3 HR-69 BP-146/71 RR-16 SpO2-94. Labs were significant for WBC-18 Hb-10.8 Cr-1.4 Lactate-0.8. Urinalysis was unremarkable. CT Head showed no acute intracranial pathology. Preliminary CXR showerd right lower lobe infiltrate  In ED, pt recieved Levaquin 500mg x1. (01 Nov 2017 04:02)    SUBJECTIVE:  Patient is a 82y old  Male who presents with a chief complaint of Spinal Compression w/ Neuro Defecits/AMS (05 Nov 2017 11:11)    Seen and evaluated.  Alert and oriented.  Comfortable on RA.  Denies CP, palpitations, or SOB.  Denies discomfort on ambulation, however, with back pain at times.    OBJECTIVE:  Review Of Systems:  Constitutional: [ ] Fever [ ] Chills [ ] Fatigue [ ] Weight change   HEENT: [ ] Blurred vision [ ] Eye Pain [ ] Headache [ ] Runny nose [ ] Sore Throat   Respiratory: [ ] Cough [ ] Wheezing [ ] Shortness of breath  Cardiovascular: [ ] Chest Pain [ ] Palpitations [ ] SAAVEDRA [ ] PND [ ] Orthopnea  Gastrointestinal: [ ] Abdominal Pain [ ] Diarrhea [ ] Constipation [ ] Hemorrhoids [ ] Nausea [ ] Vomiting  Genitourinary: [ ] Nocturia [ ] Dysuria [ ] Incontinence  Extremities: [ ] Swelling [ ] Joint Pain  Neurologic: [ ] Focal deficit [ ] Paresthesias [ ] Syncope  Lymphatic: [ ] Swelling [ ] Lymphadenopathy   Skin: [ ] Rash [ ] Ecchymoses [ ] Wounds [ ] Lesions  Psychiatry: [ ] Depression [ ] Suicidal/Homicidal Ideation [ ] Anxiety [ ] Sleep Disturbances  [x ] 10 point review of systems is otherwise negative except as mentioned above            [ ]Unable to obtain    Allergy:  Allergies    penicillin (Hives)    Intolerances    Medications:  MEDICATIONS  (STANDING):  ascorbic acid 500 milliGRAM(s) Oral two times a day  BACItracin 50,000 Unit(s) Injectable (Rx Quick Charge) 932692 Unit(s) IntraMuscular   calcium carbonate 1250 mG + Vitamin D (OsCal 500 + D) 1 Tablet(s) Oral three times a day  cyclobenzaprine 10 milliGRAM(s) Oral every 8 hours  dextrose 5%. 1000 milliLiter(s) (50 mL/Hr) IV Continuous <Continuous>  dextrose 50% Injectable 12.5 Gram(s) IV Push once  dextrose 50% Injectable 25 Gram(s) IV Push once  dextrose 50% Injectable 25 Gram(s) IV Push once  docusate sodium 100 milliGRAM(s) Oral three times a day  folic acid 1 milliGRAM(s) Oral daily  insulin lispro (HumaLOG) corrective regimen sliding scale   SubCutaneous three times a day before meals  insulin lispro (HumaLOG) corrective regimen sliding scale   SubCutaneous at bedtime  levoFLOXacin  Tablet 500 milliGRAM(s) Oral every 24 hours  levothyroxine 75 MICROGram(s) Oral daily  multivitamin 1 Tablet(s) Oral daily  pantoprazole    Tablet 40 milliGRAM(s) Oral before breakfast  senna 2 Tablet(s) Oral at bedtime  tamsulosin 0.4 milliGRAM(s) Oral at bedtime  verapamil  milliGRAM(s) Oral daily    MEDICATIONS  (PRN):  acetaminophen   Tablet 650 milliGRAM(s) Oral every 6 hours PRN For Temp greater than 38 C (100.4 F)  benzocaine 15 mG/menthol 3.6 mG Lozenge 1 Lozenge Oral every 4 hours PRN Sore Throat  dextrose Gel 1 Dose(s) Oral once PRN Blood Glucose LESS THAN 70 milliGRAM(s)/deciliter  diphenhydrAMINE   Capsule 25 milliGRAM(s) Oral every 4 hours PRN Insomnia  glucagon  Injectable 1 milliGRAM(s) IntraMuscular once PRN Glucose LESS THAN 70 milligrams/deciliter  magnesium hydroxide Suspension 30 milliLiter(s) Oral every 12 hours PRN Constipation  ondansetron Injectable 4 milliGRAM(s) IV Push once PRN Nausea and/or Vomiting  oxyCODONE    IR 5 milliGRAM(s) Oral every 4 hours PRN Mild Pain (1 - 3)  oxyCODONE    IR 10 milliGRAM(s) Oral every 4 hours PRN Moderate Pain (4 - 6)    PMH/PSH/FH/SH: [ ] Unchanged    Vitals:  T(C): 36.8 (11-08-17 @ 07:23), Max: 37.1 (11-07-17 @ 16:25)  HR: 78 (11-08-17 @ 07:23) (78 - 86)  BP: 149/77 (11-08-17 @ 07:23) (135/71 - 151/78)  BP(mean): --  RR: 16 (11-08-17 @ 07:23) (16 - 16)  SpO2: 95% (11-08-17 @ 07:23) (94% - 97%)  Wt(kg): --  Daily     Daily   I&O's Summary    07 Nov 2017 07:01  -  08 Nov 2017 07:00  --------------------------------------------------------  IN: 0 mL / OUT: 1205 mL / NET: -1205 mL    Labs:                        8.4    7.9   )-----------( 335      ( 08 Nov 2017 06:54 )             27.4     11-08    142  |  106  |  18  ----------------------------<  133<H>  3.7   |  28  |  0.88    Ca    8.6      08 Nov 2017 06:54    ECG:  < from: 12 Lead ECG (11.03.17 @ 07:56) >    Ventricular Rate 78 BPM    Atrial Rate 78 BPM    P-R Interval 168 ms    QRS Duration 160 ms    Q-T Interval 432 ms    QTC Calculation(Bezet) 492 ms    P Axis 53 degrees    R Axis -75 degrees    T Axis 47 degrees    Diagnosis Line Normal sinus rhythm  Left axis deviation  Right bundle branch block  Abnormal ECG    Confirmed by Russell Riojas MD ([id number]) on 11/3/2017 10:05:20 AM    < end of copied text >    Echo:  < from: CT Chest No Cont (11.03.17 @ 13:06) >    EXAM:  CT CHEST                          PROCEDURE DATE:  11/03/2017      INTERPRETATION:  History: Hypoxia. Rule out small bowel obstruction    CT chest abdomen and pelvis. Oral contrast only.    Bibasilar dependent atelectasis and airspace infiltrates with air   bronchograms suggestive of aspiration pneumonia in the appropriate   clinical setting. Small layering bilateral pleural effusions right larger   than left.  Nasogastric tube in position. Central airways patent. Normal caliber   thoracic aorta. No mediastinal adenopathy.  Borderline to mild cardiomegaly with coronary artery calcification.  Calcified gallstones. No biliary dilatation. Unenhanced liver spleen not   remarkable. Atrophic pancreas.  No hydronephrosis or urolithiasis. Left renal cortical hypodensity cannot   be definitively characterize probable cyst. Ultrasound can be obtained   for confirmation.  No suspicious retroperitoneal mesenteric adenopathy.  No actively inflamed or obstructed bowel. Sigmoid diverticula, no   diverticulitis. Lipomatosis ileocecal valve.  No extraluminal fluid or gas.  The prostate not enlarged. Prostate radiation seeds noted.  Markedly distended urinary bladder.  Status post lower lumbar laminectomy and fusion. Fluid collection noted   at operative site, seen to better advantage on lumbar MR of 11/2/2017.    Impression:    Bibasilar airspace infiltrates consistent with aspiration pneumonia in   appropriate clinical setting. Small bilateral pleural effusions.  No acute obstructive or inflammatory pathology in the abdomen and pelvis.  Findings as discussed    HELLEN MÁRQUEZ M.D., ATTENDING RADIOLOGIST  This document has been electronically signed. Nov  3 2017  1:17PM      < end of copied text >    Stress Testing:     Cath:    Imaging:    Interpretation of Telemetry: Not on tele    Physical Exam:  Appearance: [x] Normal  [ ] abnormal [x ] NAD   Eyes: [ x] PERRL [ ] EOMI  HENT: [ x] Normal [ ] Abnormal oral muscosa [ ]NC/AT  Cardiovascular: [x ] S1 [ ] S2 [ ] RRR [ ] m/r/g [ ]edema [ ] JVP  Procedural Access Site: [ ]  hematoma [ ] tender to palpation [ ] 2+ pulse [ ] bruit [ ] Ecchymosis  Respiratory: [x ] Clear to auscultation bilaterally  Gastrointestinal: [x ] Soft [ ] tenderness[ ] distension [ ] BS  Musculoskeletal: [ ] clubbing [ ] joint deformity   Neurologic: [x ] Non-focal  Lymphatic: [ ] lymphadenopathy  Psychiatry: [x ] AAOx3  [ ] confused [ ] disoriented [ ] Mood & affect appropriate  Skin: [ ]  rashes [ ] ecchymoses [ ] cyanosis

## 2017-11-08 NOTE — PROGRESS NOTE ADULT - SUBJECTIVE AND OBJECTIVE BOX
KRISTOPHER JONATHAN is a yMale , patient examined and chart reviewed. Patient seen and examined today being followed for possible aspiration pneumonia and drainage of post op hematoma      INTERVAL HPI/ OVERNIGHT EVENTS:  Events noted, has Stroud placed for urinary retention He is constipated . seen by urology .    PAST MEDICAL & SURGICAL HISTORY:  Prostate CA  Crohn disease  Hypothyroidism  Depression  Intervertebral disc disorder  Diabetes mellitus  Obesity  Hypercholesterolemia  HTN (hypertension)  Stented coronary artery: 2 stents, 20 yrs ago, 15 yrs ago  CAD (coronary artery disease)  Macular degeneration: right eye  Hearing loss: left hearing aid, right ear deaf  S/P colon resection  S/P lumbar fusion  History of hip replacement, total, right: Revision  History of hip replacement, total, right      For details regarding the patient's social history, family history, and other miscellaneous elements, please refer the initial infectious diseases consultation and/or the admitting history and physical examination for this admission.    ROS:  CONSTITUTIONAL:  Negative fever or chills, feels well, good appetite  EYES:  Negative  blurry vision or double vision  CARDIOVASCULAR:  Negative for chest pain or palpitations  RESPIRATORY:  Negative for cough, wheezing, or SOB   GASTROINTESTINAL:  Negative for nausea, vomiting, diarrhea, constipation, or abdominal pain  GENITOURINARY:  Negative frequency, urgency or dysuria  NEUROLOGIC:  No headache, confusion, dizziness, lightheadedness  All other systems were reviewed and are negative     Allergies :  penicillin (Hives)      Current inpatient medications :    ANTIBIOTICS/RELEVANT:  BACItracin 50,000 Unit(s) Injectable (Rx Quick Charge) 973198 Unit(s) IntraMuscular   levoFLOXacin  Tablet 500 milliGRAM(s) Oral every 24 hours      acetaminophen   Tablet 650 milliGRAM(s) Oral every 6 hours PRN  ascorbic acid 500 milliGRAM(s) Oral two times a day  benzocaine 15 mG/menthol 3.6 mG Lozenge 1 Lozenge Oral every 4 hours PRN  calcium carbonate 1250 mG + Vitamin D (OsCal 500 + D) 1 Tablet(s) Oral three times a day  cyclobenzaprine 10 milliGRAM(s) Oral every 8 hours  dextrose 5%. 1000 milliLiter(s) IV Continuous <Continuous>  dextrose 50% Injectable 12.5 Gram(s) IV Push once  dextrose 50% Injectable 25 Gram(s) IV Push once  dextrose 50% Injectable 25 Gram(s) IV Push once  dextrose Gel 1 Dose(s) Oral once PRN  diphenhydrAMINE   Capsule 25 milliGRAM(s) Oral every 4 hours PRN  docusate sodium 100 milliGRAM(s) Oral three times a day  folic acid 1 milliGRAM(s) Oral daily  glucagon  Injectable 1 milliGRAM(s) IntraMuscular once PRN  insulin lispro (HumaLOG) corrective regimen sliding scale   SubCutaneous three times a day before meals  insulin lispro (HumaLOG) corrective regimen sliding scale   SubCutaneous at bedtime  levothyroxine 75 MICROGram(s) Oral daily  magnesium hydroxide Suspension 30 milliLiter(s) Oral every 12 hours PRN  multivitamin 1 Tablet(s) Oral daily  ondansetron Injectable 4 milliGRAM(s) IV Push once PRN  oxyCODONE    IR 5 milliGRAM(s) Oral every 4 hours PRN  oxyCODONE    IR 10 milliGRAM(s) Oral every 4 hours PRN  pantoprazole    Tablet 40 milliGRAM(s) Oral before breakfast  polyethylene glycol 3350 17 Gram(s) Oral daily  senna 2 Tablet(s) Oral at bedtime  tamsulosin 0.4 milliGRAM(s) Oral at bedtime  verapamil  milliGRAM(s) Oral daily      Objective:    11-07 @ 07:01  -  11-08 @ 07:00  --------------------------------------------------------  IN: 0 mL / OUT: 1205 mL / NET: -1205 mL      T(C): 36.8 (11-08-17 @ 07:23), Max: 37.1 (11-07-17 @ 16:25)  HR: 78 (11-08-17 @ 07:23) (78 - 86)  BP: 149/77 (11-08-17 @ 07:23) (135/71 - 151/78)  RR: 16 (11-08-17 @ 07:23) (16 - 16)  SpO2: 95% (11-08-17 @ 07:23) (94% - 97%)  Wt(kg): --      Physical Exam:  General: well developed well nourished, in no acute distress  Eyes: sclera anicteric, pupils equal and reactive to light  ENMT: buccal mucosa moist, pharynx not injected  Neck: supple, trachea midline  Lungs: clear, no wheeze/rhonchi  Cardiovascular: regular rate and rhythm, S1 S2  Abdomen: soft, nontender, no organomegaly present, bowel sounds normal  Neurological: alert and oriented x3, Cranial Nerves II-XII grossly intact  Skin: no increased ecchymosis/petechiae/purpura  Lymph Nodes: no palpable cervical/supraclavicular lymph nodes enlargements  Extremities: no cyanosis/clubbing/edema      LABS:                          8.4    7.9   )-----------( 335      ( 08 Nov 2017 06:54 )             27.4       11-08    142  |  106  |  18  ----------------------------<  133<H>  3.7   |  28  |  0.88    Ca    8.6      08 Nov 2017 06:54      Assessment :      82 yr old PMHx CAD, Crohn's dz, DM2, HTN, HLD, s/p coronary stents '02/1997, Hypothyroid, OA recent hospitalization at [facility] on 10/19/17 , underwent lumbar laminectomy L2 - L5 , his post op course complicated by acute blood loss  and was transfused  he was sent ot [facility] on 10/27/17 on PO Doxycycline and taper steroids ,    he is now admitted with AMS, sent to ER from [facility] , noted ot have leukocytosis not associated with fever . He was given sedation due ot agitation and also for MRI, became hypoxic so transferred to MICU . MRI of lumbar spine with contrast shows collection in the post op bed ? hematoma or infection.     Sepsis work up from admission is negative . To note he was on PO steroids on dc from hospital to rehab , his wbc on day of dc was 15.7     He has I & D  post op epidural  hematoma , no sign of infection , all OR cs are negative      Possibility of aspiration pneumonia should be considered , clinically stable       Plan :   - will change to Po Levaquin  x 4 days   - avoid sedatives and narcotics  - aspiration precautions    - increase activity   - Dc Planning   - laxatives and bowel regime   - TOV when ok with urology     Continue with present regime .  Appropriate use of antibiotics and adverse effects reviewed.     I have discussed the above plan of care with patient in detail. He expressed understanding of the  treatment plan . Risks, benefits and alternatives discussed in detail. I have asked if he has any questions or concerns and appropriately addressed them to the best of my ability .    > 15 minutes were spent in direct patient care reviewing notes, medications ,labs data/ imaging , discussion with multidisciplinary team.    Thank you for allowing me to participate in care of your patient .    Elio Rowell MD  [phone]

## 2017-11-08 NOTE — PROGRESS NOTE ADULT - ASSESSMENT
82 year old male unable to provide hx. History obtained from his medical record.  He has a h/o CAD s/p remote PCI, Crohn's disease, T2DM, HTN, HLD, and hypothyroidism.  Has a known RBBB.  Is recently s/p laminectomy, course complicated by blood loss and hypotension.   No acute cv process was felt to be present at that time.  He did not have followup evaluation after that.   Eventually discharged to rehab, and is now sent from rehab to the hospital  for an altered ms.  Was found to have a leukocytosis, and spine imaging suspicious for spinal abscess vs hematoma.        - POD #4 laminectomy and evacuation of hematoma  - tolerated procedure with no evidence of cardiac complications  - Ambulating well with PT  - No evidence of bradycardia or tachycardia per flow sheet  - Continue verapamil 180 QD  - remote pci, not presently on antiplatelet agent, which cannot be started at this time in the context of spinal surgery  - Please resume ASA 81mg Qday for ACS once cleared by ortho (patient s/p PCI).  Per Neuro, Dr. Murphy, patient can start with ASA.   - there is no evidence of significant arrhythmia, volume overload, or active ischemia  - follow i/o  - monitor electrolytes, keep k>4, Mg>2  - For discharge today.  No contraindication for discharge from cardiac standpoint, however, decision as to be made as to timing of ASA resumption    Bhumika Bowen NP  Cardiology 82 year old male unable to provide hx. History obtained from his medical record.  He has a h/o CAD s/p remote PCI, Crohn's disease, T2DM, HTN, HLD, and hypothyroidism.  Has a known RBBB.  Is recently s/p laminectomy, course complicated by blood loss and hypotension.   No acute cv process was felt to be present at that time.  He did not have followup evaluation after that.   Eventually discharged to rehab, and is now sent from rehab to the hospital  for an altered ms.  Was found to have a leukocytosis, and spine imaging suspicious for spinal abscess vs hematoma.        - POD #4 laminectomy and evacuation of hematoma  - tolerated procedure with no evidence of cardiac complications  - Ambulating well with PT  - No evidence of bradycardia or tachycardia per flow sheet  - Continue verapamil 180 QD  - remote pci, not presently on antiplatelet agent, which cannot be started at this time in the context of spinal surgery  - Please resume ASA 81mg Qday for ACS once cleared by ortho (patient s/p PCI).  Per Neuro, Dr. Murphy, patient can start with ASA.   - there is no evidence of significant arrhythmia, volume overload, or active ischemia  - follow i/o  - monitor electrolytes, keep k>4, Mg>2  - For discharge today.  No contraindication for discharge from cardiac standpoint  - a decision will need to be made by ortho/spine as to timing of ASA resumption    Bhumika Bowen NP  Cardiology

## 2017-11-08 NOTE — PROGRESS NOTE ADULT - NSHPATTENDINGPLANDISCUSS_GEN_ALL_CORE
neuro, RN and GI
Dr Hassan
pt, nursing
pt, nursing, dr denson, resident physician
neuro, Pul, RN and family
neuro, RN and GI
ICU
Social Work, RN.
Ortho, GI , RN
Ortho, ICU, Patient
Patient, ID

## 2017-11-08 NOTE — PROGRESS NOTE ADULT - ATTENDING COMMENTS
DC planning to [facility] DC planning to [facility] once has BM. D/W GI Abdominal X ray ordered and added Miralax. Also d/w Ortho resident , will remove Hemovac prior to discharge. DC planning to [facility] once has BM. D/W GI Abdominal X ray ordered and added Miralax. Also d/w Ortho resident , will remove Hemovac prior to discharge. GI F/U

## 2017-11-08 NOTE — PROGRESS NOTE ADULT - ASSESSMENT
Awake, alert, ambulating with walker.   Caballero draining well, still on tamsulosin.  Consider TOV as ambulating improves.   Note that the patient cursed at Dr. Broderick during a discussion about removing his caballero; this occurred in front of witnesses.

## 2017-11-08 NOTE — PROGRESS NOTE ADULT - SUBJECTIVE AND OBJECTIVE BOX
INTERVAL HPI/OVERNIGHT EVENTS:    denies abd pain, n/v  tolerating diet  pt states he had a BM however none reported by housestaff      HPI:  82 year old male with PMHx of CAD, Crohn's disease, T2DM, HTN, HLD, and hypothyroidism [facility]. Pt was in his rehab facility when the staff noticed that he was making nonsensical statements. Pt was able to answer questions at rehab but would make odd remarks, which prompted them to send him to the ED. He did not have any fevers, cough, nausea, vomiting, or diarrhea at the rehab facility. At baseline, pt is AAOx3 as per rehab staff. Pt was recently discharged from [facility] on 10/27/17 s/p laminectomy. His postoperative course was complicated by blood loss requiring transfusion as well as an URI requiring antibiotics and steroids. He was discharged on doxycycline and a steroid taper. At present, pt is difficult to arouse and constantly drifts into sleep. He responds to his name when called and follows instructions. However he does not answer any questions and the answers he gives are nonsensical.     In ED, VS were T-97.3 HR-69 BP-146/71 RR-16 SpO2-94. Labs were significant for WBC-18 Hb-10.8 Cr-1.4 Lactate-0.8. Urinalysis was unremarkable. CT Head showed no acute intracranial pathology. Preliminary CXR showerd right lower lobe infiltrate  In ED, pt recieved Levaquin 500mg x1. (01 Nov 2017 04:02)    MEDICATIONS  (STANDING):  ascorbic acid 500 milliGRAM(s) Oral two times a day  BACItracin 50,000 Unit(s) Injectable (Rx Quick Charge) [id number] Unit(s) IntraMuscular   calcium carbonate 1250 mG + Vitamin D (OsCal 500 + D) 1 Tablet(s) Oral three times a day  cyclobenzaprine 10 milliGRAM(s) Oral every 8 hours  dextrose 5%. 1000 milliLiter(s) (50 mL/Hr) IV Continuous <Continuous>  dextrose 50% Injectable 12.5 Gram(s) IV Push once  dextrose 50% Injectable 25 Gram(s) IV Push once  dextrose 50% Injectable 25 Gram(s) IV Push once  docusate sodium 100 milliGRAM(s) Oral three times a day  folic acid 1 milliGRAM(s) Oral daily  insulin lispro (HumaLOG) corrective regimen sliding scale   SubCutaneous three times a day before meals  insulin lispro (HumaLOG) corrective regimen sliding scale   SubCutaneous at bedtime  levoFLOXacin  Tablet 500 milliGRAM(s) Oral every 24 hours  levothyroxine 75 MICROGram(s) Oral daily  multivitamin 1 Tablet(s) Oral daily  pantoprazole    Tablet 40 milliGRAM(s) Oral before breakfast  polyethylene glycol 3350 17 Gram(s) Oral daily  senna 2 Tablet(s) Oral at bedtime  tamsulosin 0.4 milliGRAM(s) Oral at bedtime  verapamil  milliGRAM(s) Oral daily    MEDICATIONS  (PRN):  acetaminophen   Tablet 650 milliGRAM(s) Oral every 6 hours PRN For Temp greater than 38 C (100.4 F)  benzocaine 15 mG/menthol 3.6 mG Lozenge 1 Lozenge Oral every 4 hours PRN Sore Throat  dextrose Gel 1 Dose(s) Oral once PRN Blood Glucose LESS THAN 70 milliGRAM(s)/deciliter  diphenhydrAMINE   Capsule 25 milliGRAM(s) Oral every 4 hours PRN Insomnia  glucagon  Injectable 1 milliGRAM(s) IntraMuscular once PRN Glucose LESS THAN 70 milligrams/deciliter  magnesium hydroxide Suspension 30 milliLiter(s) Oral every 12 hours PRN Constipation  ondansetron Injectable 4 milliGRAM(s) IV Push once PRN Nausea and/or Vomiting  oxyCODONE    IR 5 milliGRAM(s) Oral every 4 hours PRN Mild Pain (1 - 3)  oxyCODONE    IR 10 milliGRAM(s) Oral every 4 hours PRN Moderate Pain (4 - 6)      Allergies    penicillin (Hives)    Intolerances          General:  No wt loss, fevers, chills, night sweats, fatigue,   Eyes:  Good vision, no reported pain  ENT:  No sore throat, pain, runny nose, dysphagia  CV:  No pain, palpitations, hypo/hypertension  Resp:  No dyspnea, cough, tachypnea, wheezing  GI:  No pain, No nausea, No vomiting, No diarrhea, No constipation, No weight loss, No fever, No pruritis, No rectal bleeding, No tarry stools, No dysphagia,  :  No pain, bleeding, incontinence, nocturia  Muscle:  No pain, weakness  Neuro:  No weakness, tingling, memory problems  Psych:  No fatigue, insomnia, mood problems, depression  Endocrine:  No polyuria, polydipsia, cold/heat intolerance  Heme:  No petechiae, ecchymosis, easy bruisability  Skin:  No rash, tattoos, scars, edema      PHYSICAL EXAM:   Vital Signs:  Vital Signs Last 24 Hrs  T(C): 36.8 (08 Nov 2017 07:23), Max: 37.1 (07 Nov 2017 16:25)  T(F): 98.3 (08 Nov 2017 07:23), Max: 98.7 (07 Nov 2017 16:25)  HR: 78 (08 Nov 2017 07:23) (78 - 86)  BP: 149/77 (08 Nov 2017 07:23) (135/71 - 151/78)  BP(mean): --  RR: 16 (08 Nov 2017 07:23) (16 - 16)  SpO2: 95% (08 Nov 2017 07:23) (94% - 97%)  Daily     Daily I&O's Summary    07 Nov 2017 07:01  -  08 Nov 2017 07:00  --------------------------------------------------------  IN: 0 mL / OUT: 1205 mL / NET: -1205 mL    08 Nov 2017 07:01  -  08 Nov 2017 14:13  --------------------------------------------------------  IN: 0 mL / OUT: 250 mL / NET: -250 mL        GENERAL:  Appears stated age, well-groomed, well-nourished, no distress  HEENT:  NC/AT,  conjunctivae clear and pink, no thyromegaly, nodules, adenopathy, no JVD, sclera -anicteric  CHEST:  Full & symmetric excursion, no increased effort, breath sounds clear  HEART:  Regular rhythm, S1, S2, no murmur/rub/S3/S4, no abdominal bruit, no edema  ABDOMEN:  Soft, non-tender, mildly distended, normoactive bowel sounds,  no masses ,no hepato-splenomegaly, no signs of chronic liver disease  EXTEREMITIES:  no cyanosis,clubbing or edema  SKIN:  No rash/erythema/ecchymoses/petechiae/wounds/abscess/warm/dry  NEURO:  Alert, oriented, no asterixis, no tremor, no encephalopathy      LABS:                        8.4    7.9   )-----------( 335      ( 08 Nov 2017 06:54 )             27.4     11-08    142  |  106  |  18  ----------------------------<  133<H>  3.7   |  28  |  0.88    Ca    8.6      08 Nov 2017 06:54          amylase   lipase  RADIOLOGY & ADDITIONAL TESTS:

## 2017-11-08 NOTE — PROGRESS NOTE ADULT - PROBLEM SELECTOR PLAN 5
Monitor cbc  No surgical intervention indicated   GI following, NGT removed, on diet. accuchecks  hypoglycemia protocol  LDISS.

## 2017-11-08 NOTE — PROGRESS NOTE ADULT - ASSESSMENT
81 yo M S/P I & D lumbar wound POD 4  OR cx NGTD- continue to follow   Analgesia  Exam improving but LLE still limited, patient states that is always weak leg  SCD's  WBAT   PT today, OOBTC  Continue to Monitor HMV output - will pull today   Dispo Planning- [facility] today 83 yo M S/P I & D lumbar wound POD 4  OR cx NGTD- continue to follow   Can restart Aspirin 11/11/17  Analgesia  Exam improving but LLE still limited, patient states that is always weak leg  SCD's  WBAT   PT today, OOBTC  Continue to Monitor HMV output - will pull today   Dispo Planning- [facility] today

## 2017-11-08 NOTE — PROGRESS NOTE ADULT - PROBLEM SELECTOR PLAN 2
s/p laminectomy on 10/25 and complicated by blood loss anemia.  MRI of spine c/w spinal hematoma likely, s/p evacuation, Hemovac in place.   -Continue neuro checks  - PT/ OOB as tolerated

## 2017-11-08 NOTE — PROGRESS NOTE ADULT - SUBJECTIVE AND OBJECTIVE BOX
Patient seen and examined. Pain controlled. No acute events.    Vital Signs Last 24 Hrs  T(C): 36.7 (08 Nov 2017 00:35), Max: 37.1 (07 Nov 2017 16:25)  T(F): 98.1 (08 Nov 2017 00:35), Max: 98.7 (07 Nov 2017 16:25)  HR: 83 (08 Nov 2017 00:35) (78 - 86)  BP: 135/71 (08 Nov 2017 00:35) (135/71 - 151/78)  RR: 16 (08 Nov 2017 00:35) (16 - 16)  SpO2: 95% (08 Nov 2017 00:35) (94% - 95%)    Physical Exam    Gen: NAD    Spine:   Dressing c/d/i, dressing changed today, HMV in place  Moving extremities x 4, R>L, RLE 4/5, LLE 3/5 with HIp flexion and knee extension  SILT L3-S1, improved on LLE  DP +  Compartments soft  No calf ttp

## 2017-11-09 VITALS
RESPIRATION RATE: 16 BRPM | SYSTOLIC BLOOD PRESSURE: 147 MMHG | OXYGEN SATURATION: 94 % | HEART RATE: 85 BPM | TEMPERATURE: 98 F | DIASTOLIC BLOOD PRESSURE: 76 MMHG

## 2017-11-09 LAB
ANION GAP SERPL CALC-SCNC: 7 MMOL/L — SIGNIFICANT CHANGE UP (ref 5–17)
BUN SERPL-MCNC: 20 MG/DL — SIGNIFICANT CHANGE UP (ref 7–23)
CALCIUM SERPL-MCNC: 8.6 MG/DL — SIGNIFICANT CHANGE UP (ref 8.5–10.1)
CHLORIDE SERPL-SCNC: 106 MMOL/L — SIGNIFICANT CHANGE UP (ref 96–108)
CO2 SERPL-SCNC: 29 MMOL/L — SIGNIFICANT CHANGE UP (ref 22–31)
CREAT SERPL-MCNC: 1.1 MG/DL — SIGNIFICANT CHANGE UP (ref 0.5–1.3)
CULTURE RESULTS: SIGNIFICANT CHANGE UP
GLUCOSE SERPL-MCNC: 138 MG/DL — HIGH (ref 70–99)
HCT VFR BLD CALC: 27.9 % — LOW (ref 39–50)
HGB BLD-MCNC: 8.7 G/DL — LOW (ref 13–17)
MCHC RBC-ENTMCNC: 29.9 PG — SIGNIFICANT CHANGE UP (ref 27–34)
MCHC RBC-ENTMCNC: 31.2 GM/DL — LOW (ref 32–36)
MCV RBC AUTO: 95.8 FL — SIGNIFICANT CHANGE UP (ref 80–100)
PLATELET # BLD AUTO: 342 K/UL — SIGNIFICANT CHANGE UP (ref 150–400)
POTASSIUM SERPL-MCNC: 4.4 MMOL/L — SIGNIFICANT CHANGE UP (ref 3.5–5.3)
POTASSIUM SERPL-SCNC: 4.4 MMOL/L — SIGNIFICANT CHANGE UP (ref 3.5–5.3)
RBC # BLD: 2.91 M/UL — LOW (ref 4.2–5.8)
RBC # FLD: 14.5 % — SIGNIFICANT CHANGE UP (ref 10.3–14.5)
SODIUM SERPL-SCNC: 142 MMOL/L — SIGNIFICANT CHANGE UP (ref 135–145)
SPECIMEN SOURCE: SIGNIFICANT CHANGE UP
WBC # BLD: 8.1 K/UL — SIGNIFICANT CHANGE UP (ref 3.8–10.5)
WBC # FLD AUTO: 8.1 K/UL — SIGNIFICANT CHANGE UP (ref 3.8–10.5)

## 2017-11-09 PROCEDURE — 85027 COMPLETE CBC AUTOMATED: CPT

## 2017-11-09 PROCEDURE — 82607 VITAMIN B-12: CPT

## 2017-11-09 PROCEDURE — 70450 CT HEAD/BRAIN W/O DYE: CPT

## 2017-11-09 PROCEDURE — 84300 ASSAY OF URINE SODIUM: CPT

## 2017-11-09 PROCEDURE — 36600 WITHDRAWAL OF ARTERIAL BLOOD: CPT

## 2017-11-09 PROCEDURE — 87075 CULTR BACTERIA EXCEPT BLOOD: CPT

## 2017-11-09 PROCEDURE — 86900 BLOOD TYPING SEROLOGIC ABO: CPT

## 2017-11-09 PROCEDURE — 74018 RADEX ABDOMEN 1 VIEW: CPT

## 2017-11-09 PROCEDURE — 85610 PROTHROMBIN TIME: CPT

## 2017-11-09 PROCEDURE — 87086 URINE CULTURE/COLONY COUNT: CPT

## 2017-11-09 PROCEDURE — 71250 CT THORAX DX C-: CPT

## 2017-11-09 PROCEDURE — 82746 ASSAY OF FOLIC ACID SERUM: CPT

## 2017-11-09 PROCEDURE — 87633 RESP VIRUS 12-25 TARGETS: CPT

## 2017-11-09 PROCEDURE — 96375 TX/PRO/DX INJ NEW DRUG ADDON: CPT

## 2017-11-09 PROCEDURE — 80202 ASSAY OF VANCOMYCIN: CPT

## 2017-11-09 PROCEDURE — 94760 N-INVAS EAR/PLS OXIMETRY 1: CPT

## 2017-11-09 PROCEDURE — 96376 TX/PRO/DX INJ SAME DRUG ADON: CPT

## 2017-11-09 PROCEDURE — 86920 COMPATIBILITY TEST SPIN: CPT

## 2017-11-09 PROCEDURE — 84100 ASSAY OF PHOSPHORUS: CPT

## 2017-11-09 PROCEDURE — C1889: CPT

## 2017-11-09 PROCEDURE — 97110 THERAPEUTIC EXERCISES: CPT

## 2017-11-09 PROCEDURE — 82150 ASSAY OF AMYLASE: CPT

## 2017-11-09 PROCEDURE — 97530 THERAPEUTIC ACTIVITIES: CPT

## 2017-11-09 PROCEDURE — 86901 BLOOD TYPING SEROLOGIC RH(D): CPT

## 2017-11-09 PROCEDURE — 96372 THER/PROPH/DIAG INJ SC/IM: CPT | Mod: 59

## 2017-11-09 PROCEDURE — 83735 ASSAY OF MAGNESIUM: CPT

## 2017-11-09 PROCEDURE — A9579: CPT

## 2017-11-09 PROCEDURE — 87070 CULTURE OTHR SPECIMN AEROBIC: CPT

## 2017-11-09 PROCEDURE — 84480 ASSAY TRIIODOTHYRONINE (T3): CPT

## 2017-11-09 PROCEDURE — 87581 M.PNEUMON DNA AMP PROBE: CPT

## 2017-11-09 PROCEDURE — 83605 ASSAY OF LACTIC ACID: CPT

## 2017-11-09 PROCEDURE — 84145 PROCALCITONIN (PCT): CPT

## 2017-11-09 PROCEDURE — 82272 OCCULT BLD FECES 1-3 TESTS: CPT

## 2017-11-09 PROCEDURE — 82550 ASSAY OF CK (CPK): CPT

## 2017-11-09 PROCEDURE — 99232 SBSQ HOSP IP/OBS MODERATE 35: CPT

## 2017-11-09 PROCEDURE — 82803 BLOOD GASES ANY COMBINATION: CPT

## 2017-11-09 PROCEDURE — 99285 EMERGENCY DEPT VISIT HI MDM: CPT | Mod: 25

## 2017-11-09 PROCEDURE — 80076 HEPATIC FUNCTION PANEL: CPT

## 2017-11-09 PROCEDURE — 85730 THROMBOPLASTIN TIME PARTIAL: CPT

## 2017-11-09 PROCEDURE — 87040 BLOOD CULTURE FOR BACTERIA: CPT

## 2017-11-09 PROCEDURE — 80053 COMPREHEN METABOLIC PANEL: CPT

## 2017-11-09 PROCEDURE — 72110 X-RAY EXAM L-2 SPINE 4/>VWS: CPT

## 2017-11-09 PROCEDURE — 84436 ASSAY OF TOTAL THYROXINE: CPT

## 2017-11-09 PROCEDURE — 87486 CHLMYD PNEUM DNA AMP PROBE: CPT

## 2017-11-09 PROCEDURE — 86140 C-REACTIVE PROTEIN: CPT

## 2017-11-09 PROCEDURE — 71045 X-RAY EXAM CHEST 1 VIEW: CPT

## 2017-11-09 PROCEDURE — 36415 COLL VENOUS BLD VENIPUNCTURE: CPT

## 2017-11-09 PROCEDURE — 85652 RBC SED RATE AUTOMATED: CPT

## 2017-11-09 PROCEDURE — 82570 ASSAY OF URINE CREATININE: CPT

## 2017-11-09 PROCEDURE — 97116 GAIT TRAINING THERAPY: CPT

## 2017-11-09 PROCEDURE — 72158 MRI LUMBAR SPINE W/O & W/DYE: CPT

## 2017-11-09 PROCEDURE — 82962 GLUCOSE BLOOD TEST: CPT

## 2017-11-09 PROCEDURE — 80048 BASIC METABOLIC PNL TOTAL CA: CPT

## 2017-11-09 PROCEDURE — 83880 ASSAY OF NATRIURETIC PEPTIDE: CPT

## 2017-11-09 PROCEDURE — 87798 DETECT AGENT NOS DNA AMP: CPT

## 2017-11-09 PROCEDURE — 82140 ASSAY OF AMMONIA: CPT

## 2017-11-09 PROCEDURE — 83690 ASSAY OF LIPASE: CPT

## 2017-11-09 PROCEDURE — 93005 ELECTROCARDIOGRAM TRACING: CPT

## 2017-11-09 PROCEDURE — 84134 ASSAY OF PREALBUMIN: CPT

## 2017-11-09 PROCEDURE — 96365 THER/PROPH/DIAG IV INF INIT: CPT

## 2017-11-09 PROCEDURE — 99239 HOSP IP/OBS DSCHRG MGMT >30: CPT

## 2017-11-09 PROCEDURE — 74176 CT ABD & PELVIS W/O CONTRAST: CPT

## 2017-11-09 PROCEDURE — 81001 URINALYSIS AUTO W/SCOPE: CPT

## 2017-11-09 PROCEDURE — 86850 RBC ANTIBODY SCREEN: CPT

## 2017-11-09 PROCEDURE — 97162 PT EVAL MOD COMPLEX 30 MIN: CPT

## 2017-11-09 PROCEDURE — 84443 ASSAY THYROID STIM HORMONE: CPT

## 2017-11-09 RX ORDER — POLYETHYLENE GLYCOL 3350 17 G/17G
17 POWDER, FOR SOLUTION ORAL
Qty: 0 | Refills: 0 | COMMUNITY

## 2017-11-09 RX ORDER — METHYLNALTREXONE BROMIDE 12 MG/.6ML
12 INJECTION, SOLUTION SUBCUTANEOUS ONCE
Qty: 0 | Refills: 0 | Status: COMPLETED | OUTPATIENT
Start: 2017-11-09 | End: 2017-11-09

## 2017-11-09 RX ORDER — SENNA PLUS 8.6 MG/1
2 TABLET ORAL
Qty: 0 | Refills: 0 | COMMUNITY

## 2017-11-09 RX ADMIN — Medication 75 MICROGRAM(S): at 06:00

## 2017-11-09 RX ADMIN — Medication 1 TABLET(S): at 06:00

## 2017-11-09 RX ADMIN — Medication 1 TABLET(S): at 11:25

## 2017-11-09 RX ADMIN — Medication 500 MILLIGRAM(S): at 06:00

## 2017-11-09 RX ADMIN — POLYETHYLENE GLYCOL 3350 17 GRAM(S): 17 POWDER, FOR SOLUTION ORAL at 11:25

## 2017-11-09 RX ADMIN — CYCLOBENZAPRINE HYDROCHLORIDE 10 MILLIGRAM(S): 10 TABLET, FILM COATED ORAL at 06:00

## 2017-11-09 RX ADMIN — Medication 1 MILLIGRAM(S): at 11:24

## 2017-11-09 RX ADMIN — Medication 1 TABLET(S): at 13:15

## 2017-11-09 RX ADMIN — PANTOPRAZOLE SODIUM 40 MILLIGRAM(S): 20 TABLET, DELAYED RELEASE ORAL at 06:00

## 2017-11-09 RX ADMIN — Medication 180 MILLIGRAM(S): at 06:00

## 2017-11-09 RX ADMIN — METHYLNALTREXONE BROMIDE 12 MILLIGRAM(S): 12 INJECTION, SOLUTION SUBCUTANEOUS at 13:12

## 2017-11-09 RX ADMIN — Medication 1 ENEMA: at 13:19

## 2017-11-09 RX ADMIN — CYCLOBENZAPRINE HYDROCHLORIDE 10 MILLIGRAM(S): 10 TABLET, FILM COATED ORAL at 13:16

## 2017-11-09 RX ADMIN — Medication 100 MILLIGRAM(S): at 06:00

## 2017-11-09 RX ADMIN — OXYCODONE HYDROCHLORIDE 5 MILLIGRAM(S): 5 TABLET ORAL at 11:20

## 2017-11-09 RX ADMIN — OXYCODONE HYDROCHLORIDE 5 MILLIGRAM(S): 5 TABLET ORAL at 12:20

## 2017-11-09 RX ADMIN — Medication 1: at 13:09

## 2017-11-09 NOTE — PROGRESS NOTE ADULT - SUBJECTIVE AND OBJECTIVE BOX
INTERVAL HPI/OVERNIGHT EVENTS:  ambulating    MEDICATIONS  (STANDING):  ascorbic acid 500 milliGRAM(s) Oral two times a day  BACItracin 50,000 Unit(s) Injectable (Rx Quick Charge) [id number] Unit(s) IntraMuscular   calcium carbonate 1250 mG + Vitamin D (OsCal 500 + D) 1 Tablet(s) Oral three times a day  cyclobenzaprine 10 milliGRAM(s) Oral every 8 hours  dextrose 5%. 1000 milliLiter(s) (50 mL/Hr) IV Continuous <Continuous>  dextrose 50% Injectable 12.5 Gram(s) IV Push once  dextrose 50% Injectable 25 Gram(s) IV Push once  dextrose 50% Injectable 25 Gram(s) IV Push once  docusate sodium 100 milliGRAM(s) Oral three times a day  folic acid 1 milliGRAM(s) Oral daily  insulin lispro (HumaLOG) corrective regimen sliding scale   SubCutaneous three times a day before meals  insulin lispro (HumaLOG) corrective regimen sliding scale   SubCutaneous at bedtime  levoFLOXacin  Tablet 500 milliGRAM(s) Oral every 24 hours  levothyroxine 75 MICROGram(s) Oral daily  multivitamin 1 Tablet(s) Oral daily  pantoprazole    Tablet 40 milliGRAM(s) Oral before breakfast  polyethylene glycol 3350 17 Gram(s) Oral daily  senna 2 Tablet(s) Oral at bedtime  tamsulosin 0.4 milliGRAM(s) Oral at bedtime  verapamil  milliGRAM(s) Oral daily    MEDICATIONS  (PRN):  acetaminophen   Tablet 650 milliGRAM(s) Oral every 6 hours PRN For Temp greater than 38 C (100.4 F)  benzocaine 15 mG/menthol 3.6 mG Lozenge 1 Lozenge Oral every 4 hours PRN Sore Throat  bisacodyl Suppository 10 milliGRAM(s) Rectal once PRN Constipation  dextrose Gel 1 Dose(s) Oral once PRN Blood Glucose LESS THAN 70 milliGRAM(s)/deciliter  diphenhydrAMINE   Capsule 25 milliGRAM(s) Oral every 4 hours PRN Insomnia  glucagon  Injectable 1 milliGRAM(s) IntraMuscular once PRN Glucose LESS THAN 70 milligrams/deciliter  magnesium hydroxide Suspension 30 milliLiter(s) Oral every 12 hours PRN Constipation  ondansetron Injectable 4 milliGRAM(s) IV Push once PRN Nausea and/or Vomiting  oxyCODONE    IR 5 milliGRAM(s) Oral every 6 hours PRN Severe Pain (7 - 10)        Vital Signs Last 24 Hrs  T(C): 36.7 (09 Nov 2017 05:58), Max: 36.7 (08 Nov 2017 16:28)  T(F): 98.1 (09 Nov 2017 05:58), Max: 98.1 (08 Nov 2017 16:28)  HR: 79 (09 Nov 2017 05:58) (77 - 85)  BP: 128/66 (09 Nov 2017 05:58) (128/66 - 147/70)  BP(mean): --  RR: 16 (09 Nov 2017 05:58) (16 - 16)  SpO2: 94% (09 Nov 2017 05:58) (94% - 96%)    PHYSICAL EXAM:    Urine clear via caballero    LABS:                        8.4    7.9   )-----------( 335      ( 08 Nov 2017 06:54 )             27.4     11-08    142  |  106  |  18  ----------------------------<  133<H>  3.7   |  28  |  0.88    Ca    8.6      08 Nov 2017 06:54              RADIOLOGY & ADDITIONAL TESTS:

## 2017-11-09 NOTE — PROGRESS NOTE ADULT - PROBLEM SELECTOR PLAN 3
Resolved. ROSE likely secondary to prerenal as a result of GI bleed and dehydration  - Avoid nephrotoxins.
Pt has throat secretion. suction and asp precaution.  seen by Pul, will c/w levaquin   nebs PRN, chest pt, saline nasal for upper airway congestion
Pt has throat secretion. suction and asp precaution.  seen by Pul, will c/w levaquin   nebs PRN, chest pt, saline nasal for upper airway congestion
Resolved. ROSE likely secondary to prerenal as a result of GI bleed and dehydration  - Avoid nephrotoxins.
ct chest shows poss asp pna, plus atelectasis  monitor sat and resp status  cont ABX  HOB elevation  asp prec, oral hygiene, skin hygiene, support with ADL  prognosis remains guarded
poss asp pna  will increase dose of ABX, renal function and GFR reviewed  monitor clinical status  monitor wbc, labs, and vs / sat
Resolved. ROSE likely secondary to prerenal as a result of GI bleed and dehydration  - Avoid nephrotoxins.
Resolved. ROSE likely secondary to prerenal as a result of GI bleed and dehydration  -Continue fluids,  f/u with labs.  hold lisinopril and other nephrotoxic meds.
Resolved. ROSE likely secondary to prerenal as a result of GI bleed and dehydration  -Continue fluids,  f/u with labs.  hold lisinopril and other nephrotoxic meds.
ROSE likely secondary to prerenal as a result of GI bleed and dehydration  -Continue fluids,  f/u with labs.  hold lisinopril and other nephrotoxic meds.
Resolved. ROSE likely secondary to prerenal as a result of GI bleed and dehydration  - Avoid nephrotoxins.

## 2017-11-09 NOTE — PROGRESS NOTE ADULT - PROBLEM SELECTOR PLAN 9
Abdominal Xray  + for mild Ileus   Continue bowel regimen. Minimize use of narcotics.   GI following

## 2017-11-09 NOTE — PROGRESS NOTE ADULT - PROBLEM SELECTOR PROBLEM 3
Aspiration pneumonia
Pneumonia
ROSE (acute kidney injury)
RSOE (acute kidney injury)
ROSE (acute kidney injury)
ROSE (acute kidney injury)

## 2017-11-09 NOTE — PROGRESS NOTE ADULT - ASSESSMENT
82 year old male unable to provide hx. History obtained from his medical record.  He has a h/o CAD s/p remote PCI, Crohn's disease, T2DM, HTN, HLD, and hypothyroidism.  Has a known RBBB.  Is recently s/p laminectomy, course complicated by blood loss and hypotension.   No acute cv process was felt to be present at that time.  He did not have followup evaluation after that.   Eventually discharged to rehab, and is now sent from rehab to the hospital  for an altered ms.  Was found to have a leukocytosis, and spine imaging suspicious for spinal abscess vs hematoma.        - POD #5 laminectomy and evacuation of hematoma  - tolerated procedure with no evidence of cardiac complications  - Ambulating well with PT  - No evidence of bradycardia or tachycardia per flow sheet  - Continue verapamil 180 QD  - CAD with remote pci, presently not on ASA.  Ortho recommends resuming it on 11/11  - Please resume statin for cardiac protection if no contraindication  - there is no evidence of significant arrhythmia, volume overload, or active ischemia  - monitor electrolytes, keep k>4, Mg>2  - For discharge to [facility] today.  No contraindication for discharge from cardiac standpoint    Bhumika Bowen NP  Cardiology

## 2017-11-09 NOTE — PROGRESS NOTE ADULT - ASSESSMENT
82 year old male with PMH of recent lumbar laminectomy on 10/27 presents with AMS from [facility] facility likely secondary to Encephalopathy .   Course complicated by coffee ground emesis, serosanguinous fluid drainage from surgical incision with increasing leukocytosis suggestive of spinal abscess, and hypoxia. Transferred to ICU for closer monitoring. S/P I&D POD#2, consistent with hematoma, abscess ruled out, on antibiotics for aspiration pneumonia and now post op ileus

## 2017-11-09 NOTE — PROGRESS NOTE ADULT - PROBLEM SELECTOR PLAN 1
likely secondary to ileus  continue senna, colace  miralax 17 grams daily   monitor lytes -- k goal >4, mg goal >2  monitor for BM  AXR -- mild ileus   dc opioids  relistor x 1 today, monitor for BMs

## 2017-11-09 NOTE — PROGRESS NOTE ADULT - PROBLEM SELECTOR PROBLEM 1
Abdominal distension
Altered mental status
Abdominal distension
Altered mental status
Spinal abscess
Urinary retention
Urinary retention
Wheezing
Altered mental status
Hematoma
Postlaminectomy syndrome of lumbar region
Abdominal distension
Altered mental status

## 2017-11-09 NOTE — PROGRESS NOTE ADULT - SUBJECTIVE AND OBJECTIVE BOX
INTERVAL HPI/OVERNIGHT EVENTS:    no reported BMs  patient denies abd pain, n/v  tolerating diet    HPI:  82 year old male with PMHx of CAD, Crohn's disease, T2DM, HTN, HLD, and hypothyroidism sent to ER from [facility] for AMS. Pt was in his rehab facility when the staff noticed that he was making nonsensical statements. Pt was able to answer questions at rehab but would make odd remarks, which prompted them to send him to the ED. He did not have any fevers, cough, nausea, vomiting, or diarrhea at the rehab facility. At baseline, pt is AAOx3 as per rehab staff. Pt was recently discharged from [facility] on 10/27/17 s/p laminectomy. His postoperative course was complicated by blood loss requiring transfusion as well as an URI requiring antibiotics and steroids. He was discharged on doxycycline and a steroid taper. At present, pt is difficult to arouse and constantly drifts into sleep. He responds to his name when called and follows instructions. However he does not answer any questions and the answers he gives are nonsensical.     In ED, VS were T-97.3 HR-69 BP-146/71 RR-16 SpO2-94. Labs were significant for WBC-18 Hb-10.8 Cr-1.4 Lactate-0.8. Urinalysis was unremarkable. CT Head showed no acute intracranial pathology. Preliminary CXR showerd right lower lobe infiltrate  In ED, pt recieved Levaquin 500mg x1. (01 Nov 2017 04:02)    MEDICATIONS  (STANDING):  ascorbic acid 500 milliGRAM(s) Oral two times a day  calcium carbonate 1250 mG + Vitamin D (OsCal 500 + D) 1 Tablet(s) Oral three times a day  cyclobenzaprine 10 milliGRAM(s) Oral every 8 hours  dextrose 5%. 1000 milliLiter(s) (50 mL/Hr) IV Continuous <Continuous>  dextrose 50% Injectable 12.5 Gram(s) IV Push once  dextrose 50% Injectable 25 Gram(s) IV Push once  dextrose 50% Injectable 25 Gram(s) IV Push once  docusate sodium 100 milliGRAM(s) Oral three times a day  folic acid 1 milliGRAM(s) Oral daily  insulin lispro (HumaLOG) corrective regimen sliding scale   SubCutaneous three times a day before meals  insulin lispro (HumaLOG) corrective regimen sliding scale   SubCutaneous at bedtime  levoFLOXacin  Tablet 500 milliGRAM(s) Oral every 24 hours  levothyroxine 75 MICROGram(s) Oral daily  multivitamin 1 Tablet(s) Oral daily  pantoprazole    Tablet 40 milliGRAM(s) Oral before breakfast  polyethylene glycol 3350 17 Gram(s) Oral daily  senna 2 Tablet(s) Oral at bedtime  tamsulosin 0.4 milliGRAM(s) Oral at bedtime  verapamil  milliGRAM(s) Oral daily    MEDICATIONS  (PRN):  acetaminophen   Tablet 650 milliGRAM(s) Oral every 6 hours PRN For Temp greater than 38 C (100.4 F)  benzocaine 15 mG/menthol 3.6 mG Lozenge 1 Lozenge Oral every 4 hours PRN Sore Throat  bisacodyl Suppository 10 milliGRAM(s) Rectal once PRN Constipation  dextrose Gel 1 Dose(s) Oral once PRN Blood Glucose LESS THAN 70 milliGRAM(s)/deciliter  diphenhydrAMINE   Capsule 25 milliGRAM(s) Oral every 4 hours PRN Insomnia  glucagon  Injectable 1 milliGRAM(s) IntraMuscular once PRN Glucose LESS THAN 70 milligrams/deciliter  magnesium hydroxide Suspension 30 milliLiter(s) Oral every 12 hours PRN Constipation  ondansetron Injectable 4 milliGRAM(s) IV Push once PRN Nausea and/or Vomiting  oxyCODONE    IR 5 milliGRAM(s) Oral every 6 hours PRN Severe Pain (7 - 10)      Allergies    penicillin (Hives)    Intolerances          General:  No wt loss, fevers, chills, night sweats, fatigue,   Eyes:  Good vision, no reported pain  ENT:  No sore throat, pain, runny nose, dysphagia  CV:  No pain, palpitations, hypo/hypertension  Resp:  No dyspnea, cough, tachypnea, wheezing  GI:  No pain, No nausea, No vomiting, No diarrhea, No constipation, No weight loss, No fever, No pruritis, No rectal bleeding, No tarry stools, No dysphagia,  :  No pain, bleeding, incontinence, nocturia  Muscle:  No pain, weakness  Neuro:  No weakness, tingling, memory problems  Psych:  No fatigue, insomnia, mood problems, depression  Endocrine:  No polyuria, polydipsia, cold/heat intolerance  Heme:  No petechiae, ecchymosis, easy bruisability  Skin:  No rash, tattoos, scars, edema      PHYSICAL EXAM:   Vital Signs:  Vital Signs Last 24 Hrs  T(C): 36.7 (09 Nov 2017 07:48), Max: 36.7 (08 Nov 2017 16:28)  T(F): 98.1 (09 Nov 2017 07:48), Max: 98.1 (08 Nov 2017 16:28)  HR: 76 (09 Nov 2017 07:48) (76 - 85)  BP: 144/78 (09 Nov 2017 07:48) (128/66 - 147/70)  BP(mean): --  RR: 16 (09 Nov 2017 07:48) (16 - 16)  SpO2: 96% (09 Nov 2017 07:48) (94% - 96%)  Daily     Daily I&O's Summary    08 Nov 2017 07:01  -  09 Nov 2017 07:00  --------------------------------------------------------  IN: 0 mL / OUT: 625 mL / NET: -625 mL        GENERAL:  Appears stated age, well-groomed, well-nourished, no distress  HEENT:  NC/AT,  conjunctivae clear and pink, no thyromegaly, nodules, adenopathy, no JVD, sclera -anicteric  CHEST:  Full & symmetric excursion, no increased effort, breath sounds clear  HEART:  Regular rhythm, S1, S2, no murmur/rub/S3/S4, no abdominal bruit, no edema  ABDOMEN:  Soft, non-tender, mildly-distended, normoactive bowel sounds,  no masses ,no hepato-splenomegaly, no signs of chronic liver disease  EXTEREMITIES:  no cyanosis,clubbing or edema  SKIN:  No rash/erythema/ecchymoses/petechiae/wounds/abscess/warm/dry  NEURO:  Alert, oriented x2, no asterixis, no tremor, no encephalopathy      LABS:                        8.7    8.1   )-----------( 342      ( 09 Nov 2017 07:18 )             27.9     11-09    142  |  106  |  20  ----------------------------<  138<H>  4.4   |  29  |  1.10    Ca    8.6      09 Nov 2017 07:18          amylase   lipase  RADIOLOGY & ADDITIONAL TESTS:

## 2017-11-09 NOTE — PROGRESS NOTE ADULT - PROBLEM SELECTOR PROBLEM 4
Pneumonia
Atelectasis
GIB (gastrointestinal bleeding)
Intervertebral disc disorder
Obesity
Pneumonia

## 2017-11-09 NOTE — PROGRESS NOTE ADULT - SUBJECTIVE AND OBJECTIVE BOX
HPI:  82 year old male with PMHx of CAD, Crohn's disease, T2DM, HTN, HLD, and hypothyroidism sent to ER from [facility] for AMS. Pt was in his rehab facility when the staff noticed that he was making nonsensical statements. Pt was able to answer questions at rehab but would make odd remarks, which prompted them to send him to the ED. He did not have any fevers, cough, nausea, vomiting, or diarrhea at the rehab facility. At baseline, pt is AAOx3 as per rehab staff. Pt was recently discharged from [facility] on 10/27/17 s/p laminectomy. His postoperative course was complicated by blood loss requiring transfusion as well as an URI requiring antibiotics and steroids. He was discharged on doxycycline and a steroid taper. At present, pt is difficult to arouse and constantly drifts into sleep. He responds to his name when called and follows instructions. However he does not answer any questions and the answers he gives are nonsensical.     In ED, VS were T-97.3 HR-69 BP-146/71 RR-16 SpO2-94. Labs were significant for WBC-18 Hb-10.8 Cr-1.4 Lactate-0.8. Urinalysis was unremarkable. CT Head showed no acute intracranial pathology. Preliminary CXR showerd right lower lobe infiltrate  In ED, pt recieved Levaquin 500mg x1. (01 Nov 2017 04:02)    SUBJECTIVE:  Patient is a 82y old  Male who presents with a chief complaint of Spinal Compression w/ Neuro Defecits/AMS (05 Nov 2017 11:11)    Seen and examined.  Comfortable on RA.  Denies CP, SOB, palpitations.  Denies back pain.  States he is going to rehab today.    OBJECTIVE:  Review Of Systems:  Constitutional: [ ] Fever [ ] Chills [ ] Fatigue [ ] Weight change   HEENT: [ ] Blurred vision [ ] Eye Pain [ ] Headache [ ] Runny nose [ ] Sore Throat   Respiratory: [ ] Cough [ ] Wheezing [ ] Shortness of breath  Cardiovascular: [ ] Chest Pain [ ] Palpitations [ ] SAAVEDRA [ ] PND [ ] Orthopnea  Gastrointestinal: [ ] Abdominal Pain [ ] Diarrhea [ ] Constipation [ ] Hemorrhoids [ ] Nausea [ ] Vomiting  Genitourinary: [ ] Nocturia [ ] Dysuria [ ] Incontinence  Extremities: [ ] Swelling [ ] Joint Pain  Neurologic: [ ] Focal deficit [ ] Paresthesias [ ] Syncope  Lymphatic: [ ] Swelling [ ] Lymphadenopathy   Skin: [ ] Rash [ ] Ecchymoses [ ] Wounds [ ] Lesions  Psychiatry: [ ] Depression [ ] Suicidal/Homicidal Ideation [ ] Anxiety [ ] Sleep Disturbances  [ x] 10 point review of systems is otherwise negative except as mentioned above            [ ]Unable to obtain    Allergy:  Allergies    penicillin (Hives)    Intolerances    Medications:  MEDICATIONS  (STANDING):  ascorbic acid 500 milliGRAM(s) Oral two times a day  BACItracin 50,000 Unit(s) Injectable (Rx Quick Charge) 108819 Unit(s) IntraMuscular   calcium carbonate 1250 mG + Vitamin D (OsCal 500 + D) 1 Tablet(s) Oral three times a day  cyclobenzaprine 10 milliGRAM(s) Oral every 8 hours  dextrose 5%. 1000 milliLiter(s) (50 mL/Hr) IV Continuous <Continuous>  dextrose 50% Injectable 12.5 Gram(s) IV Push once  dextrose 50% Injectable 25 Gram(s) IV Push once  dextrose 50% Injectable 25 Gram(s) IV Push once  docusate sodium 100 milliGRAM(s) Oral three times a day  folic acid 1 milliGRAM(s) Oral daily  insulin lispro (HumaLOG) corrective regimen sliding scale   SubCutaneous three times a day before meals  insulin lispro (HumaLOG) corrective regimen sliding scale   SubCutaneous at bedtime  levoFLOXacin  Tablet 500 milliGRAM(s) Oral every 24 hours  levothyroxine 75 MICROGram(s) Oral daily  multivitamin 1 Tablet(s) Oral daily  pantoprazole    Tablet 40 milliGRAM(s) Oral before breakfast  polyethylene glycol 3350 17 Gram(s) Oral daily  senna 2 Tablet(s) Oral at bedtime  tamsulosin 0.4 milliGRAM(s) Oral at bedtime  verapamil  milliGRAM(s) Oral daily    MEDICATIONS  (PRN):  acetaminophen   Tablet 650 milliGRAM(s) Oral every 6 hours PRN For Temp greater than 38 C (100.4 F)  benzocaine 15 mG/menthol 3.6 mG Lozenge 1 Lozenge Oral every 4 hours PRN Sore Throat  bisacodyl Suppository 10 milliGRAM(s) Rectal once PRN Constipation  dextrose Gel 1 Dose(s) Oral once PRN Blood Glucose LESS THAN 70 milliGRAM(s)/deciliter  diphenhydrAMINE   Capsule 25 milliGRAM(s) Oral every 4 hours PRN Insomnia  glucagon  Injectable 1 milliGRAM(s) IntraMuscular once PRN Glucose LESS THAN 70 milligrams/deciliter  magnesium hydroxide Suspension 30 milliLiter(s) Oral every 12 hours PRN Constipation  ondansetron Injectable 4 milliGRAM(s) IV Push once PRN Nausea and/or Vomiting  oxyCODONE    IR 5 milliGRAM(s) Oral every 6 hours PRN Severe Pain (7 - 10)    PMH/PSH/FH/SH: [ ] Unchanged    Vitals:  T(C): 36.7 (11-09-17 @ 07:48), Max: 36.7 (11-08-17 @ 16:28)  HR: 76 (11-09-17 @ 07:48) (76 - 85)  BP: 144/78 (11-09-17 @ 07:48) (128/66 - 147/70)  BP(mean): --  RR: 16 (11-09-17 @ 07:48) (16 - 16)  SpO2: 96% (11-09-17 @ 07:48) (94% - 96%)  Wt(kg): --  Daily     Daily   I&O's Summary    08 Nov 2017 07:01  -  09 Nov 2017 07:00  --------------------------------------------------------  IN: 0 mL / OUT: 625 mL / NET: -625 mL    Labs:                        8.7    8.1   )-----------( 342      ( 09 Nov 2017 07:18 )             27.9     11-09    142  |  106  |  20  ----------------------------<  138<H>  4.4   |  29  |  1.10    Ca    8.6      09 Nov 2017 07:18    ECG:  < from: 12 Lead ECG (11.03.17 @ 07:56) >    Ventricular Rate 78 BPM    Atrial Rate 78 BPM    P-R Interval 168 ms    QRS Duration 160 ms    Q-T Interval 432 ms    QTC Calculation(Bezet) 492 ms    P Axis 53 degrees    R Axis -75 degrees    T Axis 47 degrees    Diagnosis Line Normal sinus rhythm  Left axis deviation  Right bundle branch block  Abnormal ECG    Confirmed by Russell Riojas MD ([id number]) on 11/3/2017 10:05:20 AM    < end of copied text >    Echo:    Stress Testing:     Cath:    Imaging:    Interpretation of Telemetry:    Physical Exam:  Appearance: [x ] Normal  [ ] abnormal [x ] NAD   Eyes: [x ] PERRL [ ] EOMI  HENT: [x ] Normal [ ] Abnormal oral muscosa [ ]NC/AT  Cardiovascular: [x ] S1 [ x] S2 [ x] RRR [ ] m/r/g [ ]edema [ ] JVP  Procedural Access Site: [ ]  hematoma [ ] tender to palpation [ ] 2+ pulse [ ] bruit [ ] Ecchymosis  Respiratory: [x ] Clear to auscultation bilaterally  Gastrointestinal: [x ] Soft [ ] tenderness[ ] distension [x ] BS  Musculoskeletal: [ ] clubbing [ ] joint deformity   Neurologic: [ x] Non-focal  Lymphatic: [ ] lymphadenopathy  Psychiatry: [ x] AAOx3  [ ] confused [ ] disoriented [ ] Mood & affect appropriate  [x] Angry/annoyed  Skin: [ ]  rashes [ ] ecchymoses [ ] cyanosis

## 2017-11-09 NOTE — PROGRESS NOTE ADULT - ASSESSMENT
83 yo M S/P I & D lumbar wound POD 5  OR cx NGTD- continue to follow   Can restart Aspirin 11/11/17  Analgesia  Exam improving 4/5 LLE today   SCD's  WBAT   PT today, OOBTC   Dispo Planning- [facility] today

## 2017-11-09 NOTE — PROGRESS NOTE ADULT - SUBJECTIVE AND OBJECTIVE BOX
Patient seen and examined. Pain controlled. No acute events.    Vital Signs Last 24 Hrs  T(C): 36.7 (09 Nov 2017 05:58), Max: 36.8 (08 Nov 2017 07:23)  T(F): 98.1 (09 Nov 2017 05:58), Max: 98.3 (08 Nov 2017 07:23)  HR: 79 (09 Nov 2017 05:58) (77 - 85)  BP: 128/66 (09 Nov 2017 05:58) (128/66 - 149/77)  RR: 16 (09 Nov 2017 05:58) (16 - 16)  SpO2: 94% (09 Nov 2017 05:58) (94% - 97%)    Physical Exam    Gen: NAD    Spine:   Dressing c/d/i  Moving extremities x 4, R>L, RLE 4/5, LLE 4/5 with Hip flexion and knee extension  SILT L3-S1, improved on LLE  DP +  Compartments soft  No calf ttp

## 2017-11-09 NOTE — PROGRESS NOTE ADULT - SUBJECTIVE AND OBJECTIVE BOX
Patient is a 82y old  Male who presents with a chief complaint of Spinal Compression w/ Neuro Defecits/AMS (05 Nov 2017 11:11)       INTERVAL HPI/OVERNIGHT EVENTS: Patient seen and examined at bedside. Denied any bowel movement yet. No new complaints. No abdominal pain, nausea.    MEDICATIONS  (STANDING):  ascorbic acid 500 milliGRAM(s) Oral two times a day  BACItracin 50,000 Unit(s) Injectable (Rx Quick Charge) 964071 Unit(s) IntraMuscular   calcium carbonate 1250 mG + Vitamin D (OsCal 500 + D) 1 Tablet(s) Oral three times a day  cyclobenzaprine 10 milliGRAM(s) Oral every 8 hours  dextrose 5%. 1000 milliLiter(s) (50 mL/Hr) IV Continuous <Continuous>  dextrose 50% Injectable 12.5 Gram(s) IV Push once  dextrose 50% Injectable 25 Gram(s) IV Push once  dextrose 50% Injectable 25 Gram(s) IV Push once  docusate sodium 100 milliGRAM(s) Oral three times a day  folic acid 1 milliGRAM(s) Oral daily  insulin lispro (HumaLOG) corrective regimen sliding scale   SubCutaneous three times a day before meals  insulin lispro (HumaLOG) corrective regimen sliding scale   SubCutaneous at bedtime  levoFLOXacin  Tablet 500 milliGRAM(s) Oral every 24 hours  levothyroxine 75 MICROGram(s) Oral daily  multivitamin 1 Tablet(s) Oral daily  pantoprazole    Tablet 40 milliGRAM(s) Oral before breakfast  polyethylene glycol 3350 17 Gram(s) Oral daily  senna 2 Tablet(s) Oral at bedtime  tamsulosin 0.4 milliGRAM(s) Oral at bedtime  verapamil  milliGRAM(s) Oral daily    MEDICATIONS  (PRN):  acetaminophen   Tablet 650 milliGRAM(s) Oral every 6 hours PRN For Temp greater than 38 C (100.4 F)  benzocaine 15 mG/menthol 3.6 mG Lozenge 1 Lozenge Oral every 4 hours PRN Sore Throat  bisacodyl Suppository 10 milliGRAM(s) Rectal once PRN Constipation  dextrose Gel 1 Dose(s) Oral once PRN Blood Glucose LESS THAN 70 milliGRAM(s)/deciliter  diphenhydrAMINE   Capsule 25 milliGRAM(s) Oral every 4 hours PRN Insomnia  glucagon  Injectable 1 milliGRAM(s) IntraMuscular once PRN Glucose LESS THAN 70 milligrams/deciliter  magnesium hydroxide Suspension 30 milliLiter(s) Oral every 12 hours PRN Constipation  ondansetron Injectable 4 milliGRAM(s) IV Push once PRN Nausea and/or Vomiting  oxyCODONE    IR 5 milliGRAM(s) Oral every 6 hours PRN Severe Pain (7 - 10)      Allergies    penicillin (Hives)    Intolerances        REVIEW OF SYSTEMS:  CONSTITUTIONAL: No fever, or fatigue  EYES: No eye pain, visual disturbances, or discharge  ENMT:  No difficulty hearing, tinnitus, vertigo; No sinus or throat pain  NECK: No pain or stiffness  RESPIRATORY: No cough, wheezing, chills or hemoptysis; No shortness of breath  CARDIOVASCULAR: No chest pain, palpitations, dizziness, or leg swelling  GASTROINTESTINAL: No abdominal or epigastric pain. No nausea, vomiting, or hematemesis; No diarrhea or constipation. No melena or hematochezia.  GENITOURINARY: No dysuria, frequency, hematuria, or incontinence  NEUROLOGICAL: No headaches, memory loss, loss of strength, numbness, or tremors  SKIN: No itching, burning, rashes, or lesions   LYMPH NODES: No enlarged glands  ENDOCRINE: No heat or cold intolerance; No hair loss; No polydipsia or polyuria  MUSCULOSKELETAL: No joint pain or swelling; No muscle, back, or extremity pain  HEME/LYMPH: No easy bruising, or bleeding gums  ALLERGY AND IMMUNOLOGIC: No hives or eczema    Vital Signs Last 24 Hrs  T(C): 36.7 (09 Nov 2017 07:48), Max: 36.7 (08 Nov 2017 16:28)  T(F): 98.1 (09 Nov 2017 07:48), Max: 98.1 (08 Nov 2017 16:28)  HR: 76 (09 Nov 2017 07:48) (76 - 85)  BP: 144/78 (09 Nov 2017 07:48) (128/66 - 147/70)  BP(mean): --  RR: 16 (09 Nov 2017 07:48) (16 - 16)  SpO2: 96% (09 Nov 2017 07:48) (94% - 96%)    PHYSICAL EXAM:  GENERAL: NAD,  well-developed  HEAD:  Atraumatic, Normocephalic  EYES: EOMI, PERRLA, conjunctiva and sclera clear  ENMT: No tonsillar erythema, exudates, or enlargement; Moist mucous membranes  NECK: Supple, No JVD, Normal thyroid  NERVOUS SYSTEM:  Alert & Awake ; Motor Strength 5/5 B/L upper and lower extremities; DTRs 2+ intact and symmetric  CHEST/LUNG: Clear to auscultation bilaterally; No rales, rhonchi, wheezing, or rubs  HEART: Regular rate and rhythm; No murmurs, rubs, or gallops  ABDOMEN: Soft, Nontender, Nondistended; Bowel sounds present  EXTREMITIES:  2+ Peripheral Pulses, No clubbing, cyanosis, or edema  LYMPH: No lymphadenopathy noted  SKIN: No rashes or lesions    LABS:                        8.7    8.1   )-----------( 342      ( 09 Nov 2017 07:18 )             27.9     09 Nov 2017 07:18    142    |  106    |  20     ----------------------------<  138    4.4     |  29     |  1.10     Ca    8.6        09 Nov 2017 07:18        CAPILLARY BLOOD GLUCOSE  143 (09 Nov 2017 07:48)      POCT Blood Glucose.: 143 mg/dL (09 Nov 2017 07:47)  POCT Blood Glucose.: 127 mg/dL (08 Nov 2017 22:15)  POCT Blood Glucose.: 350 mg/dL (08 Nov 2017 17:15)  POCT Blood Glucose.: 236 mg/dL (08 Nov 2017 11:18)    BLOOD CULTURE    RADIOLOGY & ADDITIONAL TESTS:    Imaging Personally Reviewed:  [ ] YES     Consultant(s) Notes Reviewed:      Care Discussed with Consultants/Other Providers:

## 2017-11-09 NOTE — PROGRESS NOTE ADULT - SUBJECTIVE AND OBJECTIVE BOX
Neurology follow up note    JONATHAN SUMMERS82yMale      Interval History:    Patient feels ok no new complaints.    MEDICATIONS    acetaminophen   Tablet 650 milliGRAM(s) Oral every 6 hours PRN  ascorbic acid 500 milliGRAM(s) Oral two times a day  benzocaine 15 mG/menthol 3.6 mG Lozenge 1 Lozenge Oral every 4 hours PRN  calcium carbonate 1250 mG + Vitamin D (OsCal 500 + D) 1 Tablet(s) Oral three times a day  cyclobenzaprine 10 milliGRAM(s) Oral every 8 hours  dextrose 5%. 1000 milliLiter(s) IV Continuous <Continuous>  dextrose 50% Injectable 12.5 Gram(s) IV Push once  dextrose 50% Injectable 25 Gram(s) IV Push once  dextrose 50% Injectable 25 Gram(s) IV Push once  dextrose Gel 1 Dose(s) Oral once PRN  diphenhydrAMINE   Capsule 25 milliGRAM(s) Oral every 4 hours PRN  folic acid 1 milliGRAM(s) Oral daily  glucagon  Injectable 1 milliGRAM(s) IntraMuscular once PRN  insulin lispro (HumaLOG) corrective regimen sliding scale   SubCutaneous three times a day before meals  insulin lispro (HumaLOG) corrective regimen sliding scale   SubCutaneous at bedtime  levoFLOXacin  Tablet 500 milliGRAM(s) Oral every 24 hours  levothyroxine 75 MICROGram(s) Oral daily  magnesium hydroxide Suspension 30 milliLiter(s) Oral every 12 hours PRN  multivitamin 1 Tablet(s) Oral daily  ondansetron Injectable 4 milliGRAM(s) IV Push once PRN  oxyCODONE    IR 5 milliGRAM(s) Oral every 6 hours PRN  pantoprazole    Tablet 40 milliGRAM(s) Oral before breakfast  polyethylene glycol 3350 17 Gram(s) Oral daily  tamsulosin 0.4 milliGRAM(s) Oral at bedtime  verapamil  milliGRAM(s) Oral daily      Allergies    penicillin (Hives)    Intolerances            Vital Signs Last 24 Hrs  T(C): 36.7 (09 Nov 2017 07:48), Max: 36.7 (08 Nov 2017 16:28)  T(F): 98.1 (09 Nov 2017 07:48), Max: 98.1 (08 Nov 2017 16:28)  HR: 76 (09 Nov 2017 07:48) (76 - 85)  BP: 144/78 (09 Nov 2017 07:48) (128/66 - 147/70)  BP(mean): --  RR: 16 (09 Nov 2017 07:48) (16 - 16)  SpO2: 96% (09 Nov 2017 07:48) (94% - 96%)      REVIEW OF SYSTEMS: no headaches feels ok   no significant back pain   does feel sad today       On Neurological Examination:    Mental Status - Patient is alert, awake, oriented   loc hospital .   oct   2017      Follow simple commands    Speech -   Fluent                    Cranial Nerves - Pupils 3 mm equal and reactive to light,   extraocular eye movements intact.   smile symmetric  intact bilateral NLF    Motor Exam -   Right upper 4/5  Left upper 4/5  Right lower 2/5  Left lower 2/5    Muscle tone - is normal all over.  No asymmetry is seen.      Sensory    Bilateral intact to light touch    GENERAL Exam: Nontoxic , No Acute Distress   	  HEENT:  normocephalic, atraumatic  		  LUNGS: Decreased bilaterally  	  HEART: Normal S1S2   No murmur RRR        	  GI/ ABDOMEN:  Soft  Non tender    EXTREMITIES:   No Edema  No Clubbing  No Cyanosis No Edema    MUSCULOSKELETAL: upper ext Normal Range of Motion lower decreased   	   SKIN: Normal  No Ecchymosis             LABS:  CBC Full  -  ( 09 Nov 2017 07:18 )  WBC Count : 8.1 K/uL  Hemoglobin : 8.7 g/dL  Hematocrit : 27.9 %  Platelet Count - Automated : 342 K/uL  Mean Cell Volume : 95.8 fl  Mean Cell Hemoglobin : 29.9 pg  Mean Cell Hemoglobin Concentration : 31.2 gm/dL  Auto Neutrophil # : x  Auto Lymphocyte # : x  Auto Monocyte # : x  Auto Eosinophil # : x  Auto Basophil # : x  Auto Neutrophil % : x  Auto Lymphocyte % : x  Auto Monocyte % : x  Auto Eosinophil % : x  Auto Basophil % : x      11-09    142  |  106  |  20  ----------------------------<  138<H>  4.4   |  29  |  1.10    Ca    8.6      09 Nov 2017 07:18      Hemoglobin A1C:       Vitamin B12         RADIOLOGY    ANALYSIS AND PLAN:  An 82-year-old with episodes of changes in mental status.    1.	For episodes of changes in mental status better h/o PNA  ID follow up antibotics as needed    2.	Overall at present  more responsive   3.	neurology wise stable   4.	monitor psych status today doing better   5.	PT evaluation as tolerated   Spoke with family members in past    Spoke with one relativeClive telephone number is [phone] 11/7/17.  , Rosanna, her home number is [phone].  Her cell number [phone].  11/3/17   Thank you for the courtesy of consultation.    30 minutes spent on total encounter; more than 50% of the visit was spent counseling and/or coordinating care by the attending physic

## 2017-11-09 NOTE — PROGRESS NOTE ADULT - PROVIDER SPECIALTY LIST ADULT
Cardiology
Critical Care
Gastroenterology
Hospitalist
Infectious Disease
Neurology
Orthopedics
Pulmonology
Pulmonology
Surgery
Surgery
Urology
Cardiology
Orthopedics
Surgery
Gastroenterology
Hospitalist
Critical Care
Gastroenterology
Pulmonology
Hospitalist

## 2017-11-09 NOTE — PROGRESS NOTE ADULT - PROBLEM SELECTOR PLAN 4
CT chest suggest aspiration pneumonia  -On Meropenem , plan per ID . CXR from 11/06/17 showed improvement   -aspiration precaution
CT chest suggest aspiration pneumonia  -On Meropenem , plan per ID   -aspiration precaution
I malik as tolerated  monitor sat  chest pt  nebs  keep sat > 88 pct
Pt had coffee ground emesis, GI consult obtained.  CT abd to r/o Obs. H/H stable.  PPI and NGT placement.
at risk for EVANGELINA  will need outpatient screen  keep sat > 88 pct
s/p laminectomy on 10/25 and complicated by blood loss anemia.   received Blood transfusions
CT chest suggest aspiration pneumonia  -decrease Meropenem to 1 gram q 8 hours , check vanco trough before 4th dose   -aspiration precaution
CT chest suggest aspiration pneumonia  -decrease Meropenem to 1 gram q 8 hours , check vanco trough before 4th dose   -aspiration precaution
CT chest suggest aspiration pneumonia  On Levaquin now   ID f/u appreciated
CT chest suggest aspiration pneumonia  -decrease Meropenem to 1 gram q 8 hours , check vanco trough before 4th dose   -aspiration precaution
CT chest suggest aspiration pneumonia  On Levaquin now   ID f/u appreciated

## 2017-11-30 ENCOUNTER — TRANSCRIPTION ENCOUNTER (OUTPATIENT)
Age: 82
End: 2017-11-30

## 2017-12-13 ENCOUNTER — EMERGENCY (EMERGENCY)
Facility: HOSPITAL | Age: 82
LOS: 1 days | Discharge: HOME | End: 2017-12-13
Attending: EMERGENCY MEDICINE | Admitting: EMERGENCY MEDICINE
Payer: MEDICARE

## 2017-12-13 VITALS
HEART RATE: 77 BPM | DIASTOLIC BLOOD PRESSURE: 75 MMHG | OXYGEN SATURATION: 98 % | SYSTOLIC BLOOD PRESSURE: 169 MMHG | RESPIRATION RATE: 16 BRPM | TEMPERATURE: 98 F

## 2017-12-13 VITALS
HEIGHT: 66 IN | RESPIRATION RATE: 16 BRPM | SYSTOLIC BLOOD PRESSURE: 121 MMHG | HEART RATE: 61 BPM | TEMPERATURE: 98 F | WEIGHT: 179.9 LBS | DIASTOLIC BLOOD PRESSURE: 70 MMHG | OXYGEN SATURATION: 94 %

## 2017-12-13 DIAGNOSIS — Z96.641 PRESENCE OF RIGHT ARTIFICIAL HIP JOINT: Chronic | ICD-10-CM

## 2017-12-13 DIAGNOSIS — Z98.1 ARTHRODESIS STATUS: Chronic | ICD-10-CM

## 2017-12-13 DIAGNOSIS — Z90.49 ACQUIRED ABSENCE OF OTHER SPECIFIED PARTS OF DIGESTIVE TRACT: Chronic | ICD-10-CM

## 2017-12-13 PROCEDURE — 99284 EMERGENCY DEPT VISIT MOD MDM: CPT | Mod: 25

## 2017-12-13 PROCEDURE — 70450 CT HEAD/BRAIN W/O DYE: CPT | Mod: 26

## 2017-12-13 PROCEDURE — 99284 EMERGENCY DEPT VISIT MOD MDM: CPT

## 2017-12-13 PROCEDURE — 70450 CT HEAD/BRAIN W/O DYE: CPT

## 2017-12-13 NOTE — ED ADULT TRIAGE NOTE - CHIEF COMPLAINT QUOTE
pt arrived via EMS with reports of a fall, hitting back of head. denies LOC pt arrived via EMS with reports of a mechanical fall, hitting back of head. denies LOC

## 2017-12-13 NOTE — ED ADULT NURSE REASSESSMENT NOTE - NS ED NURSE REASSESS COMMENT FT1
Pt. awaiting discharge and ambulance. Pt. stated he has 14 stairs to get into his home and is unable to ambulate up the stairs, confirmed by son. Son present at bedside. Will continue to monitor.

## 2017-12-13 NOTE — ED PROVIDER NOTE - OBJECTIVE STATEMENT
fall this morning while at the sink washing his hands, pt states he lost his balance and fell onto his behind and hit his head.  pt said he let go of his walker for too long. pt at rehab s/p spinal surgery 2 months ago and has been getting stronger.  pt denies any pain at this time, only + head trauma on aspirin.

## 2017-12-13 NOTE — ED ADULT NURSE NOTE - OBJECTIVE STATEMENT
Pt. stated while washing his face at home he "let go" of his walker and fell backwards, hitting his head. Pt. denies LOC, nausea, dizziness or headache. Pt. states he had a spinal fusion in October, Abdominal binder in place, denies any injury to spine from fall.

## 2017-12-13 NOTE — ED PROVIDER NOTE - PHYSICAL EXAMINATION
Gen:  alert, awake, no acute distress  HEENT:  atraumatic head, airway clear, pupils equal and round  CV:  rrr, nl S1, S2, no m/r/g  Pulm:  BS equal b/l  Abd: s/nt/nd, +BS  Ext:  moving all extremities, has a lumbar/abd binder on  Neuro:  grossly intact, no deficits  Skin:  clear, dry, intact

## 2018-12-19 PROBLEM — F32.9 MAJOR DEPRESSIVE DISORDER, SINGLE EPISODE, UNSPECIFIED: Chronic | Status: ACTIVE | Noted: 2017-10-12

## 2018-12-19 PROBLEM — K50.90 CROHN'S DISEASE, UNSPECIFIED, WITHOUT COMPLICATIONS: Chronic | Status: ACTIVE | Noted: 2017-10-12

## 2018-12-19 PROBLEM — H91.90 UNSPECIFIED HEARING LOSS, UNSPECIFIED EAR: Chronic | Status: ACTIVE | Noted: 2017-10-12

## 2018-12-19 PROBLEM — E66.9 OBESITY, UNSPECIFIED: Chronic | Status: ACTIVE | Noted: 2017-10-12

## 2018-12-19 PROBLEM — I25.10 ATHEROSCLEROTIC HEART DISEASE OF NATIVE CORONARY ARTERY WITHOUT ANGINA PECTORIS: Chronic | Status: ACTIVE | Noted: 2017-10-12

## 2018-12-19 PROBLEM — M51.9 UNSPECIFIED THORACIC, THORACOLUMBAR AND LUMBOSACRAL INTERVERTEBRAL DISC DISORDER: Chronic | Status: ACTIVE | Noted: 2017-10-12

## 2018-12-19 PROBLEM — I10 ESSENTIAL (PRIMARY) HYPERTENSION: Chronic | Status: ACTIVE | Noted: 2017-10-12

## 2018-12-19 PROBLEM — E78.00 PURE HYPERCHOLESTEROLEMIA, UNSPECIFIED: Chronic | Status: ACTIVE | Noted: 2017-10-12

## 2018-12-19 PROBLEM — Z95.5 PRESENCE OF CORONARY ANGIOPLASTY IMPLANT AND GRAFT: Chronic | Status: ACTIVE | Noted: 2017-10-12

## 2018-12-19 PROBLEM — H35.30 UNSPECIFIED MACULAR DEGENERATION: Chronic | Status: ACTIVE | Noted: 2017-10-12

## 2018-12-19 PROBLEM — E03.9 HYPOTHYROIDISM, UNSPECIFIED: Chronic | Status: ACTIVE | Noted: 2017-10-12

## 2018-12-19 PROBLEM — E11.9 TYPE 2 DIABETES MELLITUS WITHOUT COMPLICATIONS: Chronic | Status: ACTIVE | Noted: 2017-10-12

## 2018-12-26 ENCOUNTER — OUTPATIENT (OUTPATIENT)
Dept: OUTPATIENT SERVICES | Facility: HOSPITAL | Age: 83
LOS: 1 days | Discharge: HOME | End: 2018-12-26
Payer: MEDICARE

## 2018-12-26 VITALS
DIASTOLIC BLOOD PRESSURE: 68 MMHG | HEART RATE: 65 BPM | HEIGHT: 66.5 IN | TEMPERATURE: 98 F | RESPIRATION RATE: 16 BRPM | OXYGEN SATURATION: 98 % | SYSTOLIC BLOOD PRESSURE: 141 MMHG | WEIGHT: 186.07 LBS

## 2018-12-26 DIAGNOSIS — Z98.1 ARTHRODESIS STATUS: Chronic | ICD-10-CM

## 2018-12-26 DIAGNOSIS — Z90.49 ACQUIRED ABSENCE OF OTHER SPECIFIED PARTS OF DIGESTIVE TRACT: Chronic | ICD-10-CM

## 2018-12-26 DIAGNOSIS — Z96.641 PRESENCE OF RIGHT ARTIFICIAL HIP JOINT: Chronic | ICD-10-CM

## 2018-12-26 DIAGNOSIS — M96.1 POSTLAMINECTOMY SYNDROME, NOT ELSEWHERE CLASSIFIED: ICD-10-CM

## 2018-12-26 LAB
ANION GAP SERPL CALC-SCNC: 8 MMOL/L — SIGNIFICANT CHANGE UP (ref 5–17)
APPEARANCE UR: CLEAR — SIGNIFICANT CHANGE UP
BACTERIA # UR AUTO: ABNORMAL
BASOPHILS # BLD AUTO: 0.04 K/UL — SIGNIFICANT CHANGE UP (ref 0–0.2)
BASOPHILS NFR BLD AUTO: 0.4 % — SIGNIFICANT CHANGE UP (ref 0–2)
BILIRUB UR-MCNC: NEGATIVE — SIGNIFICANT CHANGE UP
BUN SERPL-MCNC: 39 MG/DL — HIGH (ref 7–23)
CALCIUM SERPL-MCNC: 9.7 MG/DL — SIGNIFICANT CHANGE UP (ref 8.5–10.1)
CHLORIDE SERPL-SCNC: 109 MMOL/L — HIGH (ref 96–108)
CO2 SERPL-SCNC: 27 MMOL/L — SIGNIFICANT CHANGE UP (ref 22–31)
COLOR SPEC: YELLOW — SIGNIFICANT CHANGE UP
COMMENT - URINE: SIGNIFICANT CHANGE UP
CREAT SERPL-MCNC: 1.55 MG/DL — HIGH (ref 0.5–1.3)
DIFF PNL FLD: NEGATIVE — SIGNIFICANT CHANGE UP
EOSINOPHIL # BLD AUTO: 0.2 K/UL — SIGNIFICANT CHANGE UP (ref 0–0.5)
EOSINOPHIL NFR BLD AUTO: 2.1 % — SIGNIFICANT CHANGE UP (ref 0–6)
EPI CELLS # UR: NEGATIVE — SIGNIFICANT CHANGE UP
GLUCOSE SERPL-MCNC: 106 MG/DL — HIGH (ref 70–99)
GLUCOSE UR QL: NEGATIVE MG/DL — SIGNIFICANT CHANGE UP
HBA1C BLD-MCNC: 7 % — HIGH (ref 4–5.6)
HCT VFR BLD CALC: 35.8 % — LOW (ref 39–50)
HGB BLD-MCNC: 11.7 G/DL — LOW (ref 13–17)
HYALINE CASTS # UR AUTO: ABNORMAL /LPF
IMM GRANULOCYTES NFR BLD AUTO: 0.2 % — SIGNIFICANT CHANGE UP (ref 0–1.5)
INR BLD: 1.08 RATIO — SIGNIFICANT CHANGE UP (ref 0.88–1.16)
KETONES UR-MCNC: NEGATIVE — SIGNIFICANT CHANGE UP
LEUKOCYTE ESTERASE UR-ACNC: NEGATIVE — SIGNIFICANT CHANGE UP
LYMPHOCYTES # BLD AUTO: 1.48 K/UL — SIGNIFICANT CHANGE UP (ref 1–3.3)
LYMPHOCYTES # BLD AUTO: 15.3 % — SIGNIFICANT CHANGE UP (ref 13–44)
MCHC RBC-ENTMCNC: 29 PG — SIGNIFICANT CHANGE UP (ref 27–34)
MCHC RBC-ENTMCNC: 32.7 GM/DL — SIGNIFICANT CHANGE UP (ref 32–36)
MCV RBC AUTO: 88.6 FL — SIGNIFICANT CHANGE UP (ref 80–100)
MONOCYTES # BLD AUTO: 0.7 K/UL — SIGNIFICANT CHANGE UP (ref 0–0.9)
MONOCYTES NFR BLD AUTO: 7.2 % — SIGNIFICANT CHANGE UP (ref 2–14)
MRSA PCR RESULT.: SIGNIFICANT CHANGE UP
NEUTROPHILS # BLD AUTO: 7.25 K/UL — SIGNIFICANT CHANGE UP (ref 1.8–7.4)
NEUTROPHILS NFR BLD AUTO: 74.8 % — SIGNIFICANT CHANGE UP (ref 43–77)
NITRITE UR-MCNC: NEGATIVE — SIGNIFICANT CHANGE UP
NRBC # BLD: 0 /100 WBCS — SIGNIFICANT CHANGE UP (ref 0–0)
PH UR: 5 — SIGNIFICANT CHANGE UP (ref 5–8)
PLATELET # BLD AUTO: 306 K/UL — SIGNIFICANT CHANGE UP (ref 150–400)
POTASSIUM SERPL-MCNC: 4 MMOL/L — SIGNIFICANT CHANGE UP (ref 3.5–5.3)
POTASSIUM SERPL-SCNC: 4 MMOL/L — SIGNIFICANT CHANGE UP (ref 3.5–5.3)
PROT UR-MCNC: 15 MG/DL
PROTHROM AB SERPL-ACNC: 12 SEC — SIGNIFICANT CHANGE UP (ref 10–12.9)
RBC # BLD: 4.04 M/UL — LOW (ref 4.2–5.8)
RBC # FLD: 15 % — HIGH (ref 10.3–14.5)
RBC CASTS # UR COMP ASSIST: NEGATIVE /HPF — SIGNIFICANT CHANGE UP (ref 0–4)
S AUREUS DNA NOSE QL NAA+PROBE: SIGNIFICANT CHANGE UP
SODIUM SERPL-SCNC: 144 MMOL/L — SIGNIFICANT CHANGE UP (ref 135–145)
SP GR SPEC: 1.02 — SIGNIFICANT CHANGE UP (ref 1.01–1.02)
UROBILINOGEN FLD QL: NEGATIVE MG/DL — SIGNIFICANT CHANGE UP
WBC # BLD: 9.69 K/UL — SIGNIFICANT CHANGE UP (ref 3.8–10.5)
WBC # FLD AUTO: 9.69 K/UL — SIGNIFICANT CHANGE UP (ref 3.8–10.5)
WBC UR QL: SIGNIFICANT CHANGE UP

## 2018-12-26 PROCEDURE — 71046 X-RAY EXAM CHEST 2 VIEWS: CPT | Mod: 26

## 2018-12-26 PROCEDURE — 93010 ELECTROCARDIOGRAM REPORT: CPT

## 2018-12-26 RX ORDER — POLYETHYLENE GLYCOL 3350 17 G/17G
17 POWDER, FOR SOLUTION ORAL
Qty: 0 | Refills: 0 | COMMUNITY

## 2018-12-26 RX ORDER — METFORMIN HYDROCHLORIDE 850 MG/1
1 TABLET ORAL
Qty: 0 | Refills: 0 | COMMUNITY

## 2018-12-26 RX ORDER — LEVOTHYROXINE SODIUM 125 MCG
1 TABLET ORAL
Qty: 0 | Refills: 0 | COMMUNITY

## 2018-12-26 RX ORDER — VERAPAMIL HCL 240 MG
1 CAPSULE, EXTENDED RELEASE PELLETS 24 HR ORAL
Qty: 0 | Refills: 0 | COMMUNITY

## 2018-12-26 RX ORDER — SENNA PLUS 8.6 MG/1
2 TABLET ORAL
Qty: 0 | Refills: 0 | COMMUNITY

## 2018-12-26 RX ORDER — GLIMEPIRIDE 1 MG
1 TABLET ORAL
Qty: 0 | Refills: 0 | COMMUNITY

## 2018-12-26 RX ORDER — DOCUSATE SODIUM 100 MG
1 CAPSULE ORAL
Qty: 0 | Refills: 0 | COMMUNITY

## 2018-12-26 RX ORDER — ATORVASTATIN CALCIUM 80 MG/1
1 TABLET, FILM COATED ORAL
Qty: 0 | Refills: 0 | COMMUNITY

## 2018-12-26 NOTE — H&P PST ADULT - HISTORY OF PRESENT ILLNESS
83 year old male PMH of HTN, HLD, CAD, cardiac stent x2, T2DM, Crohn's, prostate cancer  Still c/o back pain and neuropathy after spinal fusion; he presents to PST for dorsal column stimulator trial with percutaneous leads with fluoroscopic guidance complex program

## 2018-12-26 NOTE — H&P PST ADULT - PMH
CAD (coronary artery disease)    Crohn disease    Depression    Diabetes mellitus    Hearing loss  left hearing aid, right ear deaf  HTN (hypertension)    Hypercholesterolemia    Hypothyroidism    Intervertebral disc disorder    Macular degeneration  right eye  Neuropathy    Obesity    Prostate CA  s/p XRT  Sepsis  s/p back surgery  Stented coronary artery  2 stents, 20 yrs ago, 15 yrs ago

## 2018-12-26 NOTE — H&P PST ADULT - ASSESSMENT
83 year old male presents to PST for dorsal column stimulator trial with percutaneous leads with fluoroscopic guidance complex program     Plan:  1. PST instructions given ; NPO post midnight   2. Pt instructed to take following meds with sip of water : levothyroxin;   3. EZ wash instructions given & mupirocin instructions given  4. Medical Evaluation with Dr Patterson   5. instructed patient to stop metformin 24 hours prior to surgery

## 2018-12-27 PROBLEM — C61 MALIGNANT NEOPLASM OF PROSTATE: Chronic | Status: ACTIVE | Noted: 2017-10-12

## 2018-12-28 ENCOUNTER — TRANSCRIPTION ENCOUNTER (OUTPATIENT)
Age: 83
End: 2018-12-28

## 2019-01-04 ENCOUNTER — OUTPATIENT (OUTPATIENT)
Dept: OUTPATIENT SERVICES | Facility: HOSPITAL | Age: 84
LOS: 1 days | Discharge: HOME | End: 2019-01-04

## 2019-01-04 VITALS
SYSTOLIC BLOOD PRESSURE: 121 MMHG | RESPIRATION RATE: 16 BRPM | HEART RATE: 63 BPM | OXYGEN SATURATION: 96 % | DIASTOLIC BLOOD PRESSURE: 69 MMHG

## 2019-01-04 VITALS
RESPIRATION RATE: 16 BRPM | WEIGHT: 186.51 LBS | HEIGHT: 66.5 IN | OXYGEN SATURATION: 98 % | DIASTOLIC BLOOD PRESSURE: 68 MMHG | TEMPERATURE: 99 F | SYSTOLIC BLOOD PRESSURE: 136 MMHG | HEART RATE: 62 BPM

## 2019-01-04 DIAGNOSIS — Z96.641 PRESENCE OF RIGHT ARTIFICIAL HIP JOINT: Chronic | ICD-10-CM

## 2019-01-04 DIAGNOSIS — Z98.1 ARTHRODESIS STATUS: Chronic | ICD-10-CM

## 2019-01-04 DIAGNOSIS — Z90.49 ACQUIRED ABSENCE OF OTHER SPECIFIED PARTS OF DIGESTIVE TRACT: Chronic | ICD-10-CM

## 2019-01-04 RX ORDER — ACETAMINOPHEN 500 MG
1000 TABLET ORAL ONCE
Qty: 0 | Refills: 0 | Status: DISCONTINUED | OUTPATIENT
Start: 2019-01-04 | End: 2019-01-04

## 2019-01-04 RX ORDER — OXYCODONE HYDROCHLORIDE 5 MG/1
5 TABLET ORAL ONCE
Qty: 0 | Refills: 0 | Status: DISCONTINUED | OUTPATIENT
Start: 2019-01-04 | End: 2019-01-04

## 2019-01-04 RX ORDER — ONDANSETRON 8 MG/1
4 TABLET, FILM COATED ORAL ONCE
Qty: 0 | Refills: 0 | Status: DISCONTINUED | OUTPATIENT
Start: 2019-01-04 | End: 2019-01-04

## 2019-01-04 RX ORDER — SODIUM CHLORIDE 9 MG/ML
1000 INJECTION, SOLUTION INTRAVENOUS
Qty: 0 | Refills: 0 | Status: DISCONTINUED | OUTPATIENT
Start: 2019-01-04 | End: 2019-01-04

## 2019-01-04 RX ORDER — FENTANYL CITRATE 50 UG/ML
50 INJECTION INTRAVENOUS
Qty: 0 | Refills: 0 | Status: DISCONTINUED | OUTPATIENT
Start: 2019-01-04 | End: 2019-01-04

## 2019-01-04 NOTE — ASU PREOP CHECKLIST - INTERNAL PROSTHESES
THR, IOL, cardiac stents, spine hardwear/yes(specify) yes(specify)/THR, IOL, cardiac stents, spine hardware

## 2019-01-04 NOTE — ASU PATIENT PROFILE, ADULT - ABILITY TO HEAR (WITH HEARING AID OR HEARING APPLIANCE IF NORMALLY USED):
Mildly to Moderately Impaired: difficulty hearing in some environments or speaker may need to increase volume or speak distinctly/unable to hear in right ear, left ear with hearing aid

## 2019-01-04 NOTE — ASU DISCHARGE PLAN (ADULT/PEDIATRIC). - NURSING INSTRUCTIONS
For any problems or concerns,contact your doctor. [facility] patients should call the [facility]. If you cannot reach the doctor or clinic, call [facility] Emergency Department at [phone] or go to your local Emergency Department.  A responsible adult should be with you for the rest of the day and night for your safety and to help you if you needed. Resume your medications as listed on the attached Medication Record.  Begin with liquids and light food ( tea, toast, Jello, soups). Advance to what you normally eat. Liquids should taken in adequate amounts today.     CALL the DOCTOR:    -Fever greater than  101F  - Signs  of infection such as : increase pain,swelling,redness,or a bad  smell coming from the wound.  -Excessive amount of bleeding.  - Any pain that appears to be getting worse.  - Vomiting  -  If you have  not urinated 8 hours after surgery or have any difficulty urinating.     A responsible adult should be with you for the rest of the day and night for your safety and to help you if you needed.    Review attached FACT SHEET if applicable.

## 2019-01-04 NOTE — BRIEF OPERATIVE NOTE - PROCEDURE
<<-----Click on this checkbox to enter Procedure Insertion of dorsal column nerve stimulator for trial  01/04/2019  percutaneous leads x 2  Active  WCONYISHI

## 2019-01-08 DIAGNOSIS — Z88.0 ALLERGY STATUS TO PENICILLIN: ICD-10-CM

## 2019-01-08 DIAGNOSIS — M54.16 RADICULOPATHY, LUMBAR REGION: ICD-10-CM

## 2019-01-08 DIAGNOSIS — Z98.1 ARTHRODESIS STATUS: ICD-10-CM

## 2019-01-08 DIAGNOSIS — Z85.46 PERSONAL HISTORY OF MALIGNANT NEOPLASM OF PROSTATE: ICD-10-CM

## 2019-01-08 DIAGNOSIS — Z96.641 PRESENCE OF RIGHT ARTIFICIAL HIP JOINT: ICD-10-CM

## 2019-01-08 DIAGNOSIS — I12.9 HYPERTENSIVE CHRONIC KIDNEY DISEASE WITH STAGE 1 THROUGH STAGE 4 CHRONIC KIDNEY DISEASE, OR UNSPECIFIED CHRONIC KIDNEY DISEASE: ICD-10-CM

## 2019-01-08 DIAGNOSIS — E78.5 HYPERLIPIDEMIA, UNSPECIFIED: ICD-10-CM

## 2019-01-08 DIAGNOSIS — Z79.82 LONG TERM (CURRENT) USE OF ASPIRIN: ICD-10-CM

## 2019-01-08 DIAGNOSIS — I25.10 ATHEROSCLEROTIC HEART DISEASE OF NATIVE CORONARY ARTERY WITHOUT ANGINA PECTORIS: ICD-10-CM

## 2019-01-08 DIAGNOSIS — Z79.84 LONG TERM (CURRENT) USE OF ORAL HYPOGLYCEMIC DRUGS: ICD-10-CM

## 2019-01-08 DIAGNOSIS — N18.3 CHRONIC KIDNEY DISEASE, STAGE 3 (MODERATE): ICD-10-CM

## 2019-01-08 DIAGNOSIS — E11.22 TYPE 2 DIABETES MELLITUS WITH DIABETIC CHRONIC KIDNEY DISEASE: ICD-10-CM

## 2019-02-17 ENCOUNTER — TRANSCRIPTION ENCOUNTER (OUTPATIENT)
Age: 84
End: 2019-02-17

## 2019-02-20 PROBLEM — G62.9 POLYNEUROPATHY, UNSPECIFIED: Chronic | Status: ACTIVE | Noted: 2018-12-26

## 2019-02-20 PROBLEM — A41.9 SEPSIS, UNSPECIFIED ORGANISM: Chronic | Status: ACTIVE | Noted: 2018-12-26

## 2019-02-22 NOTE — PROGRESS NOTE ADULT - PROBLEM SELECTOR PLAN 1
Resolved.  likely Metabolic  encephalopathy?   -avoid sedatives and narcotics  -Continue neuro checks No

## 2019-02-25 ENCOUNTER — APPOINTMENT (OUTPATIENT)
Dept: ORTHOPEDIC SURGERY | Facility: CLINIC | Age: 84
End: 2019-02-25
Payer: MEDICARE

## 2019-02-25 VITALS
TEMPERATURE: 98.2 F | SYSTOLIC BLOOD PRESSURE: 124 MMHG | DIASTOLIC BLOOD PRESSURE: 64 MMHG | HEIGHT: 66 IN | WEIGHT: 185 LBS | HEART RATE: 74 BPM | BODY MASS INDEX: 29.73 KG/M2

## 2019-02-25 DIAGNOSIS — Z86.79 PERSONAL HISTORY OF OTHER DISEASES OF THE CIRCULATORY SYSTEM: ICD-10-CM

## 2019-02-25 DIAGNOSIS — Z85.9 PERSONAL HISTORY OF MALIGNANT NEOPLASM, UNSPECIFIED: ICD-10-CM

## 2019-02-25 DIAGNOSIS — Z86.69 PERSONAL HISTORY OF OTHER DISEASES OF THE NERVOUS SYSTEM AND SENSE ORGANS: ICD-10-CM

## 2019-02-25 PROCEDURE — 73502 X-RAY EXAM HIP UNI 2-3 VIEWS: CPT | Mod: RT

## 2019-02-25 PROCEDURE — 99202 OFFICE O/P NEW SF 15 MIN: CPT

## 2019-02-25 PROCEDURE — 73552 X-RAY EXAM OF FEMUR 2/>: CPT | Mod: RT

## 2019-03-05 PROBLEM — Z85.9 HISTORY OF MALIGNANT NEOPLASM: Status: RESOLVED | Noted: 2019-03-05 | Resolved: 2019-03-05

## 2019-03-05 PROBLEM — Z86.69 HISTORY OF NEUROPATHY: Status: RESOLVED | Noted: 2019-03-05 | Resolved: 2019-03-05

## 2019-03-05 PROBLEM — Z86.79 HISTORY OF HYPERTENSION: Status: RESOLVED | Noted: 2019-02-25 | Resolved: 2019-03-05

## 2019-03-05 PROBLEM — Z86.69 HISTORY OF NEUROPATHY: Status: RESOLVED | Noted: 2019-02-25 | Resolved: 2019-03-05

## 2019-03-05 PROBLEM — Z85.9 HISTORY OF MALIGNANT NEOPLASM: Status: RESOLVED | Noted: 2019-02-25 | Resolved: 2019-03-05

## 2019-03-05 RX ORDER — ASPIRIN 325 MG/1
TABLET, FILM COATED ORAL
Refills: 0 | Status: ACTIVE | COMMUNITY

## 2019-03-05 RX ORDER — LEVOTHYROXINE SODIUM 0.17 MG/1
TABLET ORAL
Refills: 0 | Status: ACTIVE | COMMUNITY

## 2019-03-05 RX ORDER — COLESTIPOL HYDROCHLORIDE 1 G/1
TABLET, FILM COATED ORAL
Refills: 0 | Status: ACTIVE | COMMUNITY

## 2019-03-05 RX ORDER — VITAMIN E ACID SUCCINATE 268 MG
TABLET ORAL
Refills: 0 | Status: ACTIVE | COMMUNITY

## 2019-03-05 RX ORDER — ASCORBIC ACID 500 MG
TABLET ORAL
Refills: 0 | Status: ACTIVE | COMMUNITY

## 2019-03-05 RX ORDER — ATORVASTATIN CALCIUM 80 MG/1
TABLET, FILM COATED ORAL
Refills: 0 | Status: ACTIVE | COMMUNITY

## 2019-03-06 DIAGNOSIS — M25.551 PAIN IN RIGHT HIP: ICD-10-CM

## 2019-06-12 NOTE — H&P PST ADULT - HEIGHT IN FEET
Duration Of Freeze Thaw-Cycle (Seconds): 0 Render Post-Care Instructions In Note?: no Detail Level: Detailed Consent: The patient's consent was obtained including but not limited to risks of crusting, scabbing, blistering, scarring, darker or lighter pigmentary change, recurrence, incomplete removal and infection. Post-Care Instructions: I reviewed with the patient in detail post-care instructions. Patient is to wear sunprotection, and avoid picking at any of the treated lesions. Pt may apply Vaseline to crusted or scabbing areas. 5

## 2019-09-12 NOTE — PATIENT PROFILE ADULT. - AS SC BRADEN SENSORY
[de-identified] : 63 year old male presents for RLE radiculopathy and a suspected L2 compression fx since 07/06/19.His main complaint is numbness, tingling, and weakness in RLE and has sleep disturbances. The pain radiates down to ankle on inside of the RLE.  The pain is reported as a 6/10 and is described as localized. Sleeping on his back and stomach exacerbates his symptoms He is a very active individual he does mountain biking and skiing. He has been going to PT. He had 10 sessions so far which provided some relief. He has not tried chiropractic care or pain management. He had plantar fascitis on his left foot earlier in the year. PMHx of HLD, HTN, and knee arthritis.  [Stable] : stable [___ mths] : [unfilled] month(s) ago [Intermit.] : ~He/She~ states the symptoms seem to be intermittent [Rest] : relieved by rest [Ataxia] : no ataxia [Incontinence] : no incontinence [Loss of Dexterity] : good dexterity [Urinary Ret.] : no urinary retention (2) very limited

## 2020-05-27 NOTE — H&P PST ADULT - NSANTHOSAYNRD_GEN_A_CORE
negative No. EVANGELINA screening performed.  STOP BANG Legend: 0-2 = LOW Risk; 3-4 = INTERMEDIATE Risk; 5-8 = HIGH Risk

## 2020-08-24 NOTE — ED ADULT TRIAGE NOTE - BANDS:
Hospital Facility-Based Program  Phase 2 Cardiac Rehab Weekly Progress Report      Patient prescribed exercise:  9:00 class. 3 times per week in rehab, 1-4 times per week at home for the amount of sessions/weeks specified by insurance. Current Levels: Treadmill: 2. 8mph/1% for 15 minutes, NuStep:  50 Eason for 15 minutes, UBE: 26 eason for 5 minutes. Progression Discussion: Maintain/Increase Aerobic exercise 15 minutes to work on endurance. Attempt to increase intensity by 5-20% for each modality this week. Try to increase intensities until Heidi Hall rates the exercises a 13-17 on Chelle RPE. Allergy;

## 2021-06-23 ENCOUNTER — APPOINTMENT (OUTPATIENT)
Dept: UROLOGY | Facility: CLINIC | Age: 86
End: 2021-06-23
Payer: MEDICARE

## 2021-06-23 DIAGNOSIS — N28.1 CYST OF KIDNEY, ACQUIRED: ICD-10-CM

## 2021-06-23 PROCEDURE — 99443: CPT | Mod: 95

## 2021-07-29 ENCOUNTER — NON-APPOINTMENT (OUTPATIENT)
Age: 86
End: 2021-07-29

## 2022-01-01 NOTE — DISCHARGE NOTE ADULT - MEDICATION SUMMARY - MEDICATIONS TO TAKE
LACTATION PROGRESS NOTE    DELIVERY INFORMATION:   Date and time of birth: 2022 5:40 PM   Delivery method: Vaginal, Spontaneous [250]   Gestational age:35w6d   Birth weight: 4 lb 13.4 oz (2195 g)     MATERNAL INFORMATION:   Age: 31 year old   /Para:    Maternal History:  Past Medical History:   Diagnosis Date   • Secondary hypertension       Social History     Tobacco Use   • Smoking status: Never Smoker   • Smokeless tobacco: Never Used   Vaping Use   • Vaping Use: never used   Substance Use Topics   • Alcohol use: Never   • Drug use: Never      Prior to Admission medications    Medication Sig Start Date End Date Taking? Authorizing Provider   labetalol (NORMODYNE) 200 MG tablet Take 1 tablet by mouth 2 times daily. 22  Yes Sobeida Littlejohn MD   Prenatal MV & Min w/FA-DHA (PRENATAL ADULT GUMMY/DHA/FA PO)    Yes Outside Provider   Blood Pressure Monitoring (Adult Blood Pressure Cuff Lg) Kit Disp one BP check 22   Sobeida Littlejohn MD      History of feedings with prior children:  Exclusively pumped and gave EBM and formula   Current feeding goal:  EP and bottle feed  Feeding history:  Bottle feeding Formula  And giving EBM     INFORMATION:   Baby in SCN and Late  Infant (34-36.6 wga)    LACTATION CONSULTATION:   Reason for visit:   Initial     Assessment/Intervention:  Upon arrival to room, mother had just finished pumping; 5ml colostrum expressed. Mother plans to EP and bottle feed similar to her previous child.  Discussed feeding plan and need to pump every 3 hours. [facility] grade pump at bedside.  Support person at bedside    Education provided:   Normal  behavior  Breastfeeding- General Information  when to call an LC and typical intake volume/stomach capacity  Breastfeeding- Establishing Milk supply/maintaining  Breastmilk production, engorgement, colostrum and prevention of sore nipples and care   Milk Expression  pumping frequency and duration breast pump-  Medela Symphony (double electric hospital grade pump) cleaning breast pump parts breast milk storage and collection  labeling breastmilk   Resources   Lactation Warm-Line   Supplementing and Formula  formula  Infant Separation  Breastmilk pumping, pumping duration and frequency, initiate vs maintenance mode, formula supplementation, Breastpump- Medela Symphony, cleaning breast pump parts, breast milk storage and collection and labeling breastmilk      Readiness: Acceptance  Method:  Explain  Written material at bedside/given     Response:  Verbalizes understanding       Supplies Given:  none at this time    Handouts Given:  New Beginnings Booklet - already at bedside    Plan:  Pump every 3 hours. Label EBM and bring to [facility] for feedings. Call LC for any further questions or concerns    For use at home, when needed, patient has plans to obtain breast pump (through insurance)     I will START or STAY ON the medications listed below when I get home from the hospital:    budesonide 0.5 mg/2 mL inhalation suspension  -- 0.5 milligram(s) inhaled once a day  -- Indication: For Home Med    oxyCODONE 5 mg oral tablet  -- 1 tab(s) by mouth every 6 hours, As needed, for severe pain  -- Indication: For Pain as needed     aspirin 81 mg oral tablet  -- 1 tab(s) by mouth once a day    instructed to foloow preop instructions as per surgeon and cardiologist  -- Indication: For Home Med    tamsulosin 0.4 mg oral capsule  -- 1 cap(s) by mouth once a day (at bedtime)  -- Indication: For Urinary retention    verapamil 200 mg/24 hours oral capsule, extended release  -- 1 cap(s) by mouth once a day   -- Indication: For Home Med    glimepiride 1 mg oral tablet  -- 1 tab(s) by mouth once a day  -- Indication: For DM2    metFORMIN 500 mg oral tablet  -- 1 tab(s) by mouth 2 times a day  -- Indication: For DM2    atorvastatin 20 mg oral tablet  -- 1 tab(s) by mouth once a day  -- Indication: For Home Med    colestipol 1 g oral tablet  -- 1 tab(s) by mouth once a day  -- Indication: For Home Med    lisinopril-hydroCHLOROthiazide 20 mg-25 mg oral tablet  -- 1 tab(s) by mouth once a day  -- Indication: For Home Med    busPIRone 10 mg oral tablet  -- 1 tab(s) by mouth 2 times a day  -- Indication: For Home Med    polyethylene glycol 3350 oral powder for reconstitution  -- 17 gram(s) by mouth once a day as needed for constipation   -- Indication: For Constipation    senna oral tablet  -- 2 tab(s) by mouth once a day as needed for constipation   -- Indication: For Constipation    docusate sodium 100 mg oral tablet  -- 1 tab(s) by mouth 3 times a day  -- Indication: For Constipation as needed    montelukast 10 mg oral tablet  -- 1 tab(s) by mouth once a day (at bedtime)  -- Indication: For Home Med    cyclobenzaprine 10 mg oral tablet  -- 1 tab(s) by mouth every 8 hours  -- Indication: For Home Med    pantoprazole 40 mg oral delayed release tablet  -- 1 tab(s) by mouth once a day (before a meal)  -- Indication: For Home Med    levoFLOXacin 500 mg oral tablet  -- 1 tab(s) by mouth every 24 hours x 1 more day  -- Indication: For Aspiration pna    levothyroxine 75 mcg (0.075 mg) oral tablet  -- 1 tab(s) by mouth once a day  -- Indication: For Hypothyroidism    Multiple Vitamins oral tablet  -- 1 tab(s) by mouth once a day  -- Indication: For Supplement     calcium-vitamin D 500 mg-200 intl units oral tablet  -- 1 tab(s) by mouth 3 times a day  -- Indication: For Supplement     ascorbic acid 500 mg oral tablet  -- 1 tab(s) by mouth 2 times a day  -- Indication: For Supplement    folic acid 1 mg oral tablet  -- 1 tab(s) by mouth once a day  -- Indication: For Supplement

## 2022-02-28 ENCOUNTER — NON-APPOINTMENT (OUTPATIENT)
Age: 87
End: 2022-02-28

## 2022-03-02 ENCOUNTER — APPOINTMENT (OUTPATIENT)
Dept: UROLOGY | Facility: CLINIC | Age: 87
End: 2022-03-02
Payer: MEDICARE

## 2022-03-02 VITALS
DIASTOLIC BLOOD PRESSURE: 76 MMHG | OXYGEN SATURATION: 95 % | WEIGHT: 185 LBS | SYSTOLIC BLOOD PRESSURE: 161 MMHG | BODY MASS INDEX: 29.73 KG/M2 | HEIGHT: 66 IN | HEART RATE: 61 BPM

## 2022-03-02 DIAGNOSIS — R31.0 GROSS HEMATURIA: ICD-10-CM

## 2022-03-02 PROCEDURE — 99204 OFFICE O/P NEW MOD 45 MIN: CPT

## 2022-03-02 RX ORDER — LEVOFLOXACIN 500 MG/1
500 TABLET, FILM COATED ORAL
Qty: 7 | Refills: 0 | Status: ACTIVE | COMMUNITY
Start: 2022-03-02 | End: 1900-01-01

## 2022-03-03 LAB
ANION GAP SERPL CALC-SCNC: 17 MMOL/L
APPEARANCE: CLEAR
BACTERIA: NEGATIVE
BILIRUBIN URINE: NEGATIVE
BLOOD URINE: NEGATIVE
BUN SERPL-MCNC: 53 MG/DL
CALCIUM SERPL-MCNC: 10.1 MG/DL
CHLORIDE SERPL-SCNC: 103 MMOL/L
CO2 SERPL-SCNC: 22 MMOL/L
COLOR: NORMAL
CREAT SERPL-MCNC: 1.67 MG/DL
EGFR: 40 ML/MIN/1.73M2
GLUCOSE QUALITATIVE U: NEGATIVE
GLUCOSE SERPL-MCNC: 46 MG/DL
HYALINE CASTS: 0 /LPF
KETONES URINE: NEGATIVE
LEUKOCYTE ESTERASE URINE: NEGATIVE
MICROSCOPIC-UA: NORMAL
NITRITE URINE: NEGATIVE
PH URINE: 5.5
POTASSIUM SERPL-SCNC: 5.3 MMOL/L
PROTEIN URINE: NORMAL
PSA SERPL-MCNC: <0.01 NG/ML
RED BLOOD CELLS URINE: 1 /HPF
SODIUM SERPL-SCNC: 142 MMOL/L
SPECIFIC GRAVITY URINE: 1.02
SQUAMOUS EPITHELIAL CELLS: 0 /HPF
URINE CYTOLOGY: NORMAL
UROBILINOGEN URINE: NORMAL
WHITE BLOOD CELLS URINE: 0 /HPF

## 2022-03-03 NOTE — HISTORY OF PRESENT ILLNESS
[FreeTextEntry1] : This patient presents for evaluation of intermittent gross hematuria with some lower tract symptoms

## 2022-03-03 NOTE — END OF VISIT
[FreeTextEntry3] : A CAT scan and cystourethroscopy are scheduled and urine is sent for culture analysis and cytology.  He will take a course of antibiotics as well

## 2022-03-04 LAB — BACTERIA UR CULT: NORMAL

## 2022-03-05 ENCOUNTER — NON-APPOINTMENT (OUTPATIENT)
Age: 87
End: 2022-03-05

## 2022-03-30 ENCOUNTER — APPOINTMENT (OUTPATIENT)
Dept: UROLOGY | Facility: CLINIC | Age: 87
End: 2022-03-30

## 2022-04-08 ENCOUNTER — INPATIENT (INPATIENT)
Facility: HOSPITAL | Age: 87
LOS: 9 days | Discharge: HOME HEALTH | DRG: 389 | End: 2022-04-18
Attending: SURGERY | Admitting: SURGERY
Payer: MEDICARE

## 2022-04-08 ENCOUNTER — NON-APPOINTMENT (OUTPATIENT)
Age: 87
End: 2022-04-08

## 2022-04-08 VITALS
RESPIRATION RATE: 18 BRPM | SYSTOLIC BLOOD PRESSURE: 173 MMHG | HEART RATE: 66 BPM | TEMPERATURE: 98 F | DIASTOLIC BLOOD PRESSURE: 71 MMHG | OXYGEN SATURATION: 100 %

## 2022-04-08 DIAGNOSIS — Z96.641 PRESENCE OF RIGHT ARTIFICIAL HIP JOINT: Chronic | ICD-10-CM

## 2022-04-08 DIAGNOSIS — K56.609 UNSPECIFIED INTESTINAL OBSTRUCTION, UNSPECIFIED AS TO PARTIAL VERSUS COMPLETE OBSTRUCTION: ICD-10-CM

## 2022-04-08 DIAGNOSIS — Z98.1 ARTHRODESIS STATUS: Chronic | ICD-10-CM

## 2022-04-08 DIAGNOSIS — Z90.49 ACQUIRED ABSENCE OF OTHER SPECIFIED PARTS OF DIGESTIVE TRACT: Chronic | ICD-10-CM

## 2022-04-08 DIAGNOSIS — R30.0 DYSURIA: ICD-10-CM

## 2022-04-08 LAB
ALBUMIN SERPL ELPH-MCNC: 3.5 G/DL — SIGNIFICANT CHANGE UP (ref 3.3–5)
ALP SERPL-CCNC: 89 U/L — SIGNIFICANT CHANGE UP (ref 40–120)
ALT FLD-CCNC: 33 U/L — SIGNIFICANT CHANGE UP (ref 12–78)
ANION GAP SERPL CALC-SCNC: 9 MMOL/L — SIGNIFICANT CHANGE UP (ref 5–17)
APPEARANCE UR: CLEAR — SIGNIFICANT CHANGE UP
AST SERPL-CCNC: 23 U/L — SIGNIFICANT CHANGE UP (ref 15–37)
BASOPHILS # BLD AUTO: 0.07 K/UL — SIGNIFICANT CHANGE UP (ref 0–0.2)
BASOPHILS NFR BLD AUTO: 0.5 % — SIGNIFICANT CHANGE UP (ref 0–2)
BILIRUB SERPL-MCNC: 0.4 MG/DL — SIGNIFICANT CHANGE UP (ref 0.2–1.2)
BILIRUB UR-MCNC: NEGATIVE — SIGNIFICANT CHANGE UP
BUN SERPL-MCNC: 44 MG/DL — HIGH (ref 7–23)
CALCIUM SERPL-MCNC: 10.1 MG/DL — SIGNIFICANT CHANGE UP (ref 8.5–10.1)
CHLORIDE SERPL-SCNC: 111 MMOL/L — HIGH (ref 96–108)
CO2 SERPL-SCNC: 22 MMOL/L — SIGNIFICANT CHANGE UP (ref 22–31)
COLOR SPEC: YELLOW — SIGNIFICANT CHANGE UP
CREAT SERPL-MCNC: 1.68 MG/DL — HIGH (ref 0.5–1.3)
DIFF PNL FLD: NEGATIVE — SIGNIFICANT CHANGE UP
EGFR: 39 ML/MIN/1.73M2 — LOW
EOSINOPHIL # BLD AUTO: 0.09 K/UL — SIGNIFICANT CHANGE UP (ref 0–0.5)
EOSINOPHIL NFR BLD AUTO: 0.7 % — SIGNIFICANT CHANGE UP (ref 0–6)
GLUCOSE SERPL-MCNC: 146 MG/DL — HIGH (ref 70–99)
GLUCOSE UR QL: NEGATIVE — SIGNIFICANT CHANGE UP
HCT VFR BLD CALC: 38.5 % — LOW (ref 39–50)
HGB BLD-MCNC: 12 G/DL — LOW (ref 13–17)
IMM GRANULOCYTES NFR BLD AUTO: 0.4 % — SIGNIFICANT CHANGE UP (ref 0–1.5)
KETONES UR-MCNC: NEGATIVE — SIGNIFICANT CHANGE UP
LACTATE SERPL-SCNC: 2.3 MMOL/L — HIGH (ref 0.7–2)
LEUKOCYTE ESTERASE UR-ACNC: NEGATIVE — SIGNIFICANT CHANGE UP
LIDOCAIN IGE QN: 82 U/L — SIGNIFICANT CHANGE UP (ref 73–393)
LYMPHOCYTES # BLD AUTO: 1.17 K/UL — SIGNIFICANT CHANGE UP (ref 1–3.3)
LYMPHOCYTES # BLD AUTO: 8.5 % — LOW (ref 13–44)
MCHC RBC-ENTMCNC: 29 PG — SIGNIFICANT CHANGE UP (ref 27–34)
MCHC RBC-ENTMCNC: 31.2 GM/DL — LOW (ref 32–36)
MCV RBC AUTO: 93 FL — SIGNIFICANT CHANGE UP (ref 80–100)
MONOCYTES # BLD AUTO: 0.58 K/UL — SIGNIFICANT CHANGE UP (ref 0–0.9)
MONOCYTES NFR BLD AUTO: 4.2 % — SIGNIFICANT CHANGE UP (ref 2–14)
NEUTROPHILS # BLD AUTO: 11.88 K/UL — HIGH (ref 1.8–7.4)
NEUTROPHILS NFR BLD AUTO: 85.7 % — HIGH (ref 43–77)
NITRITE UR-MCNC: NEGATIVE — SIGNIFICANT CHANGE UP
PH UR: 5 — SIGNIFICANT CHANGE UP (ref 5–8)
PLATELET # BLD AUTO: 310 K/UL — SIGNIFICANT CHANGE UP (ref 150–400)
POTASSIUM SERPL-MCNC: 4.3 MMOL/L — SIGNIFICANT CHANGE UP (ref 3.5–5.3)
POTASSIUM SERPL-SCNC: 4.3 MMOL/L — SIGNIFICANT CHANGE UP (ref 3.5–5.3)
PROT SERPL-MCNC: 7.4 GM/DL — SIGNIFICANT CHANGE UP (ref 6–8.3)
PROT UR-MCNC: 15
RAPID RVP RESULT: SIGNIFICANT CHANGE UP
RBC # BLD: 4.14 M/UL — LOW (ref 4.2–5.8)
RBC # FLD: 14.6 % — HIGH (ref 10.3–14.5)
SARS-COV-2 RNA SPEC QL NAA+PROBE: SIGNIFICANT CHANGE UP
SODIUM SERPL-SCNC: 142 MMOL/L — SIGNIFICANT CHANGE UP (ref 135–145)
SP GR SPEC: 1.01 — SIGNIFICANT CHANGE UP (ref 1.01–1.02)
UROBILINOGEN FLD QL: NEGATIVE — SIGNIFICANT CHANGE UP
WBC # BLD: 13.84 K/UL — HIGH (ref 3.8–10.5)
WBC # FLD AUTO: 13.84 K/UL — HIGH (ref 3.8–10.5)

## 2022-04-08 PROCEDURE — 74018 RADEX ABDOMEN 1 VIEW: CPT

## 2022-04-08 PROCEDURE — C9113: CPT

## 2022-04-08 PROCEDURE — 71045 X-RAY EXAM CHEST 1 VIEW: CPT

## 2022-04-08 PROCEDURE — 36415 COLL VENOUS BLD VENIPUNCTURE: CPT

## 2022-04-08 PROCEDURE — 85027 COMPLETE CBC AUTOMATED: CPT

## 2022-04-08 PROCEDURE — 82962 GLUCOSE BLOOD TEST: CPT

## 2022-04-08 PROCEDURE — 74177 CT ABD & PELVIS W/CONTRAST: CPT | Mod: 26,MA

## 2022-04-08 PROCEDURE — 84478 ASSAY OF TRIGLYCERIDES: CPT

## 2022-04-08 PROCEDURE — 99223 1ST HOSP IP/OBS HIGH 75: CPT

## 2022-04-08 PROCEDURE — 97162 PT EVAL MOD COMPLEX 30 MIN: CPT | Mod: GP

## 2022-04-08 PROCEDURE — 80053 COMPREHEN METABOLIC PANEL: CPT

## 2022-04-08 PROCEDURE — 83735 ASSAY OF MAGNESIUM: CPT

## 2022-04-08 PROCEDURE — 84100 ASSAY OF PHOSPHORUS: CPT

## 2022-04-08 PROCEDURE — 80048 BASIC METABOLIC PNL TOTAL CA: CPT

## 2022-04-08 PROCEDURE — 0225U NFCT DS DNA&RNA 21 SARSCOV2: CPT

## 2022-04-08 PROCEDURE — 71045 X-RAY EXAM CHEST 1 VIEW: CPT | Mod: 26

## 2022-04-08 PROCEDURE — 74250 X-RAY XM SM INT 1CNTRST STD: CPT

## 2022-04-08 PROCEDURE — 87635 SARS-COV-2 COVID-19 AMP PRB: CPT

## 2022-04-08 PROCEDURE — 74177 CT ABD & PELVIS W/CONTRAST: CPT

## 2022-04-08 PROCEDURE — 99285 EMERGENCY DEPT VISIT HI MDM: CPT | Mod: FS

## 2022-04-08 PROCEDURE — 83605 ASSAY OF LACTIC ACID: CPT

## 2022-04-08 PROCEDURE — 97116 GAIT TRAINING THERAPY: CPT | Mod: GP

## 2022-04-08 PROCEDURE — 83036 HEMOGLOBIN GLYCOSYLATED A1C: CPT

## 2022-04-08 RX ORDER — LEVOTHYROXINE SODIUM 125 MCG
0 TABLET ORAL
Qty: 0 | Refills: 0 | DISCHARGE

## 2022-04-08 RX ORDER — MORPHINE SULFATE 50 MG/1
2 CAPSULE, EXTENDED RELEASE ORAL ONCE
Refills: 0 | Status: DISCONTINUED | OUTPATIENT
Start: 2022-04-08 | End: 2022-04-08

## 2022-04-08 RX ORDER — ATORVASTATIN CALCIUM 80 MG/1
0 TABLET, FILM COATED ORAL
Qty: 0 | Refills: 0 | DISCHARGE

## 2022-04-08 RX ORDER — TRAMADOL HYDROCHLORIDE 50 MG/1
1 TABLET ORAL
Qty: 0 | Refills: 0 | DISCHARGE

## 2022-04-08 RX ORDER — ONDANSETRON 8 MG/1
4 TABLET, FILM COATED ORAL ONCE
Refills: 0 | Status: COMPLETED | OUTPATIENT
Start: 2022-04-08 | End: 2022-04-08

## 2022-04-08 RX ORDER — CEFEPIME 1 G/1
1000 INJECTION, POWDER, FOR SOLUTION INTRAMUSCULAR; INTRAVENOUS ONCE
Refills: 0 | Status: DISCONTINUED | OUTPATIENT
Start: 2022-04-08 | End: 2022-04-08

## 2022-04-08 RX ORDER — LISINOPRIL/HYDROCHLOROTHIAZIDE 10-12.5 MG
0 TABLET ORAL
Qty: 0 | Refills: 0 | DISCHARGE

## 2022-04-08 RX ORDER — SODIUM CHLORIDE 9 MG/ML
1000 INJECTION INTRAMUSCULAR; INTRAVENOUS; SUBCUTANEOUS ONCE
Refills: 0 | Status: COMPLETED | OUTPATIENT
Start: 2022-04-08 | End: 2022-04-08

## 2022-04-08 RX ORDER — METFORMIN HYDROCHLORIDE 850 MG/1
0 TABLET ORAL
Qty: 0 | Refills: 0 | DISCHARGE

## 2022-04-08 RX ORDER — PIPERACILLIN AND TAZOBACTAM 4; .5 G/20ML; G/20ML
3.38 INJECTION, POWDER, LYOPHILIZED, FOR SOLUTION INTRAVENOUS ONCE
Refills: 0 | Status: COMPLETED | OUTPATIENT
Start: 2022-04-08 | End: 2022-04-08

## 2022-04-08 RX ORDER — SODIUM CHLORIDE 9 MG/ML
1000 INJECTION, SOLUTION INTRAVENOUS ONCE
Refills: 0 | Status: COMPLETED | OUTPATIENT
Start: 2022-04-08 | End: 2022-04-08

## 2022-04-08 RX ORDER — SODIUM CHLORIDE 9 MG/ML
1500 INJECTION INTRAMUSCULAR; INTRAVENOUS; SUBCUTANEOUS ONCE
Refills: 0 | Status: COMPLETED | OUTPATIENT
Start: 2022-04-08 | End: 2022-04-08

## 2022-04-08 RX ORDER — GLIMEPIRIDE 1 MG
0 TABLET ORAL
Qty: 0 | Refills: 0 | DISCHARGE

## 2022-04-08 RX ORDER — VERAPAMIL HCL 240 MG
0 CAPSULE, EXTENDED RELEASE PELLETS 24 HR ORAL
Qty: 0 | Refills: 0 | DISCHARGE

## 2022-04-08 RX ORDER — TRAMADOL HYDROCHLORIDE 50 MG/1
0 TABLET ORAL
Qty: 0 | Refills: 0 | DISCHARGE

## 2022-04-08 RX ADMIN — SODIUM CHLORIDE 1000 MILLILITER(S): 9 INJECTION INTRAMUSCULAR; INTRAVENOUS; SUBCUTANEOUS at 13:37

## 2022-04-08 RX ADMIN — ONDANSETRON 4 MILLIGRAM(S): 8 TABLET, FILM COATED ORAL at 13:37

## 2022-04-08 RX ADMIN — ONDANSETRON 4 MILLIGRAM(S): 8 TABLET, FILM COATED ORAL at 19:26

## 2022-04-08 RX ADMIN — MORPHINE SULFATE 2 MILLIGRAM(S): 50 CAPSULE, EXTENDED RELEASE ORAL at 17:10

## 2022-04-08 RX ADMIN — SODIUM CHLORIDE 1500 MILLILITER(S): 9 INJECTION INTRAMUSCULAR; INTRAVENOUS; SUBCUTANEOUS at 17:07

## 2022-04-08 RX ADMIN — PIPERACILLIN AND TAZOBACTAM 200 GRAM(S): 4; .5 INJECTION, POWDER, LYOPHILIZED, FOR SOLUTION INTRAVENOUS at 18:53

## 2022-04-08 RX ADMIN — SODIUM CHLORIDE 750 MILLILITER(S): 9 INJECTION INTRAMUSCULAR; INTRAVENOUS; SUBCUTANEOUS at 15:17

## 2022-04-08 RX ADMIN — SODIUM CHLORIDE 1000 MILLILITER(S): 9 INJECTION INTRAMUSCULAR; INTRAVENOUS; SUBCUTANEOUS at 14:07

## 2022-04-08 RX ADMIN — MORPHINE SULFATE 2 MILLIGRAM(S): 50 CAPSULE, EXTENDED RELEASE ORAL at 14:27

## 2022-04-08 NOTE — ED STATDOCS - NS ED ATTENDING STATEMENT MOD
This was a shared visit with the JACOBY. I reviewed and verified the documentation and independently performed the documented:

## 2022-04-08 NOTE — ED ADULT NURSE REASSESSMENT NOTE - NS ED NURSE REASSESS COMMENT FT1
PT is A&O x 3. Pt resting comfortably in stretcher. PT states he is pain free at this time while he is laying down. Pt states after receiving pain medication he can now lay down flat in a supine position and he was not able to do that earlier. Vital signs stable as per flow sheet. Viral nasal swab completed and sent to lab. Call bell is in reach.

## 2022-04-08 NOTE — ED STATDOCS - OBJECTIVE STATEMENT
88 yo male w/PMH of sepsis, prostate CA, neuropathy, Crohn disease, hypothyroidism, depression, DM, HTN, stented coronary artery, CAD, macular degeneration, hard or hearing presents to the ED for right lower abdominal pain x3 weeks. States this morning with dysuria. No fevers. Had recent CT scan done, was sent to GI who sent pt for another CT scan on March 30th 2022 with no findings, pt forgot to bring results with him. Pt with friend in room, okay to speak in front of her. Pt recently on two courses of antibiotics up until x2 days ago. Reports a few years ago with similar symptoms and had an obstruction at that time.

## 2022-04-08 NOTE — ED STATDOCS - NS ED ROS FT
Constitutional: No fever or chills  Eyes: No visual changes  HEENT: No throat pain  CV: No chest pain  Resp: No SOB no cough  GI: +abd pain, no nausea or vomiting  : +dysuria  MSK: No musculoskeletal pain  Skin: No rash  Neuro: No headache

## 2022-04-08 NOTE — ED STATDOCS - WR ORDER STATUS 1
From: Sly Piper  To: Roosevelt Colvin  Sent: 10/31/2021 11:22 AM CDT  Subject: Coronary stent placement      Good Morning, I am having an MRI completed by [facility] and they asked I bring my stent card with me on this Tuesday, November 2 at 10:30. I can not find it. Is there anyway I can pick another one up before that or have your office call them. You did the stent on 5-20-08.  Thanks,  Sly  
Performed

## 2022-04-08 NOTE — ED STATDOCS - PROGRESS NOTE DETAILS
pt aware of ct finding showing distal small bowel obstruction RLQ, called Surgical resident and awaiting consult. -Zahra Dorantes PA-C spoke to Ar Desai and he is going to the OR now will eval after. -Zahra Dorantes PA-C spoke to Dr. Joyner and recommend colorectal consult believes finding from Chron' s complication. -Zahra Dorantes PA-C spoke to colorectal team will admit for further management.  -Zahra Dorantes PA-C spoke to colorectal team will admit for further management, recommend Zosyn for antibotics at this time  -Zahra Dorantes PA-C

## 2022-04-08 NOTE — ED STATDOCS - NSICDXPASTSURGICALHX_GEN_ALL_CORE_FT
PAST SURGICAL HISTORY:  History of hip replacement, total, right     History of hip replacement, total, right Revision    S/P colon resection     S/P lumbar fusion

## 2022-04-08 NOTE — ED ADULT NURSE REASSESSMENT NOTE - NS ED NURSE REASSESS COMMENT FT1
Pt c/o abd pain and c/o long wait for the surgeon  . morphine 2mg ivp was given. Jayna SCHROEDER, tried calling multiple times to the surgeon. .continuo monitoring.

## 2022-04-08 NOTE — ED ADULT NURSE NOTE - OBJECTIVE STATEMENT
Pt reports RLQ abd pain with nausea.  Told to come in by GI Dr. Padilla. Pt reports RLQ abd pain with nausea.  Told to come in by GI Dr. Padilla. Patient A&Ox4, accompanied by friend. c/o severe abdominal pain for about 3 weeks. Denies CP, SOB. has had two prior ct scans from march, [facility] to fax over report. Patient w/ hx of SBO 30 years ago.

## 2022-04-08 NOTE — ED ADULT NURSE NOTE - NSIMPLEMENTINTERV_GEN_ALL_ED
Implemented All Fall with Harm Risk Interventions:  [place] to call system. Call bell, personal items and telephone within reach. Instruct patient to call for assistance. Room bathroom lighting operational. Non-slip footwear when patient is off stretcher. Physically safe environment: no spills, clutter or unnecessary equipment. Stretcher in lowest position, wheels locked, appropriate side rails in place. Provide visual cue, wrist band, yellow gown, etc. Monitor gait and stability. Monitor for mental status changes and reorient to person, place, and time. Review medications for side effects contributing to fall risk. Reinforce activity limits and safety measures with patient and family. Provide visual clues: red socks.

## 2022-04-08 NOTE — ED STATDOCS - PHYSICAL EXAMINATION
Constitutional: NAD AAOx3  Eyes: PERRLA EOMI  Head: Normocephalic atraumatic  Mouth: MMM  Cardiac: regular rate   Resp: Lungs CTAB  GI: Abd s/nd mild RLQ ttp  Neuro: CN2-12 intact  Skin: No visible rashes

## 2022-04-08 NOTE — ED STATDOCS - NSICDXPASTMEDICALHX_GEN_ALL_CORE_FT
PAST MEDICAL HISTORY:  CAD (coronary artery disease)     Crohn disease     Depression     Diabetes mellitus     Hearing loss left hearing aid, right ear deaf    HTN (hypertension)     Hypercholesterolemia     Hypothyroidism     Intervertebral disc disorder     Macular degeneration right eye    Neuropathy     Obesity     Prostate CA s/p XRT    Sepsis s/p back surgery    Stented coronary artery 2 stents, 20 yrs ago, 15 yrs ago

## 2022-04-09 LAB
A1C WITH ESTIMATED AVERAGE GLUCOSE RESULT: 7 % — HIGH (ref 4–5.6)
ANION GAP SERPL CALC-SCNC: 8 MMOL/L — SIGNIFICANT CHANGE UP (ref 5–17)
BUN SERPL-MCNC: 37 MG/DL — HIGH (ref 7–23)
CALCIUM SERPL-MCNC: 9.5 MG/DL — SIGNIFICANT CHANGE UP (ref 8.5–10.1)
CHLORIDE SERPL-SCNC: 114 MMOL/L — HIGH (ref 96–108)
CO2 SERPL-SCNC: 21 MMOL/L — LOW (ref 22–31)
CREAT SERPL-MCNC: 1.6 MG/DL — HIGH (ref 0.5–1.3)
CULTURE RESULTS: SIGNIFICANT CHANGE UP
EGFR: 41 ML/MIN/1.73M2 — LOW
ESTIMATED AVERAGE GLUCOSE: 154 MG/DL — HIGH (ref 68–114)
GLUCOSE SERPL-MCNC: 141 MG/DL — HIGH (ref 70–99)
HCT VFR BLD CALC: 35.3 % — LOW (ref 39–50)
HGB BLD-MCNC: 11.2 G/DL — LOW (ref 13–17)
LACTATE SERPL-SCNC: 1.6 MMOL/L — SIGNIFICANT CHANGE UP (ref 0.7–2)
LACTATE SERPL-SCNC: 2.1 MMOL/L — HIGH (ref 0.7–2)
MAGNESIUM SERPL-MCNC: 2.1 MG/DL — SIGNIFICANT CHANGE UP (ref 1.6–2.6)
MCHC RBC-ENTMCNC: 29.2 PG — SIGNIFICANT CHANGE UP (ref 27–34)
MCHC RBC-ENTMCNC: 31.7 GM/DL — LOW (ref 32–36)
MCV RBC AUTO: 91.9 FL — SIGNIFICANT CHANGE UP (ref 80–100)
PHOSPHATE SERPL-MCNC: 3 MG/DL — SIGNIFICANT CHANGE UP (ref 2.5–4.5)
PLATELET # BLD AUTO: 284 K/UL — SIGNIFICANT CHANGE UP (ref 150–400)
POTASSIUM SERPL-MCNC: 4.6 MMOL/L — SIGNIFICANT CHANGE UP (ref 3.5–5.3)
POTASSIUM SERPL-SCNC: 4.6 MMOL/L — SIGNIFICANT CHANGE UP (ref 3.5–5.3)
RBC # BLD: 3.84 M/UL — LOW (ref 4.2–5.8)
RBC # FLD: 14.9 % — HIGH (ref 10.3–14.5)
SODIUM SERPL-SCNC: 143 MMOL/L — SIGNIFICANT CHANGE UP (ref 135–145)
SPECIMEN SOURCE: SIGNIFICANT CHANGE UP
WBC # BLD: 9.26 K/UL — SIGNIFICANT CHANGE UP (ref 3.8–10.5)
WBC # FLD AUTO: 9.26 K/UL — SIGNIFICANT CHANGE UP (ref 3.8–10.5)

## 2022-04-09 PROCEDURE — 99232 SBSQ HOSP IP/OBS MODERATE 35: CPT

## 2022-04-09 RX ORDER — TRAMADOL HYDROCHLORIDE 50 MG/1
50 TABLET ORAL THREE TIMES A DAY
Refills: 0 | Status: DISCONTINUED | OUTPATIENT
Start: 2022-04-09 | End: 2022-04-09

## 2022-04-09 RX ORDER — ONDANSETRON 8 MG/1
4 TABLET, FILM COATED ORAL EVERY 6 HOURS
Refills: 0 | Status: DISCONTINUED | OUTPATIENT
Start: 2022-04-09 | End: 2022-04-18

## 2022-04-09 RX ORDER — DEXTROSE MONOHYDRATE, SODIUM CHLORIDE, AND POTASSIUM CHLORIDE 50; .745; 4.5 G/1000ML; G/1000ML; G/1000ML
1000 INJECTION, SOLUTION INTRAVENOUS
Refills: 0 | Status: DISCONTINUED | OUTPATIENT
Start: 2022-04-09 | End: 2022-04-11

## 2022-04-09 RX ORDER — INSULIN LISPRO 100/ML
VIAL (ML) SUBCUTANEOUS EVERY 6 HOURS
Refills: 0 | Status: DISCONTINUED | OUTPATIENT
Start: 2022-04-09 | End: 2022-04-18

## 2022-04-09 RX ORDER — DIATRIZOATE MEGLUMINE 180 MG/ML
60 INJECTION, SOLUTION INTRAVESICAL ONCE
Refills: 0 | Status: COMPLETED | OUTPATIENT
Start: 2022-04-09 | End: 2022-04-09

## 2022-04-09 RX ORDER — CHOLECALCIFEROL (VITAMIN D3) 125 MCG
2000 CAPSULE ORAL DAILY
Refills: 0 | Status: DISCONTINUED | OUTPATIENT
Start: 2022-04-09 | End: 2022-04-18

## 2022-04-09 RX ORDER — DEXTROSE 50 % IN WATER 50 %
12.5 SYRINGE (ML) INTRAVENOUS ONCE
Refills: 0 | Status: DISCONTINUED | OUTPATIENT
Start: 2022-04-09 | End: 2022-04-18

## 2022-04-09 RX ORDER — SODIUM CHLORIDE 9 MG/ML
1000 INJECTION, SOLUTION INTRAVENOUS
Refills: 0 | Status: DISCONTINUED | OUTPATIENT
Start: 2022-04-09 | End: 2022-04-18

## 2022-04-09 RX ORDER — DEXTROSE 50 % IN WATER 50 %
25 SYRINGE (ML) INTRAVENOUS ONCE
Refills: 0 | Status: DISCONTINUED | OUTPATIENT
Start: 2022-04-09 | End: 2022-04-18

## 2022-04-09 RX ORDER — DEXTROSE 50 % IN WATER 50 %
15 SYRINGE (ML) INTRAVENOUS ONCE
Refills: 0 | Status: DISCONTINUED | OUTPATIENT
Start: 2022-04-09 | End: 2022-04-18

## 2022-04-09 RX ORDER — LEVOTHYROXINE SODIUM 125 MCG
88 TABLET ORAL DAILY
Refills: 0 | Status: DISCONTINUED | OUTPATIENT
Start: 2022-04-09 | End: 2022-04-18

## 2022-04-09 RX ORDER — ENOXAPARIN SODIUM 100 MG/ML
40 INJECTION SUBCUTANEOUS EVERY 24 HOURS
Refills: 0 | Status: DISCONTINUED | OUTPATIENT
Start: 2022-04-09 | End: 2022-04-18

## 2022-04-09 RX ORDER — VERAPAMIL HCL 240 MG
120 CAPSULE, EXTENDED RELEASE PELLETS 24 HR ORAL EVERY 12 HOURS
Refills: 0 | Status: DISCONTINUED | OUTPATIENT
Start: 2022-04-09 | End: 2022-04-18

## 2022-04-09 RX ORDER — ASPIRIN/CALCIUM CARB/MAGNESIUM 324 MG
81 TABLET ORAL DAILY
Refills: 0 | Status: DISCONTINUED | OUTPATIENT
Start: 2022-04-09 | End: 2022-04-18

## 2022-04-09 RX ORDER — ASCORBIC ACID 60 MG
1000 TABLET,CHEWABLE ORAL DAILY
Refills: 0 | Status: DISCONTINUED | OUTPATIENT
Start: 2022-04-09 | End: 2022-04-18

## 2022-04-09 RX ORDER — GLUCAGON INJECTION, SOLUTION 0.5 MG/.1ML
1 INJECTION, SOLUTION SUBCUTANEOUS ONCE
Refills: 0 | Status: DISCONTINUED | OUTPATIENT
Start: 2022-04-09 | End: 2022-04-18

## 2022-04-09 RX ADMIN — Medication 120 MILLIGRAM(S): at 09:54

## 2022-04-09 RX ADMIN — TRAMADOL HYDROCHLORIDE 50 MILLIGRAM(S): 50 TABLET ORAL at 09:55

## 2022-04-09 RX ADMIN — SODIUM CHLORIDE 1000 MILLILITER(S): 9 INJECTION, SOLUTION INTRAVENOUS at 00:13

## 2022-04-09 RX ADMIN — DIATRIZOATE MEGLUMINE 60 MILLILITER(S): 180 INJECTION, SOLUTION INTRAVESICAL at 12:05

## 2022-04-09 RX ADMIN — Medication 2000 UNIT(S): at 09:55

## 2022-04-09 RX ADMIN — ONDANSETRON 4 MILLIGRAM(S): 8 TABLET, FILM COATED ORAL at 15:45

## 2022-04-09 RX ADMIN — ENOXAPARIN SODIUM 40 MILLIGRAM(S): 100 INJECTION SUBCUTANEOUS at 06:39

## 2022-04-09 RX ADMIN — DEXTROSE MONOHYDRATE, SODIUM CHLORIDE, AND POTASSIUM CHLORIDE 100 MILLILITER(S): 50; .745; 4.5 INJECTION, SOLUTION INTRAVENOUS at 20:44

## 2022-04-09 RX ADMIN — Medication 88 MICROGRAM(S): at 06:39

## 2022-04-09 RX ADMIN — DEXTROSE MONOHYDRATE, SODIUM CHLORIDE, AND POTASSIUM CHLORIDE 100 MILLILITER(S): 50; .745; 4.5 INJECTION, SOLUTION INTRAVENOUS at 04:45

## 2022-04-09 RX ADMIN — Medication 120 MILLIGRAM(S): at 20:45

## 2022-04-09 RX ADMIN — Medication 1 TABLET(S): at 09:56

## 2022-04-09 RX ADMIN — Medication 81 MILLIGRAM(S): at 09:55

## 2022-04-09 RX ADMIN — Medication 1000 MILLIGRAM(S): at 09:56

## 2022-04-09 NOTE — H&P ADULT - ASSESSMENT
An 87 year old male with Crohn's disease and small bowel obstruction on CT scan, possibly chronic    Plan:  Admit under colorectal surgery  Keep NPO  IV fluids  Pain medications  Serial abdominal exam  resume home medications  Hospitalist consult  Repeat labs, including lactate  NG tube only if vomiting  Consider gastrografin challenge    Plan discussed with Dr Walden

## 2022-04-09 NOTE — H&P ADULT - NSHPPHYSICALEXAM_GEN_ALL_CORE
Alert, conscious, oriented  Not in pain or distress  No pallor, jaundice or cyanosis  Dry mucous membranes  Chest clear, equal air entry bilaterally  Abdomen: Protuberant, tympanic percussion note  Extremities: No swelling or tenderness  Neuro: normal power

## 2022-04-09 NOTE — H&P ADULT - NSHPLABSRESULTS_GEN_ALL_CORE
12.0   13.84 )-----------( 310      ( 08 Apr 2022 13:56 )             38.5   04-08    142  |  111<H>  |  44<H>  ----------------------------<  146<H>  4.3   |  22  |  1.68<H>    Ca    10.1      08 Apr 2022 13:56    TPro  7.4  /  Alb  3.5  /  TBili  0.4  /  DBili  x   /  AST  23  /  ALT  33  /  AlkPhos  89  04-08  < from: CT Abdomen and Pelvis w/ IV Cont (04.08.22 @ 14:55) >    FINDINGS:  LOWER CHEST: Coronary artery calcification    LIVER: Steatosis.  BILE DUCTS: Normal caliber.  GALLBLADDER: Cholelithiasis.  SPLEEN: Within normal limits.  PANCREAS: Within normal limits.  ADRENALS: Within normal limits.  KIDNEYS/URETERS: Bilateral renal cortical scarring. Bilateral renal   cortical cysts..    BLADDER: Limited visualization due to artifact from right THR  REPRODUCTIVE ORGANS: Limited visualization due to artifact from right   THR. Prostate radiation seeds.    BOWEL: Dilated fluid-filled small bowel loops with equalized the distal   small bowel loops proximal to a transition point in the right lower   quadrant at the level of a anastomotic suture line. Relatively   decompressed distal small bowel and colon. Findings compatible with an   acute mechanical distal small bowel obstruction. There is a small amount   of interloop fluid and mesenteric edema. Correlate with lactate levels   for possible ischemia. No obstructing mass. Colonic diverticula. The   colon is inadequately distended, no gross evidence of colon inflammation.   Submucosal fat deposition in the terminal ileum may reflect chronic   inflammation. Appendix no appendicitis  PERITONEUM: No ascites, extraluminal gas or drainable collection..  VESSELS: Nonaneurysmal  RETROPERITONEUM/LYMPH NODES: No lymphadenopathy.  ABDOMINAL WALL: Within normal limits.  BONES: Degenerative changes. Postoperative changes lumbar spine.    IMPRESSION:  Acute mechanical distal small bowel obstruction transitioning at an   anastomotic suture line.  Small amount of interloop fluid and mesenteric edema. Correlate with   lactate levels for possible ischemia  Additional nonacute findings as discussed      < end of copied text >

## 2022-04-09 NOTE — PATIENT PROFILE ADULT - FUNCTIONAL SCREEN CURRENT LEVEL: COMMUNICATION, MLM
CHIEF COMPLAINT: Right heel pain/plantar fasciitis. HISTORY:  Ms. Mulugeta Landaverde 72 y.o.  female presents today for the first visit for evaluation of right heel pain which started Aug 2021.  She is complaining of sharp pain 6/10. Pain is increase with standing and wallking. Pain is sharp early in the morning with first few steps, dull achy pain by the end of the day. No radiation and no numbness and tingling sensation. No other complaint. The patient is known to me for right Hallux rigidus, status post cheilectomy, 2012. She is a retired RN. Past Medical History:   Diagnosis Date    Arthritis    [address]    left    Osteoporosis        Past Surgical History:   Procedure Laterality Date     SECTION  1985    COLONOSCOPY  2019    COLONOSCOPY POLYPECTOMY SNARE/COLD BIOPSY performed by Livan Babin MD at [address]    club foot surgery    TOE SURGERY Right     TONSILLECTOMY         Social History     Socioeconomic History    Marital status:      Spouse name: Not on file    Number of children: Not on file    Years of education: Not on file    Highest education level: Not on file   Occupational History    Occupation: RN   Tobacco Use    Smoking status: Never Smoker    Smokeless tobacco: Never Used   Substance and Sexual Activity    Alcohol use: Yes     Comment: occasionally     Drug use: No    Sexual activity: Not on file   Other Topics Concern    Not on file   Social History Narrative    Not on file     Social Determinants of Health     Financial Resource Strain:     Difficulty of Paying Living Expenses:    Food Insecurity:     Worried About Running Out of Food in the Last Year:     [address] in the Last Year:    Transportation Needs:     Lack of Transportation (Medical):      Lack of Transportation (Non-Medical):    Physical Activity:     Days of Exercise per Week:     Minutes of Exercise per Session:    Stress:     Feeling of Stress :    Social Connections:     Frequency of Communication with Friends and Family:     Frequency of Social Gatherings with Friends and Family:     Attends Gnosticism Services:     Active Member of Clubs or Organizations:     Attends Club or Organization Meetings:     Marital Status:    Intimate Partner Violence:     Fear of Current or Ex-Partner:     Emotionally Abused:     Physically Abused:     Sexually Abused:        Family History   Problem Relation Age of Onset    High Blood Pressure Other        Current Outpatient Medications on File Prior to Visit   Medication Sig Dispense Refill    hydroCHLOROthiazide (MICROZIDE) 12.5 MG capsule TAKE ONE CAPSULE BY MOUTH EVERY MORNING 90 capsule 3    denosumab (PROLIA) 60 MG/ML SOSY SC injection Inject 1 mL into the skin once for 1 dose 1 mL 0    denosumab (PROLIA) 60 MG/ML SOSY SC injection Inject 1 mL into the skin once for 1 dose 1 mL 0    Estradiol (VAGIFEM) 10 MCG TABS vaginal tablet Place 1 tablet vaginally Twice a Week       Multiple Vitamins-Minerals (THERAPEUTIC MULTIVITAMIN-MINERALS) tablet Take 1 tablet by mouth daily. Vit D 1000 units       No current facility-administered medications on file prior to visit. Pertinent items are noted in HPI  Review of systems reviewed from Patient History Form dated on 9/1/2021 and available in the patient's chart under the Media tab. No change. PHYSICAL EXAMINATION:  Ms. Gretel Haskins is a very pleasant 72 y.o.  female who presents today in no acute distress, awake, alert, and oriented. She is well dressed, nourished and  groomed. Patient with normal affect. Height is  5' (1.524 m), weight is 120 lb (54.4 kg), Body mass index is 23.44 kg/m². Resting respiratory rate is 16.      Examination of the gait, showed that the patient walks heel-toe with a non-antalgic gait and no limp.  Examination of both ankles showing a good range of motion.  She has dorsiflexion to about 5 degrees bilaterally, which increased with knee flexion. She has intact sensation and good pedal pulses.  She has good strength in all four planes, including eversion, and has tenderness on deep palpation over the medial calcaneal tubercle, compared to the other side.  The ankles are stable to drawer test bilaterally, equally.      IMAGING:Xray's were reviewed, 3 views of the right foot taken in office today, and showed no acute fracture. Mild ha;;ux rigidus. No other abnormality. IMPRESSION: Right plantar fasciitis. PLAN: I discussed with the patient the treatment options. We recommended stretching exercises of the calf as well as stretching of the plantar fascia which was taught to the patient today. She will take NSAIDS Naprosyn. Use silicone heel pad. F/u in 6 weeks, PT if needed. She understands that this may take up to 6 months for the pain to resolve.         Volodymyr Rodríguez MD 0 = understands/communicates without difficulty

## 2022-04-09 NOTE — PATIENT PROFILE ADULT - FALL HARM RISK - HARM RISK INTERVENTIONS

## 2022-04-09 NOTE — H&P ADULT - ATTENDING COMMENTS
Patient seen and examined.  He has a remote history of Crohn's disease and had prior bowel resection.  Presents with abdominal pain and smoldering symptoms for several weeks.  He has not had a bowel movement since admission last 2 days.  CT showed small bowel obstruction with transition at the prior anastomosis.  On exam, he is in no distress.  Abdomen is somewhat distended with mild right lower quadrant tenderness.  No rebound or guarding.  No nausea vomiting and he has not required an NG tube.  Leukocytosis has resolved.  His lactic acidosis has also cleared.  I recommend a small bowel series to see if contrast makes it through the anastomotic site.  Continue bowel rest for now.  May need surgical intervention but no rush for that now.

## 2022-04-09 NOTE — H&P ADULT - HISTORY OF PRESENT ILLNESS
An 87 year old male who was seen in the ED for abdominal pain. Pain started around 3 weeks ago, located in the right lower abdomen, continuous, moderate, slightly increased by food, no relieving factors, associated with occasional nausea, no vomiting and no fever. His last bowel movement was 2 days ao but he is able to pass gas. He has visited his urologist for this pain as he also had an episode of hematuria initially that has resolved. He had a CT scan done than which he was told that was normal. He was referred to his gastroenterologist (Dr Wade) who also repeated the CT scan. This also did not show any acute causes of abdominal pain. He was told by Dr Padilla that if his pain persisted, he should come to the ED for management.   He does not remember when his last colonoscopy was.

## 2022-04-10 LAB
ANION GAP SERPL CALC-SCNC: 7 MMOL/L — SIGNIFICANT CHANGE UP (ref 5–17)
BUN SERPL-MCNC: 36 MG/DL — HIGH (ref 7–23)
CALCIUM SERPL-MCNC: 9.5 MG/DL — SIGNIFICANT CHANGE UP (ref 8.5–10.1)
CHLORIDE SERPL-SCNC: 119 MMOL/L — HIGH (ref 96–108)
CO2 SERPL-SCNC: 21 MMOL/L — LOW (ref 22–31)
CREAT SERPL-MCNC: 1.64 MG/DL — HIGH (ref 0.5–1.3)
EGFR: 40 ML/MIN/1.73M2 — LOW
GLUCOSE SERPL-MCNC: 127 MG/DL — HIGH (ref 70–99)
HCT VFR BLD CALC: 33.4 % — LOW (ref 39–50)
HGB BLD-MCNC: 10.4 G/DL — LOW (ref 13–17)
MAGNESIUM SERPL-MCNC: 2.2 MG/DL — SIGNIFICANT CHANGE UP (ref 1.6–2.6)
MCHC RBC-ENTMCNC: 29.1 PG — SIGNIFICANT CHANGE UP (ref 27–34)
MCHC RBC-ENTMCNC: 31.1 GM/DL — LOW (ref 32–36)
MCV RBC AUTO: 93.3 FL — SIGNIFICANT CHANGE UP (ref 80–100)
PHOSPHATE SERPL-MCNC: 2.8 MG/DL — SIGNIFICANT CHANGE UP (ref 2.5–4.5)
PLATELET # BLD AUTO: 274 K/UL — SIGNIFICANT CHANGE UP (ref 150–400)
POTASSIUM SERPL-MCNC: 4.6 MMOL/L — SIGNIFICANT CHANGE UP (ref 3.5–5.3)
POTASSIUM SERPL-SCNC: 4.6 MMOL/L — SIGNIFICANT CHANGE UP (ref 3.5–5.3)
RBC # BLD: 3.58 M/UL — LOW (ref 4.2–5.8)
RBC # FLD: 14.9 % — HIGH (ref 10.3–14.5)
SODIUM SERPL-SCNC: 147 MMOL/L — HIGH (ref 135–145)
WBC # BLD: 7.11 K/UL — SIGNIFICANT CHANGE UP (ref 3.8–10.5)
WBC # FLD AUTO: 7.11 K/UL — SIGNIFICANT CHANGE UP (ref 3.8–10.5)

## 2022-04-10 PROCEDURE — 74018 RADEX ABDOMEN 1 VIEW: CPT | Mod: 26,59

## 2022-04-10 PROCEDURE — 99221 1ST HOSP IP/OBS SF/LOW 40: CPT

## 2022-04-10 PROCEDURE — 74250 X-RAY XM SM INT 1CNTRST STD: CPT | Mod: 26

## 2022-04-10 PROCEDURE — 99232 SBSQ HOSP IP/OBS MODERATE 35: CPT

## 2022-04-10 RX ORDER — LANOLIN ALCOHOL/MO/W.PET/CERES
5 CREAM (GRAM) TOPICAL AT BEDTIME
Refills: 0 | Status: DISCONTINUED | OUTPATIENT
Start: 2022-04-10 | End: 2022-04-18

## 2022-04-10 RX ORDER — PANTOPRAZOLE SODIUM 20 MG/1
40 TABLET, DELAYED RELEASE ORAL DAILY
Refills: 0 | Status: DISCONTINUED | OUTPATIENT
Start: 2022-04-10 | End: 2022-04-18

## 2022-04-10 RX ADMIN — Medication 5 MILLIGRAM(S): at 21:10

## 2022-04-10 RX ADMIN — PANTOPRAZOLE SODIUM 40 MILLIGRAM(S): 20 TABLET, DELAYED RELEASE ORAL at 09:38

## 2022-04-10 RX ADMIN — Medication 120 MILLIGRAM(S): at 21:09

## 2022-04-10 RX ADMIN — Medication 88 MICROGRAM(S): at 06:15

## 2022-04-10 RX ADMIN — Medication 81 MILLIGRAM(S): at 09:55

## 2022-04-10 RX ADMIN — Medication 2000 UNIT(S): at 09:37

## 2022-04-10 RX ADMIN — ONDANSETRON 4 MILLIGRAM(S): 8 TABLET, FILM COATED ORAL at 00:01

## 2022-04-10 RX ADMIN — DEXTROSE MONOHYDRATE, SODIUM CHLORIDE, AND POTASSIUM CHLORIDE 100 MILLILITER(S): 50; .745; 4.5 INJECTION, SOLUTION INTRAVENOUS at 06:17

## 2022-04-10 RX ADMIN — Medication 1000 MILLIGRAM(S): at 09:39

## 2022-04-10 RX ADMIN — Medication 120 MILLIGRAM(S): at 09:40

## 2022-04-10 RX ADMIN — Medication 1 TABLET(S): at 09:38

## 2022-04-10 RX ADMIN — ENOXAPARIN SODIUM 40 MILLIGRAM(S): 100 INJECTION SUBCUTANEOUS at 06:56

## 2022-04-10 NOTE — PROGRESS NOTE ADULT - SUBJECTIVE AND OBJECTIVE BOX
CC: Abd pain  HPI:  87 year old man with PMHx of DM2, HTN, HLD, CAD s/p stents, Crohn's, hx prostate ca, hypothyroidism who presents with abdominal pain.  Pt states pain has happening for 3 months, worsening.  Seen in out patient setting, CT imaging at that time negative for acute pathology.  He was inevitably sent to ED for persistent pain.    In ED: Found to have distal SBO, admitted under surgery.  Pt is maintained NPO, denies any fever, chills, n, v, passing gas.  Per nursing staff pt had BM last night.  Initially LA elevated, now normalized with ongoing IVFs.      Review of Systems:  Other Review of Systems: All other review of systems negative, except as noted in HPI      Allergies and Intolerances:        Allergies:  	penicillin: Drug, Hives        Patient History:   Past Medical, Past Surgical, and Family History:  PAST MEDICAL HISTORY:  CAD (coronary artery disease)   Crohn disease   Depression   Diabetes mellitus   Hearing loss left hearing aid, right ear deaf  HTN (hypertension)   Hypercholesterolemia   Hypothyroidism   Intervertebral disc disorder   Macular degeneration right eye  Neuropathy   Obesity   Prostate CA s/p XRT  Sepsis s/p back surgery  Stented coronary artery 2 stents, 20 yrs ago, 15 yrs ago.     PAST SURGICAL HISTORY:  History of hip replacement, total, right   History of hip replacement, total, right Revision  S/P colon resection   S/P lumbar fusion.     FAMILY HISTORY/Social history:  nonsmoker or substance user  No pertinent family history in first degree relatives. No pertinent family history of: VTE. No FHx of porphyia.     Vital Signs Last 24 Hrs  T(C): 36.7 (10 Apr 2022 09:46), Max: 36.8 (09 Apr 2022 20:40)  T(F): 98.1 (10 Apr 2022 09:46), Max: 98.3 (09 Apr 2022 20:40)  HR: 68 (10 Apr 2022 09:46) (66 - 74)  BP: 123/55 (10 Apr 2022 09:46) (123/55 - 153/60)  BP(mean): --  RR: 18 (10 Apr 2022 09:46) (18 - 18)  SpO2: 94% (10 Apr 2022 09:46) (94% - 97%)    PHYSICAL EXAM:    Constitutional: NAD, awake and alert, well-developed  HEENT: PERR, EOMI, Normal Hearing, MMM  Neck: Soft and supple  Respiratory: Breath sounds are clear bilaterally, No wheezing, rales or rhonchi  Cardiovascular: S1 and S2, regular rate and rhythm, no Murmurs, gallops or rubs  Gastrointestinal: Bowel Sounds present, soft, + distended abd, no guarding, no rebound  Extremities: No peripheral edema  Neurological: A/O x 3, no focal deficits in my limited exam      MEDICATIONS  (STANDING):  ascorbic acid  Oral Tab/Cap - Peds 1000 milliGRAM(s) Oral daily  aspirin  chewable 81 milliGRAM(s) Oral daily  cholecalciferol Oral Tab/Cap - Peds 2000 Unit(s) Oral daily  dextrose 5%. 1000 milliLiter(s) (50 mL/Hr) IV Continuous <Continuous>  dextrose 5%. 1000 milliLiter(s) (100 mL/Hr) IV Continuous <Continuous>  dextrose 50% Injectable 25 Gram(s) IV Push once  dextrose 50% Injectable 12.5 Gram(s) IV Push once  dextrose 50% Injectable 25 Gram(s) IV Push once  enoxaparin Injectable 40 milliGRAM(s) SubCutaneous every 24 hours  glucagon  Injectable 1 milliGRAM(s) IntraMuscular once  insulin lispro (ADMELOG) corrective regimen sliding scale   SubCutaneous every 6 hours  levothyroxine 88 MICROGram(s) Oral daily  melatonin 5 milliGRAM(s) Oral at bedtime  multivitamin 1 Tablet(s) Oral daily  pantoprazole  Injectable 40 milliGRAM(s) IV Push daily  sodium chloride 0.9% with potassium chloride 20 mEq/L 1000 milliLiter(s) (100 mL/Hr) IV Continuous <Continuous>  verapamil 120 milliGRAM(s) Oral every 12 hours    MEDICATIONS  (PRN):  dextrose Oral Gel 15 Gram(s) Oral once PRN Blood Glucose LESS THAN 70 milliGRAM(s)/deciliter  ondansetron Injectable 4 milliGRAM(s) IV Push every 6 hours PRN Nausea and/or Vomiting  traMADol 50 milliGRAM(s) Oral three times a day PRN Moderate Pain (4 - 6)    Assessment: 87 year old man who presents with abd pain secondary to distal SBO.    ABd pain / distal SBO / Hx of Crohns:  -leukocytosis resolved  -Lactic acidosis resolved  -Scr 1.6, monitor, possible ROSE  -NPO except meds  -IVFs  -supportive care / pain management    DM2:  -RAISS    HTN / HLD / CAD s/p stents / hypothyroidism:  -resume home meds      DVT ppx:  -Lovenox

## 2022-04-10 NOTE — PROGRESS NOTE ADULT - SUBJECTIVE AND OBJECTIVE BOX
No acute events overnight. He had a few bowel movements overnight and his pain has improved but not resolved completely. Some flatus passed as well. No fevers or chills. Only the first two images from small bowel series are available.     Exam:  Vital Signs Last 24 Hrs  T(C): 36.7 (10 Apr 2022 09:46), Max: 36.8 (09 Apr 2022 20:40)  T(F): 98.1 (10 Apr 2022 09:46), Max: 98.3 (09 Apr 2022 20:40)  HR: 68 (10 Apr 2022 09:46) (66 - 74)  BP: 123/55 (10 Apr 2022 09:46) (123/55 - 153/60)  RR: 18 (10 Apr 2022 09:46) (18 - 18)  SpO2: 94% (10 Apr 2022 09:46) (94% - 97%)    In no distress  Abdomen soft, some distension, tender in the RLQ without rebound or guarding.   Alert and oriented x 3                            10.4   7.11  )-----------( 274      ( 10 Apr 2022 07:54 )             33.4   04-10    147<H>  |  119<H>  |  36<H>  ----------------------------<  127<H>  4.6   |  21<L>  |  1.64<H>    Ca    9.5      10 Apr 2022 07:54  Phos  2.8     04-10  Mg     2.2     04-10    MEDICATIONS  (STANDING):  ascorbic acid  Oral Tab/Cap - Peds 1000 milliGRAM(s) Oral daily  aspirin  chewable 81 milliGRAM(s) Oral daily  cholecalciferol Oral Tab/Cap - Peds 2000 Unit(s) Oral daily  dextrose 5%. 1000 milliLiter(s) (50 mL/Hr) IV Continuous <Continuous>  dextrose 5%. 1000 milliLiter(s) (100 mL/Hr) IV Continuous <Continuous>  dextrose 50% Injectable 25 Gram(s) IV Push once  dextrose 50% Injectable 12.5 Gram(s) IV Push once  dextrose 50% Injectable 25 Gram(s) IV Push once  enoxaparin Injectable 40 milliGRAM(s) SubCutaneous every 24 hours  glucagon  Injectable 1 milliGRAM(s) IntraMuscular once  insulin lispro (ADMELOG) corrective regimen sliding scale   SubCutaneous every 6 hours  levothyroxine 88 MICROGram(s) Oral daily  melatonin 5 milliGRAM(s) Oral at bedtime  multivitamin 1 Tablet(s) Oral daily  pantoprazole  Injectable 40 milliGRAM(s) IV Push daily  sodium chloride 0.9% with potassium chloride 20 mEq/L 1000 milliLiter(s) (100 mL/Hr) IV Continuous <Continuous>  verapamil 120 milliGRAM(s) Oral every 12 hours

## 2022-04-11 ENCOUNTER — TRANSCRIPTION ENCOUNTER (OUTPATIENT)
Age: 87
End: 2022-04-11

## 2022-04-11 LAB
ALBUMIN SERPL ELPH-MCNC: 2.7 G/DL — LOW (ref 3.3–5)
ALP SERPL-CCNC: 63 U/L — SIGNIFICANT CHANGE UP (ref 40–120)
ALT FLD-CCNC: 22 U/L — SIGNIFICANT CHANGE UP (ref 12–78)
ANION GAP SERPL CALC-SCNC: 7 MMOL/L — SIGNIFICANT CHANGE UP (ref 5–17)
AST SERPL-CCNC: 21 U/L — SIGNIFICANT CHANGE UP (ref 15–37)
BILIRUB SERPL-MCNC: 0.3 MG/DL — SIGNIFICANT CHANGE UP (ref 0.2–1.2)
BUN SERPL-MCNC: 31 MG/DL — HIGH (ref 7–23)
CALCIUM SERPL-MCNC: 9.6 MG/DL — SIGNIFICANT CHANGE UP (ref 8.5–10.1)
CHLORIDE SERPL-SCNC: 119 MMOL/L — HIGH (ref 96–108)
CO2 SERPL-SCNC: 21 MMOL/L — LOW (ref 22–31)
CREAT SERPL-MCNC: 1.48 MG/DL — HIGH (ref 0.5–1.3)
EGFR: 46 ML/MIN/1.73M2 — LOW
GLUCOSE SERPL-MCNC: 91 MG/DL — SIGNIFICANT CHANGE UP (ref 70–99)
HCT VFR BLD CALC: 36.7 % — LOW (ref 39–50)
HGB BLD-MCNC: 11 G/DL — LOW (ref 13–17)
MAGNESIUM SERPL-MCNC: 2.2 MG/DL — SIGNIFICANT CHANGE UP (ref 1.6–2.6)
MCHC RBC-ENTMCNC: 28.9 PG — SIGNIFICANT CHANGE UP (ref 27–34)
MCHC RBC-ENTMCNC: 30 GM/DL — LOW (ref 32–36)
MCV RBC AUTO: 96.6 FL — SIGNIFICANT CHANGE UP (ref 80–100)
PHOSPHATE SERPL-MCNC: 2.2 MG/DL — LOW (ref 2.5–4.5)
PLATELET # BLD AUTO: 257 K/UL — SIGNIFICANT CHANGE UP (ref 150–400)
POTASSIUM SERPL-MCNC: 4.7 MMOL/L — SIGNIFICANT CHANGE UP (ref 3.5–5.3)
POTASSIUM SERPL-SCNC: 4.7 MMOL/L — SIGNIFICANT CHANGE UP (ref 3.5–5.3)
PROT SERPL-MCNC: 6.1 GM/DL — SIGNIFICANT CHANGE UP (ref 6–8.3)
RBC # BLD: 3.8 M/UL — LOW (ref 4.2–5.8)
RBC # FLD: 15 % — HIGH (ref 10.3–14.5)
SODIUM SERPL-SCNC: 147 MMOL/L — HIGH (ref 135–145)
WBC # BLD: 6.68 K/UL — SIGNIFICANT CHANGE UP (ref 3.8–10.5)
WBC # FLD AUTO: 6.68 K/UL — SIGNIFICANT CHANGE UP (ref 3.8–10.5)

## 2022-04-11 PROCEDURE — 74018 RADEX ABDOMEN 1 VIEW: CPT | Mod: 26

## 2022-04-11 PROCEDURE — 99232 SBSQ HOSP IP/OBS MODERATE 35: CPT

## 2022-04-11 PROCEDURE — 71045 X-RAY EXAM CHEST 1 VIEW: CPT | Mod: 26

## 2022-04-11 PROCEDURE — 99233 SBSQ HOSP IP/OBS HIGH 50: CPT

## 2022-04-11 RX ORDER — SODIUM,POTASSIUM PHOSPHATES 278-250MG
1 POWDER IN PACKET (EA) ORAL ONCE
Refills: 0 | Status: COMPLETED | OUTPATIENT
Start: 2022-04-11 | End: 2022-04-11

## 2022-04-11 RX ORDER — ACETAMINOPHEN 500 MG
1000 TABLET ORAL ONCE
Refills: 0 | Status: DISCONTINUED | OUTPATIENT
Start: 2022-04-11 | End: 2022-04-18

## 2022-04-11 RX ORDER — IBUPROFEN 200 MG
600 TABLET ORAL EVERY 6 HOURS
Refills: 0 | Status: DISCONTINUED | OUTPATIENT
Start: 2022-04-11 | End: 2022-04-11

## 2022-04-11 RX ORDER — MORPHINE SULFATE 50 MG/1
2 CAPSULE, EXTENDED RELEASE ORAL EVERY 4 HOURS
Refills: 0 | Status: DISCONTINUED | OUTPATIENT
Start: 2022-04-11 | End: 2022-04-17

## 2022-04-11 RX ORDER — DEXTROSE MONOHYDRATE, SODIUM CHLORIDE, AND POTASSIUM CHLORIDE 50; .745; 4.5 G/1000ML; G/1000ML; G/1000ML
1000 INJECTION, SOLUTION INTRAVENOUS
Refills: 0 | Status: DISCONTINUED | OUTPATIENT
Start: 2022-04-11 | End: 2022-04-14

## 2022-04-11 RX ADMIN — ONDANSETRON 4 MILLIGRAM(S): 8 TABLET, FILM COATED ORAL at 17:26

## 2022-04-11 RX ADMIN — Medication 1.25 MILLIGRAM(S): at 23:54

## 2022-04-11 RX ADMIN — Medication 1 TABLET(S): at 08:54

## 2022-04-11 RX ADMIN — DEXTROSE MONOHYDRATE, SODIUM CHLORIDE, AND POTASSIUM CHLORIDE 75 MILLILITER(S): 50; .745; 4.5 INJECTION, SOLUTION INTRAVENOUS at 18:28

## 2022-04-11 RX ADMIN — Medication 2000 UNIT(S): at 08:55

## 2022-04-11 RX ADMIN — ENOXAPARIN SODIUM 40 MILLIGRAM(S): 100 INJECTION SUBCUTANEOUS at 06:01

## 2022-04-11 RX ADMIN — PANTOPRAZOLE SODIUM 40 MILLIGRAM(S): 20 TABLET, DELAYED RELEASE ORAL at 08:56

## 2022-04-11 RX ADMIN — Medication 1 PACKET(S): at 16:41

## 2022-04-11 RX ADMIN — Medication 81 MILLIGRAM(S): at 08:56

## 2022-04-11 RX ADMIN — Medication 1.25 MILLIGRAM(S): at 18:58

## 2022-04-11 RX ADMIN — Medication 1000 MILLIGRAM(S): at 08:56

## 2022-04-11 RX ADMIN — MORPHINE SULFATE 2 MILLIGRAM(S): 50 CAPSULE, EXTENDED RELEASE ORAL at 18:28

## 2022-04-11 RX ADMIN — Medication 88 MICROGRAM(S): at 06:00

## 2022-04-11 RX ADMIN — Medication 0.5 MILLIGRAM(S): at 23:59

## 2022-04-11 RX ADMIN — DEXTROSE MONOHYDRATE, SODIUM CHLORIDE, AND POTASSIUM CHLORIDE 75 MILLILITER(S): 50; .745; 4.5 INJECTION, SOLUTION INTRAVENOUS at 18:18

## 2022-04-11 NOTE — PROVIDER CONTACT NOTE (OTHER) - ACTION/TREATMENT ORDERED:
Resident stated that that is OK and the he will order another Flat plate Xray for the morning.
hold oral verapamil at bedtime. administer enalapril iv at 12am
Diet changed to NPO, IVF ordered, Sx resident to come assess pt per pt request.
MD to order BP meds. Will continue to monitor.

## 2022-04-11 NOTE — DISCHARGE NOTE PROVIDER - HOSPITAL COURSE
Patient was admitted under the colorectal surgery service. Patient was managed conservatively. Bowel function was monitored. Serial abdominal exams were performed. Gastrografin challenge was performed. Patient was having bowel function. Diet was advanced and patient was tolerating without nausea or vomiting. Patient was stable for discharge. Patient was admitted under the colorectal surgery service. Patient was managed conservatively. Bowel function was monitored. Serial abdominal exams were performed. Gastrografin challenge was performed. Patient was having bowel function. Diet was advanced and patient was tolerating without nausea or vomiting. Repeat CT scan showed area of obstruction. Pt. kept NPO for 7 days with PPN and fat emulsion started. Remained to be managed conservatively for small bowel obstruction. Patient refusing surgery but has return of bowel function. Abdominal distention is still present but patient claims this is baseline for him. Patient is now in stable condition and ready for discharge home.

## 2022-04-11 NOTE — DISCHARGE NOTE PROVIDER - CARE PROVIDER_API CALL
Maria Ines Walden)  ColonRectal Surgery; [place]  [address] [address]  Phone: [phone]  Fax: [phone]  Follow Up Time: 2 weeks

## 2022-04-11 NOTE — PROCEDURE NOTE - ADDITIONAL PROCEDURE DETAILS
pt w/ active vomiting, worsening abd pain and distention requiring NG tube for decompression.   18 fr ng tube placed in right nare with + return of gastric contents.   pt tolerated procedure well.   surgical team and Dr. Nguyen made aware of events and procedure.   no complications

## 2022-04-11 NOTE — PROVIDER CONTACT NOTE (OTHER) - SITUATION
Pt. hypertensive during episode of anxiety/vomiting, bp rechecked after NGT and while calm, 187/77. Dr. Lennon Surgery Resident called and made aware.
notified Dr Patterson's office of admission please fax over discharge paperwork once patient is discharged please fax over discharge paperwrok to [phone]
pt with SBO unable to tolerate full liquid diet. NG tube placed on 4/11. pt on oral BP medications.
Pt. with vomiting and abdominal pain after advancing to full liquid diet today
Sx Resident Cony notified that ordered small bowel series cannot be done tonight as per Xray, as there is no radiologist on site.

## 2022-04-11 NOTE — DISCHARGE NOTE PROVIDER - DETAILS OF MALNUTRITION DIAGNOSIS/DIAGNOSES
This patient has been assessed with a concern for Malnutrition and was treated during this hospitalization for the following Nutrition diagnosis/diagnoses:     -  04/15/2022: Moderate protein-calorie malnutrition

## 2022-04-11 NOTE — PROGRESS NOTE ADULT - SUBJECTIVE AND OBJECTIVE BOX
No acute events overnight. He had a few bowel movements overnight and his pain has improved but not resolved completely. Some flatus passed as well. No fevers or chills.     Exam:  In no distress  Abdomen soft, some distension, tender in the RLQ without rebound or guarding.   Alert and oriented x 3    Vital Signs Last 24 Hrs  T(C): 36.7 (10 Apr 2022 21:00), Max: 36.7 (10 Apr 2022 09:46)  T(F): 98.1 (10 Apr 2022 21:00), Max: 98.1 (10 Apr 2022 09:46)  HR: 62 (10 Apr 2022 21:00) (62 - 68)  BP: 144/47 (10 Apr 2022 21:00) (122/57 - 144/47)  BP(mean): --  RR: 18 (10 Apr 2022 21:00) (18 - 18)  SpO2: 98% (10 Apr 2022 21:00) (94% - 98%)    LABS:                        10.4   7.11  )-----------( 274      ( 10 Apr 2022 07:54 )             33.4     10 Apr 2022 07:54    147    |  119    |  36     ----------------------------<  127    4.6     |  21     |  1.64     Ca    9.5        10 Apr 2022 07:54  Phos  2.8       10 Apr 2022 07:54  Mg     2.2       10 Apr 2022 07:54          MEDICATIONS  (STANDING):  ascorbic acid  Oral Tab/Cap - Peds 1000 milliGRAM(s) Oral daily  aspirin  chewable 81 milliGRAM(s) Oral daily  cholecalciferol Oral Tab/Cap - Peds 2000 Unit(s) Oral daily  dextrose 5%. 1000 milliLiter(s) (50 mL/Hr) IV Continuous <Continuous>  dextrose 5%. 1000 milliLiter(s) (100 mL/Hr) IV Continuous <Continuous>  dextrose 50% Injectable 25 Gram(s) IV Push once  dextrose 50% Injectable 12.5 Gram(s) IV Push once  dextrose 50% Injectable 25 Gram(s) IV Push once  enoxaparin Injectable 40 milliGRAM(s) SubCutaneous every 24 hours  glucagon  Injectable 1 milliGRAM(s) IntraMuscular once  insulin lispro (ADMELOG) corrective regimen sliding scale   SubCutaneous every 6 hours  levothyroxine 88 MICROGram(s) Oral daily  melatonin 5 milliGRAM(s) Oral at bedtime  multivitamin 1 Tablet(s) Oral daily  pantoprazole  Injectable 40 milliGRAM(s) IV Push daily  sodium chloride 0.9% with potassium chloride 20 mEq/L 1000 milliLiter(s) (100 mL/Hr) IV Continuous <Continuous>  verapamil 120 milliGRAM(s) Oral every 12 hours

## 2022-04-11 NOTE — DISCHARGE NOTE PROVIDER - CARE PROVIDERS DIRECT ADDRESSES
[email or web address]

## 2022-04-11 NOTE — DISCHARGE NOTE NURSING/CASE MANAGEMENT/SOCIAL WORK - NSDCPEFALRISK_GEN_ALL_CORE
For information on Fall & Injury Prevention, visit: [email or web address] OR  [email or web address] OR  https://www.cdc.gov/steadi/patient.html

## 2022-04-11 NOTE — PROVIDER CONTACT NOTE (OTHER) - ASSESSMENT
elevated BP before change of shift. Medication given and down to 160s. due for next BP med at 12am.
Pt with +bowel sounds, distended abdomen, states he is "done throwing up, just in pain now".
Pt. calm at this time. C/o moderate abdominal pain.

## 2022-04-11 NOTE — DISCHARGE NOTE NURSING/CASE MANAGEMENT/SOCIAL WORK - PATIENT PORTAL LINK FT
You can access the FollowMyHealth Patient Portal offered by [facility] by registering at the following website: [email or web address]. By joining [employer]’s FollowMyHealth portal, you will also be able to view your health information using other applications (apps) compatible with our system.

## 2022-04-12 LAB
ANION GAP SERPL CALC-SCNC: 6 MMOL/L — SIGNIFICANT CHANGE UP (ref 5–17)
BUN SERPL-MCNC: 24 MG/DL — HIGH (ref 7–23)
CALCIUM SERPL-MCNC: 9.7 MG/DL — SIGNIFICANT CHANGE UP (ref 8.5–10.1)
CHLORIDE SERPL-SCNC: 119 MMOL/L — HIGH (ref 96–108)
CO2 SERPL-SCNC: 21 MMOL/L — LOW (ref 22–31)
CREAT SERPL-MCNC: 1.47 MG/DL — HIGH (ref 0.5–1.3)
EGFR: 46 ML/MIN/1.73M2 — LOW
GLUCOSE SERPL-MCNC: 156 MG/DL — HIGH (ref 70–99)
HCT VFR BLD CALC: 35.4 % — LOW (ref 39–50)
HGB BLD-MCNC: 10.9 G/DL — LOW (ref 13–17)
LACTATE SERPL-SCNC: 0.8 MMOL/L — SIGNIFICANT CHANGE UP (ref 0.7–2)
MAGNESIUM SERPL-MCNC: 2.1 MG/DL — SIGNIFICANT CHANGE UP (ref 1.6–2.6)
MCHC RBC-ENTMCNC: 28.6 PG — SIGNIFICANT CHANGE UP (ref 27–34)
MCHC RBC-ENTMCNC: 30.8 GM/DL — LOW (ref 32–36)
MCV RBC AUTO: 92.9 FL — SIGNIFICANT CHANGE UP (ref 80–100)
PHOSPHATE SERPL-MCNC: 1.9 MG/DL — LOW (ref 2.5–4.5)
PLATELET # BLD AUTO: 288 K/UL — SIGNIFICANT CHANGE UP (ref 150–400)
POTASSIUM SERPL-MCNC: 4.5 MMOL/L — SIGNIFICANT CHANGE UP (ref 3.5–5.3)
POTASSIUM SERPL-SCNC: 4.5 MMOL/L — SIGNIFICANT CHANGE UP (ref 3.5–5.3)
RBC # BLD: 3.81 M/UL — LOW (ref 4.2–5.8)
RBC # FLD: 14.8 % — HIGH (ref 10.3–14.5)
SODIUM SERPL-SCNC: 146 MMOL/L — HIGH (ref 135–145)
WBC # BLD: 7.93 K/UL — SIGNIFICANT CHANGE UP (ref 3.8–10.5)
WBC # FLD AUTO: 7.93 K/UL — SIGNIFICANT CHANGE UP (ref 3.8–10.5)

## 2022-04-12 PROCEDURE — 99233 SBSQ HOSP IP/OBS HIGH 50: CPT

## 2022-04-12 PROCEDURE — 99232 SBSQ HOSP IP/OBS MODERATE 35: CPT

## 2022-04-12 PROCEDURE — 74018 RADEX ABDOMEN 1 VIEW: CPT | Mod: 26

## 2022-04-12 PROCEDURE — 74177 CT ABD & PELVIS W/CONTRAST: CPT | Mod: 26

## 2022-04-12 RX ORDER — POTASSIUM PHOSPHATE, MONOBASIC POTASSIUM PHOSPHATE, DIBASIC 236; 224 MG/ML; MG/ML
30 INJECTION, SOLUTION INTRAVENOUS ONCE
Refills: 0 | Status: COMPLETED | OUTPATIENT
Start: 2022-04-12 | End: 2022-04-12

## 2022-04-12 RX ORDER — BENZOCAINE AND MENTHOL 5; 1 G/100ML; G/100ML
1 LIQUID ORAL EVERY 12 HOURS
Refills: 0 | Status: DISCONTINUED | OUTPATIENT
Start: 2022-04-12 | End: 2022-04-18

## 2022-04-12 RX ADMIN — Medication 1.25 MILLIGRAM(S): at 05:40

## 2022-04-12 RX ADMIN — ENOXAPARIN SODIUM 40 MILLIGRAM(S): 100 INJECTION SUBCUTANEOUS at 05:40

## 2022-04-12 RX ADMIN — Medication 2000 UNIT(S): at 09:56

## 2022-04-12 RX ADMIN — Medication 2: at 17:28

## 2022-04-12 RX ADMIN — Medication 1.25 MILLIGRAM(S): at 11:51

## 2022-04-12 RX ADMIN — Medication 1.25 MILLIGRAM(S): at 23:12

## 2022-04-12 RX ADMIN — POTASSIUM PHOSPHATE, MONOBASIC POTASSIUM PHOSPHATE, DIBASIC 83.33 MILLIMOLE(S): 236; 224 INJECTION, SOLUTION INTRAVENOUS at 16:40

## 2022-04-12 RX ADMIN — DEXTROSE MONOHYDRATE, SODIUM CHLORIDE, AND POTASSIUM CHLORIDE 75 MILLILITER(S): 50; .745; 4.5 INJECTION, SOLUTION INTRAVENOUS at 05:49

## 2022-04-12 RX ADMIN — Medication 1000 MILLIGRAM(S): at 09:56

## 2022-04-12 RX ADMIN — Medication 5 MILLIGRAM(S): at 23:12

## 2022-04-12 RX ADMIN — DEXTROSE MONOHYDRATE, SODIUM CHLORIDE, AND POTASSIUM CHLORIDE 75 MILLILITER(S): 50; .745; 4.5 INJECTION, SOLUTION INTRAVENOUS at 23:15

## 2022-04-12 RX ADMIN — BENZOCAINE AND MENTHOL 1 LOZENGE: 5; 1 LIQUID ORAL at 23:12

## 2022-04-12 RX ADMIN — Medication 120 MILLIGRAM(S): at 11:17

## 2022-04-12 RX ADMIN — Medication 81 MILLIGRAM(S): at 09:57

## 2022-04-12 RX ADMIN — Medication 1.25 MILLIGRAM(S): at 18:23

## 2022-04-12 RX ADMIN — PANTOPRAZOLE SODIUM 40 MILLIGRAM(S): 20 TABLET, DELAYED RELEASE ORAL at 09:57

## 2022-04-12 RX ADMIN — Medication 1 TABLET(S): at 09:56

## 2022-04-12 RX ADMIN — Medication 88 MICROGRAM(S): at 05:38

## 2022-04-12 NOTE — CHART NOTE - NSCHARTNOTEFT_GEN_A_CORE
NGT became displaced.  called to replace NGT  The tube was lodged in the back of the throat as evidenced by injecting bolus of air,  It was removed and attempted replacement several times, but patient experiencing difficulty , strong gag reflex and cough.    He appeared stressed and winded . Will leave the tube out until the morning when it can be replaced.  Patient is not nauseous or vomiting at this time. Bowel sounds are active. No abdominal tenderness on palpation  Vital Signs Last 24 Hrs  T(C): 36.6 (11 Apr 2022 23:40), Max: 37 (11 Apr 2022 21:03)  T(F): 97.8 (11 Apr 2022 23:40), Max: 98.6 (11 Apr 2022 21:03)  HR: 66 (11 Apr 2022 23:40) (57 - 73)  BP: 142/51 (11 Apr 2022 23:40) (142/51 - 187/77)  RR: 18 (11 Apr 2022 23:40) (18 - 18)  SpO2: 95% (11 Apr 2022 23:40) (95% - 96%)    NT tube to be replaced in am if clinically indicated. 87 year old male with a history of Crohn's disease admitted with a small bowel obstruction at a prior anastomosis.     NGT became displaced.  called to replace NGT  The tube was lodged in the back of the throat as evidenced by injecting bolus of air,  It was removed and attempted replacement several times, but patient experiencing difficulty , strong gag reflex and cough.    He appeared stressed and winded . Will leave the tube out until the morning when it can be replaced.  Patient is not nauseous or vomiting at this time. Bowel sounds are active. No abdominal tenderness on palpation  Vital Signs Last 24 Hrs  T(C): 36.6 (11 Apr 2022 23:40), Max: 37 (11 Apr 2022 21:03)  T(F): 97.8 (11 Apr 2022 23:40), Max: 98.6 (11 Apr 2022 21:03)  HR: 66 (11 Apr 2022 23:40) (57 - 73)  BP: 142/51 (11 Apr 2022 23:40) (142/51 - 187/77)  RR: 18 (11 Apr 2022 23:40) (18 - 18)  SpO2: 95% (11 Apr 2022 23:40) (95% - 96%)    NT tube to be replaced in am if clinically indicated.

## 2022-04-12 NOTE — PROGRESS NOTE ADULT - SUBJECTIVE AND OBJECTIVE BOX
Patient was seen and evaluated at bedside this morning. Pt had a lot of the FLD, then became nausea and had multiple episodes of emesis. Pt was made NPO, however, continued to have emesis and NGT was placed by NP with 500cc output. Per chart review, patient's NGT fell out and PA overnight attempted NGT placement without success. Pt denies nausea or vomiting this morning. Denies abdominal pain. Admits to passing flatus, no BMs. VS reviewed.    Exam:  In no distress  Abdomen soft, some distension, tender in the RLQ without rebound or guarding.   Alert and oriented x 3    Vital Signs Last 24 Hrs  T(C): 36.8 (12 Apr 2022 08:07), Max: 37 (11 Apr 2022 21:03)  T(F): 98.3 (12 Apr 2022 08:07), Max: 98.6 (11 Apr 2022 21:03)  HR: 67 (12 Apr 2022 08:07) (61 - 73)  BP: 143/60 (12 Apr 2022 08:07) (142/51 - 187/77)  BP(mean): --  RR: 18 (12 Apr 2022 08:07) (18 - 18)  SpO2: 97% (12 Apr 2022 08:07) (95% - 97%)    LABS:                                   10.9   7.93  )-----------( 288      ( 12 Apr 2022 08:51 )             35.4   04-11    147<H>  |  119<H>  |  31<H>  ----------------------------<  91  4.7   |  21<L>  |  1.48<H>    Ca    9.6      11 Apr 2022 08:26  Phos  2.2     04-11  Mg     2.2     04-11    TPro  6.1  /  Alb  2.7<L>  /  TBili  0.3  /  DBili  x   /  AST  21  /  ALT  22  /  AlkPhos  63  04-11            MEDICATIONS  (STANDING):  ascorbic acid  Oral Tab/Cap - Peds 1000 milliGRAM(s) Oral daily  aspirin  chewable 81 milliGRAM(s) Oral daily  cholecalciferol Oral Tab/Cap - Peds 2000 Unit(s) Oral daily  dextrose 5%. 1000 milliLiter(s) (50 mL/Hr) IV Continuous <Continuous>  dextrose 5%. 1000 milliLiter(s) (100 mL/Hr) IV Continuous <Continuous>  dextrose 50% Injectable 25 Gram(s) IV Push once  dextrose 50% Injectable 12.5 Gram(s) IV Push once  dextrose 50% Injectable 25 Gram(s) IV Push once  enoxaparin Injectable 40 milliGRAM(s) SubCutaneous every 24 hours  glucagon  Injectable 1 milliGRAM(s) IntraMuscular once  insulin lispro (ADMELOG) corrective regimen sliding scale   SubCutaneous every 6 hours  levothyroxine 88 MICROGram(s) Oral daily  melatonin 5 milliGRAM(s) Oral at bedtime  multivitamin 1 Tablet(s) Oral daily  pantoprazole  Injectable 40 milliGRAM(s) IV Push daily  sodium chloride 0.9% with potassium chloride 20 mEq/L 1000 milliLiter(s) (100 mL/Hr) IV Continuous <Continuous>  verapamil 120 milliGRAM(s) Oral every 12 hours

## 2022-04-12 NOTE — PROGRESS NOTE ADULT - SUBJECTIVE AND OBJECTIVE BOX
CC: Abd pain  HPI:  87 year old man with PMHx of DM2, HTN, HLD, CAD s/p stents, Crohn's, hx prostate ca, hypothyroidism who presents with abdominal pain.  Pt states pain has happening for 3 months, worsening.  Seen in out patient setting, CT imaging at that time negative for acute pathology.  He was inevitably sent to ED for persistent pain.    In ED: Found to have distal SBO, admitted under surgery.  Pt is maintained NPO, denies any fever, chills, n, v, passing gas.  Per nursing staff pt had BM last night.  Initially LA elevated, now normalized with ongoing IVFs.      4/12: laying in bed, ng tube dislodged yesterday night. abd less distended and tender today. tolerated PO contrast     Review of Systems:  Other Review of Systems: All other review of systems negative, except as noted in HPI      Vital Signs Last 24 Hrs  T(C): 36.8 (12 Apr 2022 08:07), Max: 37 (11 Apr 2022 21:03)  T(F): 98.3 (12 Apr 2022 08:07), Max: 98.6 (11 Apr 2022 21:03)  HR: 67 (12 Apr 2022 08:07) (61 - 73)  BP: 122/57 (12 Apr 2022 11:39) (122/57 - 187/77)  BP(mean): --  RR: 18 (12 Apr 2022 08:07) (18 - 18)  SpO2: 97% (12 Apr 2022 08:07) (95% - 97%)        PHYSICAL EXAM:    Constitutional: NAD, awake and alert, well-developed  HEENT: PERR, EOMI, Normal Hearing, MMM  Neck: Soft and supple  Respiratory: Breath sounds are clear bilaterally, No wheezing, rales or rhonchi  Cardiovascular: S1 and S2, regular rate and rhythm, no Murmurs, gallops or rubs  Gastrointestinal: Bowel Sounds present, soft, + distended abd, no guarding, no rebound  Extremities: No peripheral edema  Neurological: A/O x 3, no focal deficits in my limited exam      MEDICATIONS  (STANDING):  ascorbic acid  Oral Tab/Cap - Peds 1000 milliGRAM(s) Oral daily  aspirin  chewable 81 milliGRAM(s) Oral daily  cholecalciferol Oral Tab/Cap - Peds 2000 Unit(s) Oral daily  dextrose 5%. 1000 milliLiter(s) (50 mL/Hr) IV Continuous <Continuous>  dextrose 5%. 1000 milliLiter(s) (100 mL/Hr) IV Continuous <Continuous>  dextrose 50% Injectable 25 Gram(s) IV Push once  dextrose 50% Injectable 12.5 Gram(s) IV Push once  dextrose 50% Injectable 25 Gram(s) IV Push once  enoxaparin Injectable 40 milliGRAM(s) SubCutaneous every 24 hours  glucagon  Injectable 1 milliGRAM(s) IntraMuscular once  insulin lispro (ADMELOG) corrective regimen sliding scale   SubCutaneous every 6 hours  levothyroxine 88 MICROGram(s) Oral daily  melatonin 5 milliGRAM(s) Oral at bedtime  multivitamin 1 Tablet(s) Oral daily  pantoprazole  Injectable 40 milliGRAM(s) IV Push daily  sodium chloride 0.9% with potassium chloride 20 mEq/L 1000 milliLiter(s) (100 mL/Hr) IV Continuous <Continuous>  verapamil 120 milliGRAM(s) Oral every 12 hours    MEDICATIONS  (PRN):  dextrose Oral Gel 15 Gram(s) Oral once PRN Blood Glucose LESS THAN 70 milliGRAM(s)/deciliter  ondansetron Injectable 4 milliGRAM(s) IV Push every 6 hours PRN Nausea and/or Vomiting  traMADol 50 milliGRAM(s) Oral three times a day PRN Moderate Pain (4 - 6)    Assessment: 87 year old man who presents with abd pain secondary to distal SBO.    ABd pain / distal SBO / Hx of Crohns:  -leukocytosis resolved  -Lactic acidosis resolved  -Scr 1.4, monitor, possible ROSE  -s/p ngt for decompression  -repeat CT scan pending   -NPO except meds  -IVFs  -supportive care / pain management    DM2:  -RAISS    HTN / HLD / CAD s/p stents / hypothyroidism:  -resume home meds      DVT ppx:  -Lovenox

## 2022-04-13 DIAGNOSIS — K56.609 UNSPECIFIED INTESTINAL OBSTRUCTION, UNSPECIFIED AS TO PARTIAL VERSUS COMPLETE OBSTRUCTION: ICD-10-CM

## 2022-04-13 LAB
ANION GAP SERPL CALC-SCNC: 7 MMOL/L — SIGNIFICANT CHANGE UP (ref 5–17)
BUN SERPL-MCNC: 18 MG/DL — SIGNIFICANT CHANGE UP (ref 7–23)
CALCIUM SERPL-MCNC: 9.5 MG/DL — SIGNIFICANT CHANGE UP (ref 8.5–10.1)
CHLORIDE SERPL-SCNC: 114 MMOL/L — HIGH (ref 96–108)
CO2 SERPL-SCNC: 21 MMOL/L — LOW (ref 22–31)
CREAT SERPL-MCNC: 1.38 MG/DL — HIGH (ref 0.5–1.3)
CULTURE RESULTS: SIGNIFICANT CHANGE UP
CULTURE RESULTS: SIGNIFICANT CHANGE UP
EGFR: 49 ML/MIN/1.73M2 — LOW
GLUCOSE SERPL-MCNC: 142 MG/DL — HIGH (ref 70–99)
HCT VFR BLD CALC: 32.7 % — LOW (ref 39–50)
HGB BLD-MCNC: 10.3 G/DL — LOW (ref 13–17)
MAGNESIUM SERPL-MCNC: 1.8 MG/DL — SIGNIFICANT CHANGE UP (ref 1.6–2.6)
MCHC RBC-ENTMCNC: 29.4 PG — SIGNIFICANT CHANGE UP (ref 27–34)
MCHC RBC-ENTMCNC: 31.5 GM/DL — LOW (ref 32–36)
MCV RBC AUTO: 93.4 FL — SIGNIFICANT CHANGE UP (ref 80–100)
PHOSPHATE SERPL-MCNC: 2.7 MG/DL — SIGNIFICANT CHANGE UP (ref 2.5–4.5)
PLATELET # BLD AUTO: 242 K/UL — SIGNIFICANT CHANGE UP (ref 150–400)
POTASSIUM SERPL-MCNC: 4.3 MMOL/L — SIGNIFICANT CHANGE UP (ref 3.5–5.3)
POTASSIUM SERPL-SCNC: 4.3 MMOL/L — SIGNIFICANT CHANGE UP (ref 3.5–5.3)
RBC # BLD: 3.5 M/UL — LOW (ref 4.2–5.8)
RBC # FLD: 14.7 % — HIGH (ref 10.3–14.5)
SODIUM SERPL-SCNC: 142 MMOL/L — SIGNIFICANT CHANGE UP (ref 135–145)
SPECIMEN SOURCE: SIGNIFICANT CHANGE UP
SPECIMEN SOURCE: SIGNIFICANT CHANGE UP
WBC # BLD: 5.92 K/UL — SIGNIFICANT CHANGE UP (ref 3.8–10.5)
WBC # FLD AUTO: 5.92 K/UL — SIGNIFICANT CHANGE UP (ref 3.8–10.5)

## 2022-04-13 PROCEDURE — 99232 SBSQ HOSP IP/OBS MODERATE 35: CPT

## 2022-04-13 PROCEDURE — 99223 1ST HOSP IP/OBS HIGH 75: CPT

## 2022-04-13 RX ORDER — CHLORHEXIDINE GLUCONATE 213 G/1000ML
1 SOLUTION TOPICAL
Refills: 0 | Status: DISCONTINUED | OUTPATIENT
Start: 2022-04-13 | End: 2022-04-18

## 2022-04-13 RX ADMIN — PANTOPRAZOLE SODIUM 40 MILLIGRAM(S): 20 TABLET, DELAYED RELEASE ORAL at 09:52

## 2022-04-13 RX ADMIN — Medication 1: at 12:28

## 2022-04-13 RX ADMIN — Medication 1 TABLET(S): at 09:53

## 2022-04-13 RX ADMIN — Medication 1000 MILLIGRAM(S): at 09:52

## 2022-04-13 RX ADMIN — ENOXAPARIN SODIUM 40 MILLIGRAM(S): 100 INJECTION SUBCUTANEOUS at 05:36

## 2022-04-13 RX ADMIN — BENZOCAINE AND MENTHOL 1 LOZENGE: 5; 1 LIQUID ORAL at 09:52

## 2022-04-13 RX ADMIN — Medication 1.25 MILLIGRAM(S): at 05:35

## 2022-04-13 RX ADMIN — Medication 2000 UNIT(S): at 09:53

## 2022-04-13 RX ADMIN — Medication 120 MILLIGRAM(S): at 23:03

## 2022-04-13 RX ADMIN — Medication 120 MILLIGRAM(S): at 09:50

## 2022-04-13 RX ADMIN — DEXTROSE MONOHYDRATE, SODIUM CHLORIDE, AND POTASSIUM CHLORIDE 75 MILLILITER(S): 50; .745; 4.5 INJECTION, SOLUTION INTRAVENOUS at 18:00

## 2022-04-13 RX ADMIN — CHLORHEXIDINE GLUCONATE 1 APPLICATION(S): 213 SOLUTION TOPICAL at 09:57

## 2022-04-13 RX ADMIN — Medication 1.25 MILLIGRAM(S): at 17:28

## 2022-04-13 RX ADMIN — Medication 88 MICROGRAM(S): at 05:35

## 2022-04-13 RX ADMIN — Medication 5 MILLIGRAM(S): at 23:03

## 2022-04-13 RX ADMIN — Medication 81 MILLIGRAM(S): at 09:53

## 2022-04-13 NOTE — CDI QUERY NOTE - NSCDIOTHERTXTBX2_GEN_ALL_CORE_FT
Laboratory results  indicates that this patient has increased Sodium blood values without an associated diagnosis.   In order to accurately capture this lab finding to the greatest degree of specificity reflecting the patient’s actual severity of illness please document the diagnosis, if known, associated with the below values & clinical evidence:    A. Hypernatremia  B. Other please specify  C. Clinically not significant     Supporting Documentation and/or Clinical Evidence:    Sodium, Serum: 142 mmol/L (04.13.22 @ 08:39)   Sodium, Serum: 146 mmol/L (04.12.22 @ 08:51)   Sodium, Serum: 147 mmol/L (04.11.22 @ 08:26)   Sodium, Serum: 147 mmol/L (04.10.22 @ 07:54)   Sodium, Serum: 143 mmol/L (04.09.22 @ 09:16)   Sodium, Serum: 142 mmol/L (04.08.22 @ 13:56)     Medicine PN Assessment: 87 year old man who presents with abd pain secondary to distal SBO.    ABd pain / distal SBO / Hx of Crohns:-leukocytosis resolved-Lactic acidosis resolved-Scr 1.4, monitor, possible ROSE-s/p ngt for decompression -repeat CT without resolution or improvement to distal obstruction -colorecral consulted - Strongly recommended NGT placement to maximize chance of nonoperative therapy; he refused once again.  He expressed that he does not want any operative intervention at this time, as "this did not cause me issues for 30 years".  Plan for abdominal XR today to evaluate passage of contrast.  Continue NPO, IVF, encourage ambulation.  -NPO except meds-IVFs-supportive care / pain management  4/12: laying in bed, ng tube dislodged yesterday night. abd less distended and tender today. tolerated PO contrast   4/13: sitting up in chair, requesting PO intake, had + BM this morning. discussed remaining NPO until repeat abx xray w/ con  As compared with April 08, 2022, no change in distal small bowel   obstruction likely due to adhesions.

## 2022-04-13 NOTE — CONSULT NOTE ADULT - NS ATTEND AMEND GEN_ALL_CORE FT
pt is opposed tto surgery and overwhelmed with discussions of dnr dni.   He is not sure what next steps he would want but at this time is hoping things settle down and he  improves  awaiting repeat xray to see if sbo is resolving   pt remains full code, case discussed at length with pt and np as above. will continue to follow  abdomen nontender on exam decreased BS   cards s1s2  resp rr no wheezing or rales

## 2022-04-13 NOTE — CONSULT NOTE ADULT - ASSESSMENT
Imp:  H/O Crohn's  SBO with transition at anastomosis - could crohn's, anastomotic stricture, adhesive dz, neoplasm  I think he's still partially obstructed but not entirely clear    Rec:  Will d/w Dr. Walden regarding possible CTE  Colonoscopy would be very helpful but can't do if obstruction doesn't fully resolve first.
  HPI: Pt is a 87y old Male with  abdominal pain which started around 3 weeks ago, located in the right lower abdomen, continuous, moderate, slightly increased by food, no relieving factors, associated with occasional nausea, no vomiting and no fever. His last bowel movement was 2 days ao but he is able to pass gas. He has visited his urologist for this pain as he also had an episode of hematuria initially that has resolved. He had a CT scan done than which he was told that was normal. He was referred to his gastroenterologist (Dr Padilla)  who also repeated the CT scan. This also did not show any acute causes of abdominal pain. He was told by Dr Padilla that if his pain persisted, he should come to the ED for management. Abd CT reveals a SBO and pt has been declining surgical intervention. Palliative Medicine Consult to further discuss GOC.   4/13/22 Seen and examined at bedside. Pt anxious and tearful regarding any potential surgical intervention. He denies pain and has been NPO for severeal days now. Endorses anxiety and insomnia at times.     Assessment and Plan:    1) Small Bowel Obstruction  -CT =As compared with April 08, 2022, no change in distal small bowel   obstruction likely due to adhesions.  -Small bowel obstruction at a prior anastomosis  -Refusing surgical intervention  -S/P NGT  -NPO  -Follow up abd XRAY 4/14  -+ BM  -Surgery eval noted  -GI eval noted  -Denies pain at present    2) Anxiety/Pain  -R/T current situation  -Cont Morphine/Tramadol  -Add Ativan 0.5 mg IVP Q6H PRN anxiety    3) Debility  -NPO  -Mod protein wendy malnutrition  -PT eval    4) Advanced Directives  -Pt with capacity  -Will schedule further GOC discussion after Xray 4/14  -0Reviewed MOLST and he did not want to set limits at this time

## 2022-04-13 NOTE — CDI QUERY NOTE - NSCDIOTHERTXTBX_GEN_ALL_CORE_HH
Clinical documentation indicates that this patient has an elevated creatinine without an associated diagnosis.   In order to accurately capture this lab finding to the greatest degree of specificity reflecting the patient’s actual severity of illness please document the diagnosis, if known, associated with the below creatinine values & clinical evidence:       The [employer]'s (KDIGO) definition of ROSE includes an increase in serum creatnine of greater than or equal to 0.3 mg/dl from the baseline within 48 hours, or an increase in serum creatinine level to greater than or equal to 1.5 times the baseline which is known or presumed ot have occured within the prior 7 days, or urine volume less than 0.5 ml/kg/hr for 6 hours, which the [facility] policy for reporting ROSE is based on.  Please refer to [facility] Policy #C1 "The Reporting of ROSE in Adult/Pediatric Patients" for full detail.      Supporting Documentation and/or Clinical Evidence:      Creatinine Trend this admission  Creatinine, Serum: 1.38 mg/dL *H* [0.50 - 1.30] (04-13-22)  Creatinine, Serum: 1.47 mg/dL *H* [0.50 - 1.30] (04-12-22)  Creatinine, Serum: 1.48 mg/dL *H* [0.50 - 1.30] (04-11-22)  Creatinine, Serum: 1.64 mg/dL *H* [0.50 - 1.30] (04-10-22)  Creatinine, Serum: 1.60 mg/dL *H* [0.50 - 1.30] (04-09-22)  Creatinine, Serum: 1.68 mg/dL *H* [0.50 - 1.30] (04-08-22)    Previous admissions   Creatinine, Serum: 1.55 mg/dL (12.26.18 @ 08:28)   Creatinine, Serum: 1.10 mg/dL (11.09.17 @ 07:18)   Creatinine, Serum: 0.88 mg/dL (11.08.17 @ 06:54)   Creatinine, Serum: 0.87 mg/dL (11.07.17 @ 08:27)   Creatinine, Serum: 0.95 mg/dL (11.06.17 @ 06:03)   Creatinine, Serum: 0.97 mg/dL (11.05.17 @ 05:51)   Creatinine, Serum: 1.00 mg/dL (11.04.17 @ 18:36)   Creatinine, Serum: 1.10 mg/dL (11.04.17 @ 06:12)   Creatinine, Serum: 1.70 mg/dL (11.03.17 @ 05:50)   Creatinine, Serum: 1.20 mg/dL (11.02.17 @ 18:41)   Creatinine, Serum: 0.97 mg/dL (11.02.17 @ 05:22)   Creatinine, Serum: 1.30 mg/dL (11.01.17 @ 07:46)   Creatinine, Serum: 1.40 mg/dL (11.01.17 @ 02:54)   Creatinine, Serum: 1.24 mg/dL (10.27.17 @ 05:58)   Creatinine, Serum: 1.23 mg/dL (10.26.17 @ 13:03)   Creatinine, Serum: 1.11 mg/dL (10.25.17 @ 06:20)   Creatinine, Serum: 1.24 mg/dL (10.24.17 @ 07:38)   Creatinine, Serum: 1.25 mg/dL (10.23.17 @ 10:16)   Creatinine, Serum: 1.41 mg/dL (10.22.17 @ 05:04)   Creatinine, Serum: 1.88 mg/dL (10.21.17 @ 05:30)   Creatinine, Serum: 2.18 mg/dL (10.20.17 @ 17:16)   Creatinine, Serum: 2.36 mg/dL (10.20.17 @ 12:37)   Creatinine, Serum: 2.09 mg/dL (10.20.17 @ 05:09)   Creatinine, Serum: 1.47 mg/dL (10.19.17 @ 19:21)   Creatinine, Serum: 1.46 mg/dL (10.12.17 @ 10:30)     87 year old man with PMHx of DM2, HTN, HLD, CAD s/p stents, Crohn's, hx prostate ca, hypothyroidism who presents with abdominal pain.  Pt states pain has happening for 3 months, worsening.  Seen in out patient setting, CT imaging at that time negative for acute pathology.  He was inevitably sent to ED for persistent pain.    In ED: Found to have distal SBO, admitted under surgery.  Pt is maintained NPO, denies any fever, chills, n, v, passing gas.  Per nursing staff pt had BM last night.  Initially LA elevated, now normalized with ongoing IVFs.      4/12: laying in bed, ng tube dislodged yesterday night. abd less distended and tender today. tolerated PO contrast   4/13: sitting up in chair, requesting PO intake, had + BM this morning. discussed remaining NPO until repeat abx xray w/ con  As compared with April 08, 2022, no change in distal small bowel   obstruction likely due to adhesions.    Assessment: 87 year old man who presents with abd pain secondary to distal SBO.    ABd pain / distal SBO / Hx of Crohns:  -leukocytosis resolved  -Lactic acidosis resolved  -Scr 1.4, monitor, possible ROSE  -s/p ngt for decompression   -repeat CT without resolution or improvement to distal obstruction   -colorecral consulted - Strongly recommended NGT placement to maximize chance of nonoperative therapy; he refused once again.  He expressed that he does not want any operative intervention at this time, as "this did not cause me issues for 30 years".  Plan for abdominal XR today to evaluate passage of contrast.  Continue NPO, IVF, encourage ambulation.  -NPO except meds  -IVFs  -supportive care / pain management    DM2:  -RAISS    HTN / HLD / CAD s/p stents / hypothyroidism:  -resume home meds      DVT ppx:  -Lovenox

## 2022-04-13 NOTE — PROGRESS NOTE ADULT - SUBJECTIVE AND OBJECTIVE BOX
Patient was seen and evaluated at bedside this morning. CTE done yesterday showed an SBO at old surgical site but no Crohn's disease. Admits to passing flatus, and had small BMs. VS reviewed.      Vital Signs Last 24 Hrs  T(C): 36.5 (12 Apr 2022 22:39), Max: 37.1 (12 Apr 2022 17:16)  T(F): 97.7 (12 Apr 2022 22:39), Max: 98.8 (12 Apr 2022 17:16)  HR: 66 (13 Apr 2022 05:44) (64 - 68)  BP: 131/59 (13 Apr 2022 05:44) (122/57 - 143/60)  BP(mean): --  RR: 18 (13 Apr 2022 05:44) (18 - 18)  SpO2: 96% (13 Apr 2022 05:44) (96% - 98%)    Labs:                        10.9   7.93  )-----------( 288      ( 12 Apr 2022 08:51 )             35.4     CBC Full  -  ( 12 Apr 2022 08:51 )  WBC Count : 7.93 K/uL  RBC Count : 3.81 M/uL  Hemoglobin : 10.9 g/dL  Hematocrit : 35.4 %  Platelet Count - Automated : 288 K/uL  Mean Cell Volume : 92.9 fl  Mean Cell Hemoglobin : 28.6 pg  Mean Cell Hemoglobin Concentration : 30.8 gm/dL  Auto Neutrophil # : x  Auto Lymphocyte # : x  Auto Monocyte # : x  Auto Eosinophil # : x  Auto Basophil # : x  Auto Neutrophil % : x  Auto Lymphocyte % : x  Auto Monocyte % : x  Auto Eosinophil % : x  Auto Basophil % : x    04-12    146<H>  |  119<H>  |  24<H>  ----------------------------<  156<H>  4.5   |  21<L>  |  1.47<H>    Ca    9.7      12 Apr 2022 08:51  Phos  1.9     04-12  Mg     2.1     04-12    TPro  6.1  /  Alb  2.7<L>  /  TBili  0.3  /  DBili  x   /  AST  21  /  ALT  22  /  AlkPhos  63  04-11    LIVER FUNCTIONS - ( 11 Apr 2022 08:26 )  Alb: 2.7 g/dL / Pro: 6.1 gm/dL / ALK PHOS: 63 U/L / ALT: 22 U/L / AST: 21 U/L / GGT: x               Physical Exam:  Pt is AAOx3  General: Well developed, in no acute distress.   Chest: Lungs clear, no rales, no rhonchi, no wheezes.   Heart: RR, no murmurs, no rubs, no gallops.   Abdomen: Soft, RLQ tenderness, distended, no masses, BS normal.    Back: Normal curvature, no tenderness.   Neuro: Physiological, no localizing findings.   Skin: Normal, no rashes, no lesions noted.   Extremities: Warm, well perfused, no edema, Pulses intact

## 2022-04-13 NOTE — CONSULT NOTE ADULT - SUBJECTIVE AND OBJECTIVE BOX
HPI:  An 87 year old male who was seen in the ED for abdominal pain. Pain started around 3 weeks ago, located in the right lower abdomen, continuous, moderate, slightly increased by food, no relieving factors, associated with occasional nausea, no vomiting and no fever. His last bowel movement was 2 days ao but he is able to pass gas. He has visited his urologist for this pain as he also had an episode of hematuria initially that has resolved. He had a CT scan done than which he was told that was normal. He was referred to his gastroenterologist (Dr Wade) who also repeated the CT scan. This also did not show any acute causes of abdominal pain. He was told by Dr Padilla that if his pain persisted, he should come to the ED for management.   He does not remember when his last colonoscopy was.  (09 Apr 2022 00:56)  -----------------  CT shows SBO with transition at anastomosis. He's passing flatus but vomited yesterday and remains distended.    PAST MEDICAL & SURGICAL HISTORY:  Hearing loss  left hearing aid, right ear deaf    Macular degeneration  right eye    CAD (coronary artery disease)    Stented coronary artery  2 stents, 20 yrs ago, 15 yrs ago    HTN (hypertension)    Hypercholesterolemia    Obesity    Diabetes mellitus    Intervertebral disc disorder    Depression    Hypothyroidism    Crohn disease    Prostate CA  s/p XRT    Neuropathy    Sepsis  s/p back surgery    History of hip replacement, total, right    History of hip replacement, total, right  Revision    S/P lumbar fusion    S/P colon resection        Home Medications:  ascorbic acid 1000 mg oral tablet: 1 tab(s) orally once a day (08 Apr 2022 18:54)  aspirin 81 mg oral tablet: 1 tab(s) orally once a day    (08 Apr 2022 18:54)  atorvastatin 20 mg oral tablet: 1 tab(s) orally once a day (08 Apr 2022 18:54)  colestipol 1 g oral tablet: 1 tab(s) orally once a day (08 Apr 2022 18:54)  glimepiride 1 mg oral tablet: 1 tab(s) orally once a day (08 Apr 2022 18:54)  levothyroxine 88 mcg (0.088 mg) oral tablet: 1 tab(s) orally once a day (08 Apr 2022 18:54)  lisinopril-hydrochlorothiazide 20 mg-25 mg oral tablet: 1 tab(s) orally once a day (08 Apr 2022 18:54)  metFORMIN 500 mg oral tablet: 1 tab(s) orally 2 times a day (08 Apr 2022 18:54)  Multiple Vitamins oral tablet: 1 tab(s) orally once a day (08 Apr 2022 18:54)  PreserVision AREDS 2 oral capsule: 1 cap(s) orally 2 times a day (08 Apr 2022 18:54)  traMADol 50 mg oral tablet: 1 tab(s) orally 3 times a day, As Needed (08 Apr 2022 18:54)  verapamil 240 mg/24 hours oral tablet, extended release: 1 tab(s) orally once a day (08 Apr 2022 18:54)  Vitamin D3 50 mcg (2000 intl units) oral tablet: 1 tab(s) orally once a day (08 Apr 2022 18:54)      MEDICATIONS  (STANDING):  ascorbic acid  Oral Tab/Cap - Peds 1000 milliGRAM(s) Oral daily  aspirin  chewable 81 milliGRAM(s) Oral daily  cholecalciferol Oral Tab/Cap - Peds 2000 Unit(s) Oral daily  dextrose 5% + sodium chloride 0.45% with potassium chloride 20 mEq/L 1000 milliLiter(s) (75 mL/Hr) IV Continuous <Continuous>  dextrose 5%. 1000 milliLiter(s) (50 mL/Hr) IV Continuous <Continuous>  dextrose 5%. 1000 milliLiter(s) (100 mL/Hr) IV Continuous <Continuous>  dextrose 50% Injectable 25 Gram(s) IV Push once  dextrose 50% Injectable 12.5 Gram(s) IV Push once  dextrose 50% Injectable 25 Gram(s) IV Push once  enalaprilat Injectable 1.25 milliGRAM(s) IV Push every 6 hours  enoxaparin Injectable 40 milliGRAM(s) SubCutaneous every 24 hours  glucagon  Injectable 1 milliGRAM(s) IntraMuscular once  insulin lispro (ADMELOG) corrective regimen sliding scale   SubCutaneous every 6 hours  levothyroxine 88 MICROGram(s) Oral daily  melatonin 5 milliGRAM(s) Oral at bedtime  multivitamin 1 Tablet(s) Oral daily  pantoprazole  Injectable 40 milliGRAM(s) IV Push daily  verapamil 120 milliGRAM(s) Oral every 12 hours    MEDICATIONS  (PRN):  acetaminophen   IVPB .. 1000 milliGRAM(s) IV Intermittent once PRN Mild Pain (1 - 3)  dextrose Oral Gel 15 Gram(s) Oral once PRN Blood Glucose LESS THAN 70 milliGRAM(s)/deciliter  morphine  - Injectable 2 milliGRAM(s) IV Push every 4 hours PRN Severe Pain (7 - 10)  ondansetron Injectable 4 milliGRAM(s) IV Push every 6 hours PRN Nausea and/or Vomiting  traMADol 50 milliGRAM(s) Oral three times a day PRN Moderate Pain (4 - 6)      Allergies    penicillin (Hives)    Intolerances        SOCIAL HISTORY:    FAMILY HISTORY:  No pertinent family history in first degree relatives        ROS  As above  Otherwise unremarkable    Vital Signs Last 24 Hrs  T(C): 36.8 (12 Apr 2022 08:07), Max: 37 (11 Apr 2022 21:03)  T(F): 98.3 (12 Apr 2022 08:07), Max: 98.6 (11 Apr 2022 21:03)  HR: 67 (12 Apr 2022 08:07) (61 - 73)  BP: 143/60 (12 Apr 2022 08:07) (142/51 - 187/77)  BP(mean): --  RR: 18 (12 Apr 2022 08:07) (18 - 18)  SpO2: 97% (12 Apr 2022 08:07) (95% - 97%)    Constitutional: NAD, well-developed  Respiratory: CTAB  Cardiovascular: S1 and S2, RRR  Gastrointestinal: BS+, soft, still moderately distended  Extremities: No peripheral edema  Psychiatric: Normal mood, normal affect  Skin: No rashes    LABS:                        10.9   7.93  )-----------( 288      ( 12 Apr 2022 08:51 )             35.4     04-12    146<H>  |  119<H>  |  24<H>  ----------------------------<  156<H>  4.5   |  21<L>  |  1.47<H>    Ca    9.7      12 Apr 2022 08:51  Phos  1.9     04-12  Mg     2.1     04-12    TPro  6.1  /  Alb  2.7<L>  /  TBili  0.3  /  DBili  x   /  AST  21  /  ALT  22  /  AlkPhos  63  04-11      LIVER FUNCTIONS - ( 11 Apr 2022 08:26 )  Alb: 2.7 g/dL / Pro: 6.1 gm/dL / ALK PHOS: 63 U/L / ALT: 22 U/L / AST: 21 U/L / GGT: x             RADIOLOGY & ADDITIONAL STUDIES:
  HPI: Pt is a 87y old Male with  abdominal pain which started around 3 weeks ago, located in the right lower abdomen, continuous, moderate, slightly increased by food, no relieving factors, associated with occasional nausea, no vomiting and no fever. His last bowel movement was 2 days ao but he is able to pass gas. He has visited his urologist for this pain as he also had an episode of hematuria initially that has resolved. He had a CT scan done than which he was told that was normal. He was referred to his gastroenterologist (Dr Padilla)  who also repeated the CT scan. This also did not show any acute causes of abdominal pain. He was told by Dr Padilla that if his pain persisted, he should come to the ED for management. Abd CT reveals a SBO and pt has been declining surgical intervention. Palliative Medicine Consult to further discuss GOC.   4/13/22 Seen and examined at bedside. Pt anxious and tearful regarding any potential surgical intervention. He denies pain and has been NPO for severeal days now. Endorses anxiety and insomnia at times.     PAIN: (X )Yes   ( )No  Level: mod-severe  Location: abd  Intensity:   4 /10  Quality: intermittent  Aggravating Factors: ? PO intake  Alleviating Factors: NPO  Impact on ADLs: severe    DYSPNEA: ( ) Yes  (X ) No    PAST MEDICAL & SURGICAL HISTORY:  Hearing loss  left hearing aid, right ear deaf  Macular degeneration  right eye  CAD (coronary artery disease)  Stented coronary artery  2 stents, 20 yrs ago, 15 yrs ago  HTN (hypertension)  Hypercholesterolemia  Obesity  Diabetes mellitus  Intervertebral disc disorder  Depression  Hypothyroidism  Crohn disease  Prostate CA  s/p XRT  Neuropathy  Sepsis  s/p back surgery  History of hip replacement, total, right  History of hip replacement, total, right  Revision  S/P lumbar fusion  S/P colon resection      SOCIAL HX:  Lives alone  Hx opiate tolerance ( )YES  (X )NO    Baseline ADLs  (Prior to Admission)  (X ) Independent   ( )Dependent    FAMILY HISTORY:  No pertinent family history in first degree relatives    Review of Systems:    Anxiety-mod  Depression-situational  Physical Discomfort-mild-severe  Dyspnea-denies  Constipation-intermittent  Diarrhea-denies  Nausea-denies  Vomiting-denies  Anorexia-denies  Fatigue-mod  Weakness-severe      All other systems reviewed and negative        PHYSICAL EXAM:    Vital Signs Last 24 Hrs  T(C): 36.6 (13 Apr 2022 12:09), Max: 37.1 (12 Apr 2022 17:16)  T(F): 97.9 (13 Apr 2022 12:09), Max: 98.8 (12 Apr 2022 17:16)  HR: 63 (13 Apr 2022 12:09) (63 - 71)  BP: 109/45 (13 Apr 2022 12:09) (109/45 - 148/62)  RR: 18 (13 Apr 2022 12:09) (18 - 18)  SpO2: 96% (13 Apr 2022 12:09) (95% - 98%)    PPSV2:  40-50 %    General: Elderly male in bed in NAD  Mental Status: tearful, crying throughout conversation  HEENT: oral mucosa dry  Lungs: clear to auscultation christiano  Cardiac: S1S2+  GI: abd softly distended NT no BS auscultated  : voids  Ext: MURCIA strength  Neuro: no focal def      LABS:                        10.3   5.92  )-----------( 242      ( 13 Apr 2022 08:39 )             32.7     04-13    142  |  114<H>  |  18  ----------------------------<  142<H>  4.3   |  21<L>  |  1.38<H>    Ca    9.5      13 Apr 2022 08:39  Phos  2.7     04-13  Mg     1.8     04-13    Albumin: Albumin, Serum: 2.7 g/dL (04-11 @ 08:26)    Allergies  penicillin (Hives)  Intolerances    MEDICATIONS  (STANDING):  ascorbic acid  Oral Tab/Cap - Peds 1000 milliGRAM(s) Oral daily  aspirin  chewable 81 milliGRAM(s) Oral daily  benzocaine 15 mG/menthol 3.6 mG Lozenge 1 Lozenge Oral every 12 hours  chlorhexidine 4% Liquid 1 Application(s) Topical <User Schedule>  cholecalciferol Oral Tab/Cap - Peds 2000 Unit(s) Oral daily  dextrose 5% + sodium chloride 0.45% with potassium chloride 20 mEq/L 1000 milliLiter(s) (75 mL/Hr) IV Continuous <Continuous>  dextrose 5%. 1000 milliLiter(s) (50 mL/Hr) IV Continuous <Continuous>  dextrose 5%. 1000 milliLiter(s) (100 mL/Hr) IV Continuous <Continuous>  dextrose 50% Injectable 25 Gram(s) IV Push once  dextrose 50% Injectable 12.5 Gram(s) IV Push once  dextrose 50% Injectable 25 Gram(s) IV Push once  enalaprilat Injectable 1.25 milliGRAM(s) IV Push every 6 hours  enoxaparin Injectable 40 milliGRAM(s) SubCutaneous every 24 hours  glucagon  Injectable 1 milliGRAM(s) IntraMuscular once  insulin lispro (ADMELOG) corrective regimen sliding scale   SubCutaneous every 6 hours  levothyroxine 88 MICROGram(s) Oral daily  melatonin 5 milliGRAM(s) Oral at bedtime  multivitamin 1 Tablet(s) Oral daily  pantoprazole  Injectable 40 milliGRAM(s) IV Push daily  verapamil 120 milliGRAM(s) Oral every 12 hours    MEDICATIONS  (PRN):  acetaminophen   IVPB .. 1000 milliGRAM(s) IV Intermittent once PRN Mild Pain (1 - 3)  dextrose Oral Gel 15 Gram(s) Oral once PRN Blood Glucose LESS THAN 70 milliGRAM(s)/deciliter  morphine  - Injectable 2 milliGRAM(s) IV Push every 4 hours PRN Severe Pain (7 - 10)  ondansetron Injectable 4 milliGRAM(s) IV Push every 6 hours PRN Nausea and/or Vomiting  traMADol 50 milliGRAM(s) Oral three times a day PRN Moderate Pain (4 - 6)      RADIOLOGY/ADDITIONAL STUDIES:    < from: CT Enterography w/ Oral Cont and w/ IV Cont (04.12.22 @ 13:13) >    ACC: [id number] EXAM:  CT ENTEROGRAPHY OC IC                          PROCEDURE DATE:  04/12/2022          INTERPRETATION:  CLINICAL INFORMATION: Small bowel obstruction. History   of Crohn's disease.    COMPARISON: April 08, 2022    CONTRAST/COMPLICATIONS:  IV Contrast: Omnipaque 350  90 cc administered   10 cc discarded  Oral Contrast: VoLumen  Complications: None reported at time of study completion    PROCEDURE:  CT of the Abdomen and Pelvis (CT Enterography) was performed with   intravenous contrast in the late arterial and portal venous phases.  Sagittal and coronal reformats were performed.    FINDINGS:  LOWER CHEST: Coronary artery calcifications. Trace bilateral pleural   effusions.    LIVER: Steatosis. A few hypervascular nodules inthe right lobe visible   on the arterial phase sequence only, the largest measuring 1.6 cm (602;   78).  BILE DUCTS: Normal caliber.  GALLBLADDER: Cholelithiasis.  SPLEEN: Within normal limits.  PANCREAS: Within normal limits.  ADRENALS: Within normal limits.  KIDNEYS/URETERS: Left renal cyst and several hypodense lesions in both   kidneys which are too small to characterize.    BLADDER/REPRODUCTIVE ORGANS: Limited evaluation of the lower pelvis due   to beam hardening artifact from the patient'sright hip arthroplasty,   however the bladder is grossly unremarkable.Radiation therapy seeds in   the prostate.    BOWEL: Fluid-filled and dilated small bowel loops with a right lower   quadrant transition point just proximal to an ileoileal anastomosis,   compatible with small bowel obstruction, findings which are unchanged. No   wall thickening, stricture or fistula. Colonic diverticulosis without   diverticulitis. Appendix is normal.  PERITONEUM: Small volume ascites. No pneumoperitoneum.  VESSELS: Atherosclerotic changes. Celiac and superior mesenteric arteries   are patent. Portal, superior mesenteric and splenic veins are patent.  RETROPERITONEUM/LYMPH NODES: No lymphadenopathy.  ABDOMINAL WALL: Within normal limits.  BONES: Right hip arthroplasty. L2-S1 posterior fusion with interpedicular   screws and connecting rods.    IMPRESSION:  As compared with April 08, 2022, no change in distal small bowel   obstruction likely due to adhesions.    No evidence of Crohn's disease.    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    < from: Xray Abdomen 1 View PORTABLE -Urgent (Xray Abdomen 1 View PORTABLE -Urgent .) (04.11.22 @ 09:06) >    ACC: [id number] EXAM:  XR ABDOMEN PORTABLE URGENT 1V                          PROCEDURE DATE:  04/11/2022          INTERPRETATION:  Abdomen one view    HISTORY: Small bowel obstruction    COMPARISON: 4/10/2022    Frontal view of the abdomen shows progression of small bowel dilatation.   Contrast material is seen in the colon and rectum. Right hip and lumbar   spine hardware are again noted. No definite free air is identified.    IMPRESSION:  Progression of small bowel dilatation.        Thank youfor this referral.    < from: Xray Abdomen 1 View Portable, IMMEDIATE (Xray Abdomen 1 View Portable, IMMEDIATE .) (04.10.22 @ 16:59) >    < from: Xray Abdomen 1 View Portable, IMMEDIATE (Xray Abdomen 1 View Portable, IMMEDIATE .) (04.10.22 @ 16:59) >    ACC: [id number] EXAM:  XR ABDOMEN PORTABLE IMMED 1V                          PROCEDURE DATE:  04/10/2022          INTERPRETATION:  Abdomen one view    HISTORY: Small bowel obstruction    COMPARISON: 4/8/2022    Frontal view of the abdomen shows similar dilated small bowel loops.   Contrast material appears in a collapsed colon. Lumbar spine and right   hip hardware are again noted. There is no evidence of organomegaly. No   definite free air is identified.    IMPRESSION:  No small bowel obstruction pattern.      < from: CT Abdomen and Pelvis w/ IV Cont (04.08.22 @ 14:55) >    ACC: [id number] EXAM:  CT ABDOMEN AND PELVIS IC                          PROCEDURE DATE:  04/08/2022          INTERPRETATION:  CLINICAL INFORMATION: Right lower quadrant pain    COMPARISON: 11/2/2017    CONTRAST/COMPLICATIONS:  IV Contrast: [id number]  90 cc administered   10 cc discarded  Oral Contrast: NONE  Complications: None reported at time of study completion    PROCEDURE:  CT of the Abdomen and Pelvis was performed.  Sagittal and coronal reformats were performed.    FINDINGS:  LOWER CHEST: Coronary artery calcification    LIVER: Steatosis.  BILE DUCTS: Normal caliber.  GALLBLADDER: Cholelithiasis.  SPLEEN: Within normal limits.  PANCREAS: Within normal limits.  ADRENALS: Within normal limits.  KIDNEYS/URETERS: Bilateral renal cortical scarring. Bilateral renal   cortical cysts..    BLADDER: Limited visualization due to artifact from right THR  REPRODUCTIVE ORGANS: Limited visualization due to artifact from right   THR. Prostate radiation seeds.    BOWEL: Dilated fluid-filled small bowel loops with equalized the distal   small bowel loops proximal to a transition point in the right lower   quadrant at the level of a anastomotic suture line. Relatively   decompressed distal small bowel and colon. Findings compatible with an   acute mechanical distal small bowel obstruction. There is a small amount   of interloop fluid and mesenteric edema. Correlate with lactate levels   for possible ischemia. No obstructing mass. Colonic diverticula. The   colon is inadequately distended, no gross evidence of colon inflammation.   Submucosal fat deposition in the terminal ileum may reflect chronic   inflammation. Appendix no appendicitis  PERITONEUM: No ascites, extraluminal gas or drainable collection..  VESSELS: Nonaneurysmal  RETROPERITONEUM/LYMPH NODES: No lymphadenopathy.  ABDOMINAL WALL: Within normal limits.  BONES: Degenerative changes. Postoperative changes lumbar spine.    IMPRESSION:  Acute mechanical distal small bowel obstruction transitioning at an   anastomotic suture line.  Small amount of interloop fluid and mesenteric edema. Correlate with   lactate levels for possible ischemia  Additional nonacute findings as discussed    Discussed acute findings with Zahra Dorantes at the time of this   interpretation        < from: Xray Chest 1 View AP/PA. (04.08.22 @ 13:34) >    ACC: [id number] EXAM:  XR CHEST 1 VIEW                          PROCEDURE DATE:  04/08/2022          INTERPRETATION:  Clinical Information:  Abdominal pain    Technique: AP chest image.    Comparison: CXR 12/26/2018    Findings/  Impression:    No focal consolidation.    No large effusions or pneumothoraces    Cardiomediastinal silhouette is within normal limits.    Osseous structures are unremarkable for age.

## 2022-04-13 NOTE — CHART NOTE - NSCHARTNOTEFT_GEN_A_CORE
HPI:  An 87 year old male who was seen in the ED for abdominal pain. Pain started around 3 weeks ago, located in the right lower abdomen, continuous, moderate, slightly increased by food, no relieving factors, associated with occasional nausea, no vomiting and no fever.  (09 Apr 2022 00:56)      PERTINENT PMH REVIEWED:  [ X ] YES [ ] NO           Primary Contact:    HCP in [facility] naming Rosanna Kaleb as primary and Jasson Manuel as alternate               HCP [  ] Surrogate [   ] Guardian [   ]    Mental Status: [ X ] Alert  [ X ] Oriented [  ] Confused [  ] Lethargic [  ]  Concerns of Depression [  ]- expressed feeling depressed at times given his current state  Anxiety [   ]- anxious at times  Baseline ADLs (prior to admission):  Independent [ X ] moderately [ ] fully   Dependent   [ ] moderately [ ]fully    Family Meeting: GOC pending after tomorrows Xray    Anticipated Grief: Patient [  X] Family [  ]    Caregiver Corona Assessed: Yes [  X ] No [  ]    Mosque: Scientologist    Spiritual Concerns: Not identified     Goals of Care: To be further determined after Xray tomorrow     Previous Services: None    ADVANCE DIRECTIVES:  Pt. currently Full Code     Anticipated D/C Plan: to be further determined                     Summary:    This SW met with pt. at bedside to follow up and provide support. Role of Palliative  explained. Pt. reports he was living alone prior to admission. He shared some of his frustrations with his current medical condition. He reports that he often prays to "fix me or take me". Feelings explored or support provided. Pt. shared how he is awaiting abdominal X ray tomorrow to f/u regarding obstruction and from there the plan will be further determined. He discussed how if it is a very invasive surgery he does not think he would want to go through that however, would like to hear his options.     Pt. reports he has a daughter who is in a cult therefore, she is not as reliable but he is in contact with her. Pt. also has a nephew in [place]. Although he has these family members he did share that his support is limited. Pt. currently has not set any limits and is a full code.     Plan at this time is to be further determined. GOC meeting to take place after X ray. Emotional support provided. Our team will continue to follow.

## 2022-04-13 NOTE — PROGRESS NOTE ADULT - SUBJECTIVE AND OBJECTIVE BOX
CC: Abd pain  HPI:  87 year old man with PMHx of DM2, HTN, HLD, CAD s/p stents, Crohn's, hx prostate ca, hypothyroidism who presents with abdominal pain.  Pt states pain has happening for 3 months, worsening.  Seen in out patient setting, CT imaging at that time negative for acute pathology.  He was inevitably sent to ED for persistent pain.    In ED: Found to have distal SBO, admitted under surgery.  Pt is maintained NPO, denies any fever, chills, n, v, passing gas.  Per nursing staff pt had BM last night.  Initially LA elevated, now normalized with ongoing IVFs.      4/12: laying in bed, ng tube dislodged yesterday night. abd less distended and tender today. tolerated PO contrast   4/13: sitting up in chair, requesting PO intake, had + BM this morning. discussed remaining NPO until repeat abx xray w/ con    Review of Systems:  Other Review of Systems: All other review of systems negative, except as noted in HPI      Vital Signs Last 24 Hrs  T(C): 36.6 (13 Apr 2022 12:09), Max: 37.1 (12 Apr 2022 17:16)  T(F): 97.9 (13 Apr 2022 12:09), Max: 98.8 (12 Apr 2022 17:16)  HR: 63 (13 Apr 2022 12:09) (63 - 71)  BP: 109/45 (13 Apr 2022 12:09) (109/45 - 148/62)  BP(mean): --  RR: 18 (13 Apr 2022 12:09) (18 - 18)  SpO2: 96% (13 Apr 2022 12:09) (95% - 98%)        PHYSICAL EXAM:    Constitutional: NAD, awake and alert, well-developed  HEENT: PERR, EOMI, Normal Hearing, MMM  Neck: Soft and supple  Respiratory: Breath sounds are clear bilaterally, No wheezing, rales or rhonchi  Cardiovascular: S1 and S2, regular rate and rhythm, no Murmurs, gallops or rubs  Gastrointestinal: Bowel Sounds present, soft, still distended abd, no guarding, no rebound  Extremities: No peripheral edema  Neurological: A/O x 3, no focal deficits in my limited exam      MEDICATIONS  (STANDING):  ascorbic acid  Oral Tab/Cap - Peds 1000 milliGRAM(s) Oral daily  aspirin  chewable 81 milliGRAM(s) Oral daily  cholecalciferol Oral Tab/Cap - Peds 2000 Unit(s) Oral daily  dextrose 5%. 1000 milliLiter(s) (50 mL/Hr) IV Continuous <Continuous>  dextrose 5%. 1000 milliLiter(s) (100 mL/Hr) IV Continuous <Continuous>  dextrose 50% Injectable 25 Gram(s) IV Push once  dextrose 50% Injectable 12.5 Gram(s) IV Push once  dextrose 50% Injectable 25 Gram(s) IV Push once  enoxaparin Injectable 40 milliGRAM(s) SubCutaneous every 24 hours  glucagon  Injectable 1 milliGRAM(s) IntraMuscular once  insulin lispro (ADMELOG) corrective regimen sliding scale   SubCutaneous every 6 hours  levothyroxine 88 MICROGram(s) Oral daily  melatonin 5 milliGRAM(s) Oral at bedtime  multivitamin 1 Tablet(s) Oral daily  pantoprazole  Injectable 40 milliGRAM(s) IV Push daily  sodium chloride 0.9% with potassium chloride 20 mEq/L 1000 milliLiter(s) (100 mL/Hr) IV Continuous <Continuous>  verapamil 120 milliGRAM(s) Oral every 12 hours    MEDICATIONS  (PRN):  dextrose Oral Gel 15 Gram(s) Oral once PRN Blood Glucose LESS THAN 70 milliGRAM(s)/deciliter  ondansetron Injectable 4 milliGRAM(s) IV Push every 6 hours PRN Nausea and/or Vomiting  traMADol 50 milliGRAM(s) Oral three times a day PRN Moderate Pain (4 - 6)        < from: CT Enterography w/ Oral Cont and w/ IV Cont (04.12.22 @ 13:13) >  IMPRESSION:  As compared with April 08, 2022, no change in distal small bowel   obstruction likely due to adhesions.    No evidence of Crohn's disease.    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        --- End of Report ---  KENNY URRUTIA JR, MD; Attending Radiologist  This document has been electronically signed. Apr 12 2022  2:41PM    < end of copied text >      Assessment: 87 year old man who presents with abd pain secondary to distal SBO.    ABd pain / distal SBO / Hx of Crohns:  -leukocytosis resolved  -Lactic acidosis resolved  -Scr 1.4, monitor, possible ROSE  -s/p ngt for decompression   -repeat CT without resolution or improvement to distal obstruction   -colorecral consulted - Strongly recommended NGT placement to maximize chance of nonoperative therapy; he refused once again.  He expressed that he does not want any operative intervention at this time, as "this did not cause me issues for 30 years".  Plan for abdominal XR today to evaluate passage of contrast.  Continue NPO, IVF, encourage ambulation.  -NPO except meds  -IVFs  -supportive care / pain management    DM2:  -RAISS    HTN / HLD / CAD s/p stents / hypothyroidism:  -resume home meds      DVT ppx:  -Lovenox

## 2022-04-14 LAB
ANION GAP SERPL CALC-SCNC: 5 MMOL/L — SIGNIFICANT CHANGE UP (ref 5–17)
BUN SERPL-MCNC: 16 MG/DL — SIGNIFICANT CHANGE UP (ref 7–23)
CALCIUM SERPL-MCNC: 9.7 MG/DL — SIGNIFICANT CHANGE UP (ref 8.5–10.1)
CHLORIDE SERPL-SCNC: 113 MMOL/L — HIGH (ref 96–108)
CO2 SERPL-SCNC: 22 MMOL/L — SIGNIFICANT CHANGE UP (ref 22–31)
CREAT SERPL-MCNC: 1.4 MG/DL — HIGH (ref 0.5–1.3)
EGFR: 49 ML/MIN/1.73M2 — LOW
GLUCOSE SERPL-MCNC: 121 MG/DL — HIGH (ref 70–99)
HCT VFR BLD CALC: 35 % — LOW (ref 39–50)
HGB BLD-MCNC: 10.8 G/DL — LOW (ref 13–17)
MAGNESIUM SERPL-MCNC: 1.9 MG/DL — SIGNIFICANT CHANGE UP (ref 1.6–2.6)
MCHC RBC-ENTMCNC: 29.1 PG — SIGNIFICANT CHANGE UP (ref 27–34)
MCHC RBC-ENTMCNC: 30.9 GM/DL — LOW (ref 32–36)
MCV RBC AUTO: 94.3 FL — SIGNIFICANT CHANGE UP (ref 80–100)
PHOSPHATE SERPL-MCNC: 2.7 MG/DL — SIGNIFICANT CHANGE UP (ref 2.5–4.5)
PLATELET # BLD AUTO: 264 K/UL — SIGNIFICANT CHANGE UP (ref 150–400)
POTASSIUM SERPL-MCNC: 4.4 MMOL/L — SIGNIFICANT CHANGE UP (ref 3.5–5.3)
POTASSIUM SERPL-SCNC: 4.4 MMOL/L — SIGNIFICANT CHANGE UP (ref 3.5–5.3)
RBC # BLD: 3.71 M/UL — LOW (ref 4.2–5.8)
RBC # FLD: 15.1 % — HIGH (ref 10.3–14.5)
SODIUM SERPL-SCNC: 140 MMOL/L — SIGNIFICANT CHANGE UP (ref 135–145)
WBC # BLD: 5.8 K/UL — SIGNIFICANT CHANGE UP (ref 3.8–10.5)
WBC # FLD AUTO: 5.8 K/UL — SIGNIFICANT CHANGE UP (ref 3.8–10.5)

## 2022-04-14 PROCEDURE — 99232 SBSQ HOSP IP/OBS MODERATE 35: CPT

## 2022-04-14 PROCEDURE — 74018 RADEX ABDOMEN 1 VIEW: CPT | Mod: 26

## 2022-04-14 RX ADMIN — BENZOCAINE AND MENTHOL 1 LOZENGE: 5; 1 LIQUID ORAL at 11:44

## 2022-04-14 RX ADMIN — PANTOPRAZOLE SODIUM 40 MILLIGRAM(S): 20 TABLET, DELAYED RELEASE ORAL at 09:34

## 2022-04-14 RX ADMIN — Medication 120 MILLIGRAM(S): at 23:01

## 2022-04-14 RX ADMIN — Medication 120 MILLIGRAM(S): at 09:34

## 2022-04-14 RX ADMIN — Medication 5 MILLIGRAM(S): at 23:01

## 2022-04-14 RX ADMIN — CHLORHEXIDINE GLUCONATE 1 APPLICATION(S): 213 SOLUTION TOPICAL at 11:44

## 2022-04-14 RX ADMIN — Medication 81 MILLIGRAM(S): at 09:35

## 2022-04-14 RX ADMIN — ENOXAPARIN SODIUM 40 MILLIGRAM(S): 100 INJECTION SUBCUTANEOUS at 05:07

## 2022-04-14 RX ADMIN — Medication 88 MICROGRAM(S): at 05:06

## 2022-04-14 RX ADMIN — ONDANSETRON 4 MILLIGRAM(S): 8 TABLET, FILM COATED ORAL at 17:04

## 2022-04-14 NOTE — PROGRESS NOTE ADULT - SUBJECTIVE AND OBJECTIVE BOX
Patient is a 87y old  Male who presents with a chief complaint of SBO (14 Apr 2022 07:50)      Subective:  No flatus or BMs. No real change.  Patient hasn't decided what he wants to do.    PAST MEDICAL & SURGICAL HISTORY:  Hearing loss  left hearing aid, right ear deaf    Macular degeneration  right eye    CAD (coronary artery disease)    Stented coronary artery  2 stents, 20 yrs ago, 15 yrs ago    HTN (hypertension)    Hypercholesterolemia    Obesity    Diabetes mellitus    Intervertebral disc disorder    Depression    Hypothyroidism    Crohn disease    Prostate CA  s/p XRT    Neuropathy    Sepsis  s/p back surgery    History of hip replacement, total, right    History of hip replacement, total, right  Revision    S/P lumbar fusion    S/P colon resection        MEDICATIONS  (STANDING):  ascorbic acid  Oral Tab/Cap - Peds 1000 milliGRAM(s) Oral daily  aspirin  chewable 81 milliGRAM(s) Oral daily  benzocaine 15 mG/menthol 3.6 mG Lozenge 1 Lozenge Oral every 12 hours  chlorhexidine 4% Liquid 1 Application(s) Topical <User Schedule>  cholecalciferol Oral Tab/Cap - Peds 2000 Unit(s) Oral daily  dextrose 5% + sodium chloride 0.45% with potassium chloride 20 mEq/L 1000 milliLiter(s) (75 mL/Hr) IV Continuous <Continuous>  dextrose 5%. 1000 milliLiter(s) (100 mL/Hr) IV Continuous <Continuous>  dextrose 5%. 1000 milliLiter(s) (50 mL/Hr) IV Continuous <Continuous>  dextrose 50% Injectable 25 Gram(s) IV Push once  dextrose 50% Injectable 12.5 Gram(s) IV Push once  dextrose 50% Injectable 25 Gram(s) IV Push once  enalaprilat Injectable 1.25 milliGRAM(s) IV Push every 6 hours  enoxaparin Injectable 40 milliGRAM(s) SubCutaneous every 24 hours  glucagon  Injectable 1 milliGRAM(s) IntraMuscular once  insulin lispro (ADMELOG) corrective regimen sliding scale   SubCutaneous every 6 hours  levothyroxine 88 MICROGram(s) Oral daily  melatonin 5 milliGRAM(s) Oral at bedtime  multivitamin 1 Tablet(s) Oral daily  pantoprazole  Injectable 40 milliGRAM(s) IV Push daily  verapamil 120 milliGRAM(s) Oral every 12 hours    MEDICATIONS  (PRN):  acetaminophen   IVPB .. 1000 milliGRAM(s) IV Intermittent once PRN Mild Pain (1 - 3)  dextrose Oral Gel 15 Gram(s) Oral once PRN Blood Glucose LESS THAN 70 milliGRAM(s)/deciliter  LORazepam   Injectable 0.5 milliGRAM(s) IV Push every 6 hours PRN Anxiety  morphine  - Injectable 2 milliGRAM(s) IV Push every 4 hours PRN Severe Pain (7 - 10)  ondansetron Injectable 4 milliGRAM(s) IV Push every 6 hours PRN Nausea and/or Vomiting  traMADol 50 milliGRAM(s) Oral three times a day PRN Moderate Pain (4 - 6)      REVIEW OF SYSTEMS:    RESPIRATORY: No shortness of breath  CARDIOVASCULAR: No chest pain  All other review of systems is negative unless indicated above.    Vital Signs Last 24 Hrs  T(C): 36.9 (13 Apr 2022 22:53), Max: 37 (13 Apr 2022 08:19)  T(F): 98.4 (13 Apr 2022 22:53), Max: 98.6 (13 Apr 2022 08:19)  HR: 69 (14 Apr 2022 04:33) (63 - 149)  BP: 118/49 (14 Apr 2022 04:33) (109/45 - 148/62)  BP(mean): --  RR: 18 (13 Apr 2022 22:53) (18 - 18)  SpO2: 96% (13 Apr 2022 22:53) (93% - 96%)    PHYSICAL EXAM:    Constitutional: NAD, well-developed  Respiratory: CTAB  Cardiovascular: S1 and S2, RRR  Gastrointestinal: BS+, soft, mildly distended but soft  Extremities: No peripheral edema  Psychiatric: Normal mood, normal affect    LABS:                        10.3   5.92  )-----------( 242      ( 13 Apr 2022 08:39 )             32.7     04-13    142  |  114<H>  |  18  ----------------------------<  142<H>  4.3   |  21<L>  |  1.38<H>    Ca    9.5      13 Apr 2022 08:39  Phos  2.7     04-13  Mg     1.8     04-13            RADIOLOGY & ADDITIONAL STUDIES:

## 2022-04-14 NOTE — PHYSICAL THERAPY INITIAL EVALUATION ADULT - MODALITIES TREATMENT COMMENTS
, Patient  left in chair with CBIR and chair alarm active. Encouraged to ambulate during the day to return bowfunction. Patient independent in functional mobility, no skilled physical therapy need in this setting.  May ambulate per nursing.  Will d/c from PT. RN, CM informed.

## 2022-04-14 NOTE — PHYSICAL THERAPY INITIAL EVALUATION ADULT - PERTINENT HX OF CURRENT PROBLEM, REHAB EVAL
`87 year old man with PMHx of DM2, HTN, HLD, CAD s/p stents, Crohn's, hx prostate ca, hypothyroidism who presents with abdominal pain.  Progressively worse x 3 months.  Found to have distal SBO, in ED, admitted under surgery.  S/P NGT, refusing surgery.   Ct 4/12: As compared with April 08, 2022, no change in distal small bowel obstruction likely due to adhesions.

## 2022-04-14 NOTE — PROGRESS NOTE ADULT - SUBJECTIVE AND OBJECTIVE BOX
HPI: Pt is a 87y old Male with  abdominal pain which started around 3 weeks ago, located in the right lower abdomen, continuous, moderate, slightly increased by food, no relieving factors, associated with occasional nausea, no vomiting and no fever. His last bowel movement was 2 days ao but he is able to pass gas. He has visited his urologist for this pain as he also had an episode of hematuria initially that has resolved. He had a CT scan done than which he was told that was normal. He was referred to his gastroenterologist (Dr Padilla)  who also repeated the CT scan. This also did not show any acute causes of abdominal pain. He was told by Dr Padilla that if his pain persisted, he should come to the ED for management. Abd CT reveals a SBO and pt has been declining surgical intervention. Palliative Medicine Consult to further discuss GOC.   4/13/22 Seen and examined at bedside. Pt anxious and tearful regarding any potential surgical intervention. He denies pain and has been NPO for severeal days now. Endorses anxiety and insomnia at times.   4/14/22 Seen and examined at bedside. Denies pain. Taking clear liquids. Awaiting Xray results    PAIN: (X )Yes   ( )No  Level: mod-severe  Location: abd  Intensity:   4 /10  Quality: intermittent  Aggravating Factors: ? PO intake  Alleviating Factors: NPO  Impact on ADLs: severe    DYSPNEA: ( ) Yes  (X ) No    PAST MEDICAL & SURGICAL HISTORY:  Hearing loss  left hearing aid, right ear deaf  Macular degeneration  right eye  CAD (coronary artery disease)  Stented coronary artery  2 stents, 20 yrs ago, 15 yrs ago  HTN (hypertension)  Hypercholesterolemia  Obesity  Diabetes mellitus  Intervertebral disc disorder  Depression  Hypothyroidism  Crohn disease  Prostate CA  s/p XRT  Neuropathy  Sepsis  s/p back surgery  History of hip replacement, total, right  History of hip replacement, total, right  Revision  S/P lumbar fusion  S/P colon resection      SOCIAL HX:  Lives alone  Hx opiate tolerance ( )YES  (X )NO    Baseline ADLs  (Prior to Admission)  (X ) Independent   ( )Dependent    FAMILY HISTORY:  No pertinent family history in first degree relatives    Review of Systems:    Anxiety-mod  Depression-situational  Physical Discomfort-mild-severe  Dyspnea-denies  Constipation-intermittent  Diarrhea-denies  Nausea-denies  Vomiting-denies  Anorexia-denies  Fatigue-mod  Weakness-severe      All other systems reviewed and negative        PHYSICAL EXAM:    Vital Signs Last 24 Hrs  T(C): 36.6 (13 Apr 2022 12:09), Max: 37.1 (12 Apr 2022 17:16)  T(F): 97.9 (13 Apr 2022 12:09), Max: 98.8 (12 Apr 2022 17:16)  HR: 63 (13 Apr 2022 12:09) (63 - 71)  BP: 109/45 (13 Apr 2022 12:09) (109/45 - 148/62)  RR: 18 (13 Apr 2022 12:09) (18 - 18)  SpO2: 96% (13 Apr 2022 12:09) (95% - 98%)    PPSV2:  40-50 %    General: Elderly male in bed in NAD  Mental Status: tearful, crying throughout conversation  HEENT: oral mucosa dry  Lungs: clear to auscultation christiano  Cardiac: S1S2+  GI: abd distended NT no BS auscultated  : voids  Ext: MURCIA strength  Neuro: no focal def      LABS:                              10.8   5.80  )-----------( 264      ( 14 Apr 2022 08:24 )             35.0               04-14    140  |  113<H>  |  16  ----------------------------<  121<H>  4.4   |  22  |  1.40<H>    Ca    9.7      14 Apr 2022 08:24  Phos  2.7     04-14  Mg     1.9     04-14    Albumin: Albumin, Serum: 2.7 g/dL (04-11 @ 08:26)    Allergies  penicillin (Hives)  Intolerances  MEDICATIONS  (STANDING):  ascorbic acid  Oral Tab/Cap - Peds 1000 milliGRAM(s) Oral daily  aspirin  chewable 81 milliGRAM(s) Oral daily  benzocaine 15 mG/menthol 3.6 mG Lozenge 1 Lozenge Oral every 12 hours  chlorhexidine 4% Liquid 1 Application(s) Topical <User Schedule>  cholecalciferol Oral Tab/Cap - Peds 2000 Unit(s) Oral daily  dextrose 5%. 1000 milliLiter(s) (100 mL/Hr) IV Continuous <Continuous>  dextrose 5%. 1000 milliLiter(s) (50 mL/Hr) IV Continuous <Continuous>  dextrose 50% Injectable 25 Gram(s) IV Push once  dextrose 50% Injectable 12.5 Gram(s) IV Push once  dextrose 50% Injectable 25 Gram(s) IV Push once  enalaprilat Injectable 1.25 milliGRAM(s) IV Push every 6 hours  enoxaparin Injectable 40 milliGRAM(s) SubCutaneous every 24 hours  glucagon  Injectable 1 milliGRAM(s) IntraMuscular once  insulin lispro (ADMELOG) corrective regimen sliding scale   SubCutaneous every 6 hours  levothyroxine 88 MICROGram(s) Oral daily  melatonin 5 milliGRAM(s) Oral at bedtime  multivitamin 1 Tablet(s) Oral daily  pantoprazole  Injectable 40 milliGRAM(s) IV Push daily  verapamil 120 milliGRAM(s) Oral every 12 hours    MEDICATIONS  (PRN):  acetaminophen   IVPB .. 1000 milliGRAM(s) IV Intermittent once PRN Mild Pain (1 - 3)  dextrose Oral Gel 15 Gram(s) Oral once PRN Blood Glucose LESS THAN 70 milliGRAM(s)/deciliter  LORazepam   Injectable 0.5 milliGRAM(s) IV Push every 6 hours PRN Anxiety  morphine  - Injectable 2 milliGRAM(s) IV Push every 4 hours PRN Severe Pain (7 - 10)  ondansetron Injectable 4 milliGRAM(s) IV Push every 6 hours PRN Nausea and/or Vomiting  traMADol 50 milliGRAM(s) Oral three times a day PRN Moderate Pain (4 - 6)    RADIOLOGY:

## 2022-04-14 NOTE — PROGRESS NOTE ADULT - SUBJECTIVE AND OBJECTIVE BOX
CC: Abd pain  HPI:  87 year old man with PMHx of DM2, HTN, HLD, CAD s/p stents, Crohn's, hx prostate ca, hypothyroidism who presents with abdominal pain.  Pt states pain has happening for 3 months, worsening.  Seen in out patient setting, CT imaging at that time negative for acute pathology.  He was inevitably sent to ED for persistent pain.    In ED: Found to have distal SBO, admitted under surgery.  Pt is maintained NPO, denies any fever, chills, n, v, passing gas.  Per nursing staff pt had BM last night.  Initially LA elevated, now normalized with ongoing IVFs.      4/12: laying in bed, ng tube dislodged yesterday night. abd less distended and tender today. tolerated PO contrast   4/13: sitting up in chair, requesting PO intake, had + BM this morning. discussed remaining NPO until repeat abx xray w/ con  4/14: ambulated in hallways without pain or discomfort, still undecided regarding surgery.     Review of Systems:  Other Review of Systems: All other review of systems negative, except as noted in HPI      Vital Signs Last 24 Hrs  T(C): 36.7 (14 Apr 2022 08:21), Max: 36.9 (13 Apr 2022 22:53)  T(F): 98.1 (14 Apr 2022 08:21), Max: 98.4 (13 Apr 2022 22:53)  HR: 64 (14 Apr 2022 08:21) (63 - 149)  BP: 149/61 (14 Apr 2022 08:21) (109/45 - 149/61)  BP(mean): --  RR: 18 (14 Apr 2022 08:21) (18 - 18)  SpO2: 94% (14 Apr 2022 08:21) (93% - 96%)      PHYSICAL EXAM:    Constitutional: NAD, awake and alert, well-developed  HEENT: PERR, EOMI, Normal Hearing, MMM  Neck: Soft and supple  Respiratory: Breath sounds are clear bilaterally, No wheezing, rales or rhonchi  Cardiovascular: S1 and S2, regular rate and rhythm, no Murmurs, gallops or rubs  Gastrointestinal: Bowel Sounds present, soft,  less distended abd, no guarding, no rebound  Extremities: No peripheral edema  Neurological: A/O x 3, no focal deficits in my limited exam      MEDICATIONS  (STANDING):  ascorbic acid  Oral Tab/Cap - Peds 1000 milliGRAM(s) Oral daily  aspirin  chewable 81 milliGRAM(s) Oral daily  cholecalciferol Oral Tab/Cap - Peds 2000 Unit(s) Oral daily  dextrose 5%. 1000 milliLiter(s) (50 mL/Hr) IV Continuous <Continuous>  dextrose 5%. 1000 milliLiter(s) (100 mL/Hr) IV Continuous <Continuous>  dextrose 50% Injectable 25 Gram(s) IV Push once  dextrose 50% Injectable 12.5 Gram(s) IV Push once  dextrose 50% Injectable 25 Gram(s) IV Push once  enoxaparin Injectable 40 milliGRAM(s) SubCutaneous every 24 hours  glucagon  Injectable 1 milliGRAM(s) IntraMuscular once  insulin lispro (ADMELOG) corrective regimen sliding scale   SubCutaneous every 6 hours  levothyroxine 88 MICROGram(s) Oral daily  melatonin 5 milliGRAM(s) Oral at bedtime  multivitamin 1 Tablet(s) Oral daily  pantoprazole  Injectable 40 milliGRAM(s) IV Push daily  sodium chloride 0.9% with potassium chloride 20 mEq/L 1000 milliLiter(s) (100 mL/Hr) IV Continuous <Continuous>  verapamil 120 milliGRAM(s) Oral every 12 hours    MEDICATIONS  (PRN):  dextrose Oral Gel 15 Gram(s) Oral once PRN Blood Glucose LESS THAN 70 milliGRAM(s)/deciliter  ondansetron Injectable 4 milliGRAM(s) IV Push every 6 hours PRN Nausea and/or Vomiting  traMADol 50 milliGRAM(s) Oral three times a day PRN Moderate Pain (4 - 6)        < from: CT Enterography w/ Oral Cont and w/ IV Cont (04.12.22 @ 13:13) >  IMPRESSION:  As compared with April 08, 2022, no change in distal small bowel   obstruction likely due to adhesions.    No evidence of Crohn's disease.    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        --- End of Report ---  KENNY URRUTIA JR, MD; Attending Radiologist  This document has been electronically signed. Apr 12 2022  2:41PM    < end of copied text >      Assessment: 87 year old man who presents with abd pain secondary to distal SBO.    ABd pain / distal SBO / Hx of Crohns:  -leukocytosis resolved  -Lactic acidosis resolved  -s/p ngt for decompression   -repeat CT without resolution or improvement to distal obstruction   -colorecral consulted - Strongly recommended NGT placement to maximize chance of nonoperative therapy; he refused once again.  He expressed that he does not want any operative intervention at this time, as "this did not cause me issues for 30 years".  Plan for abdominal XR today to evaluate passage of contrast.  Continue NPO, IVF, encourage ambulation.  -NPO except meds  -IVFs  -supportive care / pain management    # ROSE on CKD stage 3   - Scr 1.4 -- plateued   - present on arrival   - avoid nephrotoxins     DM2:  -RAISS    HTN / HLD / CAD s/p stents / hypothyroidism:  -resume home meds      DVT ppx:  -Lovenox      CC: Abd pain  HPI:  87 year old man with PMHx of DM2, HTN, HLD, CAD s/p stents, Crohn's, hx prostate ca, hypothyroidism who presents with abdominal pain.  Pt states pain has happening for 3 months, worsening.  Seen in out patient setting, CT imaging at that time negative for acute pathology.  He was inevitably sent to ED for persistent pain.    In ED: Found to have distal SBO, admitted under surgery.  Pt is maintained NPO, denies any fever, chills, n, v, passing gas.  Per nursing staff pt had BM last night.  Initially LA elevated, now normalized with ongoing IVFs.      4/12: laying in bed, ng tube dislodged yesterday night. abd less distended and tender today. tolerated PO contrast   4/13: sitting up in chair, requesting PO intake, had + BM this morning. discussed remaining NPO until repeat abx xray w/ con  4/14: ambulated in hallways without pain or discomfort, still undecided regarding surgery.     Review of Systems:  Other Review of Systems: All other review of systems negative, except as noted in HPI      Vital Signs Last 24 Hrs  T(C): 36.7 (14 Apr 2022 08:21), Max: 36.9 (13 Apr 2022 22:53)  T(F): 98.1 (14 Apr 2022 08:21), Max: 98.4 (13 Apr 2022 22:53)  HR: 64 (14 Apr 2022 08:21) (63 - 149)  BP: 149/61 (14 Apr 2022 08:21) (109/45 - 149/61)  BP(mean): --  RR: 18 (14 Apr 2022 08:21) (18 - 18)  SpO2: 94% (14 Apr 2022 08:21) (93% - 96%)      PHYSICAL EXAM:    Constitutional: NAD, awake and alert, well-developed  HEENT: PERR, EOMI, Normal Hearing, MMM  Neck: Soft and supple  Respiratory: Breath sounds are clear bilaterally, No wheezing, rales or rhonchi  Cardiovascular: S1 and S2, regular rate and rhythm, no Murmurs, gallops or rubs  Gastrointestinal: Bowel Sounds present, soft,  less distended abd, no guarding, no rebound  Extremities: No peripheral edema  Neurological: A/O x 3, no focal deficits in my limited exam      MEDICATIONS  (STANDING):  ascorbic acid  Oral Tab/Cap - Peds 1000 milliGRAM(s) Oral daily  aspirin  chewable 81 milliGRAM(s) Oral daily  cholecalciferol Oral Tab/Cap - Peds 2000 Unit(s) Oral daily  dextrose 5%. 1000 milliLiter(s) (50 mL/Hr) IV Continuous <Continuous>  dextrose 5%. 1000 milliLiter(s) (100 mL/Hr) IV Continuous <Continuous>  dextrose 50% Injectable 25 Gram(s) IV Push once  dextrose 50% Injectable 12.5 Gram(s) IV Push once  dextrose 50% Injectable 25 Gram(s) IV Push once  enoxaparin Injectable 40 milliGRAM(s) SubCutaneous every 24 hours  glucagon  Injectable 1 milliGRAM(s) IntraMuscular once  insulin lispro (ADMELOG) corrective regimen sliding scale   SubCutaneous every 6 hours  levothyroxine 88 MICROGram(s) Oral daily  melatonin 5 milliGRAM(s) Oral at bedtime  multivitamin 1 Tablet(s) Oral daily  pantoprazole  Injectable 40 milliGRAM(s) IV Push daily  sodium chloride 0.9% with potassium chloride 20 mEq/L 1000 milliLiter(s) (100 mL/Hr) IV Continuous <Continuous>  verapamil 120 milliGRAM(s) Oral every 12 hours    MEDICATIONS  (PRN):  dextrose Oral Gel 15 Gram(s) Oral once PRN Blood Glucose LESS THAN 70 milliGRAM(s)/deciliter  ondansetron Injectable 4 milliGRAM(s) IV Push every 6 hours PRN Nausea and/or Vomiting  traMADol 50 milliGRAM(s) Oral three times a day PRN Moderate Pain (4 - 6)        < from: CT Enterography w/ Oral Cont and w/ IV Cont (04.12.22 @ 13:13) >  IMPRESSION:  As compared with April 08, 2022, no change in distal small bowel   obstruction likely due to adhesions.    No evidence of Crohn's disease.    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        --- End of Report ---  KENNY URRUTIA JR, MD; Attending Radiologist  This document has been electronically signed. Apr 12 2022  2:41PM    < end of copied text >      Assessment: 87 year old man who presents with abd pain secondary to distal SBO.    ABd pain / distal SBO / Hx of Crohns:  -leukocytosis resolved  -Lactic acidosis resolved  -s/p ngt for decompression   -repeat CT without resolution or improvement to distal obstruction   -colorecral consulted - Strongly recommended NGT placement to maximize chance of nonoperative therapy; he refused once again.  He expressed that he does not want any operative intervention at this time, as "this did not cause me issues for 30 years".  Plan for abdominal XR today to evaluate passage of contrast.  Continue NPO, IVF, encourage ambulation.  -NPO except meds  -IVFs  -supportive care / pain management    # ROSE on CKD stage 3   - Scr 1.4 -- plateued   - present on arrival   - avoid nephrotoxins     # hypernatremia   - due to hypovolemia   - present on arrival and resolved     DM2:  -RAISS    HTN / HLD / CAD s/p stents / hypothyroidism:  -resume home meds      DVT ppx:  -Lovenox

## 2022-04-14 NOTE — PROGRESS NOTE ADULT - SUBJECTIVE AND OBJECTIVE BOX
Patient was seen and evaluated at bedside this morning. Admits to passing flatus, and had small BMs, but still very distended. VS reviewed.    Pt seen and examined at bedside, no acute events. Pt had no complaints. Tolerating liquid diet. Continues to have bowel function. Denied fever, chills, nausea, vomiting or SOB overnight.     Vital Signs Last 24 Hrs  T(C): 36.9 (13 Apr 2022 22:53), Max: 37 (13 Apr 2022 08:19)  T(F): 98.4 (13 Apr 2022 22:53), Max: 98.6 (13 Apr 2022 08:19)  HR: 69 (14 Apr 2022 04:33) (63 - 149)  BP: 118/49 (14 Apr 2022 04:33) (109/45 - 148/62)  BP(mean): --  RR: 18 (13 Apr 2022 22:53) (18 - 18)  SpO2: 96% (13 Apr 2022 22:53) (93% - 96%)    Labs:                              10.3   5.92  )-----------( 242      ( 13 Apr 2022 08:39 )             32.7     CBC Full  -  ( 13 Apr 2022 08:39 )  WBC Count : 5.92 K/uL  RBC Count : 3.50 M/uL  Hemoglobin : 10.3 g/dL  Hematocrit : 32.7 %  Platelet Count - Automated : 242 K/uL  Mean Cell Volume : 93.4 fl  Mean Cell Hemoglobin : 29.4 pg  Mean Cell Hemoglobin Concentration : 31.5 gm/dL  Auto Neutrophil # : x  Auto Lymphocyte # : x  Auto Monocyte # : x  Auto Eosinophil # : x  Auto Basophil # : x  Auto Neutrophil % : x  Auto Lymphocyte % : x  Auto Monocyte % : x  Auto Eosinophil % : x  Auto Basophil % : x    04-13    142  |  114<H>  |  18  ----------------------------<  142<H>  4.3   |  21<L>  |  1.38<H>    Ca    9.5      13 Apr 2022 08:39  Phos  2.7     04-13  Mg     1.8     04-13        Physical Exam:  Pt is AAOx3  General: Well developed, in no acute distress.   Chest: Lungs clear, no rales, no rhonchi, no wheezes.   Heart: RR, no murmurs, no rubs, no gallops.   Abdomen: Soft, improved RLQ tenderness, distended, no masses, BS normal.    Back: Normal curvature, no tenderness.   Neuro: Physiological, no localizing findings.   Skin: Normal, no rashes, no lesions noted.   Extremities: Warm, well perfused, no edema, Pulses intact

## 2022-04-14 NOTE — PHYSICAL THERAPY INITIAL EVALUATION ADULT - ADDITIONAL COMMENTS
+minimal , Community Ambulator.  Denied using AD at home, but uses RW or shopping cart outside the home.

## 2022-04-14 NOTE — PHYSICAL THERAPY INITIAL EVALUATION ADULT - GENERAL OBSERVATIONS, REHAB EVAL
Patient received out of bed in chair on 5E.  Denied pain.  Patient reported ambulating in [place] earlier this am.

## 2022-04-15 LAB
ANION GAP SERPL CALC-SCNC: 4 MMOL/L — LOW (ref 5–17)
BUN SERPL-MCNC: 15 MG/DL — SIGNIFICANT CHANGE UP (ref 7–23)
CALCIUM SERPL-MCNC: 9.2 MG/DL — SIGNIFICANT CHANGE UP (ref 8.5–10.1)
CHLORIDE SERPL-SCNC: 114 MMOL/L — HIGH (ref 96–108)
CO2 SERPL-SCNC: 23 MMOL/L — SIGNIFICANT CHANGE UP (ref 22–31)
CREAT SERPL-MCNC: 1.36 MG/DL — HIGH (ref 0.5–1.3)
EGFR: 50 ML/MIN/1.73M2 — LOW
GLUCOSE SERPL-MCNC: 116 MG/DL — HIGH (ref 70–99)
HCT VFR BLD CALC: 32.3 % — LOW (ref 39–50)
HGB BLD-MCNC: 10 G/DL — LOW (ref 13–17)
MAGNESIUM SERPL-MCNC: 1.9 MG/DL — SIGNIFICANT CHANGE UP (ref 1.6–2.6)
MCHC RBC-ENTMCNC: 28.9 PG — SIGNIFICANT CHANGE UP (ref 27–34)
MCHC RBC-ENTMCNC: 31 GM/DL — LOW (ref 32–36)
MCV RBC AUTO: 93.4 FL — SIGNIFICANT CHANGE UP (ref 80–100)
PHOSPHATE SERPL-MCNC: 2.5 MG/DL — SIGNIFICANT CHANGE UP (ref 2.5–4.5)
PLATELET # BLD AUTO: 239 K/UL — SIGNIFICANT CHANGE UP (ref 150–400)
POTASSIUM SERPL-MCNC: 4 MMOL/L — SIGNIFICANT CHANGE UP (ref 3.5–5.3)
POTASSIUM SERPL-SCNC: 4 MMOL/L — SIGNIFICANT CHANGE UP (ref 3.5–5.3)
RBC # BLD: 3.46 M/UL — LOW (ref 4.2–5.8)
RBC # FLD: 14.9 % — HIGH (ref 10.3–14.5)
SARS-COV-2 RNA SPEC QL NAA+PROBE: SIGNIFICANT CHANGE UP
SODIUM SERPL-SCNC: 141 MMOL/L — SIGNIFICANT CHANGE UP (ref 135–145)
WBC # BLD: 4.91 K/UL — SIGNIFICANT CHANGE UP (ref 3.8–10.5)
WBC # FLD AUTO: 4.91 K/UL — SIGNIFICANT CHANGE UP (ref 3.8–10.5)

## 2022-04-15 PROCEDURE — 99233 SBSQ HOSP IP/OBS HIGH 50: CPT

## 2022-04-15 PROCEDURE — 99232 SBSQ HOSP IP/OBS MODERATE 35: CPT

## 2022-04-15 PROCEDURE — 99497 ADVNCD CARE PLAN 30 MIN: CPT

## 2022-04-15 PROCEDURE — 99498 ADVNCD CARE PLAN ADDL 30 MIN: CPT

## 2022-04-15 RX ORDER — ELECTROLYTE SOLUTION,INJ
1 VIAL (ML) INTRAVENOUS
Refills: 0 | Status: DISCONTINUED | OUTPATIENT
Start: 2022-04-15 | End: 2022-04-15

## 2022-04-15 RX ORDER — DEXTROSE 50 % IN WATER 50 %
25 SYRINGE (ML) INTRAVENOUS ONCE
Refills: 0 | Status: DISCONTINUED | OUTPATIENT
Start: 2022-04-15 | End: 2022-04-18

## 2022-04-15 RX ORDER — DEXTROSE 50 % IN WATER 50 %
15 SYRINGE (ML) INTRAVENOUS ONCE
Refills: 0 | Status: DISCONTINUED | OUTPATIENT
Start: 2022-04-15 | End: 2022-04-18

## 2022-04-15 RX ORDER — SODIUM CHLORIDE 9 MG/ML
1000 INJECTION, SOLUTION INTRAVENOUS
Refills: 0 | Status: DISCONTINUED | OUTPATIENT
Start: 2022-04-15 | End: 2022-04-18

## 2022-04-15 RX ORDER — GLUCAGON INJECTION, SOLUTION 0.5 MG/.1ML
1 INJECTION, SOLUTION SUBCUTANEOUS ONCE
Refills: 0 | Status: DISCONTINUED | OUTPATIENT
Start: 2022-04-15 | End: 2022-04-18

## 2022-04-15 RX ORDER — DEXTROSE 50 % IN WATER 50 %
12.5 SYRINGE (ML) INTRAVENOUS ONCE
Refills: 0 | Status: DISCONTINUED | OUTPATIENT
Start: 2022-04-15 | End: 2022-04-18

## 2022-04-15 RX ADMIN — ENOXAPARIN SODIUM 40 MILLIGRAM(S): 100 INJECTION SUBCUTANEOUS at 05:32

## 2022-04-15 RX ADMIN — PANTOPRAZOLE SODIUM 40 MILLIGRAM(S): 20 TABLET, DELAYED RELEASE ORAL at 10:39

## 2022-04-15 RX ADMIN — Medication 120 MILLIGRAM(S): at 10:39

## 2022-04-15 RX ADMIN — BENZOCAINE AND MENTHOL 1 LOZENGE: 5; 1 LIQUID ORAL at 21:39

## 2022-04-15 RX ADMIN — Medication 1 TABLET(S): at 10:39

## 2022-04-15 RX ADMIN — Medication 1 EACH: at 22:17

## 2022-04-15 RX ADMIN — Medication 120 MILLIGRAM(S): at 21:38

## 2022-04-15 RX ADMIN — BENZOCAINE AND MENTHOL 1 LOZENGE: 5; 1 LIQUID ORAL at 11:19

## 2022-04-15 RX ADMIN — Medication 0: at 12:01

## 2022-04-15 RX ADMIN — CHLORHEXIDINE GLUCONATE 1 APPLICATION(S): 213 SOLUTION TOPICAL at 11:19

## 2022-04-15 RX ADMIN — Medication 1000 MILLIGRAM(S): at 10:41

## 2022-04-15 RX ADMIN — Medication 81 MILLIGRAM(S): at 10:40

## 2022-04-15 RX ADMIN — Medication 5 MILLIGRAM(S): at 21:38

## 2022-04-15 RX ADMIN — Medication 2000 UNIT(S): at 10:40

## 2022-04-15 NOTE — PROGRESS NOTE ADULT - NS ATTEND AMEND GEN_ALL_CORE FT
pt is opposed tto surgery and overwhelmed with discussions of dnr dni.   He is not sure what next steps he would want but at this time is hoping things settle down and he  improves  awaiting repeat xray to see if sbo is resolving   pt remains full code, case discussed at length with pt and np as above. will continue to follow  abdomen nontender on exam decreased BS   cards s1s2  resp rr no wheezing or rales
pt is opposed tto surgery and overwhelmed with discussions of dnr dni.   He is not sure what next steps he would want but at this time is hoping things settle down and he  improves  awaiting repeat xray to see if sbo is resolving   pt remains full code, case discussed at length with pt and np as above. will continue to follow  abdomen nontender on exam decreased BS   cards s1s2  resp rr no wheezing or rales

## 2022-04-15 NOTE — DIETITIAN NUTRITION RISK NOTIFICATION - TREATMENT: THE FOLLOWING DIET HAS BEEN RECOMMENDED
Diet, Clear Liquid (04-15-22 @ 11:56) [Active]      Parenteral Nutrition - Adult 1 Each (83 mL/Hr) TPN Continuous <Continuous>, 04-15-22 @ 22:00, 22:00, Stop order after: 1 Days

## 2022-04-15 NOTE — PROGRESS NOTE ADULT - CONVERSATION DETAILS
The role of Palliative medicine was reviewed. The pt discussed his fear of surgery R/T anesthesia and complications of neuropathy after mult back surgeries. We discussed possible bowel surgery if his bowel obstruction is not resolved. He is hopeful her will not need surgery but will consider if it is indicated. I discussed the fact that he can not eat if he is obstructed and that it would lead to his demise.  Also reviewed options R/T disch as he lives alone and discussed PATRICK vs home care. We will continue to follow. LUCILA reviewed. Does not want to complete at this time,

## 2022-04-15 NOTE — PROGRESS NOTE ADULT - SUBJECTIVE AND OBJECTIVE BOX
HPI: Pt is a 87y old Male with  abdominal pain which started around 3 weeks ago, located in the right lower abdomen, continuous, moderate, slightly increased by food, no relieving factors, associated with occasional nausea, no vomiting and no fever. His last bowel movement was 2 days ao but he is able to pass gas. He has visited his urologist for this pain as he also had an episode of hematuria initially that has resolved. He had a CT scan done than which he was told that was normal. He was referred to his gastroenterologist (Dr Padilla)  who also repeated the CT scan. This also did not show any acute causes of abdominal pain. He was told by Dr Padilla that if his pain persisted, he should come to the ED for management. Abd CT reveals a SBO and pt has been declining surgical intervention. Palliative Medicine Consult to further discuss GOC.   4/13/22 Seen and examined at bedside. Pt anxious and tearful regarding any potential surgical intervention. He denies pain and has been NPO for severeal days now. Endorses anxiety and insomnia at times.   4/14/22 Seen and examined at bedside. Denies pain. Taking clear liquids. Awaiting Xray results  4/15/22 Seen and examined at bedside with no family present.  Seen by GI and surgery this am. States he had several episodes of diarrheas over night.    PAIN: (X )Yes   ( )No  Level: mod-severe  Location: abd  Intensity:   4 /10  Quality: intermittent  Aggravating Factors: ? PO intake  Alleviating Factors: NPO  Impact on ADLs: severe    DYSPNEA: ( ) Yes  (X ) No    PAST MEDICAL & SURGICAL HISTORY:  Hearing loss  left hearing aid, right ear deaf  Macular degeneration  right eye  CAD (coronary artery disease)  Stented coronary artery  2 stents, 20 yrs ago, 15 yrs ago  HTN (hypertension)  Hypercholesterolemia  Obesity  Diabetes mellitus  Intervertebral disc disorder  Depression  Hypothyroidism  Crohn disease  Prostate CA  s/p XRT  Neuropathy  Sepsis  s/p back surgery  History of hip replacement, total, right  History of hip replacement, total, right  Revision  S/P lumbar fusion  S/P colon resection      SOCIAL HX:  Lives alone  Hx opiate tolerance ( )YES  (X )NO    Baseline ADLs  (Prior to Admission)  (X ) Independent   ( )Dependent    FAMILY HISTORY:  No pertinent family history in first degree relatives    Review of Systems:    Anxiety-mod  Depression-situational  Physical Discomfort-mild-severe  Dyspnea-denies  Constipation-intermittent  Diarrhea-denies  Nausea-denies  Vomiting-denies  Anorexia-denies  Fatigue-mod  Weakness-severe      All other systems reviewed and negative        PHYSICAL EXAM:  ICU Vital Signs Last 24 Hrs  T(C): 36.7 (15 Apr 2022 08:20), Max: 37.3 (14 Apr 2022 22:53)  T(F): 98 (15 Apr 2022 08:20), Max: 99.1 (14 Apr 2022 22:53)  HR: 61 (15 Apr 2022 08:20) (61 - 72)  BP: 146/59 (15 Apr 2022 08:20) (126/53 - 149/57)  RR: 18 (15 Apr 2022 08:20) (18 - 18)  SpO2: 95% (15 Apr 2022 08:20) (94% - 97%)    PPSV2:  40-50 %    General: Elderly male in bed in NAD  Mental Status: tearful, crying throughout conversation  HEENT: oral mucosa dry  Lungs: clear to auscultation christiano  Cardiac: S1S2+  GI: abd distended NT no BS auscultated  : voids  Ext: MURCIA strength  Neuro: no focal def      LABS:                           10.0   4.91  )-----------( 239      ( 15 Apr 2022 09:00 )             32.3          04-15    141  |  114<H>  |  15  ----------------------------<  116<H>  4.0   |  23  |  1.36<H>    Ca    9.2      15 Apr 2022 09:00  Phos  2.5     04-15  Mg     1.9     04-15                   Albumin: Albumin, Serum: 2.7 g/dL (04-11 @ 08:26)    Allergies  penicillin (Hives)  Intolerances  MEDICATIONS  (STANDING):  ascorbic acid  Oral Tab/Cap - Peds 1000 milliGRAM(s) Oral daily  aspirin  chewable 81 milliGRAM(s) Oral daily  benzocaine 15 mG/menthol 3.6 mG Lozenge 1 Lozenge Oral every 12 hours  chlorhexidine 4% Liquid 1 Application(s) Topical <User Schedule>  cholecalciferol Oral Tab/Cap - Peds 2000 Unit(s) Oral daily  dextrose 5%. 1000 milliLiter(s) (50 mL/Hr) IV Continuous <Continuous>  dextrose 5%. 1000 milliLiter(s) (100 mL/Hr) IV Continuous <Continuous>  dextrose 5%. 1000 milliLiter(s) (50 mL/Hr) IV Continuous <Continuous>  dextrose 5%. 1000 milliLiter(s) (100 mL/Hr) IV Continuous <Continuous>  dextrose 50% Injectable 25 Gram(s) IV Push once  dextrose 50% Injectable 12.5 Gram(s) IV Push once  dextrose 50% Injectable 25 Gram(s) IV Push once  dextrose 50% Injectable 25 Gram(s) IV Push once  dextrose 50% Injectable 12.5 Gram(s) IV Push once  dextrose 50% Injectable 25 Gram(s) IV Push once  enalaprilat Injectable 1.25 milliGRAM(s) IV Push every 6 hours  enoxaparin Injectable 40 milliGRAM(s) SubCutaneous every 24 hours  glucagon  Injectable 1 milliGRAM(s) IntraMuscular once  glucagon  Injectable 1 milliGRAM(s) IntraMuscular once  insulin lispro (ADMELOG) corrective regimen sliding scale   SubCutaneous every 6 hours  levothyroxine 88 MICROGram(s) Oral daily  melatonin 5 milliGRAM(s) Oral at bedtime  multivitamin 1 Tablet(s) Oral daily  pantoprazole  Injectable 40 milliGRAM(s) IV Push daily  Parenteral Nutrition - Adult 1 Each (83 mL/Hr) TPN Continuous <Continuous>  verapamil 120 milliGRAM(s) Oral every 12 hours    MEDICATIONS  (PRN):  acetaminophen   IVPB .. 1000 milliGRAM(s) IV Intermittent once PRN Mild Pain (1 - 3)  dextrose Oral Gel 15 Gram(s) Oral once PRN Blood Glucose LESS THAN 70 milliGRAM(s)/deciliter  dextrose Oral Gel 15 Gram(s) Oral once PRN Blood Glucose LESS THAN 70 milliGRAM(s)/deciliter  LORazepam   Injectable 0.5 milliGRAM(s) IV Push every 6 hours PRN Anxiety  morphine  - Injectable 2 milliGRAM(s) IV Push every 4 hours PRN Severe Pain (7 - 10)  ondansetron Injectable 4 milliGRAM(s) IV Push every 6 hours PRN Nausea and/or Vomiting  traMADol 50 milliGRAM(s) Oral three times a day PRN Moderate Pain (4 - 6)    RADIOLOGY:

## 2022-04-15 NOTE — DIETITIAN NUTRITION RISK NOTIFICATION - ADDITIONAL COMMENTS/DIETITIAN RECOMMENDATIONS
Additional Recommendations  1) initiate PN orders as recommended. 2) Daily weights 3) Strict I & O's 4) Daily lyte checks 5) Weekly triglycerides/LFT checks 6) supplement calcium gluconate outside 10 mEq (shortage as per CAPS) 7) Monitor POCT's Q6 hr w/ ISS. Maintain glycemic control 140-180mg/dl 8) advance po diet when medically feasible

## 2022-04-15 NOTE — PROGRESS NOTE ADULT - NS ATTEND BILL GEN_ALL_CORE
For therapy - please call insurance and find out if [facility] ([phone]) or [facility] ([phone]) are in network.    For mood - decrease Wellbutrin XL back to 150mg, continue Fluoxetine.      Return to clinic in 3 months.  
Attending to bill
Attending to bill

## 2022-04-15 NOTE — DIETITIAN INITIAL EVALUATION ADULT - PERTINENT MEDS FT
MEDICATIONS  (STANDING):  ascorbic acid  Oral Tab/Cap - Peds 1000 milliGRAM(s) Oral daily  aspirin  chewable 81 milliGRAM(s) Oral daily  benzocaine 15 mG/menthol 3.6 mG Lozenge 1 Lozenge Oral every 12 hours  chlorhexidine 4% Liquid 1 Application(s) Topical <User Schedule>  cholecalciferol Oral Tab/Cap - Peds 2000 Unit(s) Oral daily  dextrose 5%. 1000 milliLiter(s) (50 mL/Hr) IV Continuous <Continuous>  dextrose 5%. 1000 milliLiter(s) (100 mL/Hr) IV Continuous <Continuous>  dextrose 50% Injectable 12.5 Gram(s) IV Push once  dextrose 50% Injectable 25 Gram(s) IV Push once  dextrose 50% Injectable 25 Gram(s) IV Push once  enalaprilat Injectable 1.25 milliGRAM(s) IV Push every 6 hours  enoxaparin Injectable 40 milliGRAM(s) SubCutaneous every 24 hours  glucagon  Injectable 1 milliGRAM(s) IntraMuscular once  insulin lispro (ADMELOG) corrective regimen sliding scale   SubCutaneous every 6 hours  levothyroxine 88 MICROGram(s) Oral daily  melatonin 5 milliGRAM(s) Oral at bedtime  multivitamin 1 Tablet(s) Oral daily  pantoprazole  Injectable 40 milliGRAM(s) IV Push daily  verapamil 120 milliGRAM(s) Oral every 12 hours    MEDICATIONS  (PRN):  acetaminophen   IVPB .. 1000 milliGRAM(s) IV Intermittent once PRN Mild Pain (1 - 3)  dextrose Oral Gel 15 Gram(s) Oral once PRN Blood Glucose LESS THAN 70 milliGRAM(s)/deciliter  LORazepam   Injectable 0.5 milliGRAM(s) IV Push every 6 hours PRN Anxiety  morphine  - Injectable 2 milliGRAM(s) IV Push every 4 hours PRN Severe Pain (7 - 10)  ondansetron Injectable 4 milliGRAM(s) IV Push every 6 hours PRN Nausea and/or Vomiting  traMADol 50 milliGRAM(s) Oral three times a day PRN Moderate Pain (4 - 6)

## 2022-04-15 NOTE — DIETITIAN INITIAL EVALUATION ADULT - ADD RECOMMEND
1) initiate PN orders as recommended. 2) Daily weights 3) Strict I & O's 4) Daily lyte checks 5) Weekly triglycerides/LFT checks 6) supplement calcium gluconate outside 10 mEq (shortage as per CAPS) 7) Monitor POCT's Q6 hr w/ ISS. Maintain glycemic control 140-180mg/dl  1) initiate PN orders as recommended. 2) Daily weights 3) Strict I & O's 4) Daily lyte checks 5) Weekly triglycerides/LFT checks 6) supplement calcium gluconate outside 10 mEq (shortage as per CAPS) 7) Monitor POCT's Q6 hr w/ ISS. Maintain glycemic control 140-180mg/dl 8) advance po diet when medically feasible

## 2022-04-15 NOTE — DIETITIAN INITIAL EVALUATION ADULT - RD TO REMAIN AVAILABLE
PPN Recommendations:  Total Volume:   80 g  Amino Acids  100 g Dextrose  0 g Lipids 20%  121 mEq Sodium Chloride  33 mEq Sodium Acetate  0 mmol Sodium Phosphate  37 mEq Potassium Chloride  4 mEq Potassium Acetate  20 mmol Potassium Phosphate  0 mEq Calcium Gluconate  8 mEq Magnesium Sulfate  100 mg Thiamine  1 ml Trace   10 ml MVI    Total Calories 660(Meets   37%  of  Estimated Energy needs and 79%  Protein needs)/yes

## 2022-04-15 NOTE — DIETITIAN INITIAL EVALUATION ADULT - OTHER INFO
87y old Male with  abdominal pain which started around 3 weeks ago, located in the right lower abdomen, continuous, moderate, slightly increased by food, no relieving factors, associated with occasional nausea, no vomiting and no fever. His last bowel movement was 2 days ao but he is able to pass gas. He has visited his urologist for this pain as he also had an episode of hematuria initially that has resolved. He had a CT scan done than which he was told that was normal. He was referred to his gastroenterologist (Dr Padilla)  who also repeated the CT scan. This also did not show any acute causes of abdominal pain. He was told by Dr Padilla that if his pain persisted, he should come to the ED for management. Abd CT reveals a SBO and pt has been declining surgical intervention. Pt started on clear liquid diet and is tolerating sips w/out difficulty. Denies any significant weight changes. Poor intake x 1 week PTA. NFPE- mild muscle/fat loss. If unable to advance po diet any further consider alternate route of nutrition. See below for recommendations. Criteria met for moderate malnutrition. Will continue to monitor and follow up prn.

## 2022-04-15 NOTE — PROGRESS NOTE ADULT - SUBJECTIVE AND OBJECTIVE BOX
CC: Abd pain  HPI:  87 year old man with PMHx of DM2, HTN, HLD, CAD s/p stents, Crohn's, hx prostate ca, hypothyroidism who presents with abdominal pain.  Pt states pain has happening for 3 months, worsening.  Seen in out patient setting, CT imaging at that time negative for acute pathology.  He was inevitably sent to ED for persistent pain.    In ED: Found to have distal SBO, admitted under surgery.  Pt is maintained NPO, denies any fever, chills, n, v, passing gas.  Per nursing staff pt had BM last night.  Initially LA elevated, now normalized with ongoing IVFs.      4/12: laying in bed, ng tube dislodged yesterday night. abd less distended and tender today. tolerated PO contrast   4/13: sitting up in chair, requesting PO intake, had + BM this morning. discussed remaining NPO until repeat abx xray w/ con  4/14: ambulated in hallways without pain or discomfort, still undecided regarding surgery.   4/15: multiple BMs overnight, no abd pain, nausea or vomiting.     Review of Systems:  Other Review of Systems: All other review of systems negative, except as noted in HPI      Vital Signs Last 24 Hrs  T(C): 37.2 (15 Apr 2022 16:08), Max: 37.3 (14 Apr 2022 22:53)  T(F): 98.9 (15 Apr 2022 16:08), Max: 99.1 (14 Apr 2022 22:53)  HR: 72 (15 Apr 2022 16:08) (61 - 72)  BP: 151/57 (15 Apr 2022 16:08) (126/53 - 151/57)  BP(mean): --  RR: 18 (15 Apr 2022 16:08) (18 - 18)  SpO2: 96% (15 Apr 2022 16:08) (94% - 96%)      PHYSICAL EXAM:    Constitutional: NAD, awake and alert, well-developed  HEENT: PERR, EOMI, Normal Hearing, MMM  Neck: Soft and supple  Respiratory: Breath sounds are clear bilaterally, No wheezing, rales or rhonchi  Cardiovascular: S1 and S2, regular rate and rhythm, no Murmurs, gallops or rubs  Gastrointestinal: Bowel Sounds present, soft, firm abd, less distended abd, no guarding, no rebound  Extremities: No peripheral edema  Neurological: A/O x 3, no focal deficits in my limited exam      MEDICATIONS  (STANDING):  ascorbic acid  Oral Tab/Cap - Peds 1000 milliGRAM(s) Oral daily  aspirin  chewable 81 milliGRAM(s) Oral daily  cholecalciferol Oral Tab/Cap - Peds 2000 Unit(s) Oral daily  dextrose 5%. 1000 milliLiter(s) (50 mL/Hr) IV Continuous <Continuous>  dextrose 5%. 1000 milliLiter(s) (100 mL/Hr) IV Continuous <Continuous>  dextrose 50% Injectable 25 Gram(s) IV Push once  dextrose 50% Injectable 12.5 Gram(s) IV Push once  dextrose 50% Injectable 25 Gram(s) IV Push once  enoxaparin Injectable 40 milliGRAM(s) SubCutaneous every 24 hours  glucagon  Injectable 1 milliGRAM(s) IntraMuscular once  insulin lispro (ADMELOG) corrective regimen sliding scale   SubCutaneous every 6 hours  levothyroxine 88 MICROGram(s) Oral daily  melatonin 5 milliGRAM(s) Oral at bedtime  multivitamin 1 Tablet(s) Oral daily  pantoprazole  Injectable 40 milliGRAM(s) IV Push daily  sodium chloride 0.9% with potassium chloride 20 mEq/L 1000 milliLiter(s) (100 mL/Hr) IV Continuous <Continuous>  verapamil 120 milliGRAM(s) Oral every 12 hours    MEDICATIONS  (PRN):  dextrose Oral Gel 15 Gram(s) Oral once PRN Blood Glucose LESS THAN 70 milliGRAM(s)/deciliter  ondansetron Injectable 4 milliGRAM(s) IV Push every 6 hours PRN Nausea and/or Vomiting  traMADol 50 milliGRAM(s) Oral three times a day PRN Moderate Pain (4 - 6)        < from: CT Enterography w/ Oral Cont and w/ IV Cont (04.12.22 @ 13:13) >  IMPRESSION:  As compared with April 08, 2022, no change in distal small bowel   obstruction likely due to adhesions.    No evidence of Crohn's disease.    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        --- End of Report ---  KENNY URRUTIA JR, MD; Attending Radiologist  This document has been electronically signed. Apr 12 2022  2:41PM    < end of copied text >      Assessment: 87 year old man who presents with abd pain secondary to distal SBO.    ABd pain / distal SBO / Hx of Crohns:  -leukocytosis resolved  -Lactic acidosis resolved  -s/p ngt for decompression   -repeat CT without resolution or improvement to distal obstruction   -colorecral consulted - Strongly recommended NGT placement to maximize chance of nonoperative therapy; he refused once again.  He expressed that he does not want any operative intervention at this time, as "this did not cause me issues for 30 years".  Plan for abdominal XR today to evaluate passage of contrast.    Continue CLD, encourage ambulation.  -- tpn for nutritional support,    s/p IVFs  -supportive care / pain management    # ROSE on CKD stage 3   - Scr 1.4 -- plateued   - present on arrival   - avoid nephrotoxins     # hypernatremia   - due to hypovolemia   - present on arrival and resolved     DM2:  -RAISS    HTN / HLD / CAD s/p stents / hypothyroidism:  -resume home meds    DVT ppx:  -Lovenox

## 2022-04-15 NOTE — PROGRESS NOTE ADULT - SUBJECTIVE AND OBJECTIVE BOX
HPI: Pt is a 87y old Male with hx of       PAIN: ( )Yes   ( )No  Level:  Location:  Intensity:    /10  Quality:  Aggravating Factors:  Alleviating Factors:  Radiation:  Duration/Timing:  Impact on ADLs:    DYSPNEA: ( ) Yes  ( ) No  Level:    PAST MEDICAL & SURGICAL HISTORY:  Hearing loss  left hearing aid, right ear deaf    Macular degeneration  right eye    CAD (coronary artery disease)    Stented coronary artery  2 stents, 20 yrs ago, 15 yrs ago    HTN (hypertension)    Hypercholesterolemia    Obesity    Diabetes mellitus    Intervertebral disc disorder    Depression    Hypothyroidism    Crohn disease    Prostate CA  s/p XRT    Neuropathy    Sepsis  s/p back surgery    History of hip replacement, total, right    History of hip replacement, total, right  Revision    S/P lumbar fusion    S/P colon resection        SOCIAL HX:    Hx opiate tolerance ( )YES  ( )NO    Baseline ADLs  (Prior to Admission)  ( ) Independent   ( )Dependent    FAMILY HISTORY:  No pertinent family history in first degree relatives        Review of Systems:    Anxiety-  Depression-  Physical Discomfort-  Dyspnea-  Constipation-  Diarrhea-  Nausea-  Vomiting-  Anorexia-  Weight Loss-   Cough-  Secretions-  Fatigue-  Weakness-  Delirium-    All other systems reviewed and negative  Unable to obtain/Limited due to:      PHYSICAL EXAM:    Vital Signs Last 24 Hrs  T(C): 36.7 (15 Apr 2022 08:20), Max: 37.3 (14 Apr 2022 22:53)  T(F): 98 (15 Apr 2022 08:20), Max: 99.1 (14 Apr 2022 22:53)  HR: 61 (15 Apr 2022 08:20) (61 - 72)  BP: 146/59 (15 Apr 2022 08:20) (126/53 - 149/57)  BP(mean): --  RR: 18 (15 Apr 2022 08:20) (18 - 18)  SpO2: 95% (15 Apr 2022 08:20) (94% - 97%)  Daily     Daily     PPSV2:   %  FAST:    General:  Mental Status:  HEENT:  Lungs:  Cardiac:  GI:  :  Ext:  Neuro:      LABS:                        10.0   4.91  )-----------( 239      ( 15 Apr 2022 09:00 )             32.3     04-15    141  |  114<H>  |  15  ----------------------------<  116<H>  4.0   |  23  |  1.36<H>    Ca    9.2      15 Apr 2022 09:00  Phos  2.5     04-15  Mg     1.9     04-15        Albumin: Albumin, Serum: 2.7 g/dL (04-11 @ 08:26)      Allergies    penicillin (Hives)    Intolerances      MEDICATIONS  (STANDING):  ascorbic acid  Oral Tab/Cap - Peds 1000 milliGRAM(s) Oral daily  aspirin  chewable 81 milliGRAM(s) Oral daily  benzocaine 15 mG/menthol 3.6 mG Lozenge 1 Lozenge Oral every 12 hours  chlorhexidine 4% Liquid 1 Application(s) Topical <User Schedule>  cholecalciferol Oral Tab/Cap - Peds 2000 Unit(s) Oral daily  dextrose 5%. 1000 milliLiter(s) (50 mL/Hr) IV Continuous <Continuous>  dextrose 5%. 1000 milliLiter(s) (100 mL/Hr) IV Continuous <Continuous>  dextrose 5%. 1000 milliLiter(s) (50 mL/Hr) IV Continuous <Continuous>  dextrose 5%. 1000 milliLiter(s) (100 mL/Hr) IV Continuous <Continuous>  dextrose 50% Injectable 25 Gram(s) IV Push once  dextrose 50% Injectable 12.5 Gram(s) IV Push once  dextrose 50% Injectable 25 Gram(s) IV Push once  dextrose 50% Injectable 25 Gram(s) IV Push once  dextrose 50% Injectable 12.5 Gram(s) IV Push once  dextrose 50% Injectable 25 Gram(s) IV Push once  enalaprilat Injectable 1.25 milliGRAM(s) IV Push every 6 hours  enoxaparin Injectable 40 milliGRAM(s) SubCutaneous every 24 hours  glucagon  Injectable 1 milliGRAM(s) IntraMuscular once  glucagon  Injectable 1 milliGRAM(s) IntraMuscular once  insulin lispro (ADMELOG) corrective regimen sliding scale   SubCutaneous every 6 hours  levothyroxine 88 MICROGram(s) Oral daily  melatonin 5 milliGRAM(s) Oral at bedtime  multivitamin 1 Tablet(s) Oral daily  pantoprazole  Injectable 40 milliGRAM(s) IV Push daily  Parenteral Nutrition - Adult 1 Each (83 mL/Hr) TPN Continuous <Continuous>  verapamil 120 milliGRAM(s) Oral every 12 hours    MEDICATIONS  (PRN):  acetaminophen   IVPB .. 1000 milliGRAM(s) IV Intermittent once PRN Mild Pain (1 - 3)  dextrose Oral Gel 15 Gram(s) Oral once PRN Blood Glucose LESS THAN 70 milliGRAM(s)/deciliter  dextrose Oral Gel 15 Gram(s) Oral once PRN Blood Glucose LESS THAN 70 milliGRAM(s)/deciliter  LORazepam   Injectable 0.5 milliGRAM(s) IV Push every 6 hours PRN Anxiety  morphine  - Injectable 2 milliGRAM(s) IV Push every 4 hours PRN Severe Pain (7 - 10)  ondansetron Injectable 4 milliGRAM(s) IV Push every 6 hours PRN Nausea and/or Vomiting  traMADol 50 milliGRAM(s) Oral three times a day PRN Moderate Pain (4 - 6)      RADIOLOGY/ADDITIONAL STUDIES:

## 2022-04-15 NOTE — PROGRESS NOTE ADULT - SUBJECTIVE AND OBJECTIVE BOX
Patient is a 87y old  Male who presents with a chief complaint of SBO (14 Apr 2022 14:46)      Subective:  Had copious bowel movements "all night long"  No abdominal pain or N/V now.    PAST MEDICAL & SURGICAL HISTORY:  Hearing loss  left hearing aid, right ear deaf    Macular degeneration  right eye    CAD (coronary artery disease)    Stented coronary artery  2 stents, 20 yrs ago, 15 yrs ago    HTN (hypertension)    Hypercholesterolemia    Obesity    Diabetes mellitus    Intervertebral disc disorder    Depression    Hypothyroidism    Crohn disease    Prostate CA  s/p XRT    Neuropathy    Sepsis  s/p back surgery    History of hip replacement, total, right    History of hip replacement, total, right  Revision    S/P lumbar fusion    S/P colon resection        MEDICATIONS  (STANDING):  ascorbic acid  Oral Tab/Cap - Peds 1000 milliGRAM(s) Oral daily  aspirin  chewable 81 milliGRAM(s) Oral daily  benzocaine 15 mG/menthol 3.6 mG Lozenge 1 Lozenge Oral every 12 hours  chlorhexidine 4% Liquid 1 Application(s) Topical <User Schedule>  cholecalciferol Oral Tab/Cap - Peds 2000 Unit(s) Oral daily  dextrose 5%. 1000 milliLiter(s) (50 mL/Hr) IV Continuous <Continuous>  dextrose 5%. 1000 milliLiter(s) (100 mL/Hr) IV Continuous <Continuous>  dextrose 50% Injectable 25 Gram(s) IV Push once  dextrose 50% Injectable 12.5 Gram(s) IV Push once  dextrose 50% Injectable 25 Gram(s) IV Push once  enalaprilat Injectable 1.25 milliGRAM(s) IV Push every 6 hours  enoxaparin Injectable 40 milliGRAM(s) SubCutaneous every 24 hours  glucagon  Injectable 1 milliGRAM(s) IntraMuscular once  insulin lispro (ADMELOG) corrective regimen sliding scale   SubCutaneous every 6 hours  levothyroxine 88 MICROGram(s) Oral daily  melatonin 5 milliGRAM(s) Oral at bedtime  multivitamin 1 Tablet(s) Oral daily  pantoprazole  Injectable 40 milliGRAM(s) IV Push daily  verapamil 120 milliGRAM(s) Oral every 12 hours    MEDICATIONS  (PRN):  acetaminophen   IVPB .. 1000 milliGRAM(s) IV Intermittent once PRN Mild Pain (1 - 3)  dextrose Oral Gel 15 Gram(s) Oral once PRN Blood Glucose LESS THAN 70 milliGRAM(s)/deciliter  LORazepam   Injectable 0.5 milliGRAM(s) IV Push every 6 hours PRN Anxiety  morphine  - Injectable 2 milliGRAM(s) IV Push every 4 hours PRN Severe Pain (7 - 10)  ondansetron Injectable 4 milliGRAM(s) IV Push every 6 hours PRN Nausea and/or Vomiting  traMADol 50 milliGRAM(s) Oral three times a day PRN Moderate Pain (4 - 6)      REVIEW OF SYSTEMS:    RESPIRATORY: No shortness of breath  CARDIOVASCULAR: No chest pain  All other review of systems is negative unless indicated above.    Vital Signs Last 24 Hrs  T(C): 36.7 (15 Apr 2022 05:24), Max: 37.3 (14 Apr 2022 22:53)  T(F): 98 (15 Apr 2022 05:24), Max: 99.1 (14 Apr 2022 22:53)  HR: 69 (15 Apr 2022 05:24) (64 - 72)  BP: 126/53 (15 Apr 2022 05:24) (126/53 - 149/61)  BP(mean): --  RR: 18 (15 Apr 2022 05:24) (18 - 18)  SpO2: 94% (15 Apr 2022 05:24) (94% - 97%)    PHYSICAL EXAM:    Constitutional: NAD, well-developed  Respiratory: CTAB  Cardiovascular: S1 and S2, RRR  Gastrointestinal: BS+, soft, NT/ND  Psychiatric: Normal mood, normal affect    LABS:                        10.8   5.80  )-----------( 264      ( 14 Apr 2022 08:24 )             35.0     04-14    140  |  113<H>  |  16  ----------------------------<  121<H>  4.4   |  22  |  1.40<H>    Ca    9.7      14 Apr 2022 08:24  Phos  2.7     04-14  Mg     1.9     04-14            RADIOLOGY & ADDITIONAL STUDIES:

## 2022-04-15 NOTE — PROGRESS NOTE ADULT - SUBJECTIVE AND OBJECTIVE BOX
Patient was seen and examined at the bedside this morning  No acute events overnight  Pain is better controlled  No nausea or vomiting  having multiple episodes of diarrhea    O/E:  T(C): 36.7 (04-15-22 @ 08:20), Max: 37.3 (04-14-22 @ 22:53)  HR: 61 (04-15-22 @ 08:20) (61 - 72)  BP: 146/59 (04-15-22 @ 08:20) (126/53 - 149/57)  RR: 18 (04-15-22 @ 08:20) (18 - 18)  SpO2: 95% (04-15-22 @ 08:20) (94% - 97%)  Alert, conscious, oriented  No pallor, jaundice or cyanosis  Not in pain or distress  Chest clear, equal air entry bilaterally  Abdomen less distended, still tympanic, active bowel sounds  Extremities: No swelling or tenderness                          10.0   4.91  )-----------( 239      ( 15 Apr 2022 09:00 )             32.3   04-15    141  |  114<H>  |  15  ----------------------------<  116<H>  4.0   |  23  |  1.36<H>    Ca    9.2      15 Apr 2022 09:00  Phos  2.5     04-15  Mg     1.9     04-15    MEDICATIONS  (STANDING):  ascorbic acid  Oral Tab/Cap - Peds 1000 milliGRAM(s) Oral daily  aspirin  chewable 81 milliGRAM(s) Oral daily  benzocaine 15 mG/menthol 3.6 mG Lozenge 1 Lozenge Oral every 12 hours  chlorhexidine 4% Liquid 1 Application(s) Topical <User Schedule>  cholecalciferol Oral Tab/Cap - Peds 2000 Unit(s) Oral daily  dextrose 5%. 1000 milliLiter(s) (50 mL/Hr) IV Continuous <Continuous>  dextrose 5%. 1000 milliLiter(s) (100 mL/Hr) IV Continuous <Continuous>  dextrose 5%. 1000 milliLiter(s) (50 mL/Hr) IV Continuous <Continuous>  dextrose 5%. 1000 milliLiter(s) (100 mL/Hr) IV Continuous <Continuous>  dextrose 50% Injectable 25 Gram(s) IV Push once  dextrose 50% Injectable 12.5 Gram(s) IV Push once  dextrose 50% Injectable 25 Gram(s) IV Push once  dextrose 50% Injectable 25 Gram(s) IV Push once  dextrose 50% Injectable 12.5 Gram(s) IV Push once  dextrose 50% Injectable 25 Gram(s) IV Push once  enalaprilat Injectable 1.25 milliGRAM(s) IV Push every 6 hours  enoxaparin Injectable 40 milliGRAM(s) SubCutaneous every 24 hours  glucagon  Injectable 1 milliGRAM(s) IntraMuscular once  glucagon  Injectable 1 milliGRAM(s) IntraMuscular once  insulin lispro (ADMELOG) corrective regimen sliding scale   SubCutaneous every 6 hours  levothyroxine 88 MICROGram(s) Oral daily  melatonin 5 milliGRAM(s) Oral at bedtime  multivitamin 1 Tablet(s) Oral daily  pantoprazole  Injectable 40 milliGRAM(s) IV Push daily  Parenteral Nutrition - Adult 1 Each (83 mL/Hr) TPN Continuous <Continuous>  verapamil 120 milliGRAM(s) Oral every 12 hours    MEDICATIONS  (PRN):  acetaminophen   IVPB .. 1000 milliGRAM(s) IV Intermittent once PRN Mild Pain (1 - 3)  dextrose Oral Gel 15 Gram(s) Oral once PRN Blood Glucose LESS THAN 70 milliGRAM(s)/deciliter  dextrose Oral Gel 15 Gram(s) Oral once PRN Blood Glucose LESS THAN 70 milliGRAM(s)/deciliter  LORazepam   Injectable 0.5 milliGRAM(s) IV Push every 6 hours PRN Anxiety  morphine  - Injectable 2 milliGRAM(s) IV Push every 4 hours PRN Severe Pain (7 - 10)  ondansetron Injectable 4 milliGRAM(s) IV Push every 6 hours PRN Nausea and/or Vomiting  traMADol 50 milliGRAM(s) Oral three times a day PRN Moderate Pain (4 - 6)

## 2022-04-15 NOTE — DIETITIAN INITIAL EVALUATION ADULT - PERTINENT LABORATORY DATA
04-15    141  |  114<H>  |  15  ----------------------------<  116<H>  4.0   |  23  |  1.36<H>    Ca    9.2      15 Apr 2022 09:00  Phos  2.5     04-15  Mg     1.9     04-15    POCT Blood Glucose.: 119 mg/dL (04-15-22 @ 05:34)  A1C with Estimated Average Glucose Result: 7.0 % (04-09-22 @ 09:16)

## 2022-04-16 LAB
ALBUMIN SERPL ELPH-MCNC: 2.6 G/DL — LOW (ref 3.3–5)
ALP SERPL-CCNC: 55 U/L — SIGNIFICANT CHANGE UP (ref 40–120)
ALT FLD-CCNC: 23 U/L — SIGNIFICANT CHANGE UP (ref 12–78)
ANION GAP SERPL CALC-SCNC: 6 MMOL/L — SIGNIFICANT CHANGE UP (ref 5–17)
AST SERPL-CCNC: 18 U/L — SIGNIFICANT CHANGE UP (ref 15–37)
BILIRUB SERPL-MCNC: 0.2 MG/DL — SIGNIFICANT CHANGE UP (ref 0.2–1.2)
BUN SERPL-MCNC: 18 MG/DL — SIGNIFICANT CHANGE UP (ref 7–23)
CALCIUM SERPL-MCNC: 9.1 MG/DL — SIGNIFICANT CHANGE UP (ref 8.5–10.1)
CHLORIDE SERPL-SCNC: 112 MMOL/L — HIGH (ref 96–108)
CO2 SERPL-SCNC: 23 MMOL/L — SIGNIFICANT CHANGE UP (ref 22–31)
CREAT SERPL-MCNC: 1.4 MG/DL — HIGH (ref 0.5–1.3)
EGFR: 49 ML/MIN/1.73M2 — LOW
GLUCOSE SERPL-MCNC: 149 MG/DL — HIGH (ref 70–99)
HCT VFR BLD CALC: 30.8 % — LOW (ref 39–50)
HGB BLD-MCNC: 9.8 G/DL — LOW (ref 13–17)
MAGNESIUM SERPL-MCNC: 1.8 MG/DL — SIGNIFICANT CHANGE UP (ref 1.6–2.6)
MCHC RBC-ENTMCNC: 29.1 PG — SIGNIFICANT CHANGE UP (ref 27–34)
MCHC RBC-ENTMCNC: 31.8 GM/DL — LOW (ref 32–36)
MCV RBC AUTO: 91.4 FL — SIGNIFICANT CHANGE UP (ref 80–100)
PHOSPHATE SERPL-MCNC: 2.4 MG/DL — LOW (ref 2.5–4.5)
PLATELET # BLD AUTO: 250 K/UL — SIGNIFICANT CHANGE UP (ref 150–400)
POTASSIUM SERPL-MCNC: 4 MMOL/L — SIGNIFICANT CHANGE UP (ref 3.5–5.3)
POTASSIUM SERPL-SCNC: 4 MMOL/L — SIGNIFICANT CHANGE UP (ref 3.5–5.3)
PROT SERPL-MCNC: 5.9 GM/DL — LOW (ref 6–8.3)
RBC # BLD: 3.37 M/UL — LOW (ref 4.2–5.8)
RBC # FLD: 14.9 % — HIGH (ref 10.3–14.5)
SODIUM SERPL-SCNC: 141 MMOL/L — SIGNIFICANT CHANGE UP (ref 135–145)
TRIGL SERPL-MCNC: 231 MG/DL — HIGH
WBC # BLD: 6.51 K/UL — SIGNIFICANT CHANGE UP (ref 3.8–10.5)
WBC # FLD AUTO: 6.51 K/UL — SIGNIFICANT CHANGE UP (ref 3.8–10.5)

## 2022-04-16 PROCEDURE — 99232 SBSQ HOSP IP/OBS MODERATE 35: CPT

## 2022-04-16 RX ORDER — COLESTIPOL HCL 5 G
1 GRANULES (GRAM) ORAL DAILY
Refills: 0 | Status: DISCONTINUED | OUTPATIENT
Start: 2022-04-16 | End: 2022-04-18

## 2022-04-16 RX ORDER — ELECTROLYTE SOLUTION,INJ
1 VIAL (ML) INTRAVENOUS
Refills: 0 | Status: DISCONTINUED | OUTPATIENT
Start: 2022-04-16 | End: 2022-04-16

## 2022-04-16 RX ORDER — SODIUM,POTASSIUM PHOSPHATES 278-250MG
1 POWDER IN PACKET (EA) ORAL ONCE
Refills: 0 | Status: COMPLETED | OUTPATIENT
Start: 2022-04-16 | End: 2022-04-16

## 2022-04-16 RX ADMIN — Medication 1000 MILLIGRAM(S): at 09:30

## 2022-04-16 RX ADMIN — Medication 81 MILLIGRAM(S): at 09:29

## 2022-04-16 RX ADMIN — Medication 88 MICROGRAM(S): at 05:17

## 2022-04-16 RX ADMIN — BENZOCAINE AND MENTHOL 1 LOZENGE: 5; 1 LIQUID ORAL at 21:28

## 2022-04-16 RX ADMIN — ENOXAPARIN SODIUM 40 MILLIGRAM(S): 100 INJECTION SUBCUTANEOUS at 05:18

## 2022-04-16 RX ADMIN — Medication 1: at 11:46

## 2022-04-16 RX ADMIN — Medication 1 EACH: at 23:14

## 2022-04-16 RX ADMIN — Medication 1 PACKET(S): at 21:36

## 2022-04-16 RX ADMIN — Medication 120 MILLIGRAM(S): at 09:29

## 2022-04-16 RX ADMIN — Medication 120 MILLIGRAM(S): at 21:24

## 2022-04-16 RX ADMIN — Medication 1.25 MILLIGRAM(S): at 11:47

## 2022-04-16 RX ADMIN — Medication 5 MILLIGRAM(S): at 21:24

## 2022-04-16 RX ADMIN — Medication 2000 UNIT(S): at 09:29

## 2022-04-16 RX ADMIN — Medication 1.25 MILLIGRAM(S): at 17:03

## 2022-04-16 RX ADMIN — PANTOPRAZOLE SODIUM 40 MILLIGRAM(S): 20 TABLET, DELAYED RELEASE ORAL at 09:30

## 2022-04-16 RX ADMIN — Medication 1 TABLET(S): at 09:30

## 2022-04-16 NOTE — CHART NOTE - NSCHARTNOTEFT_GEN_A_CORE
Clinical Nutrition PN Follow Up Note    ****HPI: 87y old Male with  abdominal pain which started around 3 weeks ago, located in the right lower abdomen, continuous, moderate, slightly increased by food, no relieving factors, associated with occasional nausea, no vomiting and no fever. His last bowel movement was 2 days ao but he is able to pass gas. He has visited his urologist for this pain as he also had an episode of hematuria initially that has resolved. He had a CT scan done than which he was told that was normal. He was referred to his gastroenterologist (Dr Padilla)  who also repeated the CT scan. This also did not show any acute causes of abdominal pain. He was told by Dr Padilla that if his pain persisted, he should come to the ED for management. Abd CT reveals a SBO and pt has been declining surgical intervention. Pt started on clear liquid diet and is tolerating sips w/out difficulty. Denies any significant weight changes. Poor intake x 1 week PTA. NFPE- mild muscle/fat loss. If unable to advance po diet any further consider alternate route of nutrition. See below for recommendations. Criteria met for moderate malnutrition. Will continue to monitor and follow up prn.   **Refer to initial nutrition assessment on 4/15 for further information.     *Current status: Pt remains on clear liquid diet (tolerating at this time w/ >20% intake). PPN infusing- suggest continue w/ nutrition support until po diet advanced and is tolerating >50% of ENN.     **PPN initiated 2/2 SBO however if unable to tolerate clear liquids NG will be placed as per surgery.    **Malnutrition Dx:  Moderate protein/calorie malnutrition in context of acute illness r/t inability to meet nutritional needs 2/2 altered GI AEB mild muscle/fat wasting and <50% of ENN x > 5 days       *labs reviewed; Magnesium on lower range of normal will increase in bag. Hypophosphatemia will add 10 mmol of Sodium phosphate (total phosphorus 30mmol). Calcium WDL suggest supplement outside bag as there is a shortage as per CAPS ( Calcium gluconate 10 mEq). Pending triglyceride level- will initiate lipids when feasible.   04-16    141  |  112<H>  |  18  ----------------------------<  149<H>  4.0   |  23  |  1.40<H>    Ca    9.1      16 Apr 2022 05:00  Phos  2.4     04-16  Mg     1.8     04-16    TPro  5.9<L>  /  Alb  2.6<L>  /  TBili  0.2  /  DBili  x   /  AST  18  /  ALT  23  /  AlkPhos  55  04-16      BMI: BMI (kg/m2): 29.4 (04-08-22 @ 12:49)  HbA1c: A1C with Estimated Average Glucose Result: 7.0 % (04-09-22 @ 09:16)    Glucose: POCT Blood Glucose.: 149 mg/dL (04-16-22 @ 05:16)    BP: 135/56 (04-16-22 @ 04:45) (109/45 - 152/66)  Lipid Panel:       *I&O's Detail- not documented.      * BM (+) on 4/15.  pt on no bowel regimen.  *PO intake clear liquid diet: >25%    *Elvin Score of ; PU documented.  edema documented.      **PPN Recommendations:  Total Volume: 2000 ml x 24 hr (peripheral line)   80 g  Amino Acids  100 g Dextrose  0 g Lipids 20%  121 mEq Sodium Chloride  33 mEq Sodium Acetate  20 mmol Sodium Phosphate  31 mEq Potassium Chloride  10 mEq Potassium Acetate  20 mmol Potassium Phosphate  0 mEq Calcium Gluconate  10 mEq Magnesium Sulfate  100 mg Thiamine  1 ml Trace   10 ml MVI    Total Calories 660 (Meets 37%  of  Estimated Energy needs and 79%  Protein needs) <900 osmolarity    Additional Recommendations:    1) Daily weights  2) Strict I & O's  3) Daily lyte checks  4) Weekly triglycerides/LFT checks  5) Initiate Lipids pending triglyceride level  6) continue to monitor POCT's Q 6 hr and maintain glycemic control 140-180mg/dl  7) Advance po diet when feasible and D/C PPN when meeting > 50% of ENN.       Estimated Needs: based on 71.6Kg ( IBW (67.1Kg used for protein calculation, Adj BW for kcal and fluids)  Calories:6541-3602 Kcal (25-30Kcal/Kg)  Protein:  101-134g (1.5-2.0g/Kg)  Fluids:  7430-4091 mL (25-30mL/Kg)        Weight History: no recent weight obtained  Height (cm): 168.9 (04-08-22 @ 12:49)  Weight (kg): 83.9 (04-08-22 @ 12:49)  BMI (kg/m2): 29.4 (04-08-22 @ 12:49)  BSA (m2): 1.95 (04-08-22 @ 12:49)  IBW: 148#  IBW %: 125%    *will continue to monitor and adjust PN prn*    Vernell Alcala RD office: [phone]   Cell# [phone] Clinical Nutrition PN Follow Up Note    ****HPI: 87y old Male with  abdominal pain which started around 3 weeks ago, located in the right lower abdomen, continuous, moderate, slightly increased by food, no relieving factors, associated with occasional nausea, no vomiting and no fever. His last bowel movement was 2 days ao but he is able to pass gas. He has visited his urologist for this pain as he also had an episode of hematuria initially that has resolved. He had a CT scan done than which he was told that was normal. He was referred to his gastroenterologist (Dr Padilla)  who also repeated the CT scan. This also did not show any acute causes of abdominal pain. He was told by Dr Padilla that if his pain persisted, he should come to the ED for management. Abd CT reveals a SBO and pt has been declining surgical intervention. Pt started on clear liquid diet and is tolerating sips w/out difficulty. Denies any significant weight changes. Poor intake x 1 week PTA. NFPE- mild muscle/fat loss. If unable to advance po diet any further consider alternate route of nutrition. See below for recommendations. Criteria met for moderate malnutrition. Will continue to monitor and follow up prn.   **Refer to initial nutrition assessment on 4/15 for further information.     *Current status: Pt remains on clear liquid diet (tolerating at this time w/ >20% intake). PPN infusing- suggest continue w/ nutrition support until po diet advanced and is tolerating >50% of ENN.     **PPN initiated 2/2 SBO however if unable to tolerate clear liquids NG will be placed as per surgery.    **Malnutrition Dx:  Moderate protein/calorie malnutrition in context of acute illness r/t inability to meet nutritional needs 2/2 altered GI AEB mild muscle/fat wasting and <50% of ENN x > 5 days       *labs reviewed; Magnesium on lower range of normal will increase in bag. Hypophosphatemia will add 10 mmol of Sodium phosphate (total phosphorus 30mmol). Calcium WDL suggest supplement outside bag as there is a shortage as per CAPS ( Calcium gluconate 10 mEq). Pending triglyceride level- will initiate lipids when feasible.   04-16    141  |  112<H>  |  18  ----------------------------<  149<H>  4.0   |  23  |  1.40<H>    Ca    9.1      16 Apr 2022 05:00  Phos  2.4     04-16  Mg     1.8     04-16    TPro  5.9<L>  /  Alb  2.6<L>  /  TBili  0.2  /  DBili  x   /  AST  18  /  ALT  23  /  AlkPhos  55  04-16      BMI: BMI (kg/m2): 29.4 (04-08-22 @ 12:49)  HbA1c: A1C with Estimated Average Glucose Result: 7.0 % (04-09-22 @ 09:16)    Glucose: POCT Blood Glucose.: 149 mg/dL (04-16-22 @ 05:16)    BP: 135/56 (04-16-22 @ 04:45) (109/45 - 152/66)  Lipid Panel:       *I&O's Detail- not documented.      * BM (+) on 4/15.  pt on no bowel regimen.  *PO intake clear liquid diet: >25%    *Elvin Score of ; PU documented.  edema documented.      **PPN Recommendations:  Total Volume: 2000 ml x 24 hr (peripheral line)   80 g  Amino Acids  100 g Dextrose  0 g Lipids 20%  121 mEq Sodium Chloride  20 mEq Sodium Acetate  10 mmol Sodium Phosphate  31 mEq Potassium Chloride  10 mEq Potassium Acetate  20 mmol Potassium Phosphate  0 mEq Calcium Gluconate  10 mEq Magnesium Sulfate  100 mg Thiamine  1 ml Trace   10 ml MVI    Total Calories 660 (Meets 37%  of  Estimated Energy needs and 79%  Protein needs) <900 osmolarity    Additional Recommendations:    1) Daily weights  2) Strict I & O's  3) Daily lyte checks  4) Weekly triglycerides/LFT checks  5) Initiate Lipids pending triglyceride level  6) continue to monitor POCT's Q 6 hr and maintain glycemic control 140-180mg/dl  7) Advance po diet when feasible and D/C PPN when meeting > 50% of ENN.       Estimated Needs: based on 71.6Kg ( IBW (67.1Kg used for protein calculation, Adj BW for kcal and fluids)  Calories:6242-6454 Kcal (25-30Kcal/Kg)  Protein:  101-134g (1.5-2.0g/Kg)  Fluids:  9802-0771 mL (25-30mL/Kg)        Weight History: no recent weight obtained  Height (cm): 168.9 (04-08-22 @ 12:49)  Weight (kg): 83.9 (04-08-22 @ 12:49)  BMI (kg/m2): 29.4 (04-08-22 @ 12:49)  BSA (m2): 1.95 (04-08-22 @ 12:49)  IBW: 148#  IBW %: 125%    *will continue to monitor and adjust PN prn*    Vernell Alcala RD office: [phone]   Cell# [phone]

## 2022-04-16 NOTE — PROGRESS NOTE ADULT - SUBJECTIVE AND OBJECTIVE BOX
CC: Abd pain  HPI:  87 year old man with PMHx of DM2, HTN, HLD, CAD s/p stents, Crohn's, hx prostate ca, hypothyroidism who presents with abdominal pain.  Pt states pain has happening for 3 months, worsening.  Seen in out patient setting, CT imaging at that time negative for acute pathology.  He was inevitably sent to ED for persistent pain.    In ED: Found to have distal SBO, admitted under surgery.  Pt is maintained NPO, denies any fever, chills, n, v, passing gas.  Per nursing staff pt had BM last night.  Initially LA elevated, now normalized with ongoing IVFs.      4/12: laying in bed, ng tube dislodged yesterday night. abd less distended and tender today. tolerated PO contrast   4/13: sitting up in chair, requesting PO intake, had + BM this morning. discussed remaining NPO until repeat abx xray w/ con  4/14: ambulated in hallways without pain or discomfort, still undecided regarding surgery.   4/15: multiple BMs overnight, no abd pain, nausea or vomiting.   4/16: SItting in chair, alert, states feels better, asking to take his home crohns medication. No fever, chills, n, v.    REVIEW OF SYSTEMS: All other review of systems is negative unless indicated above.    Vital Signs Last 24 Hrs  T(C): 36.8 (16 Apr 2022 08:10), Max: 37.2 (15 Apr 2022 16:08)  T(F): 98.3 (16 Apr 2022 08:10), Max: 98.9 (15 Apr 2022 16:08)  HR: 75 (16 Apr 2022 08:10) (67 - 75)  BP: 131/57 (16 Apr 2022 11:46) (131/57 - 152/66)  BP(mean): --  RR: 15 (16 Apr 2022 08:10) (15 - 18)  SpO2: 95% (16 Apr 2022 08:10) (94% - 96%)      PHYSICAL EXAM:    Constitutional: NAD, awake and alert, well-developed  HEENT: PERR, EOMI, Normal Hearing, MMM  Neck: Soft and supple  Respiratory: Breath sounds are clear bilaterally, No wheezing, rales or rhonchi  Cardiovascular: S1 and S2, regular rate and rhythm, no Murmurs, gallops or rubs  Gastrointestinal: Bowel Sounds present, soft, firm abd, less distended abd, no guarding, no rebound  Extremities: No peripheral edema  Neurological: A/O x 3, no focal deficits in my limited exam    MEDICATIONS  (STANDING):  ascorbic acid  Oral Tab/Cap - Peds 1000 milliGRAM(s) Oral daily  aspirin  chewable 81 milliGRAM(s) Oral daily  benzocaine 15 mG/menthol 3.6 mG Lozenge 1 Lozenge Oral every 12 hours  chlorhexidine 4% Liquid 1 Application(s) Topical <User Schedule>  cholecalciferol Oral Tab/Cap - Peds 2000 Unit(s) Oral daily  dextrose 5%. 1000 milliLiter(s) (50 mL/Hr) IV Continuous <Continuous>  dextrose 5%. 1000 milliLiter(s) (100 mL/Hr) IV Continuous <Continuous>  dextrose 5%. 1000 milliLiter(s) (100 mL/Hr) IV Continuous <Continuous>  dextrose 5%. 1000 milliLiter(s) (50 mL/Hr) IV Continuous <Continuous>  dextrose 50% Injectable 25 Gram(s) IV Push once  dextrose 50% Injectable 12.5 Gram(s) IV Push once  dextrose 50% Injectable 25 Gram(s) IV Push once  dextrose 50% Injectable 25 Gram(s) IV Push once  dextrose 50% Injectable 12.5 Gram(s) IV Push once  dextrose 50% Injectable 25 Gram(s) IV Push once  enalaprilat Injectable 1.25 milliGRAM(s) IV Push every 6 hours  enoxaparin Injectable 40 milliGRAM(s) SubCutaneous every 24 hours  glucagon  Injectable 1 milliGRAM(s) IntraMuscular once  glucagon  Injectable 1 milliGRAM(s) IntraMuscular once  insulin lispro (ADMELOG) corrective regimen sliding scale   SubCutaneous every 6 hours  levothyroxine 88 MICROGram(s) Oral daily  melatonin 5 milliGRAM(s) Oral at bedtime  multivitamin 1 Tablet(s) Oral daily  pantoprazole  Injectable 40 milliGRAM(s) IV Push daily  Parenteral Nutrition - Adult 1 Each (83 mL/Hr) TPN Continuous <Continuous>  Parenteral Nutrition - Adult 1 Each (83 mL/Hr) TPN Continuous <Continuous>  verapamil 120 milliGRAM(s) Oral every 12 hours    MEDICATIONS  (PRN):  acetaminophen   IVPB .. 1000 milliGRAM(s) IV Intermittent once PRN Mild Pain (1 - 3)  dextrose Oral Gel 15 Gram(s) Oral once PRN Blood Glucose LESS THAN 70 milliGRAM(s)/deciliter  dextrose Oral Gel 15 Gram(s) Oral once PRN Blood Glucose LESS THAN 70 milliGRAM(s)/deciliter  LORazepam   Injectable 0.5 milliGRAM(s) IV Push every 6 hours PRN Anxiety  morphine  - Injectable 2 milliGRAM(s) IV Push every 4 hours PRN Severe Pain (7 - 10)  ondansetron Injectable 4 milliGRAM(s) IV Push every 6 hours PRN Nausea and/or Vomiting  traMADol 50 milliGRAM(s) Oral three times a day PRN Moderate Pain (4 - 6)                                9.8    6.51  )-----------( 250      ( 16 Apr 2022 05:00 )             30.8     04-16    141  |  112<H>  |  18  ----------------------------<  149<H>  4.0   |  23  |  1.40<H>    Ca    9.1      16 Apr 2022 05:00  Phos  2.4     04-16  Mg     1.8     04-16    TPro  5.9<L>  /  Alb  2.6<L>  /  TBili  0.2  /  DBili  x   /  AST  18  /  ALT  23  /  AlkPhos  55  04-16    CAPILLARY BLOOD GLUCOSE      POCT Blood Glucose.: 162 mg/dL (16 Apr 2022 11:43)  POCT Blood Glucose.: 149 mg/dL (16 Apr 2022 05:16)  POCT Blood Glucose.: 137 mg/dL (15 Apr 2022 22:55)  POCT Blood Glucose.: 118 mg/dL (15 Apr 2022 17:48)    LIVER FUNCTIONS - ( 16 Apr 2022 05:00 )  Alb: 2.6 g/dL / Pro: 5.9 gm/dL / ALK PHOS: 55 U/L / ALT: 23 U/L / AST: 18 U/L / GGT: x                   Assessment: 87 year old man who presents with abd pain secondary to distal SBO.    ABd pain / distal SBO / Hx of Crohns:  - denies pain at this time  -leukocytosis resolved  -Lactic acidosis resolved  -s/p ngt for decompression   -repeat CT without resolution or improvement to distal obstruction   -colorecral consulted - Strongly recommended NGT placement to maximize chance of nonoperative therapy  - pt declining NG tube treatment.    - Pt declining operative intervention at this time, as "this did not cause me issues for 30 years".    - Continue CLD, encourage ambulation.  - tpn for nutritional support per nutritionist   - advance diet per surgery assessment   - s/p IVFs  -supportive care / pain management    # ROSE on CKD stage 3: POA   - Scr stable at ~1.4  - avoid nephrotoxins     # hypernatremia POA due to hypovolemia: RESOLVED    DM2:  - A1c 7  - RAISS    HTN / HLD / CAD s/p stents / hypothyroidism:  - c/w home meds    DVT ppx:  - Lovenox     Code status:  -Full.  Palliative care evaluation appreciated.     Assessment and Plan:   Nutritional Assessment:  · Nutritional Assessment	This patient has been assessed with a concern for Malnutrition and has been determined to have a diagnosis/diagnoses of Moderate protein-calorie malnutrition.    This patient is being managed with:   Parenteral Nutrition - Adult-  Entered: Apr 15 2022 10:00PM    Diet Clear Liquid-  Entered: Apr 15 2022  4:30PM

## 2022-04-16 NOTE — PROGRESS NOTE ADULT - NUTRITIONAL ASSESSMENT
This patient has been assessed with a concern for Malnutrition and has been determined to have a diagnosis/diagnoses of Moderate protein-calorie malnutrition.    This patient is being managed with:   Parenteral Nutrition - Adult-  Entered: Apr 15 2022 10:00PM    Diet Clear Liquid-  Entered: Apr 15 2022  4:30PM    
This patient has been assessed with a concern for Malnutrition and has been determined to have a diagnosis/diagnoses of Moderate protein-calorie malnutrition.    This patient is being managed with:   Parenteral Nutrition - Adult-  Entered: Apr 16 2022 10:00PM    Diet Full Liquid-  Entered: Apr 16 2022 11:10AM    Parenteral Nutrition - Adult-  Entered: Apr 15 2022 10:00PM

## 2022-04-16 NOTE — PROGRESS NOTE ADULT - SUBJECTIVE AND OBJECTIVE BOX
Patient was seen and examined at the bedside this morning. No acute events overnight. Tolerated CLD without nausea or vomiting. Having multiple episodes of diarrhea, passing flatus. VS reviewed.    Vital Signs Last 24 Hrs  T(C): 36.8 (16 Apr 2022 08:10), Max: 37.2 (15 Apr 2022 16:08)  T(F): 98.3 (16 Apr 2022 08:10), Max: 98.9 (15 Apr 2022 16:08)  HR: 75 (16 Apr 2022 08:10) (67 - 75)  BP: 151/65 (16 Apr 2022 08:10) (135/56 - 152/66)  BP(mean): --  RR: 15 (16 Apr 2022 08:10) (15 - 18)  SpO2: 95% (16 Apr 2022 08:10) (94% - 96%)    Physical Exam:  Alert, conscious, oriented  No pallor, jaundice or cyanosis  Not in pain or distress  Chest clear, equal air entry bilaterally  Abdomen less distended, still tympanic, active bowel sounds  Extremities: No swelling or tenderness     Labs:                    9.8    6.51  )-----------( 250      ( 16 Apr 2022 05:00 )             30.8   04-16    141  |  112<H>  |  18  ----------------------------<  149<H>  4.0   |  23  |  1.40<H>    Ca    9.1      16 Apr 2022 05:00  Phos  2.4     04-16  Mg     1.8     04-16    TPro  5.9<L>  /  Alb  2.6<L>  /  TBili  0.2  /  DBili  x   /  AST  18  /  ALT  23  /  AlkPhos  55  04-16      MEDICATIONS  (STANDING):  ascorbic acid  Oral Tab/Cap - Peds 1000 milliGRAM(s) Oral daily  aspirin  chewable 81 milliGRAM(s) Oral daily  benzocaine 15 mG/menthol 3.6 mG Lozenge 1 Lozenge Oral every 12 hours  chlorhexidine 4% Liquid 1 Application(s) Topical <User Schedule>  cholecalciferol Oral Tab/Cap - Peds 2000 Unit(s) Oral daily  dextrose 5%. 1000 milliLiter(s) (50 mL/Hr) IV Continuous <Continuous>  dextrose 5%. 1000 milliLiter(s) (100 mL/Hr) IV Continuous <Continuous>  dextrose 5%. 1000 milliLiter(s) (50 mL/Hr) IV Continuous <Continuous>  dextrose 5%. 1000 milliLiter(s) (100 mL/Hr) IV Continuous <Continuous>  dextrose 50% Injectable 25 Gram(s) IV Push once  dextrose 50% Injectable 12.5 Gram(s) IV Push once  dextrose 50% Injectable 25 Gram(s) IV Push once  dextrose 50% Injectable 25 Gram(s) IV Push once  dextrose 50% Injectable 12.5 Gram(s) IV Push once  dextrose 50% Injectable 25 Gram(s) IV Push once  enalaprilat Injectable 1.25 milliGRAM(s) IV Push every 6 hours  enoxaparin Injectable 40 milliGRAM(s) SubCutaneous every 24 hours  glucagon  Injectable 1 milliGRAM(s) IntraMuscular once  glucagon  Injectable 1 milliGRAM(s) IntraMuscular once  insulin lispro (ADMELOG) corrective regimen sliding scale   SubCutaneous every 6 hours  levothyroxine 88 MICROGram(s) Oral daily  melatonin 5 milliGRAM(s) Oral at bedtime  multivitamin 1 Tablet(s) Oral daily  pantoprazole  Injectable 40 milliGRAM(s) IV Push daily  Parenteral Nutrition - Adult 1 Each (83 mL/Hr) TPN Continuous <Continuous>  verapamil 120 milliGRAM(s) Oral every 12 hours    MEDICATIONS  (PRN):  acetaminophen   IVPB .. 1000 milliGRAM(s) IV Intermittent once PRN Mild Pain (1 - 3)  dextrose Oral Gel 15 Gram(s) Oral once PRN Blood Glucose LESS THAN 70 milliGRAM(s)/deciliter  dextrose Oral Gel 15 Gram(s) Oral once PRN Blood Glucose LESS THAN 70 milliGRAM(s)/deciliter  LORazepam   Injectable 0.5 milliGRAM(s) IV Push every 6 hours PRN Anxiety  morphine  - Injectable 2 milliGRAM(s) IV Push every 4 hours PRN Severe Pain (7 - 10)  ondansetron Injectable 4 milliGRAM(s) IV Push every 6 hours PRN Nausea and/or Vomiting  traMADol 50 milliGRAM(s) Oral three times a day PRN Moderate Pain (4 - 6)

## 2022-04-17 LAB
ANION GAP SERPL CALC-SCNC: 5 MMOL/L — SIGNIFICANT CHANGE UP (ref 5–17)
BUN SERPL-MCNC: 24 MG/DL — HIGH (ref 7–23)
CALCIUM SERPL-MCNC: 9.6 MG/DL — SIGNIFICANT CHANGE UP (ref 8.5–10.1)
CHLORIDE SERPL-SCNC: 112 MMOL/L — HIGH (ref 96–108)
CO2 SERPL-SCNC: 25 MMOL/L — SIGNIFICANT CHANGE UP (ref 22–31)
CREAT SERPL-MCNC: 1.3 MG/DL — SIGNIFICANT CHANGE UP (ref 0.5–1.3)
EGFR: 53 ML/MIN/1.73M2 — LOW
GLUCOSE SERPL-MCNC: 175 MG/DL — HIGH (ref 70–99)
HCT VFR BLD CALC: 29.2 % — LOW (ref 39–50)
HGB BLD-MCNC: 9.5 G/DL — LOW (ref 13–17)
MAGNESIUM SERPL-MCNC: 2 MG/DL — SIGNIFICANT CHANGE UP (ref 1.6–2.6)
MCHC RBC-ENTMCNC: 29.1 PG — SIGNIFICANT CHANGE UP (ref 27–34)
MCHC RBC-ENTMCNC: 32.5 GM/DL — SIGNIFICANT CHANGE UP (ref 32–36)
MCV RBC AUTO: 89.3 FL — SIGNIFICANT CHANGE UP (ref 80–100)
PHOSPHATE SERPL-MCNC: 2.7 MG/DL — SIGNIFICANT CHANGE UP (ref 2.5–4.5)
PLATELET # BLD AUTO: 222 K/UL — SIGNIFICANT CHANGE UP (ref 150–400)
POTASSIUM SERPL-MCNC: 4.2 MMOL/L — SIGNIFICANT CHANGE UP (ref 3.5–5.3)
POTASSIUM SERPL-SCNC: 4.2 MMOL/L — SIGNIFICANT CHANGE UP (ref 3.5–5.3)
RBC # BLD: 3.27 M/UL — LOW (ref 4.2–5.8)
RBC # FLD: 14.7 % — HIGH (ref 10.3–14.5)
SODIUM SERPL-SCNC: 142 MMOL/L — SIGNIFICANT CHANGE UP (ref 135–145)
WBC # BLD: 8.09 K/UL — SIGNIFICANT CHANGE UP (ref 3.8–10.5)
WBC # FLD AUTO: 8.09 K/UL — SIGNIFICANT CHANGE UP (ref 3.8–10.5)

## 2022-04-17 PROCEDURE — 99232 SBSQ HOSP IP/OBS MODERATE 35: CPT

## 2022-04-17 RX ORDER — I.V. FAT EMULSION 20 G/100ML
0.95 EMULSION INTRAVENOUS
Qty: 80 | Refills: 0 | Status: DISCONTINUED | OUTPATIENT
Start: 2022-04-17 | End: 2022-04-18

## 2022-04-17 RX ORDER — ELECTROLYTE SOLUTION,INJ
1 VIAL (ML) INTRAVENOUS
Refills: 0 | Status: DISCONTINUED | OUTPATIENT
Start: 2022-04-17 | End: 2022-04-18

## 2022-04-17 RX ADMIN — Medication 2000 UNIT(S): at 10:11

## 2022-04-17 RX ADMIN — Medication 120 MILLIGRAM(S): at 10:13

## 2022-04-17 RX ADMIN — Medication 1: at 06:16

## 2022-04-17 RX ADMIN — BENZOCAINE AND MENTHOL 1 LOZENGE: 5; 1 LIQUID ORAL at 22:09

## 2022-04-17 RX ADMIN — Medication 1.25 MILLIGRAM(S): at 13:27

## 2022-04-17 RX ADMIN — Medication 1: at 13:26

## 2022-04-17 RX ADMIN — Medication 1: at 18:46

## 2022-04-17 RX ADMIN — Medication 81 MILLIGRAM(S): at 10:11

## 2022-04-17 RX ADMIN — PANTOPRAZOLE SODIUM 40 MILLIGRAM(S): 20 TABLET, DELAYED RELEASE ORAL at 10:11

## 2022-04-17 RX ADMIN — Medication 5 MILLIGRAM(S): at 22:02

## 2022-04-17 RX ADMIN — Medication 120 MILLIGRAM(S): at 22:02

## 2022-04-17 RX ADMIN — Medication 1 GRAM(S): at 10:16

## 2022-04-17 RX ADMIN — Medication 1 EACH: at 23:29

## 2022-04-17 RX ADMIN — Medication 1 TABLET(S): at 10:11

## 2022-04-17 RX ADMIN — I.V. FAT EMULSION 33.2 GM/KG/DAY: 20 EMULSION INTRAVENOUS at 23:33

## 2022-04-17 RX ADMIN — Medication 1000 MILLIGRAM(S): at 10:10

## 2022-04-17 RX ADMIN — Medication 88 MICROGRAM(S): at 06:52

## 2022-04-17 NOTE — CHART NOTE - NSCHARTNOTEFT_GEN_A_CORE
Clinical Nutrition PN Follow Up Note    ****HPI: 87y old Male with  abdominal pain which started around 3 weeks ago, located in the right lower abdomen, continuous, moderate, slightly increased by food, no relieving factors, associated with occasional nausea, no vomiting and no fever. His last bowel movement was 2 days ao but he is able to pass gas. He has visited his urologist for this pain as he also had an episode of hematuria initially that has resolved. He had a CT scan done than which he was told that was normal. He was referred to his gastroenterologist (Dr Padilla)  who also repeated the CT scan. This also did not show any acute causes of abdominal pain. He was told by Dr Padilla that if his pain persisted, he should come to the ED for management. Abd CT reveals a SBO and pt has been declining surgical intervention. Pt started on clear liquid diet and is tolerating sips w/out difficulty. Denies any significant weight changes. Poor intake x 1 week PTA. NFPE- mild muscle/fat loss. If unable to advance po diet any further consider alternate route of nutrition. See below for recommendations. Criteria met for moderate malnutrition. Will continue to monitor and follow up prn.   **Refer to initial nutrition assessment on 4/15 for further information. **PPN initiated 2/2 SBO however if unable to tolerate clear liquids NG will be placed as per surgery.    *Current status: Pt advanced to FLD, tolerating but having diarrhea and multiple BM's. PO intake not meeting >/= 50% ENN. Rec'd to continue PPN and nbridge w/ PO intake until po diet advanced to low fiber and is tolerating >50% of ENN.     **Malnutrition Dx:  Moderate protein/calorie malnutrition in context of acute illness r/t inability to meet nutritional needs 2/2 altered GI AEB mild muscle/fat wasting and <50% of ENN x > 5 days     *labs reviewed; Lytes/ min WNL except Phos low-end of normal, will increase in PN. Calcium WDL suggest supplement outside bag as there is a shortage as per CAPS ( Calcium gluconate 10 mEq). Triglyceride level 231, can initiate lipids since it is <400 - will monitor closely and start 80g lipids.    POCT WNL.    04-17    142  |  112<H>  |  24<H>  ----------------------------<  175<H>  4.2   |  25  |  1.30    Ca    9.6      17 Apr 2022 06:00  Phos  2.7     04-17  Mg     2.0     04-17    TPro  5.9<L>  /  Alb  2.6<L>  /  TBili  0.2  /  DBili  x   /  AST  18  /  ALT  23  /  AlkPhos  55  04-16    BMI: BMI (kg/m2): 29.4 (04-08-22 @ 12:49)  HbA1c: A1C with Estimated Average Glucose Result: 7.0 % (04-09-22 @ 09:16)    POCT Blood Glucose.: 177 mg/dL (17 Apr 2022 06:03)  POCT Blood Glucose.: 150 mg/dL (16 Apr 2022 23:05)  POCT Blood Glucose.: 162 mg/dL (16 Apr 2022 20:56)  POCT Blood Glucose.: 134 mg/dL (16 Apr 2022 16:44)  POCT Blood Glucose.: 162 mg/dL (16 Apr 2022 11:43)      BP: 135/56 (04-16-22 @ 04:45) (109/45 - 152/66)  Lipid Panel:   Triglycerides: 231 (4/16)    *I&O's Detail - not documented. Strict I&O's are rec'd when pt is on PN     * BM (+) on 4/17; liquid, abd discomfort.  pt not on bowel regimen.  *PO intake: full liquid diet; <50% of ENN being met    *Elvin Score of 15; No PU documented. No edema documented.    Estimated Needs: based on 71.6Kg ( IBW (67.1Kg used for protein calculation, Adj BW for kcal and fluids)  Calories:7361-0018 Kcal (25-30Kcal/Kg)  Protein:  101-134g (1.5-2.0g/Kg)  Fluids:  9045-8101 mL (25-30mL/Kg)    Weight History: no recent weight obtained  Height (cm): 168.9 (04-08-22 @ 12:49)  Weight (kg): 83.9 (04-08-22 @ 12:49)  BMI (kg/m2): 29.4 (04-08-22 @ 12:49)  BSA (m2): 1.95 (04-08-22 @ 12:49)  IBW: 148#  IBW %: 125%        **PPN Recommendations:  Total Volume: 2000 ml x 24 hr (peripheral venous line)   80 g  Amino Acids  100 g Dextrose  80 g Lipids 20%  121 mEq Sodium Chloride  20 mEq Sodium Acetate  10 mmol Sodium Phosphate  27 mEq Potassium Chloride  6 mEq Potassium Acetate  25 mmol Potassium Phosphate  0 mEq Calcium Gluconate  10 mEq Magnesium Sulfate  100 mg Thiamine  1 ml Trace   10 ml MVI    Total Calories:   1460   (Meets 82%  of  LOW-END Estimated Energy needs and 79%  Protein needs)   (osmolarity ~892)    Additional Recommendations:    1) Daily weights  2) Strict I & O's  3) Daily lyte checks  4) Weekly triglycerides/LFT checks  5) Initiate Lipids pending triglyceride level  6) continue to monitor POCT's Q 6 hr and maintain glycemic control 140-180mg/dl  7) Advance po diet when feasible and D/C PPN when meeting > 50% of ENN.     *will continue to monitor and adjust PN prn*  Myra Broderick MS, RDN, [phone]

## 2022-04-17 NOTE — PROGRESS NOTE ADULT - ATTENDING COMMENTS
Patient seen and examined at bedside. Patient denies nausea/vomiting reports passing flatus and some BM. Patients abdomen remains taut, non-tender but very distended, no guarding or rebound tenderness. He continues to refuse NGT and operative intervention.     Vital Signs Last 24 Hrs  T(C): 36.7 (14 Apr 2022 08:21), Max: 36.9 (13 Apr 2022 22:53)  T(F): 98.1 (14 Apr 2022 08:21), Max: 98.4 (13 Apr 2022 22:53)  HR: 64 (14 Apr 2022 08:21) (64 - 149)  BP: 149/61 (14 Apr 2022 08:21) (118/49 - 149/61)  BP(mean): --  RR: 18 (14 Apr 2022 08:21) (18 - 18)  SpO2: 94% (14 Apr 2022 08:21) (93% - 96%)                          10.8   5.80  )-----------( 264      ( 14 Apr 2022 08:24 )             35.0     Patient is unlikely to tolerate PO intake  NGT is advisable for gastric  decompression and to prevent aspiration  Strongly recommended NGT placement   Continue NPO, IVF  Follow for bowel function  I discussed with him that this is unlikely to resolved without surgery and it is not safe to advance him to a diet or discharge him home without being able to tolerate PO intake. Patient at this time does not wish to have any surgical interventions and would like a "few more days to think about it."  Continue supportive care
Patient seen and examined at bedside. Patient was advanced to full liquids yesterday and has tolerated without issue. Patient continues to have bowel function. Abdomen is soft, remains distended but less than yesterdays exam. No rebound or guarding.  He denies N/V.    Vital Signs Last 24 Hrs  T(C): 36.9 (04-17-22 @ 15:37), Max: 36.9 (04-17-22 @ 15:37)  T(F): 98.5 (04-17-22 @ 15:37), Max: 98.5 (04-17-22 @ 15:37)  HR: 73 (04-17-22 @ 15:37) (66 - 73)  BP: 135/74 (04-17-22 @ 15:37) (126/43 - 157/70)  BP(mean): --  RR: 16 (04-17-22 @ 15:37) (16 - 18)  SpO2: 97% (04-17-22 @ 15:37) (94% - 98%)                          9.5    8.09  )-----------( 222      ( 17 Apr 2022 06:00 )             29.2       Will advance to soft diet, patient advised to take his PO intake slow and in moderation  If recurrence of N/V, advise NGT  DVT prophylaxis  OOB with ambulation  Continue close supportive care  If patient tolerates his diet and continues to have bowel function we will plan on discharging him
Patient seen and examined at bedside. Patient was advanced to clear liquids yesterday and has tolerated without issue. Patient continues to have bowel function. Abdomen is soft, remains moderately distended, but reduced compared to prior exams. No rebound or guarding.  He denies N/V.    Vital Signs Last 24 Hrs  T(C): 36.8 (16 Apr 2022 08:10), Max: 37.2 (15 Apr 2022 16:08)  T(F): 98.3 (16 Apr 2022 08:10), Max: 98.9 (15 Apr 2022 16:08)  HR: 75 (16 Apr 2022 08:10) (67 - 75)  BP: 131/57 (16 Apr 2022 11:46) (131/57 - 152/66)  BP(mean): --  RR: 15 (16 Apr 2022 08:10) (15 - 18)  SpO2: 95% (16 Apr 2022 08:10) (94% - 96%)                          9.8    6.51  )-----------( 250      ( 16 Apr 2022 05:00 )             30.8       Will advance to full liquids, patient advised to take his PO intake slow and in moderation  If recurrence of N/V, advise NGT  Patient may need operative intervention if PO trial fails, he is aware of this  DVT prophylaxis  OOB with ambulation  Continue supportive care

## 2022-04-17 NOTE — PROGRESS NOTE ADULT - SUBJECTIVE AND OBJECTIVE BOX
Patient was seen and examined at the bedside this morning. No acute events overnight. Tolerated FLD without nausea or vomiting. Having multiple episodes of diarrhea, passing flatus. VS reviewed.    Vital Signs Last 24 Hrs  T(C): 36.6 (17 Apr 2022 06:13), Max: 36.8 (16 Apr 2022 08:10)  T(F): 97.8 (17 Apr 2022 06:13), Max: 98.3 (16 Apr 2022 08:10)  HR: 72 (17 Apr 2022 06:13) (66 - 75)  BP: 126/43 (17 Apr 2022 06:13) (126/43 - 157/70)  BP(mean): --  RR: 18 (17 Apr 2022 06:13) (15 - 18)  SpO2: 94% (17 Apr 2022 06:13) (94% - 97%)    Physical Exam:  Alert, conscious, oriented  No pallor, jaundice or cyanosis  Not in pain or distress  Chest clear, equal air entry bilaterally  Abdomen less distended, still tympanic, active bowel sounds  Extremities: No swelling or tenderness     Labs:                                   9.5    8.09  )-----------( 222      ( 17 Apr 2022 06:00 )             29.2   04-16    141  |  112<H>  |  18  ----------------------------<  149<H>  4.0   |  23  |  1.40<H>    Ca    9.1      16 Apr 2022 05:00  Phos  2.4     04-16  Mg     1.8     04-16    TPro  5.9<L>  /  Alb  2.6<L>  /  TBili  0.2  /  DBili  x   /  AST  18  /  ALT  23  /  AlkPhos  55  04-16        MEDICATIONS  (STANDING):  ascorbic acid  Oral Tab/Cap - Peds 1000 milliGRAM(s) Oral daily  aspirin  chewable 81 milliGRAM(s) Oral daily  benzocaine 15 mG/menthol 3.6 mG Lozenge 1 Lozenge Oral every 12 hours  chlorhexidine 4% Liquid 1 Application(s) Topical <User Schedule>  cholecalciferol Oral Tab/Cap - Peds 2000 Unit(s) Oral daily  dextrose 5%. 1000 milliLiter(s) (50 mL/Hr) IV Continuous <Continuous>  dextrose 5%. 1000 milliLiter(s) (100 mL/Hr) IV Continuous <Continuous>  dextrose 5%. 1000 milliLiter(s) (50 mL/Hr) IV Continuous <Continuous>  dextrose 5%. 1000 milliLiter(s) (100 mL/Hr) IV Continuous <Continuous>  dextrose 50% Injectable 25 Gram(s) IV Push once  dextrose 50% Injectable 12.5 Gram(s) IV Push once  dextrose 50% Injectable 25 Gram(s) IV Push once  dextrose 50% Injectable 25 Gram(s) IV Push once  dextrose 50% Injectable 12.5 Gram(s) IV Push once  dextrose 50% Injectable 25 Gram(s) IV Push once  enalaprilat Injectable 1.25 milliGRAM(s) IV Push every 6 hours  enoxaparin Injectable 40 milliGRAM(s) SubCutaneous every 24 hours  glucagon  Injectable 1 milliGRAM(s) IntraMuscular once  glucagon  Injectable 1 milliGRAM(s) IntraMuscular once  insulin lispro (ADMELOG) corrective regimen sliding scale   SubCutaneous every 6 hours  levothyroxine 88 MICROGram(s) Oral daily  melatonin 5 milliGRAM(s) Oral at bedtime  multivitamin 1 Tablet(s) Oral daily  pantoprazole  Injectable 40 milliGRAM(s) IV Push daily  Parenteral Nutrition - Adult 1 Each (83 mL/Hr) TPN Continuous <Continuous>  verapamil 120 milliGRAM(s) Oral every 12 hours    MEDICATIONS  (PRN):  acetaminophen   IVPB .. 1000 milliGRAM(s) IV Intermittent once PRN Mild Pain (1 - 3)  dextrose Oral Gel 15 Gram(s) Oral once PRN Blood Glucose LESS THAN 70 milliGRAM(s)/deciliter  dextrose Oral Gel 15 Gram(s) Oral once PRN Blood Glucose LESS THAN 70 milliGRAM(s)/deciliter  LORazepam   Injectable 0.5 milliGRAM(s) IV Push every 6 hours PRN Anxiety  morphine  - Injectable 2 milliGRAM(s) IV Push every 4 hours PRN Severe Pain (7 - 10)  ondansetron Injectable 4 milliGRAM(s) IV Push every 6 hours PRN Nausea and/or Vomiting  traMADol 50 milliGRAM(s) Oral three times a day PRN Moderate Pain (4 - 6)

## 2022-04-18 VITALS
HEART RATE: 67 BPM | RESPIRATION RATE: 18 BRPM | OXYGEN SATURATION: 96 % | DIASTOLIC BLOOD PRESSURE: 63 MMHG | TEMPERATURE: 98 F | SYSTOLIC BLOOD PRESSURE: 123 MMHG

## 2022-04-18 LAB
ALBUMIN SERPL ELPH-MCNC: 2.9 G/DL — LOW (ref 3.3–5)
ALP SERPL-CCNC: 55 U/L — SIGNIFICANT CHANGE UP (ref 40–120)
ALT FLD-CCNC: 23 U/L — SIGNIFICANT CHANGE UP (ref 12–78)
ANION GAP SERPL CALC-SCNC: 3 MMOL/L — LOW (ref 5–17)
AST SERPL-CCNC: 19 U/L — SIGNIFICANT CHANGE UP (ref 15–37)
BILIRUB SERPL-MCNC: 0.2 MG/DL — SIGNIFICANT CHANGE UP (ref 0.2–1.2)
BUN SERPL-MCNC: 26 MG/DL — HIGH (ref 7–23)
CALCIUM SERPL-MCNC: 9.6 MG/DL — SIGNIFICANT CHANGE UP (ref 8.5–10.1)
CHLORIDE SERPL-SCNC: 110 MMOL/L — HIGH (ref 96–108)
CO2 SERPL-SCNC: 26 MMOL/L — SIGNIFICANT CHANGE UP (ref 22–31)
CREAT SERPL-MCNC: 1.31 MG/DL — HIGH (ref 0.5–1.3)
EGFR: 53 ML/MIN/1.73M2 — LOW
GLUCOSE SERPL-MCNC: 175 MG/DL — HIGH (ref 70–99)
HCT VFR BLD CALC: 30.4 % — LOW (ref 39–50)
HGB BLD-MCNC: 9.8 G/DL — LOW (ref 13–17)
MAGNESIUM SERPL-MCNC: 1.9 MG/DL — SIGNIFICANT CHANGE UP (ref 1.6–2.6)
MCHC RBC-ENTMCNC: 29.3 PG — SIGNIFICANT CHANGE UP (ref 27–34)
MCHC RBC-ENTMCNC: 32.2 GM/DL — SIGNIFICANT CHANGE UP (ref 32–36)
MCV RBC AUTO: 91 FL — SIGNIFICANT CHANGE UP (ref 80–100)
PHOSPHATE SERPL-MCNC: 2.9 MG/DL — SIGNIFICANT CHANGE UP (ref 2.5–4.5)
PLATELET # BLD AUTO: 254 K/UL — SIGNIFICANT CHANGE UP (ref 150–400)
POTASSIUM SERPL-MCNC: 4.4 MMOL/L — SIGNIFICANT CHANGE UP (ref 3.5–5.3)
POTASSIUM SERPL-SCNC: 4.4 MMOL/L — SIGNIFICANT CHANGE UP (ref 3.5–5.3)
PROT SERPL-MCNC: 6.2 GM/DL — SIGNIFICANT CHANGE UP (ref 6–8.3)
RBC # BLD: 3.34 M/UL — LOW (ref 4.2–5.8)
RBC # FLD: 14.9 % — HIGH (ref 10.3–14.5)
SODIUM SERPL-SCNC: 139 MMOL/L — SIGNIFICANT CHANGE UP (ref 135–145)
WBC # BLD: 9.63 K/UL — SIGNIFICANT CHANGE UP (ref 3.8–10.5)
WBC # FLD AUTO: 9.63 K/UL — SIGNIFICANT CHANGE UP (ref 3.8–10.5)

## 2022-04-18 PROCEDURE — 99232 SBSQ HOSP IP/OBS MODERATE 35: CPT

## 2022-04-18 RX ORDER — PANTOPRAZOLE SODIUM 20 MG/1
40 TABLET, DELAYED RELEASE ORAL DAILY
Refills: 0 | Status: DISCONTINUED | OUTPATIENT
Start: 2022-04-19 | End: 2022-04-18

## 2022-04-18 RX ADMIN — Medication 120 MILLIGRAM(S): at 10:31

## 2022-04-18 RX ADMIN — Medication 88 MICROGRAM(S): at 06:00

## 2022-04-18 RX ADMIN — PANTOPRAZOLE SODIUM 40 MILLIGRAM(S): 20 TABLET, DELAYED RELEASE ORAL at 09:59

## 2022-04-18 RX ADMIN — Medication 1000 MILLIGRAM(S): at 09:59

## 2022-04-18 RX ADMIN — Medication 2000 UNIT(S): at 10:00

## 2022-04-18 RX ADMIN — Medication 1: at 06:01

## 2022-04-18 RX ADMIN — CHLORHEXIDINE GLUCONATE 1 APPLICATION(S): 213 SOLUTION TOPICAL at 06:05

## 2022-04-18 RX ADMIN — Medication 81 MILLIGRAM(S): at 09:59

## 2022-04-18 RX ADMIN — ENOXAPARIN SODIUM 40 MILLIGRAM(S): 100 INJECTION SUBCUTANEOUS at 06:00

## 2022-04-18 RX ADMIN — Medication 1 TABLET(S): at 10:00

## 2022-04-18 NOTE — PROGRESS NOTE ADULT - ASSESSMENT
87 year old male with a history of crohn's disease admitted with a small bowel obstruction at a prior anastomosis. He has had some bowel function since admission which is reassuring but  in the RLQ. I am still awaiting the images from small bowel series as the last one was from 6pm yesterday and still showed most of the contrast in the stomach. If improved, may star liquids. Continue serial abdominal exams
Imp:  Clinically, SBO seems to have opened up    Rec:  Will defer dietary advancement to surgical team  I'll be away for the next week - can reconsult Dr. Flores/Dr Singh team prn
87 year old male with a history of crohn's disease admitted with a small bowel obstruction at a prior anastomosis. Mildly tender over RLQ.      Plan:  Serial AXR  Mild improvement   Continue to pass flatus and have BM  If progressing will advance diet to FLD  DVT ppx  restart home meds    Plan discussed with Dr. Luna 
87 year old male with a history of crohn's disease admitted with a small bowel obstruction at a prior anastomosis. Mildly tender over RLQ.      Plan:  Serial AXR  NPO  Monitor bowel function  NGT if continues to have emesis  f/u GI consult  DVT ppx  continue home meds    Plan discussed with Dr. Walden
An 87 year old male with a history of Crohn's disease and admitted with SBO  Having multiple bowel movements    Plan:  FLD  Keep IV fluids  Continue PPN  Ambulation  Incentive spirometry  Serial abdominal exam  Monitor bowel function    Plan discussed with Dr. Luna
An 87 year old male with a history of Crohn's disease and admitted with SBO  Having multiple bowel movements last night    Plan:  Start clear liquids slowly  Keep IV fluids  Ambulation  Incentive spirometry  Serial abdominal exam    Plan discussed with colorectal surgery group
Imp:  SBO, likely adhesive, but r/o anastomotic stricture crohn's recurrence, neoplasm.    Rec:  I favor surgery but patient hasn't committed to a course of action
87 year old male with a history of crohn's disease admitted with a small bowel obstruction at a prior anastomosis. Refusing surgical intervention and/or NGT placement for conservative management at this time.    Plan:  pain control prn  Serial AXR  NPO  IVF  Monitor bowel function  NGT if continues to have emesis  f/u GI consult  DVT ppx  continue home meds    Plan discussed with Dr. Adron
87 year old male with a history of crohn's disease admitted with a small bowel obstruction at a prior anastomosis. Refusing surgical intervention and/or NGT placement for conservative management at this time.    Plan:  pain control prn  Serial AXR  NPO  IVF  Monitor bowel function  NGT if continues to have emesis  f/u GI consult  palliative care consult appreciated  DVT ppx  continue home meds    Plan discussed with Dr. Ardon
An 87 year old male with a history of Crohn's disease and admitted with SBO  Having multiple bowel movements    Plan:  possibly advance to FLD  Keep IV fluids  Ambulation  Incentive spirometry  Serial abdominal exam  Monitor bowel function    Plan discussed with Dr. Luna
  HPI: Pt is a 87y old Male with  abdominal pain which started around 3 weeks ago, located in the right lower abdomen, continuous, moderate, slightly increased by food, no relieving factors, associated with occasional nausea, no vomiting and no fever. His last bowel movement was 2 days ao but he is able to pass gas. He has visited his urologist for this pain as he also had an episode of hematuria initially that has resolved. He had a CT scan done than which he was told that was normal. He was referred to his gastroenterologist (Dr Padilla)  who also repeated the CT scan. This also did not show any acute causes of abdominal pain. He was told by Dr Padilla that if his pain persisted, he should come to the ED for management. Abd CT reveals a SBO and pt has been declining surgical intervention. Palliative Medicine Consult to further discuss GOC.   4/13/22 Seen and examined at bedside. Pt anxious and tearful regarding any potential surgical intervention. He denies pain and has been NPO for severeal days now. Endorses anxiety and insomnia at times.     Assessment and Plan:    1) Small Bowel Obstruction  -CT =As compared with April 08, 2022, no change in distal small bowel   obstruction likely due to adhesions.  -Small bowel obstruction at a prior anastomosis  -Refusing surgical intervention  -S/P NGT  -clear liquids  -Follow up abd XRAY 4/14 pending results  -+ BM  -Surgery eval noted  -GI eval noted  -Denies pain at present    2) Anxiety/Pain  -R/T current situation  -Cont Morphine/Tramadol  -cont Ativan 0.5 mg IVP Q6H PRN anxiety    3) Debility  -NPO  -Mod protein wendy malnutrition  -PT eval    4) Advanced Directives  -Pt with capacity  -Will schedule further GOC discussion after Xray 4/14  -Reviewed MOLST and he did not want to set limits at this time     
An 87 year old male with a history of Crohn's disease and admitted with SBO    Plan:  LRD  Ambulation  Incentive spirometry  Serial abdominal exam  Monitor bowel function  Possible discharge home today vs tomorrow if continues to have bowel function  Will need outpatient GI follow up    Plan discussed with Dr. Ng
  HPI: Pt is a 87y old Male with  abdominal pain which started around 3 weeks ago, located in the right lower abdomen, continuous, moderate, slightly increased by food, no relieving factors, associated with occasional nausea, no vomiting and no fever. His last bowel movement was 2 days ao but he is able to pass gas. He has visited his urologist for this pain as he also had an episode of hematuria initially that has resolved. He had a CT scan done than which he was told that was normal. He was referred to his gastroenterologist (Dr Padilla)  who also repeated the CT scan. This also did not show any acute causes of abdominal pain. He was told by Dr Padilla that if his pain persisted, he should come to the ED for management. Abd CT reveals a SBO and pt has been declining surgical intervention. Palliative Medicine Consult to further discuss GOC.   4/13/22 Seen and examined at bedside. Pt anxious and tearful regarding any potential surgical intervention. He denies pain and has been NPO for severeal days now. Endorses anxiety and insomnia at times.     Assessment and Plan:    1) Small Bowel Obstruction  -CT =As compared with April 08, 2022, no change in distal small bowel   obstruction likely due to adhesions.  -Small bowel obstruction at a prior anastomosis  -Refusing surgical intervention  -S/P NGT  -clear liquids to be increased today as per surgery  -Follow up abd XRAY 4/14 pending results  -+ BM/ diarrhea   -Surgery eval noted  -GI eval noted  -Denies pain at present    2) Anxiety/Pain  -R/T current situation  -Cont Morphine/Tramadol  -cont Ativan 0.5 mg IVP Q6H PRN anxiety    3) Debility  -NPO  -Mod protein wendy malnutrition  -PT eval    4) Advanced Directives  -Pt with capacity  -GOC discussion today 4/15  -Reviewed MOLST and he did not want to set limits at this time

## 2022-04-18 NOTE — PROGRESS NOTE ADULT - PROVIDER SPECIALTY LIST ADULT
Colorectal Surgery
Colorectal Surgery
Hospitalist
Hospitalist
Colorectal Surgery
Colorectal Surgery
Hospitalist
Palliative Care
Palliative Care
Colorectal Surgery
Colorectal Surgery
Hospitalist
Palliative Care
Colorectal Surgery
Gastroenterology
Gastroenterology

## 2022-04-18 NOTE — PROGRESS NOTE ADULT - TIME BILLING
Patient seen and examined, chart including vitals, labs, and imaging reviewed.  Patient denies nausea/vomiting.  Passing small amounts of flatus and some BM but still distended.  He continues to refuse NGT but also does not want operative intervention.  CTE yesterday showed SBO just proximal to the site of his old small bowel anastomosis without any signs of active Crohn's.    On exam abdomen is markedly distended, taut, and tympanic.  Occasional hiccups throughoutour conversation.  No tenderness, no rebound, no guarding.  Labs - no leukocytosis, Cr improving.    With the degree of distention on exam, degree of small bowel and gastric dilation on CT scan, and hiccupping, it is unlikely that he will tolerate any oral intake.  Strongly recommended NGT placement to maximize chance of nonoperative therapy; he refused once again.  He expressed that he does not want any operative intervention at this time, as "this did not cause me issues for 30 years".  Plan for abdominal XR today to evaluate passage of contrast.  Continue NPO, IVF, encourage ambulation.
No acute issues overnight.  Tolerated soft diet, no N/V, continues to pass flatus/BM.  On exam abdomen soft, obese, but less distended than previously.  No rebound or guarding.  Labs stable from yesterday.    -- Wean parenteral nutrition  -- If patient continues to tolerate diet, plan to discharge on soft diet  -- Encourage ambulation, IS  -- Follow up with GI regarding management of his Crohn's as outpatient  -- No acute surgical intervention required at this time.  If his obstructive symptoms recur, recommend NGT placement.
Patient seen and examined, chart reviewed.  Mr. Manuel had multiple episodes of diarrhea last night.  XR obtained yesterday reviewed - no formal read yet but it does appear that the majority of the contrast has passed.  On exam his abdomen is soft, less distended than two days ago when I last examined him, but still is tympanitic.  No rebound or guarding.  He denies N/V.    -- Will advance to clear liquids - advised patient to take this very slowly.    -- If recurrence of N/V, advise NGT  -- Patient may need operative intervention if PO trial fails.  Will advance diet slowly if tolerated.  -- DVT prophylaxis  -- Encourage ambulation.
Seen and examined  Larisa full liquid diet.  No significant flatus but passed diarrheal stools.  AFVSS  NAD  soft, NT, distended without peritoneal signs  Labs reviewed  Imaging and reports reviewed  A/P - pSBO, Crohns  Improving but still with continued abdominal distention.  Keep on full liquids for now.  Cont ambulation.
Seen and examined.  Events overnight noted.  Did not tolerate full liquid diet yesterday.  Seen by Dr. Padilla. CTE ordered with findings of SBO at prior anastomosis site.  AFVSS  BAD  soft, NT, moderate distention  Labs reviewed  CTE imaging and report reviewed  A/P - pSBO  At time of my visit, pt reports passage of flatus and some thin stools.   Discussed with pt the recommendation for ex-lap, possible SBR, EDUIN.   He is not ready to agree to surgery and would like to allow for continued conservative mgmt of pSBO.   Advised that the optimal way to allow for resolution of SBO is NGT insertion with bowel rest.   He is not agreeable for this either.  Will cont NPO, IVF for now.

## 2022-04-18 NOTE — CHART NOTE - NSCHARTNOTEFT_GEN_A_CORE
Pt advanced to low fiber diet  Per nursing, pt ate a good portion, > 50% of breakfast  +BM with soft stool  Pt urinating well  Will d/c PPN  Pt asleep in chair on visit today, unable to arouse  Will continue to monitor PO intake and tolerance, labs and weight.

## 2022-04-18 NOTE — CHART NOTE - NSCHARTNOTESELECT_GEN_ALL_CORE
Event Note
Event Note
Dietitian F/U w/ PPN
Dietitian f/u w/ PPN recs
Follow Up Note Brief  Dietitian

## 2022-04-18 NOTE — PROGRESS NOTE ADULT - SUBJECTIVE AND OBJECTIVE BOX
Patient was seen and examined at the bedside this morning. No acute events overnight. Tolerated LRD without nausea or vomiting. Passing flatus, has not had a BM yet. VS reviewed.    Vital Signs Last 24 Hrs  T(C): 36.8 (17 Apr 2022 21:35), Max: 36.9 (17 Apr 2022 15:37)  T(F): 98.2 (17 Apr 2022 21:35), Max: 98.5 (17 Apr 2022 15:37)  HR: 78 (17 Apr 2022 21:35) (69 - 78)  BP: 144/71 (17 Apr 2022 21:35) (135/74 - 151/72)  BP(mean): --  RR: 18 (17 Apr 2022 21:35) (16 - 18)  SpO2: 96% (17 Apr 2022 21:35) (96% - 98%)    Physical Exam:  Alert, conscious, oriented  No pallor, jaundice or cyanosis  Not in pain or distress  Chest clear, equal air entry bilaterally  Abdomen less distended, still tympanic, active bowel sounds  Extremities: No swelling or tenderness     Labs:                                              9.8    9.63  )-----------( 254      ( 18 Apr 2022 04:43 )             30.4   04-18    139  |  110<H>  |  26<H>  ----------------------------<  175<H>  4.4   |  26  |  1.31<H>    Ca    9.6      18 Apr 2022 04:43  Phos  2.9     04-18  Mg     1.9     04-18    TPro  6.2  /  Alb  2.9<L>  /  TBili  0.2  /  DBili  x   /  AST  19  /  ALT  23  /  AlkPhos  55  04-18        MEDICATIONS  (STANDING):  ascorbic acid  Oral Tab/Cap - Peds 1000 milliGRAM(s) Oral daily  aspirin  chewable 81 milliGRAM(s) Oral daily  benzocaine 15 mG/menthol 3.6 mG Lozenge 1 Lozenge Oral every 12 hours  chlorhexidine 4% Liquid 1 Application(s) Topical <User Schedule>  cholecalciferol Oral Tab/Cap - Peds 2000 Unit(s) Oral daily  dextrose 5%. 1000 milliLiter(s) (50 mL/Hr) IV Continuous <Continuous>  dextrose 5%. 1000 milliLiter(s) (100 mL/Hr) IV Continuous <Continuous>  dextrose 5%. 1000 milliLiter(s) (50 mL/Hr) IV Continuous <Continuous>  dextrose 5%. 1000 milliLiter(s) (100 mL/Hr) IV Continuous <Continuous>  dextrose 50% Injectable 25 Gram(s) IV Push once  dextrose 50% Injectable 12.5 Gram(s) IV Push once  dextrose 50% Injectable 25 Gram(s) IV Push once  dextrose 50% Injectable 25 Gram(s) IV Push once  dextrose 50% Injectable 12.5 Gram(s) IV Push once  dextrose 50% Injectable 25 Gram(s) IV Push once  enalaprilat Injectable 1.25 milliGRAM(s) IV Push every 6 hours  enoxaparin Injectable 40 milliGRAM(s) SubCutaneous every 24 hours  glucagon  Injectable 1 milliGRAM(s) IntraMuscular once  glucagon  Injectable 1 milliGRAM(s) IntraMuscular once  insulin lispro (ADMELOG) corrective regimen sliding scale   SubCutaneous every 6 hours  levothyroxine 88 MICROGram(s) Oral daily  melatonin 5 milliGRAM(s) Oral at bedtime  multivitamin 1 Tablet(s) Oral daily  pantoprazole  Injectable 40 milliGRAM(s) IV Push daily  Parenteral Nutrition - Adult 1 Each (83 mL/Hr) TPN Continuous <Continuous>  verapamil 120 milliGRAM(s) Oral every 12 hours    MEDICATIONS  (PRN):  acetaminophen   IVPB .. 1000 milliGRAM(s) IV Intermittent once PRN Mild Pain (1 - 3)  dextrose Oral Gel 15 Gram(s) Oral once PRN Blood Glucose LESS THAN 70 milliGRAM(s)/deciliter  dextrose Oral Gel 15 Gram(s) Oral once PRN Blood Glucose LESS THAN 70 milliGRAM(s)/deciliter  LORazepam   Injectable 0.5 milliGRAM(s) IV Push every 6 hours PRN Anxiety  morphine  - Injectable 2 milliGRAM(s) IV Push every 4 hours PRN Severe Pain (7 - 10)  ondansetron Injectable 4 milliGRAM(s) IV Push every 6 hours PRN Nausea and/or Vomiting  traMADol 50 milliGRAM(s) Oral three times a day PRN Moderate Pain (4 - 6)

## 2022-04-19 ENCOUNTER — FORM ENCOUNTER (OUTPATIENT)
Age: 87
End: 2022-04-19

## 2022-04-25 DIAGNOSIS — T85.528A DISPLACEMENT OF OTHER GASTROINTESTINAL PROSTHETIC DEVICES, IMPLANTS AND GRAFTS, INITIAL ENCOUNTER: ICD-10-CM

## 2022-04-25 DIAGNOSIS — Z79.890 HORMONE REPLACEMENT THERAPY: ICD-10-CM

## 2022-04-25 DIAGNOSIS — K56.600 PARTIAL INTESTINAL OBSTRUCTION, UNSPECIFIED AS TO CAUSE: ICD-10-CM

## 2022-04-25 DIAGNOSIS — I12.9 HYPERTENSIVE CHRONIC KIDNEY DISEASE WITH STAGE 1 THROUGH STAGE 4 CHRONIC KIDNEY DISEASE, OR UNSPECIFIED CHRONIC KIDNEY DISEASE: ICD-10-CM

## 2022-04-25 DIAGNOSIS — E11.22 TYPE 2 DIABETES MELLITUS WITH DIABETIC CHRONIC KIDNEY DISEASE: ICD-10-CM

## 2022-04-25 DIAGNOSIS — N17.9 ACUTE KIDNEY FAILURE, UNSPECIFIED: ICD-10-CM

## 2022-04-25 DIAGNOSIS — E78.00 PURE HYPERCHOLESTEROLEMIA, UNSPECIFIED: ICD-10-CM

## 2022-04-25 DIAGNOSIS — E87.2 ACIDOSIS: ICD-10-CM

## 2022-04-25 DIAGNOSIS — I25.10 ATHEROSCLEROTIC HEART DISEASE OF NATIVE CORONARY ARTERY WITHOUT ANGINA PECTORIS: ICD-10-CM

## 2022-04-25 DIAGNOSIS — Z95.5 PRESENCE OF CORONARY ANGIOPLASTY IMPLANT AND GRAFT: ICD-10-CM

## 2022-04-25 DIAGNOSIS — R19.7 DIARRHEA, UNSPECIFIED: ICD-10-CM

## 2022-04-25 DIAGNOSIS — E44.0 MODERATE PROTEIN-CALORIE MALNUTRITION: ICD-10-CM

## 2022-04-25 DIAGNOSIS — R11.2 NAUSEA WITH VOMITING, UNSPECIFIED: ICD-10-CM

## 2022-04-25 DIAGNOSIS — F41.9 ANXIETY DISORDER, UNSPECIFIED: ICD-10-CM

## 2022-04-25 DIAGNOSIS — Z92.3 PERSONAL HISTORY OF IRRADIATION: ICD-10-CM

## 2022-04-25 DIAGNOSIS — Z79.899 OTHER LONG TERM (CURRENT) DRUG THERAPY: ICD-10-CM

## 2022-04-25 DIAGNOSIS — Z96.641 PRESENCE OF RIGHT ARTIFICIAL HIP JOINT: ICD-10-CM

## 2022-04-25 DIAGNOSIS — D72.829 ELEVATED WHITE BLOOD CELL COUNT, UNSPECIFIED: ICD-10-CM

## 2022-04-25 DIAGNOSIS — Z90.49 ACQUIRED ABSENCE OF OTHER SPECIFIED PARTS OF DIGESTIVE TRACT: ICD-10-CM

## 2022-04-25 DIAGNOSIS — Z20.822 CONTACT WITH AND (SUSPECTED) EXPOSURE TO COVID-19: ICD-10-CM

## 2022-04-25 DIAGNOSIS — Z79.82 LONG TERM (CURRENT) USE OF ASPIRIN: ICD-10-CM

## 2022-04-25 DIAGNOSIS — E03.9 HYPOTHYROIDISM, UNSPECIFIED: ICD-10-CM

## 2022-04-25 DIAGNOSIS — E11.40 TYPE 2 DIABETES MELLITUS WITH DIABETIC NEUROPATHY, UNSPECIFIED: ICD-10-CM
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
## 2022-04-25 DIAGNOSIS — E87.0 HYPEROSMOLALITY AND HYPERNATREMIA: ICD-10-CM

## 2022-04-25 DIAGNOSIS — H91.93 UNSPECIFIED HEARING LOSS, BILATERAL: ICD-10-CM

## 2022-04-25 DIAGNOSIS — Z79.84 LONG TERM (CURRENT) USE OF ORAL HYPOGLYCEMIC DRUGS: ICD-10-CM

## 2022-04-25 DIAGNOSIS — E66.9 OBESITY, UNSPECIFIED: ICD-10-CM

## 2022-04-25 DIAGNOSIS — Z85.46 PERSONAL HISTORY OF MALIGNANT NEOPLASM OF PROSTATE: ICD-10-CM

## 2022-04-25 DIAGNOSIS — E78.5 HYPERLIPIDEMIA, UNSPECIFIED: ICD-10-CM

## 2022-04-25 DIAGNOSIS — H35.30 UNSPECIFIED MACULAR DEGENERATION: ICD-10-CM

## 2022-04-25 DIAGNOSIS — N18.30 CHRONIC KIDNEY DISEASE, STAGE 3 UNSPECIFIED: ICD-10-CM
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
## 2022-05-02 NOTE — ASU PREOP CHECKLIST - CHLOROHEXIDINE WASH 3
19-Oct-2017 Consent 1/Introductory Paragraph: The rationale for Mohs was explained to the patient and consent was obtained. The risks, benefits and alternatives to therapy were discussed in detail. Specifically, the risks of infection, scarring, bleeding, prolonged wound healing, incomplete removal, allergy to anesthesia, nerve injury and recurrence were addressed. Prior to the procedure, the treatment site was clearly identified and confirmed by the patient. All components of Universal Protocol/PAUSE Rule completed.

## 2022-05-06 ENCOUNTER — APPOINTMENT (OUTPATIENT)
Dept: COLORECTAL SURGERY | Facility: CLINIC | Age: 87
End: 2022-05-06
Payer: MEDICARE

## 2022-05-06 PROCEDURE — 99213 OFFICE O/P EST LOW 20 MIN: CPT

## 2022-05-16 NOTE — DISCHARGE NOTE PROVIDER - NSDCMRMEDTOKEN_GEN_ALL_CORE_FT
ascorbic acid 1000 mg oral tablet: 1 tab(s) orally once a day  aspirin 81 mg oral tablet: 1 tab(s) orally once a day     atorvastatin 20 mg oral tablet: 1 tab(s) orally once a day  colestipol 1 g oral tablet: 1 tab(s) orally once a day  glimepiride 1 mg oral tablet: 1 tab(s) orally once a day  levothyroxine 88 mcg (0.088 mg) oral tablet: 1 tab(s) orally once a day  lisinopril-hydrochlorothiazide 20 mg-25 mg oral tablet: 1 tab(s) orally once a day  metFORMIN 500 mg oral tablet: 1 tab(s) orally 2 times a day  Multiple Vitamins oral tablet: 1 tab(s) orally once a day  PreserVision AREDS 2 oral capsule: 1 cap(s) orally 2 times a day  traMADol 50 mg oral tablet: 1 tab(s) orally 3 times a day, As Needed  verapamil 240 mg/24 hours oral tablet, extended release: 1 tab(s) orally once a day  Vitamin D3 50 mcg (2000 intl units) oral tablet: 1 tab(s) orally once a day  
(4) walks frequently

## 2022-06-02 ENCOUNTER — OUTPATIENT (OUTPATIENT)
Dept: OUTPATIENT SERVICES | Facility: HOSPITAL | Age: 87
LOS: 1 days | Discharge: HOME | End: 2022-06-02
Payer: MEDICARE

## 2022-06-02 ENCOUNTER — RESULT REVIEW (OUTPATIENT)
Age: 87
End: 2022-06-02

## 2022-06-02 VITALS
DIASTOLIC BLOOD PRESSURE: 64 MMHG | SYSTOLIC BLOOD PRESSURE: 159 MMHG | WEIGHT: 177.03 LBS | HEART RATE: 57 BPM | HEIGHT: 66 IN | TEMPERATURE: 98 F | RESPIRATION RATE: 20 BRPM | OXYGEN SATURATION: 98 %

## 2022-06-02 DIAGNOSIS — Z90.49 ACQUIRED ABSENCE OF OTHER SPECIFIED PARTS OF DIGESTIVE TRACT: Chronic | ICD-10-CM

## 2022-06-02 DIAGNOSIS — Z98.1 ARTHRODESIS STATUS: Chronic | ICD-10-CM

## 2022-06-02 DIAGNOSIS — Z96.641 PRESENCE OF RIGHT ARTIFICIAL HIP JOINT: Chronic | ICD-10-CM

## 2022-06-02 DIAGNOSIS — K57.31 DIVERTICULOSIS OF LARGE INTESTINE WITHOUT PERFORATION OR ABSCESS WITH BLEEDING: ICD-10-CM

## 2022-06-02 PROCEDURE — 88305 TISSUE EXAM BY PATHOLOGIST: CPT | Mod: 26

## 2022-06-02 PROCEDURE — 88341 IMHCHEM/IMCYTCHM EA ADD ANTB: CPT | Mod: 26

## 2022-06-02 PROCEDURE — 88342 IMHCHEM/IMCYTCHM 1ST ANTB: CPT

## 2022-06-02 PROCEDURE — 88305 TISSUE EXAM BY PATHOLOGIST: CPT

## 2022-06-02 PROCEDURE — 88342 IMHCHEM/IMCYTCHM 1ST ANTB: CPT | Mod: 26

## 2022-06-02 PROCEDURE — 88341 IMHCHEM/IMCYTCHM EA ADD ANTB: CPT

## 2022-06-02 RX ORDER — MULTIVIT-MIN/FERROUS GLUCONATE 9 MG/15 ML
1 LIQUID (ML) ORAL
Qty: 0 | Refills: 0 | DISCHARGE

## 2022-06-02 NOTE — ASU PATIENT PROFILE, ADULT - DATE OF FIRST COVID-19 BOOSTER
02-May-2022
CAD (coronary artery disease)  05/08/2017    Laron Woods  MR (mitral regurgitation)  05/08/2017    Laron Woods

## 2022-06-06 DIAGNOSIS — D12.5 BENIGN NEOPLASM OF SIGMOID COLON: ICD-10-CM

## 2022-06-06 DIAGNOSIS — Z88.0 ALLERGY STATUS TO PENICILLIN: ICD-10-CM

## 2022-06-06 DIAGNOSIS — I10 ESSENTIAL (PRIMARY) HYPERTENSION: ICD-10-CM

## 2022-06-06 DIAGNOSIS — Z09 ENCOUNTER FOR FOLLOW-UP EXAMINATION AFTER COMPLETED TREATMENT FOR CONDITIONS OTHER THAN MALIGNANT NEOPLASM: ICD-10-CM

## 2022-06-06 DIAGNOSIS — Z87.19 PERSONAL HISTORY OF OTHER DISEASES OF THE DIGESTIVE SYSTEM: ICD-10-CM

## 2022-06-06 DIAGNOSIS — K63.3 ULCER OF INTESTINE: ICD-10-CM

## 2022-06-06 DIAGNOSIS — Z95.5 PRESENCE OF CORONARY ANGIOPLASTY IMPLANT AND GRAFT: ICD-10-CM

## 2022-06-06 DIAGNOSIS — C18.7 MALIGNANT NEOPLASM OF SIGMOID COLON: ICD-10-CM

## 2022-06-06 DIAGNOSIS — K57.30 DIVERTICULOSIS OF LARGE INTESTINE WITHOUT PERFORATION OR ABSCESS WITHOUT BLEEDING: ICD-10-CM

## 2022-06-06 DIAGNOSIS — Z90.49 ACQUIRED ABSENCE OF OTHER SPECIFIED PARTS OF DIGESTIVE TRACT: ICD-10-CM

## 2022-06-06 DIAGNOSIS — Z96.649 PRESENCE OF UNSPECIFIED ARTIFICIAL HIP JOINT: ICD-10-CM

## 2022-06-06 DIAGNOSIS — Z85.46 PERSONAL HISTORY OF MALIGNANT NEOPLASM OF PROSTATE: ICD-10-CM

## 2022-06-06 DIAGNOSIS — I25.10 ATHEROSCLEROTIC HEART DISEASE OF NATIVE CORONARY ARTERY WITHOUT ANGINA PECTORIS: ICD-10-CM

## 2022-06-06 DIAGNOSIS — Z79.84 LONG TERM (CURRENT) USE OF ORAL HYPOGLYCEMIC DRUGS: ICD-10-CM

## 2022-06-06 DIAGNOSIS — E11.9 TYPE 2 DIABETES MELLITUS WITHOUT COMPLICATIONS: ICD-10-CM

## 2022-06-06 DIAGNOSIS — Z79.82 LONG TERM (CURRENT) USE OF ASPIRIN: ICD-10-CM

## 2022-06-10 ENCOUNTER — NON-APPOINTMENT (OUTPATIENT)
Age: 87
End: 2022-06-10

## 2022-06-10 ENCOUNTER — APPOINTMENT (OUTPATIENT)
Dept: COLORECTAL SURGERY | Facility: CLINIC | Age: 87
End: 2022-06-10
Payer: MEDICARE

## 2022-06-10 VITALS
OXYGEN SATURATION: 96 % | HEIGHT: 65 IN | BODY MASS INDEX: 29.99 KG/M2 | DIASTOLIC BLOOD PRESSURE: 68 MMHG | TEMPERATURE: 98.2 F | WEIGHT: 180 LBS | SYSTOLIC BLOOD PRESSURE: 175 MMHG | RESPIRATION RATE: 14 BRPM | HEART RATE: 72 BPM

## 2022-06-10 DIAGNOSIS — K56.699 OTHER INTESTINAL OBSTRUCTION UNSPECIFIED AS TO PARTIAL VERSUS COMPLETE OBSTRUCTION: ICD-10-CM

## 2022-06-10 DIAGNOSIS — C18.7 MALIGNANT NEOPLASM OF SIGMOID COLON: ICD-10-CM

## 2022-06-10 PROCEDURE — 99215 OFFICE O/P EST HI 40 MIN: CPT

## 2022-06-10 NOTE — PHYSICAL EXAM
[No Rash or Lesion] : No rash or lesion [Alert] : alert [Oriented to Person] : oriented to person [Oriented to Place] : oriented to place [Oriented to Time] : oriented to time [Calm] : calm [de-identified] : soft, NT, moderately distended [de-identified] : NCAT [de-identified] : NAD [de-identified] : MURCIA x 4

## 2022-06-10 NOTE — CONSULT LETTER
[Dear  ___] : Dear  [unfilled], [Consult Letter:] : I had the pleasure of evaluating your patient, [unfilled]. [Please see my note below.] : Please see my note below. [Consult Closing:] : Thank you very much for allowing me to participate in the care of this patient.  If you have any questions, please do not hesitate to contact me. [Sincerely,] : Sincerely, [FreeTextEntry3] : Gabrielle Ardon MD\par  [DrVeena  ___] : Dr. MAURER

## 2022-06-10 NOTE — HISTORY OF PRESENT ILLNESS
[FreeTextEntry1] : Mr. Manuel returns to the office for followup. He was hospitalized from 4/8/22 - 4/18/22 at  for pSBO at level of ileocolic anastomosis created years earlier for Crohns disease. CT A/P and CTE at the time demonstrated pSBO. He is not on maintenance Crohns medications. He was discharged home once tolerating po.  On 6/2/22, He underwent long overdue colonoscopy with Dr. Padilla. 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 Ileum was noted to be narrowed. He denies any nausea though gets full easily. He is passing gas and stool without issue. He is now here in office to discuss findings and plan of care moving forward.

## 2022-06-10 NOTE — ASSESSMENT
[FreeTextEntry1] : Mr. 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  I discussed with him the natural course of cancer which is a progressive disease if left untreated, and that will lead to death.  If he is seeking to have the cancer removed for potential cure, he would require a laparoscopic, possible open sigmoidectomy and possible ostomy.  At the same setting, I have recommended he undergo revision of his ileocolic anastomosis which could be strictured by recurrent Crohn's causing this partial small bowel  obstruction presentation.\par If agreeable to surgery, we discussed the need to obtain a CEA level as well as medical clearance and having to complete another bowel prep.  He would also need staging CT A/P though he did have a CT completed during his hospitalization in April which did not reveal any metastatic disease in his liver.\par We had a lengthy discussion regarding his current life situation.  He lives alone, has no family and is limited in mobility secondary to sequela of back surgery.  His mental faculties remain intact.  He is keenly aware of his advanced age and his limited life expectancy regardless of surgery.  He also understands that surgical intervention to remove the cancer may lead to further debility so that what he has left in terms of quality of life may be further compromised. \par He requested an additional 1 to 2 weeks to consider whether he would like to proceed with surgical intervention or not, which I have agreed to.  All questions were answered on completion of today's visit.\par

## 2022-06-14 ENCOUNTER — NON-APPOINTMENT (OUTPATIENT)
Age: 87
End: 2022-06-14

## 2022-06-27 ENCOUNTER — INPATIENT (INPATIENT)
Facility: HOSPITAL | Age: 87
LOS: 6 days | Discharge: HOME HEALTH | DRG: 329 | End: 2022-07-04
Attending: COLON & RECTAL SURGERY | Admitting: COLON & RECTAL SURGERY
Payer: MEDICARE

## 2022-06-27 VITALS — WEIGHT: 179.9 LBS | HEIGHT: 66 IN

## 2022-06-27 DIAGNOSIS — K56.7 ILEUS, UNSPECIFIED: ICD-10-CM

## 2022-06-27 DIAGNOSIS — K50.112 CROHN'S DISEASE OF LARGE INTESTINE WITH INTESTINAL OBSTRUCTION: ICD-10-CM

## 2022-06-27 DIAGNOSIS — N18.32 CHRONIC KIDNEY DISEASE, STAGE 3B: ICD-10-CM

## 2022-06-27 DIAGNOSIS — R94.31 ABNORMAL ELECTROCARDIOGRAM [ECG] [EKG]: ICD-10-CM

## 2022-06-27 DIAGNOSIS — Z90.49 ACQUIRED ABSENCE OF OTHER SPECIFIED PARTS OF DIGESTIVE TRACT: Chronic | ICD-10-CM

## 2022-06-27 DIAGNOSIS — Z98.1 ARTHRODESIS STATUS: Chronic | ICD-10-CM

## 2022-06-27 DIAGNOSIS — C18.9 MALIGNANT NEOPLASM OF COLON, UNSPECIFIED: ICD-10-CM

## 2022-06-27 DIAGNOSIS — Z96.641 PRESENCE OF RIGHT ARTIFICIAL HIP JOINT: Chronic | ICD-10-CM

## 2022-06-27 DIAGNOSIS — Z97.4 PRESENCE OF EXTERNAL HEARING-AID: ICD-10-CM

## 2022-06-27 DIAGNOSIS — E78.00 PURE HYPERCHOLESTEROLEMIA, UNSPECIFIED: ICD-10-CM

## 2022-06-27 LAB
ALBUMIN SERPL ELPH-MCNC: 3.5 G/DL — SIGNIFICANT CHANGE UP (ref 3.3–5)
ALP SERPL-CCNC: 83 U/L — SIGNIFICANT CHANGE UP (ref 40–120)
ALT FLD-CCNC: 46 U/L — SIGNIFICANT CHANGE UP (ref 12–78)
ANION GAP SERPL CALC-SCNC: 5 MMOL/L — SIGNIFICANT CHANGE UP (ref 5–17)
APPEARANCE UR: CLEAR — SIGNIFICANT CHANGE UP
AST SERPL-CCNC: 30 U/L — SIGNIFICANT CHANGE UP (ref 15–37)
BASOPHILS # BLD AUTO: 0.07 K/UL — SIGNIFICANT CHANGE UP (ref 0–0.2)
BASOPHILS NFR BLD AUTO: 0.8 % — SIGNIFICANT CHANGE UP (ref 0–2)
BILIRUB SERPL-MCNC: 0.2 MG/DL — SIGNIFICANT CHANGE UP (ref 0.2–1.2)
BILIRUB UR-MCNC: NEGATIVE — SIGNIFICANT CHANGE UP
BUN SERPL-MCNC: 31 MG/DL — HIGH (ref 7–23)
CALCIUM SERPL-MCNC: 10 MG/DL — SIGNIFICANT CHANGE UP (ref 8.5–10.1)
CHLORIDE SERPL-SCNC: 111 MMOL/L — HIGH (ref 96–108)
CO2 SERPL-SCNC: 24 MMOL/L — SIGNIFICANT CHANGE UP (ref 22–31)
COLOR SPEC: YELLOW — SIGNIFICANT CHANGE UP
CREAT SERPL-MCNC: 1.51 MG/DL — HIGH (ref 0.5–1.3)
DIFF PNL FLD: NEGATIVE — SIGNIFICANT CHANGE UP
EGFR: 44 ML/MIN/1.73M2 — LOW
EOSINOPHIL # BLD AUTO: 0.26 K/UL — SIGNIFICANT CHANGE UP (ref 0–0.5)
EOSINOPHIL NFR BLD AUTO: 3 % — SIGNIFICANT CHANGE UP (ref 0–6)
GLUCOSE SERPL-MCNC: 137 MG/DL — HIGH (ref 70–99)
GLUCOSE UR QL: NEGATIVE — SIGNIFICANT CHANGE UP
HCT VFR BLD CALC: 33.4 % — LOW (ref 39–50)
HGB BLD-MCNC: 10.5 G/DL — LOW (ref 13–17)
IMM GRANULOCYTES NFR BLD AUTO: 0.2 % — SIGNIFICANT CHANGE UP (ref 0–1.5)
KETONES UR-MCNC: NEGATIVE — SIGNIFICANT CHANGE UP
LEUKOCYTE ESTERASE UR-ACNC: NEGATIVE — SIGNIFICANT CHANGE UP
LIDOCAIN IGE QN: 140 U/L — SIGNIFICANT CHANGE UP (ref 73–393)
LYMPHOCYTES # BLD AUTO: 1.45 K/UL — SIGNIFICANT CHANGE UP (ref 1–3.3)
LYMPHOCYTES # BLD AUTO: 16.8 % — SIGNIFICANT CHANGE UP (ref 13–44)
MCHC RBC-ENTMCNC: 28.7 PG — SIGNIFICANT CHANGE UP (ref 27–34)
MCHC RBC-ENTMCNC: 31.4 GM/DL — LOW (ref 32–36)
MCV RBC AUTO: 91.3 FL — SIGNIFICANT CHANGE UP (ref 80–100)
MONOCYTES # BLD AUTO: 0.55 K/UL — SIGNIFICANT CHANGE UP (ref 0–0.9)
MONOCYTES NFR BLD AUTO: 6.4 % — SIGNIFICANT CHANGE UP (ref 2–14)
NEUTROPHILS # BLD AUTO: 6.3 K/UL — SIGNIFICANT CHANGE UP (ref 1.8–7.4)
NEUTROPHILS NFR BLD AUTO: 72.8 % — SIGNIFICANT CHANGE UP (ref 43–77)
NITRITE UR-MCNC: NEGATIVE — SIGNIFICANT CHANGE UP
PH UR: 5 — SIGNIFICANT CHANGE UP (ref 5–8)
PLATELET # BLD AUTO: 282 K/UL — SIGNIFICANT CHANGE UP (ref 150–400)
POTASSIUM SERPL-MCNC: 4.3 MMOL/L — SIGNIFICANT CHANGE UP (ref 3.5–5.3)
POTASSIUM SERPL-SCNC: 4.3 MMOL/L — SIGNIFICANT CHANGE UP (ref 3.5–5.3)
PROT SERPL-MCNC: 7.6 GM/DL — SIGNIFICANT CHANGE UP (ref 6–8.3)
PROT UR-MCNC: NEGATIVE — SIGNIFICANT CHANGE UP
RBC # BLD: 3.66 M/UL — LOW (ref 4.2–5.8)
RBC # FLD: 15.6 % — HIGH (ref 10.3–14.5)
SARS-COV-2 RNA SPEC QL NAA+PROBE: SIGNIFICANT CHANGE UP
SODIUM SERPL-SCNC: 140 MMOL/L — SIGNIFICANT CHANGE UP (ref 135–145)
SP GR SPEC: 1.01 — SIGNIFICANT CHANGE UP (ref 1.01–1.02)
TROPONIN I, HIGH SENSITIVITY RESULT: 11.26 NG/L — SIGNIFICANT CHANGE UP
UROBILINOGEN FLD QL: NEGATIVE — SIGNIFICANT CHANGE UP
WBC # BLD: 8.65 K/UL — SIGNIFICANT CHANGE UP (ref 3.8–10.5)
WBC # FLD AUTO: 8.65 K/UL — SIGNIFICANT CHANGE UP (ref 3.8–10.5)

## 2022-06-27 PROCEDURE — C1765: CPT

## 2022-06-27 PROCEDURE — 82378 CARCINOEMBRYONIC ANTIGEN: CPT

## 2022-06-27 PROCEDURE — 86850 RBC ANTIBODY SCREEN: CPT

## 2022-06-27 PROCEDURE — 84100 ASSAY OF PHOSPHORUS: CPT

## 2022-06-27 PROCEDURE — 99285 EMERGENCY DEPT VISIT HI MDM: CPT

## 2022-06-27 PROCEDURE — 85610 PROTHROMBIN TIME: CPT

## 2022-06-27 PROCEDURE — 86900 BLOOD TYPING SEROLOGIC ABO: CPT

## 2022-06-27 PROCEDURE — 82570 ASSAY OF URINE CREATININE: CPT

## 2022-06-27 PROCEDURE — 88305 TISSUE EXAM BY PATHOLOGIST: CPT

## 2022-06-27 PROCEDURE — 80048 BASIC METABOLIC PNL TOTAL CA: CPT

## 2022-06-27 PROCEDURE — 84134 ASSAY OF PREALBUMIN: CPT

## 2022-06-27 PROCEDURE — 97116 GAIT TRAINING THERAPY: CPT | Mod: GP

## 2022-06-27 PROCEDURE — P9047: CPT

## 2022-06-27 PROCEDURE — 97530 THERAPEUTIC ACTIVITIES: CPT | Mod: GP

## 2022-06-27 PROCEDURE — 83036 HEMOGLOBIN GLYCOSYLATED A1C: CPT

## 2022-06-27 PROCEDURE — 80053 COMPREHEN METABOLIC PANEL: CPT

## 2022-06-27 PROCEDURE — C1889: CPT

## 2022-06-27 PROCEDURE — 84156 ASSAY OF PROTEIN URINE: CPT

## 2022-06-27 PROCEDURE — 74019 RADEX ABDOMEN 2 VIEWS: CPT

## 2022-06-27 PROCEDURE — 86901 BLOOD TYPING SEROLOGIC RH(D): CPT

## 2022-06-27 PROCEDURE — 71045 X-RAY EXAM CHEST 1 VIEW: CPT | Mod: 26

## 2022-06-27 PROCEDURE — 71045 X-RAY EXAM CHEST 1 VIEW: CPT

## 2022-06-27 PROCEDURE — 84439 ASSAY OF FREE THYROXINE: CPT

## 2022-06-27 PROCEDURE — 76770 US EXAM ABDO BACK WALL COMP: CPT

## 2022-06-27 PROCEDURE — 83735 ASSAY OF MAGNESIUM: CPT

## 2022-06-27 PROCEDURE — 82962 GLUCOSE BLOOD TEST: CPT

## 2022-06-27 PROCEDURE — 93306 TTE W/DOPPLER COMPLETE: CPT

## 2022-06-27 PROCEDURE — 99222 1ST HOSP IP/OBS MODERATE 55: CPT

## 2022-06-27 PROCEDURE — 84443 ASSAY THYROID STIM HORMONE: CPT

## 2022-06-27 PROCEDURE — 84300 ASSAY OF URINE SODIUM: CPT

## 2022-06-27 PROCEDURE — 85730 THROMBOPLASTIN TIME PARTIAL: CPT

## 2022-06-27 PROCEDURE — 36415 COLL VENOUS BLD VENIPUNCTURE: CPT

## 2022-06-27 PROCEDURE — 88309 TISSUE EXAM BY PATHOLOGIST: CPT

## 2022-06-27 PROCEDURE — 93010 ELECTROCARDIOGRAM REPORT: CPT

## 2022-06-27 PROCEDURE — 97162 PT EVAL MOD COMPLEX 30 MIN: CPT | Mod: GP

## 2022-06-27 PROCEDURE — 85027 COMPLETE CBC AUTOMATED: CPT

## 2022-06-27 PROCEDURE — 81001 URINALYSIS AUTO W/SCOPE: CPT

## 2022-06-27 PROCEDURE — 84478 ASSAY OF TRIGLYCERIDES: CPT

## 2022-06-27 RX ORDER — GLIMEPIRIDE 1 MG
1 TABLET ORAL DAILY
Refills: 0 | Status: DISCONTINUED | OUTPATIENT
Start: 2022-06-27 | End: 2022-06-28

## 2022-06-27 RX ORDER — VERAPAMIL HCL 240 MG
240 CAPSULE, EXTENDED RELEASE PELLETS 24 HR ORAL DAILY
Refills: 0 | Status: DISCONTINUED | OUTPATIENT
Start: 2022-06-27 | End: 2022-06-28

## 2022-06-27 RX ORDER — COLESTIPOL HCL 5 G
1 GRANULES (GRAM) ORAL
Qty: 0 | Refills: 0 | DISCHARGE

## 2022-06-27 RX ORDER — VERAPAMIL HCL 240 MG
240 CAPSULE, EXTENDED RELEASE PELLETS 24 HR ORAL DAILY
Refills: 0 | Status: DISCONTINUED | OUTPATIENT
Start: 2022-06-27 | End: 2022-06-27

## 2022-06-27 RX ORDER — ATORVASTATIN CALCIUM 80 MG/1
20 TABLET, FILM COATED ORAL AT BEDTIME
Refills: 0 | Status: DISCONTINUED | OUTPATIENT
Start: 2022-06-27 | End: 2022-07-04

## 2022-06-27 RX ORDER — METFORMIN HYDROCHLORIDE 850 MG/1
500 TABLET ORAL DAILY
Refills: 0 | Status: DISCONTINUED | OUTPATIENT
Start: 2022-06-27 | End: 2022-06-28

## 2022-06-27 RX ORDER — HEPARIN SODIUM 5000 [USP'U]/ML
5000 INJECTION INTRAVENOUS; SUBCUTANEOUS EVERY 8 HOURS
Refills: 0 | Status: DISCONTINUED | OUTPATIENT
Start: 2022-06-27 | End: 2022-07-04

## 2022-06-27 RX ORDER — ASCORBIC ACID 60 MG
1000 TABLET,CHEWABLE ORAL ONCE
Refills: 0 | Status: COMPLETED | OUTPATIENT
Start: 2022-06-27 | End: 2022-06-27

## 2022-06-27 RX ORDER — LEVOTHYROXINE SODIUM 125 MCG
88 TABLET ORAL DAILY
Refills: 0 | Status: DISCONTINUED | OUTPATIENT
Start: 2022-06-27 | End: 2022-06-27

## 2022-06-27 RX ORDER — LISINOPRIL 2.5 MG/1
20 TABLET ORAL DAILY
Refills: 0 | Status: DISCONTINUED | OUTPATIENT
Start: 2022-06-27 | End: 2022-06-28

## 2022-06-27 RX ORDER — CHOLECALCIFEROL (VITAMIN D3) 125 MCG
1000 CAPSULE ORAL DAILY
Refills: 0 | Status: DISCONTINUED | OUTPATIENT
Start: 2022-06-27 | End: 2022-07-04

## 2022-06-27 RX ORDER — HYDROCHLOROTHIAZIDE 25 MG
25 TABLET ORAL DAILY
Refills: 0 | Status: DISCONTINUED | OUTPATIENT
Start: 2022-06-27 | End: 2022-06-28

## 2022-06-27 RX ORDER — TRAMADOL HYDROCHLORIDE 50 MG/1
1 TABLET ORAL
Qty: 0 | Refills: 0 | DISCHARGE

## 2022-06-27 RX ORDER — SODIUM CHLORIDE 9 MG/ML
1000 INJECTION INTRAMUSCULAR; INTRAVENOUS; SUBCUTANEOUS
Refills: 0 | Status: DISCONTINUED | OUTPATIENT
Start: 2022-06-27 | End: 2022-06-29

## 2022-06-27 RX ORDER — LEVOTHYROXINE SODIUM 125 MCG
88 TABLET ORAL DAILY
Refills: 0 | Status: DISCONTINUED | OUTPATIENT
Start: 2022-06-27 | End: 2022-07-04

## 2022-06-27 RX ORDER — SODIUM CHLORIDE 9 MG/ML
1000 INJECTION INTRAMUSCULAR; INTRAVENOUS; SUBCUTANEOUS ONCE
Refills: 0 | Status: COMPLETED | OUTPATIENT
Start: 2022-06-27 | End: 2022-06-27

## 2022-06-27 RX ORDER — TRAMADOL HYDROCHLORIDE 50 MG/1
50 TABLET ORAL ONCE
Refills: 0 | Status: DISCONTINUED | OUTPATIENT
Start: 2022-06-27 | End: 2022-06-30

## 2022-06-27 RX ADMIN — SODIUM CHLORIDE 1000 MILLILITER(S): 9 INJECTION INTRAMUSCULAR; INTRAVENOUS; SUBCUTANEOUS at 14:28

## 2022-06-27 RX ADMIN — HEPARIN SODIUM 5000 UNIT(S): 5000 INJECTION INTRAVENOUS; SUBCUTANEOUS at 17:57

## 2022-06-27 RX ADMIN — SODIUM CHLORIDE 1000 MILLILITER(S): 9 INJECTION INTRAMUSCULAR; INTRAVENOUS; SUBCUTANEOUS at 00:00

## 2022-06-27 RX ADMIN — SODIUM CHLORIDE 120 MILLILITER(S): 9 INJECTION INTRAMUSCULAR; INTRAVENOUS; SUBCUTANEOUS at 21:30

## 2022-06-27 RX ADMIN — SODIUM CHLORIDE 120 MILLILITER(S): 9 INJECTION INTRAMUSCULAR; INTRAVENOUS; SUBCUTANEOUS at 17:55

## 2022-06-27 RX ADMIN — ATORVASTATIN CALCIUM 20 MILLIGRAM(S): 80 TABLET, FILM COATED ORAL at 21:32

## 2022-06-27 RX ADMIN — Medication 240 MILLIGRAM(S): at 23:27

## 2022-06-27 NOTE — ED PROVIDER NOTE - CLINICAL SUMMARY MEDICAL DECISION MAKING FREE TEXT BOX
Pt. with recently diagnosed colon cancer to be admitted for surgery by Dr. Kurtz. Pt. with recently diagnosed colon cancer to be admitted for surgery by Dr. Ardon.

## 2022-06-27 NOTE — H&P ADULT - HISTORY OF PRESENT ILLNESS
88 y/o M with a PMHx of sepsis, neuropathy, prostate CA, Crohn disease, hypothyroidism, depression, intervertebral disc disorder, diabetes mellitus obesity, HTN, hypercholesterolemia, stented coronary artery, CAD, macular degeneration, hearing loss presents to the ED for abdominal pain. Patient says that he was scheduled for colon cancer surgery diagnosed by a recent colonoscopy by Dr. Padilla to have surgery on Wednesday by Dr. Ardon. Pt has diarrhea and right side pain. Patient denies fever, vomiting, or blood in diarrhea. No smoking, no alcohol, no drug usage.

## 2022-06-27 NOTE — H&P ADULT - NSHPPHYSICALEXAM_GEN_ALL_CORE
PHYSICAL EXAM:  GENERAL: NAD, lying in bed comfortably  HEAD:  Atraumatic, Normocephalic  EYES: EOMI, PERRLA, conjunctiva and sclera clear  ENT: Moist mucous membranes  NECK: Supple, No JVD  CHEST/LUNG: Unlabored respirations  HEART: Regular rate and rhythm  ABDOMEN: Soft, Nontender, Nondistended.   EXTREMITIES:  2+ Peripheral Pulses, brisk capillary refill. No clubbing, cyanosis, or edema  NERVOUS SYSTEM:  Alert & Oriented X3, speech clear. No deficits   MSK: FROM all 4 extremities, full and equal strength  SKIN: No rashes or lesions

## 2022-06-27 NOTE — ED ADULT TRIAGE NOTE - CHIEF COMPLAINT QUOTE
c/o right sided abdominal pain started this morning, scheduled for colon ca surgery in 2 days, c/o associated diarrhea, denies fever, c/o ear stuffiness yesterday which resolved today  HX: chrons disease, leg neuropathy c/o right sided abdominal pain started this morning, scheduled for colon ca surgery in 2 days, c/o associated diarrhea, denies fever, c/o ear stuffiness yesterday which resolved today  HX: chrons disease, leg neuropathy, was told to come to ER by colorectal surgeon dr. Ardon

## 2022-06-27 NOTE — ED PROVIDER NOTE - OBJECTIVE STATEMENT
86 y/o M with a PMHx of sepsis, neuropathy, prostate CA, Crohn disease, hypothyroidism, depression, intervertebral disc disorder, diabetes mellitus obesity, HTN, hypercholesterolemia, stented coronary artery, CAD, macular degeneration, hearing loss presents to the ED for abdominal pain. Patient says that he was scheduled for colon cancer surgery diagnosed by a recent colonoscopy by Dr. Padilla to have surgery on Wednesday by Dr. Kurtz. Pt has diarrhea and right side pain. Patient denies fever, vomiting, or blood in diarrhea. No smoking, no alcohol, no drug usage.

## 2022-06-27 NOTE — ED STATDOCS - PROGRESS NOTE DETAILS
Donna Perez for attending Dr. Hilario: 88 y/o male with a PMHx of CAD s/p stents, Crohns disease, depression, DM, hearing loss, HTN, hypercholesterolemia, hypothyroid, intervertebral disc disorder, macular degeneration, neuropathy, obesity, prostate cancer, sepsis, presents to the ED c/o abd pain. Pt scheduled for colon surgery in 2 days with Dr. Ardon. No other complaints at this time. Will send pt to main ED for further evaluation.

## 2022-06-27 NOTE — H&P ADULT - NSHPLABSRESULTS_GEN_ALL_CORE
LABS:                        10.5   8.65  )-----------( 282      ( 27 Jun 2022 14:03 )             33.4     27 Jun 2022 14:03    140    |  111    |  31     ----------------------------<  137    4.3     |  24     |  1.51     Ca    10.0       27 Jun 2022 14:03    TPro  7.6    /  Alb  3.5    /  TBili  0.2    /  DBili  x      /  AST  30     /  ALT  46     /  AlkPhos  83     27 Jun 2022 14:03

## 2022-06-27 NOTE — H&P ADULT - TIME BILLING
Pt with abdominal pain.  Was scheduled for laparoscopic revision of ileocolic anastomosis (completed in past for Crohns) and now with stricture, as well as sigmoidectomy for sigmoid colon cancer.   Will need medical and cardiac optimization.  Abx and mechanical prep.  NPO after MN.

## 2022-06-27 NOTE — PATIENT PROFILE ADULT - FALL HARM RISK - HARM RISK INTERVENTIONS
Assistance OOB with selected safe patient handling equipment/Communicate Risk of Fall with Harm to all staff/Discuss with provider need for PT consult/Monitor gait and stability/Provide patient with walking aids - walker, cane, crutches/Reinforce activity limits and safety measures with patient and family/Tailored Fall Risk Interventions/Use of alarms - bed, chair and/or voice tab/Visual Cue: Yellow wristband and red socks/Bed in lowest position, wheels locked, appropriate side rails in place/Call bell, personal items and telephone in reach/Instruct patient to call for assistance before getting out of bed or chair/Non-slip footwear when patient is out of bed/[place] to call system/Physically safe environment - no spills, clutter or unnecessary equipment/Purposeful Proactive Rounding/Room/bathroom lighting operational, light cord in reach

## 2022-06-27 NOTE — ED ADULT NURSE NOTE - OBJECTIVE STATEMENT
Pt c/o pain to RLQ and diarrhea. Pt has h/o Crohn's; colon ca surgery scheduled in 2 days. Pt reports colon ca MD Ardon directed pt to come to ED for evaluation and "pre-surgical testing." Pt reports pain has decreased significantly and "feels much better and wants to go home." Pt denies fever, chills, n/v, chest pain, difficulty breathing, SOB.

## 2022-06-27 NOTE — PHARMACOTHERAPY INTERVENTION NOTE - COMMENTS
Medication reconciliation completed.  Reviewed Medication list and confirmed med allergies with patient; confirmed with Dr. First Medparam.

## 2022-06-27 NOTE — H&P ADULT - ASSESSMENT
admit for medical management  plan for surgery Wednesday  obtain medical and cardiac clearance  IVF hydration  analgesia  antiemetic  bowel prep AM  hospitalist consult for comanagement of comorbidities  DVT ppx    Plan discussed with Dr Ardon

## 2022-06-27 NOTE — ED ADULT NURSE NOTE - CHIEF COMPLAINT QUOTE
c/o right sided abdominal pain started this morning, scheduled for colon ca surgery in 2 days, c/o associated diarrhea, denies fever, c/o ear stuffiness yesterday which resolved today  HX: chrons disease, leg neuropathy, was told to come to ER by colorectal surgeon dr. Ardon

## 2022-06-28 DIAGNOSIS — I10 ESSENTIAL (PRIMARY) HYPERTENSION: ICD-10-CM

## 2022-06-28 DIAGNOSIS — Z01.810 ENCOUNTER FOR PREPROCEDURAL CARDIOVASCULAR EXAMINATION: ICD-10-CM

## 2022-06-28 DIAGNOSIS — I25.10 ATHEROSCLEROTIC HEART DISEASE OF NATIVE CORONARY ARTERY WITHOUT ANGINA PECTORIS: ICD-10-CM

## 2022-06-28 DIAGNOSIS — R94.31 ABNORMAL ELECTROCARDIOGRAM [ECG] [EKG]: ICD-10-CM

## 2022-06-28 LAB
A1C WITH ESTIMATED AVERAGE GLUCOSE RESULT: 6.6 % — HIGH (ref 4–5.6)
ALBUMIN SERPL ELPH-MCNC: 3.2 G/DL — LOW (ref 3.3–5)
ALP SERPL-CCNC: 73 U/L — SIGNIFICANT CHANGE UP (ref 40–120)
ALT FLD-CCNC: 38 U/L — SIGNIFICANT CHANGE UP (ref 12–78)
ANION GAP SERPL CALC-SCNC: 5 MMOL/L — SIGNIFICANT CHANGE UP (ref 5–17)
APTT BLD: 30.9 SEC — SIGNIFICANT CHANGE UP (ref 27.5–35.5)
AST SERPL-CCNC: 27 U/L — SIGNIFICANT CHANGE UP (ref 15–37)
BILIRUB SERPL-MCNC: 0.2 MG/DL — SIGNIFICANT CHANGE UP (ref 0.2–1.2)
BUN SERPL-MCNC: 28 MG/DL — HIGH (ref 7–23)
CALCIUM SERPL-MCNC: 9.6 MG/DL — SIGNIFICANT CHANGE UP (ref 8.5–10.1)
CHLORIDE SERPL-SCNC: 116 MMOL/L — HIGH (ref 96–108)
CO2 SERPL-SCNC: 23 MMOL/L — SIGNIFICANT CHANGE UP (ref 22–31)
CREAT SERPL-MCNC: 1.4 MG/DL — HIGH (ref 0.5–1.3)
EGFR: 49 ML/MIN/1.73M2 — LOW
ESTIMATED AVERAGE GLUCOSE: 143 MG/DL — HIGH (ref 68–114)
GLUCOSE SERPL-MCNC: 112 MG/DL — HIGH (ref 70–99)
HCT VFR BLD CALC: 31.8 % — LOW (ref 39–50)
HGB BLD-MCNC: 10.2 G/DL — LOW (ref 13–17)
INR BLD: 1.18 RATIO — HIGH (ref 0.88–1.16)
MAGNESIUM SERPL-MCNC: 1.9 MG/DL — SIGNIFICANT CHANGE UP (ref 1.6–2.6)
MCHC RBC-ENTMCNC: 29 PG — SIGNIFICANT CHANGE UP (ref 27–34)
MCHC RBC-ENTMCNC: 32.1 GM/DL — SIGNIFICANT CHANGE UP (ref 32–36)
MCV RBC AUTO: 90.3 FL — SIGNIFICANT CHANGE UP (ref 80–100)
PHOSPHATE SERPL-MCNC: 2.6 MG/DL — SIGNIFICANT CHANGE UP (ref 2.5–4.5)
PLATELET # BLD AUTO: 242 K/UL — SIGNIFICANT CHANGE UP (ref 150–400)
POTASSIUM SERPL-MCNC: 4.3 MMOL/L — SIGNIFICANT CHANGE UP (ref 3.5–5.3)
POTASSIUM SERPL-SCNC: 4.3 MMOL/L — SIGNIFICANT CHANGE UP (ref 3.5–5.3)
PREALB SERPL-MCNC: 23 MG/DL — SIGNIFICANT CHANGE UP (ref 20–40)
PROT SERPL-MCNC: 6.6 GM/DL — SIGNIFICANT CHANGE UP (ref 6–8.3)
PROTHROM AB SERPL-ACNC: 13.7 SEC — HIGH (ref 10.5–13.4)
RBC # BLD: 3.52 M/UL — LOW (ref 4.2–5.8)
RBC # FLD: 15.6 % — HIGH (ref 10.3–14.5)
SODIUM SERPL-SCNC: 144 MMOL/L — SIGNIFICANT CHANGE UP (ref 135–145)
WBC # BLD: 8.44 K/UL — SIGNIFICANT CHANGE UP (ref 3.8–10.5)
WBC # FLD AUTO: 8.44 K/UL — SIGNIFICANT CHANGE UP (ref 3.8–10.5)

## 2022-06-28 PROCEDURE — 99223 1ST HOSP IP/OBS HIGH 75: CPT

## 2022-06-28 PROCEDURE — 93306 TTE W/DOPPLER COMPLETE: CPT | Mod: 26

## 2022-06-28 PROCEDURE — 99232 SBSQ HOSP IP/OBS MODERATE 35: CPT

## 2022-06-28 PROCEDURE — 99232 SBSQ HOSP IP/OBS MODERATE 35: CPT | Mod: 57

## 2022-06-28 RX ORDER — GLUCAGON INJECTION, SOLUTION 0.5 MG/.1ML
1 INJECTION, SOLUTION SUBCUTANEOUS ONCE
Refills: 0 | Status: DISCONTINUED | OUTPATIENT
Start: 2022-06-28 | End: 2022-07-04

## 2022-06-28 RX ORDER — VERAPAMIL HCL 240 MG
240 CAPSULE, EXTENDED RELEASE PELLETS 24 HR ORAL DAILY
Refills: 0 | Status: DISCONTINUED | OUTPATIENT
Start: 2022-06-28 | End: 2022-07-04

## 2022-06-28 RX ORDER — SODIUM CHLORIDE 9 MG/ML
1000 INJECTION, SOLUTION INTRAVENOUS
Refills: 0 | Status: DISCONTINUED | OUTPATIENT
Start: 2022-06-28 | End: 2022-07-04

## 2022-06-28 RX ORDER — DEXTROSE 50 % IN WATER 50 %
15 SYRINGE (ML) INTRAVENOUS ONCE
Refills: 0 | Status: DISCONTINUED | OUTPATIENT
Start: 2022-06-28 | End: 2022-07-04

## 2022-06-28 RX ORDER — SOD SULF/SODIUM/NAHCO3/KCL/PEG
1000 SOLUTION, RECONSTITUTED, ORAL ORAL
Refills: 0 | Status: COMPLETED | OUTPATIENT
Start: 2022-06-28 | End: 2022-06-28

## 2022-06-28 RX ORDER — NEOMYCIN SULFATE 500 MG/1
1000 TABLET ORAL
Refills: 0 | Status: COMPLETED | OUTPATIENT
Start: 2022-06-28 | End: 2022-06-28

## 2022-06-28 RX ORDER — LISINOPRIL 2.5 MG/1
10 TABLET ORAL DAILY
Refills: 0 | Status: DISCONTINUED | OUTPATIENT
Start: 2022-06-28 | End: 2022-06-30

## 2022-06-28 RX ORDER — METRONIDAZOLE 500 MG
500 TABLET ORAL
Refills: 0 | Status: COMPLETED | OUTPATIENT
Start: 2022-06-28 | End: 2022-06-28

## 2022-06-28 RX ORDER — INSULIN LISPRO 100/ML
VIAL (ML) SUBCUTANEOUS
Refills: 0 | Status: DISCONTINUED | OUTPATIENT
Start: 2022-06-28 | End: 2022-07-04

## 2022-06-28 RX ORDER — DEXTROSE 50 % IN WATER 50 %
25 SYRINGE (ML) INTRAVENOUS ONCE
Refills: 0 | Status: DISCONTINUED | OUTPATIENT
Start: 2022-06-28 | End: 2022-07-04

## 2022-06-28 RX ORDER — INSULIN LISPRO 100/ML
VIAL (ML) SUBCUTANEOUS AT BEDTIME
Refills: 0 | Status: DISCONTINUED | OUTPATIENT
Start: 2022-06-28 | End: 2022-07-04

## 2022-06-28 RX ORDER — DEXTROSE 50 % IN WATER 50 %
12.5 SYRINGE (ML) INTRAVENOUS ONCE
Refills: 0 | Status: DISCONTINUED | OUTPATIENT
Start: 2022-06-28 | End: 2022-07-04

## 2022-06-28 RX ORDER — ALVIMOPAN 12 MG/1
12 CAPSULE ORAL ONCE
Refills: 0 | Status: COMPLETED | OUTPATIENT
Start: 2022-06-29 | End: 2022-06-29

## 2022-06-28 RX ADMIN — Medication 88 MICROGRAM(S): at 05:47

## 2022-06-28 RX ADMIN — HEPARIN SODIUM 5000 UNIT(S): 5000 INJECTION INTRAVENOUS; SUBCUTANEOUS at 21:51

## 2022-06-28 RX ADMIN — Medication 1000 MILLILITER(S): at 16:10

## 2022-06-28 RX ADMIN — Medication 500 MILLIGRAM(S): at 17:07

## 2022-06-28 RX ADMIN — HEPARIN SODIUM 5000 UNIT(S): 5000 INJECTION INTRAVENOUS; SUBCUTANEOUS at 05:47

## 2022-06-28 RX ADMIN — NEOMYCIN SULFATE 1000 MILLIGRAM(S): 500 TABLET ORAL at 17:08

## 2022-06-28 RX ADMIN — NEOMYCIN SULFATE 1000 MILLIGRAM(S): 500 TABLET ORAL at 19:12

## 2022-06-28 RX ADMIN — Medication 500 MILLIGRAM(S): at 19:12

## 2022-06-28 RX ADMIN — SODIUM CHLORIDE 120 MILLILITER(S): 9 INJECTION INTRAMUSCULAR; INTRAVENOUS; SUBCUTANEOUS at 06:14

## 2022-06-28 RX ADMIN — Medication 500 MILLIGRAM(S): at 12:46

## 2022-06-28 RX ADMIN — Medication 240 MILLIGRAM(S): at 10:52

## 2022-06-28 RX ADMIN — NEOMYCIN SULFATE 1000 MILLIGRAM(S): 500 TABLET ORAL at 12:47

## 2022-06-28 RX ADMIN — Medication 1 TABLET(S): at 10:52

## 2022-06-28 RX ADMIN — LISINOPRIL 10 MILLIGRAM(S): 2.5 TABLET ORAL at 10:52

## 2022-06-28 RX ADMIN — ATORVASTATIN CALCIUM 20 MILLIGRAM(S): 80 TABLET, FILM COATED ORAL at 21:50

## 2022-06-28 RX ADMIN — HEPARIN SODIUM 5000 UNIT(S): 5000 INJECTION INTRAVENOUS; SUBCUTANEOUS at 14:22

## 2022-06-28 RX ADMIN — Medication 1000 UNIT(S): at 10:52

## 2022-06-28 NOTE — CONSULT NOTE ADULT - PROBLEM SELECTOR RECOMMENDATION 2
with remote hx of PCI , without active cardiac symptoms , continue statin , resume ecotrin after surgery

## 2022-06-28 NOTE — CONSULT NOTE ADULT - ASSESSMENT
86 y/o M with a PMHx of sepsis, neuropathy, prostate CA, Crohn disease, hypothyroidism, depression, intervertebral disc disorder, DM2,  obesity, HTN, HLD,  CAD s/p stents , macular degeneration, hearing loss admitted  6/28  to the surgical service for abdominal pain. Patient says that he was scheduled for colon cancer surgery diagnosed by a recent colonoscopy by Dr. Padilla to have surgery on Wednesday by Dr. Ardon. Pt has diarrhea and right side pain.     Assessment/Plan:   Infiltrating adenocarcinoma of  Sigmoid colon , h/o Chron' s  disease   - sigmoidectomy plan as per colorectal surgery team   - as per surgery   - pt has no cardiac or respiratory symptoms, EKG, CXR reviewed   - cardiology clearance noted   - by revised Cardiac risk pt has high risk ( DM, CAD) and will undergo high risk surgery   - pt medically optimized for needed surgery   -c/w IV hydration, IV cardiazem prn if NPO , BP control, DVT prophylaxis as per surgery   - resume low dose ASA 81 as soon as cleared by surgery     Mild anemia - monitor post-op     CAD  - c/w statins , resume ASA 81 once cleared by surgery     CKD stage 3 baseline 1.3-1.5   - IV fluids as per surgery   - c/w  lisinopril , monitor renal function    HTN - c/w verapamil 240 qd , use IV Cardizem if NPO     Hypothyroidism - c/w levothyroxine 88 , check TSH, free T4 in am    Chronic back pain - c/w tramadol prn     DM2 - diet, stop metformin, glimepiride , diet, ISS, check A1C 6.6     Macular degeneration - c/w vitamins    Hearing loss - supportive care       DVT PPX: as per surgery VTE risk high     Advance Directive:  - Full code, diagnosis, prognosis discussed  - 17 minutes spend    Disposition: as per surgery     Time Span: 75 min     Thank you for consult, will follow with you.

## 2022-06-28 NOTE — CONSULT NOTE ADULT - PROBLEM SELECTOR RECOMMENDATION 9
Patient with hx of CAD remote PCI , HTN DM  neuropathy without active cardiac symptoms on limited activity , stable abnormal ekg .controlled hypertension , CKD who is optimal for proposed surgery . patient is at intermediate risk for intermediate risk . conisder giving  IV cardizem while patient is NPO as patient is on oral verapamil ,

## 2022-06-28 NOTE — PROGRESS NOTE ADULT - ASSESSMENT
Pt w/ pmh of Crohn's s/p ileocolic resection/anastomosis, now colon CA, plan for sigmoidectomy tomorrow    Analgesia  Medical and cardiac clearance/optimization  Was scheduled for laparoscopic revision of ileocolic anastomosis (completed in past for Crohn's) and now with stricture, as well as sigmoidectomy for sigmoid colon cancer.   Will need medical and cardiac optimization  Abx and mechanical prep  NPO after MN  IVF hydration  DVT ppx    Plan discussed with CRS team

## 2022-06-28 NOTE — PROGRESS NOTE ADULT - TIME BILLING
Seen and examined.  No complaints.  Had EKG and echo performed, pending read.  AFVSS  NAD  soft, NTND  Labs reviewed  A/P - Has ileocolic stricture and sigmoid colon cancer  Plan for lap ileocolic resection and sigmoid resection.  Clear liquid diet today with abx and mechanical bowel prep.  NPO after MN.  Covid negative, T&S.  Awaiting medical and cardiac eval for surgery tomorrow.

## 2022-06-28 NOTE — PROGRESS NOTE ADULT - SUBJECTIVE AND OBJECTIVE BOX
Pt was seen and examine edita bedside this am. Remains grumpy, in a mood. Keyanna had a BM in bathroom. No acute event reported overnight     Vital Signs Last 24 Hrs  T(C): 36.6 (28 Jun 2022 04:30), Max: 36.9 (27 Jun 2022 12:37)  T(F): 97.8 (28 Jun 2022 04:30), Max: 98.5 (27 Jun 2022 12:37)  HR: 57 (28 Jun 2022 04:30) (53 - 68)  BP: 120/50 (28 Jun 2022 04:30) (118/76 - 159/65)  BP(mean): 78 (27 Jun 2022 12:37) (78 - 78)  RR: 15 (28 Jun 2022 04:30) (15 - 18)  SpO2: 96% (28 Jun 2022 04:30) (94% - 98%)        LABS:                        10.2   8.44  )-----------( 242      ( 28 Jun 2022 07:24 )             31.8     28 Jun 2022 07:24    144    |  116    |  28     ----------------------------<  112    4.3     |  23     |  1.40     Ca    9.6        28 Jun 2022 07:24  Phos  2.6       28 Jun 2022 07:24  Mg     1.9       28 Jun 2022 07:24    TPro  6.6    /  Alb  3.2    /  TBili  0.2    /  DBili  x      /  AST  27     /  ALT  38     /  AlkPhos  73     28 Jun 2022 07:24    PT/INR - ( 28 Jun 2022 07:24 )   PT: 13.7 sec;   INR: 1.18 ratio         PTT - ( 28 Jun 2022 07:24 )  PTT:30.9 sec

## 2022-06-28 NOTE — CONSULT NOTE ADULT - SUBJECTIVE AND OBJECTIVE BOX
PCP:    REQUESTING PHYSICIAN:    REASON FOR CONSULT:    CHIEF COMPLAINT:    HPI:  86 y/o M with a PMHx of sepsis, neuropathy, prostate CA, Crohn disease, hypothyroidism, depression, intervertebral disc disorder, diabetes mellitus obesity, HTN, hypercholesterolemia, stented coronary artery 20 years ago and 15 y ears ago , CAD, macular degeneration, hearing loss presents to the ED for abdominal pain. Patient says that he was scheduled for colon cancer surgery diagnosed by a recent colonoscopy by Dr. Padilla to have surgery on Wednesday by Dr. Ardon. Pt has diarrhea and right side pain. Patient denies fever, vomiting, or blood in diarrhea. No smoking, no alcohol, no drug usage. called for preop cardiac evaluation , patient does have limited physical activity , walks with cane denies any chest pain or shortness of breath on limited activity due to neuropathy and back pain ,    his ekg similar to prior abnormal ekg , echo showed hyperdynamic LVEF       PAST MEDICAL & SURGICAL HISTORY:  Hearing loss  left hearing aid, right ear deaf      Macular degeneration  right eye      CAD (coronary artery disease)      Stented coronary artery  2 stents, 20 yrs ago, 15 yrs ago      HTN (hypertension)      Hypercholesterolemia      Obesity      Diabetes mellitus      Intervertebral disc disorder      Depression      Hypothyroidism      Crohn disease      Prostate CA  s/p XRT      Neuropathy      Sepsis  s/p back surgery      History of hip replacement, total, right      History of hip replacement, total, right  Revision      S/P lumbar fusion      S/P colon resection          Allergies    chocolate (Unknown)  penicillin (Hives)    Intolerances        SOCIAL HISTORY:  former       FAMILY HISTORY:    brother had CAD       MEDICATIONS:  MEDICATIONS  (STANDING):  atorvastatin 20 milliGRAM(s) Oral at bedtime  cholecalciferol Oral Tab/Cap - Peds 1000 Unit(s) Oral daily  dextrose 5%. 1000 milliLiter(s) (50 mL/Hr) IV Continuous <Continuous>  dextrose 5%. 1000 milliLiter(s) (100 mL/Hr) IV Continuous <Continuous>  dextrose 50% Injectable 25 Gram(s) IV Push once  dextrose 50% Injectable 12.5 Gram(s) IV Push once  dextrose 50% Injectable 25 Gram(s) IV Push once  glucagon  Injectable 1 milliGRAM(s) IntraMuscular once  heparin   Injectable 5000 Unit(s) SubCutaneous every 8 hours  insulin lispro (ADMELOG) corrective regimen sliding scale   SubCutaneous three times a day before meals  insulin lispro (ADMELOG) corrective regimen sliding scale   SubCutaneous at bedtime  levothyroxine 88 MICROGram(s) Oral daily  lisinopril 10 milliGRAM(s) Oral daily  metroNIDAZOLE    Tablet 500 milliGRAM(s) Oral <User Schedule>  multivitamin 1 Tablet(s) Oral daily  neomycin 1000 milliGRAM(s) Oral <User Schedule>  polyethylene glycol/electrolyte Solution 1000 milliLiter(s) Oral <User Schedule>  PreserVision (AREDS 2) 1 Capsule(s) 1 Capsule(s) Oral daily  sodium chloride 0.9%. 1000 milliLiter(s) (120 mL/Hr) IV Continuous <Continuous>  verapamil  milliGRAM(s) Oral daily    MEDICATIONS  (PRN):  dextrose Oral Gel 15 Gram(s) Oral once PRN Blood Glucose LESS THAN 70 milliGRAM(s)/deciliter  traMADol 50 milliGRAM(s) Oral Once PRN Moderate Pain (4 - 6)      REVIEW OF SYSTEMS:    CONSTITUTIONAL: No weakness, fevers or chills  EYES/ENT: No visual changes;  No vertigo or throat pain   NECK: No pain or stiffness  RESPIRATORY: No cough, wheezing, hemoptysis; No shortness of breath  CARDIOVASCULAR: No chest pain or palpitations  GASTROINTESTINAL: No abdominal or epigastric pain. No nausea, vomiting, or hematemesis; No diarrhea or constipation. No melena or hematochezia.  GENITOURINARY: No dysuria, frequency or hematuria  NEUROLOGICAL:  back pain , numbness of feet   SKIN: No itching, burning, rashes, or lesions   All other review of systems is negative unless indicated above    Vital Signs Last 24 Hrs  T(C): 36.9 (28 Jun 2022 15:18), Max: 36.9 (28 Jun 2022 15:18)  T(F): 98.4 (28 Jun 2022 15:18), Max: 98.4 (28 Jun 2022 15:18)  HR: 52 (28 Jun 2022 15:18) (52 - 68)  BP: 128/46 (28 Jun 2022 15:18) (118/76 - 168/53)  BP(mean): --  RR: 18 (28 Jun 2022 15:18) (15 - 18)  SpO2: 95% (28 Jun 2022 15:18) (95% - 99%)    I&O's Summary    28 Jun 2022 07:01  -  28 Jun 2022 16:37  --------------------------------------------------------  IN: 360 mL / OUT: 0 mL / NET: 360 mL        PHYSICAL EXAM:    Constitutional: NAD, awake and alert, well-developed  HEENT: PERR, EOMI,  No oral cyananosis.  Neck:  supple,  No JVD  Respiratory: Breath sounds are clear bilaterally, No wheezing, rales or rhonchi  Cardiovascular: S1 and S2, regular rate and rhythm, no Murmurs, gallops or rubs  Gastrointestinal: Bowel Sounds present, soft, nontender.   Extremities: No peripheral edema. No clubbing or cyanosis.  Vascular: 2+ peripheral pulses  Neurological: A/O x 3, no focal deficits  Musculoskeletal: no calf tenderness.  Skin: No rashes.      LABS: All Labs Reviewed:                        10.2   8.44  )-----------( 242      ( 28 Jun 2022 07:24 )             31.8                         10.5   8.65  )-----------( 282      ( 27 Jun 2022 14:03 )             33.4     28 Jun 2022 07:24    144    |  116    |  28     ----------------------------<  112    4.3     |  23     |  1.40   27 Jun 2022 14:03    140    |  111    |  31     ----------------------------<  137    4.3     |  24     |  1.51     Ca    9.6        28 Jun 2022 07:24  Ca    10.0       27 Jun 2022 14:03  Phos  2.6       28 Jun 2022 07:24  Mg     1.9       28 Jun 2022 07:24    TPro  6.6    /  Alb  3.2    /  TBili  0.2    /  DBili  x      /  AST  27     /  ALT  38     /  AlkPhos  73     28 Jun 2022 07:24  TPro  7.6    /  Alb  3.5    /  TBili  0.2    /  DBili  x      /  AST  30     /  ALT  46     /  AlkPhos  83     27 Jun 2022 14:03    PT/INR - ( 28 Jun 2022 07:24 )   PT: 13.7 sec;   INR: 1.18 ratio         PTT - ( 28 Jun 2022 07:24 )  PTT:30.9 sec        Blood Culture:         RADIOLOGY/EKG:  sinus bradycardia RBBB LAFB  no significant change frm ekg dec 2018  other than bradycardia     echo   reviewed  normal EF   DD stage I  , mild to moderate MR , mild AS  AI

## 2022-06-28 NOTE — CONSULT NOTE ADULT - SUBJECTIVE AND OBJECTIVE BOX
Chief Complaint:    HPI:     86 y/o M with a PMHx of sepsis, neuropathy, prostate CA, Crohn disease, hypothyroidism, depression, intervertebral disc disorder, diabetes mellitus obesity, HTN, hypercholesterolemia, stented coronary artery, CAD, macular degeneration, hearing loss presents to the ED for abdominal pain. Patient says that he was scheduled for colon cancer surgery diagnosed by a recent colonoscopy by Dr. Padilla to have surgery on Wednesday by Dr. Ardon. Pt has diarrhea and right side pain. Patient denies fever, vomiting, or blood in diarrhea. No smoking, no alcohol, no drug usage. (27 Jun 2022 16:31)      REVIEW OF SYSTEMS:    CONSTITUTIONAL: No weakness, fevers or chills  EYES/ENT: No visual changes;  No vertigo or throat pain   NECK: No pain or stiffness  RESPIRATORY: No cough, wheezing, hemoptysis; No shortness of breath  CARDIOVASCULAR: No chest pain or palpitations  GASTROINTESTINAL: No abdominal or epigastric pain. No nausea, vomiting, or hematemesis; No diarrhea or constipation. No melena or hematochezia.  GENITOURINARY: No dysuria, frequency or hematuria  NEUROLOGICAL: No numbness or weakness  SKIN: No itching, burning, rashes, or lesions   All other review of systems is negative unless indicated above    PAST MEDICAL & SURGICAL HISTORY:  Hearing loss  left hearing aid, right ear deaf      Macular degeneration  right eye      CAD (coronary artery disease)      Stented coronary artery  2 stents, 20 yrs ago, 15 yrs ago      HTN (hypertension)      Hypercholesterolemia      Obesity      Diabetes mellitus      Intervertebral disc disorder      Depression      Hypothyroidism      Crohn disease      Prostate CA  s/p XRT      Neuropathy      Sepsis  s/p back surgery      History of hip replacement, total, right      History of hip replacement, total, right  Revision      S/P lumbar fusion      S/P colon resection          Social history : Denies ETOH use, IVDU, smoking   No pertinent history of stroke, MI, cancer , DM  in first degree relatives.    Vital Signs Last 24 Hrs  T(C): 36.7 (28 Jun 2022 09:38), Max: 36.7 (27 Jun 2022 18:19)  T(F): 98.1 (28 Jun 2022 09:38), Max: 98.1 (27 Jun 2022 18:19)  HR: 60 (28 Jun 2022 09:38) (57 - 68)  BP: 168/53 (28 Jun 2022 09:38) (118/76 - 168/53)  BP(mean): --  RR: 16 (28 Jun 2022 09:38) (15 - 18)  SpO2: 99% (28 Jun 2022 09:38) (96% - 99%)    I&O's Summary    28 Jun 2022 07:01  -  28 Jun 2022 15:14  --------------------------------------------------------  IN: 360 mL / OUT: 0 mL / NET: 360 mL        CAPILLARY BLOOD GLUCOSE      POCT Blood Glucose.: 141 mg/dL (28 Jun 2022 11:53)  POCT Blood Glucose.: 115 mg/dL (28 Jun 2022 08:01)  POCT Blood Glucose.: 126 mg/dL (27 Jun 2022 23:16)  POCT Blood Glucose.: 83 mg/dL (27 Jun 2022 17:31)      PHYSICAL EXAM:    Constitutional: NAD, awake and alert, well-developed  HEENT: PERR, EOMI, Normal Hearing, MMM  Neck: Soft and supple, No LAD, No JVD  Respiratory: Breath sounds are clear bilaterally, No wheezing, rales or rhonchi  Cardiovascular: S1 and S2, regular rate and rhythm, no Murmurs, gallops or rubs  Gastrointestinal: Bowel Sounds present, soft, nontender, nondistended, no guarding, no rebound  Extremities: No peripheral edema  Vascular: 2+ peripheral pulses  Neurological: A/O x 3, no focal deficits  Musculoskeletal: 5/5 strength b/l upper and lower extremities  Skin: No rashes    Medications:  MEDICATIONS  (STANDING):  atorvastatin 20 milliGRAM(s) Oral at bedtime  cholecalciferol Oral Tab/Cap - Peds 1000 Unit(s) Oral daily  dextrose 5%. 1000 milliLiter(s) (50 mL/Hr) IV Continuous <Continuous>  dextrose 5%. 1000 milliLiter(s) (100 mL/Hr) IV Continuous <Continuous>  dextrose 50% Injectable 25 Gram(s) IV Push once  dextrose 50% Injectable 12.5 Gram(s) IV Push once  dextrose 50% Injectable 25 Gram(s) IV Push once  glucagon  Injectable 1 milliGRAM(s) IntraMuscular once  heparin   Injectable 5000 Unit(s) SubCutaneous every 8 hours  insulin lispro (ADMELOG) corrective regimen sliding scale   SubCutaneous three times a day before meals  insulin lispro (ADMELOG) corrective regimen sliding scale   SubCutaneous at bedtime  levothyroxine 88 MICROGram(s) Oral daily  lisinopril 10 milliGRAM(s) Oral daily  metroNIDAZOLE    Tablet 500 milliGRAM(s) Oral <User Schedule>  multivitamin 1 Tablet(s) Oral daily  neomycin 1000 milliGRAM(s) Oral <User Schedule>  polyethylene glycol/electrolyte Solution 1000 milliLiter(s) Oral <User Schedule>  PreserVision (AREDS 2) 1 Capsule(s) 1 Capsule(s) Oral daily  sodium chloride 0.9%. 1000 milliLiter(s) (120 mL/Hr) IV Continuous <Continuous>  verapamil  milliGRAM(s) Oral daily      Labs: All Labs Reviewed:                        10.2   8.44  )-----------( 242      ( 28 Jun 2022 07:24 )             31.8     06-28    144  |  116<H>  |  28<H>  ----------------------------<  112<H>  4.3   |  23  |  1.40<H>    Ca    9.6      28 Jun 2022 07:24  Phos  2.6     06-28  Mg     1.9     06-28    TPro  6.6  /  Alb  3.2<L>  /  TBili  0.2  /  DBili  x   /  AST  27  /  ALT  38  /  AlkPhos  73  06-28    PT/INR - ( 28 Jun 2022 07:24 )   PT: 13.7 sec;   INR: 1.18 ratio         PTT - ( 28 Jun 2022 07:24 )  PTT:30.9 sec      Blood Culture:     RADIOLOGY/EKG: all reviewed     Assessment/Plan:    DVT PPX:    Advance Directive:  - Full code, diagnosis, prognosis discussed  - 17 minutes spend    Disposition:    Time Span:    Thank you for consult, will follow with you.  Chief Complaint:    HPI:     86 y/o M with a PMHx of sepsis, neuropathy, prostate CA, Crohn disease, hypothyroidism, depression, intervertebral disc disorder, diabetes mellitus obesity, HTN, hypercholesterolemia, stented coronary artery, CAD, macular degeneration, hearing loss presents to the ED for abdominal pain. Patient says that he was scheduled for colon cancer surgery diagnosed by a recent colonoscopy by Dr. Padilla to have surgery on Wednesday by Dr. Ardon. Pt has diarrhea and right side pain. Patient denies fever, vomiting, or blood in diarrhea. No smoking, no alcohol, no drug usage. (27 Jun 2022 16:31)      REVIEW OF SYSTEMS:    CONSTITUTIONAL: No weakness, fevers or chills  EYES/ENT: No visual changes;  No vertigo or throat pain   NECK: No pain or stiffness  RESPIRATORY: No cough, wheezing, hemoptysis; No shortness of breath  CARDIOVASCULAR: No chest pain or palpitations  GASTROINTESTINAL: No abdominal or epigastric pain. No nausea, vomiting, or hematemesis; No diarrhea or constipation. No melena or hematochezia.  GENITOURINARY: No dysuria, frequency or hematuria  NEUROLOGICAL: No numbness or weakness  SKIN: No itching, burning, rashes, or lesions   All other review of systems is negative unless indicated above    PAST MEDICAL & SURGICAL HISTORY:  Hearing loss  left hearing aid, right ear deaf      Macular degeneration  right eye      CAD (coronary artery disease)      Stented coronary artery  2 stents, 20 yrs ago, 15 yrs ago      HTN (hypertension)      Hypercholesterolemia      Obesity      Diabetes mellitus      Intervertebral disc disorder      Depression      Hypothyroidism      Crohn disease      Prostate CA  s/p XRT      Neuropathy      Sepsis  s/p back surgery      History of hip replacement, total, right      History of hip replacement, total, right  Revision      S/P lumbar fusion      S/P colon resection          Social history : Denies ETOH use, IVDU, smoking   No pertinent history of stroke, MI, cancer , DM  in first degree relatives.    Vital Signs Last 24 Hrs  T(C): 36.7 (28 Jun 2022 09:38), Max: 36.7 (27 Jun 2022 18:19)  T(F): 98.1 (28 Jun 2022 09:38), Max: 98.1 (27 Jun 2022 18:19)  HR: 60 (28 Jun 2022 09:38) (57 - 68)  BP: 168/53 (28 Jun 2022 09:38) (118/76 - 168/53)  BP(mean): --  RR: 16 (28 Jun 2022 09:38) (15 - 18)  SpO2: 99% (28 Jun 2022 09:38) (96% - 99%)    I&O's Summary    28 Jun 2022 07:01  -  28 Jun 2022 15:14  --------------------------------------------------------  IN: 360 mL / OUT: 0 mL / NET: 360 mL        CAPILLARY BLOOD GLUCOSE      POCT Blood Glucose.: 141 mg/dL (28 Jun 2022 11:53)  POCT Blood Glucose.: 115 mg/dL (28 Jun 2022 08:01)  POCT Blood Glucose.: 126 mg/dL (27 Jun 2022 23:16)  POCT Blood Glucose.: 83 mg/dL (27 Jun 2022 17:31)    ..  PHYSICAL EXAM:    Constitutional: NAD, awake and alert, well-developed  HEENT: PERR, EOMI, Normal Hearing, MMM  Neck: Soft and supple, No LAD, No JVD  Respiratory: Breath sounds are clear bilaterally, No wheezing, rales or rhonchi  Cardiovascular: S1 and S2, regular rate and rhythm, no Murmurs, gallops or rubs  Gastrointestinal: Bowel Sounds present, soft, nontender, nondistended, no guarding, no rebound  Extremities: No peripheral edema  Vascular: 2+ peripheral pulses  Neurological: A/O x 3, no focal deficits  Musculoskeletal: 5/5 strength b/l upper and lower extremities  Skin: No rashes    Medications:  MEDICATIONS  (STANDING):  atorvastatin 20 milliGRAM(s) Oral at bedtime  cholecalciferol Oral Tab/Cap - Peds 1000 Unit(s) Oral daily  dextrose 5%. 1000 milliLiter(s) (50 mL/Hr) IV Continuous <Continuous>  dextrose 5%. 1000 milliLiter(s) (100 mL/Hr) IV Continuous <Continuous>  dextrose 50% Injectable 25 Gram(s) IV Push once  dextrose 50% Injectable 12.5 Gram(s) IV Push once  dextrose 50% Injectable 25 Gram(s) IV Push once  glucagon  Injectable 1 milliGRAM(s) IntraMuscular once  heparin   Injectable 5000 Unit(s) SubCutaneous every 8 hours  insulin lispro (ADMELOG) corrective regimen sliding scale   SubCutaneous three times a day before meals  insulin lispro (ADMELOG) corrective regimen sliding scale   SubCutaneous at bedtime  levothyroxine 88 MICROGram(s) Oral daily  lisinopril 10 milliGRAM(s) Oral daily  metroNIDAZOLE    Tablet 500 milliGRAM(s) Oral <User Schedule>  multivitamin 1 Tablet(s) Oral daily  neomycin 1000 milliGRAM(s) Oral <User Schedule>  polyethylene glycol/electrolyte Solution 1000 milliLiter(s) Oral <User Schedule>  PreserVision (AREDS 2) 1 Capsule(s) 1 Capsule(s) Oral daily  sodium chloride 0.9%. 1000 milliLiter(s) (120 mL/Hr) IV Continuous <Continuous>  verapamil  milliGRAM(s) Oral daily      Labs: All Labs Reviewed:                        10.2   8.44  )-----------( 242      ( 28 Jun 2022 07:24 )             31.8     06-28    144  |  116<H>  |  28<H>  ----------------------------<  112<H>  4.3   |  23  |  1.40<H>    Ca    9.6      28 Jun 2022 07:24  Phos  2.6     06-28  Mg     1.9     06-28    TPro  6.6  /  Alb  3.2<L>  /  TBili  0.2  /  DBili  x   /  AST  27  /  ALT  38  /  AlkPhos  73  06-28    PT/INR - ( 28 Jun 2022 07:24 )   PT: 13.7 sec;   INR: 1.18 ratio         PTT - ( 28 Jun 2022 07:24 )  PTT:30.9 sec      Blood Culture:     RADIOLOGY/EKG: all reviewed     Assessment/Plan:    DVT PPX:    Advance Directive:  - Full code, diagnosis, prognosis discussed  - 17 minutes spend    Disposition:    Time Span:    Thank you for consult, will follow with you.  Chief Complaint:    HPI:     86 y/o M with a PMHx of sepsis, neuropathy, prostate CA, Crohn disease, hypothyroidism, depression, intervertebral disc disorder, DM2,  obesity, HTN, HLD,  CAD s/p stents , macular degeneration, hearing loss  admitted  on presents to the ED for abdominal pain. Patient says that he was scheduled for colon cancer surgery diagnosed by a recent colonoscopy by Dr. Padilla to have surgery on Wednesday by Dr. Ardon. Pt has diarrhea and right side pain.          REVIEW OF SYSTEMS:    CONSTITUTIONAL: No weakness, fevers or chills  EYES/ENT: No visual changes;  No vertigo or throat pain   NECK: No pain or stiffness  RESPIRATORY: No cough, wheezing, hemoptysis; No shortness of breath  CARDIOVASCULAR: No chest pain or palpitations  GASTROINTESTINAL: No abdominal or epigastric pain. No nausea, vomiting, or hematemesis; No diarrhea or constipation. No melena or hematochezia.  GENITOURINARY: No dysuria, frequency or hematuria  NEUROLOGICAL: No numbness or weakness  SKIN: No itching, burning, rashes, or lesions   All other review of systems is negative unless indicated above    PAST MEDICAL & SURGICAL HISTORY:  Hearing loss  left hearing aid, right ear deaf      Macular degeneration  right eye      CAD (coronary artery disease)      Stented coronary artery  2 stents, 20 yrs ago, 15 yrs ago      HTN (hypertension)      Hypercholesterolemia      Obesity      Diabetes mellitus      Intervertebral disc disorder      Depression      Hypothyroidism      Crohn disease      Prostate CA  s/p XRT      Neuropathy      Sepsis  s/p back surgery      History of hip replacement, total, right      History of hip replacement, total, right  Revision      S/P lumbar fusion      S/P colon resection          Social history : Denies ETOH use, IVDU, smoking   No pertinent history of stroke, MI, cancer , DM  in first degree relatives.    Vital Signs Last 24 Hrs  T(C): 36.7 (28 Jun 2022 09:38), Max: 36.7 (27 Jun 2022 18:19)  T(F): 98.1 (28 Jun 2022 09:38), Max: 98.1 (27 Jun 2022 18:19)  HR: 60 (28 Jun 2022 09:38) (57 - 68)  BP: 168/53 (28 Jun 2022 09:38) (118/76 - 168/53)  BP(mean): --  RR: 16 (28 Jun 2022 09:38) (15 - 18)  SpO2: 99% (28 Jun 2022 09:38) (96% - 99%)    I&O's Summary    28 Jun 2022 07:01  -  28 Jun 2022 15:14  --------------------------------------------------------  IN: 360 mL / OUT: 0 mL / NET: 360 mL        CAPILLARY BLOOD GLUCOSE      POCT Blood Glucose.: 141 mg/dL (28 Jun 2022 11:53)  POCT Blood Glucose.: 115 mg/dL (28 Jun 2022 08:01)  POCT Blood Glucose.: 126 mg/dL (27 Jun 2022 23:16)  POCT Blood Glucose.: 83 mg/dL (27 Jun 2022 17:31)    ..  PHYSICAL EXAM:    Constitutional: NAD, awake and alert, well-developed  HEENT: PERR, EOMI, Normal Hearing, MMM  Neck: Soft and supple, No LAD, No JVD  Respiratory: Breath sounds are clear bilaterally, No wheezing, rales or rhonchi  Cardiovascular: S1 and S2, regular rate and rhythm, no Murmurs, gallops or rubs  Gastrointestinal: Bowel Sounds present, soft, nontender, nondistended, no guarding, no rebound  Extremities: No peripheral edema  Vascular: 2+ peripheral pulses  Neurological: A/O x 3, no focal deficits  Musculoskeletal: 5/5 strength b/l upper and lower extremities  Skin: No rashes    Medications:  MEDICATIONS  (STANDING):  atorvastatin 20 milliGRAM(s) Oral at bedtime  cholecalciferol Oral Tab/Cap - Peds 1000 Unit(s) Oral daily  dextrose 5%. 1000 milliLiter(s) (50 mL/Hr) IV Continuous <Continuous>  dextrose 5%. 1000 milliLiter(s) (100 mL/Hr) IV Continuous <Continuous>  dextrose 50% Injectable 25 Gram(s) IV Push once  dextrose 50% Injectable 12.5 Gram(s) IV Push once  dextrose 50% Injectable 25 Gram(s) IV Push once  glucagon  Injectable 1 milliGRAM(s) IntraMuscular once  heparin   Injectable 5000 Unit(s) SubCutaneous every 8 hours  insulin lispro (ADMELOG) corrective regimen sliding scale   SubCutaneous three times a day before meals  insulin lispro (ADMELOG) corrective regimen sliding scale   SubCutaneous at bedtime  levothyroxine 88 MICROGram(s) Oral daily  lisinopril 10 milliGRAM(s) Oral daily  metroNIDAZOLE    Tablet 500 milliGRAM(s) Oral <User Schedule>  multivitamin 1 Tablet(s) Oral daily  neomycin 1000 milliGRAM(s) Oral <User Schedule>  polyethylene glycol/electrolyte Solution 1000 milliLiter(s) Oral <User Schedule>  PreserVision (AREDS 2) 1 Capsule(s) 1 Capsule(s) Oral daily  sodium chloride 0.9%. 1000 milliLiter(s) (120 mL/Hr) IV Continuous <Continuous>  verapamil  milliGRAM(s) Oral daily      Labs: All Labs Reviewed:                        10.2   8.44  )-----------( 242      ( 28 Jun 2022 07:24 )             31.8     06-28    144  |  116<H>  |  28<H>  ----------------------------<  112<H>  4.3   |  23  |  1.40<H>    Ca    9.6      28 Jun 2022 07:24  Phos  2.6     06-28  Mg     1.9     06-28    TPro  6.6  /  Alb  3.2<L>  /  TBili  0.2  /  DBili  x   /  AST  27  /  ALT  38  /  AlkPhos  73  06-28    PT/INR - ( 28 Jun 2022 07:24 )   PT: 13.7 sec;   INR: 1.18 ratio         PTT - ( 28 Jun 2022 07:24 )  PTT:30.9 sec      Blood Culture:     RADIOLOGY/EKG: all reviewed     Assessment/Plan:    DVT PPX:    Advance Directive:  - Full code, diagnosis, prognosis discussed  - 17 minutes spend    Disposition:    Time Span:    Thank you for consult, will follow with you.  Chief Complaint:    HPI:     88 y/o M with a PMHx of sepsis, neuropathy, prostate CA, Crohn disease, hypothyroidism, depression, intervertebral disc disorder, DM2,  obesity, HTN, HLD,  CAD s/p stents , macular degeneration, hearing loss admitted  6/28  to the surgical service for abdominal pain. Patient says that he was scheduled for colon cancer surgery diagnosed by a recent colonoscopy by Dr. Padilla to have surgery on Wednesday by Dr. Ardon. Pt has diarrhea and right side pain.     REVIEW OF SYSTEMS:  CONSTITUTIONAL: No weakness, fevers or chills  EYES/ENT: No visual changes;  No vertigo or throat pain   NECK: No pain or stiffness  RESPIRATORY: No cough, wheezing, hemoptysis; No shortness of breath  CARDIOVASCULAR: No chest pain or palpitations  GASTROINTESTINAL: No abdominal or epigastric pain. No nausea, vomiting, or hematemesis; No diarrhea or constipation. No melena or hematochezia.  GENITOURINARY: No dysuria, frequency or hematuria  NEUROLOGICAL: No numbness or weakness  SKIN: No itching, burning, rashes, or lesions   All other review of systems is negative unless indicated above    PAST MEDICAL & SURGICAL HISTORY:  Hearing loss  left hearing aid, right ear deaf      Macular degeneration  right eye      CAD (coronary artery disease)      Stented coronary artery  2 stents, 20 yrs ago, 15 yrs ago      HTN (hypertension)      Hypercholesterolemia      Obesity      Diabetes mellitus      Intervertebral disc disorder      Depression      Hypothyroidism      Crohn disease      Prostate CA  s/p XRT      Neuropathy      Sepsis  s/p back surgery      History of hip replacement, total, right      History of hip replacement, total, right  Revision      S/P lumbar fusion      S/P colon resection          Social history : Denies ETOH use, IVDU, smoking   No pertinent history of stroke, MI, cancer , DM  in first degree relatives.    Vital Signs Last 24 Hrs  T(C): 36.7 (28 Jun 2022 09:38), Max: 36.7 (27 Jun 2022 18:19)  T(F): 98.1 (28 Jun 2022 09:38), Max: 98.1 (27 Jun 2022 18:19)  HR: 60 (28 Jun 2022 09:38) (57 - 68)  BP: 168/53 (28 Jun 2022 09:38) (118/76 - 168/53)  BP(mean): --  RR: 16 (28 Jun 2022 09:38) (15 - 18)  SpO2: 99% (28 Jun 2022 09:38) (96% - 99%)    I&O's Summary    28 Jun 2022 07:01  -  28 Jun 2022 15:14  --------------------------------------------------------  IN: 360 mL / OUT: 0 mL / NET: 360 mL        CAPILLARY BLOOD GLUCOSE      POCT Blood Glucose.: 141 mg/dL (28 Jun 2022 11:53)  POCT Blood Glucose.: 115 mg/dL (28 Jun 2022 08:01)  POCT Blood Glucose.: 126 mg/dL (27 Jun 2022 23:16)  POCT Blood Glucose.: 83 mg/dL (27 Jun 2022 17:31)    ..  PHYSICAL EXAM:    Constitutional: NAD, awake and alert, well-developed  HEENT: PERR, EOMI, Normal Hearing, MMM  Neck: Soft and supple, No LAD, No JVD  Respiratory: Breath sounds are clear bilaterally, No wheezing, rales or rhonchi  Cardiovascular: S1 and S2, regular rate and rhythm, no Murmurs, gallops or rubs  Gastrointestinal: Bowel Sounds present, soft, nontender, nondistended, no guarding, no rebound  Extremities: No peripheral edema  Vascular: 2+ peripheral pulses  Neurological: A/O x 3, no focal deficits  Musculoskeletal: 5/5 strength b/l upper and lower extremities  Skin: No rashes    Medications:  MEDICATIONS  (STANDING):  atorvastatin 20 milliGRAM(s) Oral at bedtime  cholecalciferol Oral Tab/Cap - Peds 1000 Unit(s) Oral daily  dextrose 5%. 1000 milliLiter(s) (50 mL/Hr) IV Continuous <Continuous>  dextrose 5%. 1000 milliLiter(s) (100 mL/Hr) IV Continuous <Continuous>  dextrose 50% Injectable 25 Gram(s) IV Push once  dextrose 50% Injectable 12.5 Gram(s) IV Push once  dextrose 50% Injectable 25 Gram(s) IV Push once  glucagon  Injectable 1 milliGRAM(s) IntraMuscular once  heparin   Injectable 5000 Unit(s) SubCutaneous every 8 hours  insulin lispro (ADMELOG) corrective regimen sliding scale   SubCutaneous three times a day before meals  insulin lispro (ADMELOG) corrective regimen sliding scale   SubCutaneous at bedtime  levothyroxine 88 MICROGram(s) Oral daily  lisinopril 10 milliGRAM(s) Oral daily  metroNIDAZOLE    Tablet 500 milliGRAM(s) Oral <User Schedule>  multivitamin 1 Tablet(s) Oral daily  neomycin 1000 milliGRAM(s) Oral <User Schedule>  polyethylene glycol/electrolyte Solution 1000 milliLiter(s) Oral <User Schedule>  PreserVision (AREDS 2) 1 Capsule(s) 1 Capsule(s) Oral daily  sodium chloride 0.9%. 1000 milliLiter(s) (120 mL/Hr) IV Continuous <Continuous>  verapamil  milliGRAM(s) Oral daily      Labs: All Labs Reviewed:                        10.2   8.44  )-----------( 242      ( 28 Jun 2022 07:24 )             31.8     06-28    144  |  116<H>  |  28<H>  ----------------------------<  112<H>  4.3   |  23  |  1.40<H>    Ca    9.6      28 Jun 2022 07:24  Phos  2.6     06-28  Mg     1.9     06-28    TPro  6.6  /  Alb  3.2<L>  /  TBili  0.2  /  DBili  x   /  AST  27  /  ALT  38  /  AlkPhos  73  06-28    PT/INR - ( 28 Jun 2022 07:24 )   PT: 13.7 sec;   INR: 1.18 ratio         PTT - ( 28 Jun 2022 07:24 )  PTT:30.9 sec      Blood Culture:     RADIOLOGY/EKG: all reviewed     Assessment/Plan:    DVT PPX:    Advance Directive:  - Full code, diagnosis, prognosis discussed  - 17 minutes spend    Disposition:    Time Span:    Thank you for consult, will follow with you.  Chief Complaint:    HPI:     86 y/o M with a PMHx of sepsis, neuropathy, prostate CA, Crohn disease, hypothyroidism, depression, intervertebral disc disorder, DM2,  obesity, HTN, HLD,  CAD s/p stents , macular degeneration, hearing loss admitted    to the surgical service for abdominal pain. Patient says that he was scheduled for colon cancer surgery diagnosed by a recent colonoscopy by Dr. Padilla to have surgery on Wednesday by Dr. Ardon. Pt has diarrhea and right side pain.      - pt seen and examined , reports feeling better, denies pain, nausea, vomiting, has 2 episodes of diarrhea, denies cp, palpitations, abdominal pain, headaches     REVIEW OF SYSTEMS:  CONSTITUTIONAL: No weakness, fevers or chills  EYES/ENT: No visual changes;  No vertigo or throat pain   NECK: No pain or stiffness  RESPIRATORY: No cough, wheezing, hemoptysis; No shortness of breath  CARDIOVASCULAR: No chest pain or palpitations  GASTROINTESTINAL: No abdominal or epigastric pain. No nausea, vomiting, or hematemesis; +  diarrhea , no  constipation. No melena or hematochezia.  GENITOURINARY: No dysuria, frequency or hematuria  NEUROLOGICAL: No numbness or weakness  SKIN: No itching, burning, rashes, or lesions   All other review of systems is negative unless indicated above    PAST MEDICAL & SURGICAL HISTORY:  Hearing loss left hearing aid, right ear deaf  Macular degeneration right eye  CAD (coronary artery disease)  Stented coronary artery  2 stents, 20 yrs ago, 15 yrs ago  HTN (hypertension)  Hypercholesterolemia  Obesity  Diabetes mellitus  Intervertebral disc disorder  Depression  Hypothyroidism  Crohn disease  Prostate CA s/p XRT  Neuropathy  Sepsis s/p back surgery  History of hip replacement, total, right  History of hip replacement, total, right  Revision  S/P lumbar fusion  S/P colon resection  Social history : Denies ETOH use, IVDU, smoking   No pertinent history of  cancer   in first degree relatives.  father  yound from MI, mother from DM    Vital Signs Last 24 Hrs  T(C): 36.7 (2022 09:38), Max: 36.7 (2022 18:19)  T(F): 98.1 (2022 09:38), Max: 98.1 (2022 18:19)  HR: 60 (2022 09:38) (57 - 68)  BP: 168/53 (2022 09:38) (118/76 - 168/53)  BP(mean): --  RR: 16 (2022 09:38) (15 - 18)  SpO2: 99% (2022 09:38) (96% - 99%)    I&O's Summary    2022 07:01  -  2022 15:14  --------------------------------------------------------  IN: 360 mL / OUT: 0 mL / NET: 360 mL    CAPILLARY BLOOD GLUCOSE      POCT Blood Glucose.: 141 mg/dL (2022 11:53)  POCT Blood Glucose.: 115 mg/dL (2022 08:01)  POCT Blood Glucose.: 126 mg/dL (2022 23:16)  POCT Blood Glucose.: 83 mg/dL (2022 17:31)    ..  PHYSICAL EXAM:    Constitutional: NAD, awake and alert, well-developed  HEENT: PERR, EOMI, Normal Hearing, MMM  Neck: Soft and supple, No LAD, No JVD  Respiratory: Breath sounds are clear bilaterally, No wheezing, rales or rhonchi  Cardiovascular: S1 and S2, regular rate and rhythm, no Murmurs, gallops or rubs  Gastrointestinal: Bowel Sounds present, soft, right quadrant mild tenderness, nondistended, no guarding, no rebound  Extremities: No peripheral edema  Vascular: 2+ peripheral pulses  Neurological: A/O x 3, no focal deficits  Musculoskeletal: 5/5 strength b/l upper and lower extremities  Skin: No rashes    Medications:  MEDICATIONS  (STANDING):  atorvastatin 20 milliGRAM(s) Oral at bedtime  cholecalciferol Oral Tab/Cap - Peds 1000 Unit(s) Oral daily  dextrose 5%. 1000 milliLiter(s) (50 mL/Hr) IV Continuous <Continuous>  dextrose 5%. 1000 milliLiter(s) (100 mL/Hr) IV Continuous <Continuous>  dextrose 50% Injectable 25 Gram(s) IV Push once  dextrose 50% Injectable 12.5 Gram(s) IV Push once  dextrose 50% Injectable 25 Gram(s) IV Push once  glucagon  Injectable 1 milliGRAM(s) IntraMuscular once  heparin   Injectable 5000 Unit(s) SubCutaneous every 8 hours  insulin lispro (ADMELOG) corrective regimen sliding scale   SubCutaneous three times a day before meals  insulin lispro (ADMELOG) corrective regimen sliding scale   SubCutaneous at bedtime  levothyroxine 88 MICROGram(s) Oral daily  lisinopril 10 milliGRAM(s) Oral daily  metroNIDAZOLE    Tablet 500 milliGRAM(s) Oral <User Schedule>  multivitamin 1 Tablet(s) Oral daily  neomycin 1000 milliGRAM(s) Oral <User Schedule>  polyethylene glycol/electrolyte Solution 1000 milliLiter(s) Oral <User Schedule>  PreserVision (AREDS 2) 1 Capsule(s) 1 Capsule(s) Oral daily  sodium chloride 0.9%. 1000 milliLiter(s) (120 mL/Hr) IV Continuous <Continuous>  verapamil  milliGRAM(s) Oral daily      Labs: All Labs Reviewed:                        10.2   8.44  )-----------( 242      ( 2022 07:24 )             31.8         144  |  116<H>  |  28<H>  ----------------------------<  112<H>  4.3   |  23  |  1.40<H>    Ca    9.6      2022 07:24  Phos  2.6       Mg     1.9         TPro  6.6  /  Alb  3.2<L>  /  TBili  0.2  /  DBili  x   /  AST  27  /  ALT  38  /  AlkPhos  73  28    PT/INR - ( 2022 07:24 )   PT: 13.7 sec;   INR: 1.18 ratio         PTT - ( 2022 07:24 )  PTT:30.9 sec      Blood Culture:     RADIOLOGY/EKG: all reviewed   EKG - sinus bradycardia, RBBB no ischemic changes  CXR - reviewed by me clear lung, pending official radiology reading

## 2022-06-28 NOTE — CONSULT NOTE ADULT - NSCONSULTADDITIONALINFOA_GEN_ALL_CORE
discussed with patient , aware of risk and benefit in his current clinical scenario ,   discussed with hospitalist dr louis

## 2022-06-29 ENCOUNTER — RESULT REVIEW (OUTPATIENT)
Age: 87
End: 2022-06-29

## 2022-06-29 ENCOUNTER — APPOINTMENT (OUTPATIENT)
Dept: COLORECTAL SURGERY | Facility: HOSPITAL | Age: 87
End: 2022-06-29

## 2022-06-29 ENCOUNTER — TRANSCRIPTION ENCOUNTER (OUTPATIENT)
Age: 87
End: 2022-06-29

## 2022-06-29 LAB
ALBUMIN SERPL ELPH-MCNC: 3.3 G/DL — SIGNIFICANT CHANGE UP (ref 3.3–5)
ALP SERPL-CCNC: 71 U/L — SIGNIFICANT CHANGE UP (ref 40–120)
ALT FLD-CCNC: 36 U/L — SIGNIFICANT CHANGE UP (ref 12–78)
ANION GAP SERPL CALC-SCNC: 6 MMOL/L — SIGNIFICANT CHANGE UP (ref 5–17)
ANION GAP SERPL CALC-SCNC: 7 MMOL/L — SIGNIFICANT CHANGE UP (ref 5–17)
AST SERPL-CCNC: 24 U/L — SIGNIFICANT CHANGE UP (ref 15–37)
BILIRUB SERPL-MCNC: 0.3 MG/DL — SIGNIFICANT CHANGE UP (ref 0.2–1.2)
BUN SERPL-MCNC: 20 MG/DL — SIGNIFICANT CHANGE UP (ref 7–23)
BUN SERPL-MCNC: 22 MG/DL — SIGNIFICANT CHANGE UP (ref 7–23)
CALCIUM SERPL-MCNC: 9 MG/DL — SIGNIFICANT CHANGE UP (ref 8.5–10.1)
CALCIUM SERPL-MCNC: 9.6 MG/DL — SIGNIFICANT CHANGE UP (ref 8.5–10.1)
CEA SERPL-MCNC: 4.3 NG/ML — HIGH (ref 0–3.8)
CHLORIDE SERPL-SCNC: 117 MMOL/L — HIGH (ref 96–108)
CHLORIDE SERPL-SCNC: 118 MMOL/L — HIGH (ref 96–108)
CO2 SERPL-SCNC: 21 MMOL/L — LOW (ref 22–31)
CO2 SERPL-SCNC: 22 MMOL/L — SIGNIFICANT CHANGE UP (ref 22–31)
CREAT SERPL-MCNC: 1.32 MG/DL — HIGH (ref 0.5–1.3)
CREAT SERPL-MCNC: 1.43 MG/DL — HIGH (ref 0.5–1.3)
EGFR: 47 ML/MIN/1.73M2 — LOW
EGFR: 52 ML/MIN/1.73M2 — LOW
GLUCOSE SERPL-MCNC: 118 MG/DL — HIGH (ref 70–99)
GLUCOSE SERPL-MCNC: 177 MG/DL — HIGH (ref 70–99)
HCT VFR BLD CALC: 30.7 % — LOW (ref 39–50)
HCT VFR BLD CALC: 32.6 % — LOW (ref 39–50)
HGB BLD-MCNC: 10.3 G/DL — LOW (ref 13–17)
HGB BLD-MCNC: 9.9 G/DL — LOW (ref 13–17)
MAGNESIUM SERPL-MCNC: 1.6 MG/DL — SIGNIFICANT CHANGE UP (ref 1.6–2.6)
MAGNESIUM SERPL-MCNC: 1.9 MG/DL — SIGNIFICANT CHANGE UP (ref 1.6–2.6)
MCHC RBC-ENTMCNC: 29 PG — SIGNIFICANT CHANGE UP (ref 27–34)
MCHC RBC-ENTMCNC: 29 PG — SIGNIFICANT CHANGE UP (ref 27–34)
MCHC RBC-ENTMCNC: 31.6 GM/DL — LOW (ref 32–36)
MCHC RBC-ENTMCNC: 32.2 GM/DL — SIGNIFICANT CHANGE UP (ref 32–36)
MCV RBC AUTO: 90 FL — SIGNIFICANT CHANGE UP (ref 80–100)
MCV RBC AUTO: 91.8 FL — SIGNIFICANT CHANGE UP (ref 80–100)
PHOSPHATE SERPL-MCNC: 2 MG/DL — LOW (ref 2.5–4.5)
PHOSPHATE SERPL-MCNC: 4 MG/DL — SIGNIFICANT CHANGE UP (ref 2.5–4.5)
PLATELET # BLD AUTO: 231 K/UL — SIGNIFICANT CHANGE UP (ref 150–400)
PLATELET # BLD AUTO: 267 K/UL — SIGNIFICANT CHANGE UP (ref 150–400)
POTASSIUM SERPL-MCNC: 3.7 MMOL/L — SIGNIFICANT CHANGE UP (ref 3.5–5.3)
POTASSIUM SERPL-MCNC: 4.2 MMOL/L — SIGNIFICANT CHANGE UP (ref 3.5–5.3)
POTASSIUM SERPL-SCNC: 3.7 MMOL/L — SIGNIFICANT CHANGE UP (ref 3.5–5.3)
POTASSIUM SERPL-SCNC: 4.2 MMOL/L — SIGNIFICANT CHANGE UP (ref 3.5–5.3)
PROT SERPL-MCNC: 6.6 GM/DL — SIGNIFICANT CHANGE UP (ref 6–8.3)
RBC # BLD: 3.41 M/UL — LOW (ref 4.2–5.8)
RBC # BLD: 3.55 M/UL — LOW (ref 4.2–5.8)
RBC # FLD: 15.5 % — HIGH (ref 10.3–14.5)
RBC # FLD: 15.7 % — HIGH (ref 10.3–14.5)
SODIUM SERPL-SCNC: 145 MMOL/L — SIGNIFICANT CHANGE UP (ref 135–145)
SODIUM SERPL-SCNC: 146 MMOL/L — HIGH (ref 135–145)
T4 FREE SERPL-MCNC: 0.96 NG/DL — SIGNIFICANT CHANGE UP (ref 0.76–1.46)
TSH SERPL-MCNC: 2.54 UU/ML — SIGNIFICANT CHANGE UP (ref 0.34–4.82)
WBC # BLD: 13.93 K/UL — HIGH (ref 3.8–10.5)
WBC # BLD: 7.51 K/UL — SIGNIFICANT CHANGE UP (ref 3.8–10.5)
WBC # FLD AUTO: 13.93 K/UL — HIGH (ref 3.8–10.5)
WBC # FLD AUTO: 7.51 K/UL — SIGNIFICANT CHANGE UP (ref 3.8–10.5)

## 2022-06-29 PROCEDURE — 99233 SBSQ HOSP IP/OBS HIGH 50: CPT

## 2022-06-29 PROCEDURE — 88309 TISSUE EXAM BY PATHOLOGIST: CPT | Mod: 26

## 2022-06-29 PROCEDURE — 44213 LAP MOBIL SPLENIC FL ADD-ON: CPT

## 2022-06-29 PROCEDURE — 44205 LAP COLECTOMY PART W/ILEUM: CPT

## 2022-06-29 PROCEDURE — 88305 TISSUE EXAM BY PATHOLOGIST: CPT | Mod: 26

## 2022-06-29 RX ORDER — ACETAMINOPHEN 500 MG
1000 TABLET ORAL ONCE
Refills: 0 | Status: DISCONTINUED | OUTPATIENT
Start: 2022-06-29 | End: 2022-07-04

## 2022-06-29 RX ORDER — CIPROFLOXACIN LACTATE 400MG/40ML
400 VIAL (ML) INTRAVENOUS EVERY 12 HOURS
Refills: 0 | Status: COMPLETED | OUTPATIENT
Start: 2022-06-29 | End: 2022-06-30

## 2022-06-29 RX ORDER — ONDANSETRON 8 MG/1
4 TABLET, FILM COATED ORAL EVERY 6 HOURS
Refills: 0 | Status: DISCONTINUED | OUTPATIENT
Start: 2022-06-29 | End: 2022-06-29

## 2022-06-29 RX ORDER — SODIUM CHLORIDE 9 MG/ML
1000 INJECTION, SOLUTION INTRAVENOUS
Refills: 0 | Status: DISCONTINUED | OUTPATIENT
Start: 2022-06-29 | End: 2022-06-29

## 2022-06-29 RX ORDER — POTASSIUM PHOSPHATE, MONOBASIC POTASSIUM PHOSPHATE, DIBASIC 236; 224 MG/ML; MG/ML
15 INJECTION, SOLUTION INTRAVENOUS ONCE
Refills: 0 | Status: COMPLETED | OUTPATIENT
Start: 2022-06-29 | End: 2022-06-29

## 2022-06-29 RX ORDER — KETOROLAC TROMETHAMINE 30 MG/ML
15 SYRINGE (ML) INJECTION EVERY 6 HOURS
Refills: 0 | Status: DISCONTINUED | OUTPATIENT
Start: 2022-06-29 | End: 2022-06-30

## 2022-06-29 RX ORDER — MORPHINE SULFATE 50 MG/1
4 CAPSULE, EXTENDED RELEASE ORAL EVERY 6 HOURS
Refills: 0 | Status: DISCONTINUED | OUTPATIENT
Start: 2022-06-29 | End: 2022-07-04

## 2022-06-29 RX ORDER — ACETAMINOPHEN 500 MG
975 TABLET ORAL EVERY 6 HOURS
Refills: 0 | Status: DISCONTINUED | OUTPATIENT
Start: 2022-06-29 | End: 2022-07-04

## 2022-06-29 RX ORDER — ALVIMOPAN 12 MG/1
12 CAPSULE ORAL
Refills: 0 | Status: DISCONTINUED | OUTPATIENT
Start: 2022-06-29 | End: 2022-06-29

## 2022-06-29 RX ORDER — SODIUM CHLORIDE 9 MG/ML
1000 INJECTION, SOLUTION INTRAVENOUS
Refills: 0 | Status: DISCONTINUED | OUTPATIENT
Start: 2022-06-29 | End: 2022-06-30

## 2022-06-29 RX ORDER — FENTANYL CITRATE 50 UG/ML
50 INJECTION INTRAVENOUS
Refills: 0 | Status: DISCONTINUED | OUTPATIENT
Start: 2022-06-29 | End: 2022-06-29

## 2022-06-29 RX ORDER — ALVIMOPAN 12 MG/1
12 CAPSULE ORAL
Refills: 0 | Status: DISCONTINUED | OUTPATIENT
Start: 2022-06-29 | End: 2022-07-01

## 2022-06-29 RX ORDER — LANOLIN ALCOHOL/MO/W.PET/CERES
5 CREAM (GRAM) TOPICAL AT BEDTIME
Refills: 0 | Status: COMPLETED | OUTPATIENT
Start: 2022-06-29 | End: 2022-06-29

## 2022-06-29 RX ORDER — OXYCODONE HYDROCHLORIDE 5 MG/1
5 TABLET ORAL ONCE
Refills: 0 | Status: DISCONTINUED | OUTPATIENT
Start: 2022-06-29 | End: 2022-06-29

## 2022-06-29 RX ORDER — METRONIDAZOLE 500 MG
500 TABLET ORAL EVERY 8 HOURS
Refills: 0 | Status: COMPLETED | OUTPATIENT
Start: 2022-06-29 | End: 2022-06-30

## 2022-06-29 RX ADMIN — ATORVASTATIN CALCIUM 20 MILLIGRAM(S): 80 TABLET, FILM COATED ORAL at 21:45

## 2022-06-29 RX ADMIN — Medication 975 MILLIGRAM(S): at 19:01

## 2022-06-29 RX ADMIN — Medication 100 MILLIGRAM(S): at 22:48

## 2022-06-29 RX ADMIN — FENTANYL CITRATE 50 MICROGRAM(S): 50 INJECTION INTRAVENOUS at 17:14

## 2022-06-29 RX ADMIN — FENTANYL CITRATE 50 MICROGRAM(S): 50 INJECTION INTRAVENOUS at 16:07

## 2022-06-29 RX ADMIN — ALVIMOPAN 12 MILLIGRAM(S): 12 CAPSULE ORAL at 21:44

## 2022-06-29 RX ADMIN — Medication 5 MILLIGRAM(S): at 00:49

## 2022-06-29 RX ADMIN — FENTANYL CITRATE 50 MICROGRAM(S): 50 INJECTION INTRAVENOUS at 15:14

## 2022-06-29 RX ADMIN — ALVIMOPAN 12 MILLIGRAM(S): 12 CAPSULE ORAL at 10:00

## 2022-06-29 RX ADMIN — Medication 200 MILLIGRAM(S): at 23:50

## 2022-06-29 RX ADMIN — Medication 975 MILLIGRAM(S): at 23:44

## 2022-06-29 RX ADMIN — LISINOPRIL 10 MILLIGRAM(S): 2.5 TABLET ORAL at 09:58

## 2022-06-29 RX ADMIN — Medication 240 MILLIGRAM(S): at 09:58

## 2022-06-29 RX ADMIN — HEPARIN SODIUM 5000 UNIT(S): 5000 INJECTION INTRAVENOUS; SUBCUTANEOUS at 21:45

## 2022-06-29 RX ADMIN — FENTANYL CITRATE 50 MICROGRAM(S): 50 INJECTION INTRAVENOUS at 15:38

## 2022-06-29 RX ADMIN — FENTANYL CITRATE 50 MICROGRAM(S): 50 INJECTION INTRAVENOUS at 15:36

## 2022-06-29 RX ADMIN — OXYCODONE HYDROCHLORIDE 5 MILLIGRAM(S): 5 TABLET ORAL at 16:17

## 2022-06-29 RX ADMIN — POTASSIUM PHOSPHATE, MONOBASIC POTASSIUM PHOSPHATE, DIBASIC 62.5 MILLIMOLE(S): 236; 224 INJECTION, SOLUTION INTRAVENOUS at 11:12

## 2022-06-29 RX ADMIN — OXYCODONE HYDROCHLORIDE 5 MILLIGRAM(S): 5 TABLET ORAL at 16:09

## 2022-06-29 RX ADMIN — MORPHINE SULFATE 4 MILLIGRAM(S): 50 CAPSULE, EXTENDED RELEASE ORAL at 21:43

## 2022-06-29 RX ADMIN — MORPHINE SULFATE 4 MILLIGRAM(S): 50 CAPSULE, EXTENDED RELEASE ORAL at 22:13

## 2022-06-29 NOTE — BRIEF OPERATIVE NOTE - NSICDXBRIEFPREOP_GEN_ALL_CORE_FT
PRE-OP DIAGNOSIS:  Crohn disease 29-Jun-2022 15:39:25  Eve Story  Cancer of sigmoid 29-Jun-2022 15:39:48  Eve Story

## 2022-06-29 NOTE — PROGRESS NOTE ADULT - SUBJECTIVE AND OBJECTIVE BOX
CHIEF COMPLAINT:    CC:  88 y/o M with a PMHx of sepsis, neuropathy, prostate CA, Crohn disease, hypothyroidism, depression, intervertebral disc disorder, DM2,  obesity, HTN, HLD,  CAD s/p stents , macular degeneration, hearing loss admitted  6/28  to the surgical service for abdominal pain. Patient says that he was scheduled for colon cancer surgery diagnosed by a recent colonoscopy by Dr. Padilla to have surgery on Wednesday by Dr. Ardon. Pt has diarrhea and right side pain.     Vital Signs Last 24 Hrs  T(C): 37 (28 Jun 2022 22:19), Max: 37 (28 Jun 2022 22:19)  T(F): 98.6 (28 Jun 2022 22:19), Max: 98.6 (28 Jun 2022 22:19)  HR: 57 (28 Jun 2022 22:19) (52 - 60)  BP: 142/49 (28 Jun 2022 22:19) (128/46 - 168/53)  RR: 18 (28 Jun 2022 22:19) (16 - 18)  SpO2: 95% (28 Jun 2022 22:19) (95% - 99%)  Constitutional: NAD, awake and alert  HEENT: PERR, EOMI  Neck: Soft and supple,  No JVD  Respiratory: Breath sounds are clear bilaterally, No wheezing, rales or rhonchi  Cardiovascular: S1 and S2, regular rate and rhythm, no Murmurs  Gastrointestinal: Bowel Sounds present, soft, nontender, nondistended  Extremities: No peripheral edema  Vascular: 2+ peripheral pulses  Neurological: A/O x 3, no focal deficits  Musculoskeletal: 5/5 strength b/l upper and lower extremities  Skin: No rashes    MEDICATIONS:  MEDICATIONS  (STANDING):  alvimopan 12 milliGRAM(s) Oral once  atorvastatin 20 milliGRAM(s) Oral at bedtime  cholecalciferol Oral Tab/Cap - Peds 1000 Unit(s) Oral daily  dextrose 5%. 1000 milliLiter(s) (50 mL/Hr) IV Continuous <Continuous>  dextrose 5%. 1000 milliLiter(s) (100 mL/Hr) IV Continuous <Continuous>  dextrose 50% Injectable 25 Gram(s) IV Push once  dextrose 50% Injectable 12.5 Gram(s) IV Push once  dextrose 50% Injectable 25 Gram(s) IV Push once  glucagon  Injectable 1 milliGRAM(s) IntraMuscular once  heparin   Injectable 5000 Unit(s) SubCutaneous every 8 hours  insulin lispro (ADMELOG) corrective regimen sliding scale   SubCutaneous three times a day before meals  insulin lispro (ADMELOG) corrective regimen sliding scale   SubCutaneous at bedtime  levothyroxine 88 MICROGram(s) Oral daily  lisinopril 10 milliGRAM(s) Oral daily  multivitamin 1 Tablet(s) Oral daily  PreserVision (AREDS 2) 1 Capsule(s) 1 Capsule(s) Oral daily  sodium chloride 0.9%. 1000 milliLiter(s) (120 mL/Hr) IV Continuous <Continuous>  verapamil  milliGRAM(s) Oral daily      LABS: All Labs Reviewed:                      9.9    7.51  )-----------( 231      ( 29 Jun 2022 07:02 )             30.7   146<H>  |  118<H>  |  22  ----------------------------<  118<H>  3.7   |  22  |  1.32<H>    Ca    9.6      29 Jun 2022 07:02  Phos  2.0     06-29  Mg     1.9     06-29  TPro  6.6  /  Alb  3.3  /  TBili  0.3  /  DBili  x   /  AST  24  /  ALT  36  /  AlkPhos  71  06-29  PT/INR - ( 28 Jun 2022 07:24 )   PT: 13.7 sec;   INR: 1.18 ratio    PTT - ( 28 Jun 2022 07:24 )  PTT:30.9 sec    RADIOLOGY/EKG:   CC:  88 y/o M with a PMHx of sepsis, neuropathy, prostate CA, Crohn disease, hypothyroidism, depression, intervertebral disc disorder, DM2,  obesity, HTN, HLD,  CAD s/p stents , macular degeneration, hearing loss admitted  6/28  to the surgical service for abdominal pain. Patient says that he was scheduled for colon cancer surgery diagnosed by a recent colonoscopy by Dr. Padilla to have surgery on Wednesday by Dr. Ardon. Pt has diarrhea and right side pain.     6/29 - no cp palps sob abdo pain, lying flat in bed w/o any resp distress     Vital Signs Last 24 Hrs  T(C): 36.6 (29 Jun 2022 17:10), Max: 37.1 (29 Jun 2022 09:01)  T(F): 97.9 (29 Jun 2022 17:10), Max: 98.8 (29 Jun 2022 09:01)  HR: 65 (29 Jun 2022 17:10) (57 - 68)  BP: 137/60 (29 Jun 2022 17:10) (131/53 - 155/68)  BP(mean): 81 (29 Jun 2022 17:10) (81 - 81)  RR: 17 (29 Jun 2022 17:10) (12 - 18)  SpO2: 99% (29 Jun 2022 17:10) (95% - 100%)  Constitutional: NAD, awake and alert  HEENT: PERR, EOMI  Neck: Soft and supple,  No JVD  Respiratory: Breath sounds are clear bilaterally, No wheezing, rales or rhonchi  Cardiovascular: S1 and S2, regular rate and rhythm, no Murmurs  Gastrointestinal: Bowel Sounds present, soft, nontender, nondistended  Extremities: No peripheral edema  Vascular: 2+ peripheral pulses  Neurological: A/O x 3, no focal deficits  Musculoskeletal: 5/5 strength b/l upper and lower extremities  Skin: No rashes    LABS: All Labs Reviewed:                      10.3   13.93 )-----------( 267      ( 29 Jun 2022 16:39 )             32.6   145  |  117<H>  |  20  ----------------------------<  177<H>  4.2   |  21<L>  |  1.43<H>  Ca    9.0      29 Jun 2022 16:39  Phos  4.0     06-29  Mg     1.6     06-29  TPro  6.6  /  Alb  3.3  /  TBili  0.3  /  DBili  x   /  AST  24  /  ALT  36  /  AlkPhos  71  06-29  PT/INR - ( 28 Jun 2022 07:24 )   PT: 13.7 sec;   INR: 1.18 ratio    PTT - ( 28 Jun 2022 07:24 )  PTT:30.9 sec      RADIOLOGY/EKG:  < from: Xray Chest 1 View- PORTABLE-Urgent (06.27.22 @ 13:50) >  No evidence of active chest disease.    ECHO   The mitral valve leaflets appear thickened.   Mild mitral annular calcification is present.   Mild to Moderate mitral regurgitation is present.   2 jets noted.   EA reversal of the mitral inflow consistent with reduced compliance of   the   left ventricle.   The aortic valve is visualized, appears mildly calcified. Valve opening   seems to be restricted.   Peak and mean transaortic gradients are 27 and 13 mmHg respectively; this   finding is consistent with mild aortic stenosis.   Trace aortic regurgitation is present.   The tricuspid valve leaflets are thin and pliable; valve motion is   normal.   Trace tricuspid valve regurgitation is present.   Pulmonic valve not well seen.   Normal appearing left atrium.   Mild concentric left ventricular hypertrophy is present.   Estimated left ventricular ejection fraction is 65-70 %.        MEDS:   acetaminophen     Tablet .. 975 milliGRAM(s) Oral every 6 hours  acetaminophen   IVPB .. 1000 milliGRAM(s) IV Intermittent once PRN  alvimopan 12 milliGRAM(s) Oral two times a day  atorvastatin 20 milliGRAM(s) Oral at bedtime  cholecalciferol Oral Tab/Cap - Peds 1000 Unit(s) Oral daily  ciprofloxacin   IVPB 400 milliGRAM(s) IV Intermittent every 12 hours  dextrose 5% + sodium chloride 0.9%. 1000 milliLiter(s) IV Continuous <Continuous>  glucagon  Injectable 1 milliGRAM(s) IntraMuscular once  heparin   Injectable 5000 Unit(s) SubCutaneous every 8 hours  insulin lispro (ADMELOG) corrective regimen sliding scale   SubCutaneous three times a day before meals  insulin lispro (ADMELOG) corrective regimen sliding scale   SubCutaneous at bedtime  ketorolac   Injectable 15 milliGRAM(s) IV Push every 6 hours PRN  levothyroxine 88 MICROGram(s) Oral daily  lisinopril 10 milliGRAM(s) Oral daily  metroNIDAZOLE  IVPB 500 milliGRAM(s) IV Intermittent every 8 hours  morphine  - Injectable 4 milliGRAM(s) IV Push every 6 hours PRN  multivitamin 1 Tablet(s) Oral daily  PreserVision (AREDS 2) 1 Capsule(s) 1 Capsule(s) Oral daily  sodium chloride 0.45%. 1000 milliLiter(s) IV Continuous <Continuous>  traMADol 50 milliGRAM(s) Oral Once PRN  verapamil  milliGRAM(s) Oral daily

## 2022-06-29 NOTE — PROGRESS NOTE ADULT - SUBJECTIVE AND OBJECTIVE BOX
Pt was seen and examined at bedside this am. No acute events reported overnight. Tolerated bowel prep.    Vital Signs Last 24 Hrs  T(C): 37 (28 Jun 2022 22:19), Max: 37 (28 Jun 2022 22:19)  T(F): 98.6 (28 Jun 2022 22:19), Max: 98.6 (28 Jun 2022 22:19)  HR: 57 (28 Jun 2022 22:19) (52 - 60)  BP: 142/49 (28 Jun 2022 22:19) (128/46 - 168/53)  BP(mean): --  RR: 18 (28 Jun 2022 22:19) (16 - 18)  SpO2: 95% (28 Jun 2022 22:19) (95% - 99%)    Labs:                              9.9    7.51  )-----------( 231      ( 29 Jun 2022 07:02 )             30.7     CBC Full  -  ( 29 Jun 2022 07:02 )  WBC Count : 7.51 K/uL  RBC Count : 3.41 M/uL  Hemoglobin : 9.9 g/dL  Hematocrit : 30.7 %  Platelet Count - Automated : 231 K/uL  Mean Cell Volume : 90.0 fl  Mean Cell Hemoglobin : 29.0 pg  Mean Cell Hemoglobin Concentration : 32.2 gm/dL  Auto Neutrophil # : x  Auto Lymphocyte # : x  Auto Monocyte # : x  Auto Eosinophil # : x  Auto Basophil # : x  Auto Neutrophil % : x  Auto Lymphocyte % : x  Auto Monocyte % : x  Auto Eosinophil % : x  Auto Basophil % : x    06-29    146<H>  |  118<H>  |  22  ----------------------------<  118<H>  3.7   |  22  |  1.32<H>    Ca    9.6      29 Jun 2022 07:02  Phos  2.0     06-29  Mg     1.9     06-29    TPro  6.6  /  Alb  3.3  /  TBili  0.3  /  DBili  x   /  AST  24  /  ALT  36  /  AlkPhos  71  06-29    LIVER FUNCTIONS - ( 29 Jun 2022 07:02 )  Alb: 3.3 g/dL / Pro: 6.6 gm/dL / ALK PHOS: 71 U/L / ALT: 36 U/L / AST: 24 U/L / GGT: x           PT/INR - ( 28 Jun 2022 07:24 )   PT: 13.7 sec;   INR: 1.18 ratio         PTT - ( 28 Jun 2022 07:24 )  PTT:30.9 sec      Physical Exam:  Pt is AAOx3  General: Well developed, in no acute distress.   Chest: Lungs clear, no rales, no rhonchi, no wheezes.   Heart: RR, no murmurs, no rubs, no gallops.   Abdomen: Soft, no tenderness, nondistended, no masses, BS normal.    Back: Normal curvature, no tenderness.   Neuro: Physiological, no localizing findings.   Skin: Normal, no rashes, no lesions noted.   Extremities: Warm, well perfused, no edema, Pulses intact

## 2022-06-29 NOTE — BRIEF OPERATIVE NOTE - SPECIMENS
Sigmoid colon, ileo-ileal anastomosis, cecum, appendix, part of right colon, colorectal anastomosis, further distal rectum,

## 2022-06-29 NOTE — BRIEF OPERATIVE NOTE - NSICDXBRIEFPROCEDURE_GEN_ALL_CORE_FT
PROCEDURES:  Laparoscopic ileocolic resection 29-Jun-2022 15:38:07  Eve Story  Mobilization, splenic flexure, laparoscopic 29-Jun-2022 15:38:19  Eve Story  Block, transversus abdominis plane, bilateral 29-Jun-2022 15:38:33  Eve Story  Colectomy, sigmoid, with colorectal anastomosis 29-Jun-2022 15:38:54  Eve Story

## 2022-06-29 NOTE — PROGRESS NOTE ADULT - ASSESSMENT
86 y/o M with a PMHx of sepsis, neuropathy, prostate CA, Crohn disease, hypothyroidism, depression, intervertebral disc disorder, DM2,  obesity, HTN, HLD,  CAD s/p stents , macular degeneration, hearing loss admitted  6/28  to the surgical service for abdominal pain. Patient says that he was scheduled for colon cancer surgery diagnosed by a recent colonoscopy by Dr. Padilla to have surgery on Wednesday by Dr. Ardon. Pt has diarrhea and right side pain.     Assessment/Plan:   Infiltrating adenocarcinoma of  Sigmoid colon , h/o Chron' s  disease   - sigmoidectomy plan as per colorectal surgery team   - as per surgery   - pt has no cardiac or respiratory symptoms, EKG, CXR reviewed   - cardiology clearance noted   - by revised Cardiac risk pt has high risk ( DM, CAD) and will undergo high risk surgery   - pt medically optimized for needed surgery   -c/w IV hydration, IV cardiazem prn if NPO , BP control, DVT prophylaxis as per surgery   - resume low dose ASA 81 as soon as cleared by surgery     Mild anemia - monitor post-op     CAD  - c/w statins , resume ASA 81 once cleared by surgery     CKD stage 3 baseline 1.3-1.5   - IV fluids as per surgery   - c/w  lisinopril , monitor renal function    HTN - c/w verapamil 240 qd , use IV Cardizem if NPO     Hypothyroidism - c/w levothyroxine 88 , check TSH, free T4 in am    Chronic back pain - c/w tramadol prn     DM2 - diet, stop metformin, glimepiride , diet, ISS, check A1C 6.6     Macular degeneration - c/w vitamins    Hearing loss - supportive care     88 y/o M with a PMHx of sepsis, neuropathy, prostate CA, Crohn disease, hypothyroidism, depression, intervertebral disc disorder, DM2,  obesity, HTN, HLD,  CAD s/p stents , macular degeneration, hearing loss admitted  6/28  to the surgical service for abdominal pain. Patient says that he was scheduled for colon cancer surgery diagnosed by a recent colonoscopy by Dr. Padilla to have surgery on Wednesday by Dr. Ardon. Pt has diarrhea and right side pain.     Assessment/Plan:   Infiltrating adenocarcinoma of  Sigmoid colon , h/o Chron' s  disease   - sigmoidectomy plan as per colorectal surgery team   - pt has no cardiac or respiratory symptoms, ECHO with normal EF, CXR rev by me - no infiltrates; A1C 6.6  - pt has no active cardiac or pulm complaints   - by revised Cardiac risk pt has high risk ( DM, CAD)   - pt medically optimized for needed surgery   -c/w IV hydration, IV cardiazem prn if NPO , BP control, DVT prophylaxis as per surgery   - resume low dose ASA 81 as soon as cleared by surgery     Mild anemia - monitor post-op     CAD  - c/w statins , resume ASA 81 once cleared by surgery     CKD stage 3 baseline 1.3-1.5   - IV fluids changes to 1/2NS  - c/w  lisinopril , monitor renal function    HTN - c/w verapamil 240 qd as toelrated , use IV Cardizem if NPO     Hypothyroidism - c/w levothyroxine 88 , check TSH, free T4 in am    Chronic back pain - c/w tramadol prn     DM2 - diet, stop metformin, glimepiride , diet, ISS, A1C 6.6     Macular degeneration - c/w vitamins    Hearing loss - supportive care       Pls call with q

## 2022-06-29 NOTE — PROGRESS NOTE ADULT - ASSESSMENT
Pt w/ pmh of Crohn's s/p ileocolic resection/anastomosis, now colon CA, plan for sigmoidectomy tomorrow    Analgesia  Medical and cardiac clearance appreciated  OR today for laparoscopic revision of ileocolic anastomosis (completed in past for Crohn's) and now with stricture, as well as sigmoidectomy for sigmoid colon cancer.   NPO  IVF hydration  DVT ppx  Replete lytes    Plan discussed with CRS team

## 2022-06-29 NOTE — BRIEF OPERATIVE NOTE - NSICDXBRIEFPOSTOP_GEN_ALL_CORE_FT
POST-OP DIAGNOSIS:  Cancer of sigmoid 29-Jun-2022 15:40:07  Eve Story  Crohn's disease 29-Jun-2022 15:40:35  Eve Story

## 2022-06-30 LAB
ALBUMIN SERPL ELPH-MCNC: 2.8 G/DL — LOW (ref 3.3–5)
ALP SERPL-CCNC: 56 U/L — SIGNIFICANT CHANGE UP (ref 40–120)
ALT FLD-CCNC: 29 U/L — SIGNIFICANT CHANGE UP (ref 12–78)
ANION GAP SERPL CALC-SCNC: 6 MMOL/L — SIGNIFICANT CHANGE UP (ref 5–17)
AST SERPL-CCNC: 15 U/L — SIGNIFICANT CHANGE UP (ref 15–37)
BILIRUB SERPL-MCNC: 0.4 MG/DL — SIGNIFICANT CHANGE UP (ref 0.2–1.2)
BUN SERPL-MCNC: 21 MG/DL — SIGNIFICANT CHANGE UP (ref 7–23)
CALCIUM SERPL-MCNC: 8.7 MG/DL — SIGNIFICANT CHANGE UP (ref 8.5–10.1)
CHLORIDE SERPL-SCNC: 116 MMOL/L — HIGH (ref 96–108)
CO2 SERPL-SCNC: 23 MMOL/L — SIGNIFICANT CHANGE UP (ref 22–31)
CREAT SERPL-MCNC: 1.77 MG/DL — HIGH (ref 0.5–1.3)
EGFR: 37 ML/MIN/1.73M2 — LOW
GLUCOSE SERPL-MCNC: 173 MG/DL — HIGH (ref 70–99)
HCT VFR BLD CALC: 30.7 % — LOW (ref 39–50)
HGB BLD-MCNC: 9.8 G/DL — LOW (ref 13–17)
MAGNESIUM SERPL-MCNC: 1.7 MG/DL — SIGNIFICANT CHANGE UP (ref 1.6–2.6)
MCHC RBC-ENTMCNC: 29.1 PG — SIGNIFICANT CHANGE UP (ref 27–34)
MCHC RBC-ENTMCNC: 31.9 GM/DL — LOW (ref 32–36)
MCV RBC AUTO: 91.1 FL — SIGNIFICANT CHANGE UP (ref 80–100)
PHOSPHATE SERPL-MCNC: 3.7 MG/DL — SIGNIFICANT CHANGE UP (ref 2.5–4.5)
PLATELET # BLD AUTO: 293 K/UL — SIGNIFICANT CHANGE UP (ref 150–400)
POTASSIUM SERPL-MCNC: 4.3 MMOL/L — SIGNIFICANT CHANGE UP (ref 3.5–5.3)
POTASSIUM SERPL-SCNC: 4.3 MMOL/L — SIGNIFICANT CHANGE UP (ref 3.5–5.3)
PROT SERPL-MCNC: 5.9 GM/DL — LOW (ref 6–8.3)
RBC # BLD: 3.37 M/UL — LOW (ref 4.2–5.8)
RBC # FLD: 16.1 % — HIGH (ref 10.3–14.5)
SODIUM SERPL-SCNC: 145 MMOL/L — SIGNIFICANT CHANGE UP (ref 135–145)
WBC # BLD: 11.4 K/UL — HIGH (ref 3.8–10.5)
WBC # FLD AUTO: 11.4 K/UL — HIGH (ref 3.8–10.5)

## 2022-06-30 PROCEDURE — 99232 SBSQ HOSP IP/OBS MODERATE 35: CPT

## 2022-06-30 RX ORDER — SODIUM CHLORIDE 9 MG/ML
500 INJECTION INTRAMUSCULAR; INTRAVENOUS; SUBCUTANEOUS ONCE
Refills: 0 | Status: COMPLETED | OUTPATIENT
Start: 2022-06-30 | End: 2022-06-30

## 2022-06-30 RX ORDER — ONDANSETRON 8 MG/1
4 TABLET, FILM COATED ORAL EVERY 6 HOURS
Refills: 0 | Status: DISCONTINUED | OUTPATIENT
Start: 2022-06-30 | End: 2022-07-04

## 2022-06-30 RX ORDER — ASPIRIN/CALCIUM CARB/MAGNESIUM 324 MG
81 TABLET ORAL AT BEDTIME
Refills: 0 | Status: DISCONTINUED | OUTPATIENT
Start: 2022-06-30 | End: 2022-07-04

## 2022-06-30 RX ORDER — SODIUM CHLORIDE 9 MG/ML
1000 INJECTION INTRAMUSCULAR; INTRAVENOUS; SUBCUTANEOUS
Refills: 0 | Status: DISCONTINUED | OUTPATIENT
Start: 2022-06-30 | End: 2022-07-02

## 2022-06-30 RX ORDER — TRAMADOL HYDROCHLORIDE 50 MG/1
25 TABLET ORAL EVERY 6 HOURS
Refills: 0 | Status: DISCONTINUED | OUTPATIENT
Start: 2022-06-30 | End: 2022-07-04

## 2022-06-30 RX ADMIN — SODIUM CHLORIDE 500 MILLILITER(S): 9 INJECTION INTRAMUSCULAR; INTRAVENOUS; SUBCUTANEOUS at 19:00

## 2022-06-30 RX ADMIN — Medication 975 MILLIGRAM(S): at 06:02

## 2022-06-30 RX ADMIN — MORPHINE SULFATE 4 MILLIGRAM(S): 50 CAPSULE, EXTENDED RELEASE ORAL at 07:42

## 2022-06-30 RX ADMIN — MORPHINE SULFATE 4 MILLIGRAM(S): 50 CAPSULE, EXTENDED RELEASE ORAL at 21:40

## 2022-06-30 RX ADMIN — MORPHINE SULFATE 4 MILLIGRAM(S): 50 CAPSULE, EXTENDED RELEASE ORAL at 21:26

## 2022-06-30 RX ADMIN — ALVIMOPAN 12 MILLIGRAM(S): 12 CAPSULE ORAL at 11:03

## 2022-06-30 RX ADMIN — Medication 200 MILLIGRAM(S): at 11:03

## 2022-06-30 RX ADMIN — SODIUM CHLORIDE 500 MILLILITER(S): 9 INJECTION INTRAMUSCULAR; INTRAVENOUS; SUBCUTANEOUS at 23:04

## 2022-06-30 RX ADMIN — Medication 15 MILLIGRAM(S): at 11:03

## 2022-06-30 RX ADMIN — SODIUM CHLORIDE 120 MILLILITER(S): 9 INJECTION INTRAMUSCULAR; INTRAVENOUS; SUBCUTANEOUS at 23:19

## 2022-06-30 RX ADMIN — ONDANSETRON 4 MILLIGRAM(S): 8 TABLET, FILM COATED ORAL at 12:29

## 2022-06-30 RX ADMIN — ALVIMOPAN 12 MILLIGRAM(S): 12 CAPSULE ORAL at 21:26

## 2022-06-30 RX ADMIN — HEPARIN SODIUM 5000 UNIT(S): 5000 INJECTION INTRAVENOUS; SUBCUTANEOUS at 13:05

## 2022-06-30 RX ADMIN — Medication 1 TABLET(S): at 11:02

## 2022-06-30 RX ADMIN — SODIUM CHLORIDE 120 MILLILITER(S): 9 INJECTION INTRAMUSCULAR; INTRAVENOUS; SUBCUTANEOUS at 13:03

## 2022-06-30 RX ADMIN — HEPARIN SODIUM 5000 UNIT(S): 5000 INJECTION INTRAVENOUS; SUBCUTANEOUS at 06:03

## 2022-06-30 RX ADMIN — Medication 975 MILLIGRAM(S): at 06:32

## 2022-06-30 RX ADMIN — Medication 2: at 08:31

## 2022-06-30 RX ADMIN — Medication 81 MILLIGRAM(S): at 21:26

## 2022-06-30 RX ADMIN — Medication 975 MILLIGRAM(S): at 18:00

## 2022-06-30 RX ADMIN — Medication 975 MILLIGRAM(S): at 00:14

## 2022-06-30 RX ADMIN — Medication 100 MILLIGRAM(S): at 13:05

## 2022-06-30 RX ADMIN — Medication 1000 UNIT(S): at 11:02

## 2022-06-30 RX ADMIN — LISINOPRIL 10 MILLIGRAM(S): 2.5 TABLET ORAL at 11:02

## 2022-06-30 RX ADMIN — SODIUM CHLORIDE 500 MILLILITER(S): 9 INJECTION INTRAMUSCULAR; INTRAVENOUS; SUBCUTANEOUS at 16:15

## 2022-06-30 RX ADMIN — SODIUM CHLORIDE 125 MILLILITER(S): 9 INJECTION, SOLUTION INTRAVENOUS at 00:50

## 2022-06-30 RX ADMIN — ATORVASTATIN CALCIUM 20 MILLIGRAM(S): 80 TABLET, FILM COATED ORAL at 21:26

## 2022-06-30 RX ADMIN — Medication 100 MILLIGRAM(S): at 06:10

## 2022-06-30 RX ADMIN — HEPARIN SODIUM 5000 UNIT(S): 5000 INJECTION INTRAVENOUS; SUBCUTANEOUS at 21:26

## 2022-06-30 RX ADMIN — Medication 88 MICROGRAM(S): at 06:04

## 2022-06-30 NOTE — PROGRESS NOTE ADULT - ASSESSMENT
87M with hx of Crohn's disease and colon cancer POD1 s/p lap sigmoidectomy and ileocolic resection    pain control  monitor for bowel function  ambulation  incentive spirometry  CLD  TOV  DVT/GI ppx    Plan discussed with Dr. Luna

## 2022-06-30 NOTE — PROVIDER CONTACT NOTE (MEDICATION) - ACTION/TREATMENT ORDERED:
MD spoke to patient and reeducated the patient to not take home meds. Medications sent home with patients emergency contact Lucille

## 2022-06-30 NOTE — PROGRESS NOTE ADULT - ASSESSMENT
86 y/o M with a PMHx of sepsis, neuropathy, prostate CA, Crohn disease, hypothyroidism, depression, intervertebral disc disorder, DM2,  obesity, HTN, HLD,  CAD s/p stents , macular degeneration, hearing loss admitted  6/28  to the surgical service for abdominal pain. Patient says that he was scheduled for colon cancer surgery diagnosed by a recent colonoscopy by Dr. Padilla to have surgery on Wednesday by Dr. Ardon. Pt has diarrhea and right side pain.     Assessment/Plan:   Infiltrating adenocarcinoma of  Sigmoid colon , h/o Chron' s  disease   - sigmoidectomy plan as per colorectal surgery team   - pt has no cardiac or respiratory symptoms, ECHO with normal EF, CXR rev by me - no infiltrates; A1C 6.6  - pt has no active cardiac or pulm complaints   - by revised Cardiac risk pt has high risk ( DM, CAD)   - pt medically optimized for needed surgery   -c/w IV hydration, IV cardiazem prn if NPO , BP control, DVT prophylaxis as per surgery   - resume low dose ASA 81 as soon as cleared by surgery     Mild anemia - monitor post-op     CAD  - c/w statins , resume ASA 81 once cleared by surgery     CKD stage 3 baseline 1.3-1.5   - IV fluids changes to 1/2NS  - c/w  lisinopril , monitor renal function    HTN - c/w verapamil 240 qd as toelrated , use IV Cardizem if NPO     Hypothyroidism - c/w levothyroxine 88 , check TSH, free T4 in am    Chronic back pain - c/w tramadol prn     DM2 - diet, stop metformin, glimepiride , diet, ISS, A1C 6.6     Macular degeneration - c/w vitamins    Hearing loss - supportive care       Pls call with q     88 y/o M with a PMHx of sepsis, neuropathy, prostate CA, Crohn disease, hypothyroidism, depression, intervertebral disc disorder, DM2,  obesity, HTN, HLD,  CAD s/p stents , macular degeneration, hearing loss admitted  6/28  to the surgical service for abdominal pain. Patient says that he was scheduled for colon cancer surgery diagnosed by a recent colonoscopy by Dr. Padilla to have surgery on Wednesday by Dr. Ardon. Pt has diarrhea and right side pain.     Assessment/Plan:   Infiltrating adenocarcinoma of  Sigmoid colon , h/o Chron' s  disease   Mild anemia likely due to Adenocarcinoma   - s/p lap sigmoidectomy and ileocolic resection on 6/29  - pain under control, cont pain meds; monitor H&H  - encourage OOB and IS   - on CLD, advance diet per surgery     CAD  - c/w statins , resume ASA 81 once cleared by surgery     CKD stage 3 baseline 1.3-1.5   - IV fluids changes to 1/2NS  - Serum Creatinine rising post-op, cont IVFs, will hold lisinopril , patient also taking his own metformin at bedside (not prescribed by us)   patient counseled to stop taking metformin, family to take his meds back home  monitor renal function    HTN - c/w verapamil 240 qd as toelrated , use IV Cardizem if NPO     Hypothyroidism - c/w levothyroxine 88 , check TSH, free T4 in am    Chronic back pain - c/w tramadol prn     DM2 - diet, stop metformin, glimepiride , diet, ISS, A1C 6.6     Macular degeneration - c/w vitamins    Hearing loss - supportive care       Pls call with q   discussed with nephew at bedside

## 2022-06-30 NOTE — PHYSICAL THERAPY INITIAL EVALUATION ADULT - ADDITIONAL COMMENTS
pt is very angry about his level of function following his 2nd back surgery 5 years ago, did not have sensation in BLEs post-op and limits his activity level; pt can ambulate limited distances then c/o pain developing B legs pt is very angry about his level of function following his 2nd back surgery 5 years ago, did not have sensation in BLEs post-op and limits his activity leve yet also c/o neuropathy in B hands to a degree l; pt can ambulate limited distances then c/o pain developing B legs

## 2022-06-30 NOTE — PHYSICAL THERAPY INITIAL EVALUATION ADULT - LEVEL OF INDEPENDENCE, REHAB EVAL
logrolls to L side with min/modAx1/minimum assist (75% patients effort)/moderate assist (50% patients effort)

## 2022-06-30 NOTE — PROVIDER CONTACT NOTE (MEDICATION) - REASON
----- Message from Katja Wood MD sent at 3/3/2017 11:59 AM CST -----  All labs are normal.  ----- Message -----     From: Leatha Soni In [facility]     Sent: 3/2/2017   5:02 PM       To: Katja Wood MD         Patient refusing insulin shot

## 2022-06-30 NOTE — PHYSICAL THERAPY INITIAL EVALUATION ADULT - MARITAL STATUS
lives alone; pt has 2 children living in [place] and [place] who he states "don't give a hoot about me,its a long story.." sounds like they are alienated from father; Pt speaks about a nephew he is fond of but doesn't get along with nephews wife nor her mother/

## 2022-06-30 NOTE — PHYSICAL THERAPY INITIAL EVALUATION ADULT - PERTINENT HX OF CURRENT PROBLEM, REHAB EVAL
pt c/o R sided abdominal pain & diarrhea; recently diagnosed with colon CA by colonoscopy (Dr Padilla) and scheduled for elective surgery with Dr Gabrielle Ardon

## 2022-06-30 NOTE — PROGRESS NOTE ADULT - SUBJECTIVE AND OBJECTIVE BOX
Pt seen and examined at bedside, no acute events. Pt complaining of pain. Tolerating clear liquid diet. No bowel function. Denied fever, chills, nausea, vomiting or SOB overnight.     Vital Signs Last 24 Hrs  T(C): 36.7 (30 Jun 2022 08:28), Max: 36.8 (29 Jun 2022 21:38)  T(F): 98.1 (30 Jun 2022 08:28), Max: 98.2 (29 Jun 2022 21:38)  HR: 65 (30 Jun 2022 09:02) (60 - 75)  BP: 137/52 (30 Jun 2022 09:02) (126/49 - 154/60)  BP(mean): 81 (29 Jun 2022 17:10) (81 - 81)  RR: 18 (30 Jun 2022 08:28) (12 - 18)  SpO2: 96% (30 Jun 2022 08:28) (96% - 100%)    Labs:                              9.8    11.40 )-----------( 293      ( 30 Jun 2022 08:02 )             30.7     CBC Full  -  ( 30 Jun 2022 08:02 )  WBC Count : 11.40 K/uL  RBC Count : 3.37 M/uL  Hemoglobin : 9.8 g/dL  Hematocrit : 30.7 %  Platelet Count - Automated : 293 K/uL  Mean Cell Volume : 91.1 fl  Mean Cell Hemoglobin : 29.1 pg  Mean Cell Hemoglobin Concentration : 31.9 gm/dL  Auto Neutrophil # : x  Auto Lymphocyte # : x  Auto Monocyte # : x  Auto Eosinophil # : x  Auto Basophil # : x  Auto Neutrophil % : x  Auto Lymphocyte % : x  Auto Monocyte % : x  Auto Eosinophil % : x  Auto Basophil % : x    06-30    145  |  116<H>  |  21  ----------------------------<  173<H>  4.3   |  23  |  1.77<H>    Ca    8.7      30 Jun 2022 08:02  Phos  3.7     06-30  Mg     1.7     06-30    TPro  5.9<L>  /  Alb  2.8<L>  /  TBili  0.4  /  DBili  x   /  AST  15  /  ALT  29  /  AlkPhos  56  06-30    LIVER FUNCTIONS - ( 30 Jun 2022 08:02 )  Alb: 2.8 g/dL / Pro: 5.9 gm/dL / ALK PHOS: 56 U/L / ALT: 29 U/L / AST: 15 U/L / GGT: x                   Physical Exam:  Pt is AAOx3  General: Well developed, in no acute distress.   Chest: Lungs clear, no rales, no rhonchi, no wheezes.   Heart: RR, no murmurs, no rubs, no gallops.   Abdomen: Soft, no tenderness, nondistended, no masses, BS normal.  incisions c/d/i  Back: Normal curvature, no tenderness.   Neuro: Physiological, no localizing findings.   Skin: Normal, no rashes, no lesions noted.   Extremities: Warm, well perfused, no edema, Pulses intact

## 2022-06-30 NOTE — PHYSICAL THERAPY INITIAL EVALUATION ADULT - PRECAUTIONS/LIMITATIONS, REHAB EVAL
+macular degeneration , hard of hearing/hearing precautions/vision precautions +macular degeneration , hard of hearing/hearing precautions/surgical precautions/vision precautions

## 2022-06-30 NOTE — PHYSICAL THERAPY INITIAL EVALUATION ADULT - DIAGNOSIS, PT EVAL
infiltrating adenoCA sigmoid colon , s/p sigmoid colectomy 6/29/22 +h/o Crohns disease, neuropathy, obesity infiltrating adenoCA sigmoid colon , s/p laparoscopic ileo resection, sigmoid colectomy & colorectal anastamosis 6/29/22 +h/o Crohns disease, neuropathy, obesity

## 2022-06-30 NOTE — PHYSICAL THERAPY INITIAL EVALUATION ADULT - PLANNED THERAPY INTERVENTIONS, PT EVAL
splinting/regulation of breathing techniques when transitioning to limit abdominal pain (exhale with transitions)/bed mobility training/gait training/ROM/strengthening/transfer training

## 2022-06-30 NOTE — PHYSICAL THERAPY INITIAL EVALUATION ADULT - DISCHARGE DISPOSITION, PT EVAL
TBD based on progress with PT ,improved level of functional independence and endurance/activity tolerance as pain improves post-op/home w/ home PT/rehabilitation facility

## 2022-06-30 NOTE — PROVIDER CONTACT NOTE (MEDICATION) - BACKGROUND
Patient has a hx of DM and was refusing insulin this morning. Education was provided earlier that the patient can not get metformin in the hospital d/t affects on kidney function. Patient disregarded education and took medication.

## 2022-06-30 NOTE — PROGRESS NOTE ADULT - ATTENDING COMMENTS
Patient seen and examined at bedside this morning. Patient reports improved pain in comparison to yesterday, and overall he feels better. Patient Abdomen soft, remains distended, appropriately tender.     Vital Signs Last 24 Hrs  T(C): 36.7 (30 Jun 2022 08:28), Max: 36.8 (29 Jun 2022 21:38)  T(F): 98.1 (30 Jun 2022 08:28), Max: 98.2 (29 Jun 2022 21:38)  HR: 77 (30 Jun 2022 10:43) (60 - 77)  BP: 121/50 (30 Jun 2022 10:43) (121/50 - 154/60)  BP(mean): 81 (29 Jun 2022 17:10) (81 - 81)  RR: 18 (30 Jun 2022 08:28) (12 - 18)  SpO2: 96% (30 Jun 2022 08:28) (96% - 100%)                          9.8    11.40 )-----------( 293      ( 30 Jun 2022 08:02 )             30.7     06-30    145  |  116<H>  |  21  ----------------------------<  173<H>  4.3   |  23  |  1.77<H>    Ca    8.7      30 Jun 2022 08:02  Phos  3.7     06-30  Mg     1.7     06-30    TPro  5.9<L>  /  Alb  2.8<L>  /  TBili  0.4  /  DBili  x   /  AST  15  /  ALT  29  /  AlkPhos  56  06-30      A/P  Continue to ensure adequate pain control  Keep on clear liquids at this time  Serial abdominal exams  Follow for obwel movement  Discontinue Stroud, follow up voids  Daily physical therapy  OOB with ambulation  Continue close supportive care during recovery Patient seen and examined at bedside this morning. Patient reports improved pain in comparison to yesterday, and overall he feels better. Patient Abdomen soft, remains distended, appropriately tender.     Vital Signs Last 24 Hrs  T(C): 36.7 (30 Jun 2022 08:28), Max: 36.8 (29 Jun 2022 21:38)  T(F): 98.1 (30 Jun 2022 08:28), Max: 98.2 (29 Jun 2022 21:38)  HR: 77 (30 Jun 2022 10:43) (60 - 77)  BP: 121/50 (30 Jun 2022 10:43) (121/50 - 154/60)  BP(mean): 81 (29 Jun 2022 17:10) (81 - 81)  RR: 18 (30 Jun 2022 08:28) (12 - 18)  SpO2: 96% (30 Jun 2022 08:28) (96% - 100%)                          9.8    11.40 )-----------( 293      ( 30 Jun 2022 08:02 )             30.7     06-30    145  |  116<H>  |  21  ----------------------------<  173<H>  4.3   |  23  |  1.77<H>    Ca    8.7      30 Jun 2022 08:02  Phos  3.7     06-30  Mg     1.7     06-30    TPro  5.9<L>  /  Alb  2.8<L>  /  TBili  0.4  /  DBili  x   /  AST  15  /  ALT  29  /  AlkPhos  56  06-30      A/P  Continue to ensure adequate pain control  Keep on clear liquids at this time  Continue with IV fluid resuscitation  Serial abdominal exams  Follow for bowel movement  Discontinue Stroud, follow up voids  Strict I's and O's, follow up output  Daily physical therapy  OOB with ambulation  Continue close supportive care during recovery

## 2022-06-30 NOTE — PROVIDER CONTACT NOTE (OTHER) - SITUATION
patient refusing blood glucose
patient refusing 10pm Moviprep
Notified Surgical Resident that patient was not making adequate urine this AM. Stroud was planned to be d/c today, but OK with MD to keep in until making adequate urine

## 2022-06-30 NOTE — PHYSICAL THERAPY INITIAL EVALUATION ADULT - IMPAIRMENTS CONTRIBUTING TO GAIT DEVIATIONS, PT EVAL
BLEs , abdominal pain post-op/pain/impaired postural control/decreased sensation/impaired sensory feedback

## 2022-06-30 NOTE — PHYSICAL THERAPY INITIAL EVALUATION ADULT - GENERAL OBSERVATIONS, REHAB EVAL
pt rec'd supine in bed , a bit cool on my greeting/introduction , not confident he will be able to mobilize due to abdominal incisional pain ,awake,alert, Ox4, wears eyeglasses, B Flowtrons ,

## 2022-06-30 NOTE — PHYSICAL THERAPY INITIAL EVALUATION ADULT - GROSSLY INTACT, SENSORY
05-Aug-2018 +peripheral neuropathy affecting BLEs stocking distribution and to a lesser degree B hands/finger tips

## 2022-06-30 NOTE — PHYSICAL THERAPY INITIAL EVALUATION ADULT - LEVEL OF INDEPENDENCE: SUPINE/SIT, REHAB EVAL
Fidelia root is an adaptogen   (a substance used for stress protection and anxiety reduction) with significant anti-inflammatory & antioxidant activity.  It is used extensively in the Ayurvedic system of medicine for premature aging and debility. It is classified as an herb that enhances longevity, youth, memory, intellect, immunity and physical endurance.  May use for arthritis or nervous exhaustion.  Dose: 500 mg of extracts standardized to 2.5-5 % withanolides; use 2-3 times daily; or use 2-3 gram daily with the tincture.      It is a preferable adaptogen for those who suffer from irritability, anxiety and insomnia. Ashwaganda has relaxing activity for:  asthma, lung specific, nervousness, tenseness, irritability and poor sleep (where cannot turn off the mind).  Ashwaganda is often combined with other adaptogenic herbs, such as ginseng.    The dried root can be prepared as a decoction (simmer the root in water for at least 20 minutes), usual daily dose of 3-6 grams. The dose for tincture (1:5) generally is 5 ml three times daily.   Caution: use with pregnancy.  May interact with barbiturate sedatives, diabetic & thyroid medication; may increase serum T3 not T4 (check T3 & T4 if using long term)     Rhodiola is an adaptogen.   Used for fatigue, protects body when under stress (hypothalamic-pituitary-adrenal axis), or has depression, anxiety, chronic fatigue syndrome, body hurts, alertness & to enhance ability to work.   Dose: 100-150 mg daily x 1-2 weeks then increase dose to  300-400mg (usual dose) (Response can be dramatic).    Get good extract with 3% Rosevin & 0.8% salidroside.  ======================================    [email or web address]      Consider mindfulness meditation:    Rosemarie Byrne - migel accredited mindfulness-based stress reduction  [email or web address]  [email or web address]  [phone]    Dr. Campbell Candelario is a practicing Smiley Anabaptism in the tradition of the [place] Yovany master  Al Aggarwal. He is one of the founding members, and is the present president, of the [facility] [place]. He teaches a variety of classes including Mindfulness in Everyday Life.  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    Consider looking into classes at the [facility].  Campbell is also available for personal consultations. You may reach him at  [email or web address] ([employer]).   ========================  MEDITATION RESOURCES    Marylu Samano, a Tibetan meditation master known for the practicality of his technique and relate-ability of his presentation style.  See [employer]com    Ivy Zamora  Teacher and  of guided imagery.   See [employer]    Health Journeys on [employer].com    Joseph French - MBSR   MBSR:  Mindfulness Based Stress Reduction brings together mindfulness meditation and yoga. Although MBSR is a training with potential benefits for all types of participants, historically, students have suffered from a wide range of chronic disorders and diseases. MBSR is an 8-week intensive training in mindfulness meditation, based on ancient healing practices, which meets on a weekly basis. Mindfulness practice is ideal for cultivating greater awareness of the unity of mind and body, as well as of the ways the unconscious thoughts, feelings, and behaviors can undermine emotional, physical, and spiritual health. The mind is known to be a factor in stress and stress-related disorders, and meditation has been shown to positively effect a range of autonomic physiological processes, such as lowering blood pressure and reducing overall arousal and emotional reactivity. In addition to mindfulness practices, MBSR uses yoga to help reverse the prevalence of disuse atrophy from our culture's largely sedentary lifestyle, especially for those with pain and chronic illnesses. The program brings meditation and yoga together so that the virtues of  both can be experienced simultaneously.  The MBSR program started in the Stress Reduction Clinic at the [facility] in 1979 and is now offered in over 200 medical centers, hospitals, and clinics around the world, including some of the leading integrative medical centers such as the [facility], the [facility], and the [facility]. Many of the MBSR classes are taught by physicians, nurses, social workers, and psychologists, as well as other health professionals who are seeking to reclaim and deepen some of the sacred reciprocity inherent in the doctor-caregiver/patient-client relationship. Their work is based on a need for an active partnership in a participatory medicine, one in which patient/clients take on significant responsibility for doing a certain kind of interior work in order to tap into their own deepest inner resources for learning, growing, healing, and transformation.  Mindfulness-Based Cognitive Therapy (MBCT) is a form of MBSR that includes information about depression as well as cognitive therapy-based exercises linking thinking and its resulting impact on feeling. MBCT demonstrates how participants can best work with these thoughts and feelings when depression threatens to overwhelm them and how to recognize depressive moods that can bring on negative thought patterns .  Mindfulness is a lifetime engagement--not to get somewhere else, but to be where and as we actually are in this very moment, whether the experience is pleasant, unpleasant, or neutral.  Other Resource Links  There are many good resources on workbooks, classes, and online programs if you google MBSR / Mindfulness Based Stress Reduction  [place].com   Mindfulness 360 https://www.youTimecrosube.com/channel/UCDurv_diN_vwnFxo_3jmsVA  [place] [facility]fulness https://www.Keeps[employer]ube.com/channel/UCjQxrBbp-9aCBzWIsiuHmiA  Sounds  True https://www.Park[employer].com/user/SoundsTrueVideos  Health Journeys with Ivy Bee https://www.[email or web address] Pet Foundationube.com/channel/RQhjpicf1pI7-Z9-q7trOg9j    https://[employer]psychologyWeft.Sanlorenzo/mindfulness-based-stress-reduction-mbsr/    https://TicketLeap/    [place] for Mindfulness A history of the Mindfulness-Based Stress Reduction Program Studies       Dr. Riki Simon  [facility]  Leading well  from Within  The big decision         moderate assist (50% patients effort)/maximum assist (25% patients effort)

## 2022-06-30 NOTE — PROVIDER CONTACT NOTE (MEDICATION) - SITUATION
Notified MD that patient took home medication without my knowledge. Unaware that patients nephew brought in his medications

## 2022-06-30 NOTE — PROVIDER CONTACT NOTE (OTHER) - ACTION/TREATMENT ORDERED:
MD made aware, no intervention at this time
OK to give 500cc bolus over 1 hour
MD made aware, stated okay for patient to refuse as long as patient is having clear bowel movements

## 2022-06-30 NOTE — PROGRESS NOTE ADULT - SUBJECTIVE AND OBJECTIVE BOX
REASON FOR VISIT: CAD    HPI:  87 y/[place] with a cardiac history significant for HTN, hypercholesterolemia, CAD, mild valvular heart disease, RBBB, and remote coronary stent admitted on 6/27 with abdominal discomfort and diarrhea in setting of colon cancer and Crohns disease; now s/p ileocolic resection and sigmoid colectomy.    6/30/22:  Abdominal tenderness; no cardiac complaints.    MEDICATIONS  (STANDING):  acetaminophen     Tablet .. 975 milliGRAM(s) Oral every 6 hours  alvimopan 12 milliGRAM(s) Oral two times a day  aspirin enteric coated 81 milliGRAM(s) Oral at bedtime  atorvastatin 20 milliGRAM(s) Oral at bedtime  cholecalciferol Oral Tab/Cap - Peds 1000 Unit(s) Oral daily  dextrose 5% + sodium chloride 0.9%. 1000 milliLiter(s) (125 mL/Hr) IV Continuous <Continuous>  dextrose 5%. 1000 milliLiter(s) (100 mL/Hr) IV Continuous <Continuous>  dextrose 5%. 1000 milliLiter(s) (50 mL/Hr) IV Continuous <Continuous>  dextrose 50% Injectable 25 Gram(s) IV Push once  dextrose 50% Injectable 12.5 Gram(s) IV Push once  dextrose 50% Injectable 25 Gram(s) IV Push once  glucagon  Injectable 1 milliGRAM(s) IntraMuscular once  heparin   Injectable 5000 Unit(s) SubCutaneous every 8 hours  insulin lispro (ADMELOG) corrective regimen sliding scale   SubCutaneous three times a day before meals  insulin lispro (ADMELOG) corrective regimen sliding scale   SubCutaneous at bedtime  levothyroxine 88 MICROGram(s) Oral daily  lisinopril 10 milliGRAM(s) Oral daily  multivitamin 1 Tablet(s) Oral daily  PreserVision (AREDS 2) 1 Capsule(s) 1 Capsule(s) Oral daily  sodium chloride 0.45%. 1000 milliLiter(s) (120 mL/Hr) IV Continuous <Continuous>  sodium chloride 0.9%. 1000 milliLiter(s) (120 mL/Hr) IV Continuous <Continuous>  verapamil  milliGRAM(s) Oral daily    MEDICATIONS  (PRN):  acetaminophen   IVPB .. 1000 milliGRAM(s) IV Intermittent once PRN Mild Pain (1 - 3)  dextrose Oral Gel 15 Gram(s) Oral once PRN Blood Glucose LESS THAN 70 milliGRAM(s)/deciliter  ketorolac   Injectable 15 milliGRAM(s) IV Push every 6 hours PRN Moderate Pain (4 - 6)  morphine  - Injectable 4 milliGRAM(s) IV Push every 6 hours PRN Severe Pain (7 - 10)  ondansetron Injectable 4 milliGRAM(s) IV Push every 6 hours PRN Nausea and/or Vomiting  traMADol 50 milliGRAM(s) Oral Once PRN Moderate Pain (4 - 6)    Vital Signs Last 24 Hrs  T(C): 36.7 (30 Jun 2022 08:28), Max: 36.8 (29 Jun 2022 21:38)  T(F): 98.1 (30 Jun 2022 08:28), Max: 98.2 (29 Jun 2022 21:38)  HR: 77 (30 Jun 2022 10:43) (60 - 77)  BP: 121/50 (30 Jun 2022 10:43) (121/50 - 154/60)  BP(mean): 81 (29 Jun 2022 17:10) (81 - 81)  RR: 18 (30 Jun 2022 08:28) (12 - 18)  SpO2: 96% (30 Jun 2022 08:28) (96% - 100%)    PHYSICAL EXAM:  Constitutional: NAD, awake and alert, seated in bedside chair  Respiratory: Breath sounds are clear bilaterally  Cardiovascular: S1 and S2, regular rate and rhythm    LABS:              9.8    11.40 )-----------( 293      ( 30 Jun 2022 08:02 )             30.7     145  |  116<H>  |  21  ----------------------------<  173<H>  4.3   |  23  |  1.77<H>    Ca    8.7      30 Jun 2022 08:02  Phos  3.7     06-30  Mg     1.7     06-30    TPro  5.9<L>  /  Alb  2.8<L>  /  TBili  0.4  /  DBili  x   /  AST  15  /  ALT  29  /  AlkPhos  56  06-30    TroponinI hsT: 11.26    12 Lead ECG (06.27.22 @ 13:01) >  Sinus bradycardia  Left axis deviation  Right bundle branch block  Abnormal ECG    TTE Echo Complete w/o Contrast w/ Doppler (06.28.22 @ 09:03):   Mild to Moderate mitral regurgitation is present.   EA reversal of the mitral inflow consistent with reduced compliance of the left ventricle.   Mild aortic stenosis. Trace aortic regurgitation.   The tricuspid valve leaflets are thin and pliable; valve motion is normal.   Trace tricuspid valve regurgitation is present.   Pulmonic valve not well seen.   Normal appearing left atrium.   Mild concentric left ventricular hypertrophy is present.   Estimated left ventricular ejection fraction is 65-70 %.

## 2022-06-30 NOTE — PROGRESS NOTE ADULT - SUBJECTIVE AND OBJECTIVE BOX
CC:  88 y/o M with a PMHx of sepsis, neuropathy, prostate CA, Crohn disease, hypothyroidism, depression, intervertebral disc disorder, DM2,  obesity, HTN, HLD,  CAD s/p stents , macular degeneration, hearing loss admitted  6/28  to the surgical service for abdominal pain. Patient says that he was scheduled for colon cancer surgery diagnosed by a recent colonoscopy by Dr. Padilla to have surgery on Wednesday by Dr. Ardon. Pt has diarrhea and right side pain.     6/29 - no cp palps sob abdo pain, lying flat in bed w/o any resp distress       Constitutional: NAD, awake and alert  HEENT: PERR, EOMI  Neck: Soft and supple,  No JVD  Respiratory: Breath sounds are clear bilaterally, No wheezing, rales or rhonchi  Cardiovascular: S1 and S2, regular rate and rhythm, no Murmurs  Gastrointestinal: Bowel Sounds present, soft, nontender, nondistended  Extremities: No peripheral edema  Vascular: 2+ peripheral pulses  Neurological: A/O x 3, no focal deficits  Musculoskeletal: 5/5 strength b/l upper and lower extremities  Skin: No rashes    LABS: All Labs Reviewed:                      10.3   13.93 )-----------( 267      ( 29 Jun 2022 16:39 )             32.6   145  |  117<H>  |  20  ----------------------------<  177<H>  4.2   |  21<L>  |  1.43<H>  Ca    9.0      29 Jun 2022 16:39  Phos  4.0     06-29  Mg     1.6     06-29  TPro  6.6  /  Alb  3.3  /  TBili  0.3  /  DBili  x   /  AST  24  /  ALT  36  /  AlkPhos  71  06-29  PT/INR - ( 28 Jun 2022 07:24 )   PT: 13.7 sec;   INR: 1.18 ratio    PTT - ( 28 Jun 2022 07:24 )  PTT:30.9 sec      RADIOLOGY/EKG:  < from: Xray Chest 1 View- PORTABLE-Urgent (06.27.22 @ 13:50) >  No evidence of active chest disease.    ECHO   The mitral valve leaflets appear thickened.   Mild mitral annular calcification is present.   Mild to Moderate mitral regurgitation is present.   2 jets noted.   EA reversal of the mitral inflow consistent with reduced compliance of   the   left ventricle.   The aortic valve is visualized, appears mildly calcified. Valve opening   seems to be restricted.   Peak and mean transaortic gradients are 27 and 13 mmHg respectively; this   finding is consistent with mild aortic stenosis.   Trace aortic regurgitation is present.   The tricuspid valve leaflets are thin and pliable; valve motion is   normal.   Trace tricuspid valve regurgitation is present.   Pulmonic valve not well seen.   Normal appearing left atrium.   Mild concentric left ventricular hypertrophy is present.   Estimated left ventricular ejection fraction is 65-70 %.        MEDS:   acetaminophen     Tablet .. 975 milliGRAM(s) Oral every 6 hours  acetaminophen   IVPB .. 1000 milliGRAM(s) IV Intermittent once PRN  alvimopan 12 milliGRAM(s) Oral two times a day  atorvastatin 20 milliGRAM(s) Oral at bedtime  cholecalciferol Oral Tab/Cap - Peds 1000 Unit(s) Oral daily  ciprofloxacin   IVPB 400 milliGRAM(s) IV Intermittent every 12 hours  dextrose 5% + sodium chloride 0.9%. 1000 milliLiter(s) IV Continuous <Continuous>  glucagon  Injectable 1 milliGRAM(s) IntraMuscular once  heparin   Injectable 5000 Unit(s) SubCutaneous every 8 hours  insulin lispro (ADMELOG) corrective regimen sliding scale   SubCutaneous three times a day before meals  insulin lispro (ADMELOG) corrective regimen sliding scale   SubCutaneous at bedtime  ketorolac   Injectable 15 milliGRAM(s) IV Push every 6 hours PRN  levothyroxine 88 MICROGram(s) Oral daily  lisinopril 10 milliGRAM(s) Oral daily  metroNIDAZOLE  IVPB 500 milliGRAM(s) IV Intermittent every 8 hours  morphine  - Injectable 4 milliGRAM(s) IV Push every 6 hours PRN  multivitamin 1 Tablet(s) Oral daily  PreserVision (AREDS 2) 1 Capsule(s) 1 Capsule(s) Oral daily  sodium chloride 0.45%. 1000 milliLiter(s) IV Continuous <Continuous>  traMADol 50 milliGRAM(s) Oral Once PRN  verapamil  milliGRAM(s) Oral daily         CC:  88 y/o M with a PMHx of sepsis, neuropathy, prostate CA, Crohn disease, hypothyroidism, depression, intervertebral disc disorder, DM2,  obesity, HTN, HLD,  CAD s/p stents , macular degeneration, hearing loss admitted  6/28  to the surgical service for abdominal pain. Patient says that he was scheduled for colon cancer surgery diagnosed by a recent colonoscopy by Dr. Padilla to have surgery on Wednesday by Dr. Ardon. Pt has diarrhea and right side pain.     6/29 - no cp palps sob abdo pain, lying flat in bed w/o any resp distress   6/30 - s/p surgery yesterday, pain is under control. no cp palps sob. patient has been taking his own metfomin tabs - counseled, family to take the meds back home       Vital Signs Last 24 Hrs  T(F): 98.7 (30 Jun 2022 15:46), Max: 98.7 (30 Jun 2022 15:46)  HR: 80 (30 Jun 2022 15:46) (64 - 80)  BP: 100/50 (30 Jun 2022 15:46) (100/50 - 148/58)  RR: 18 (30 Jun 2022 15:46) (18 - 18)  SpO2: 95% (30 Jun 2022 15:46) (95% - 98%)  Constitutional: NAD, awake and alert  HEENT: PERR, EOMI  Neck: Soft and supple,  No JVD  Respiratory: Breath sounds are clear bilaterally, No wheezing, rales or rhonchi  Cardiovascular: S1 and S2, regular rate and rhythm, no Murmurs  Gastrointestinal: Bowel Sounds present, soft, nontender, nondistended  Extremities: No peripheral edema  Vascular: 2+ peripheral pulses  Neurological: A/O x 3, no focal deficits  Musculoskeletal: 5/5 strength b/l upper and lower extremities  Skin: No rashes    LABS: All Labs Reviewed:                      10.3   13.93 )-----------( 267      ( 29 Jun 2022 16:39 )             32.6   145  |  117<H>  |  20  ----------------------------<  177<H>  4.2   |  21<L>  |  1.43<H>  Ca    9.0      29 Jun 2022 16:39  Phos  4.0     06-29  Mg     1.6     06-29  TPro  6.6  /  Alb  3.3  /  TBili  0.3  /  DBili  x   /  AST  24  /  ALT  36  /  AlkPhos  71  06-29  PT/INR - ( 28 Jun 2022 07:24 )   PT: 13.7 sec;   INR: 1.18 ratio    PTT - ( 28 Jun 2022 07:24 )  PTT:30.9 sec      RADIOLOGY/EKG:  < from: Xray Chest 1 View- PORTABLE-Urgent (06.27.22 @ 13:50) >  No evidence of active chest disease.    ECHO   The mitral valve leaflets appear thickened.   Mild mitral annular calcification is present.   Mild to Moderate mitral regurgitation is present.   2 jets noted.   EA reversal of the mitral inflow consistent with reduced compliance of   the   left ventricle.   The aortic valve is visualized, appears mildly calcified. Valve opening   seems to be restricted.   Peak and mean transaortic gradients are 27 and 13 mmHg respectively; this   finding is consistent with mild aortic stenosis.   Trace aortic regurgitation is present.   The tricuspid valve leaflets are thin and pliable; valve motion is   normal.   Trace tricuspid valve regurgitation is present.   Pulmonic valve not well seen.   Normal appearing left atrium.   Mild concentric left ventricular hypertrophy is present.   Estimated left ventricular ejection fraction is 65-70 %.        MEDS:   acetaminophen     Tablet .. 975 milliGRAM(s) Oral every 6 hours  acetaminophen   IVPB .. 1000 milliGRAM(s) IV Intermittent once PRN  alvimopan 12 milliGRAM(s) Oral two times a day  atorvastatin 20 milliGRAM(s) Oral at bedtime  cholecalciferol Oral Tab/Cap - Peds 1000 Unit(s) Oral daily  ciprofloxacin   IVPB 400 milliGRAM(s) IV Intermittent every 12 hours  dextrose 5% + sodium chloride 0.9%. 1000 milliLiter(s) IV Continuous <Continuous>  glucagon  Injectable 1 milliGRAM(s) IntraMuscular once  heparin   Injectable 5000 Unit(s) SubCutaneous every 8 hours  insulin lispro (ADMELOG) corrective regimen sliding scale   SubCutaneous three times a day before meals  insulin lispro (ADMELOG) corrective regimen sliding scale   SubCutaneous at bedtime  ketorolac   Injectable 15 milliGRAM(s) IV Push every 6 hours PRN  levothyroxine 88 MICROGram(s) Oral daily  lisinopril 10 milliGRAM(s) Oral daily  metroNIDAZOLE  IVPB 500 milliGRAM(s) IV Intermittent every 8 hours  morphine  - Injectable 4 milliGRAM(s) IV Push every 6 hours PRN  multivitamin 1 Tablet(s) Oral daily  PreserVision (AREDS 2) 1 Capsule(s) 1 Capsule(s) Oral daily  sodium chloride 0.45%. 1000 milliLiter(s) IV Continuous <Continuous>  traMADol 50 milliGRAM(s) Oral Once PRN  verapamil  milliGRAM(s) Oral daily

## 2022-06-30 NOTE — PROVIDER CONTACT NOTE (OTHER) - RECOMMENDATIONS
OK to give 500cc bolus and will monitor urine
continue to educate patient
continue to educate patient

## 2022-06-30 NOTE — PHYSICAL THERAPY INITIAL EVALUATION ADULT - WORK/LEISURE ACTIVITY, REHAB EVAL
pt enjoyed ballroom dancing and tennis prior to back surgery 5.5 years ago , has been unable to do anything due to lack of sensation B legs

## 2022-07-01 LAB
ALBUMIN SERPL ELPH-MCNC: 2.6 G/DL — LOW (ref 3.3–5)
ALP SERPL-CCNC: 56 U/L — SIGNIFICANT CHANGE UP (ref 40–120)
ALT FLD-CCNC: 25 U/L — SIGNIFICANT CHANGE UP (ref 12–78)
ANION GAP SERPL CALC-SCNC: 6 MMOL/L — SIGNIFICANT CHANGE UP (ref 5–17)
APPEARANCE UR: ABNORMAL
AST SERPL-CCNC: 12 U/L — LOW (ref 15–37)
BILIRUB SERPL-MCNC: 0.3 MG/DL — SIGNIFICANT CHANGE UP (ref 0.2–1.2)
BILIRUB UR-MCNC: NEGATIVE — SIGNIFICANT CHANGE UP
BUN SERPL-MCNC: 23 MG/DL — SIGNIFICANT CHANGE UP (ref 7–23)
CALCIUM SERPL-MCNC: 7.9 MG/DL — LOW (ref 8.5–10.1)
CHLORIDE SERPL-SCNC: 116 MMOL/L — HIGH (ref 96–108)
CO2 SERPL-SCNC: 22 MMOL/L — SIGNIFICANT CHANGE UP (ref 22–31)
COLOR SPEC: YELLOW — SIGNIFICANT CHANGE UP
CREAT ?TM UR-MCNC: 180 MG/DL — SIGNIFICANT CHANGE UP
CREAT SERPL-MCNC: 2.28 MG/DL — HIGH (ref 0.5–1.3)
DIFF PNL FLD: ABNORMAL
EGFR: 27 ML/MIN/1.73M2 — LOW
GLUCOSE SERPL-MCNC: 149 MG/DL — HIGH (ref 70–99)
GLUCOSE UR QL: NEGATIVE — SIGNIFICANT CHANGE UP
HCT VFR BLD CALC: 27.6 % — LOW (ref 39–50)
HGB BLD-MCNC: 8.6 G/DL — LOW (ref 13–17)
KETONES UR-MCNC: NEGATIVE — SIGNIFICANT CHANGE UP
LEUKOCYTE ESTERASE UR-ACNC: ABNORMAL
MAGNESIUM SERPL-MCNC: 1.6 MG/DL — SIGNIFICANT CHANGE UP (ref 1.6–2.6)
MCHC RBC-ENTMCNC: 29.3 PG — SIGNIFICANT CHANGE UP (ref 27–34)
MCHC RBC-ENTMCNC: 31.2 GM/DL — LOW (ref 32–36)
MCV RBC AUTO: 93.9 FL — SIGNIFICANT CHANGE UP (ref 80–100)
NITRITE UR-MCNC: NEGATIVE — SIGNIFICANT CHANGE UP
PH UR: 5 — SIGNIFICANT CHANGE UP (ref 5–8)
PHOSPHATE SERPL-MCNC: 2.9 MG/DL — SIGNIFICANT CHANGE UP (ref 2.5–4.5)
PLATELET # BLD AUTO: 205 K/UL — SIGNIFICANT CHANGE UP (ref 150–400)
POTASSIUM SERPL-MCNC: 4 MMOL/L — SIGNIFICANT CHANGE UP (ref 3.5–5.3)
POTASSIUM SERPL-SCNC: 4 MMOL/L — SIGNIFICANT CHANGE UP (ref 3.5–5.3)
PROT ?TM UR-MCNC: 204 MG/DL — HIGH (ref 0–12)
PROT SERPL-MCNC: 5.7 GM/DL — LOW (ref 6–8.3)
PROT UR-MCNC: 100
PROT/CREAT UR-RTO: 1.1 RATIO — HIGH (ref 0–0.2)
RBC # BLD: 2.94 M/UL — LOW (ref 4.2–5.8)
RBC # FLD: 16.7 % — HIGH (ref 10.3–14.5)
SODIUM SERPL-SCNC: 144 MMOL/L — SIGNIFICANT CHANGE UP (ref 135–145)
SP GR SPEC: 1.02 — SIGNIFICANT CHANGE UP (ref 1.01–1.02)
UROBILINOGEN FLD QL: NEGATIVE — SIGNIFICANT CHANGE UP
WBC # BLD: 12.96 K/UL — HIGH (ref 3.8–10.5)
WBC # FLD AUTO: 12.96 K/UL — HIGH (ref 3.8–10.5)

## 2022-07-01 PROCEDURE — 99232 SBSQ HOSP IP/OBS MODERATE 35: CPT

## 2022-07-01 PROCEDURE — 74019 RADEX ABDOMEN 2 VIEWS: CPT | Mod: 26

## 2022-07-01 PROCEDURE — 71045 X-RAY EXAM CHEST 1 VIEW: CPT | Mod: 26

## 2022-07-01 RX ORDER — I.V. FAT EMULSION 20 G/100ML
0.61 EMULSION INTRAVENOUS
Qty: 50 | Refills: 0 | Status: DISCONTINUED | OUTPATIENT
Start: 2022-07-01 | End: 2022-07-01

## 2022-07-01 RX ORDER — MAGNESIUM SULFATE 500 MG/ML
1 VIAL (ML) INJECTION ONCE
Refills: 0 | Status: COMPLETED | OUTPATIENT
Start: 2022-07-01 | End: 2022-07-01

## 2022-07-01 RX ORDER — ELECTROLYTE SOLUTION,INJ
1 VIAL (ML) INTRAVENOUS
Refills: 0 | Status: DISCONTINUED | OUTPATIENT
Start: 2022-07-01 | End: 2022-07-01

## 2022-07-01 RX ORDER — ALBUMIN HUMAN 25 %
50 VIAL (ML) INTRAVENOUS EVERY 4 HOURS
Refills: 0 | Status: DISCONTINUED | OUTPATIENT
Start: 2022-07-01 | End: 2022-07-01

## 2022-07-01 RX ORDER — SODIUM CHLORIDE 9 MG/ML
1000 INJECTION, SOLUTION INTRAVENOUS ONCE
Refills: 0 | Status: COMPLETED | OUTPATIENT
Start: 2022-07-01 | End: 2022-07-01

## 2022-07-01 RX ORDER — LANOLIN ALCOHOL/MO/W.PET/CERES
5 CREAM (GRAM) TOPICAL AT BEDTIME
Refills: 0 | Status: DISCONTINUED | OUTPATIENT
Start: 2022-07-01 | End: 2022-07-04

## 2022-07-01 RX ADMIN — HEPARIN SODIUM 5000 UNIT(S): 5000 INJECTION INTRAVENOUS; SUBCUTANEOUS at 21:55

## 2022-07-01 RX ADMIN — SODIUM CHLORIDE 120 MILLILITER(S): 9 INJECTION INTRAMUSCULAR; INTRAVENOUS; SUBCUTANEOUS at 00:05

## 2022-07-01 RX ADMIN — Medication 50 MILLILITER(S): at 05:33

## 2022-07-01 RX ADMIN — HEPARIN SODIUM 5000 UNIT(S): 5000 INJECTION INTRAVENOUS; SUBCUTANEOUS at 05:24

## 2022-07-01 RX ADMIN — I.V. FAT EMULSION 20.83 GM/KG/DAY: 20 EMULSION INTRAVENOUS at 22:01

## 2022-07-01 RX ADMIN — Medication 975 MILLIGRAM(S): at 05:24

## 2022-07-01 RX ADMIN — Medication 975 MILLIGRAM(S): at 22:01

## 2022-07-01 RX ADMIN — Medication 88 MICROGRAM(S): at 05:24

## 2022-07-01 RX ADMIN — Medication 975 MILLIGRAM(S): at 05:54

## 2022-07-01 RX ADMIN — Medication 1 EACH: at 22:01

## 2022-07-01 RX ADMIN — Medication 100 GRAM(S): at 14:14

## 2022-07-01 RX ADMIN — Medication 30 MILLILITER(S): at 02:28

## 2022-07-01 RX ADMIN — ATORVASTATIN CALCIUM 20 MILLIGRAM(S): 80 TABLET, FILM COATED ORAL at 21:54

## 2022-07-01 RX ADMIN — Medication 81 MILLIGRAM(S): at 21:54

## 2022-07-01 RX ADMIN — SODIUM CHLORIDE 1000 MILLILITER(S): 9 INJECTION, SOLUTION INTRAVENOUS at 09:17

## 2022-07-01 RX ADMIN — SODIUM CHLORIDE 120 MILLILITER(S): 9 INJECTION INTRAMUSCULAR; INTRAVENOUS; SUBCUTANEOUS at 20:09

## 2022-07-01 NOTE — DIETITIAN INITIAL EVALUATION ADULT - ETIOLOGY
r/t decreased ability to meet increased nutrient needs 2/2 altered GI function (ileus on Crohn's/ Colon Ca)

## 2022-07-01 NOTE — DIETITIAN INITIAL EVALUATION ADULT - ADD RECOMMEND
1) Initiate PPN as above; if suspected to be on PPN >5 days, consider PICC line placement to provide >80% ENN, 2) Daily weights, 3) Strict I & O's, 4) Daily lyte checks, 4) Weekly triglycerides/LFT checks, 5) POCT q6 hours - maintain BG levels between 140- 180 mg/dL. RD will continue to monitor PPN, labs, hydration, and wt prn.

## 2022-07-01 NOTE — PROVIDER CONTACT NOTE (OTHER) - REASON
Patient not producing enough urine
patient refusing blood glucose
patient refusing 10pm Moviprep
Notified MD that patient is not making adequate urine output

## 2022-07-01 NOTE — CONSULT NOTE ADULT - SUBJECTIVE AND OBJECTIVE BOX
88 y/o M with a PMHx of sepsis, neuropathy, prostate CA, Crohn disease, hypothyroidism, depression, intervertebral disc disorder, DM2,  obesity, HTN, HLD,  CAD s/p stents , macular degeneration, hearing loss admitted    to the surgical service for abdominal pain. Patient says that he was scheduled for colon cancer surgery diagnosed by a recent colonoscopy by Dr. Padilla to have surgery on Wednesday by Dr. Ardon. Pt has diarrhea and right side pain.   POD #2  - s/p surgery lap ileocolic resection    today pt very frustrated by his hosp course- c/o diarrhea and requesting his home med for Crohns - Colestipol    Renal eval called for rising Cr today postop    pt was on Lisinopril and taking home dose of metformin from his belongings - last dose yesterday    pt very angry at times when asked specific questions about his med hx - denies hx of CKD or seeing a nephrologist       PAST MEDICAL & SURGICAL HISTORY:  Hearing loss  left hearing aid, right ear deaf      Macular degeneration  right eye      CAD (coronary artery disease)      Stented coronary artery  2 stents, 20 yrs ago, 15 yrs ago      HTN (hypertension)      Hypercholesterolemia      Obesity      Diabetes mellitus      Intervertebral disc disorder      Depression      Hypothyroidism      Crohn disease      Prostate CA  s/p XRT      Neuropathy      Sepsis  s/p back surgery      History of hip replacement, total, right      History of hip replacement, total, right  Revision      S/P lumbar fusion      S/P colon resection          MEDICATIONS  (STANDING):  acetaminophen     Tablet .. 975 milliGRAM(s) Oral every 6 hours  aspirin enteric coated 81 milliGRAM(s) Oral at bedtime  atorvastatin 20 milliGRAM(s) Oral at bedtime  cholecalciferol Oral Tab/Cap - Peds 1000 Unit(s) Oral daily  dextrose 5%. 1000 milliLiter(s) (50 mL/Hr) IV Continuous <Continuous>  dextrose 5%. 1000 milliLiter(s) (100 mL/Hr) IV Continuous <Continuous>  dextrose 50% Injectable 25 Gram(s) IV Push once  dextrose 50% Injectable 12.5 Gram(s) IV Push once  dextrose 50% Injectable 25 Gram(s) IV Push once  fat emulsion (Fish Oil and Plant Based) 20% Infusion 0.612 Gm/kG/Day (20.83 mL/Hr) IV Continuous <Continuous>  glucagon  Injectable 1 milliGRAM(s) IntraMuscular once  heparin   Injectable 5000 Unit(s) SubCutaneous every 8 hours  insulin lispro (ADMELOG) corrective regimen sliding scale   SubCutaneous three times a day before meals  insulin lispro (ADMELOG) corrective regimen sliding scale   SubCutaneous at bedtime  levothyroxine 88 MICROGram(s) Oral daily  multivitamin 1 Tablet(s) Oral daily  Parenteral Nutrition - Adult 1 Each (83 mL/Hr) TPN Continuous <Continuous>  PreserVision (AREDS 2) 1 Capsule(s) 1 Capsule(s) Oral daily  sodium chloride 0.9%. 1000 milliLiter(s) (120 mL/Hr) IV Continuous <Continuous>  verapamil  milliGRAM(s) Oral daily      Allergies    chocolate (Unknown)  penicillin (Hives)    Intolerances      SOCIAL HISTORY:  Denies ETOh,Smoking,     FAMILY HISTORY:  No pertinent family history in first degree relatives        REVIEW OF SYSTEMS:    CONSTITUTIONAL: No , fevers or chills  EYES/ENT: No visual changes;  No vertigo or throat pain   NECK: No pain or stiffness  RESPIRATORY: No cough, wheezing, hemoptysis;  shortness of breath  CARDIOVASCULAR: No chest pain or palpitations  GASTROINTESTINAL:   diarrhea  ++   GENITOURINARY: No dysuria, frequency or hematuria  NEUROLOGICAL: No numbness or weakness  SKIN: No itching, burning, rashes, or lesions   All other review of systems is negative unless indicated above.    Vital Signs Last 24 Hrs  T(C): 37.8 (2022 21:20), Max: 37.8 (2022 21:20)  T(F): 100 (2022 21:20), Max: 100 (2022 21:20)  HR: 92 (2022 21:20) (88 - 97)  BP: 146/54 (2022 21:20) (122/47 - 161/69)  BP(mean): --  RR: 18 (2022 21:20) (16 - 18)  SpO2: 99% (2022 21:20) (93% - 99%)    I&O's Detail    2022 07:01  -  2022 07:00  --------------------------------------------------------  IN:  Total IN: 0 mL    OUT:    Indwelling Catheter - Urethral (mL): 445 mL  Total OUT: 445 mL    Total NET: -445 mL      2022 07:01  -  2022 23:58  --------------------------------------------------------  IN:  Total IN: 0 mL    OUT:    Indwelling Catheter - Urethral (mL): 250 mL  Total OUT: 250 mL    Total NET: -250 mL      PHYSICAL EXAM:    Constitutional: NAD  HEENT:  EOMI,  MM  Neck: No LAD, No JVD  Cardiovascular: S1 and S2  Gastrointestinal:  soft, distended, and tender   Extremities: No peripheral edema  Neurological: A/O x 3, no focal deficits  : Stroud + dark urine   Skin: No rashes  Access: Not applicable    LABS:                  144    |  116    |  23     ----------------------------<  149       2022 06:23  4.0     |  22     |  2.28     145    |  116    |  21     ----------------------------<  173       2022 08:02  4.3     |  23     |  1.77     145    |  117    |  20     ----------------------------<  177       2022 16:39  4.2     |  21     |  1.43     Ca    7.9        2022 06:23  Ca    8.7        2022 08:02    Phos  2.9       2022 06:23  Phos  3.7       2022 08:02    Mg     1.6       2022 06:23  Mg     1.7       2022 08:02    TPro  5.7    /  Alb  2.6    /  TBili  0.3    /        2022 06:23  DBili  x      /  AST  12     /  ALT  25     /  AlkPhos  56       TPro  5.9    /  Alb  2.8    /  TBili  0.4    /        2022 08:02  DBili  x      /  AST  15     /  ALT  29     /  AlkPhos  56           Urine Studies:  Urinalysis Basic - ( 2022 15:21 )    Color: Yellow / Appearance: very cloudy / S.025 / pH: x  Gluc: x / Ketone: Negative  / Bili: Negative / Urobili: Negative   Blood: x / Protein: 100 / Nitrite: Negative   Leuk Esterase: Trace / RBC: >50 /HPF / WBC 0-2   Sq Epi: x / Non Sq Epi: Negative / Bacteria: Many      Creatinine, Random Urine: 180 mg/dL ( @ 15:10)  Protein/Creatinine Ratio Calculation: 1.1 Ratio ( @ 15:10)        RADIOLOGY & ADDITIONAL STUDIES:

## 2022-07-01 NOTE — CONSULT NOTE ADULT - ASSESSMENT
86 yo male with hx of Sepsis, Prostate Ca, Crohn Dz, Colon cancer of sigmoid now postop laparascopic ilecolo resection   while on ACE     ROSE postop abd surgery  w CKD 3b Cr 1.4 - 1.6  in past - Cr now rising since then   ATN while on ACE vs Prerenal ?   urine lytes   Stroud already in place so doubt obstruction    Renal sono for baseline and eval echogencity    continue IVF   no further ACE or ARB   no NSAID ( no keterolac)    trend labs and UOP    attempted to reassure pt - pt insistent on going home if he doesn;t resume his Crohns meds due to ongoing diarrhea   defer this to Medicine and Surgical teams     Thank you for the courtesy of this consult. We will follow this patient with you.   Management is subject to change if new information becomes available or patient condition changes.           88 yo male with hx of Sepsis, Prostate Ca, Crohn Dz, Colon cancer of sigmoid now postop laparascopic ilecolo resection   while on ACE     ROSE postop abd surgery  w CKD 3b Cr 1.4 - 1.6  in past - Cr now rising since then   ATN while on ACE vs Prerenal ?   urine lytes   Stroud already in place so doubt obstruction but check postop abdominal surgery    Renal sono for hydro    continue IVF   no further ACE or ARB   no NSAID ( no keterolac)    trend labs and UOP    attempted to reassure pt - pt insistent on going home if he doesn;t resume his Crohns meds due to ongoing diarrhea   defer this to Medicine and Surgical teams     Thank you for the courtesy of this consult. We will follow this patient with you.   Management is subject to change if new information becomes available or patient condition changes.

## 2022-07-01 NOTE — PROGRESS NOTE ADULT - ATTENDING COMMENTS
Patient seen and examined at bedside this morning. Patient reports improved pain and less abdominal distention, overall he feels better. Tolerating clear liquids, having bowel function. Abdomen soft, remains distended but improved compared to yesterday, appropriately tender.     ICU Vital Signs Last 24 Hrs  T(C): 36.7 (01 Jul 2022 09:35), Max: 37.6 (30 Jun 2022 21:30)  T(F): 98.1 (01 Jul 2022 09:35), Max: 99.7 (30 Jun 2022 21:30)  HR: 93 (01 Jul 2022 09:35) (80 - 97)  BP: 122/49 (01 Jul 2022 09:35) (100/50 - 122/52)  BP(mean): --  ABP: --  ABP(mean): --  RR: 18 (01 Jul 2022 09:35) (18 - 18)  SpO2: 95% (01 Jul 2022 09:35) (93% - 97%)                          8.6    12.96 )-----------( 205      ( 01 Jul 2022 06:23 )             27.6       A/P  Continue to ensure adequate pain control  Hold on diet at this time  AXR  Continue with IV fluid resuscitation  Serial abdominal exams  Keep Stroud catheter  Increased Cr, consult Nephrology  Strict I's and O's, follow up output  Daily physical therapy  OOB with ambulation  Continue close supportive care during recovery.

## 2022-07-01 NOTE — PROGRESS NOTE ADULT - SUBJECTIVE AND OBJECTIVE BOX
Pt seen and examined at bedside, no acute events. Pt complaining of pain. Tolerating clear liquid diet. Pt not sure if passing gas. No bowel function yet. Denied fever, chills, nausea, vomiting or SOB overnight.     Vital Signs Last 24 Hrs  T(C): 37.5 (01 Jul 2022 05:13), Max: 37.6 (30 Jun 2022 21:30)  T(F): 99.5 (01 Jul 2022 05:13), Max: 99.7 (30 Jun 2022 21:30)  HR: 93 (01 Jul 2022 05:13) (77 - 97)  BP: 122/52 (01 Jul 2022 05:13) (100/50 - 122/52)  RR: 18 (01 Jul 2022 05:13) (18 - 18)  SpO2: 97% (01 Jul 2022 05:19) (93% - 97%)    Physical Exam:  Pt is AAOx3  General: Well developed, in no acute distress.   Chest: Lungs clear, no rales, no rhonchi, no wheezes.   Heart: RR, no murmurs, no rubs, no gallops.   Abdomen: Soft, some tenderness, distended, no masses.  incisions c/d/i  Back: Normal curvature, no tenderness.   Neuro: Physiological, no localizing findings.   Skin: Normal, no rashes, no lesions noted.   Extremities: Warm, well perfused, no edema, Pulses intact    LABS:                        8.6    12.96 )-----------( 205      ( 01 Jul 2022 06:23 )             27.6     01 Jul 2022 06:23    144    |  116    |  23     ----------------------------<  149    4.0     |  22     |  2.28     Ca    7.9        01 Jul 2022 06:23  Phos  2.9       01 Jul 2022 06:23  Mg     1.6       01 Jul 2022 06:23    TPro  5.7    /  Alb  2.6    /  TBili  0.3    /  DBili  x      /  AST  12     /  ALT  25     /  AlkPhos  56     01 Jul 2022 06:23

## 2022-07-01 NOTE — PROVIDER CONTACT NOTE (OTHER) - ASSESSMENT
Patient has had 150cc output this shift. Encouraging PO intake and patient getting IVF
patient refusing blood glucose, patient's order to get blood glucose every 6 hours. patient stated he only checks his sugar once daily, and he is not sick for us to keep checking it very often.
pt with low urine output, sx resident notified, came to bedside to see pt, 35cc output at 2200, 500cc bolus ordered and to order albumin
patient refusing 10pm Moviprep, patient stated he is having multiple clear BMs

## 2022-07-01 NOTE — PROGRESS NOTE ADULT - ASSESSMENT
86 y/o M with a PMHx of sepsis, neuropathy, prostate CA, Crohn disease, hypothyroidism, depression, intervertebral disc disorder, DM2,  obesity, HTN, HLD,  CAD s/p stents , macular degeneration, hearing loss admitted  6/28  to the surgical service for abdominal pain. Patient says that he was scheduled for colon cancer surgery diagnosed by a recent colonoscopy by Dr. Padilla to have surgery on Wednesday by Dr. Ardon. Pt has diarrhea and right side pain.     Assessment/Plan:   Infiltrating adenocarcinoma of  Sigmoid colon , h/o Chron' s  disease   Mild anemia likely due to Adenocarcinoma   - s/p lap sigmoidectomy and ileocolic resection on 6/29  - pain under control, cont pain meds; monitor H&H  - encourage OOB and IS   - on CLD, advance diet per surgery     CAD  - c/w statins , resume ASA 81 once cleared by surgery     CKD stage 3 baseline 1.3-1.5   - IV fluids changes to 1/2NS  - Serum Creatinine rising post-op, cont IVFs, will hold lisinopril , patient also taking his own metformin at bedside (not prescribed by us)   patient counseled to stop taking metformin, family to take his meds back home  monitor renal function    HTN - c/w verapamil 240 qd as toelrated , use IV Cardizem if NPO     Hypothyroidism - c/w levothyroxine 88 , check TSH, free T4 in am    Chronic back pain - c/w tramadol prn     DM2 - diet, stop metformin, glimepiride , diet, ISS, A1C 6.6     Macular degeneration - c/w vitamins    Hearing loss - supportive care       Pls call with q   discussed with nephew at bedside     88 y/o M with a PMHx of sepsis, neuropathy, prostate CA, Crohn disease, hypothyroidism, depression, intervertebral disc disorder, DM2,  obesity, HTN, HLD,  CAD s/p stents , macular degeneration, hearing loss admitted  6/28  to the surgical service for abdominal pain. Patient says that he was scheduled for colon cancer surgery diagnosed by a recent colonoscopy by Dr. Padilla to have surgery on Wednesday by Dr. Ardon. Pt has diarrhea and right side pain.     Assessment/Plan:   Infiltrating adenocarcinoma of  Sigmoid colon , h/o Chron' s  disease   Mild anemia likely due to Adenocarcinoma   - s/p lap sigmoidectomy and ileocolic resection on 6/29  - pain under control, cont pain meds; monitor H&H  - encourage OOB and IS   - on CLD, advance diet to FLD per DR Ardon  for Crohn's - is on colestipol at home. is having diarrhea due to entereg - DC entereg, hold off on colestipol for now     CAD  - c/w statins , ASA resumed     CKD stage 3 baseline 1.3-1.5   - IV fluids changes to 1/2NS  - Serum Creatinine rising post-op, cont IVFs, will hold lisinopril , patient also taking his own metformin at bedside (not prescribed by us)   patient counseled to stop taking metformin, family to take his meds back home  - ROSE on CKD is multifactorial due to recent stress from surgery as well as meds: patient off ACEi and metformin, Cr still up, cont IVFs, U/O improving.   Renal to see the patient     HTN - c/w verapamil 240 qd as toelrated , use IV Cardizem if NPO     Hypothyroidism - c/w levothyroxine 88 , check TSH, free T4 in am    Chronic back pain - c/w tramadol prn     DM2 - diet, stop metformin, glimepiride , diet, ISS, A1C 6.6     Macular degeneration - c/w vitamins    Hearing loss - supportive care       Pls call with vikki MUELLER - EMMIE Adkins

## 2022-07-01 NOTE — DIETITIAN INITIAL EVALUATION ADULT - PERTINENT MEDS FT
MEDICATIONS  (STANDING):  acetaminophen     Tablet .. 975 milliGRAM(s) Oral every 6 hours  alvimopan 12 milliGRAM(s) Oral two times a day  aspirin enteric coated 81 milliGRAM(s) Oral at bedtime  atorvastatin 20 milliGRAM(s) Oral at bedtime  cholecalciferol Oral Tab/Cap - Peds 1000 Unit(s) Oral daily  dextrose 5%. 1000 milliLiter(s) (50 mL/Hr) IV Continuous <Continuous>  dextrose 5%. 1000 milliLiter(s) (100 mL/Hr) IV Continuous <Continuous>  dextrose 50% Injectable 25 Gram(s) IV Push once  dextrose 50% Injectable 12.5 Gram(s) IV Push once  dextrose 50% Injectable 25 Gram(s) IV Push once  glucagon  Injectable 1 milliGRAM(s) IntraMuscular once  heparin   Injectable 5000 Unit(s) SubCutaneous every 8 hours  insulin lispro (ADMELOG) corrective regimen sliding scale   SubCutaneous three times a day before meals  insulin lispro (ADMELOG) corrective regimen sliding scale   SubCutaneous at bedtime  levothyroxine 88 MICROGram(s) Oral daily  magnesium sulfate  IVPB 1 Gram(s) IV Intermittent once  multivitamin 1 Tablet(s) Oral daily  PreserVision (AREDS 2) 1 Capsule(s) 1 Capsule(s) Oral daily  sodium chloride 0.9%. 1000 milliLiter(s) (120 mL/Hr) IV Continuous <Continuous>  verapamil  milliGRAM(s) Oral daily    MEDICATIONS  (PRN):  acetaminophen   IVPB .. 1000 milliGRAM(s) IV Intermittent once PRN Mild Pain (1 - 3)  dextrose Oral Gel 15 Gram(s) Oral once PRN Blood Glucose LESS THAN 70 milliGRAM(s)/deciliter  morphine  - Injectable 4 milliGRAM(s) IV Push every 6 hours PRN Severe Pain (7 - 10)  ondansetron Injectable 4 milliGRAM(s) IV Push every 6 hours PRN Nausea and/or Vomiting  traMADol 25 milliGRAM(s) Oral every 6 hours PRN Moderate Pain (4 - 6)

## 2022-07-01 NOTE — DIETITIAN INITIAL EVALUATION ADULT - PERTINENT LABORATORY DATA
07-01    144  |  116<H>  |  23  ----------------------------<  149<H>  4.0   |  22  |  2.28<H>    Ca    7.9<L>      01 Jul 2022 06:23  Phos  2.9     07-01  Mg     1.6     07-01    TPro  5.7<L>  /  Alb  2.6<L>  /  TBili  0.3  /  DBili  x   /  AST  12<L>  /  ALT  25  /  AlkPhos  56  07-01  POCT Blood Glucose.: 147 mg/dL (07-01-22 @ 07:24)  A1C with Estimated Average Glucose Result: 6.6 % (06-28-22 @ 07:24)  A1C with Estimated Average Glucose Result: 7.0 % (04-09-22 @ 09:16)

## 2022-07-01 NOTE — DIETITIAN INITIAL EVALUATION ADULT - ALTERNATE MEANS OF NUTRITION
PPN Recommendations: via peripheral venous line  Total Volume: 2000 mL  80 g  Amino Acids  100 g Dextrose  50 g Lipids 20%  121 mEq Sodium Chloride  6 mEq Sodium Acetate  20 mmol Sodium Phosphate  33 mEq Potassium Chloride  27 mEq Potassium Acetate  0 mmol Potassium Phosphate  0 mEq Calcium Gluconate (corrected Ca elevated)  8 mEq Magnesium Sulfate  100 mg Thiamine  1 ml Trace   10 ml MVI    Total Calories       1160     (Meets    58%  of  Estimated Energy needs  and    66%  Protein needs)    (osmolarity <900)/PPN

## 2022-07-01 NOTE — DIETITIAN INITIAL EVALUATION ADULT - OTHER INFO
86 y/o M with a PMHx of sepsis, neuropathy, prostate CA, Crohn disease, hypothyroidism, depression, intervertebral disc disorder, diabetes mellitus obesity, HTN, hypercholesterolemia, stented coronary artery, CAD, macular degeneration, hearing loss presents to the ED for abdominal pain. Patient says that he was scheduled for colon cancer surgery diagnosed by a recent colonoscopy by Dr. Padilla to have surgery on Wednesday by Dr. Ardon. Pt has diarrhea and right side pain. POD 2 s/p lap sigmoidectomy and ileocolic resection, ileus, abd distended. As per surgery, NPO and PPN to be initiated today.    Appears with moderate muscle/ fat wasting; has previously been assessed by RD and met criteria for PCM - continues. Reports UBW ~185#; RD took bed scale wt on 7/1/22 at 169# - unintentional wt loss of 16#/ 8.6% with unknown time frame. Reports eating minimally <50% x 5 days. See PPN order and other recommendations below.

## 2022-07-01 NOTE — PROGRESS NOTE ADULT - ASSESSMENT
87 M with hx of Crohn's disease and colon cancer POD 2 s/p lap sigmoidectomy and ileocolic resection, ileus, abd distended    pain control  monitor for bowel function  ambulation  incentive spirometry  NPO except meds  start PPN  AXR today  DVT/GI ppx    Plan discussed with CRS team

## 2022-07-01 NOTE — DIETITIAN INITIAL EVALUATION ADULT - NSFNSGIIOFT_GEN_A_CORE
I&O's Detail    30 Jun 2022 07:01  -  01 Jul 2022 07:00  --------------------------------------------------------  IN:  Total IN: 0 mL    OUT:    Indwelling Catheter - Urethral (mL): 445 mL  Total OUT: 445 mL    Total NET: -445 mL

## 2022-07-01 NOTE — PROGRESS NOTE ADULT - SUBJECTIVE AND OBJECTIVE BOX
CC:  88 y/o M with a PMHx of sepsis, neuropathy, prostate CA, Crohn disease, hypothyroidism, depression, intervertebral disc disorder, DM2,  obesity, HTN, HLD,  CAD s/p stents , macular degeneration, hearing loss admitted  6/28  to the surgical service for abdominal pain. Patient says that he was scheduled for colon cancer surgery diagnosed by a recent colonoscopy by Dr. Padilla to have surgery on Wednesday by Dr. Ardon. Pt has diarrhea and right side pain.     6/29 - no cp palps sob abdo pain, lying flat in bed w/o any resp distress   6/30 - s/p surgery yesterday, pain is under control. no cp palps sob. patient has been taking his own metfomin tabs - counseled, family to take the meds back home       Vital Signs Last 24 Hrs  T(F): 98.7 (30 Jun 2022 15:46), Max: 98.7 (30 Jun 2022 15:46)  HR: 80 (30 Jun 2022 15:46) (64 - 80)  BP: 100/50 (30 Jun 2022 15:46) (100/50 - 148/58)  RR: 18 (30 Jun 2022 15:46) (18 - 18)  SpO2: 95% (30 Jun 2022 15:46) (95% - 98%)  Constitutional: NAD, awake and alert  HEENT: PERR, EOMI  Neck: Soft and supple,  No JVD  Respiratory: Breath sounds are clear bilaterally, No wheezing, rales or rhonchi  Cardiovascular: S1 and S2, regular rate and rhythm, no Murmurs  Gastrointestinal: Bowel Sounds present, soft, nontender, nondistended  Extremities: No peripheral edema  Vascular: 2+ peripheral pulses  Neurological: A/O x 3, no focal deficits  Musculoskeletal: 5/5 strength b/l upper and lower extremities  Skin: No rashes    LABS: All Labs Reviewed:                      10.3   13.93 )-----------( 267      ( 29 Jun 2022 16:39 )             32.6   145  |  117<H>  |  20  ----------------------------<  177<H>  4.2   |  21<L>  |  1.43<H>  Ca    9.0      29 Jun 2022 16:39  Phos  4.0     06-29  Mg     1.6     06-29  TPro  6.6  /  Alb  3.3  /  TBili  0.3  /  DBili  x   /  AST  24  /  ALT  36  /  AlkPhos  71  06-29  PT/INR - ( 28 Jun 2022 07:24 )   PT: 13.7 sec;   INR: 1.18 ratio    PTT - ( 28 Jun 2022 07:24 )  PTT:30.9 sec      RADIOLOGY/EKG:  < from: Xray Chest 1 View- PORTABLE-Urgent (06.27.22 @ 13:50) >  No evidence of active chest disease.    ECHO   The mitral valve leaflets appear thickened.   Mild mitral annular calcification is present.   Mild to Moderate mitral regurgitation is present.   2 jets noted.   EA reversal of the mitral inflow consistent with reduced compliance of   the   left ventricle.   The aortic valve is visualized, appears mildly calcified. Valve opening   seems to be restricted.   Peak and mean transaortic gradients are 27 and 13 mmHg respectively; this   finding is consistent with mild aortic stenosis.   Trace aortic regurgitation is present.   The tricuspid valve leaflets are thin and pliable; valve motion is   normal.   Trace tricuspid valve regurgitation is present.   Pulmonic valve not well seen.   Normal appearing left atrium.   Mild concentric left ventricular hypertrophy is present.   Estimated left ventricular ejection fraction is 65-70 %.        MEDS:   acetaminophen     Tablet .. 975 milliGRAM(s) Oral every 6 hours  acetaminophen   IVPB .. 1000 milliGRAM(s) IV Intermittent once PRN  alvimopan 12 milliGRAM(s) Oral two times a day  atorvastatin 20 milliGRAM(s) Oral at bedtime  cholecalciferol Oral Tab/Cap - Peds 1000 Unit(s) Oral daily  ciprofloxacin   IVPB 400 milliGRAM(s) IV Intermittent every 12 hours  dextrose 5% + sodium chloride 0.9%. 1000 milliLiter(s) IV Continuous <Continuous>  glucagon  Injectable 1 milliGRAM(s) IntraMuscular once  heparin   Injectable 5000 Unit(s) SubCutaneous every 8 hours  insulin lispro (ADMELOG) corrective regimen sliding scale   SubCutaneous three times a day before meals  insulin lispro (ADMELOG) corrective regimen sliding scale   SubCutaneous at bedtime  ketorolac   Injectable 15 milliGRAM(s) IV Push every 6 hours PRN  levothyroxine 88 MICROGram(s) Oral daily  lisinopril 10 milliGRAM(s) Oral daily  metroNIDAZOLE  IVPB 500 milliGRAM(s) IV Intermittent every 8 hours  morphine  - Injectable 4 milliGRAM(s) IV Push every 6 hours PRN  multivitamin 1 Tablet(s) Oral daily  PreserVision (AREDS 2) 1 Capsule(s) 1 Capsule(s) Oral daily  sodium chloride 0.45%. 1000 milliLiter(s) IV Continuous <Continuous>  traMADol 50 milliGRAM(s) Oral Once PRN  verapamil  milliGRAM(s) Oral daily         CC:  88 y/o M with a PMHx of sepsis, neuropathy, prostate CA, Crohn disease, hypothyroidism, depression, intervertebral disc disorder, DM2,  obesity, HTN, HLD,  CAD s/p stents , macular degeneration, hearing loss admitted  6/28  to the surgical service for abdominal pain. Patient says that he was scheduled for colon cancer surgery diagnosed by a recent colonoscopy by Dr. Padilla to have surgery on Wednesday by Dr. Ardon. Pt has diarrhea and right side pain.     6/29 - no cp palps sob abdo pain, lying flat in bed w/o any resp distress   6/30 - s/p surgery yesterday, pain is under control. no cp palps sob. patient has been taking his own metfomin tabs - counseled, family to take the meds back home   7/1 - no cp palps sob; abdo sore pain mostly controlled; c/o diarrhea - wants to get back on his colestipol     Vital Signs Last 24 Hrs  T(C): 36.7 (01 Jul 2022 15:15), Max: 37.6 (30 Jun 2022 21:30)  T(F): 98.1 (01 Jul 2022 15:15), Max: 99.7 (30 Jun 2022 21:30)  HR: 88 (01 Jul 2022 15:15) (88 - 97)  BP: 147/56 (01 Jul 2022 15:15) (117/89 - 161/69)  RR: 18 (01 Jul 2022 15:15) (16 - 18)  SpO2: 97% (01 Jul 2022 15:15) (93% - 97%)  Constitutional: NAD, awake and alert  HEENT: PERR, EOMI  Neck: Soft and supple,  No JVD  Respiratory: Breath sounds are clear bilaterally, No wheezing, rales or rhonchi  Cardiovascular: S1 and S2, regular rate and rhythm, no Murmurs  Gastrointestinal: Bowel Sounds present, soft, nontender, nondistended  Extremities: No peripheral edema  Vascular: 2+ peripheral pulses  Neurological: A/O x 3, no focal deficits  Musculoskeletal: 5/5 strength b/l upper and lower extremities  Skin: No rashes    RADIOLOGY/EKG:  < from: Xray Chest 1 View- PORTABLE-Urgent (06.27.22 @ 13:50) >  No evidence of active chest disease.    ECHO   The mitral valve leaflets appear thickened.   Mild mitral annular calcification is present.   Mild to Moderate mitral regurgitation is present.   2 jets noted.   EA reversal of the mitral inflow consistent with reduced compliance of   the   left ventricle.   The aortic valve is visualized, appears mildly calcified. Valve opening   seems to be restricted.   Peak and mean transaortic gradients are 27 and 13 mmHg respectively; this   finding is consistent with mild aortic stenosis.   Trace aortic regurgitation is present.   The tricuspid valve leaflets are thin and pliable; valve motion is   normal.   Trace tricuspid valve regurgitation is present.   Pulmonic valve not well seen.   Normal appearing left atrium.   Mild concentric left ventricular hypertrophy is present.   Estimated left ventricular ejection fraction is 65-70 %.    LABS: All Labs Reviewed:                        8.6    12.96 )-----------( 205      ( 01 Jul 2022 06:23 )             27.6     07-01    144  |  116<H>  |  23  ----------------------------<  149<H>  4.0   |  22  |  2.28<H>    Ca    7.9<L>      01 Jul 2022 06:23  Phos  2.9     07-01  Mg     1.6     07-01  TPro  5.7<L>  /  Alb  2.6<L>  /  TBili  0.3  /  DBili  x   /  AST  12<L>  /  ALT  25  /  AlkPhos  56  07-01      MEDS:   acetaminophen     Tablet .. 975 milliGRAM(s) Oral every 6 hours  acetaminophen   IVPB .. 1000 milliGRAM(s) IV Intermittent once PRN  aspirin enteric coated 81 milliGRAM(s) Oral at bedtime  atorvastatin 20 milliGRAM(s) Oral at bedtime  cholecalciferol Oral Tab/Cap - Peds 1000 Unit(s) Oral daily  fat emulsion (Fish Oil and Plant Based) 20% Infusion 0.612 Gm/kG/Day IV Continuous <Continuous>  glucagon  Injectable 1 milliGRAM(s) IntraMuscular once  heparin   Injectable 5000 Unit(s) SubCutaneous every 8 hours  insulin lispro (ADMELOG) corrective regimen sliding scale   SubCutaneous three times a day before meals  insulin lispro (ADMELOG) corrective regimen sliding scale   SubCutaneous at bedtime  levothyroxine 88 MICROGram(s) Oral daily  morphine  - Injectable 4 milliGRAM(s) IV Push every 6 hours PRN  multivitamin 1 Tablet(s) Oral daily  ondansetron Injectable 4 milliGRAM(s) IV Push every 6 hours PRN  Parenteral Nutrition - Adult 1 Each TPN Continuous <Continuous>  PreserVision (AREDS 2) 1 Capsule(s) 1 Capsule(s) Oral daily  sodium chloride 0.9%. 1000 milliLiter(s) IV Continuous <Continuous>  traMADol 25 milliGRAM(s) Oral every 6 hours PRN  verapamil  milliGRAM(s) Oral daily

## 2022-07-02 LAB
ALBUMIN SERPL ELPH-MCNC: 2.3 G/DL — LOW (ref 3.3–5)
ALP SERPL-CCNC: 56 U/L — SIGNIFICANT CHANGE UP (ref 40–120)
ALT FLD-CCNC: 21 U/L — SIGNIFICANT CHANGE UP (ref 12–78)
ANION GAP SERPL CALC-SCNC: 6 MMOL/L — SIGNIFICANT CHANGE UP (ref 5–17)
AST SERPL-CCNC: 10 U/L — LOW (ref 15–37)
BILIRUB SERPL-MCNC: 0.2 MG/DL — SIGNIFICANT CHANGE UP (ref 0.2–1.2)
BUN SERPL-MCNC: 23 MG/DL — SIGNIFICANT CHANGE UP (ref 7–23)
CALCIUM SERPL-MCNC: 8 MG/DL — LOW (ref 8.5–10.1)
CHLORIDE SERPL-SCNC: 117 MMOL/L — HIGH (ref 96–108)
CO2 SERPL-SCNC: 21 MMOL/L — LOW (ref 22–31)
CREAT SERPL-MCNC: 1.53 MG/DL — HIGH (ref 0.5–1.3)
CULTURE RESULTS: SIGNIFICANT CHANGE UP
CULTURE RESULTS: SIGNIFICANT CHANGE UP
EGFR: 44 ML/MIN/1.73M2 — LOW
GLUCOSE SERPL-MCNC: 146 MG/DL — HIGH (ref 70–99)
HCT VFR BLD CALC: 24.5 % — LOW (ref 39–50)
HGB BLD-MCNC: 7.4 G/DL — LOW (ref 13–17)
MAGNESIUM SERPL-MCNC: 2 MG/DL — SIGNIFICANT CHANGE UP (ref 1.6–2.6)
MCHC RBC-ENTMCNC: 28.6 PG — SIGNIFICANT CHANGE UP (ref 27–34)
MCHC RBC-ENTMCNC: 30.2 GM/DL — LOW (ref 32–36)
MCV RBC AUTO: 94.6 FL — SIGNIFICANT CHANGE UP (ref 80–100)
PHOSPHATE SERPL-MCNC: 1.1 MG/DL — LOW (ref 2.5–4.5)
PLATELET # BLD AUTO: 200 K/UL — SIGNIFICANT CHANGE UP (ref 150–400)
POTASSIUM SERPL-MCNC: 3.7 MMOL/L — SIGNIFICANT CHANGE UP (ref 3.5–5.3)
POTASSIUM SERPL-SCNC: 3.7 MMOL/L — SIGNIFICANT CHANGE UP (ref 3.5–5.3)
PROT SERPL-MCNC: 5.3 GM/DL — LOW (ref 6–8.3)
RBC # BLD: 2.59 M/UL — LOW (ref 4.2–5.8)
RBC # FLD: 16.8 % — HIGH (ref 10.3–14.5)
SODIUM SERPL-SCNC: 144 MMOL/L — SIGNIFICANT CHANGE UP (ref 135–145)
SODIUM UR-SCNC: 31 MMOL/L — SIGNIFICANT CHANGE UP
SPECIMEN SOURCE: SIGNIFICANT CHANGE UP
SPECIMEN SOURCE: SIGNIFICANT CHANGE UP
TRIGL SERPL-MCNC: 219 MG/DL — HIGH
WBC # BLD: 11.07 K/UL — HIGH (ref 3.8–10.5)
WBC # FLD AUTO: 11.07 K/UL — HIGH (ref 3.8–10.5)

## 2022-07-02 PROCEDURE — 76770 US EXAM ABDO BACK WALL COMP: CPT | Mod: 26

## 2022-07-02 PROCEDURE — 99232 SBSQ HOSP IP/OBS MODERATE 35: CPT

## 2022-07-02 RX ORDER — ELECTROLYTE SOLUTION,INJ
1 VIAL (ML) INTRAVENOUS
Refills: 0 | Status: DISCONTINUED | OUTPATIENT
Start: 2022-07-02 | End: 2022-07-02

## 2022-07-02 RX ORDER — SODIUM CHLORIDE 9 MG/ML
1000 INJECTION INTRAMUSCULAR; INTRAVENOUS; SUBCUTANEOUS
Refills: 0 | Status: DISCONTINUED | OUTPATIENT
Start: 2022-07-02 | End: 2022-07-03

## 2022-07-02 RX ORDER — POTASSIUM PHOSPHATE, MONOBASIC POTASSIUM PHOSPHATE, DIBASIC 236; 224 MG/ML; MG/ML
15 INJECTION, SOLUTION INTRAVENOUS ONCE
Refills: 0 | Status: COMPLETED | OUTPATIENT
Start: 2022-07-02 | End: 2022-07-02

## 2022-07-02 RX ORDER — I.V. FAT EMULSION 20 G/100ML
0.61 EMULSION INTRAVENOUS
Qty: 50 | Refills: 0 | Status: DISCONTINUED | OUTPATIENT
Start: 2022-07-02 | End: 2022-07-02

## 2022-07-02 RX ADMIN — Medication 1 TABLET(S): at 09:44

## 2022-07-02 RX ADMIN — MORPHINE SULFATE 4 MILLIGRAM(S): 50 CAPSULE, EXTENDED RELEASE ORAL at 05:00

## 2022-07-02 RX ADMIN — Medication 975 MILLIGRAM(S): at 17:37

## 2022-07-02 RX ADMIN — HEPARIN SODIUM 5000 UNIT(S): 5000 INJECTION INTRAVENOUS; SUBCUTANEOUS at 21:44

## 2022-07-02 RX ADMIN — Medication 5 MILLIGRAM(S): at 00:06

## 2022-07-02 RX ADMIN — Medication 81 MILLIGRAM(S): at 21:44

## 2022-07-02 RX ADMIN — I.V. FAT EMULSION 20.83 GM/KG/DAY: 20 EMULSION INTRAVENOUS at 22:01

## 2022-07-02 RX ADMIN — HEPARIN SODIUM 5000 UNIT(S): 5000 INJECTION INTRAVENOUS; SUBCUTANEOUS at 05:06

## 2022-07-02 RX ADMIN — POTASSIUM PHOSPHATE, MONOBASIC POTASSIUM PHOSPHATE, DIBASIC 62.5 MILLIMOLE(S): 236; 224 INJECTION, SOLUTION INTRAVENOUS at 12:46

## 2022-07-02 RX ADMIN — Medication 1000 UNIT(S): at 09:45

## 2022-07-02 RX ADMIN — MORPHINE SULFATE 4 MILLIGRAM(S): 50 CAPSULE, EXTENDED RELEASE ORAL at 04:30

## 2022-07-02 RX ADMIN — Medication 975 MILLIGRAM(S): at 11:55

## 2022-07-02 RX ADMIN — HEPARIN SODIUM 5000 UNIT(S): 5000 INJECTION INTRAVENOUS; SUBCUTANEOUS at 13:13

## 2022-07-02 RX ADMIN — MORPHINE SULFATE 4 MILLIGRAM(S): 50 CAPSULE, EXTENDED RELEASE ORAL at 23:42

## 2022-07-02 RX ADMIN — MORPHINE SULFATE 4 MILLIGRAM(S): 50 CAPSULE, EXTENDED RELEASE ORAL at 23:12

## 2022-07-02 RX ADMIN — Medication 1 EACH: at 22:01

## 2022-07-02 RX ADMIN — SODIUM CHLORIDE 120 MILLILITER(S): 9 INJECTION INTRAMUSCULAR; INTRAVENOUS; SUBCUTANEOUS at 11:11

## 2022-07-02 RX ADMIN — ATORVASTATIN CALCIUM 20 MILLIGRAM(S): 80 TABLET, FILM COATED ORAL at 21:44

## 2022-07-02 RX ADMIN — Medication 240 MILLIGRAM(S): at 09:45

## 2022-07-02 RX ADMIN — Medication 2: at 13:14

## 2022-07-02 RX ADMIN — Medication 975 MILLIGRAM(S): at 12:37

## 2022-07-02 NOTE — PROGRESS NOTE ADULT - SUBJECTIVE AND OBJECTIVE BOX
Pt seen and examined at bedside, no acute events. Pt complaining of pain. Advanced to FLD, tolerating. Endorses multiple BM and passing flatus. Denied fever, chills, nausea, vomiting or SOB overnight.  UOP: 1450cc, creatinine improving.     Vital Signs (24 Hrs):  T(C): 37.1 (22 @ 09:22), Max: 37.8 (22 @ 21:20)  HR: 80 (22 @ 09:22) (80 - 94)  BP: 125/73 (22 @ 09:22) (125/73 - 161/69)  RR: 16 (22 @ 09:22) (15 - 18)  SpO2: 98% (22 @ :22) (95% - 99%)  Wt(kg): --  Daily     Daily     I&O's Summary    2022 07:  -  2022 07:00  --------------------------------------------------------  IN: 0 mL / OUT: 1450 mL / NET: -1450 mL    2022 07:  -  2022 12:42  --------------------------------------------------------  IN: 0 mL / OUT: 400 mL / NET: -400 mL        Physical Exam:  Pt is AAOx3  General: Well developed, in no acute distress.   Chest: Lungs clear, no rales, no rhonchi, no wheezes.   Heart: RR, no murmurs, no rubs, no gallops.   Abdomen: Soft, some tenderness, distended, no masses.  incisions c/d/i  Back: Normal curvature, no tenderness.   Neuro: Physiological, no localizing findings.   Skin: Normal, no rashes, no lesions noted.   Extremities: Warm, well perfused, no edema, Pulses intact    LABS:  .  LABS:                         7.4    11.07 )-----------( 200      ( 2022 05:06 )             24.5     07-02    144  |  117<H>  |  23  ----------------------------<  146<H>  3.7   |  21<L>  |  1.53<H>    Ca    8.0<L>      2022 05:06  Phos  1.1     07-  Mg     2.0     07-    TPro  5.3<L>  /  Alb  2.3<L>  /  TBili  0.2  /  DBili  x   /  AST  10<L>  /  ALT  21  /  AlkPhos  56  07-02      Urinalysis Basic - ( 2022 15:21 )    Color: Yellow / Appearance: very cloudy / S.025 / pH: x  Gluc: x / Ketone: Negative  / Bili: Negative / Urobili: Negative   Blood: x / Protein: 100 / Nitrite: Negative   Leuk Esterase: Trace / RBC: >50 /HPF / WBC 0-2   Sq Epi: x / Non Sq Epi: Negative / Bacteria: Many            RADIOLOGY, EKG & ADDITIONAL TESTS: Reviewed.

## 2022-07-02 NOTE — PROGRESS NOTE ADULT - SUBJECTIVE AND OBJECTIVE BOX
CC:  86 y/o M with a PMHx of sepsis, neuropathy, prostate CA, Crohn disease, hypothyroidism, depression, intervertebral disc disorder, DM2,  obesity, HTN, HLD,  CAD s/p stents , macular degeneration, hearing loss admitted  6/28  to the surgical service for abdominal pain. Patient says that he was scheduled for colon cancer surgery diagnosed by a recent colonoscopy by Dr. Padilla to have surgery on Wednesday by Dr. Ardon. Pt has diarrhea and right side pain.     6/29 - no cp palps sob abdo pain, lying flat in bed w/o any resp distress   6/30 - s/p surgery yesterday, pain is under control. no cp palps sob. patient has been taking his own metfomin tabs - counseled, family to take the meds back home   7/1 - no cp palps sob; abdo sore pain mostly controlled; c/o diarrhea - wants to get back on his colestipol     Vital Signs Last 24 Hrs  T(C): 36.7 (01 Jul 2022 15:15), Max: 37.6 (30 Jun 2022 21:30)  T(F): 98.1 (01 Jul 2022 15:15), Max: 99.7 (30 Jun 2022 21:30)  HR: 88 (01 Jul 2022 15:15) (88 - 97)  BP: 147/56 (01 Jul 2022 15:15) (117/89 - 161/69)  RR: 18 (01 Jul 2022 15:15) (16 - 18)  SpO2: 97% (01 Jul 2022 15:15) (93% - 97%)  Constitutional: NAD, awake and alert  HEENT: PERR, EOMI  Neck: Soft and supple,  No JVD  Respiratory: Breath sounds are clear bilaterally, No wheezing, rales or rhonchi  Cardiovascular: S1 and S2, regular rate and rhythm, no Murmurs  Gastrointestinal: Bowel Sounds present, soft, nontender, nondistended  Extremities: No peripheral edema  Vascular: 2+ peripheral pulses  Neurological: A/O x 3, no focal deficits  Musculoskeletal: 5/5 strength b/l upper and lower extremities  Skin: No rashes    RADIOLOGY/EKG:  < from: Xray Chest 1 View- PORTABLE-Urgent (06.27.22 @ 13:50) >  No evidence of active chest disease.    ECHO   The mitral valve leaflets appear thickened.   Mild mitral annular calcification is present.   Mild to Moderate mitral regurgitation is present.   2 jets noted.   EA reversal of the mitral inflow consistent with reduced compliance of   the   left ventricle.   The aortic valve is visualized, appears mildly calcified. Valve opening   seems to be restricted.   Peak and mean transaortic gradients are 27 and 13 mmHg respectively; this   finding is consistent with mild aortic stenosis.   Trace aortic regurgitation is present.   The tricuspid valve leaflets are thin and pliable; valve motion is   normal.   Trace tricuspid valve regurgitation is present.   Pulmonic valve not well seen.   Normal appearing left atrium.   Mild concentric left ventricular hypertrophy is present.   Estimated left ventricular ejection fraction is 65-70 %.    LABS: All Labs Reviewed:                        8.6    12.96 )-----------( 205      ( 01 Jul 2022 06:23 )             27.6     07-01    144  |  116<H>  |  23  ----------------------------<  149<H>  4.0   |  22  |  2.28<H>    Ca    7.9<L>      01 Jul 2022 06:23  Phos  2.9     07-01  Mg     1.6     07-01  TPro  5.7<L>  /  Alb  2.6<L>  /  TBili  0.3  /  DBili  x   /  AST  12<L>  /  ALT  25  /  AlkPhos  56  07-01      MEDS:   acetaminophen     Tablet .. 975 milliGRAM(s) Oral every 6 hours  acetaminophen   IVPB .. 1000 milliGRAM(s) IV Intermittent once PRN  aspirin enteric coated 81 milliGRAM(s) Oral at bedtime  atorvastatin 20 milliGRAM(s) Oral at bedtime  cholecalciferol Oral Tab/Cap - Peds 1000 Unit(s) Oral daily  fat emulsion (Fish Oil and Plant Based) 20% Infusion 0.612 Gm/kG/Day IV Continuous <Continuous>  glucagon  Injectable 1 milliGRAM(s) IntraMuscular once  heparin   Injectable 5000 Unit(s) SubCutaneous every 8 hours  insulin lispro (ADMELOG) corrective regimen sliding scale   SubCutaneous three times a day before meals  insulin lispro (ADMELOG) corrective regimen sliding scale   SubCutaneous at bedtime  levothyroxine 88 MICROGram(s) Oral daily  morphine  - Injectable 4 milliGRAM(s) IV Push every 6 hours PRN  multivitamin 1 Tablet(s) Oral daily  ondansetron Injectable 4 milliGRAM(s) IV Push every 6 hours PRN  Parenteral Nutrition - Adult 1 Each TPN Continuous <Continuous>  PreserVision (AREDS 2) 1 Capsule(s) 1 Capsule(s) Oral daily  sodium chloride 0.9%. 1000 milliLiter(s) IV Continuous <Continuous>  traMADol 25 milliGRAM(s) Oral every 6 hours PRN  verapamil  milliGRAM(s) Oral daily             CC:  88 y/o M with a PMHx of sepsis, neuropathy, prostate CA, Crohn disease, hypothyroidism, depression, intervertebral disc disorder, DM2,  obesity, HTN, HLD,  CAD s/p stents , macular degeneration, hearing loss admitted  6/28  to the surgical service for abdominal pain. Patient says that he was scheduled for colon cancer surgery diagnosed by a recent colonoscopy by Dr. Padilla to have surgery on Wednesday by Dr. Ardon. Pt has diarrhea and right side pain.     6/29 - no cp palps sob abdo pain, lying flat in bed w/o any resp distress   6/30 - s/p surgery yesterday, pain is under control. no cp palps sob. patient has been taking his own metfomin tabs - counseled, family to take the meds back home   7/1 - no cp palps sob; abdo sore pain mostly controlled; c/o diarrhea - wants to get back on his colestipol   7/2 - no cp palps sob abdo pain, tolerating po diet, Cr better     Vital Signs Last 24 Hrs  T(C): 37 (02 Jul 2022 21:31), Max: 37.2 (02 Jul 2022 00:06)  T(F): 98.6 (02 Jul 2022 21:31), Max: 98.9 (02 Jul 2022 00:06)  HR: 82 (02 Jul 2022 21:31) (79 - 82)  BP: 124/65 (02 Jul 2022 21:31) (124/65 - 142/54)  RR: 18 (02 Jul 2022 21:31) (15 - 18)  SpO2: 98% (02 Jul 2022 21:31) (95% - 98%)  Constitutional: NAD, awake and alert  HEENT: PERR, EOMI  Neck: Soft and supple,  No JVD  Respiratory: Breath sounds are clear bilaterally, No wheezing, rales or rhonchi  Cardiovascular: S1 and S2, regular rate and rhythm, no Murmurs  Gastrointestinal: Bowel Sounds present, soft, nontender, nondistended  Extremities: No peripheral edema  Vascular: 2+ peripheral pulses  Neurological: A/O x 3, no focal deficits  Musculoskeletal: 5/5 strength b/l upper and lower extremities  Skin: No rashes    RADIOLOGY/EKG:  < from: Xray Chest 1 View- PORTABLE-Urgent (06.27.22 @ 13:50) >  No evidence of active chest disease.    ECHO   The mitral valve leaflets appear thickened.   Mild mitral annular calcification is present.   Mild to Moderate mitral regurgitation is present.   2 jets noted.   EA reversal of the mitral inflow consistent with reduced compliance of   the   left ventricle.   The aortic valve is visualized, appears mildly calcified. Valve opening   seems to be restricted.   Peak and mean transaortic gradients are 27 and 13 mmHg respectively; this   finding is consistent with mild aortic stenosis.   Trace aortic regurgitation is present.   The tricuspid valve leaflets are thin and pliable; valve motion is   normal.   Trace tricuspid valve regurgitation is present.   Pulmonic valve not well seen.   Normal appearing left atrium.   Mild concentric left ventricular hypertrophy is present.   Estimated left ventricular ejection fraction is 65-70 %.    LABS: All Labs Reviewed:                      7.4    11.07 )-----------( 200      ( 02 Jul 2022 05:06 )             24.5  144  |  117<H>  |  23  ----------------------------<  146<H>  3.7   |  21<L>  |  1.53<H>  TPro  5.3<L>  /  Alb  2.3<L>  /  TBili  0.2  /  DBili  x   /  AST  10<L>  /  ALT  21  /  AlkPhos  56  07-02        acetaminophen     Tablet .. 975 milliGRAM(s) Oral every 6 hours  acetaminophen   IVPB .. 1000 milliGRAM(s) IV Intermittent once PRN  aspirin enteric coated 81 milliGRAM(s) Oral at bedtime  atorvastatin 20 milliGRAM(s) Oral at bedtime  cholecalciferol Oral Tab/Cap - Peds 1000 Unit(s) Oral daily  dextrose 5%. 1000 milliLiter(s) IV Continuous <Continuous>  dextrose 5%. 1000 milliLiter(s) IV Continuous <Continuous>  dextrose 50% Injectable 25 Gram(s) IV Push once  dextrose 50% Injectable 12.5 Gram(s) IV Push once  dextrose 50% Injectable 25 Gram(s) IV Push once  dextrose Oral Gel 15 Gram(s) Oral once PRN  fat emulsion (Fish Oil and Plant Based) 20% Infusion 0.61 Gm/kG/Day IV Continuous <Continuous>  glucagon  Injectable 1 milliGRAM(s) IntraMuscular once  heparin   Injectable 5000 Unit(s) SubCutaneous every 8 hours  insulin lispro (ADMELOG) corrective regimen sliding scale   SubCutaneous three times a day before meals  insulin lispro (ADMELOG) corrective regimen sliding scale   SubCutaneous at bedtime  levothyroxine 88 MICROGram(s) Oral daily  melatonin 5 milliGRAM(s) Oral at bedtime PRN  morphine  - Injectable 4 milliGRAM(s) IV Push every 6 hours PRN  multivitamin 1 Tablet(s) Oral daily  ondansetron Injectable 4 milliGRAM(s) IV Push every 6 hours PRN  Parenteral Nutrition - Adult 1 Each TPN Continuous <Continuous>  Parenteral Nutrition - Adult 1 Each TPN Continuous <Continuous>  PreserVision (AREDS 2) 1 Capsule(s) 1 Capsule(s) Oral daily  sodium chloride 0.9%. 1000 milliLiter(s) IV Continuous <Continuous>  traMADol 25 milliGRAM(s) Oral every 6 hours PRN  verapamil  milliGRAM(s) Oral daily

## 2022-07-02 NOTE — PROGRESS NOTE ADULT - ATTENDING COMMENTS
Patient seen and examined at bedside this morning. Patient reports improved pain and less abdominal distention, overall he feels better. Tolerating clear liquids, having bowel function. Abdomen soft, remains distended but improved compared to yesterday, appropriately tender.     ICU Vital Signs Last 24 Hrs  T(C): 36.7 (01 Jul 2022 09:35), Max: 37.6 (30 Jun 2022 21:30)  T(F): 98.1 (01 Jul 2022 09:35), Max: 99.7 (30 Jun 2022 21:30)  HR: 93 (01 Jul 2022 09:35) (80 - 97)  BP: 122/49 (01 Jul 2022 09:35) (100/50 - 122/52)  BP(mean): --  ABP: --  ABP(mean): --  RR: 18 (01 Jul 2022 09:35) (18 - 18)  SpO2: 95% (01 Jul 2022 09:35) (93% - 97%)                          8.6    12.96 )-----------( 205      ( 01 Jul 2022 06:23 )             27.6       A/P  Continue to ensure adequate pain control  Hold on diet at this time  AXR  Continue with IV fluid resuscitation  Serial abdominal exams  Keep Stroud catheter  Increased Cr, consult Nephrology  Strict I's and O's, follow up output  Daily physical therapy  OOB with ambulation  Continue close supportive care during recovery. Patient advanced to full liquids last night, tolerating without N/V though somewhat distended.  Passing flatus and BM.  Ambulating.  Stroud remains in place.  On exam abdomen is soft but distended, no rebound or guarding.  Leukocytosis is improved, 11 from 12.9.  Hypophosphatemia noted on AM labs.  Cr is improving - 1.53 from 2.28.    -- Multimodal pain control  -- Continue full liquids, monitor bowel function  -- Continue with IV fluids for now - can decrease rate.  Nephrology also following patient, appreciate recs.  Follow up ultrasound results.  -- If UOP remains appropriate throughout the day can DC Stroud at midnight  -- Recheck labs in AM  -- PT, encourage OOB  -- DVT prophylaxis - heparin  -- Replete elecrolytes

## 2022-07-02 NOTE — CHART NOTE - NSCHARTNOTEFT_GEN_A_CORE
Clinical Nutrition PN Follow Up Note    *88yo male admitted with infiltrating adenocarcinoma of sigmoid colon, now s/p lap sigmoidectomy and ileocoloic resection on 6/29.  being followed by nephrology.  as per surgery on 7/1, pt made NPO with PPN started (on 7/1) 2/2 ileus with abd distended s/p surgery.    *current status: full liquid diet ordered, however, surgery states pt to be NPO 2/2 ileus with PPN.  per surgery, will re-order PPN at this time.    *labs reviewed; sodium at upper limit, potassium trending down.  hypophosphatemia, will make adjustments in PN bag.  bilirubin total WNL.  triglycerides elevated, remains in limits for lipids at this time, will monitor closely.  corrected Ca WNL, rec'd add 10mEq of calcium gluconate outside of PN bag as CAPS is out.  POCT WNL.  07-02    144  |  117<H>  |  23  ----------------------------<  146<H>  3.7   |  21<L>  |  1.53<H>    Ca    8.0<L>      02 Jul 2022 05:06  Phos  1.1     07-02  Mg     2.0     07-02    TPro  5.3<L>  /  Alb  2.3<L>  /  TBili  0.2  /  DBili  x   /  AST  10<L>  /  ALT  21  /  AlkPhos  56  07-02    HbA1c: A1C with Estimated Average Glucose Result: 6.6 % (06-28-22 @ 07:24)  Glucose: POCT Blood Glucose.: 151 mg/dL (07-02-22 @ 05:04)  Lipid Panel: Date/Time: 04-16-22 @ 05:00  Triglycerides, Serum: 231    POCT Blood Glucose.: 151 mg/dL (02 Jul 2022 05:04)  POCT Blood Glucose.: 135 mg/dL (01 Jul 2022 21:03)  POCT Blood Glucose.: 150 mg/dL (01 Jul 2022 17:13)  POCT Blood Glucose.: 164 mg/dL (01 Jul 2022 12:03)      *pertinent meds: heparin, sodium chloride 0.9%, melatonin, ademlog sliding scale, tramadol, morphine, zofran, cholecalciferol, levothyroxine, MVI    *I&O's Detail    01 Jul 2022 07:01  -  02 Jul 2022 07:00  --------------------------------------------------------  IN:  Total IN: 0 mL    OUT:    Indwelling Catheter - Urethral (mL): 1450 mL  Total OUT: 1450 mL    Total NET: -1450 mL      * negative fluid status: PN not documented BM (+) on 7/1, small with abd pain and tender.  pt not on bowel regimen.    *Elvin Score of 17 ; no PU documented. (+1) Rt/Lt foot/ankle edema documented.    *Malnutrition: severe malnutrition in acute on chronic illness r/t decreased ability to meet increased nutr needs 2/2 altered GI function (ileus on crohn's/colon CA) AEB mod muscle/fat wasting, 8.6% wt loss, meeting <50% of nutr needs x 5 days    Estimated Needs: based on 77Kg (wt from 7/1)  Calories: 1920-2300Kcal (25-30Kcal/Kg)  Protein: 115-130 g protein (1.5-1.7g/Kg)  Fluids: 7988-7582  mL (25-30mL/Kg)    Diet, Consistent Carbohydrate Full Liquid (07-01-22 @ 17:43) [Active]      Weight History:  Height (cm): 167.6 (06-27-22 @ 19:20), 167.6 (06-02-22 @ 10:32)  Weight (kg): 81.647 (06-27-22 @ 19:20), 80.3 (06-02-22 @ 10:32)  BMI (kg/m2): 29.1 (06-27-22 @ 19:20), 28.6 (06-02-22 @ 10:32)  BSA (m2): 1.91 (06-27-22 @ 19:20), 1.9 (06-02-22 @ 10:32)      *will continue to monitor and adjust PN prn*    PPN Recommendations: via peripheral line  Total Volume: 2000mL  80 g  Amino Acids  100 g Dextrose  50 g Lipids 20%  121 mEq Sodium Chloride  9 mEq Sodium Acetate  10 mmol Sodium Phosphate  18 mEq Potassium Chloride  25 mEq Potassium Acetate  15 mmol Potassium Phosphate  0 mEq Calcium Gluconate (provide 10mEq of Calcium Gluconate outside of PN bag, CAPS is out)  8 mEq Magnesium Sulfate  100 mg Thiamine  1 ml Trace Elements  10 ml MVI    Total Calories 1160Kcal  (   Meets  60%  of  Estimated Energy needs  and  70 %  Protein needs)(osmolarity 870)    Additional Recommendations:    1) Daily weights  2) Strict I & O's  3) Daily lyte checks including magnesium and phos  4) Weekly triglycerides/LFT checks  5) POCT q6hrs; maintain 140-180mg/dL  6) if remains on PN >6 days, consider PICC line placement    *will monitor and adjust treatement plan prn*  Kayley Woodall MA, RDN, CDN, CNSC [phone]

## 2022-07-02 NOTE — PROGRESS NOTE ADULT - ASSESSMENT
86 y/o M with a PMHx of sepsis, neuropathy, prostate CA, Crohn disease, hypothyroidism, depression, intervertebral disc disorder, DM2,  obesity, HTN, HLD,  CAD s/p stents , macular degeneration, hearing loss admitted  6/28  to the surgical service for abdominal pain. Patient says that he was scheduled for colon cancer surgery diagnosed by a recent colonoscopy by Dr. Padilla to have surgery on Wednesday by Dr. Ardon. Pt has diarrhea and right side pain.     Assessment/Plan:   Infiltrating adenocarcinoma of  Sigmoid colon , h/o Chron' s  disease   Mild anemia likely due to Adenocarcinoma   - s/p lap sigmoidectomy and ileocolic resection on 6/29  - pain under control, cont pain meds; monitor H&H  - encourage OOB and IS   - on CLD, advance diet to FLD per DR Ardon  for Crohn's - is on colestipol at home. is having diarrhea due to entereg - DC entereg, hold off on colestipol for now     CAD  - c/w statins , ASA resumed     CKD stage 3 baseline 1.3-1.5   - IV fluids changes to 1/2NS  - Serum Creatinine rising post-op, cont IVFs, will hold lisinopril , patient also taking his own metformin at bedside (not prescribed by us)   patient counseled to stop taking metformin, family to take his meds back home  - ROSE on CKD is multifactorial due to recent stress from surgery as well as meds: patient off ACEi and metformin, Cr still up, cont IVFs, U/O improving.   Renal to see the patient     HTN - c/w verapamil 240 qd as toelrated , use IV Cardizem if NPO     Hypothyroidism - c/w levothyroxine 88 , check TSH, free T4 in am    Chronic back pain - c/w tramadol prn     DM2 - diet, stop metformin, glimepiride , diet, ISS, A1C 6.6     Macular degeneration - c/w vitamins    Hearing loss - supportive care       Pls call with vikki MUELLER - EMMIE Adkins      86 y/o M with a PMHx of sepsis, neuropathy, prostate CA, Crohn disease, hypothyroidism, depression, intervertebral disc disorder, DM2,  obesity, HTN, HLD,  CAD s/p stents , macular degeneration, hearing loss admitted  6/28  to the surgical service for abdominal pain. Patient says that he was scheduled for colon cancer surgery diagnosed by a recent colonoscopy by Dr. Padilla to have surgery on Wednesday by Dr. Ardon. Pt has diarrhea and right side pain.     Assessment/Plan:  Infiltrating adenocarcinoma of  Sigmoid colon , h/o Chron' s  disease   Mild anemia likely due to Adenocarcinoma   - s/p lap sigmoidectomy and ileocolic resection on 6/29  - pain under control, cont pain meds; monitor H&H  - encourage OOB and IS   - advance diet per Surgery   for Crohn's - is on colestipol at home. is having diarrhea due to entereg - DC entereg, hold off on colestipol for now     CAD  - c/w statins , ASA resumed     CKD stage 3 baseline 1.3-1.5   - IV fluids changes to 1/2NS  - Serum Creatinine rising post-op, cont IVFs, will hold lisinopril , patient also taking his own metformin at bedside (not prescribed by us)   patient counseled to stop taking metformin, family to take his meds back home  - ROSE on CKD is multifactorial due to recent stress from surgery as well as meds: patient off ACEi and metformin, Cr improving, cont IVFs, U/O improving.     HTN - c/w verapamil 240 qd as tolerated , use IV Cardizem if NPO     Hypothyroidism - c/w levothyroxine 88 , check TSH, free T4 in am    Chronic back pain - c/w tramadol prn     DM2 - diet, stop metformin, glimepiride , diet, ISS, A1C 6.6     Macular degeneration - c/w vitamins    Hearing loss - supportive care       Pls call with vikki MUELLER - EMMIE Lucille , discussed with her

## 2022-07-03 LAB
ALBUMIN SERPL ELPH-MCNC: 2.3 G/DL — LOW (ref 3.3–5)
ALP SERPL-CCNC: 63 U/L — SIGNIFICANT CHANGE UP (ref 40–120)
ALT FLD-CCNC: 20 U/L — SIGNIFICANT CHANGE UP (ref 12–78)
ANION GAP SERPL CALC-SCNC: 3 MMOL/L — LOW (ref 5–17)
AST SERPL-CCNC: 22 U/L — SIGNIFICANT CHANGE UP (ref 15–37)
BILIRUB SERPL-MCNC: 0.2 MG/DL — SIGNIFICANT CHANGE UP (ref 0.2–1.2)
BUN SERPL-MCNC: 23 MG/DL — SIGNIFICANT CHANGE UP (ref 7–23)
CALCIUM SERPL-MCNC: 8 MG/DL — LOW (ref 8.5–10.1)
CHLORIDE SERPL-SCNC: 119 MMOL/L — HIGH (ref 96–108)
CO2 SERPL-SCNC: 21 MMOL/L — LOW (ref 22–31)
CREAT SERPL-MCNC: 1.29 MG/DL — SIGNIFICANT CHANGE UP (ref 0.5–1.3)
EGFR: 54 ML/MIN/1.73M2 — LOW
GLUCOSE SERPL-MCNC: 171 MG/DL — HIGH (ref 70–99)
HCT VFR BLD CALC: 25.7 % — LOW (ref 39–50)
HGB BLD-MCNC: 8.1 G/DL — LOW (ref 13–17)
MAGNESIUM SERPL-MCNC: 2.1 MG/DL — SIGNIFICANT CHANGE UP (ref 1.6–2.6)
MCHC RBC-ENTMCNC: 29.2 PG — SIGNIFICANT CHANGE UP (ref 27–34)
MCHC RBC-ENTMCNC: 31.5 GM/DL — LOW (ref 32–36)
MCV RBC AUTO: 92.8 FL — SIGNIFICANT CHANGE UP (ref 80–100)
PHOSPHATE SERPL-MCNC: 1.4 MG/DL — LOW (ref 2.5–4.5)
PLATELET # BLD AUTO: 248 K/UL — SIGNIFICANT CHANGE UP (ref 150–400)
POTASSIUM SERPL-MCNC: 4.1 MMOL/L — SIGNIFICANT CHANGE UP (ref 3.5–5.3)
POTASSIUM SERPL-SCNC: 4.1 MMOL/L — SIGNIFICANT CHANGE UP (ref 3.5–5.3)
PROT SERPL-MCNC: 5.8 GM/DL — LOW (ref 6–8.3)
RBC # BLD: 2.77 M/UL — LOW (ref 4.2–5.8)
RBC # FLD: 16.8 % — HIGH (ref 10.3–14.5)
SODIUM SERPL-SCNC: 143 MMOL/L — SIGNIFICANT CHANGE UP (ref 135–145)
WBC # BLD: 12.06 K/UL — HIGH (ref 3.8–10.5)
WBC # FLD AUTO: 12.06 K/UL — HIGH (ref 3.8–10.5)

## 2022-07-03 PROCEDURE — 99232 SBSQ HOSP IP/OBS MODERATE 35: CPT

## 2022-07-03 RX ORDER — SODIUM,POTASSIUM PHOSPHATES 278-250MG
1 POWDER IN PACKET (EA) ORAL THREE TIMES A DAY
Refills: 0 | Status: COMPLETED | OUTPATIENT
Start: 2022-07-03 | End: 2022-07-04

## 2022-07-03 RX ADMIN — HEPARIN SODIUM 5000 UNIT(S): 5000 INJECTION INTRAVENOUS; SUBCUTANEOUS at 21:42

## 2022-07-03 RX ADMIN — Medication 1000 UNIT(S): at 10:36

## 2022-07-03 RX ADMIN — Medication 81 MILLIGRAM(S): at 21:41

## 2022-07-03 RX ADMIN — Medication 1 TABLET(S): at 10:36

## 2022-07-03 RX ADMIN — ATORVASTATIN CALCIUM 20 MILLIGRAM(S): 80 TABLET, FILM COATED ORAL at 21:42

## 2022-07-03 RX ADMIN — Medication 4: at 17:42

## 2022-07-03 RX ADMIN — Medication 88 MICROGRAM(S): at 04:20

## 2022-07-03 RX ADMIN — HEPARIN SODIUM 5000 UNIT(S): 5000 INJECTION INTRAVENOUS; SUBCUTANEOUS at 14:38

## 2022-07-03 RX ADMIN — Medication 975 MILLIGRAM(S): at 17:42

## 2022-07-03 RX ADMIN — Medication 975 MILLIGRAM(S): at 13:32

## 2022-07-03 RX ADMIN — TRAMADOL HYDROCHLORIDE 25 MILLIGRAM(S): 50 TABLET ORAL at 05:08

## 2022-07-03 RX ADMIN — TRAMADOL HYDROCHLORIDE 25 MILLIGRAM(S): 50 TABLET ORAL at 04:08

## 2022-07-03 RX ADMIN — Medication 1 PACKET(S): at 14:38

## 2022-07-03 RX ADMIN — Medication 1 PACKET(S): at 21:41

## 2022-07-03 NOTE — CHART NOTE - NSCHARTNOTEFT_GEN_A_CORE
Clinical Nutrition PN Follow Up Note    *86yo male admitted with infiltrating adenocarcinoma of sigmoid colon, now s/p lap sigmoidectomy and ileocoloic resection on 6/29.  being followed by nephrology.  as per surgery on 7/1, pt made NPO with PPN started (on 7/1) 2/2 ileus with abd distended s/p surgery.    *7/2: full liquid diet ordered, however, surgery states pt to be NPO 2/2 ileus with PPN.  per surgery, will re-order PPN at this time.    *current status: full liquid diet ordered, pt with minimal to no PO intake of liquid diet at this time.  PPN to be continued until PO intake improves to meet >60% of nutr needs to optimize nutrition status as d/w surgery.  PPN day #3.    *labs reviewed; 07-03; hypophosphatemia, will increase in PN bag.  bilirubin total WNL.  corrected Ca WNL, rec'd add 10mEq of Calcium Gluconate outside of PN bag as CAPS is out.   triglycerides elevated, however, within limits to c/w lipids at this time, will monitor closely.  POCT slightly elevated and pt received 2units sliding scale in past 24hours, continue to monitor and provide sliding scale prn.    143  |  119<H>  |  23  ----------------------------<  171<H>  4.1   |  21<L>  |  1.29    Ca    8.0<L>      03 Jul 2022 04:01  Phos  1.4     07-03  Mg     2.1     07-03    TPro  5.8<L>  /  Alb  2.3<L>  /  TBili  0.2  /  DBili  x   /  AST  22  /  ALT  20  /  AlkPhos  63  07-03    HbA1c: A1C with Estimated Average Glucose Result: 6.6 % (06-28-22 @ 07:24)  Glucose: POCT Blood Glucose.: 189 mg/dL (07-03-22 @ 07:52)  Lipid Panel: Date/Time: 07-02-22 @ 05:06  Triglycerides, Serum: 219    POCT Blood Glucose.: 189 mg/dL (03 Jul 2022 07:52)  POCT Blood Glucose.: 165 mg/dL (02 Jul 2022 21:49)  POCT Blood Glucose.: 148 mg/dL (02 Jul 2022 17:35)  POCT Blood Glucose.: 187 mg/dL (02 Jul 2022 13:06)  POCT Blood Glucose.: 144 mg/dL (02 Jul 2022 09:18)      *pertinent meds: heparin, sodium chloride 0.9%, melatonin, ademlog sliding scale, tramadol, morphine, zofran, cholecalciferol, levothyroxine, MVI    *I&O's Detail    02 Jul 2022 07:01  -  03 Jul 2022 07:00  --------------------------------------------------------  IN:  Total IN: 0 mL    OUT:    Indwelling Catheter - Urethral (mL): 1700 mL  Total OUT: 1700 mL    Total NET: -1700 mL      * negative fluid status: PN not documented BM (+) on 7/2 x 5,abd pain and tender.  pt not on bowel regimen.    *Elvin Score of 17 ; no PU documented. (+1) Rt/Lt foot/ankle, (+2) Rt arm edema documented.    *Malnutrition: severe malnutrition in acute on chronic illness r/t decreased ability to meet increased nutr needs 2/2 altered GI function (ileus on crohn's/colon CA) AEB mod muscle/fat wasting, 8.6% wt loss, meeting <50% of nutr needs x 5 days    Estimated Needs: based on 77Kg (wt from 7/1)  Calories: 1920-2300Kcal (25-30Kcal/Kg)  Protein: 115-130 g protein (1.5-1.7g/Kg)  Fluids: 7175-8210  mL (25-30mL/Kg)    Diet, Consistent Carbohydrate Full Liquid (07-01-22 @ 17:43) [Active]      Weight History:  Height (cm): 167.6 (06-27-22 @ 19:20), 167.6 (06-02-22 @ 10:32)  Weight (kg): 81.647 (06-27-22 @ 19:20), 80.3 (06-02-22 @ 10:32)  BMI (kg/m2): 29.1 (06-27-22 @ 19:20), 28.6 (06-02-22 @ 10:32)  BSA (m2): 1.91 (06-27-22 @ 19:20), 1.9 (06-02-22 @ 10:32)      *will continue to monitor and adjust PN prn*    PPN Recommendations: via peripheral line  Total Volume: 2200mL  90 g  Amino Acids  100 g Dextrose  50 g Lipids 20%  121 mEq Sodium Chloride  9 mEq Sodium Acetate  10 mmol Sodium Phosphate  0 mEq Potassium Chloride  36 mEq Potassium Acetate  20 mmol Potassium Phosphate  0 mEq Calcium Gluconate (provide 10mEq of Calcium Gluconate outside of PN bag, CAPS is out)  8 mEq Magnesium Sulfate  100 mg Thiamine  1 ml Trace Elements  10 ml MVI    Total Calories 1200Kcal  (   Meets  63%  of  Estimated Energy needs  and  70 %  Protein needs)(osmolarity 840)    Additional Recommendations:    1) Daily weights  2) Strict I & O's  3) Daily lyte checks including magnesium and phos  4) Weekly triglycerides/LFT checks  5) POCT q6hrs; maintain 140-180mg/dL  6) if remains on PN >6 days, consider PICC line placement  7) vitamin D 25OH level and supplement prn     *will monitor and adjust treatement plan prn*  Kayley Woodall MA, RDN, CDN, CNSC [phone] Clinical Nutrition BRIEF Note    *88yo male admitted with infiltrating adenocarcinoma of sigmoid colon, now s/p lap sigmoidectomy and ileocoloic resection on 6/29.  being followed by nephrology.  as per surgery on 7/1, pt made NPO with PPN started (on 7/1) 2/2 ileus with abd distended s/p surgery.    *7/2: full liquid diet ordered, however, surgery states pt to be NPO 2/2 ileus with PPN.  per surgery, will re-order PPN at this time.    *current status: full liquid diet ordered, pt with minimal PO intake of liquid diet at this time.  d/w surgery, today will be pt's last dose of PPN.  rec'd advance diet as tolerated to low fiber with ensure enlive TID to optimize PO intake.    *labs reviewed; 07-03; hypophosphatemia, replete prn  bilirubin total WNL.    POCT slightly elevated and pt received 2units sliding scale in past 24hours, continue to monitor and provide sliding scale prn.    143  |  119<H>  |  23  ----------------------------<  171<H>  4.1   |  21<L>  |  1.29    Ca    8.0<L>      03 Jul 2022 04:01  Phos  1.4     07-03  Mg     2.1     07-03    TPro  5.8<L>  /  Alb  2.3<L>  /  TBili  0.2  /  DBili  x   /  AST  22  /  ALT  20  /  AlkPhos  63  07-03    HbA1c: A1C with Estimated Average Glucose Result: 6.6 % (06-28-22 @ 07:24)  Glucose: POCT Blood Glucose.: 189 mg/dL (07-03-22 @ 07:52)  Lipid Panel: Date/Time: 07-02-22 @ 05:06  Triglycerides, Serum: 219    POCT Blood Glucose.: 189 mg/dL (03 Jul 2022 07:52)  POCT Blood Glucose.: 165 mg/dL (02 Jul 2022 21:49)  POCT Blood Glucose.: 148 mg/dL (02 Jul 2022 17:35)  POCT Blood Glucose.: 187 mg/dL (02 Jul 2022 13:06)  POCT Blood Glucose.: 144 mg/dL (02 Jul 2022 09:18)      *pertinent meds: heparin, sodium chloride 0.9%, melatonin, ademlog sliding scale, tramadol, morphine, zofran, cholecalciferol, levothyroxine, MVI    *I&O's Detail    02 Jul 2022 07:01  -  03 Jul 2022 07:00  --------------------------------------------------------  IN:  Total IN: 0 mL    OUT:    Indwelling Catheter - Urethral (mL): 1700 mL  Total OUT: 1700 mL    Total NET: -1700 mL      * negative fluid status: PN not documented BM (+) on 7/2 x 5,abd pain and tender.  pt not on bowel regimen.    *Elvin Score of 17 ; no PU documented. (+1) Rt/Lt foot/ankle, (+2) Rt arm edema documented.    *Malnutrition: severe malnutrition in acute on chronic illness r/t decreased ability to meet increased nutr needs 2/2 altered GI function (ileus on crohn's/colon CA) AEB mod muscle/fat wasting, 8.6% wt loss, meeting <50% of nutr needs x 5 days    Estimated Needs: based on 77Kg (wt from 7/1)  Calories: 1920-2300Kcal (25-30Kcal/Kg)  Protein: 115-130 g protein (1.5-1.7g/Kg)  Fluids: 9261-5948  mL (25-30mL/Kg)    Diet, Consistent Carbohydrate Full Liquid (07-01-22 @ 17:43) [Active]      Weight History:  Height (cm): 167.6 (06-27-22 @ 19:20), 167.6 (06-02-22 @ 10:32)  Weight (kg): 81.647 (06-27-22 @ 19:20), 80.3 (06-02-22 @ 10:32)  BMI (kg/m2): 29.1 (06-27-22 @ 19:20), 28.6 (06-02-22 @ 10:32)  BSA (m2): 1.91 (06-27-22 @ 19:20), 1.9 (06-02-22 @ 10:32)        Recommendations:    1) Daily weights  2) ADAT to low fiber with ensure enlive TID  3) monitor PO intake/tolerance closely  4) monitor BM closely  5) add MVI with minerals daily to ensure 100% RDI met    *will monitor and adjust treatement plan prn*  Kayley Woodall MA, RDN, CDN, CNSC [phone] Clinical Nutrition BRIEF Note    *86yo male admitted with infiltrating adenocarcinoma of sigmoid colon, now s/p lap sigmoidectomy and ileocoloic resection on 6/29.  being followed by nephrology.  as per surgery on 7/1, pt made NPO with PPN started (on 7/1) 2/2 ileus with abd distended s/p surgery.    *7/2: full liquid diet ordered, however, surgery states pt to be NPO 2/2 ileus with PPN.  per surgery, will re-order PPN at this time.    *current status: full liquid diet ordered, pt with minimal PO intake of liquid diet at this time.  d/w surgery, today will be pt's last dose of PPN.  rec'd advance diet as tolerated to low fiber with ensure enlive TID to optimize PO intake.    *labs reviewed; 07-03; hypophosphatemia, replete prn  bilirubin total WNL.    POCT slightly elevated and pt received 2units sliding scale in past 24hours, continue to monitor and provide sliding scale prn.    143  |  119<H>  |  23  ----------------------------<  171<H>  4.1   |  21<L>  |  1.29    Ca    8.0<L>      03 Jul 2022 04:01  Phos  1.4     07-03  Mg     2.1     07-03    TPro  5.8<L>  /  Alb  2.3<L>  /  TBili  0.2  /  DBili  x   /  AST  22  /  ALT  20  /  AlkPhos  63  07-03    HbA1c: A1C with Estimated Average Glucose Result: 6.6 % (06-28-22 @ 07:24)  Glucose: POCT Blood Glucose.: 189 mg/dL (07-03-22 @ 07:52)  Lipid Panel: Date/Time: 07-02-22 @ 05:06  Triglycerides, Serum: 219    POCT Blood Glucose.: 189 mg/dL (03 Jul 2022 07:52)  POCT Blood Glucose.: 165 mg/dL (02 Jul 2022 21:49)  POCT Blood Glucose.: 148 mg/dL (02 Jul 2022 17:35)  POCT Blood Glucose.: 187 mg/dL (02 Jul 2022 13:06)  POCT Blood Glucose.: 144 mg/dL (02 Jul 2022 09:18)      *pertinent meds: heparin, sodium chloride 0.9%, melatonin, ademlog sliding scale, tramadol, morphine, zofran, cholecalciferol, levothyroxine, MVI    *I&O's Detail    02 Jul 2022 07:01  -  03 Jul 2022 07:00  --------------------------------------------------------  IN:  Total IN: 0 mL    OUT:    Indwelling Catheter - Urethral (mL): 1700 mL  Total OUT: 1700 mL    Total NET: -1700 mL      * negative fluid status: PN not documented BM (+) on 7/2 x 5,abd pain and tender.  pt not on bowel regimen.    *Elvin Score of 17 ; no PU documented. (+1) Rt/Lt foot/ankle, (+2) Rt arm edema documented.    *Malnutrition: severe malnutrition in acute on chronic illness r/t decreased ability to meet increased nutr needs 2/2 altered GI function (ileus on crohn's/colon CA) AEB mod muscle/fat wasting, 8.6% wt loss, meeting <50% of nutr needs x 5 days    Estimated Needs: based on 77Kg (wt from 7/1)  Calories: 1920-2300Kcal (25-30Kcal/Kg)  Protein: 115-130 g protein (1.5-1.7g/Kg)  Fluids: 9904-9533  mL (25-30mL/Kg)    Diet, Consistent Carbohydrate Full Liquid (07-01-22 @ 17:43) [Active]      Weight History:  Height (cm): 167.6 (06-27-22 @ 19:20), 167.6 (06-02-22 @ 10:32)  Weight (kg): 81.647 (06-27-22 @ 19:20), 80.3 (06-02-22 @ 10:32)  BMI (kg/m2): 29.1 (06-27-22 @ 19:20), 28.6 (06-02-22 @ 10:32)  BSA (m2): 1.91 (06-27-22 @ 19:20), 1.9 (06-02-22 @ 10:32)        Recommendations:    1) Daily weights  2) ADAT to low fiber with ensure enlive TID  3) monitor PO intake/tolerance closely  4) monitor BM closely  5) add MVI with minerals daily to ensure 100% RDI met  6) stop PPN after today's dose    *will monitor and adjust treatement plan prn*  Kayley Woodall MA, RDN, CDN, [facility] [phone]

## 2022-07-03 NOTE — PROGRESS NOTE ADULT - ASSESSMENT
88 y/o M with a PMHx of sepsis, neuropathy, prostate CA, Crohn disease, hypothyroidism, depression, intervertebral disc disorder, DM2,  obesity, HTN, HLD,  CAD s/p stents , macular degeneration, hearing loss admitted  6/28  to the surgical service for abdominal pain. Patient says that he was scheduled for colon cancer surgery diagnosed by a recent colonoscopy by Dr. Padilla to have surgery on Wednesday by Dr. Ardon. Pt has diarrhea and right side pain.     Assessment/Plan:  Infiltrating adenocarcinoma of  Sigmoid colon , h/o Chron' s  disease   Mild anemia likely due to Adenocarcinoma   - s/p lap sigmoidectomy and ileocolic resection on 6/29  - pain under control, cont pain meds; monitor H&H  - encourage OOB and IS   - advance diet per Surgery   for Crohn's - is on colestipol at home. is having diarrhea due to entereg - DC entereg, hold off on colestipol for now     CAD  - c/w statins , ASA resumed     CKD stage 3 baseline 1.3-1.5   - IV fluids changes to 1/2NS  - Serum Creatinine rising post-op, cont IVFs, will hold lisinopril , patient also taking his own metformin at bedside (not prescribed by us)   patient counseled to stop taking metformin, family to take his meds back home  - ROSE on CKD is multifactorial due to recent stress from surgery as well as meds: patient off ACEi and metformin, Cr improving, cont IVFs, U/O improving.     HTN - c/w verapamil 240 qd as tolerated , use IV Cardizem if NPO     Hypothyroidism - c/w levothyroxine 88 , check TSH, free T4 in am    Chronic back pain - c/w tramadol prn     DM2 - diet, stop metformin, glimepiride , diet, ISS, A1C 6.6     Macular degeneration - c/w vitamins    Hearing loss - supportive care       Pls call with vikki MUELLER - EMMIE Lucille , discussed with her     86 y/o M with a PMHx of sepsis, neuropathy, prostate CA, Crohn disease, hypothyroidism, depression, intervertebral disc disorder, DM2,  obesity, HTN, HLD,  CAD s/p stents , macular degeneration, hearing loss admitted  6/28  to the surgical service for abdominal pain. Patient says that he was scheduled for colon cancer surgery diagnosed by a recent colonoscopy by Dr. Padilal to have surgery on Wednesday by Dr. Ardon. Pt has diarrhea and right side pain.     Assessment/Plan:  Infiltrating adenocarcinoma of  Sigmoid colon , h/o Chron' s  disease   Mild anemia likely due to Adenocarcinoma   - s/p lap sigmoidectomy and ileocolic resection on 6/29  - pain under control, cont pain meds; monitor H&H  - encourage OOB and IS   - advance diet per Surgery   for Crohn's - is on colestipol at home. is having diarrhea due to entereg - DC entereg,   cont to hold off on colestipol for now     CAD  - c/w statins , ASA resumed     CKD stage 3 baseline 1.3-1.5   - IV fluids changes to 1/2NS  - Serum Creatinine rising post-op, cont IVFs, will hold lisinopril , patient also taking his own metformin at bedside (not prescribed by us)   patient counseled to stop taking metformin, family to take his meds back home  - ROSE on CKD is multifactorial due to recent stress from surgery as well as meds: patient off ACEi and metformin, Cr steadily improving  - dc IVFs      HTN - c/w verapamil 240 qd as tolerated , use IV Cardizem if NPO     Hypothyroidism - c/w levothyroxine 88 , check TSH, free T4 in am    Chronic back pain - c/w tramadol prn     DM2 - diet, stop metformin, glimepiride , diet, ISS, A1C 6.6     Macular degeneration - c/w vitamins    Hearing loss - supportive care       Pls call with q   STUARTK - EMMIE Rodriguezen

## 2022-07-03 NOTE — PROGRESS NOTE ADULT - SUBJECTIVE AND OBJECTIVE BOX
CC:  88 y/o M with a PMHx of sepsis, neuropathy, prostate CA, Crohn disease, hypothyroidism, depression, intervertebral disc disorder, DM2,  obesity, HTN, HLD,  CAD s/p stents , macular degeneration, hearing loss admitted  6/28  to the surgical service for abdominal pain. Patient says that he was scheduled for colon cancer surgery diagnosed by a recent colonoscopy by Dr. Padilla to have surgery on Wednesday by Dr. Ardon. Pt has diarrhea and right side pain.     6/29 - no cp palps sob abdo pain, lying flat in bed w/o any resp distress   6/30 - s/p surgery yesterday, pain is under control. no cp palps sob. patient has been taking his own metfomin tabs - counseled, family to take the meds back home   7/1 - no cp palps sob; abdo sore pain mostly controlled; c/o diarrhea - wants to get back on his colestipol   7/2 - no cp palps sob abdo pain, tolerating po diet, Cr better     Vital Signs Last 24 Hrs  T(C): 37 (02 Jul 2022 21:31), Max: 37.2 (02 Jul 2022 00:06)  T(F): 98.6 (02 Jul 2022 21:31), Max: 98.9 (02 Jul 2022 00:06)  HR: 82 (02 Jul 2022 21:31) (79 - 82)  BP: 124/65 (02 Jul 2022 21:31) (124/65 - 142/54)  RR: 18 (02 Jul 2022 21:31) (15 - 18)  SpO2: 98% (02 Jul 2022 21:31) (95% - 98%)  Constitutional: NAD, awake and alert  HEENT: PERR, EOMI  Neck: Soft and supple,  No JVD  Respiratory: Breath sounds are clear bilaterally, No wheezing, rales or rhonchi  Cardiovascular: S1 and S2, regular rate and rhythm, no Murmurs  Gastrointestinal: Bowel Sounds present, soft, nontender, nondistended  Extremities: No peripheral edema  Vascular: 2+ peripheral pulses  Neurological: A/O x 3, no focal deficits  Musculoskeletal: 5/5 strength b/l upper and lower extremities  Skin: No rashes    RADIOLOGY/EKG:  < from: Xray Chest 1 View- PORTABLE-Urgent (06.27.22 @ 13:50) >  No evidence of active chest disease.    ECHO   The mitral valve leaflets appear thickened.   Mild mitral annular calcification is present.   Mild to Moderate mitral regurgitation is present.   2 jets noted.   EA reversal of the mitral inflow consistent with reduced compliance of   the   left ventricle.   The aortic valve is visualized, appears mildly calcified. Valve opening   seems to be restricted.   Peak and mean transaortic gradients are 27 and 13 mmHg respectively; this   finding is consistent with mild aortic stenosis.   Trace aortic regurgitation is present.   The tricuspid valve leaflets are thin and pliable; valve motion is   normal.   Trace tricuspid valve regurgitation is present.   Pulmonic valve not well seen.   Normal appearing left atrium.   Mild concentric left ventricular hypertrophy is present.   Estimated left ventricular ejection fraction is 65-70 %.    LABS: All Labs Reviewed:                      7.4    11.07 )-----------( 200      ( 02 Jul 2022 05:06 )             24.5  144  |  117<H>  |  23  ----------------------------<  146<H>  3.7   |  21<L>  |  1.53<H>  TPro  5.3<L>  /  Alb  2.3<L>  /  TBili  0.2  /  DBili  x   /  AST  10<L>  /  ALT  21  /  AlkPhos  56  07-02        acetaminophen     Tablet .. 975 milliGRAM(s) Oral every 6 hours  acetaminophen   IVPB .. 1000 milliGRAM(s) IV Intermittent once PRN  aspirin enteric coated 81 milliGRAM(s) Oral at bedtime  atorvastatin 20 milliGRAM(s) Oral at bedtime  cholecalciferol Oral Tab/Cap - Peds 1000 Unit(s) Oral daily  dextrose 5%. 1000 milliLiter(s) IV Continuous <Continuous>  dextrose 5%. 1000 milliLiter(s) IV Continuous <Continuous>  dextrose 50% Injectable 25 Gram(s) IV Push once  dextrose 50% Injectable 12.5 Gram(s) IV Push once  dextrose 50% Injectable 25 Gram(s) IV Push once  dextrose Oral Gel 15 Gram(s) Oral once PRN  fat emulsion (Fish Oil and Plant Based) 20% Infusion 0.61 Gm/kG/Day IV Continuous <Continuous>  glucagon  Injectable 1 milliGRAM(s) IntraMuscular once  heparin   Injectable 5000 Unit(s) SubCutaneous every 8 hours  insulin lispro (ADMELOG) corrective regimen sliding scale   SubCutaneous three times a day before meals  insulin lispro (ADMELOG) corrective regimen sliding scale   SubCutaneous at bedtime  levothyroxine 88 MICROGram(s) Oral daily  melatonin 5 milliGRAM(s) Oral at bedtime PRN  morphine  - Injectable 4 milliGRAM(s) IV Push every 6 hours PRN  multivitamin 1 Tablet(s) Oral daily  ondansetron Injectable 4 milliGRAM(s) IV Push every 6 hours PRN  Parenteral Nutrition - Adult 1 Each TPN Continuous <Continuous>  Parenteral Nutrition - Adult 1 Each TPN Continuous <Continuous>  PreserVision (AREDS 2) 1 Capsule(s) 1 Capsule(s) Oral daily  sodium chloride 0.9%. 1000 milliLiter(s) IV Continuous <Continuous>  traMADol 25 milliGRAM(s) Oral every 6 hours PRN  verapamil  milliGRAM(s) Oral daily           CC:  86 y/o M with a PMHx of sepsis, neuropathy, prostate CA, Crohn disease, hypothyroidism, depression, intervertebral disc disorder, DM2,  obesity, HTN, HLD,  CAD s/p stents , macular degeneration, hearing loss admitted  6/28  to the surgical service for abdominal pain. Patient says that he was scheduled for colon cancer surgery diagnosed by a recent colonoscopy by Dr. Padilla to have surgery on Wednesday by Dr. Ardon. Pt has diarrhea and right side pain.     6/29 - no cp palps sob abdo pain, lying flat in bed w/o any resp distress   6/30 - s/p surgery yesterday, pain is under control. no cp palps sob. patient has been taking his own metfomin tabs - counseled, family to take the meds back home   7/1 - no cp palps sob; abdo sore pain mostly controlled; c/o diarrhea - wants to get back on his colestipol   7/2 - no cp palps sob abdo pain, tolerating po diet, Cr better   7/3 - no cp palps sob; abdo pain under control, tolerating po diet - three small BMs today     Vital Signs Last 24 Hrs  T(C): 37.1 (03 Jul 2022 15:46), Max: 37.4 (03 Jul 2022 09:14)  T(F): 98.7 (03 Jul 2022 15:46), Max: 99.3 (03 Jul 2022 09:14)  HR: 82 (03 Jul 2022 15:46) (76 - 82)  BP: 134/67 (03 Jul 2022 15:46) (124/65 - 134/67)  BP(mean): --  RR: 17 (03 Jul 2022 15:46) (15 - 18)  SpO2: 96% (03 Jul 2022 15:46) (93% - 98%)  Constitutional: NAD, awake and alert  HEENT: PERR, EOMI  Neck: Soft and supple,  No JVD  Respiratory: Breath sounds are clear bilaterally, No wheezing, rales or rhonchi  Cardiovascular: S1 and S2, regular rate and rhythm, no Murmurs  Gastrointestinal: Bowel Sounds present, soft, nontender, nondistended  Extremities: No peripheral edema  Vascular: 2+ peripheral pulses  Neurological: A/O x 3, no focal deficits  Musculoskeletal: 5/5 strength b/l upper and lower extremities  Skin: No rashes    RADIOLOGY/EKG:  < from: Xray Chest 1 View- PORTABLE-Urgent (06.27.22 @ 13:50) >  No evidence of active chest disease.    ECHO   The mitral valve leaflets appear thickened.   Mild mitral annular calcification is present.   Mild to Moderate mitral regurgitation is present.   2 jets noted.   EA reversal of the mitral inflow consistent with reduced compliance of   the   left ventricle.   The aortic valve is visualized, appears mildly calcified. Valve opening   seems to be restricted.   Peak and mean transaortic gradients are 27 and 13 mmHg respectively; this   finding is consistent with mild aortic stenosis.   Trace aortic regurgitation is present.   The tricuspid valve leaflets are thin and pliable; valve motion is   normal.   Trace tricuspid valve regurgitation is present.   Pulmonic valve not well seen.   Normal appearing left atrium.   Mild concentric left ventricular hypertrophy is present.   Estimated left ventricular ejection fraction is 65-70 %.      LABS: All Labs Reviewed:                      8.1    12.06 )-----------( 248      ( 03 Jul 2022 04:01 )             25.7   143  |  119<H>  |  23  ----------------------------<  171<H>  4.1   |  21<L>  |  1.29  Ca    8.0<L>      03 Jul 2022 04:01  Phos  1.4     07-03  Mg     2.1     07-03  TPro  5.8<L>  /  Alb  2.3<L>  /  TBili  0.2  /  DBili  x   /  AST  22  /  ALT  20  /  AlkPhos  63  07-03        acetaminophen     Tablet .. 975 milliGRAM(s) Oral every 6 hours  acetaminophen   IVPB .. 1000 milliGRAM(s) IV Intermittent once PRN  aspirin enteric coated 81 milliGRAM(s) Oral at bedtime  atorvastatin 20 milliGRAM(s) Oral at bedtime  cholecalciferol Oral Tab/Cap - Peds 1000 Unit(s) Oral daily  dextrose 5%. 1000 milliLiter(s) IV Continuous <Continuous>  dextrose 5%. 1000 milliLiter(s) IV Continuous <Continuous>  dextrose 50% Injectable 25 Gram(s) IV Push once  dextrose 50% Injectable 12.5 Gram(s) IV Push once  dextrose 50% Injectable 25 Gram(s) IV Push once  dextrose Oral Gel 15 Gram(s) Oral once PRN  glucagon  Injectable 1 milliGRAM(s) IntraMuscular once  heparin   Injectable 5000 Unit(s) SubCutaneous every 8 hours  insulin lispro (ADMELOG) corrective regimen sliding scale   SubCutaneous at bedtime  insulin lispro (ADMELOG) corrective regimen sliding scale   SubCutaneous three times a day before meals  levothyroxine 88 MICROGram(s) Oral daily  melatonin 5 milliGRAM(s) Oral at bedtime PRN  morphine  - Injectable 4 milliGRAM(s) IV Push every 6 hours PRN  multivitamin 1 Tablet(s) Oral daily  ondansetron Injectable 4 milliGRAM(s) IV Push every 6 hours PRN  Parenteral Nutrition - Adult 1 Each TPN Continuous <Continuous>  potassium phosphate / sodium phosphate Powder (PHOS-NaK) 1 Packet(s) Oral three times a day  PreserVision (AREDS 2) 1 Capsule(s) 1 Capsule(s) Oral daily  traMADol 25 milliGRAM(s) Oral every 6 hours PRN  verapamil  milliGRAM(s) Oral daily

## 2022-07-03 NOTE — PROGRESS NOTE ADULT - ASSESSMENT
87 M with hx of Crohn's disease and colon cancer POD 4s/p lap sigmoidectomy and ileocolic resection, ileus, abd distended.  Ileus now resolved, patient having bowel function and tolerating diet.     pain control  ambulation  incentive spirometry  Advance to Low Fiber diet  Half IVF  Last day of PPN  DVT/GI ppx  Nephrology recommendations appreciated  Renal US - appreciated     Plan discussed with CRS team

## 2022-07-03 NOTE — PROGRESS NOTE ADULT - SUBJECTIVE AND OBJECTIVE BOX
Pt seen and examined at bedside, no acute events. States that he wants to eat more. Advanced to FLD, tolerating. Endorses multiple BM and passing flatus. Denied fever, chills, nausea, vomiting or SOB overnight.  Stroud removed overnight, voiding freely. creatinine improving. Afebrile, VS WNL    Vital Signs (24 Hrs):  T(C): 37.4 (22 @ 09:14), Max: 37.4 (22 @ 09:14)  HR: 76 (22 @ 09:14) (76 - 82)  BP: 127/54 (22 @ 09:14) (124/65 - 132/62)  RR: 16 (22 @ 09:14) (15 - 18)  SpO2: 93% (22 @ 09:14) (93% - 98%)  Wt(kg): --  Daily     Daily     I&O's Summary    2022 07:01  -  2022 07:00  --------------------------------------------------------  IN: 0 mL / OUT: 1700 mL / NET: -1700 mL    2022 07:01  -  2022 14:32  --------------------------------------------------------  IN: 0 mL / OUT: 500 mL / NET: -500 mL              Physical Exam:  Pt is AAOx3  General: Well developed, in no acute distress.   Chest: Lungs clear, no rales, no rhonchi, no wheezes.   Heart: RR, no murmurs, no rubs, no gallops.   Abdomen: Soft, incisional tenderness, distension improving, no masses.  incisions c/d/i  Back: Normal curvature, no tenderness.   Neuro: Physiological, no localizing findings.   Skin: Normal, no rashes, no lesions noted.   Extremities: Warm, well perfused, no edema, Pulses intact      .  LABS:                         8.1    12.06 )-----------( 248      ( 2022 04:01 )             25.7     07-03    143  |  119<H>  |  23  ----------------------------<  171<H>  4.1   |  21<L>  |  1.29    Ca    8.0<L>      2022 04:01  Phos  1.4     07-03  Mg     2.1     07-03    TPro  5.8<L>  /  Alb  2.3<L>  /  TBili  0.2  /  DBili  x   /  AST  22  /  ALT  20  /  AlkPhos  63  07-03      Urinalysis Basic - ( 2022 15:21 )    Color: Yellow / Appearance: very cloudy / S.025 / pH: x  Gluc: x / Ketone: Negative  / Bili: Negative / Urobili: Negative   Blood: x / Protein: 100 / Nitrite: Negative   Leuk Esterase: Trace / RBC: >50 /HPF / WBC 0-2   Sq Epi: x / Non Sq Epi: Negative / Bacteria: Many            RADIOLOGY, EKG & ADDITIONAL TESTS: Reviewed.

## 2022-07-03 NOTE — PROGRESS NOTE ADULT - ATTENDING COMMENTS
Patient tolerating diet, no N/V.  Passing flatus and BMs.  Pain controlled.  On exam abdomen soft, mild to moderately distended and tympanic but not significantly tender.  Labs - ROSE resolving, Cr 1.29 today.     -- Advance to low residue diet  -- Encourage ambulation, IS  -- DVT prophylaxis - heparin  -- Avoid nephrotoxic medications  -- Multimodal pain control  -- Wean TPN.  Continue gentle IV hydration for now.  -- Nephrology following, appreciate recs

## 2022-07-04 ENCOUNTER — TRANSCRIPTION ENCOUNTER (OUTPATIENT)
Age: 87
End: 2022-07-04

## 2022-07-04 VITALS
SYSTOLIC BLOOD PRESSURE: 155 MMHG | DIASTOLIC BLOOD PRESSURE: 62 MMHG | HEART RATE: 74 BPM | OXYGEN SATURATION: 98 % | RESPIRATION RATE: 18 BRPM | TEMPERATURE: 98 F

## 2022-07-04 DIAGNOSIS — Z90.49 ACQUIRED ABSENCE OF OTHER SPECIFIED PARTS OF DIGESTIVE TRACT: ICD-10-CM

## 2022-07-04 LAB
ALBUMIN SERPL ELPH-MCNC: 2.2 G/DL — LOW (ref 3.3–5)
ALP SERPL-CCNC: 72 U/L — SIGNIFICANT CHANGE UP (ref 40–120)
ALT FLD-CCNC: 21 U/L — SIGNIFICANT CHANGE UP (ref 12–78)
ANION GAP SERPL CALC-SCNC: 6 MMOL/L — SIGNIFICANT CHANGE UP (ref 5–17)
AST SERPL-CCNC: 16 U/L — SIGNIFICANT CHANGE UP (ref 15–37)
BILIRUB SERPL-MCNC: 0.2 MG/DL — SIGNIFICANT CHANGE UP (ref 0.2–1.2)
BUN SERPL-MCNC: 24 MG/DL — HIGH (ref 7–23)
CALCIUM SERPL-MCNC: 8.1 MG/DL — LOW (ref 8.5–10.1)
CHLORIDE SERPL-SCNC: 119 MMOL/L — HIGH (ref 96–108)
CO2 SERPL-SCNC: 20 MMOL/L — LOW (ref 22–31)
CREAT SERPL-MCNC: 1.19 MG/DL — SIGNIFICANT CHANGE UP (ref 0.5–1.3)
EGFR: 59 ML/MIN/1.73M2 — LOW
GLUCOSE SERPL-MCNC: 131 MG/DL — HIGH (ref 70–99)
HCT VFR BLD CALC: 23.5 % — LOW (ref 39–50)
HGB BLD-MCNC: 7.5 G/DL — LOW (ref 13–17)
MAGNESIUM SERPL-MCNC: 1.9 MG/DL — SIGNIFICANT CHANGE UP (ref 1.6–2.6)
MCHC RBC-ENTMCNC: 29.2 PG — SIGNIFICANT CHANGE UP (ref 27–34)
MCHC RBC-ENTMCNC: 31.9 GM/DL — LOW (ref 32–36)
MCV RBC AUTO: 91.4 FL — SIGNIFICANT CHANGE UP (ref 80–100)
PHOSPHATE SERPL-MCNC: 1.6 MG/DL — LOW (ref 2.5–4.5)
PLATELET # BLD AUTO: 236 K/UL — SIGNIFICANT CHANGE UP (ref 150–400)
POTASSIUM SERPL-MCNC: 4.1 MMOL/L — SIGNIFICANT CHANGE UP (ref 3.5–5.3)
POTASSIUM SERPL-SCNC: 4.1 MMOL/L — SIGNIFICANT CHANGE UP (ref 3.5–5.3)
PROT SERPL-MCNC: 5.6 GM/DL — LOW (ref 6–8.3)
RBC # BLD: 2.57 M/UL — LOW (ref 4.2–5.8)
RBC # FLD: 16.8 % — HIGH (ref 10.3–14.5)
SODIUM SERPL-SCNC: 145 MMOL/L — SIGNIFICANT CHANGE UP (ref 135–145)
WBC # BLD: 10.27 K/UL — SIGNIFICANT CHANGE UP (ref 3.8–10.5)
WBC # FLD AUTO: 10.27 K/UL — SIGNIFICANT CHANGE UP (ref 3.8–10.5)

## 2022-07-04 RX ORDER — TRAMADOL HYDROCHLORIDE 50 MG/1
0.5 TABLET ORAL
Qty: 0 | Refills: 0 | DISCHARGE
Start: 2022-07-04

## 2022-07-04 RX ORDER — TRAMADOL HYDROCHLORIDE 50 MG/1
50 TABLET, COATED ORAL 3 TIMES DAILY
Qty: 10 | Refills: 0 | Status: ACTIVE | COMMUNITY
Start: 2022-07-04 | End: 1900-01-01

## 2022-07-04 RX ORDER — TRAMADOL HYDROCHLORIDE 50 MG/1
1 TABLET ORAL
Qty: 0 | Refills: 0 | DISCHARGE
Start: 2022-07-04

## 2022-07-04 RX ORDER — ASPIRIN/CALCIUM CARB/MAGNESIUM 324 MG
1 TABLET ORAL
Qty: 0 | Refills: 0 | DISCHARGE

## 2022-07-04 RX ORDER — ASPIRIN/CALCIUM CARB/MAGNESIUM 324 MG
1 TABLET ORAL
Qty: 0 | Refills: 0 | DISCHARGE
Start: 2022-07-04

## 2022-07-04 RX ORDER — COLESTIPOL HCL 5 G
1 GRANULES (GRAM) ORAL AT BEDTIME
Refills: 0 | Status: DISCONTINUED | OUTPATIENT
Start: 2022-07-04 | End: 2022-07-04

## 2022-07-04 RX ORDER — TRAMADOL HYDROCHLORIDE 50 MG/1
1 TABLET ORAL
Qty: 0 | Refills: 0 | DISCHARGE

## 2022-07-04 RX ORDER — SODIUM,POTASSIUM PHOSPHATES 278-250MG
1 POWDER IN PACKET (EA) ORAL ONCE
Refills: 0 | Status: COMPLETED | OUTPATIENT
Start: 2022-07-04 | End: 2022-07-04

## 2022-07-04 RX ADMIN — Medication 5 MILLIGRAM(S): at 02:10

## 2022-07-04 RX ADMIN — HEPARIN SODIUM 5000 UNIT(S): 5000 INJECTION INTRAVENOUS; SUBCUTANEOUS at 15:00

## 2022-07-04 RX ADMIN — Medication 88 MICROGRAM(S): at 05:58

## 2022-07-04 RX ADMIN — TRAMADOL HYDROCHLORIDE 25 MILLIGRAM(S): 50 TABLET ORAL at 16:51

## 2022-07-04 RX ADMIN — Medication 1 PACKET(S): at 05:58

## 2022-07-04 RX ADMIN — Medication 1000 UNIT(S): at 09:32

## 2022-07-04 RX ADMIN — HEPARIN SODIUM 5000 UNIT(S): 5000 INJECTION INTRAVENOUS; SUBCUTANEOUS at 05:58

## 2022-07-04 RX ADMIN — Medication 1 TABLET(S): at 09:32

## 2022-07-04 RX ADMIN — Medication 1 PACKET(S): at 15:00

## 2022-07-04 NOTE — PROGRESS NOTE ADULT - SUBJECTIVE AND OBJECTIVE BOX
Pt. seen and examined at bedside this morning. Patient reports pain is well controlled, he is tolerating a diet and having bowel movements. He denies fever, chest pain, SOB, nausea, vomiting, diarrhea or constipation. No acute events overnight.    Vitals:  T(C): 37.7 ( @ 08:33), Max: 37.7 ( @ 08:33)  HR: 74 ( 09:30) (67 - 82)  BP: 139/54 ( @ 09:30) (134/67 - 155/53)  RR: 17 ( @ 08:33) (17 - 18)  SpO2: 97% (:33) (96% - 97%)     @ 07:01  -   @ 07:00  --------------------------------------------------------  IN:  Total IN: 0 mL    OUT:    Voided (mL): 500 mL  Total OUT: 500 mL    Total NET: -500 mL      Physical Exam:  Pt is AAOx3  General: Well developed, in no acute distress.   Chest: Lungs clear, no rales, no rhonchi, no wheezes.   Heart: RR, no murmurs, no rubs, no gallops.   Abdomen: Incisions healing well, no erythema, ecchymosis or induration, Soft, incisional tenderness, distension improving  Back: Normal curvature, no tenderness.   Neuro: Physiological, no localizing findings.   Skin: Normal, no rashes, no lesions noted.   Extremities: Warm, well perfused, no edema, Pulses intact       04:54                    7.5  CBC: 10.27>)-------(<236                     23.5                 145 | 119 | 24    CMP:  ----------------------< 131               4.1 | 20 | 1.19                      Ca:8.1  Phos:1.6  M.9               0.2|      |16        LFTs:  ------|72|-----             -|      |-   @ 04:01                    8.1  CBC: 12.06>)-------(<248                     25.7                 143 | 119 | 23    CMP:  ----------------------< 171               4.1 | 21 | 1.29                      Ca:8.0  Phos:1.4  M.1               0.2|      |22        LFTs:  ------|63|-----             -|      |-      Current Inpatient Medications:  acetaminophen     Tablet .. 975 milliGRAM(s) Oral every 6 hours  acetaminophen   IVPB .. 1000 milliGRAM(s) IV Intermittent once PRN  aspirin enteric coated 81 milliGRAM(s) Oral at bedtime  atorvastatin 20 milliGRAM(s) Oral at bedtime  cholecalciferol Oral Tab/Cap - Peds 1000 Unit(s) Oral daily  dextrose 5%. 1000 milliLiter(s) (50 mL/Hr) IV Continuous <Continuous>  dextrose 5%. 1000 milliLiter(s) (100 mL/Hr) IV Continuous <Continuous>  dextrose 50% Injectable 25 Gram(s) IV Push once  dextrose 50% Injectable 12.5 Gram(s) IV Push once  dextrose 50% Injectable 25 Gram(s) IV Push once  dextrose Oral Gel 15 Gram(s) Oral once PRN  glucagon  Injectable 1 milliGRAM(s) IntraMuscular once  heparin   Injectable 5000 Unit(s) SubCutaneous every 8 hours  insulin lispro (ADMELOG) corrective regimen sliding scale   SubCutaneous three times a day before meals  insulin lispro (ADMELOG) corrective regimen sliding scale   SubCutaneous at bedtime  levothyroxine 88 MICROGram(s) Oral daily  melatonin 5 milliGRAM(s) Oral at bedtime PRN  morphine  - Injectable 4 milliGRAM(s) IV Push every 6 hours PRN  multivitamin 1 Tablet(s) Oral daily  ondansetron Injectable 4 milliGRAM(s) IV Push every 6 hours PRN  PreserVision (AREDS 2) 1 Capsule(s) 1 Capsule(s) Oral daily  traMADol 25 milliGRAM(s) Oral every 6 hours PRN  verapamil  milliGRAM(s) Oral daily

## 2022-07-04 NOTE — DISCHARGE NOTE NURSING/CASE MANAGEMENT/SOCIAL WORK - PATIENT PORTAL LINK FT
You can access the FollowMyHealth Patient Portal offered by [facility] by registering at the following website: [email or web address]. By joining [employer]’s FollowMyHealth portal, you will also be able to view your health information using other applications (apps) compatible with our system.

## 2022-07-04 NOTE — DISCHARGE NOTE PROVIDER - CARE PROVIDER_API CALL
Gabrielle Ardon)  ColonRectal Surgery; Surgery  [address] [place] [address]  Phone: [phone]  Fax: [phone]  Follow Up Time: 2 weeks

## 2022-07-04 NOTE — PROGRESS NOTE ADULT - ATTENDING COMMENTS
Patient tolerating diet, no N/V.  Passing flatus and BMs - notes that these are still loose, reassured that these are normal due to his resection/fact that he was on liquid diet for the last few days, and that this should improve with time as he eats more solids.  He is eager to go home today if possible.    On exam abdomen soft, less distended, no rebound/guarding.    -- Patient's ROSE is resolving - Cr 1.1 today.  -- Continue low residue diet.  PPN DC'ed.  Ensure supplements.  -- DVT prophylaxis  -- Pain control with multimodal regimen  -- Avoid nephrotoxic medications  -- Per prior PT note - needs home PT.  Once this is arranged patient may be discharged to home.  He is tolerating diet, voiding, ROSE improved, pain controlled.

## 2022-07-04 NOTE — PROGRESS NOTE ADULT - NUTRITIONAL ASSESSMENT
This patient has been assessed with a concern for Malnutrition and has been determined to have a diagnosis/diagnoses of Severe protein-calorie malnutrition.    This patient is being managed with:   Diet Low Fiber-  Entered: Jul  3 2022 11:52AM    Parenteral Nutrition - Adult-  Entered: Jul 2 2022 10:00PM    fat emulsion (Fish Oil and Plant Based) 20% Infusion-[Known as SMOFLIPID 20% Infusion]  50 Gram(s) in IV Solution 250 milliLiter(s) infuse at 20.83 mL/Hr  Dose Rate: 0.61 Gm/kG/Day Infuse Over: 12 Hours; Stop After 12 Hours  Administration Instructions: Use 1.2 micron in-line filter  Entered: Jul 2 2022 10:00PM    
This patient has been assessed with a concern for Malnutrition and has been determined to have a diagnosis/diagnoses of Severe protein-calorie malnutrition.    This patient is being managed with:   Diet Low Fiber-  Entered: Jul  3 2022 11:52AM    Parenteral Nutrition - Adult-  Entered: Jul 2 2022 10:00PM    fat emulsion (Fish Oil and Plant Based) 20% Infusion-[Known as SMOFLIPID 20% Infusion]  50 Gram(s) in IV Solution 250 milliLiter(s) infuse at 20.83 mL/Hr  Dose Rate: 0.61 Gm/kG/Day Infuse Over: 12 Hours; Stop After 12 Hours  Administration Instructions: Use 1.2 micron in-line filter  Entered: Jul 2 2022 10:00PM    
This patient has been assessed with a concern for Malnutrition and has been determined to have a diagnosis/diagnoses of Severe protein-calorie malnutrition.    This patient is being managed with:   Parenteral Nutrition - Adult-  Entered: Jul 2 2022 10:00PM    fat emulsion (Fish Oil and Plant Based) 20% Infusion-[Known as SMOFLIPID 20% Infusion]  50 Gram(s) in IV Solution 250 milliLiter(s) infuse at 20.83 mL/Hr  Dose Rate: 0.61 Gm/kG/Day Infuse Over: 12 Hours; Stop After 12 Hours  Administration Instructions: Use 1.2 micron in-line filter  Entered: Jul 2 2022 10:00PM    Parenteral Nutrition - Adult-  Entered: Jul 1 2022 10:00PM    fat emulsion (Fish Oil and Plant Based) 20% Infusion-[Known as SMOFLIPID 20% Infusion]  50 Gram(s) in IV Solution 250 milliLiter(s) infuse at 20.83 mL/Hr  Dose Rate: 0.612 Gm/kG/Day Infuse Over: 12 Hours; Stop After 12 Hours  Administration Instructions: Use 1.2 micron in-line filter  Entered: Jul 1 2022 10:00PM    Diet Consistent Carbohydrate Full Liquid-  Entered: Jul 1 2022  5:43PM
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
## 2022-07-04 NOTE — DISCHARGE NOTE PROVIDER - NSDCMRMEDTOKEN_GEN_ALL_CORE_FT
Alpha Lipoic Acid 100 mg oral tablet: 1 tab(s) orally once a day (at bedtime)  ascorbic acid 1000 mg oral tablet: 1 tab(s) orally once a day  atorvastatin 20 mg oral tablet: 1 tab(s) orally once a day  colestipol 1 g oral tablet: 1 tab(s) orally once a day (at bedtime)  gabapentin 100 mg oral capsule: 1 cap(s) orally 3 times a day, As Needed  glimepiride 1 mg oral tablet: 1 tab(s) orally once a day  levothyroxine 88 mcg (0.088 mg) oral tablet: 1 tab(s) orally once a day  lisinopril-hydrochlorothiazide 20 mg-25 mg oral tablet: 1 tab(s) orally once a day  metFORMIN 500 mg oral tablet: 1 tab(s) orally 2 times a day  Multiple Vitamins oral tablet: 1 tab(s) orally once a day  PreserVision AREDS 2 oral capsule: 1 cap(s) orally 2 times a day  verapamil 240 mg/24 hours oral tablet, extended release: 1 tab(s) orally once a day  Vitamin D3 50 mcg (2000 intl units) oral tablet: 1 tab(s) orally once a day

## 2022-07-04 NOTE — DISCHARGE NOTE NURSING/CASE MANAGEMENT/SOCIAL WORK - NSDCPEFALRISK_GEN_ALL_CORE
For information on Fall & Injury Prevention, visit: [email or web address] OR  [email or web address] OR  https://www.cdc.gov/steadi/patient.html

## 2022-07-04 NOTE — DISCHARGE NOTE PROVIDER - NSDCFUADDINST_GEN_ALL_CORE_FT
Please take medications as prescribed. Please take over the counter pain medications as needed for pain. You may shower. Change your dressing over your incision daily after a shower. If it is dry you do not need a dressing. Please do not lift more than 10 pounds for 6 weeks  If you have any issues call the number above or come to the ER.

## 2022-07-04 NOTE — PROGRESS NOTE ADULT - ASSESSMENT
87 M with hx of Crohn's disease and colon cancer POD 5 s/p lap sigmoidectomy and ileocolic resection.  Ileus now resolved, patient having bowel function and tolerating diet.     Pain:  Pain control  ambulation  incentive spirometry  Low fiber diet  DVT/GI ppx  Nephrology recommendations appreciated  Renal US - appreciated   Plan for DC today if home services set up  Patient may shower    Plan discussed with CRS team

## 2022-07-04 NOTE — DISCHARGE NOTE PROVIDER - HOSPITAL COURSE
6 y/o M with a PMHx of sepsis, neuropathy, prostate CA, Crohn disease, hypothyroidism, depression, intervertebral disc disorder, diabetes mellitus obesity, HTN, hypercholesterolemia, stented coronary artery, CAD, macular degeneration, hearing loss presented to the ED for abdominal pain. Patient had an ileocolic stricture and sigmoid colon cancer. He underwent a laparoscopic ileocolic resection with anastomosis and sigmoidectomy. He tolerated a low fiber diet, worked with PT is now clear for discharge.

## 2022-07-05 NOTE — PATIENT PROFILE ADULT. - BILL OF RIGHTS/ADMISSION INFORMATION PROVIDED TO:
[FreeTextEntry1] : Will plan on an upper endoscopy for GERD, questionable abnormality on recent TNE.  Explained risks/benefits/alternatives including not limited to bleeding, infection, perforation, missed lesions, anesthesia complications.  Patient understands and agrees, all questions answered.\par \par Thank you for referring Mr. CARRANZA.  Please do not hesitate to call to further discuss his/her care.\par \par Note faxed to requesting MD.\par \par  Patient

## 2022-07-06 ENCOUNTER — NON-APPOINTMENT (OUTPATIENT)
Age: 87
End: 2022-07-06

## 2022-07-06 ENCOUNTER — EMERGENCY (EMERGENCY)
Facility: HOSPITAL | Age: 87
LOS: 0 days | Discharge: HOME | End: 2022-07-06
Attending: EMERGENCY MEDICINE
Payer: MEDICARE

## 2022-07-06 VITALS
TEMPERATURE: 98 F | HEART RATE: 73 BPM | OXYGEN SATURATION: 97 % | RESPIRATION RATE: 17 BRPM | SYSTOLIC BLOOD PRESSURE: 125 MMHG | DIASTOLIC BLOOD PRESSURE: 55 MMHG

## 2022-07-06 VITALS
DIASTOLIC BLOOD PRESSURE: 71 MMHG | RESPIRATION RATE: 16 BRPM | TEMPERATURE: 99 F | WEIGHT: 171.96 LBS | HEIGHT: 66 IN | HEART RATE: 89 BPM | SYSTOLIC BLOOD PRESSURE: 96 MMHG | OXYGEN SATURATION: 93 %

## 2022-07-06 DIAGNOSIS — I25.10 ATHEROSCLEROTIC HEART DISEASE OF NATIVE CORONARY ARTERY WITHOUT ANGINA PECTORIS: ICD-10-CM

## 2022-07-06 DIAGNOSIS — J98.11 ATELECTASIS: ICD-10-CM

## 2022-07-06 DIAGNOSIS — E11.40 TYPE 2 DIABETES MELLITUS WITH DIABETIC NEUROPATHY, UNSPECIFIED: ICD-10-CM

## 2022-07-06 DIAGNOSIS — F32.A DEPRESSION, UNSPECIFIED: ICD-10-CM

## 2022-07-06 DIAGNOSIS — Z91.018 ALLERGY TO OTHER FOODS: ICD-10-CM

## 2022-07-06 DIAGNOSIS — K50.90 CROHN'S DISEASE, UNSPECIFIED, WITHOUT COMPLICATIONS: ICD-10-CM

## 2022-07-06 DIAGNOSIS — Z97.4 PRESENCE OF EXTERNAL HEARING-AID: ICD-10-CM

## 2022-07-06 DIAGNOSIS — Z98.1 ARTHRODESIS STATUS: Chronic | ICD-10-CM

## 2022-07-06 DIAGNOSIS — N17.9 ACUTE KIDNEY FAILURE, UNSPECIFIED: ICD-10-CM

## 2022-07-06 DIAGNOSIS — H35.30 UNSPECIFIED MACULAR DEGENERATION: ICD-10-CM

## 2022-07-06 DIAGNOSIS — M51.9 UNSPECIFIED THORACIC, THORACOLUMBAR AND LUMBOSACRAL INTERVERTEBRAL DISC DISORDER: ICD-10-CM

## 2022-07-06 DIAGNOSIS — N28.1 CYST OF KIDNEY, ACQUIRED: ICD-10-CM

## 2022-07-06 DIAGNOSIS — Z96.641 PRESENCE OF RIGHT ARTIFICIAL HIP JOINT: Chronic | ICD-10-CM

## 2022-07-06 DIAGNOSIS — H91.93 UNSPECIFIED HEARING LOSS, BILATERAL: ICD-10-CM

## 2022-07-06 DIAGNOSIS — R53.1 WEAKNESS: ICD-10-CM

## 2022-07-06 DIAGNOSIS — Z79.82 LONG TERM (CURRENT) USE OF ASPIRIN: ICD-10-CM

## 2022-07-06 DIAGNOSIS — Z96.641 PRESENCE OF RIGHT ARTIFICIAL HIP JOINT: ICD-10-CM

## 2022-07-06 DIAGNOSIS — Z85.46 PERSONAL HISTORY OF MALIGNANT NEOPLASM OF PROSTATE: ICD-10-CM

## 2022-07-06 DIAGNOSIS — Z98.1 ARTHRODESIS STATUS: ICD-10-CM

## 2022-07-06 DIAGNOSIS — Z85.038 PERSONAL HISTORY OF OTHER MALIGNANT NEOPLASM OF LARGE INTESTINE: ICD-10-CM

## 2022-07-06 DIAGNOSIS — R50.9 FEVER, UNSPECIFIED: ICD-10-CM

## 2022-07-06 DIAGNOSIS — E78.00 PURE HYPERCHOLESTEROLEMIA, UNSPECIFIED: ICD-10-CM

## 2022-07-06 DIAGNOSIS — Z90.49 ACQUIRED ABSENCE OF OTHER SPECIFIED PARTS OF DIGESTIVE TRACT: ICD-10-CM

## 2022-07-06 DIAGNOSIS — Z79.84 LONG TERM (CURRENT) USE OF ORAL HYPOGLYCEMIC DRUGS: ICD-10-CM

## 2022-07-06 DIAGNOSIS — I10 ESSENTIAL (PRIMARY) HYPERTENSION: ICD-10-CM

## 2022-07-06 DIAGNOSIS — R14.0 ABDOMINAL DISTENSION (GASEOUS): ICD-10-CM

## 2022-07-06 DIAGNOSIS — Z92.3 PERSONAL HISTORY OF IRRADIATION: ICD-10-CM

## 2022-07-06 DIAGNOSIS — Z95.5 PRESENCE OF CORONARY ANGIOPLASTY IMPLANT AND GRAFT: ICD-10-CM

## 2022-07-06 DIAGNOSIS — E03.9 HYPOTHYROIDISM, UNSPECIFIED: ICD-10-CM

## 2022-07-06 DIAGNOSIS — K63.89 OTHER SPECIFIED DISEASES OF INTESTINE: ICD-10-CM

## 2022-07-06 DIAGNOSIS — E66.9 OBESITY, UNSPECIFIED: ICD-10-CM

## 2022-07-06 DIAGNOSIS — Z88.0 ALLERGY STATUS TO PENICILLIN: ICD-10-CM

## 2022-07-06 DIAGNOSIS — Z90.49 ACQUIRED ABSENCE OF OTHER SPECIFIED PARTS OF DIGESTIVE TRACT: Chronic | ICD-10-CM

## 2022-07-06 DIAGNOSIS — R18.8 OTHER ASCITES: ICD-10-CM

## 2022-07-06 DIAGNOSIS — I45.10 UNSPECIFIED RIGHT BUNDLE-BRANCH BLOCK: ICD-10-CM

## 2022-07-06 DIAGNOSIS — Z20.822 CONTACT WITH AND (SUSPECTED) EXPOSURE TO COVID-19: ICD-10-CM

## 2022-07-06 DIAGNOSIS — Z86.19 PERSONAL HISTORY OF OTHER INFECTIOUS AND PARASITIC DISEASES: ICD-10-CM

## 2022-07-06 DIAGNOSIS — K80.20 CALCULUS OF GALLBLADDER WITHOUT CHOLECYSTITIS WITHOUT OBSTRUCTION: ICD-10-CM

## 2022-07-06 DIAGNOSIS — N39.0 URINARY TRACT INFECTION, SITE NOT SPECIFIED: ICD-10-CM

## 2022-07-06 LAB
ALBUMIN SERPL ELPH-MCNC: 2.4 G/DL — LOW (ref 3.3–5)
ALP SERPL-CCNC: 89 U/L — SIGNIFICANT CHANGE UP (ref 40–120)
ALT FLD-CCNC: 36 U/L — SIGNIFICANT CHANGE UP (ref 12–78)
ANION GAP SERPL CALC-SCNC: 11 MMOL/L — SIGNIFICANT CHANGE UP (ref 5–17)
APPEARANCE UR: ABNORMAL
APTT BLD: 28.3 SEC — SIGNIFICANT CHANGE UP (ref 27.5–35.5)
AST SERPL-CCNC: 25 U/L — SIGNIFICANT CHANGE UP (ref 15–37)
BASOPHILS # BLD AUTO: 0.07 K/UL — SIGNIFICANT CHANGE UP (ref 0–0.2)
BASOPHILS NFR BLD AUTO: 0.4 % — SIGNIFICANT CHANGE UP (ref 0–2)
BILIRUB SERPL-MCNC: 0.4 MG/DL — SIGNIFICANT CHANGE UP (ref 0.2–1.2)
BILIRUB UR-MCNC: NEGATIVE — SIGNIFICANT CHANGE UP
BUN SERPL-MCNC: 21 MG/DL — SIGNIFICANT CHANGE UP (ref 7–23)
CALCIUM SERPL-MCNC: 8.9 MG/DL — SIGNIFICANT CHANGE UP (ref 8.5–10.1)
CHLORIDE SERPL-SCNC: 111 MMOL/L — HIGH (ref 96–108)
CO2 SERPL-SCNC: 19 MMOL/L — LOW (ref 22–31)
COLOR SPEC: YELLOW — SIGNIFICANT CHANGE UP
CREAT SERPL-MCNC: 1.49 MG/DL — HIGH (ref 0.5–1.3)
DIFF PNL FLD: ABNORMAL
EGFR: 45 ML/MIN/1.73M2 — LOW
EOSINOPHIL # BLD AUTO: 0.07 K/UL — SIGNIFICANT CHANGE UP (ref 0–0.5)
EOSINOPHIL NFR BLD AUTO: 0.4 % — SIGNIFICANT CHANGE UP (ref 0–6)
GLUCOSE SERPL-MCNC: 154 MG/DL — HIGH (ref 70–99)
GLUCOSE UR QL: NEGATIVE — SIGNIFICANT CHANGE UP
HCT VFR BLD CALC: 28 % — LOW (ref 39–50)
HGB BLD-MCNC: 9 G/DL — LOW (ref 13–17)
IMM GRANULOCYTES NFR BLD AUTO: 0.8 % — SIGNIFICANT CHANGE UP (ref 0–1.5)
INR BLD: 1.3 RATIO — HIGH (ref 0.88–1.16)
KETONES UR-MCNC: ABNORMAL
LACTATE SERPL-SCNC: 1.3 MMOL/L — SIGNIFICANT CHANGE UP (ref 0.7–2)
LEUKOCYTE ESTERASE UR-ACNC: ABNORMAL
LYMPHOCYTES # BLD AUTO: 0.74 K/UL — LOW (ref 1–3.3)
LYMPHOCYTES # BLD AUTO: 4.2 % — LOW (ref 13–44)
MCHC RBC-ENTMCNC: 28.4 PG — SIGNIFICANT CHANGE UP (ref 27–34)
MCHC RBC-ENTMCNC: 32.1 GM/DL — SIGNIFICANT CHANGE UP (ref 32–36)
MCV RBC AUTO: 88.3 FL — SIGNIFICANT CHANGE UP (ref 80–100)
MONOCYTES # BLD AUTO: 0.69 K/UL — SIGNIFICANT CHANGE UP (ref 0–0.9)
MONOCYTES NFR BLD AUTO: 3.9 % — SIGNIFICANT CHANGE UP (ref 2–14)
NEUTROPHILS # BLD AUTO: 16.04 K/UL — HIGH (ref 1.8–7.4)
NEUTROPHILS NFR BLD AUTO: 90.3 % — HIGH (ref 43–77)
NITRITE UR-MCNC: NEGATIVE — SIGNIFICANT CHANGE UP
PH UR: 5 — SIGNIFICANT CHANGE UP (ref 5–8)
PLATELET # BLD AUTO: 451 K/UL — HIGH (ref 150–400)
POTASSIUM SERPL-MCNC: 4.2 MMOL/L — SIGNIFICANT CHANGE UP (ref 3.5–5.3)
POTASSIUM SERPL-SCNC: 4.2 MMOL/L — SIGNIFICANT CHANGE UP (ref 3.5–5.3)
PROT SERPL-MCNC: 6 GM/DL — SIGNIFICANT CHANGE UP (ref 6–8.3)
PROT UR-MCNC: 100
PROTHROM AB SERPL-ACNC: 15.1 SEC — HIGH (ref 10.5–13.4)
RBC # BLD: 3.17 M/UL — LOW (ref 4.2–5.8)
RBC # FLD: 16.1 % — HIGH (ref 10.3–14.5)
SODIUM SERPL-SCNC: 141 MMOL/L — SIGNIFICANT CHANGE UP (ref 135–145)
SP GR SPEC: 1.02 — SIGNIFICANT CHANGE UP (ref 1.01–1.02)
UROBILINOGEN FLD QL: NEGATIVE — SIGNIFICANT CHANGE UP
WBC # BLD: 17.75 K/UL — HIGH (ref 3.8–10.5)
WBC # FLD AUTO: 17.75 K/UL — HIGH (ref 3.8–10.5)

## 2022-07-06 PROCEDURE — 99284 EMERGENCY DEPT VISIT MOD MDM: CPT

## 2022-07-06 PROCEDURE — 85730 THROMBOPLASTIN TIME PARTIAL: CPT

## 2022-07-06 PROCEDURE — 0225U NFCT DS DNA&RNA 21 SARSCOV2: CPT

## 2022-07-06 PROCEDURE — 85610 PROTHROMBIN TIME: CPT

## 2022-07-06 PROCEDURE — 85025 COMPLETE CBC W/AUTO DIFF WBC: CPT

## 2022-07-06 PROCEDURE — 83605 ASSAY OF LACTIC ACID: CPT

## 2022-07-06 PROCEDURE — 87086 URINE CULTURE/COLONY COUNT: CPT

## 2022-07-06 PROCEDURE — 80053 COMPREHEN METABOLIC PANEL: CPT

## 2022-07-06 PROCEDURE — 71045 X-RAY EXAM CHEST 1 VIEW: CPT | Mod: 26

## 2022-07-06 PROCEDURE — 74177 CT ABD & PELVIS W/CONTRAST: CPT | Mod: 26,MA

## 2022-07-06 PROCEDURE — 93005 ELECTROCARDIOGRAM TRACING: CPT

## 2022-07-06 PROCEDURE — 96361 HYDRATE IV INFUSION ADD-ON: CPT

## 2022-07-06 PROCEDURE — 99285 EMERGENCY DEPT VISIT HI MDM: CPT | Mod: 25

## 2022-07-06 PROCEDURE — 71045 X-RAY EXAM CHEST 1 VIEW: CPT

## 2022-07-06 PROCEDURE — 96374 THER/PROPH/DIAG INJ IV PUSH: CPT | Mod: XU

## 2022-07-06 PROCEDURE — 87040 BLOOD CULTURE FOR BACTERIA: CPT

## 2022-07-06 PROCEDURE — 87186 SC STD MICRODIL/AGAR DIL: CPT

## 2022-07-06 PROCEDURE — 93010 ELECTROCARDIOGRAM REPORT: CPT

## 2022-07-06 PROCEDURE — 81001 URINALYSIS AUTO W/SCOPE: CPT

## 2022-07-06 PROCEDURE — 74177 CT ABD & PELVIS W/CONTRAST: CPT | Mod: MA

## 2022-07-06 PROCEDURE — 36415 COLL VENOUS BLD VENIPUNCTURE: CPT

## 2022-07-06 RX ORDER — CEFPODOXIME PROXETIL 100 MG
1 TABLET ORAL
Qty: 14 | Refills: 0
Start: 2022-07-06 | End: 2022-07-12

## 2022-07-06 RX ORDER — CEFTRIAXONE 500 MG/1
1000 INJECTION, POWDER, FOR SOLUTION INTRAMUSCULAR; INTRAVENOUS ONCE
Refills: 0 | Status: COMPLETED | OUTPATIENT
Start: 2022-07-06 | End: 2022-07-06

## 2022-07-06 RX ORDER — SODIUM CHLORIDE 9 MG/ML
1000 INJECTION INTRAMUSCULAR; INTRAVENOUS; SUBCUTANEOUS ONCE
Refills: 0 | Status: COMPLETED | OUTPATIENT
Start: 2022-07-06 | End: 2022-07-06

## 2022-07-06 RX ADMIN — CEFTRIAXONE 100 MILLIGRAM(S): 500 INJECTION, POWDER, FOR SOLUTION INTRAMUSCULAR; INTRAVENOUS at 19:02

## 2022-07-06 RX ADMIN — SODIUM CHLORIDE 1000 MILLILITER(S): 9 INJECTION INTRAMUSCULAR; INTRAVENOUS; SUBCUTANEOUS at 18:53

## 2022-07-06 RX ADMIN — SODIUM CHLORIDE 1000 MILLILITER(S): 9 INJECTION INTRAMUSCULAR; INTRAVENOUS; SUBCUTANEOUS at 16:36

## 2022-07-06 NOTE — ED PROVIDER NOTE - SKIN, MLM
Skin normal color for race. No evidence of rash. Midline abd incision appears to be healing well, no redness or discharge.

## 2022-07-06 NOTE — ED ADULT NURSE NOTE - OBJECTIVE STATEMENT
Pt reports recent colon resection with hx of colon ca. Pt reports feeling fairly well after sgy, but developing fever and abd pain at home. Labs and IVF as ordered. Pt to ct scan. Awaiting results with pt and family verbalizing understanding of POC.

## 2022-07-06 NOTE — CONSULT NOTE ADULT - ASSESSMENT
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 CT scan reviewed, showing post laparoscopic surgery changes, anastomoses intact, no obstruction, no acute intraabdominal pathology. U/A shows signs of UTI.     Plan:   - No acute surgical intervention at this time   - Please d/c patient on oral antibiotics for UTI   - Please have patient follow up with colorectal surgery this Friday 7/8/22  - Reconsult colorectal surgery prn     Case discussed with Dr. Walden

## 2022-07-06 NOTE — ED PROVIDER NOTE - CARE PROVIDER_API CALL
Maria Ines Walden)  ColonRectal Surgery; Surgery  [address] [place] [address]  Phone: [phone]  Fax: [phone]  Follow Up Time:

## 2022-07-06 NOTE — ED PROVIDER NOTE - CARE PROVIDERS DIRECT ADDRESSES
(4) excellent
[email or web address]

## 2022-07-06 NOTE — ED ADULT NURSE NOTE - NS ED NURSE IV DC DT
06-Jul-2022 22:05 Z Plasty Text: The lesion was extirpated to the level of the fat with a #15 scalpel blade.  Given the location of the defect, shape of the defect and the proximity to free margins a Z-plasty was deemed most appropriate for repair.  Using a sterile surgical marker, the appropriate transposition arms of the Z-plasty were drawn incorporating the defect and placing the expected incisions within the relaxed skin tension lines where possible.    The area thus outlined was incised deep to adipose tissue with a #15 scalpel blade.  The skin margins were undermined to an appropriate distance in all directions utilizing iris scissors.  The opposing transposition arms were then transposed into place in opposite direction and anchored with interrupted buried subcutaneous sutures.

## 2022-07-06 NOTE — ED PROVIDER NOTE - WR ORDER DATE AND TIME 1
"EXAM note      History of present illness    Faraz Wiley is a 15year old female who presents  with right lateral ankle and foot pain. Patient reports that she was playing volleyball on Monday and rolled her right ankle and had mild right ankle swelling and pain. She then was playing volleyball on Tuesday and rolled her right ankle again. She has been applying an ice pack to the area and wrapped it in an ace with some temporary improvement of pain. She is unable to bear weight on the right foot due to pain. Reports that the ankle and foot feels a bit numb after icing, but feeling returns with removal of ice pack and loosening of Ace wrap. Allergies as of 08/23/2019   â¢ (No Known Allergies)       Current Outpatient Medications   Medication Sig Dispense Refill   â¢ naproxen (NAPROSYN) 500 MG tablet Take 1 tablet by mouth 2 times daily. 60 tablet 0     No current facility-administered medications for this visit. There is no previous medical history on file. Social History     Tobacco Use   â¢ Smoking status: Never Smoker   â¢ Smokeless tobacco: Never Used       Health Maintenance   Topic Date Due   â¢ Depression Screening  06/08/2017   â¢ Influenza Vaccine (1) 09/01/2019   â¢ Meningococcal Vaccine (2 - 2-dose series) 06/08/2021   â¢ DTaP/Tdap/Td Vaccine (7 - Td) 08/25/2025   â¢ IPV Vaccine  Completed   â¢ MMR Vaccine  Completed   â¢ Varicella Vaccine  Completed   â¢ Hepatitis B Vaccine  Completed   â¢ Hepatitis A Vaccine  Completed   â¢ HPV (Female) Vaccine  Completed   â¢ Pneumococcal Vaccine 0-64  Aged Out           Review of systems       Constitutional:    Patient denies fever. Respiratory:  Denies shortness of breath. Musculoskeletal:  See HPI. Integument:  Denies erythema or any open lesions. Neurologic:  Denies weakness.       Physical Exam    Vital Signs:    Vitals:    08/23/19 1058   BP: 110/70   Pulse: 78   Weight: 68.5 kg   Height: 5' 7"" (1.702 m)     Constitutional:  Pleasant, " cooperative 15year old in no acute distress. Integument:  Warm, Dry, and Intact. No erythema or bruising. MS: Pain with palpation and mild swelling over lateral ankle radiating down over top of foot proximal to fourth and fifth metatarsals on right. She is able to put her foot flat on the floor, but can only partially bear weight when up on her tiptoes on the right foot. Cardiovascular:  No cyanosis noted to extremities. 3+ pedal pulse in right foot. Respiratory:  Chest expansion symmetric. Respirations relaxed and even. No respiratory distress. Neurologic:  Alert and oriented x 3. Pleasant with coherent thoughts. No weakness. Assessment AND Plan      Gordon, 15year-old female here with her mother regarding turning her ankle 2 days in a row earlier this week while applying volleyball. Injury of right ankle, initial encounter/Injury of right foot, initial encounter  - XR ANKLE 3+ VW RIGHT; Future  - XR FOOT 3+ VW RIGHT; Future  1. Normal radiographs of the right ankle  Â   2. Mild swelling of the right forefoot without fracture or focal osseous  Abnormality. Will treat as a right ankle sprain  -She will begin by taking one week off volleyball to rest ankle and allow for sprain to heal and avoid reinjury.  -Patient is ambulating with crutches and may continue to do so if pain persists.  -Continue to wrap with Ace daily for support. -Ice pack for 20 minutes 4 times daily until swelling and pain is improved. -Elevate right lower extremity is much as possible  -Ibuprofen as label directs p.r.n. pain. -Perform range of motion to ankle as tolerated 2-4 times daily.  -Return to clinic if symptoms fail to resolve or with any new or worsening symptoms. Educated patient regarding clinical findings, medications, signs and symptoms needing medical attention, and patient instructions. Patient verbalizes understanding of our plan and agrees with this. 06-Jul-2022 15:41

## 2022-07-06 NOTE — ED PROVIDER NOTE - NSFOLLOWUPINSTRUCTIONS_ED_ALL_ED_FT
Take antibiotics as prescribed.    Can take tylenol or motrin as needed for fever.    Please follow up with colorectal team on Friday as scheduled.    Please return to ED if you have any worsening symptoms while on antibiotics.  We will call with results of your urine and blood cultures, and direct you if you may need to come back to the ED.      Urinary Tract Infection, Adult       A urinary tract infection (UTI) is an infection of any part of the urinary tract. The urinary tract includes the kidneys, ureters, bladder, and urethra. These organs make, store, and get rid of urine in the body.    An upper UTI affects the ureters and kidneys. A lower UTI affects the bladder and urethra.      What are the causes?    Most urinary tract infections are caused by bacteria in your genital area around your urethra, where urine leaves your body. These bacteria grow and cause inflammation of your urinary tract.      What increases the risk?    You are more likely to develop this condition if:  •You have a urinary catheter that stays in place.      •You are not able to control when you urinate or have a bowel movement (incontinence).    •You are female and you:  •Use a spermicide or diaphragm for birth control.      •Have low estrogen levels.      •Are pregnant.        •You have certain genes that increase your risk.      •You are sexually active.      •You take antibiotic medicines.    •You have a condition that causes your flow of urine to slow down, such as:  •An enlarged prostate, if you are male.      •Blockage in your urethra.      •A kidney stone.      •A nerve condition that affects your bladder control (neurogenic bladder).      •Not getting enough to drink, or not urinating often.      •You have certain medical conditions, such as:  •Diabetes.      •A weak disease-fighting system (immunesystem).      •Sickle cell disease.      •Gout.      •Spinal cord injury.          What are the signs or symptoms?    Symptoms of this condition include:  •Needing to urinate right away (urgency).      •Frequent urination. This may include small amounts of urine each time you urinate.      •Pain or burning with urination.      •Blood in the urine.      •Urine that smells bad or unusual.      •Trouble urinating.      •Cloudy urine.      •Vaginal discharge, if you are female.      •Pain in the abdomen or the lower back.      You may also have:  •Vomiting or a decreased appetite.      •Confusion.      •Irritability or tiredness.      •A fever or chills.      •Diarrhea.      The first symptom in older adults may be confusion. In some cases, they may not have any symptoms until the infection has worsened.      How is this diagnosed?    This condition is diagnosed based on your medical history and a physical exam. You may also have other tests, including:  •Urine tests.      •Blood tests.      •Tests for STIs (sexually transmitted infections).      If you have had more than one UTI, a cystoscopy or imaging studies may be done to determine the cause of the infections.      How is this treated?    Treatment for this condition includes:  •Antibiotic medicine.      •Over-the-counter medicines to treat discomfort.      •Drinking enough water to stay hydrated.      If you have frequent infections or have other conditions such as a kidney stone, you may need to see a health care provider who specializes in the urinary tract (urologist).    In rare cases, urinary tract infections can cause sepsis. Sepsis is a life-threatening condition that occurs when the body responds to an infection. Sepsis is treated in the hospital with IV antibiotics, fluids, and other medicines.      Follow these instructions at home:     Medicines     •Take over-the-counter and prescription medicines only as told by your health care provider.      •If you were prescribed an antibiotic medicine, take it as told by your health care provider. Do not stop using the antibiotic even if you start to feel better.      General instructions   •Make sure you:  •Empty your bladder often and completely. Do not hold urine for long periods of time.      •Empty your bladder after sex.      •Wipe from front to back after urinating or having a bowel movement if you are female. Use each tissue only one time when you wipe.        •Drink enough fluid to keep your urine pale yellow.      •Keep all follow-up visits. This is important.        Contact a health care provider if:    •Your symptoms do not get better after 1–2 days.      •Your symptoms go away and then return.        Get help right away if:    •You have severe pain in your back or your lower abdomen.      •You have a fever or chills.      •You have nausea or vomiting.        Summary    •A urinary tract infection (UTI) is an infection of any part of the urinary tract, which includes the kidneys, ureters, bladder, and urethra.      •Most urinary tract infections are caused by bacteria in your genital area.      •Treatment for this condition often includes antibiotic medicines.      •If you were prescribed an antibiotic medicine, take it as told by your health care provider. Do not stop using the antibiotic even if you start to feel better.      •Keep all follow-up visits. This is important.      This information is not intended to replace advice given to you by your health care provider. Make sure you discuss any questions you have with your health care provider.      Document Revised: 07/30/2021 Document Reviewed: 07/30/2021    Elsevier Patient Education © 2022 [employer]

## 2022-07-06 NOTE — ED ADULT NURSE REASSESSMENT NOTE - NS ED NURSE REASSESS COMMENT FT1
Straight cath attempted, Pt currently has no urine in bladder to straight cath, Pt tolerated PO fluid well, permacath applied to patient to attempt to obtain urine specimen. Dr. Ornelas notified.

## 2022-07-06 NOTE — ED PROVIDER NOTE - OBJECTIVE STATEMENT
86 y/o male w/ a PMHx of sepsis, neuropathy, prostate CA, Crohn's disease, hypothyroidism, depression, intervertebral disc disorder, DM, obesity, HTN, hypercholesterolemia, CAD w/ stents, macular degeneration, and hearing loss presents to the ED via EMS c/o fever, chills, and weakness x today,. Pt reports he woke up this morning w/ chills which self resolved, pt was also found to be febrile (Tmax: 102). Pt also reports he was unable to get out of bed this morning secondary to LE weakness. Pt was recently at  for a colon resection and was d/c x2 days ago, states abd is still distended and sore. Pt endorses taking Ibuprofen PTA.

## 2022-07-06 NOTE — CONSULT NOTE ADULT - SUBJECTIVE AND OBJECTIVE BOX
86 y/o male w/ a PMHx of sepsis, neuropathy, prostate CA, Crohn's disease, hypothyroidism, depression, intervertebral disc disorder, DM, obesity, HTN, hypercholesterolemia, CAD w/ stents, macular degeneration, and hearing loss presents to the ED via EMS c/o fever, chills, and weakness for 1 day.  Patient also states took temperature today which read 102F.  Patient was recently discharged from  s/p ileocolic resection and sigmoid resection. Patient also states he has not been drinking much water, but hes tolerated diet and having normal BM and passing flatus.  States his abdomen is sore at incision site, but not painful. Denies chest pain, sob, abdominal pain, n/v/d. Denies urinary requency, urgency and dysuria. Patient states he feels much better than he did this am.    Vital Signs (24 Hrs):  T(C): 36.9 (22 @ 22:01), Max: 37.2 (22 @ 15:24)  HR: 73 (22 @ 22:01) (72 - 89)  BP: 125/55 (22 @ 22:01) (96/71 - 125/55)  RR: 17 (22 @ 22:01) (16 - 18)  SpO2: 97% (22 @ 22:01) (93% - 97%)  Wt(kg): --  Daily Height in cm: 167.64 (2022 15:24)    Daily     I&O's Summary      PHYSICAL EXAM:   Gen: AAOx3, NAD   Cardio: RRR   Pulm: equal chest rise b/l   Abdomen: soft, nontender, mildly distended, no suprepubic tenderness     .  LABS:                         9.0    17.75 )-----------( 451      ( 2022 15:54 )             28.0     07-06    141  |  111<H>  |  21  ----------------------------<  154<H>  4.2   |  19<L>  |  1.49<H>    Ca    8.9      2022 15:54    TPro  6.0  /  Alb  2.4<L>  /  TBili  0.4  /  DBili  x   /  AST  25  /  ALT  36  /  AlkPhos  89      PT/INR - ( 2022 15:54 )   PT: 15.1 sec;   INR: 1.30 ratio         PTT - ( 2022 15:54 )  PTT:28.3 sec  Urinalysis Basic - ( 2022 15:53 )    Color: Yellow / Appearance: Slightly Turbid / S.020 / pH: x  Gluc: x / Ketone: Trace  / Bili: Negative / Urobili: Negative   Blood: x / Protein: 100 / Nitrite: Negative   Leuk Esterase: Trace / RBC: 0-2 /HPF / WBC 0-2   Sq Epi: x / Non Sq Epi: Occasional / Bacteria: Moderate        Lactate, Blood: 1.3 mmol/L ( @ 15:54)      RADIOLOGY, EKG & ADDITIONAL TESTS: Reviewed.   < from: CT Abdomen and Pelvis w/ IV Cont (22 @ 16:27) >  PROCEDURE:  CT of the Abdomen and Pelvis was performed.  Sagittal and coronal reformats were performed.    FINDINGS:  LOWER CHEST: Small pleural fluid and mild dependent atelectasis. Coronary   artery calcifications.    LIVER: Within normal limits.  BILE DUCTS: Normal caliber.  GALLBLADDER: Cholelithiasis.  SPLEEN: Within normal limits.  PANCREAS: Within normal limits.  ADRENALS: Within normal limits.  KIDNEYS/URETERS: 4.8 cm left renal cyst and bilateral renal cortical   hypodensities too small to characterize.    BLADDER: Within normal limits.  REPRODUCTIVE ORGANS: Prostate brachytherapy seeds.    BOWEL: Sigmoid and right lower quadrant anastomosis are unremarkable. No   bowel obstruction.  PERITONEUM: Small amount of simple abdominal and pelvic ascites. Small   amount of intra-abdominal free air consistent with recent surgery.  VESSELS: Atherosclerotic changes.  RETROPERITONEUM/LYMPH NODES: No lymphadenopathy.  ABDOMINAL WALL: Anterior midline postsurgical changes. Mild anasarca.  BONES: Degenerative changes. Right hip prosthesis and lumbosacral   orthopedic hardware causes significant streak artifact limiting   evaluation of the surrounding structures.    IMPRESSION:    Small amount of simple abdominal and pelvic ascites. Small amount of   intra-abdominal free air consistent with recent surgery. No discrete   abscess.    < end of copied text >

## 2022-07-06 NOTE — ED PROVIDER NOTE - CLINICAL SUMMARY MEDICAL DECISION MAKING FREE TEXT BOX
Patient with likely UTI.  Urine and blood cultures drawn.  VS stable, no hypotension, no fever in ED, no tachycardia.  Surgical site appears well, CT negative, and surgery resident with colorectal surgery saw patient discussed with attending, and okay for discharge home on their end.  Discussed with patient and family.  Patient asking for discharge home.  Discussed need for antibiotics.  If any worsening strict return precautions given.  Will follow up with colorectal this Friday.

## 2022-07-06 NOTE — ED PROVIDER NOTE - PATIENT PORTAL LINK FT
You can access the FollowMyHealth Patient Portal offered by [facility] by registering at the following website: [email or web address]. By joining [facility]’s FollowMyHealth portal, you will also be able to view your health information using other applications (apps) compatible with our system.

## 2022-07-11 DIAGNOSIS — E03.9 HYPOTHYROIDISM, UNSPECIFIED: ICD-10-CM

## 2022-07-11 DIAGNOSIS — N17.9 ACUTE KIDNEY FAILURE, UNSPECIFIED: ICD-10-CM

## 2022-07-11 DIAGNOSIS — G89.29 OTHER CHRONIC PAIN: ICD-10-CM

## 2022-07-11 DIAGNOSIS — Z90.49 ACQUIRED ABSENCE OF OTHER SPECIFIED PARTS OF DIGESTIVE TRACT: ICD-10-CM

## 2022-07-11 DIAGNOSIS — E78.5 HYPERLIPIDEMIA, UNSPECIFIED: ICD-10-CM

## 2022-07-11 DIAGNOSIS — Z91.018 ALLERGY TO OTHER FOODS: ICD-10-CM

## 2022-07-11 DIAGNOSIS — Z79.84 LONG TERM (CURRENT) USE OF ORAL HYPOGLYCEMIC DRUGS: ICD-10-CM

## 2022-07-11 DIAGNOSIS — H35.30 UNSPECIFIED MACULAR DEGENERATION: ICD-10-CM

## 2022-07-11 DIAGNOSIS — I12.9 HYPERTENSIVE CHRONIC KIDNEY DISEASE WITH STAGE 1 THROUGH STAGE 4 CHRONIC KIDNEY DISEASE, OR UNSPECIFIED CHRONIC KIDNEY DISEASE: ICD-10-CM

## 2022-07-11 DIAGNOSIS — Z95.5 PRESENCE OF CORONARY ANGIOPLASTY IMPLANT AND GRAFT: ICD-10-CM

## 2022-07-11 DIAGNOSIS — Z79.82 LONG TERM (CURRENT) USE OF ASPIRIN: ICD-10-CM

## 2022-07-11 DIAGNOSIS — I25.10 ATHEROSCLEROTIC HEART DISEASE OF NATIVE CORONARY ARTERY WITHOUT ANGINA PECTORIS: ICD-10-CM

## 2022-07-11 DIAGNOSIS — K50.90 CROHN'S DISEASE, UNSPECIFIED, WITHOUT COMPLICATIONS: ICD-10-CM

## 2022-07-11 DIAGNOSIS — D63.0 ANEMIA IN NEOPLASTIC DISEASE: ICD-10-CM

## 2022-07-11 DIAGNOSIS — Z79.890 HORMONE REPLACEMENT THERAPY: ICD-10-CM

## 2022-07-11 DIAGNOSIS — Z98.1 ARTHRODESIS STATUS: ICD-10-CM

## 2022-07-11 DIAGNOSIS — Z85.46 PERSONAL HISTORY OF MALIGNANT NEOPLASM OF PROSTATE: ICD-10-CM

## 2022-07-11 DIAGNOSIS — Z92.3 PERSONAL HISTORY OF IRRADIATION: ICD-10-CM

## 2022-07-11 DIAGNOSIS — E11.22 TYPE 2 DIABETES MELLITUS WITH DIABETIC CHRONIC KIDNEY DISEASE: ICD-10-CM

## 2022-07-11 DIAGNOSIS — Z20.822 CONTACT WITH AND (SUSPECTED) EXPOSURE TO COVID-19: ICD-10-CM

## 2022-07-11 DIAGNOSIS — E43 UNSPECIFIED SEVERE PROTEIN-CALORIE MALNUTRITION: ICD-10-CM

## 2022-07-11 DIAGNOSIS — Z96.641 PRESENCE OF RIGHT ARTIFICIAL HIP JOINT: ICD-10-CM

## 2022-07-11 DIAGNOSIS — H91.93 UNSPECIFIED HEARING LOSS, BILATERAL: ICD-10-CM

## 2022-07-11 DIAGNOSIS — E11.40 TYPE 2 DIABETES MELLITUS WITH DIABETIC NEUROPATHY, UNSPECIFIED: ICD-10-CM

## 2022-07-11 DIAGNOSIS — N18.30 CHRONIC KIDNEY DISEASE, STAGE 3 UNSPECIFIED: ICD-10-CM

## 2022-07-11 DIAGNOSIS — C18.7 MALIGNANT NEOPLASM OF SIGMOID COLON: ICD-10-CM

## 2022-07-11 DIAGNOSIS — F32.A DEPRESSION, UNSPECIFIED: ICD-10-CM

## 2022-07-11 DIAGNOSIS — Z88.0 ALLERGY STATUS TO PENICILLIN: ICD-10-CM

## 2022-07-11 DIAGNOSIS — M51.26 OTHER INTERVERTEBRAL DISC DISPLACEMENT, LUMBAR REGION: ICD-10-CM

## 2022-07-11 DIAGNOSIS — M54.9 DORSALGIA, UNSPECIFIED: ICD-10-CM

## 2022-07-11 LAB
CULTURE RESULTS: SIGNIFICANT CHANGE UP
CULTURE RESULTS: SIGNIFICANT CHANGE UP
SPECIMEN SOURCE: SIGNIFICANT CHANGE UP
SPECIMEN SOURCE: SIGNIFICANT CHANGE UP

## 2022-07-18 ENCOUNTER — APPOINTMENT (OUTPATIENT)
Dept: COLORECTAL SURGERY | Facility: CLINIC | Age: 87
End: 2022-07-18

## 2022-07-18 VITALS
BODY MASS INDEX: 29.99 KG/M2 | OXYGEN SATURATION: 97 % | HEART RATE: 82 BPM | WEIGHT: 180 LBS | RESPIRATION RATE: 14 BRPM | TEMPERATURE: 96.5 F | DIASTOLIC BLOOD PRESSURE: 67 MMHG | SYSTOLIC BLOOD PRESSURE: 119 MMHG | HEIGHT: 65 IN

## 2022-07-18 DIAGNOSIS — Z09 ENCOUNTER FOR FOLLOW-UP EXAMINATION AFTER COMPLETED TREATMENT FOR CONDITIONS OTHER THAN MALIGNANT NEOPLASM: ICD-10-CM

## 2022-07-18 PROCEDURE — 99024 POSTOP FOLLOW-UP VISIT: CPT

## 2022-07-18 NOTE — PHYSICAL EXAM
[No Rash or Lesion] : No rash or lesion [Alert] : alert [Oriented to Person] : oriented to person [Oriented to Place] : oriented to place [Oriented to Time] : oriented to time [Calm] : calm [de-identified] : incision CDI, soft, NT, mildly distended [de-identified] : NAD [de-identified] : NCAT [de-identified] : MURCIA x 4

## 2022-07-18 NOTE — ASSESSMENT
[FreeTextEntry1] : Mr. Manuel presents to the office for follow-up after being hospitalized in mid April at [facility] for a partial small bowel obstruction involving his ileocolic anastomosis and for follow-up from recent colonoscopy findings of a sigmoid colon cancer.  I discussed with him the natural course of cancer which is a progressive disease if left untreated, and that will lead to death.  If he is seeking to have the cancer removed for potential cure, he would require a laparoscopic, possible open sigmoidectomy and possible ostomy.  At the same setting, I have recommended he undergo revision of his ileocolic anastomosis which could be strictured by recurrent Crohn's causing this partial small bowel  obstruction presentation.\par If agreeable to surgery, we discussed the need to obtain a CEA level as well as medical clearance and having to complete another bowel prep.  He would also need staging CT A/P though he did have a CT completed during his hospitalization in April which did not reveal any metastatic disease in his liver.\par We had a lengthy discussion regarding his current life situation.  He lives alone, has no family and is limited in mobility secondary to sequela of back surgery.  His mental faculties remain intact.  He is keenly aware of his advanced age and his limited life expectancy regardless of surgery.  He also understands that surgical intervention to remove the cancer may lead to further debility so that what he has left in terms of quality of life may be further compromised. \par He requested an additional 1 to 2 weeks to consider whether he would like to proceed with surgical intervention or not, which I have agreed to.  All questions were answered on completion of today's visit.\par \par 7/18/22 Mr. 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  Abdominal exam is benign and wounds are all healed.  His main complaints are in re: weakness and loose stools.  Reassured that he will recover strength with time and that loose stools should resolve over time with incorporation of more dietary fiber. Encouraged increase in colestipol. Advised of Stage 2 cancer. No need for adjuvant chemo. Will refer to Dr. Melissa for ongoing surveillance. Advised need for yearly scope for next 3 years to ensure absence of recurrence. RTO 3 months.

## 2022-07-18 NOTE — HISTORY OF PRESENT ILLNESS
[FreeTextEntry1] : Mr. Manuel returns to the office for followup. He was hospitalized from 4/8/22 - 4/18/22 at  for pSBO at level of ileocolic anastomosis created years earlier for Crohns disease. CT A/P and CTE at the time demonstrated pSBO. He is not on maintenance Crohns medications. He was discharged home once tolerating po.  On 6/2/22, He underwent long overdue colonoscopy with Dr. Padilla. 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 Ileum was noted to be narrowed. He denies any nausea though gets full easily. He is passing gas and stool without issue. He is now here in office to discuss findings and plan of care moving forward. \par \par 7/18/22 Mr Manuel returns to the office for a POV after undergoing lap assisted ileocecectomy and sigmoidectomy for T3N0(0/14 LN) on 6/29/22. Overall, he reports doing ok. He is eating but with loose stools regularly. He is feeling fairly weak and is not able to ambulate well. He also reports abdominal soreness.

## 2022-07-20 RX ORDER — AMOXICILLIN AND CLAVULANATE POTASSIUM 875; 125 MG/1; MG/1
875-125 TABLET, COATED ORAL TWICE DAILY
Qty: 10 | Refills: 0 | Status: DISCONTINUED | COMMUNITY
Start: 2022-07-20 | End: 2022-07-20

## 2022-07-20 RX ORDER — DOXYCYCLINE 100 MG/1
100 TABLET, FILM COATED ORAL
Qty: 10 | Refills: 0 | Status: ACTIVE | COMMUNITY
Start: 2022-07-20 | End: 1900-01-01

## 2022-07-21 ENCOUNTER — INPATIENT (INPATIENT)
Facility: HOSPITAL | Age: 87
LOS: 18 days | Discharge: HOME HEALTH | DRG: 856 | End: 2022-08-09
Attending: SURGERY | Admitting: SURGERY
Payer: MEDICARE

## 2022-07-21 ENCOUNTER — APPOINTMENT (OUTPATIENT)
Dept: COLORECTAL SURGERY | Facility: CLINIC | Age: 87
End: 2022-07-21

## 2022-07-21 VITALS — HEIGHT: 66 IN | WEIGHT: 179.9 LBS

## 2022-07-21 VITALS
HEIGHT: 65 IN | HEART RATE: 66 BPM | BODY MASS INDEX: 29.99 KG/M2 | DIASTOLIC BLOOD PRESSURE: 69 MMHG | SYSTOLIC BLOOD PRESSURE: 118 MMHG | WEIGHT: 180 LBS

## 2022-07-21 DIAGNOSIS — Z98.890 OTHER SPECIFIED POSTPROCEDURAL STATES: ICD-10-CM

## 2022-07-21 DIAGNOSIS — R10.9 UNSPECIFIED ABDOMINAL PAIN: ICD-10-CM

## 2022-07-21 DIAGNOSIS — Z96.641 PRESENCE OF RIGHT ARTIFICIAL HIP JOINT: Chronic | ICD-10-CM

## 2022-07-21 DIAGNOSIS — Z90.49 ACQUIRED ABSENCE OF OTHER SPECIFIED PARTS OF DIGESTIVE TRACT: Chronic | ICD-10-CM

## 2022-07-21 DIAGNOSIS — Z98.1 ARTHRODESIS STATUS: Chronic | ICD-10-CM

## 2022-07-21 LAB
ADD ON TEST-SPECIMEN IN LAB: SIGNIFICANT CHANGE UP
APPEARANCE UR: CLEAR — SIGNIFICANT CHANGE UP
APTT BLD: 33.7 SEC — SIGNIFICANT CHANGE UP (ref 27.5–35.5)
BASOPHILS # BLD AUTO: 0.07 K/UL — SIGNIFICANT CHANGE UP (ref 0–0.2)
BASOPHILS NFR BLD AUTO: 0.7 % — SIGNIFICANT CHANGE UP (ref 0–2)
BILIRUB UR-MCNC: NEGATIVE — SIGNIFICANT CHANGE UP
COLOR SPEC: YELLOW — SIGNIFICANT CHANGE UP
DIFF PNL FLD: ABNORMAL
EOSINOPHIL # BLD AUTO: 0.09 K/UL — SIGNIFICANT CHANGE UP (ref 0–0.5)
EOSINOPHIL NFR BLD AUTO: 0.8 % — SIGNIFICANT CHANGE UP (ref 0–6)
GLUCOSE UR QL: NEGATIVE — SIGNIFICANT CHANGE UP
HCT VFR BLD CALC: 24.6 % — LOW (ref 39–50)
HGB BLD-MCNC: 7.5 G/DL — LOW (ref 13–17)
IMM GRANULOCYTES NFR BLD AUTO: 0.3 % — SIGNIFICANT CHANGE UP (ref 0–1.5)
INR BLD: 1.37 RATIO — HIGH (ref 0.88–1.16)
KETONES UR-MCNC: NEGATIVE — SIGNIFICANT CHANGE UP
LACTATE SERPL-SCNC: 1 MMOL/L — SIGNIFICANT CHANGE UP (ref 0.7–2)
LEUKOCYTE ESTERASE UR-ACNC: NEGATIVE — SIGNIFICANT CHANGE UP
LYMPHOCYTES # BLD AUTO: 1.04 K/UL — SIGNIFICANT CHANGE UP (ref 1–3.3)
LYMPHOCYTES # BLD AUTO: 9.7 % — LOW (ref 13–44)
MCHC RBC-ENTMCNC: 26.7 PG — LOW (ref 27–34)
MCHC RBC-ENTMCNC: 30.5 GM/DL — LOW (ref 32–36)
MCV RBC AUTO: 87.5 FL — SIGNIFICANT CHANGE UP (ref 80–100)
MONOCYTES # BLD AUTO: 0.79 K/UL — SIGNIFICANT CHANGE UP (ref 0–0.9)
MONOCYTES NFR BLD AUTO: 7.4 % — SIGNIFICANT CHANGE UP (ref 2–14)
NEUTROPHILS # BLD AUTO: 8.68 K/UL — HIGH (ref 1.8–7.4)
NEUTROPHILS NFR BLD AUTO: 81.1 % — HIGH (ref 43–77)
NITRITE UR-MCNC: NEGATIVE — SIGNIFICANT CHANGE UP
PH UR: 5 — SIGNIFICANT CHANGE UP (ref 5–8)
PLATELET # BLD AUTO: 585 K/UL — HIGH (ref 150–400)
PROT UR-MCNC: 30 MG/DL
PROTHROM AB SERPL-ACNC: 15.9 SEC — HIGH (ref 10.5–13.4)
RBC # BLD: 2.81 M/UL — LOW (ref 4.2–5.8)
RBC # FLD: 16.5 % — HIGH (ref 10.3–14.5)
SP GR SPEC: 1.01 — SIGNIFICANT CHANGE UP (ref 1.01–1.02)
UROBILINOGEN FLD QL: NEGATIVE — SIGNIFICANT CHANGE UP
WBC # BLD: 10.7 K/UL — HIGH (ref 3.8–10.5)
WBC # FLD AUTO: 10.7 K/UL — HIGH (ref 3.8–10.5)

## 2022-07-21 PROCEDURE — U0005: CPT

## 2022-07-21 PROCEDURE — C1765: CPT

## 2022-07-21 PROCEDURE — 71045 X-RAY EXAM CHEST 1 VIEW: CPT

## 2022-07-21 PROCEDURE — 36430 TRANSFUSION BLD/BLD COMPNT: CPT

## 2022-07-21 PROCEDURE — 97162 PT EVAL MOD COMPLEX 30 MIN: CPT | Mod: GP

## 2022-07-21 PROCEDURE — 85730 THROMBOPLASTIN TIME PARTIAL: CPT

## 2022-07-21 PROCEDURE — 36415 COLL VENOUS BLD VENIPUNCTURE: CPT

## 2022-07-21 PROCEDURE — 74018 RADEX ABDOMEN 1 VIEW: CPT

## 2022-07-21 PROCEDURE — 87075 CULTR BACTERIA EXCEPT BLOOD: CPT

## 2022-07-21 PROCEDURE — 87070 CULTURE OTHR SPECIMN AEROBIC: CPT

## 2022-07-21 PROCEDURE — 86900 BLOOD TYPING SEROLOGIC ABO: CPT

## 2022-07-21 PROCEDURE — U0003: CPT

## 2022-07-21 PROCEDURE — 75984 XRAY CONTROL CATHETER CHANGE: CPT

## 2022-07-21 PROCEDURE — C9113: CPT

## 2022-07-21 PROCEDURE — 86901 BLOOD TYPING SEROLOGIC RH(D): CPT

## 2022-07-21 PROCEDURE — 99024 POSTOP FOLLOW-UP VISIT: CPT

## 2022-07-21 PROCEDURE — 80053 COMPREHEN METABOLIC PANEL: CPT

## 2022-07-21 PROCEDURE — 74177 CT ABD & PELVIS W/CONTRAST: CPT

## 2022-07-21 PROCEDURE — 80048 BASIC METABOLIC PNL TOTAL CA: CPT

## 2022-07-21 PROCEDURE — 81001 URINALYSIS AUTO W/SCOPE: CPT

## 2022-07-21 PROCEDURE — 87077 CULTURE AEROBIC IDENTIFY: CPT

## 2022-07-21 PROCEDURE — 85027 COMPLETE CBC AUTOMATED: CPT

## 2022-07-21 PROCEDURE — 97530 THERAPEUTIC ACTIVITIES: CPT | Mod: GP

## 2022-07-21 PROCEDURE — 49423 EXCHANGE DRAINAGE CATHETER: CPT

## 2022-07-21 PROCEDURE — 85610 PROTHROMBIN TIME: CPT

## 2022-07-21 PROCEDURE — C1751: CPT

## 2022-07-21 PROCEDURE — C1889: CPT

## 2022-07-21 PROCEDURE — 84100 ASSAY OF PHOSPHORUS: CPT

## 2022-07-21 PROCEDURE — 87493 C DIFF AMPLIFIED PROBE: CPT

## 2022-07-21 PROCEDURE — 36569 INSJ PICC 5 YR+ W/O IMAGING: CPT

## 2022-07-21 PROCEDURE — 87186 SC STD MICRODIL/AGAR DIL: CPT

## 2022-07-21 PROCEDURE — 83735 ASSAY OF MAGNESIUM: CPT

## 2022-07-21 PROCEDURE — 87635 SARS-COV-2 COVID-19 AMP PRB: CPT

## 2022-07-21 PROCEDURE — C1769: CPT

## 2022-07-21 PROCEDURE — 85025 COMPLETE CBC W/AUTO DIFF WBC: CPT

## 2022-07-21 PROCEDURE — 83605 ASSAY OF LACTIC ACID: CPT

## 2022-07-21 PROCEDURE — 74177 CT ABD & PELVIS W/CONTRAST: CPT | Mod: 26,MA

## 2022-07-21 PROCEDURE — 84300 ASSAY OF URINE SODIUM: CPT

## 2022-07-21 PROCEDURE — 74176 CT ABD & PELVIS W/O CONTRAST: CPT

## 2022-07-21 PROCEDURE — 84478 ASSAY OF TRIGLYCERIDES: CPT

## 2022-07-21 PROCEDURE — P9016: CPT

## 2022-07-21 PROCEDURE — C1887: CPT

## 2022-07-21 PROCEDURE — C1729: CPT

## 2022-07-21 PROCEDURE — 86923 COMPATIBILITY TEST ELECTRIC: CPT

## 2022-07-21 PROCEDURE — 97116 GAIT TRAINING THERAPY: CPT | Mod: GP

## 2022-07-21 PROCEDURE — 49406 IMAGE CATH FLUID PERI/RETRO: CPT

## 2022-07-21 PROCEDURE — 82962 GLUCOSE BLOOD TEST: CPT

## 2022-07-21 PROCEDURE — 86850 RBC ANTIBODY SCREEN: CPT

## 2022-07-21 PROCEDURE — C1894: CPT

## 2022-07-21 PROCEDURE — 82570 ASSAY OF URINE CREATININE: CPT

## 2022-07-21 PROCEDURE — 80076 HEPATIC FUNCTION PANEL: CPT

## 2022-07-21 PROCEDURE — 99285 EMERGENCY DEPT VISIT HI MDM: CPT

## 2022-07-21 RX ORDER — KETOROLAC TROMETHAMINE 30 MG/ML
15 SYRINGE (ML) INJECTION ONCE
Refills: 0 | Status: DISCONTINUED | OUTPATIENT
Start: 2022-07-21 | End: 2022-07-22

## 2022-07-21 RX ORDER — COLESTIPOL HCL 5 G
1 GRANULES (GRAM) ORAL
Qty: 0 | Refills: 0 | DISCHARGE

## 2022-07-21 RX ORDER — LISINOPRIL 2.5 MG/1
20 TABLET ORAL DAILY
Refills: 0 | Status: DISCONTINUED | OUTPATIENT
Start: 2022-07-21 | End: 2022-07-27

## 2022-07-21 RX ORDER — SENNA PLUS 8.6 MG/1
2 TABLET ORAL AT BEDTIME
Refills: 0 | Status: DISCONTINUED | OUTPATIENT
Start: 2022-07-21 | End: 2022-08-04

## 2022-07-21 RX ORDER — DIPHENHYDRAMINE HCL 50 MG
25 CAPSULE ORAL ONCE
Refills: 0 | Status: COMPLETED | OUTPATIENT
Start: 2022-07-21 | End: 2022-07-21

## 2022-07-21 RX ORDER — ENOXAPARIN SODIUM 100 MG/ML
40 INJECTION SUBCUTANEOUS EVERY 24 HOURS
Refills: 0 | Status: DISCONTINUED | OUTPATIENT
Start: 2022-07-22 | End: 2022-07-22

## 2022-07-21 RX ORDER — ONDANSETRON 8 MG/1
4 TABLET, FILM COATED ORAL EVERY 6 HOURS
Refills: 0 | Status: DISCONTINUED | OUTPATIENT
Start: 2022-07-21 | End: 2022-08-09

## 2022-07-21 RX ORDER — ACETAMINOPHEN 500 MG
1000 TABLET ORAL ONCE
Refills: 0 | Status: COMPLETED | OUTPATIENT
Start: 2022-07-21 | End: 2022-07-21

## 2022-07-21 RX ORDER — VERAPAMIL HCL 240 MG
240 CAPSULE, EXTENDED RELEASE PELLETS 24 HR ORAL DAILY
Refills: 0 | Status: DISCONTINUED | OUTPATIENT
Start: 2022-07-21 | End: 2022-08-09

## 2022-07-21 RX ORDER — ATORVASTATIN CALCIUM 80 MG/1
1 TABLET, FILM COATED ORAL
Qty: 0 | Refills: 0 | DISCHARGE

## 2022-07-21 RX ORDER — LISINOPRIL/HYDROCHLOROTHIAZIDE 10-12.5 MG
1 TABLET ORAL
Qty: 0 | Refills: 0 | DISCHARGE

## 2022-07-21 RX ORDER — METFORMIN HYDROCHLORIDE 850 MG/1
1 TABLET ORAL
Qty: 0 | Refills: 0 | DISCHARGE

## 2022-07-21 RX ORDER — ASCORBIC ACID 60 MG
1 TABLET,CHEWABLE ORAL
Qty: 0 | Refills: 0 | DISCHARGE

## 2022-07-21 RX ORDER — INSULIN LISPRO 100/ML
VIAL (ML) SUBCUTANEOUS
Refills: 0 | Status: DISCONTINUED | OUTPATIENT
Start: 2022-07-21 | End: 2022-07-26

## 2022-07-21 RX ORDER — SODIUM CHLORIDE 9 MG/ML
1000 INJECTION, SOLUTION INTRAVENOUS
Refills: 0 | Status: DISCONTINUED | OUTPATIENT
Start: 2022-07-21 | End: 2022-07-21

## 2022-07-21 RX ORDER — PIPERACILLIN AND TAZOBACTAM 4; .5 G/20ML; G/20ML
3.38 INJECTION, POWDER, LYOPHILIZED, FOR SOLUTION INTRAVENOUS EVERY 8 HOURS
Refills: 0 | Status: COMPLETED | OUTPATIENT
Start: 2022-07-21 | End: 2022-07-28

## 2022-07-21 RX ORDER — MULTIVIT-MIN/FERROUS GLUCONATE 9 MG/15 ML
1 LIQUID (ML) ORAL
Qty: 0 | Refills: 0 | DISCHARGE

## 2022-07-21 RX ORDER — MORPHINE SULFATE 50 MG/1
2 CAPSULE, EXTENDED RELEASE ORAL EVERY 4 HOURS
Refills: 0 | Status: DISCONTINUED | OUTPATIENT
Start: 2022-07-21 | End: 2022-07-22

## 2022-07-21 RX ORDER — LEVOTHYROXINE SODIUM 125 MCG
88 TABLET ORAL DAILY
Refills: 0 | Status: DISCONTINUED | OUTPATIENT
Start: 2022-07-21 | End: 2022-08-09

## 2022-07-21 RX ORDER — GABAPENTIN 400 MG/1
3 CAPSULE ORAL
Qty: 0 | Refills: 0 | DISCHARGE

## 2022-07-21 RX ORDER — DIPHENOXYLATE HCL/ATROPINE 2.5-.025MG
2 TABLET ORAL
Qty: 0 | Refills: 0 | DISCHARGE

## 2022-07-21 RX ORDER — VERAPAMIL HCL 240 MG
1 CAPSULE, EXTENDED RELEASE PELLETS 24 HR ORAL
Qty: 0 | Refills: 0 | DISCHARGE

## 2022-07-21 RX ORDER — CHOLECALCIFEROL (VITAMIN D3) 125 MCG
1 CAPSULE ORAL
Qty: 0 | Refills: 0 | DISCHARGE

## 2022-07-21 RX ORDER — SODIUM CHLORIDE 9 MG/ML
500 INJECTION INTRAMUSCULAR; INTRAVENOUS; SUBCUTANEOUS ONCE
Refills: 0 | Status: COMPLETED | OUTPATIENT
Start: 2022-07-21 | End: 2022-07-21

## 2022-07-21 RX ORDER — LANOLIN ALCOHOL/MO/W.PET/CERES
3 CREAM (GRAM) TOPICAL AT BEDTIME
Refills: 0 | Status: DISCONTINUED | OUTPATIENT
Start: 2022-07-21 | End: 2022-08-09

## 2022-07-21 RX ORDER — SODIUM CHLORIDE 9 MG/ML
1000 INJECTION INTRAMUSCULAR; INTRAVENOUS; SUBCUTANEOUS
Refills: 0 | Status: DISCONTINUED | OUTPATIENT
Start: 2022-07-21 | End: 2022-07-23

## 2022-07-21 RX ORDER — LEVOTHYROXINE SODIUM 125 MCG
1 TABLET ORAL
Qty: 0 | Refills: 0 | DISCHARGE

## 2022-07-21 RX ORDER — GLIMEPIRIDE 1 MG
1 TABLET ORAL
Qty: 0 | Refills: 0 | DISCHARGE

## 2022-07-21 RX ORDER — GABAPENTIN 400 MG/1
1 CAPSULE ORAL
Qty: 0 | Refills: 0 | DISCHARGE

## 2022-07-21 RX ORDER — HYDROCHLOROTHIAZIDE 25 MG
12.5 TABLET ORAL DAILY
Refills: 0 | Status: DISCONTINUED | OUTPATIENT
Start: 2022-07-21 | End: 2022-07-22

## 2022-07-21 RX ADMIN — Medication 400 MILLIGRAM(S): at 18:45

## 2022-07-21 RX ADMIN — Medication 3 MILLIGRAM(S): at 22:43

## 2022-07-21 RX ADMIN — Medication 25 MILLIGRAM(S): at 23:47

## 2022-07-21 RX ADMIN — SODIUM CHLORIDE 100 MILLILITER(S): 9 INJECTION INTRAMUSCULAR; INTRAVENOUS; SUBCUTANEOUS at 22:43

## 2022-07-21 RX ADMIN — SODIUM CHLORIDE 100 MILLILITER(S): 9 INJECTION, SOLUTION INTRAVENOUS at 18:00

## 2022-07-21 RX ADMIN — PIPERACILLIN AND TAZOBACTAM 25 GRAM(S): 4; .5 INJECTION, POWDER, LYOPHILIZED, FOR SOLUTION INTRAVENOUS at 22:43

## 2022-07-21 RX ADMIN — SODIUM CHLORIDE 1000 MILLILITER(S): 9 INJECTION INTRAMUSCULAR; INTRAVENOUS; SUBCUTANEOUS at 13:24

## 2022-07-21 NOTE — ED PROVIDER NOTE - SKIN, MLM
Skin normal color for race, warm, dry and intact. No evidence of rash. Skin normal color for race, warm,

## 2022-07-21 NOTE — ED PROVIDER NOTE - PROGRESS NOTE DETAILS
Donna NARVAEZ for Dr. Eric: Discussed with surgery, would like pt to get CT with IV and PO contrast. Rusty PARIS: Received s/o from Dr. Eric- patient to be admitted to colorectal service.

## 2022-07-21 NOTE — ED PROVIDER NOTE - GASTROINTESTINAL, MLM
Abdomen soft, non-tender, no guarding. +surgical scar to abdomen with 1/2 mm area with dehiscence. Abdomen soft, non-tender, no guarding. +surgical scar to abdomen with 1/2 mm area with dehiscence.  Aide with pictures of drainage

## 2022-07-21 NOTE — ED ADULT TRIAGE NOTE - CHIEF COMPLAINT QUOTE
PT presents to er with complaints of post surgical complication, states he had colon surgery with  on June 29th and believes it is infected at this time, instructed to come in for evaluation at this time from MD office.

## 2022-07-21 NOTE — ED ADULT NURSE NOTE - NSIMPLEMENTINTERV_GEN_ALL_ED
Implemented All Fall with Harm Risk Interventions:  [place] to call system. Call bell, personal items and telephone within reach. Instruct patient to call for assistance. Room bathroom lighting operational. Non-slip footwear when patient is off stretcher. Physically safe environment: no spills, clutter or unnecessary equipment. Stretcher in lowest position, wheels locked, appropriate side rails in place. Provide visual cue, wrist band, yellow gown, etc. Monitor gait and stability. Monitor for mental status changes and reorient to person, place, and time. Review medications for side effects contributing to fall risk. Reinforce activity limits and safety measures with patient and family. Provide visual clues: red socks.

## 2022-07-21 NOTE — H&P ADULT - HISTORY OF PRESENT ILLNESS
88 y/o male w/ a PMHx of sepsis, neuropathy, prostate CA, Crohn's disease, hypothyroidism, depression, intervertebral disc disorder, DM, obesity, HTN, hypercholesterolemia, CAD w/ stents, macular degeneration, hearing loss and colon cancer s/p laparoscopic ileocolic resection on 6/29/22 with Dr. Ardon. pt presents to the ED with increased drainage from the surgery site. No fevers, chills, changes in appetite.

## 2022-07-21 NOTE — ED PROVIDER NOTE - OBJECTIVE STATEMENT
86 y/o male w/ a PMHx of sepsis, neuropathy, prostate CA, Crohn's disease, hypothyroidism, depression, intervertebral disc disorder, DM, obesity, HTN, hypercholesterolemia, CAD w/ stents, macular degeneration, and hearing loss presents to the ED with increased drainage from the surgery site. Pt with hx of colon cancer s/p ileocolic and sigmoid resection on June 29th by Dr. Ardon. No fevers.

## 2022-07-21 NOTE — HISTORY OF PRESENT ILLNESS
[FreeTextEntry1] : 87-year-old female presents for follow-up visit.  He underwent a laparoscopic assisted sigmoidectomy for colon cancer as well as an ileocecectomy for Crohn's stricture abdominal June 29.  He presents to the office today for complaints of drainage from his abdominal incision.  He denies any fevers or chills.  He is having some abdominal pain.  Poor p.o. intake.  He has been having bowel movements regularly.

## 2022-07-21 NOTE — H&P ADULT - NSHPPHYSICALEXAM_GEN_ALL_CORE
CONSTITUTIONAL: Well groomed, no apparent distress  EYES: PERRLA and symmetric, EOMI, No conjunctival or scleral injection, non-icteric  ENMT: Oral mucosa with moist membranes  RESPIRATORY: No respiratory distress, no use of accessory muscles  CARDIOVASCULAR: RRRR, +S1S2, no murmurs  Vascular: radial pulse palpable  GASTROINTESTINAL: Soft, non tender, non distended, no rebound, no guarding; No palpable masses; no hepatosplenomegaly; no hernia palpated;  NEUROLOGIC: CN II-XII grossly normal  PSYCHIATRIC: Appropriate insight/judgment; A+O x 3, mood and affect appropriate, recent/remote memory intact

## 2022-07-21 NOTE — H&P ADULT - ASSESSMENT
86 y/o male w/ a PMHx of sepsis, neuropathy, prostate CA, Crohn's disease, hypothyroidism, depression, intervertebral disc disorder, DM, obesity, HTN, hypercholesterolemia, CAD w/ stents, macular degeneration, hearing loss and colon cancer s/p laparoscopic ileocolic resection on 6/29/22 with Dr. Ardon. pt presents to the ED with increased drainage from the surgery site. No fevers, chills, changes in appetite.   CT scan shows large midline abdominal wall abscess.    Plan    Admit to colorectal team  pain control  nausea control  NPO  hold DVT ppx for poss am IR drainage  IR consult  SW consult/    Case was discussed with Dr. Walden

## 2022-07-21 NOTE — ED ADULT NURSE NOTE - OBJECTIVE STATEMENT
Pt alert and oriented x 3. Pt c/o leaking at incision site on abdomen s/p colon resection d/t ca approximately 1 month ago. Area is red, pink, slight drainage yellowish noted on arrival, no foul odor noted. Pt denies pain. pt denies fever, chills, SOB, difficulty breathing. Home health aid at bedside.

## 2022-07-21 NOTE — PHYSICAL EXAM
[Respiratory Effort] : normal respiratory effort [Calm] : calm [de-identified] : Soft, distended, mild tenderness without peritonitis.  Healed midline abdominal incision with a subcentimeter opening at the superior aspect of the incision with feculent drainage.  No surrounding erythema or fluctuance at the abdominal incision.  It was probed with a Q-tip and purulent material was expressed. [de-identified] : In no distress [de-identified] : Normocephalic, atraumatic [de-identified] : Uses walker [de-identified] : Warm and dry [de-identified] : Alert and oriented x3

## 2022-07-21 NOTE — H&P ADULT - NSHPLABSRESULTS_GEN_ALL_CORE
< from: CT Abdomen and Pelvis w/ Oral Cont and w/ IV Cont (07.21.22 @ 15:43) >    IMPRESSION:  There is a large intraperitoneal thick wall abscess   identified measuring 17.5 x 11.8 x 10.8 cm, which extends anteriorly and   superiorly to the site of the midline anterior abdominal wall incision.   In addition to fluid and air there is mottled hyperdensity within the   collection likely related to administered oral contrast. The greatest   concentration of contrast is identified within the left lateral aspect of   the abscess adjacent to a suture surgical line at the level of the mid   sigmoid colon, which may be the site of fistulous communication.    < end of copied text >                          7.5    10.70 )-----------( 585      ( 21 Jul 2022 13:22 )             24.6

## 2022-07-21 NOTE — PATIENT PROFILE ADULT - FALL HARM RISK - HARM RISK INTERVENTIONS
Assistance OOB with selected safe patient handling equipment/Communicate Risk of Fall with Harm to all staff/Discuss with provider need for PT consult/Monitor gait and stability/Provide patient with walking aids - walker, cane, crutches/Reinforce activity limits and safety measures with patient and family/Tailored Fall Risk Interventions/Use of alarms - bed, chair and/or voice tab/Visual Cue: Yellow wristband and red socks/Bed in lowest position, wheels locked, appropriate side rails in place/Call bell, personal items and telephone in reach/Instruct patient to call for assistance before getting out of bed or chair/Non-slip footwear when patient is out of bed/[place] to call system/Physically safe environment - no spills, clutter or unnecessary equipment/Purposeful Proactive Rounding/Room/bathroom lighting operational, light cord in reach

## 2022-07-22 DIAGNOSIS — K65.1 PERITONEAL ABSCESS: ICD-10-CM

## 2022-07-22 LAB
ANION GAP SERPL CALC-SCNC: 7 MMOL/L — SIGNIFICANT CHANGE UP (ref 5–17)
BUN SERPL-MCNC: 33 MG/DL — HIGH (ref 7–23)
CALCIUM SERPL-MCNC: 9 MG/DL — SIGNIFICANT CHANGE UP (ref 8.5–10.1)
CHLORIDE SERPL-SCNC: 115 MMOL/L — HIGH (ref 96–108)
CO2 SERPL-SCNC: 22 MMOL/L — SIGNIFICANT CHANGE UP (ref 22–31)
CREAT SERPL-MCNC: 1.77 MG/DL — HIGH (ref 0.5–1.3)
CULTURE RESULTS: SIGNIFICANT CHANGE UP
EGFR: 37 ML/MIN/1.73M2 — LOW
GLUCOSE SERPL-MCNC: 100 MG/DL — HIGH (ref 70–99)
HCT VFR BLD CALC: 27.3 % — LOW (ref 39–50)
HGB BLD-MCNC: 8.4 G/DL — LOW (ref 13–17)
MAGNESIUM SERPL-MCNC: 2.2 MG/DL — SIGNIFICANT CHANGE UP (ref 1.6–2.6)
MCHC RBC-ENTMCNC: 26.9 PG — LOW (ref 27–34)
MCHC RBC-ENTMCNC: 30.8 GM/DL — LOW (ref 32–36)
MCV RBC AUTO: 87.5 FL — SIGNIFICANT CHANGE UP (ref 80–100)
PHOSPHATE SERPL-MCNC: 3.5 MG/DL — SIGNIFICANT CHANGE UP (ref 2.5–4.5)
PLATELET # BLD AUTO: 571 K/UL — HIGH (ref 150–400)
POTASSIUM SERPL-MCNC: 4.3 MMOL/L — SIGNIFICANT CHANGE UP (ref 3.5–5.3)
POTASSIUM SERPL-SCNC: 4.3 MMOL/L — SIGNIFICANT CHANGE UP (ref 3.5–5.3)
RBC # BLD: 3.12 M/UL — LOW (ref 4.2–5.8)
RBC # FLD: 16.5 % — HIGH (ref 10.3–14.5)
SODIUM SERPL-SCNC: 144 MMOL/L — SIGNIFICANT CHANGE UP (ref 135–145)
SPECIMEN SOURCE: SIGNIFICANT CHANGE UP
WBC # BLD: 11.75 K/UL — HIGH (ref 3.8–10.5)
WBC # FLD AUTO: 11.75 K/UL — HIGH (ref 3.8–10.5)

## 2022-07-22 PROCEDURE — 99221 1ST HOSP IP/OBS SF/LOW 40: CPT

## 2022-07-22 PROCEDURE — 49406 IMAGE CATH FLUID PERI/RETRO: CPT

## 2022-07-22 RX ORDER — HEPARIN SODIUM 5000 [USP'U]/ML
5000 INJECTION INTRAVENOUS; SUBCUTANEOUS EVERY 8 HOURS
Refills: 0 | Status: DISCONTINUED | OUTPATIENT
Start: 2022-07-23 | End: 2022-08-09

## 2022-07-22 RX ORDER — ELECTROLYTE SOLUTION,INJ
1 VIAL (ML) INTRAVENOUS
Refills: 0 | Status: DISCONTINUED | OUTPATIENT
Start: 2022-07-22 | End: 2022-07-23

## 2022-07-22 RX ORDER — HYDROMORPHONE HYDROCHLORIDE 2 MG/ML
0.5 INJECTION INTRAMUSCULAR; INTRAVENOUS; SUBCUTANEOUS
Refills: 0 | Status: DISCONTINUED | OUTPATIENT
Start: 2022-07-22 | End: 2022-07-24

## 2022-07-22 RX ORDER — ACETAMINOPHEN 500 MG
650 TABLET ORAL EVERY 6 HOURS
Refills: 0 | Status: DISCONTINUED | OUTPATIENT
Start: 2022-07-22 | End: 2022-07-29

## 2022-07-22 RX ADMIN — HYDROMORPHONE HYDROCHLORIDE 0.5 MILLIGRAM(S): 2 INJECTION INTRAMUSCULAR; INTRAVENOUS; SUBCUTANEOUS at 20:54

## 2022-07-22 RX ADMIN — Medication 88 MICROGRAM(S): at 05:30

## 2022-07-22 RX ADMIN — PIPERACILLIN AND TAZOBACTAM 25 GRAM(S): 4; .5 INJECTION, POWDER, LYOPHILIZED, FOR SOLUTION INTRAVENOUS at 20:54

## 2022-07-22 RX ADMIN — Medication 240 MILLIGRAM(S): at 09:17

## 2022-07-22 RX ADMIN — Medication 15 MILLIGRAM(S): at 04:00

## 2022-07-22 RX ADMIN — HYDROMORPHONE HYDROCHLORIDE 0.5 MILLIGRAM(S): 2 INJECTION INTRAMUSCULAR; INTRAVENOUS; SUBCUTANEOUS at 21:50

## 2022-07-22 RX ADMIN — LISINOPRIL 20 MILLIGRAM(S): 2.5 TABLET ORAL at 09:16

## 2022-07-22 RX ADMIN — Medication 12.5 MILLIGRAM(S): at 09:16

## 2022-07-22 RX ADMIN — Medication 1 EACH: at 22:01

## 2022-07-22 RX ADMIN — PIPERACILLIN AND TAZOBACTAM 25 GRAM(S): 4; .5 INJECTION, POWDER, LYOPHILIZED, FOR SOLUTION INTRAVENOUS at 15:01

## 2022-07-22 RX ADMIN — PIPERACILLIN AND TAZOBACTAM 25 GRAM(S): 4; .5 INJECTION, POWDER, LYOPHILIZED, FOR SOLUTION INTRAVENOUS at 05:30

## 2022-07-22 RX ADMIN — Medication 3 MILLIGRAM(S): at 20:55

## 2022-07-22 RX ADMIN — Medication 15 MILLIGRAM(S): at 03:19

## 2022-07-22 RX ADMIN — SODIUM CHLORIDE 100 MILLILITER(S): 9 INJECTION INTRAMUSCULAR; INTRAVENOUS; SUBCUTANEOUS at 14:54

## 2022-07-22 NOTE — DIETITIAN INITIAL EVALUATION ADULT - OTHER INFO
88yo male with PMH significant for sepsis, neuropathy, prostate CA, Crohn's disease, hypothyroidism, depression, DM, obesity, HTN, hypercholesterolemia, CAD with stents, hearing loss, colon CA s/p lap ileocolic resection on 6/29/22.  p/w increased drainage from the surgery site.  CT showed large midline abdminal wall abscess, pending drainage.  Per surgery team will need PPN at pt to be NPO for extended time.    *pt appears weak, pale, and thin during visit.  wt hx per EMR: 169# (7/1/22); 184.9# (4/15/22).  bedscale wt during visit (7/22/22), 170#.  wt loss of ~15# in 3 months (8%), clinically significant.  NFPE significant for moderate muscle/fat wasting.  Pt meets criteria for severe malnutrition.

## 2022-07-22 NOTE — DIETITIAN INITIAL EVALUATION ADULT - PERTINENT LABORATORY DATA
07-21    142  |  116<H>  |  41<H>  ----------------------------<  119<H>  4.5   |  22  |  1.94<H>    Ca    9.5      21 Jul 2022 14:00    TPro  7.3  /  Alb  2.1<L>  /  TBili  0.2  /  DBili  x   /  AST  99<H>  /  ALT  94<H>  /  AlkPhos  160<H>  07-21  POCT Blood Glucose.: 91 mg/dL (07-22-22 @ 05:29)  A1C with Estimated Average Glucose Result: 6.6 % (06-28-22 @ 07:24)  A1C with Estimated Average Glucose Result: 7.0 % (04-09-22 @ 09:16)

## 2022-07-22 NOTE — CONSULT NOTE ADULT - ASSESSMENT
88yo M with abdominal abscess following lap ileocolic resection, referred to IR for drainage  - Pt is NPO  - Lovenox held  - Covid negative in range

## 2022-07-22 NOTE — DIETITIAN INITIAL EVALUATION ADULT - PERTINENT MEDS FT
MEDICATIONS  (STANDING):  hydrochlorothiazide 12.5 milliGRAM(s) Oral daily  insulin lispro (ADMELOG) corrective regimen sliding scale.   SubCutaneous three times a day before meals  levothyroxine 88 MICROGram(s) Oral daily  lisinopril 20 milliGRAM(s) Oral daily  melatonin 3 milliGRAM(s) Oral at bedtime  piperacillin/tazobactam IVPB.. 3.375 Gram(s) IV Intermittent every 8 hours  sodium chloride 0.9%. 1000 milliLiter(s) (100 mL/Hr) IV Continuous <Continuous>  verapamil  milliGRAM(s) Oral daily    MEDICATIONS  (PRN):  morphine  - Injectable 2 milliGRAM(s) IV Push every 4 hours PRN Severe Pain (7 - 10)  ondansetron Injectable 4 milliGRAM(s) IV Push every 6 hours PRN Nausea  senna 2 Tablet(s) Oral at bedtime PRN Constipation

## 2022-07-22 NOTE — DIETITIAN NUTRITION RISK NOTIFICATION - ADDITIONAL COMMENTS/DIETITIAN RECOMMENDATIONS
PPN Recommendations: via peripheral line  Total Volume: 2200mL  100 g  Amino Acids  100 g Dextrose   0 g Lipids 20% (pending triglyceride level)  137 mEq Sodium Chloride  0 mEq Sodium Acetate  10 mmol Sodium Phosphate  0 mEq Potassium Chloride  0 mEq Potassium Acetate  0 mmol Potassium Phosphate  0 mEq Calcium Gluconate ( corrected Ca elevated, DO NOT supplement calcium outside of PN bag at this time. )  6 mEq Magnesium Sulfate  100 mg Thiamine  1 ml Trace Elements  10 ml MVI    Total Calories  740Kcal   (   Meets  38%  of  Estimated Energy needs  and  93%  Protein needs)(osmolarity <900)  Additional Recommendations:    1) Daily weights  2) Strict I & O's  3) Daily lyte checks including magnesium and phos  4) Weekly triglycerides/LFT checks  5) POCT q6hrs; maintain 140-180mg/dL   6) if to be on PN for > 6 days, consider PICC line to meet 100% of nutr needs

## 2022-07-22 NOTE — CONSULT NOTE ADULT - CONSULT REASON
86yo M with abdominal abscess 88yo M with abdominal abscess following lap ileocolic resection, referred to IR for drainage

## 2022-07-22 NOTE — PROGRESS NOTE ADULT - SUBJECTIVE AND OBJECTIVE BOX
Patient was seen and examined at bedside this am. Doing well, hard to hear but no event overnight. Abdominal dressing at bedside.     Vital Signs Last 24 Hrs  T(C): 36.5 (22 Jul 2022 09:13), Max: 37.1 (21 Jul 2022 20:45)  T(F): 97.7 (22 Jul 2022 09:13), Max: 98.7 (21 Jul 2022 20:45)  HR: 59 (22 Jul 2022 09:13) (59 - 63)  BP: 148/52 (22 Jul 2022 09:13) (122/54 - 153/56)  BP(mean): 87 (21 Jul 2022 20:45) (81 - 87)  RR: 16 (22 Jul 2022 09:13) (16 - 18)  SpO2: 99% (22 Jul 2022 09:13) (96% - 99%)    Parameters below as of 22 Jul 2022 09:13  Patient On (Oxygen Delivery Method): room air    PHYSICAL EXAM:  GENERAL: NAD, lying in bed comfortably  HEAD:  Atraumatic, Normocephalic  EYES: EOMI, PERRLA, conjunctiva and sclera clear  ENT: Moist mucous membranes  NECK: Supple, No JVD  CHEST/LUNG: Unlabored respirations  HEART: Regular rate and rhythm  ABDOMEN: Soft, Nontender, Nondistended. Abd with abscess on midline. Dressing in place, changed, at bedside   EXTREMITIES:  2+ Peripheral Pulses, brisk capillary refill. No clubbing, cyanosis, or edema  NERVOUS SYSTEM:  Alert & Oriented X3, speech clear. No deficits   MSK: FROM all 4 extremities, full and equal strength  SKIN: No rashes or lesions    I&O's Detail    21 Jul 2022 07:01  -  22 Jul 2022 07:00  --------------------------------------------------------  IN:  Total IN: 0 mL    OUT:    Voided (mL): 150 mL  Total OUT: 150 mL    Total NET: -150 mL      LABS:                        7.5    10.70 )-----------( 585      ( 21 Jul 2022 13:22 )             24.6     21 Jul 2022 14:00    142    |  116    |  41     ----------------------------<  119    4.5     |  22     |  1.94     Ca    9.5        21 Jul 2022 14:00    TPro  7.3    /  Alb  2.1    /  TBili  0.2    /  DBili  x      /  AST  99     /  ALT  94     /  AlkPhos  160    21 Jul 2022 14:00    PT/INR - ( 21 Jul 2022 13:22 )   PT: 15.9 sec;   INR: 1.37 ratio         PTT - ( 21 Jul 2022 13:22 )  PTT:33.7 sec

## 2022-07-22 NOTE — DIETITIAN INITIAL EVALUATION ADULT - ADD RECOMMEND
Additional Recommendations:    1) Daily weights  2) Strict I & O's  3) Daily lyte checks including magnesium and phos  4) Weekly triglycerides/LFT checks  5) POCT q6hrs; maintain 140-180mg/dL   6) if to be on PN for > 6 days, consider PICC line to meet 100% of nutr needs

## 2022-07-22 NOTE — PROGRESS NOTE ADULT - ASSESSMENT
88 y/o male w/ a PMHx of sepsis, neuropathy, prostate CA, Crohn's disease, hypothyroidism, depression, intervertebral disc disorder, DM, obesity, HTN, hypercholesterolemia, CAD w/ stents, macular degeneration, hearing loss and colon cancer s/p laparoscopic ileocolic resection on 6/29/22 with Dr. Ardon. pt presents to the ED with increased drainage from the surgery site. No fevers, chills, changes in appetite.   CT scan shows large midline abdominal wall abscess.    Plan    serial abd exam  abd dressing change prn  pain control  nausea control  NPO  hold DVT ppx for possible am IR drainage of abd wall asbcess  IR recs   SW consult/    Case was discussed with CRS team

## 2022-07-22 NOTE — DIETITIAN INITIAL EVALUATION ADULT - ALTERNATE MEANS OF NUTRITION
PPN Recommendations: via peripheral line  Total Volume: 2200mL  100 g  Amino Acids  100 g Dextrose   0 g Lipids 20% (pending triglyceride level)  137 mEq Sodium Chloride  0 mEq Sodium Acetate  10 mmol Sodium Phosphate  0 mEq Potassium Chloride  0 mEq Potassium Acetate  0 mmol Potassium Phosphate  0 mEq Calcium Gluconate ( corrected Ca elevated, DO NOT supplement calcium outside of PN bag at this time. )  6 mEq Magnesium Sulfate  100 mg Thiamine  1 ml Trace Elements  10 ml MVI    Total Calories  740Kcal   (   Meets  38%  of  Estimated Energy needs  and  93%  Protein needs)(osmolarity <900)

## 2022-07-23 LAB
ALBUMIN SERPL ELPH-MCNC: 1.8 G/DL — LOW (ref 3.3–5)
ALP SERPL-CCNC: 141 U/L — HIGH (ref 40–120)
ALT FLD-CCNC: 111 U/L — HIGH (ref 12–78)
ANION GAP SERPL CALC-SCNC: 6 MMOL/L — SIGNIFICANT CHANGE UP (ref 5–17)
AST SERPL-CCNC: 78 U/L — HIGH (ref 15–37)
BILIRUB SERPL-MCNC: 0.2 MG/DL — SIGNIFICANT CHANGE UP (ref 0.2–1.2)
BUN SERPL-MCNC: 29 MG/DL — HIGH (ref 7–23)
CALCIUM SERPL-MCNC: 8.9 MG/DL — SIGNIFICANT CHANGE UP (ref 8.5–10.1)
CHLORIDE SERPL-SCNC: 114 MMOL/L — HIGH (ref 96–108)
CO2 SERPL-SCNC: 21 MMOL/L — LOW (ref 22–31)
CREAT SERPL-MCNC: 1.85 MG/DL — HIGH (ref 0.5–1.3)
EGFR: 35 ML/MIN/1.73M2 — LOW
GLUCOSE SERPL-MCNC: 151 MG/DL — HIGH (ref 70–99)
HCT VFR BLD CALC: 26.6 % — LOW (ref 39–50)
HGB BLD-MCNC: 8.3 G/DL — LOW (ref 13–17)
MAGNESIUM SERPL-MCNC: 2.1 MG/DL — SIGNIFICANT CHANGE UP (ref 1.6–2.6)
MCHC RBC-ENTMCNC: 27 PG — SIGNIFICANT CHANGE UP (ref 27–34)
MCHC RBC-ENTMCNC: 31.2 GM/DL — LOW (ref 32–36)
MCV RBC AUTO: 86.6 FL — SIGNIFICANT CHANGE UP (ref 80–100)
PHOSPHATE SERPL-MCNC: 3 MG/DL — SIGNIFICANT CHANGE UP (ref 2.5–4.5)
PLATELET # BLD AUTO: 572 K/UL — HIGH (ref 150–400)
POTASSIUM SERPL-MCNC: 3.8 MMOL/L — SIGNIFICANT CHANGE UP (ref 3.5–5.3)
POTASSIUM SERPL-SCNC: 3.8 MMOL/L — SIGNIFICANT CHANGE UP (ref 3.5–5.3)
PROT SERPL-MCNC: 6.5 GM/DL — SIGNIFICANT CHANGE UP (ref 6–8.3)
RBC # BLD: 3.07 M/UL — LOW (ref 4.2–5.8)
RBC # FLD: 16.4 % — HIGH (ref 10.3–14.5)
SODIUM SERPL-SCNC: 141 MMOL/L — SIGNIFICANT CHANGE UP (ref 135–145)
TRIGL SERPL-MCNC: 172 MG/DL — HIGH
WBC # BLD: 11.44 K/UL — HIGH (ref 3.8–10.5)
WBC # FLD AUTO: 11.44 K/UL — HIGH (ref 3.8–10.5)

## 2022-07-23 RX ORDER — HYDROXYZINE HCL 10 MG
25 TABLET ORAL EVERY 12 HOURS
Refills: 0 | Status: DISCONTINUED | OUTPATIENT
Start: 2022-07-23 | End: 2022-08-09

## 2022-07-23 RX ORDER — DEXTROSE MONOHYDRATE, SODIUM CHLORIDE, AND POTASSIUM CHLORIDE 50; .745; 4.5 G/1000ML; G/1000ML; G/1000ML
1000 INJECTION, SOLUTION INTRAVENOUS
Refills: 0 | Status: DISCONTINUED | OUTPATIENT
Start: 2022-07-23 | End: 2022-07-26

## 2022-07-23 RX ORDER — LANOLIN ALCOHOL/MO/W.PET/CERES
5 CREAM (GRAM) TOPICAL AT BEDTIME
Refills: 0 | Status: DISCONTINUED | OUTPATIENT
Start: 2022-07-23 | End: 2022-08-09

## 2022-07-23 RX ORDER — SODIUM CHLORIDE 9 MG/ML
1000 INJECTION INTRAMUSCULAR; INTRAVENOUS; SUBCUTANEOUS
Refills: 0 | Status: DISCONTINUED | OUTPATIENT
Start: 2022-07-23 | End: 2022-07-23

## 2022-07-23 RX ORDER — ELECTROLYTE SOLUTION,INJ
1 VIAL (ML) INTRAVENOUS
Refills: 0 | Status: DISCONTINUED | OUTPATIENT
Start: 2022-07-23 | End: 2022-07-23

## 2022-07-23 RX ADMIN — Medication 3 MILLIGRAM(S): at 20:51

## 2022-07-23 RX ADMIN — PIPERACILLIN AND TAZOBACTAM 25 GRAM(S): 4; .5 INJECTION, POWDER, LYOPHILIZED, FOR SOLUTION INTRAVENOUS at 04:57

## 2022-07-23 RX ADMIN — PIPERACILLIN AND TAZOBACTAM 25 GRAM(S): 4; .5 INJECTION, POWDER, LYOPHILIZED, FOR SOLUTION INTRAVENOUS at 13:16

## 2022-07-23 RX ADMIN — Medication 25 MILLIGRAM(S): at 15:51

## 2022-07-23 RX ADMIN — Medication 88 MICROGRAM(S): at 04:58

## 2022-07-23 RX ADMIN — LISINOPRIL 20 MILLIGRAM(S): 2.5 TABLET ORAL at 09:46

## 2022-07-23 RX ADMIN — DEXTROSE MONOHYDRATE, SODIUM CHLORIDE, AND POTASSIUM CHLORIDE 75 MILLILITER(S): 50; .745; 4.5 INJECTION, SOLUTION INTRAVENOUS at 12:36

## 2022-07-23 RX ADMIN — SODIUM CHLORIDE 100 MILLILITER(S): 9 INJECTION INTRAMUSCULAR; INTRAVENOUS; SUBCUTANEOUS at 05:30

## 2022-07-23 RX ADMIN — HYDROMORPHONE HYDROCHLORIDE 0.5 MILLIGRAM(S): 2 INJECTION INTRAMUSCULAR; INTRAVENOUS; SUBCUTANEOUS at 04:58

## 2022-07-23 RX ADMIN — Medication 5 MILLIGRAM(S): at 20:51

## 2022-07-23 RX ADMIN — PIPERACILLIN AND TAZOBACTAM 25 GRAM(S): 4; .5 INJECTION, POWDER, LYOPHILIZED, FOR SOLUTION INTRAVENOUS at 20:51

## 2022-07-23 RX ADMIN — HYDROMORPHONE HYDROCHLORIDE 0.5 MILLIGRAM(S): 2 INJECTION INTRAMUSCULAR; INTRAVENOUS; SUBCUTANEOUS at 05:32

## 2022-07-23 RX ADMIN — Medication 240 MILLIGRAM(S): at 09:46

## 2022-07-23 RX ADMIN — Medication 1: at 17:49

## 2022-07-23 RX ADMIN — HEPARIN SODIUM 5000 UNIT(S): 5000 INJECTION INTRAVENOUS; SUBCUTANEOUS at 20:51

## 2022-07-23 RX ADMIN — SODIUM CHLORIDE 100 MILLILITER(S): 9 INJECTION INTRAMUSCULAR; INTRAVENOUS; SUBCUTANEOUS at 07:47

## 2022-07-23 RX ADMIN — HEPARIN SODIUM 5000 UNIT(S): 5000 INJECTION INTRAVENOUS; SUBCUTANEOUS at 04:58

## 2022-07-23 RX ADMIN — HEPARIN SODIUM 5000 UNIT(S): 5000 INJECTION INTRAVENOUS; SUBCUTANEOUS at 13:16

## 2022-07-23 NOTE — PROVIDER CONTACT NOTE (OTHER) - ACTION/TREATMENT ORDERED:
MD came and spoke with patient, will leave PPN on for the night and discuss with team in AM more. pt agrees with plan

## 2022-07-23 NOTE — CHART NOTE - NSCHARTNOTEFT_GEN_A_CORE
Clinical Nutrition PN Follow Up Note    *88yo male admitted with large midline abdominal wall abscess s/p drainage on 7/22.    *current status: pt refused PPN overnight due to thinking it was causing his diarrhea.  d/c'd by MD and IVF infusing.  d/w surgery, will re-order PPN today, surgery to speak with patient about diarrhea.    *labs reviewed; lytes WNL.  bilirubin total WNL.  pending new triglyceride.  corrected Ca elevated, DO NOT supplement calcium outside of PN bag at this time. POCT WNL.  07-23    141  |  114<H>  |  29<H>  ----------------------------<  151<H>  3.8   |  21<L>  |  1.85<H>    Ca    8.9      23 Jul 2022 04:53  Phos  3.0     07-23  Mg     2.1     07-23    TPro  6.5  /  Alb  1.8<L>  /  TBili  0.2  /  DBili  x   /  AST  78<H>  /  ALT  111<H>  /  AlkPhos  141<H>  07-23    HbA1c: A1C with Estimated Average Glucose Result: 6.6 % (06-28-22 @ 07:24)  Glucose: POCT Blood Glucose.: 104 mg/dL (07-22-22 @ 20:57)  Lipid Panel: Date/Time: 07-02-22 @ 05:06  Triglycerides, Serum: 219    POCT Blood Glucose.: 104 mg/dL (22 Jul 2022 20:57)  POCT Blood Glucose.: 113 mg/dL (22 Jul 2022 16:34)  POCT Blood Glucose.: 104 mg/dL (22 Jul 2022 12:45)      *pertinent meds: heparin, sodium chloride 0.9%, levothyroxine, lisinopril, zofran, senna    *I&O's Detail    21 Jul 2022 07:01  -  22 Jul 2022 07:00  --------------------------------------------------------  IN:  Total IN: 0 mL    OUT:    Voided (mL): 150 mL  Total OUT: 150 mL    Total NET: -150 mL      22 Jul 2022 07:01  -  23 Jul 2022 06:55  --------------------------------------------------------  IN:  Total IN: 0 mL    OUT:    Bulb (mL): 42 mL    Other (mL): 5 mL    Voided (mL): 350 mL  Total OUT: 397 mL    Total NET: -397 mL      *negative fluid status: BM (+) on 7/23, liquid, loose green .  pt on bowel regimen. PPN not regimen.      *Elvin Score of 16 ;  no PU documented; abd surgical wound. no edema documented.    *Malnutrition: severe malnutrition in acute illness r/t decreased ability to meet increased nutr needs 2/2 abd surgery and abd wall abscess AEB NPO, mod muscle/fat wasting, 8% wt loss x 3 mo    Estimated Needs: based on 77Kg (wt from 7/22)  Calories: 1925-2310Kcal (25-30 Kcal/Kg)  Protein: 108-123 g protein (1.4-1.6 g/Kg)  Fluids:  6255-4712 mL (25-30 mL/Kg)    Diet, Consistent Carbohydrate Clear Liquid (07-22-22 @ 15:07) [Active]      Weight History:  Height (cm): 167.6 (07-21-22 @ 12:16), 167.6 (07-06-22 @ 15:24), 167.6 (06-27-22 @ 19:20)  Weight (kg): 81.6 (07-21-22 @ 12:16), 78 (07-06-22 @ 15:24), 81.647 (06-27-22 @ 19:20)  BMI (kg/m2): 29 (07-21-22 @ 12:16), 27.8 (07-06-22 @ 15:24), 29.1 (06-27-22 @ 19:20)  BSA (m2): 1.91 (07-21-22 @ 12:16), 1.88 (07-06-22 @ 15:24), 1.91 (06-27-22 @ 19:20)      *will continue to monitor and adjust PN prn*    PPN Recommendations: via peripheral line  Total Volume: 2200mL  100 g  Amino Acids  100 g Dextrose  0 g Lipids 20% (pending triglyceride level)  137 mEq Sodium Chloride  0 mEq Sodium Acetate  10 mmol Sodium Phosphate  0 mEq Potassium Chloride  10 mEq Potassium Acetate  0 mmol Potassium Phosphate  0 mEq Calcium Gluconate ( corrected Ca elevated, DO NOT supplement calcium outside of PN bag at this time. )  6 mEq Magnesium Sulfate  100 mg Thiamine  1 ml Trace Elements  10 ml MVI    Total Calories   740Kcal    (   Meets  38%  of  Estimated Energy needs  and   93  %  Protein needs)(osmolarity <900)    Additional Recommendations:    1) Daily weights  2) Strict I & O's  3) Daily lyte checks including magnesium and phos  4) Weekly triglycerides/LFT checks  5) POCT q6hrs; maintain 140-180mg/dL  6) if pt to be on PN > 6 days, consider placing PICC line for TPN too meet 100% of nutr needs    *will monitor and adjust treatement plan prn*  Kayley Woodall MA, RDN, CDN, CNSC [phone]  Nutrition  Clinical Nutrition PN Follow Up Note    *86yo male admitted with large midline abdominal wall abscess s/p drainage on 7/22.    *current status: pt refused PPN overnight due to thinking it was causing his diarrhea.  d/c'd by MD and IVF infusing.  d/w surgery, will re-order PPN today, surgery to speak with patient about diarrhea.    *labs reviewed; lytes WNL.  bilirubin total WNL.  pending new triglyceride.  corrected Ca elevated, DO NOT supplement calcium outside of PN bag at this time. POCT WNL.  07-23    141  |  114<H>  |  29<H>  ----------------------------<  151<H>  3.8   |  21<L>  |  1.85<H>    Ca    8.9      23 Jul 2022 04:53  Phos  3.0     07-23  Mg     2.1     07-23    TPro  6.5  /  Alb  1.8<L>  /  TBili  0.2  /  DBili  x   /  AST  78<H>  /  ALT  111<H>  /  AlkPhos  141<H>  07-23    HbA1c: A1C with Estimated Average Glucose Result: 6.6 % (06-28-22 @ 07:24)  Glucose: POCT Blood Glucose.: 104 mg/dL (07-22-22 @ 20:57)  Lipid Panel: Date/Time: 07-02-22 @ 05:06  Triglycerides, Serum: 219    POCT Blood Glucose.: 104 mg/dL (22 Jul 2022 20:57)  POCT Blood Glucose.: 113 mg/dL (22 Jul 2022 16:34)  POCT Blood Glucose.: 104 mg/dL (22 Jul 2022 12:45)      *pertinent meds: heparin, sodium chloride 0.9%, levothyroxine, lisinopril, zofran, senna    *I&O's Detail    21 Jul 2022 07:01  -  22 Jul 2022 07:00  --------------------------------------------------------  IN:  Total IN: 0 mL    OUT:    Voided (mL): 150 mL  Total OUT: 150 mL    Total NET: -150 mL      22 Jul 2022 07:01  -  23 Jul 2022 06:55  --------------------------------------------------------  IN:  Total IN: 0 mL    OUT:    Bulb (mL): 42 mL    Other (mL): 5 mL    Voided (mL): 350 mL  Total OUT: 397 mL    Total NET: -397 mL      *negative fluid status: BM (+) on 7/23, liquid, loose green .  pt on bowel regimen. PPN not regimen.      *Elvin Score of 16 ;  no PU documented; abd surgical wound. no edema documented.    *Malnutrition: severe malnutrition in acute illness r/t decreased ability to meet increased nutr needs 2/2 abd surgery and abd wall abscess AEB NPO, mod muscle/fat wasting, 8% wt loss x 3 mo    Estimated Needs: based on 77Kg (wt from 7/22)  Calories: 1925-2310Kcal (25-30 Kcal/Kg)  Protein: 108-123 g protein (1.4-1.6 g/Kg)  Fluids:  1783-7848 mL (25-30 mL/Kg)    Diet, Consistent Carbohydrate Clear Liquid (07-22-22 @ 15:07) [Active]      Weight History:  Height (cm): 167.6 (07-21-22 @ 12:16), 167.6 (07-06-22 @ 15:24), 167.6 (06-27-22 @ 19:20)  Weight (kg): 81.6 (07-21-22 @ 12:16), 78 (07-06-22 @ 15:24), 81.647 (06-27-22 @ 19:20)  BMI (kg/m2): 29 (07-21-22 @ 12:16), 27.8 (07-06-22 @ 15:24), 29.1 (06-27-22 @ 19:20)  BSA (m2): 1.91 (07-21-22 @ 12:16), 1.88 (07-06-22 @ 15:24), 1.91 (06-27-22 @ 19:20)      *will continue to monitor and adjust PN prn*    PPN Recommendations: via peripheral line  Total Volume: 2200mL  100 g  Amino Acids  100 g Dextrose  0 g Lipids 20% (pending triglyceride level)  137 mEq Sodium Chloride  0 mEq Sodium Acetate  10 mmol Sodium Phosphate  0 mEq Potassium Chloride  20 mEq Potassium Acetate  0 mmol Potassium Phosphate  0 mEq Calcium Gluconate ( corrected Ca elevated, DO NOT supplement calcium outside of PN bag at this time. )  6 mEq Magnesium Sulfate  100 mg Thiamine  1 ml Trace Elements  10 ml MVI    Total Calories   740Kcal    (   Meets  38%  of  Estimated Energy needs  and   93  %  Protein needs)(osmolarity <900)    Additional Recommendations:    1) Daily weights  2) Strict I & O's  3) Daily lyte checks including magnesium and phos  4) Weekly triglycerides/LFT checks  5) POCT q6hrs; maintain 140-180mg/dL  6) if pt to be on PN > 6 days, consider placing PICC line for TPN too meet 100% of nutr needs    *will monitor and adjust treatement plan prn*  Kayley Woodall MA, RDN, CDN, CNSC [phone]  Nutrition

## 2022-07-23 NOTE — PROGRESS NOTE ADULT - SUBJECTIVE AND OBJECTIVE BOX
Pt. seen and examined at bedside this morning. Patient reports diarrhea overnight he attributed to his PPN, as well as had nightmares. He tolerated his diet. He denies fever, chest pain, SOB, nausea, vomiting.    Vitals:  T(C): 36.8 ( @ 09:27), Max: 37.2 ( @ 05:11)  HR: 61 ( @ 09:27) (61 - 62)  BP: 137/54 ( @ 09:27) (137/54 - 152/54)  RR: 18 ( @ 09:27) (16 - 18)  SpO2: 96% ( @ 09:27) (96% - 98%)     @ 07:01  -   @ 07:00  --------------------------------------------------------  IN:  Total IN: 0 mL    OUT:    Bulb (mL): 42 mL    Other (mL): 5 mL    Voided (mL): 350 mL  Total OUT: 397 mL    Total NET: -397 mL    Physical Exam:  General: AAOx3, Well developed, NAD  Chest: Normal respiratory effort  Heart: RRR, normotensive  Abdomen: incision with purulence and ostomy over site, abd nondistended, soft, nontender, drain is purulent  Neuro/Psych: No localized deficits. Normal speech, normal tone  Skin: Normal, no rashes, no lesions noted.   Extremities: Warm, well perfused, no edema, Pulses intact     @ 04:53                    8.3  CBC: 11.44>)-------(<572                     26.6                 141 | 114 | 29    CMP:  ----------------------< 151               3.8 | 21 | 1.85                      Ca:8.9  Phos:3.0  M.1               0.2|      |78        LFTs:  ------|141|-----             -|      |-   @ 11:28                    8.4  CBC: 11.75>)-------(<571                     27.3                 144 | 115 | 33    CMP:  ----------------------< 100               4.3 | 22 | 1.77                      Ca:9.0  Phos:3.5  M.2               -|      |-        LFTs:  ------|-|-----             -|      |-      Culture - Blood (collected 22 @ 14:00)  Source: .Blood None  Preliminary Report (22 @ 20:01):    No growth to date.    Culture - Urine (collected 22 @ 13:25)  Source: Clean Catch None  Final Report (22 @ 17:58):    <10,000 CFU/mL Normal Urogenital Wendi    Culture - Blood (collected 22 @ 13:22)  Source: .Blood None  Preliminary Report (22 @ 19:02):    No growth to date.      Current Inpatient Medications:  acetaminophen     Tablet .. 650 milliGRAM(s) Oral every 6 hours PRN  heparin   Injectable 5000 Unit(s) SubCutaneous every 8 hours  HYDROmorphone  Injectable 0.5 milliGRAM(s) IV Push every 3 hours PRN  insulin lispro (ADMELOG) corrective regimen sliding scale.   SubCutaneous three times a day before meals  levothyroxine 88 MICROGram(s) Oral daily  lisinopril 20 milliGRAM(s) Oral daily  melatonin 3 milliGRAM(s) Oral at bedtime  ondansetron Injectable 4 milliGRAM(s) IV Push every 6 hours PRN  Parenteral Nutrition - Adult 1 Each (92 mL/Hr) TPN Continuous <Continuous>  Parenteral Nutrition - Adult 1 Each (92 mL/Hr) TPN Continuous <Continuous>  piperacillin/tazobactam IVPB.. 3.375 Gram(s) IV Intermittent every 8 hours  senna 2 Tablet(s) Oral at bedtime PRN  sodium chloride 0.9%. 1000 milliLiter(s) (100 mL/Hr) IV Continuous <Continuous>  verapamil  milliGRAM(s) Oral daily

## 2022-07-23 NOTE — PROGRESS NOTE ADULT - ASSESSMENT
86 y/o male w/ a PMHx of sepsis, neuropathy, prostate CA, Crohn's disease, hypothyroidism, depression, intervertebral disc disorder, DM, obesity, HTN, hypercholesterolemia, CAD w/ stents, macular degeneration, hearing loss and colon cancer s/p laparoscopic ileocolic resection on 6/29/22 with Dr. Ardon. CT scan shows large midline abdominal wall abscess, s/p IR drain 7/22. AFVSS, leukocytosis unchanged, hh stable, cr up slightly, ast alt down    Plan  Pain control PRN  Routine vitals  Incentive spirometry  Low residue diet  Hydration  Continue PPN for today  Replete electrolytes PRN  DVT ppx  OOBC, ambulation  Continue Zosyn. ID for recommendations for inpatient and discharge.  Home PT  Case management for drain     Case was discussed with CRS team

## 2022-07-23 NOTE — PROGRESS NOTE ADULT - ASSESSMENT
87 CKD III sepsis, neuropathy, prostate CA, Crohn's disease, hypothyroidism, depression, intervertebral disc disorder, DM, obesity, HTN, hypercholesterolemia, CAD w/ stents, macular degeneration, hearing loss and colon cancer s/p laparoscopic ileocolic resection on 6/29/22 with Dr. Ardon. pt presents to the ED with increased drainage from the surgery site.    Yesenia on CKD  -Renal stabilizing, baseline 1.4-1.6.Likely was a mild pre-renal state and still persist. Infection likely playing a role as well and PRESTON  -IVF ongoing  -No nsaids, avoid further contrast IV if able    Wound drainage  -Surgery, fu cxs  -Broad spectrum abx    HTN  -ACE ok for now, CCB  -Will consider hold of former if rise in renal function persists    thanks, will follow with you

## 2022-07-23 NOTE — PROGRESS NOTE ADULT - ATTENDING COMMENTS
Seen and examined.  Wants to eat and go home.  Drain with 42 cc; midline wound with 5 cc, both purulent.  No pain.  Passing loose stools.  AFVSS  NAD  soft, NT, mild distention  Labs reviewed WBC 11, cultures pending  A/P -   D/C PPN.  Melatonin for sleep.  Advance to low fiber diet.  Cont IV abx, await culture results.  Flush drain regularly.  Will likely repeat CT before eventual d/c home with drain and IV abx.   Cont encouraging OOB and ambulation.

## 2022-07-23 NOTE — PROGRESS NOTE ADULT - SUBJECTIVE AND OBJECTIVE BOX
Patient is a 87y Male who reports no complaints as new, s/p IR. Very agitated/cranky again      MEDICATIONS  (STANDING):  dextrose 5% + sodium chloride 0.45% with potassium chloride 20 mEq/L 1000 milliLiter(s) (75 mL/Hr) IV Continuous <Continuous>  heparin   Injectable 5000 Unit(s) SubCutaneous every 8 hours  insulin lispro (ADMELOG) corrective regimen sliding scale.   SubCutaneous three times a day before meals  levothyroxine 88 MICROGram(s) Oral daily  lisinopril 20 milliGRAM(s) Oral daily  melatonin 3 milliGRAM(s) Oral at bedtime  piperacillin/tazobactam IVPB.. 3.375 Gram(s) IV Intermittent every 8 hours  verapamil  milliGRAM(s) Oral daily    MEDICATIONS  (PRN):  acetaminophen     Tablet .. 650 milliGRAM(s) Oral every 6 hours PRN Mild Pain (1 - 3), Moderate Pain (4 - 6)  HYDROmorphone  Injectable 0.5 milliGRAM(s) IV Push every 3 hours PRN Severe Pain (7 - 10)  ondansetron Injectable 4 milliGRAM(s) IV Push every 6 hours PRN Nausea  senna 2 Tablet(s) Oral at bedtime PRN Constipation        T(C): , Max: 37.2 (22 @ 05:11)  T(F): , Max: 98.9 (22 @ 05:11)  HR: 61 (22 @ 09:27)  BP: 137/54 (22 @ 09:27)  BP(mean): --  RR: 18 (22 @ 09:27)  SpO2: 96% (22 @ 09:27)  Wt(kg): --     @ 07:01  -   @ 07:00  --------------------------------------------------------  IN: 0 mL / OUT: 397 mL / NET: -397 mL          PHYSICAL EXAM:    Constitutional: frail  HEENT: MM  dist  Cardiovascular: S1 and S2  drain  Extremities: No peripheral edema  Neurological: A/O x 3         LABS:                        8.3    11.44 )-----------( 572      ( 2022 04:53 )             26.6     2022 04:53    141    |  114    |  29     ----------------------------<  151    3.8     |  21     |  1.85   2022 11:28    144    |  115    |  33     ----------------------------<  100    4.3     |  22     |  1.77   2022 14:00    142    |  116    |  41     ----------------------------<  119    4.5     |  22     |  1.94     Ca    8.9        2022 04:53  Ca    9.0        2022 11:28  Ca    9.5        2022 14:00  Phos  3.0       2022 04:53  Phos  3.5       2022 11:28  Mg     2.1       2022 04:53  Mg     2.2       2022 11:28    TPro  6.5    /  Alb  1.8    /  TBili  0.2    /  DBili  x      /  AST  78     /  ALT  111    /  AlkPhos  141    2022 04:53  TPro  7.3    /  Alb  2.1    /  TBili  0.2    /  DBili  x      /  AST  99     /  ALT  94     /  AlkPhos  160    2022 14:00          Urine Studies:  Urinalysis Basic - ( 2022 13:25 )    Color: Yellow / Appearance: Clear / S.015 / pH: x  Gluc: x / Ketone: Negative  / Bili: Negative / Urobili: Negative   Blood: x / Protein: 30 mg/dL / Nitrite: Negative   Leuk Esterase: Negative / RBC: 0-2 /HPF / WBC 0-2   Sq Epi: x / Non Sq Epi: Few / Bacteria: Moderate            RADIOLOGY & ADDITIONAL STUDIES:

## 2022-07-23 NOTE — CONSULT NOTE ADULT - ASSESSMENT
86 y/o male w/ a PMHx of sepsis, neuropathy, prostate CA, Crohn's disease, hypothyroidism, depression, intervertebral disc disorder, DM, obesity, HTN, hypercholesterolemia, CAD w/ stents, macular degeneration, hearing loss and colon cancer s/p laparoscopic ileocolic resection on 6/29/22 with Dr. Ardon. pt presents to the ED with increased drainage from the surgery site. No fevers, chills, changes in appetite. Imaging remarkable for large intraperitoneal thick wall abscess 17.5 x 11.8 x 10.8, IR consulted s/p IR drainage on 7/22, 40 cc pus drained, has drain in place, was given IV zosyn.    1. abdominal wall abscess s/p drainage. s/p laparascopic ileocolic resection  - imaging reviewed  - colorectal surgery f/u noted  - f/u body fluid cx, s/p IR drainage 7/22  - on IV zosyn [id number]  - continue with antibiotic coverage  - tailor abx regimen based on cx results  - fu cbc  - supportive care  - tolerating abx well so far; no side effects noted  - reason for abx use and side effects reviewed with patient    2. other issues - care per medicine  86 y/o male w/ a PMHx of sepsis, neuropathy, prostate CA, Crohn's disease, hypothyroidism, depression, intervertebral disc disorder, DM, obesity, HTN, hypercholesterolemia, CAD w/ stents, macular degeneration, hearing loss and colon cancer s/p laparoscopic ileocolic resection on 6/29/22 with Dr. Ardon. pt presents to the ED with increased drainage from the surgery site. No fevers, chills, changes in appetite. Imaging remarkable for large intraperitoneal thick wall abscess 17.5 x 11.8 x 10.8, IR consulted s/p IR drainage on 7/22, 40 cc pus drained, has drain in place, was given IV zosyn.    1. abdominal wall abscess s/p drainage. s/p laparascopic ileocolic resection. colon cancer. PCN allergy  - imaging reviewed  - colorectal surgery f/u noted  - f/u body fluid cx, s/p IR drainage 7/22  - on IV zosyn [id number]  - continue with antibiotic coverage  - tailor abx regimen based on cx results  - does not have true pcn allergy - tolerating abx without issues currently  - fu cbc  - supportive care  - tolerating abx well so far; no side effects noted  - reason for abx use and side effects reviewed with patient    2. other issues - care per medicine

## 2022-07-24 LAB
ALBUMIN SERPL ELPH-MCNC: 2 G/DL — LOW (ref 3.3–5)
ALP SERPL-CCNC: 124 U/L — HIGH (ref 40–120)
ALT FLD-CCNC: 82 U/L — HIGH (ref 12–78)
ANION GAP SERPL CALC-SCNC: 5 MMOL/L — SIGNIFICANT CHANGE UP (ref 5–17)
AST SERPL-CCNC: 62 U/L — HIGH (ref 15–37)
BILIRUB SERPL-MCNC: 0.2 MG/DL — SIGNIFICANT CHANGE UP (ref 0.2–1.2)
BUN SERPL-MCNC: 22 MG/DL — SIGNIFICANT CHANGE UP (ref 7–23)
CALCIUM SERPL-MCNC: 8.5 MG/DL — SIGNIFICANT CHANGE UP (ref 8.5–10.1)
CHLORIDE SERPL-SCNC: 116 MMOL/L — HIGH (ref 96–108)
CO2 SERPL-SCNC: 21 MMOL/L — LOW (ref 22–31)
CREAT SERPL-MCNC: 1.76 MG/DL — HIGH (ref 0.5–1.3)
EGFR: 37 ML/MIN/1.73M2 — LOW
GLUCOSE SERPL-MCNC: 136 MG/DL — HIGH (ref 70–99)
HCT VFR BLD CALC: 24.6 % — LOW (ref 39–50)
HGB BLD-MCNC: 7.6 G/DL — LOW (ref 13–17)
MAGNESIUM SERPL-MCNC: 2 MG/DL — SIGNIFICANT CHANGE UP (ref 1.6–2.6)
MCHC RBC-ENTMCNC: 26.8 PG — LOW (ref 27–34)
MCHC RBC-ENTMCNC: 30.9 GM/DL — LOW (ref 32–36)
MCV RBC AUTO: 86.6 FL — SIGNIFICANT CHANGE UP (ref 80–100)
PHOSPHATE SERPL-MCNC: 2.3 MG/DL — LOW (ref 2.5–4.5)
PLATELET # BLD AUTO: 530 K/UL — HIGH (ref 150–400)
POTASSIUM SERPL-MCNC: 3.8 MMOL/L — SIGNIFICANT CHANGE UP (ref 3.5–5.3)
POTASSIUM SERPL-SCNC: 3.8 MMOL/L — SIGNIFICANT CHANGE UP (ref 3.5–5.3)
PROT SERPL-MCNC: 6.3 GM/DL — SIGNIFICANT CHANGE UP (ref 6–8.3)
RBC # BLD: 2.84 M/UL — LOW (ref 4.2–5.8)
RBC # FLD: 16.5 % — HIGH (ref 10.3–14.5)
SODIUM SERPL-SCNC: 142 MMOL/L — SIGNIFICANT CHANGE UP (ref 135–145)
WBC # BLD: 9.8 K/UL — SIGNIFICANT CHANGE UP (ref 3.8–10.5)
WBC # FLD AUTO: 9.8 K/UL — SIGNIFICANT CHANGE UP (ref 3.8–10.5)

## 2022-07-24 RX ORDER — POTASSIUM CHLORIDE 20 MEQ
20 PACKET (EA) ORAL ONCE
Refills: 0 | Status: COMPLETED | OUTPATIENT
Start: 2022-07-24 | End: 2022-07-24

## 2022-07-24 RX ORDER — SODIUM,POTASSIUM PHOSPHATES 278-250MG
2 POWDER IN PACKET (EA) ORAL ONCE
Refills: 0 | Status: COMPLETED | OUTPATIENT
Start: 2022-07-24 | End: 2022-07-24

## 2022-07-24 RX ADMIN — DEXTROSE MONOHYDRATE, SODIUM CHLORIDE, AND POTASSIUM CHLORIDE 75 MILLILITER(S): 50; .745; 4.5 INJECTION, SOLUTION INTRAVENOUS at 15:49

## 2022-07-24 RX ADMIN — PIPERACILLIN AND TAZOBACTAM 25 GRAM(S): 4; .5 INJECTION, POWDER, LYOPHILIZED, FOR SOLUTION INTRAVENOUS at 13:39

## 2022-07-24 RX ADMIN — Medication 240 MILLIGRAM(S): at 10:53

## 2022-07-24 RX ADMIN — LISINOPRIL 20 MILLIGRAM(S): 2.5 TABLET ORAL at 10:53

## 2022-07-24 RX ADMIN — Medication 3 MILLIGRAM(S): at 21:55

## 2022-07-24 RX ADMIN — HEPARIN SODIUM 5000 UNIT(S): 5000 INJECTION INTRAVENOUS; SUBCUTANEOUS at 21:55

## 2022-07-24 RX ADMIN — HYDROMORPHONE HYDROCHLORIDE 0.5 MILLIGRAM(S): 2 INJECTION INTRAMUSCULAR; INTRAVENOUS; SUBCUTANEOUS at 00:19

## 2022-07-24 RX ADMIN — Medication 88 MICROGRAM(S): at 06:01

## 2022-07-24 RX ADMIN — PIPERACILLIN AND TAZOBACTAM 25 GRAM(S): 4; .5 INJECTION, POWDER, LYOPHILIZED, FOR SOLUTION INTRAVENOUS at 21:55

## 2022-07-24 RX ADMIN — DEXTROSE MONOHYDRATE, SODIUM CHLORIDE, AND POTASSIUM CHLORIDE 75 MILLILITER(S): 50; .745; 4.5 INJECTION, SOLUTION INTRAVENOUS at 06:00

## 2022-07-24 RX ADMIN — Medication 25 MILLIGRAM(S): at 21:55

## 2022-07-24 RX ADMIN — HEPARIN SODIUM 5000 UNIT(S): 5000 INJECTION INTRAVENOUS; SUBCUTANEOUS at 06:01

## 2022-07-24 RX ADMIN — PIPERACILLIN AND TAZOBACTAM 25 GRAM(S): 4; .5 INJECTION, POWDER, LYOPHILIZED, FOR SOLUTION INTRAVENOUS at 05:59

## 2022-07-24 RX ADMIN — Medication 1: at 16:36

## 2022-07-24 RX ADMIN — Medication 20 MILLIEQUIVALENT(S): at 13:38

## 2022-07-24 RX ADMIN — HEPARIN SODIUM 5000 UNIT(S): 5000 INJECTION INTRAVENOUS; SUBCUTANEOUS at 13:38

## 2022-07-24 RX ADMIN — Medication 2 PACKET(S): at 13:39

## 2022-07-24 RX ADMIN — Medication 5 MILLIGRAM(S): at 21:55

## 2022-07-24 NOTE — PROGRESS NOTE ADULT - ASSESSMENT
86 y/o male w/ a PMHx of sepsis, neuropathy, prostate CA, Crohn's disease, hypothyroidism, depression, intervertebral disc disorder, DM, obesity, HTN, hypercholesterolemia, CAD w/ stents, macular degeneration, hearing loss and colon cancer s/p laparoscopic ileocolic resection on 6/29/22 with Dr. Ardon. pt presents to the ED with increased drainage from the surgery site. No fevers, chills, changes in appetite. Imaging remarkable for large intraperitoneal thick wall abscess 17.5 x 11.8 x 10.8, IR consulted s/p IR drainage on 7/22, 40 cc pus drained, has drain in place, was given IV zosyn.    1. abdominal wall abscess s/p drainage. s/p laparascopic ileocolic resection. colon cancer. PCN allergy  - imaging reviewed; s/p IR drainage 7/22  - colorectal surgery f/u noted  - body fluid cx growing gpc pairs/chains, gnrs f/u final cx  - on IV zosyn [id number] #3-4   - continue with antibiotic coverage  - tailor abx regimen based on cx results  - does not have true pcn allergy - tolerating abx without issues currently  - fu cbc  - supportive care  - tolerating abx well so far; no side effects noted  - reason for abx use and side effects reviewed with patient    2. other issues - care per medicine

## 2022-07-24 NOTE — PROGRESS NOTE ADULT - ASSESSMENT
86 y/o male w/ a PMHx of sepsis, neuropathy, prostate CA, Crohn's disease, hypothyroidism, depression, intervertebral disc disorder, DM, obesity, HTN, hypercholesterolemia, CAD w/ stents, macular degeneration, hearing loss and colon cancer s/p laparoscopic ileocolic resection on 6/29/22 with Dr. Ardon. CT scan shows large midline abdominal wall abscess, s/p IR drain 7/22. AFVSS, leukocytosis unchanged, hh stable, cr up slightly, ast alt down    Plan  Pain control PRN  Routine vitals  Incentive spirometry  Low residue diet  Hydration  Continue PPN for today  Replete electrolytes PRN  DVT ppx  OOBC, ambulation  Continue Zosyn, f/u drain cx, ID to give recs when cx comes back  Home PT  Case management for drain care    Case was discussed with CRS team 88 y/o male w/ a PMHx of sepsis, neuropathy, prostate CA, Crohn's disease, hypothyroidism, depression, intervertebral disc disorder, DM, obesity, HTN, hypercholesterolemia, CAD w/ stents, macular degeneration, hearing loss and colon cancer s/p laparoscopic ileocolic resection on 6/29/22 with Dr. Ardon. CT scan shows large midline abdominal wall abscess, s/p IR drain 7/22. AFVSS, leukocytosis resolved, hh stable, cr up slightly, ast alt down    Plan  Pain control PRN  Routine vitals  Incentive spirometry  Low residue diet  Hydration  Continue PPN for today  Replete electrolytes PRN  DVT ppx  OOBC, ambulation  Continue Zosyn, f/u drain cx, ID to give recs when cx comes back  Home PT  Case management for drain care    Case was discussed with CRS team

## 2022-07-24 NOTE — PROGRESS NOTE ADULT - SUBJECTIVE AND OBJECTIVE BOX
Pt. seen and examined at bedside this morning. He tolerated his diet. He denies fever, chest pain, SOB, nausea, vomiting.  No acute events overnight.    Physical Exam:  General: AAOx3, Well developed, NAD  Chest: Normal respiratory effort  Heart: RRR, normotensive  Abdomen: incision with purulence and ostomy over site, abd nondistended, soft, nontender, drain is purulent  Neuro/Psych: No localized deficits. Normal speech, normal tone  Skin: Normal, no rashes, no lesions noted.   Extremities: Warm, well perfused, no edema, Pulses intact    Vitals:  T(C): 36.8 ( @ 09:33), Max: 37.6 ( @ 15:09)  HR: 60 (:33) (60 - 60)  BP: 168/56 ( 09:33) (139/70 - 168/56)  RR: 18 ( 09:33) (18 - 18)  SpO2: 100% ( 09:33) (94% - 100%)     @ 07:  -   @ 07:00  --------------------------------------------------------  IN:    IV PiggyBack: 100 mL  Total IN: 100 mL    OUT:    Bulb (mL): 55 mL    Other (mL): 38 mL  Total OUT: 93 mL    Total NET: 7 mL       @ 07:  -   @ 12:34  --------------------------------------------------------  IN:  Total IN: 0 mL    OUT:    Voided (mL): 125 mL  Total OUT: 125 mL    Total NET: -125 mL       @ 04:53                    7.6  CBC: 9.80>)-------(<530                     24.6                 142 | 116 | 22    CMP:  ----------------------< 136               3.8 | 21 | 1.76                      Ca:8.5  Phos:2.3  M.0               0.2|      |62        LFTs:  ------|124|-----             -|      |-  23 @ 04:53                    8.3  CBC: 11.44>)-------(<572                     26.6                 141 | 114 | 29    CMP:  ----------------------< 151               3.8 | 21 | 1.85                      Ca:8.9  Phos:3.0  M.1               0.2|      |78        LFTs:  ------|141|-----             -|      |-      Culture - Abscess with Gram Stain (collected 22 @ 11:00)  Source: .Abscess lower abdominal abscess  Preliminary Report (22 @ 11:36):    Numerous Gram Positive Cocci in Pairs and Chains    Moderate Gram Negative Rods    Culture - Blood (collected 22 @ 14:00)  Source: .Blood None  Preliminary Report (22 @ 20:01):    No growth to date.    Culture - Urine (collected 22 @ 13:25)  Source: Clean Catch None  Final Report (22 @ 17:58):    <10,000 CFU/mL Normal Urogenital Wendi    Culture - Blood (collected 22 @ 13:22)  Source: .Blood None  Preliminary Report (22 @ 19:02):    No growth to date.    Current Inpatient Medications:  acetaminophen     Tablet .. 650 milliGRAM(s) Oral every 6 hours PRN  dextrose 5% + sodium chloride 0.45% with potassium chloride 20 mEq/L 1000 milliLiter(s) (75 mL/Hr) IV Continuous <Continuous>  heparin   Injectable 5000 Unit(s) SubCutaneous every 8 hours  HYDROmorphone  Injectable 0.5 milliGRAM(s) IV Push every 3 hours PRN  hydrOXYzine hydrochloride 25 milliGRAM(s) Oral every 12 hours PRN  insulin lispro (ADMELOG) corrective regimen sliding scale.   SubCutaneous three times a day before meals  levothyroxine 88 MICROGram(s) Oral daily  lisinopril 20 milliGRAM(s) Oral daily  melatonin 5 milliGRAM(s) Oral at bedtime  melatonin 3 milliGRAM(s) Oral at bedtime  ondansetron Injectable 4 milliGRAM(s) IV Push every 6 hours PRN  piperacillin/tazobactam IVPB.. 3.375 Gram(s) IV Intermittent every 8 hours  senna 2 Tablet(s) Oral at bedtime PRN  verapamil  milliGRAM(s) Oral daily

## 2022-07-24 NOTE — PROGRESS NOTE ADULT - ATTENDING COMMENTS
Seen and examined.  No complaints.  Larisa low fiber diet.  Passing multiple stools.  WBC normalized.  AFVSS  NAD  incision clean without ongoing purulence, drain with purulence, soft, NTND  Labs reviewed  A/P -   Repeat CT in AM to assess for improvement and need for drain upsizing.  Cont current abx. Await cultures.  Anticipate home care for drain and IV abx on d/c.

## 2022-07-24 NOTE — PROGRESS NOTE ADULT - SUBJECTIVE AND OBJECTIVE BOX
Date of service: 07-24-22 @ 13:38    pt seen and examined  feels better this am  denies abd discomfort  has drain in place  afebrile    ROS: no fever or chills; denies dizziness, no HA, no SOB or cough, no diarrhea or constipation; no dysuria, no urinary frequency, no legs pain, no rashes    MEDICATIONS  (STANDING):  dextrose 5% + sodium chloride 0.45% with potassium chloride 20 mEq/L 1000 milliLiter(s) (75 mL/Hr) IV Continuous <Continuous>  heparin   Injectable 5000 Unit(s) SubCutaneous every 8 hours  insulin lispro (ADMELOG) corrective regimen sliding scale.   SubCutaneous three times a day before meals  levothyroxine 88 MICROGram(s) Oral daily  lisinopril 20 milliGRAM(s) Oral daily  melatonin 5 milliGRAM(s) Oral at bedtime  melatonin 3 milliGRAM(s) Oral at bedtime  piperacillin/tazobactam IVPB.. 3.375 Gram(s) IV Intermittent every 8 hours  potassium chloride   Powder 20 milliEquivalent(s) Oral once  potassium phosphate / sodium phosphate Powder (PHOS-NaK) 2 Packet(s) Oral once  verapamil  milliGRAM(s) Oral daily    Vital Signs Last 24 Hrs  T(C): 36.8 (24 Jul 2022 09:33), Max: 37.6 (23 Jul 2022 15:09)  T(F): 98.3 (24 Jul 2022 09:33), Max: 99.7 (23 Jul 2022 15:09)  HR: 60 (24 Jul 2022 09:33) (60 - 60)  BP: 168/56 (24 Jul 2022 09:33) (139/70 - 168/56)  BP(mean): --  RR: 18 (24 Jul 2022 09:33) (18 - 18)  SpO2: 100% (24 Jul 2022 09:33) (94% - 100%)    Parameters below as of 24 Jul 2022 09:33  Patient On (Oxygen Delivery Method): room air    PE:  Constitutional: frail looking  HEENT: NC/AT, EOMI, PERRLA, conjunctivae clear; ears and nose atraumatic; pharynx benign  Neck: supple; thyroid not palpable  Back: no tenderness  Respiratory: respiratory effort normal; clear to auscultation  Cardiovascular: S1S2 regular, no murmurs  Abdomen: soft, not tender, not distended, positive BS; + ostomy in place + abd drain  Genitourinary: no suprapubic tenderness  Lymphatic: no LN palpable  Musculoskeletal: no muscle tenderness, no joint swelling or tenderness  Extremities: no pedal edema  Neurological/ Psychiatric: AxOx3, Judgement and insight normal;  moving all extremities  Skin: no rashes; no palpable lesions    Labs: all available labs reviewed                                   7.6    9.80  )-----------( 530      ( 24 Jul 2022 04:53 )             24.6     07-24    142  |  116<H>  |  22  ----------------------------<  136<H>  3.8   |  21<L>  |  1.76<H>    Ca    8.5      24 Jul 2022 04:53  Phos  2.3     07-24  Mg     2.0     07-24    TPro  6.3  /  Alb  2.0<L>  /  TBili  0.2  /  DBili  x   /  AST  62<H>  /  ALT  82<H>  /  AlkPhos  124<H>  07-24         Culture - Abscess with Gram Stain (07.22.22 @ 11:00)   Specimen Source: .Abscess lower abdominal abscess   Culture Results:   Numerous Gram Positive Cocci in Pairs and Chains   Moderate Gram Negative Rods   Culture - Blood (07.21.22 @ 14:00)   Specimen Source: .Blood None   Culture Results:   No growth to date. Culture - Urine (07.21.22 @ 13:25)   Specimen Source: Clean Catch None   Culture Results:   <10,000 CFU/mL Normal Urogenital Wendi       Radiology: all available radiological tests reviewed      ACC: [id number] EXAM:  CT ABDOMEN AND PELVIS OC IC                          PROCEDURE DATE:  07/21/2022          INTERPRETATION:  CLINICAL INFORMATION: Drainage from surgical site;   evaluate for abscess. 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    COMPARISON: 7/6/2022.    CONTRAST/COMPLICATIONS:  IV Contrast: Omnipaque 350  90 cc administered   10 cc discarded  Oral Contrast: Omnipaque 300  Complications: None reported at time of study completion    PROCEDURE:  CT of the Abdomen and Pelvis was performed.  Sagittal and coronal reformats were performed.    FINDINGS: There is a large intraperitoneal thick wall abscess identified   measuring 17.5 x 11.8 x 10.8 cm, which extends anteriorly and superiorly   to the site of the midline anterior abdominal wall incision. In addition   to fluid and air there is mottled hyperdensity within the collection   likely related to administered oral contrast. The greatest concentration   of contrast is identified within the left lateral aspect of the abscess   adjacent to a suture surgical line at the level of the mid sigmoid colon,   which may be the site of fistulous communication.    LOWER CHEST: Small right pleural effusion. Opacity within the posterior   right lower lobe may be related to atelectasis; underlying parenchymal   infiltrate cannot be fully excluded.    LIVER: Normal in size. Hepatic steatosis. No focal hepatic masses.  BILE DUCTS: Normal caliber.  GALLBLADDER: Multiple gallstones. No gallbladder wall thickening.  SPLEEN: Within normal limits.  PANCREAS: Within normal limits.  ADRENALS: Within normal limits.  KIDNEYS/URETERS: No hydronephrosis. No renal calculi. 4.7 cm   space-occupying lesion noted within the left kidney characterized as a   cyst on prior ultrasonography July 2, 2022. Additional subcentimeter   hypodense foci are noted within the bilateral renal parenchyma, too small   to characterize.    BLADDER: Within normal limits.  REPRODUCTIVE ORGANS: Prostate seed implants identified.    BOWEL: Surgical suture linesare identified within the mid sigmoid   colonic region as well as within the right lower quadrant. No evidence   for mechanical bowel obstruction.  PERITONEUM: Upper abdominal ascites identified.  VESSELS: Within normal limits.  RETROPERITONEUM/LYMPHNODES: No lymphadenopathy.  ABDOMINAL WALL: As described above.  BONES: Status post lumbar spine fusion surgery extending from L2 through   S1. Patient status post right hip arthroplasty. Degenerative changes   lower thoracic and lumbar spine.    IMPRESSION:  There is a large intraperitoneal thick wall abscess   identified measuring 17.5 x 11.8 x 10.8 cm, which extends anteriorly and   superiorly to the site of the midline anterior abdominal wall incision.   In addition to fluid and air there is mottled hyperdensity within the   collection likely related to administered oral contrast. The greatest   concentration of contrast is identified within the left lateral aspect of   the abscess adjacent to a suture surgical line at the level of the mid   sigmoid colon, which may be the site of fistulous communication.    < end of copied text >    Advanced directives addressed: full resuscitation

## 2022-07-25 LAB
-  AMPICILLIN: SIGNIFICANT CHANGE UP
-  TETRACYCLINE: SIGNIFICANT CHANGE UP
-  VANCOMYCIN: SIGNIFICANT CHANGE UP
ANION GAP SERPL CALC-SCNC: 7 MMOL/L — SIGNIFICANT CHANGE UP (ref 5–17)
BASOPHILS # BLD AUTO: 0.05 K/UL — SIGNIFICANT CHANGE UP (ref 0–0.2)
BASOPHILS NFR BLD AUTO: 0.6 % — SIGNIFICANT CHANGE UP (ref 0–2)
BUN SERPL-MCNC: 17 MG/DL — SIGNIFICANT CHANGE UP (ref 7–23)
CALCIUM SERPL-MCNC: 8.5 MG/DL — SIGNIFICANT CHANGE UP (ref 8.5–10.1)
CHLORIDE SERPL-SCNC: 118 MMOL/L — HIGH (ref 96–108)
CO2 SERPL-SCNC: 16 MMOL/L — LOW (ref 22–31)
CREAT SERPL-MCNC: 1.73 MG/DL — HIGH (ref 0.5–1.3)
EGFR: 38 ML/MIN/1.73M2 — LOW
EOSINOPHIL # BLD AUTO: 0.23 K/UL — SIGNIFICANT CHANGE UP (ref 0–0.5)
EOSINOPHIL NFR BLD AUTO: 2.6 % — SIGNIFICANT CHANGE UP (ref 0–6)
GLUCOSE SERPL-MCNC: 135 MG/DL — HIGH (ref 70–99)
HCT VFR BLD CALC: 24.5 % — LOW (ref 39–50)
HGB BLD-MCNC: 7.2 G/DL — LOW (ref 13–17)
IMM GRANULOCYTES NFR BLD AUTO: 0.4 % — SIGNIFICANT CHANGE UP (ref 0–1.5)
LYMPHOCYTES # BLD AUTO: 1.51 K/UL — SIGNIFICANT CHANGE UP (ref 1–3.3)
LYMPHOCYTES # BLD AUTO: 16.9 % — SIGNIFICANT CHANGE UP (ref 13–44)
MAGNESIUM SERPL-MCNC: 2 MG/DL — SIGNIFICANT CHANGE UP (ref 1.6–2.6)
MCHC RBC-ENTMCNC: 26.6 PG — LOW (ref 27–34)
MCHC RBC-ENTMCNC: 29.4 GM/DL — LOW (ref 32–36)
MCV RBC AUTO: 90.4 FL — SIGNIFICANT CHANGE UP (ref 80–100)
METHOD TYPE: SIGNIFICANT CHANGE UP
MONOCYTES # BLD AUTO: 0.66 K/UL — SIGNIFICANT CHANGE UP (ref 0–0.9)
MONOCYTES NFR BLD AUTO: 7.4 % — SIGNIFICANT CHANGE UP (ref 2–14)
NEUTROPHILS # BLD AUTO: 6.44 K/UL — SIGNIFICANT CHANGE UP (ref 1.8–7.4)
NEUTROPHILS NFR BLD AUTO: 72.1 % — SIGNIFICANT CHANGE UP (ref 43–77)
PHOSPHATE SERPL-MCNC: 2.1 MG/DL — LOW (ref 2.5–4.5)
PLATELET # BLD AUTO: SIGNIFICANT CHANGE UP K/UL (ref 150–400)
POTASSIUM SERPL-MCNC: 4.2 MMOL/L — SIGNIFICANT CHANGE UP (ref 3.5–5.3)
POTASSIUM SERPL-SCNC: 4.2 MMOL/L — SIGNIFICANT CHANGE UP (ref 3.5–5.3)
RBC # BLD: 2.71 M/UL — LOW (ref 4.2–5.8)
RBC # FLD: 16.8 % — HIGH (ref 10.3–14.5)
SODIUM SERPL-SCNC: 141 MMOL/L — SIGNIFICANT CHANGE UP (ref 135–145)
WBC # BLD: 8.93 K/UL — SIGNIFICANT CHANGE UP (ref 3.8–10.5)
WBC # FLD AUTO: 8.93 K/UL — SIGNIFICANT CHANGE UP (ref 3.8–10.5)

## 2022-07-25 PROCEDURE — 74177 CT ABD & PELVIS W/CONTRAST: CPT | Mod: 26

## 2022-07-25 RX ORDER — SODIUM,POTASSIUM PHOSPHATES 278-250MG
2 POWDER IN PACKET (EA) ORAL ONCE
Refills: 0 | Status: COMPLETED | OUTPATIENT
Start: 2022-07-25 | End: 2022-07-25

## 2022-07-25 RX ORDER — POTASSIUM CHLORIDE 20 MEQ
20 PACKET (EA) ORAL ONCE
Refills: 0 | Status: DISCONTINUED | OUTPATIENT
Start: 2022-07-25 | End: 2022-07-25

## 2022-07-25 RX ORDER — VANCOMYCIN HCL 1 G
125 VIAL (EA) INTRAVENOUS EVERY 6 HOURS
Refills: 0 | Status: DISCONTINUED | OUTPATIENT
Start: 2022-07-25 | End: 2022-07-29

## 2022-07-25 RX ORDER — ACETAMINOPHEN 500 MG
1000 TABLET ORAL ONCE
Refills: 0 | Status: COMPLETED | OUTPATIENT
Start: 2022-07-25 | End: 2022-07-25

## 2022-07-25 RX ORDER — METRONIDAZOLE 500 MG
500 TABLET ORAL EVERY 8 HOURS
Refills: 0 | Status: DISCONTINUED | OUTPATIENT
Start: 2022-07-25 | End: 2022-07-25

## 2022-07-25 RX ADMIN — HEPARIN SODIUM 5000 UNIT(S): 5000 INJECTION INTRAVENOUS; SUBCUTANEOUS at 21:36

## 2022-07-25 RX ADMIN — PIPERACILLIN AND TAZOBACTAM 25 GRAM(S): 4; .5 INJECTION, POWDER, LYOPHILIZED, FOR SOLUTION INTRAVENOUS at 21:36

## 2022-07-25 RX ADMIN — Medication 5 MILLIGRAM(S): at 21:37

## 2022-07-25 RX ADMIN — DEXTROSE MONOHYDRATE, SODIUM CHLORIDE, AND POTASSIUM CHLORIDE 75 MILLILITER(S): 50; .745; 4.5 INJECTION, SOLUTION INTRAVENOUS at 12:16

## 2022-07-25 RX ADMIN — PIPERACILLIN AND TAZOBACTAM 25 GRAM(S): 4; .5 INJECTION, POWDER, LYOPHILIZED, FOR SOLUTION INTRAVENOUS at 14:37

## 2022-07-25 RX ADMIN — Medication 240 MILLIGRAM(S): at 12:15

## 2022-07-25 RX ADMIN — Medication 25 MILLIGRAM(S): at 23:07

## 2022-07-25 RX ADMIN — Medication 88 MICROGRAM(S): at 12:15

## 2022-07-25 RX ADMIN — Medication 1: at 18:03

## 2022-07-25 RX ADMIN — Medication 100 MILLIGRAM(S): at 15:49

## 2022-07-25 RX ADMIN — Medication 3 MILLIGRAM(S): at 21:36

## 2022-07-25 RX ADMIN — PIPERACILLIN AND TAZOBACTAM 25 GRAM(S): 4; .5 INJECTION, POWDER, LYOPHILIZED, FOR SOLUTION INTRAVENOUS at 06:20

## 2022-07-25 RX ADMIN — HEPARIN SODIUM 5000 UNIT(S): 5000 INJECTION INTRAVENOUS; SUBCUTANEOUS at 15:50

## 2022-07-25 RX ADMIN — Medication 125 MILLIGRAM(S): at 23:07

## 2022-07-25 RX ADMIN — LISINOPRIL 20 MILLIGRAM(S): 2.5 TABLET ORAL at 12:15

## 2022-07-25 RX ADMIN — Medication 125 MILLIGRAM(S): at 18:47

## 2022-07-25 RX ADMIN — HEPARIN SODIUM 5000 UNIT(S): 5000 INJECTION INTRAVENOUS; SUBCUTANEOUS at 06:19

## 2022-07-25 RX ADMIN — Medication 2 PACKET(S): at 12:15

## 2022-07-25 NOTE — PROGRESS NOTE ADULT - ASSESSMENT
88 y/o male w/ a PMHx of sepsis, neuropathy, prostate CA, Crohn's disease, hypothyroidism, depression, intervertebral disc disorder, DM, obesity, HTN, hypercholesterolemia, CAD w/ stents, macular degeneration, hearing loss and colon cancer s/p laparoscopic ileocolic resection on 6/29/22 with Dr. Ardon. pt presents to the ED with increased drainage from the surgery site. No fevers, chills, changes in appetite. Imaging remarkable for large intraperitoneal thick wall abscess 17.5 x 11.8 x 10.8, IR consulted s/p IR drainage on 7/22, 40 cc pus drained, has drain in place, was given IV zosyn.    1. abdominal wall abscess s/p drainage. s/p laparascopic ileocolic resection. colon cancer. crohns disease. PCN allergy  - imaging reviewed; s/p IR drainage 7/22  - colorectal surgery f/u noted  - body fluid cx growing c. diff/e faecalis - c diff growth in cx raises concern for fistulous communication to colon  - on IV zosyn [id number] #4-5  - add IV flagyl [id number] for c diff coverage  - check stool for c diff pcr, then start oral vancomycin [id number]   - f/u repeat abd imaging, case d/w CRS  - continue with antibiotic coverage  - does not have true pcn allergy - tolerating abx without issues currently  - fu cbc  - supportive care  - tolerating abx well so far; no side effects noted  - reason for abx use and side effects reviewed with patient    2. other issues - care per medicine

## 2022-07-25 NOTE — PROGRESS NOTE ADULT - ASSESSMENT
88 y/o male w/ a PMHx of sepsis, neuropathy, prostate CA, Crohn's disease, hypothyroidism, depression, intervertebral disc disorder, DM, obesity, HTN, hypercholesterolemia, CAD w/ stents, macular degeneration, hearing loss and colon cancer s/p laparoscopic ileocolic resection on 6/29/22 with Dr. Ardon. CT scan shows large midline abdominal wall abscess, s/p IR drain 7/22. AFVSS, leukocytosis resolved, hh stable, cr up slightly, ast alt down    Plan  Pain control PRN  Routine vitals  Incentive spirometry  Low residue diet  Hydration  Continue PPN for today  Replete electrolytes PRN  DVT ppx  OOBC, ambulation  Continue Zosyn, f/u drain cx, ID to give recs when cx comes back  Dispo: Home PT, VNS possible DC today  Case management for drain care      Case was discussed with CRS team 86 y/o male w/ a PMHx of sepsis, neuropathy, prostate CA, Crohn's disease, hypothyroidism, depression, intervertebral disc disorder, DM, obesity, HTN, hypercholesterolemia, CAD w/ stents, macular degeneration, hearing loss and colon cancer s/p laparoscopic ileocolic resection on 6/29/22. CT scan shows large midline abdominal wall abscess, s/p IR drain 7/22. AFVSS, leukocytosis resolved, hh stable, cr up slightly, ast alt down    Plan  Pain control PRN  Routine vitals  Incentive spirometry  Low residue diet  Hydration  Continue PPN for today  Replete electrolytes PRN  DVT ppx  OOBC, ambulation  Start vancomycin PO 500mg PO q6, Continue Zosyn  F/u ID reccs  Dispo: Home PT, VNS possible DC today  Case management for drain care      Case was discussed with CRS team

## 2022-07-25 NOTE — PROGRESS NOTE ADULT - ATTENDING COMMENTS
Patient denies any acute issues overnight.  No further drainage from midline site; purulent output in drain, 65cc recorded.  Tolerating PO, denies significant pain.  On exam abdomen soft, nondistended, nontender, drain to RLQ with purulent output, dressing to midline site with minimal output.      -- CT today to evaluate progress with drain - if persistent may need upsizing/repositioning  -- Drain cultures with C difficile and numerous E. faecalis.  Final results/sensitivities still in process.  ID following, will follow up recommendations.  -- Pain control  -- NPO for now pending CT results

## 2022-07-25 NOTE — PROGRESS NOTE ADULT - SUBJECTIVE AND OBJECTIVE BOX
Date of service: 07-25-22 @ 11:54    pt seen and examined  has loose stools  abd drain in place  denies abd discomfort  afebrile    ROS: no fever or chills; denies dizziness, no HA, no SOB or cough, no constipation; no dysuria, no urinary frequency, no legs pain, no rashes    MEDICATIONS  (STANDING):  dextrose 5% + sodium chloride 0.45% with potassium chloride 20 mEq/L 1000 milliLiter(s) (75 mL/Hr) IV Continuous <Continuous>  heparin   Injectable 5000 Unit(s) SubCutaneous every 8 hours  insulin lispro (ADMELOG) corrective regimen sliding scale.   SubCutaneous three times a day before meals  levothyroxine 88 MICROGram(s) Oral daily  lisinopril 20 milliGRAM(s) Oral daily  melatonin 5 milliGRAM(s) Oral at bedtime  melatonin 3 milliGRAM(s) Oral at bedtime  metroNIDAZOLE  IVPB 500 milliGRAM(s) IV Intermittent every 8 hours  piperacillin/tazobactam IVPB.. 3.375 Gram(s) IV Intermittent every 8 hours  potassium phosphate / sodium phosphate Powder (PHOS-NaK) 2 Packet(s) Oral once  verapamil  milliGRAM(s) Oral daily      Vital Signs Last 24 Hrs  T(C): 36.8 (24 Jul 2022 21:20), Max: 37 (24 Jul 2022 15:02)  T(F): 98.3 (24 Jul 2022 21:20), Max: 98.6 (24 Jul 2022 15:02)  HR: 63 (24 Jul 2022 23:00) (51 - 63)  BP: 135/46 (24 Jul 2022 21:20) (135/46 - 143/53)  BP(mean): --  RR: 18 (24 Jul 2022 21:20) (18 - 18)  SpO2: 98% (24 Jul 2022 21:20) (97% - 98%)    Parameters below as of 24 Jul 2022 21:20  Patient On (Oxygen Delivery Method): room air    PE:  Constitutional: frail looking  HEENT: NC/AT, EOMI, PERRLA, conjunctivae clear; ears and nose atraumatic; pharynx benign  Neck: supple; thyroid not palpable  Back: no tenderness  Respiratory: respiratory effort normal; clear to auscultation  Cardiovascular: S1S2 regular, no murmurs  Abdomen: soft, not tender, not distended, positive BS; + ostomy in place + abd drain  Genitourinary: no suprapubic tenderness  Lymphatic: no LN palpable  Musculoskeletal: no muscle tenderness, no joint swelling or tenderness  Extremities: no pedal edema  Neurological/ Psychiatric: AxOx3, Judgement and insight normal;  moving all extremities  Skin: no rashes; no palpable lesions    Labs: all available labs reviewed                                              8.1    11.52 )-----------( 516      ( 25 Jul 2022 08:34 )             26.5     07-25    141  |  118<H>  |  17  ----------------------------<  135<H>  4.2   |  16<L>  |  1.73<H>    Ca    8.5      25 Jul 2022 07:47  Phos  2.1     07-25  Mg     2.0     07-25    TPro  6.3  /  Alb  2.0<L>  /  TBili  0.2  /  DBili  x   /  AST  62<H>  /  ALT  82<H>  /  AlkPhos  124<H>  07-24         Culture - Abscess with Gram Stain (07.22.22 @ 11:00)   Specimen Source: .Abscess lower abdominal abscess   Culture Results:   Numerous Enterococcus faecalis   Moderate Clostridioides difficile (Previously Clostridum)   "Susceptibilities not performed"    Culture - Blood (07.21.22 @ 14:00)   Specimen Source: .Blood None   Culture Results:   No growth to date. Culture - Urine (07.21.22 @ 13:25)   Specimen Source: Clean Catch None   Culture Results:   <10,000 CFU/mL Normal Urogenital Wendi       Radiology: all available radiological tests reviewed      ACC: [id number] EXAM:  CT ABDOMEN AND PELVIS OC IC                          PROCEDURE DATE:  07/21/2022          INTERPRETATION:  CLINICAL INFORMATION: Drainage from surgical site;   evaluate for abscess. 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    COMPARISON: 7/6/2022.    CONTRAST/COMPLICATIONS:  IV Contrast: Omnipaque 350  90 cc administered   10 cc discarded  Oral Contrast: Omnipaque 300  Complications: None reported at time of study completion    PROCEDURE:  CT of the Abdomen and Pelvis was performed.  Sagittal and coronal reformats were performed.    FINDINGS: There is a large intraperitoneal thick wall abscess identified   measuring 17.5 x 11.8 x 10.8 cm, which extends anteriorly and superiorly   to the site of the midline anterior abdominal wall incision. In addition   to fluid and air there is mottled hyperdensity within the collection   likely related to administered oral contrast. The greatest concentration   of contrast is identified within the left lateral aspect of the abscess   adjacent to a suture surgical line at the level of the mid sigmoid colon,   which may be the site of fistulous communication.    LOWER CHEST: Small right pleural effusion. Opacity within the posterior   right lower lobe may be related to atelectasis; underlying parenchymal   infiltrate cannot be fully excluded.    LIVER: Normal in size. Hepatic steatosis. No focal hepatic masses.  BILE DUCTS: Normal caliber.  GALLBLADDER: Multiple gallstones. No gallbladder wall thickening.  SPLEEN: Within normal limits.  PANCREAS: Within normal limits.  ADRENALS: Within normal limits.  KIDNEYS/URETERS: No hydronephrosis. No renal calculi. 4.7 cm   space-occupying lesion noted within the left kidney characterized as a   cyst on prior ultrasonography July 2, 2022. Additional subcentimeter   hypodense foci are noted within the bilateral renal parenchyma, too small   to characterize.    BLADDER: Within normal limits.  REPRODUCTIVE ORGANS: Prostate seed implants identified.    BOWEL: Surgical suture linesare identified within the mid sigmoid   colonic region as well as within the right lower quadrant. No evidence   for mechanical bowel obstruction.  PERITONEUM: Upper abdominal ascites identified.  VESSELS: Within normal limits.  RETROPERITONEUM/LYMPHNODES: No lymphadenopathy.  ABDOMINAL WALL: As described above.  BONES: Status post lumbar spine fusion surgery extending from L2 through   S1. Patient status post right hip arthroplasty. Degenerative changes   lower thoracic and lumbar spine.    IMPRESSION:  There is a large intraperitoneal thick wall abscess   identified measuring 17.5 x 11.8 x 10.8 cm, which extends anteriorly and   superiorly to the site of the midline anterior abdominal wall incision.   In addition to fluid and air there is mottled hyperdensity within the   collection likely related to administered oral contrast. The greatest   concentration of contrast is identified within the left lateral aspect of   the abscess adjacent to a suture surgical line at the level of the mid   sigmoid colon, which may be the site of fistulous communication.    < end of copied text >    Advanced directives addressed: full resuscitation Date of service: 07-25-22 @ 11:54    pt seen and examined  has loose stools/diarrhea  sitting up in bed  abd drain in place  afebrile    ROS: no fever or chills; denies dizziness, no HA, no SOB or cough, no constipation; no dysuria, no urinary frequency, no legs pain, no rashes    MEDICATIONS  (STANDING):  dextrose 5% + sodium chloride 0.45% with potassium chloride 20 mEq/L 1000 milliLiter(s) (75 mL/Hr) IV Continuous <Continuous>  heparin   Injectable 5000 Unit(s) SubCutaneous every 8 hours  insulin lispro (ADMELOG) corrective regimen sliding scale.   SubCutaneous three times a day before meals  levothyroxine 88 MICROGram(s) Oral daily  lisinopril 20 milliGRAM(s) Oral daily  melatonin 5 milliGRAM(s) Oral at bedtime  melatonin 3 milliGRAM(s) Oral at bedtime  metroNIDAZOLE  IVPB 500 milliGRAM(s) IV Intermittent every 8 hours  piperacillin/tazobactam IVPB.. 3.375 Gram(s) IV Intermittent every 8 hours  potassium phosphate / sodium phosphate Powder (PHOS-NaK) 2 Packet(s) Oral once  verapamil  milliGRAM(s) Oral daily      Vital Signs Last 24 Hrs  T(C): 36.8 (24 Jul 2022 21:20), Max: 37 (24 Jul 2022 15:02)  T(F): 98.3 (24 Jul 2022 21:20), Max: 98.6 (24 Jul 2022 15:02)  HR: 63 (24 Jul 2022 23:00) (51 - 63)  BP: 135/46 (24 Jul 2022 21:20) (135/46 - 143/53)  BP(mean): --  RR: 18 (24 Jul 2022 21:20) (18 - 18)  SpO2: 98% (24 Jul 2022 21:20) (97% - 98%)    Parameters below as of 24 Jul 2022 21:20  Patient On (Oxygen Delivery Method): room air    PE:  Constitutional: frail looking  HEENT: NC/AT, EOMI, PERRLA, conjunctivae clear; ears and nose atraumatic; pharynx benign  Neck: supple; thyroid not palpable  Back: no tenderness  Respiratory: respiratory effort normal; clear to auscultation  Cardiovascular: S1S2 regular, no murmurs  Abdomen: soft, not tender, not distended, positive BS; + ostomy in place + abd drain  Genitourinary: no suprapubic tenderness  Lymphatic: no LN palpable  Musculoskeletal: no muscle tenderness, no joint swelling or tenderness  Extremities: no pedal edema  Neurological/ Psychiatric: AxOx3, Judgement and insight normal;  moving all extremities  Skin: no rashes; no palpable lesions    Labs: all available labs reviewed                                              8.1    11.52 )-----------( 516      ( 25 Jul 2022 08:34 )             26.5     07-25    141  |  118<H>  |  17  ----------------------------<  135<H>  4.2   |  16<L>  |  1.73<H>    Ca    8.5      25 Jul 2022 07:47  Phos  2.1     07-25  Mg     2.0     07-25    TPro  6.3  /  Alb  2.0<L>  /  TBili  0.2  /  DBili  x   /  AST  62<H>  /  ALT  82<H>  /  AlkPhos  124<H>  07-24         Culture - Abscess with Gram Stain (07.22.22 @ 11:00)   Specimen Source: .Abscess lower abdominal abscess   Culture Results:   Numerous Enterococcus faecalis   Moderate Clostridioides difficile (Previously Clostridum)   "Susceptibilities not performed"    Culture - Blood (07.21.22 @ 14:00)   Specimen Source: .Blood None   Culture Results:   No growth to date. Culture - Urine (07.21.22 @ 13:25)   Specimen Source: Clean Catch None   Culture Results:   <10,000 CFU/mL Normal Urogenital Wendi       Radiology: all available radiological tests reviewed      ACC: [id number] EXAM:  CT ABDOMEN AND PELVIS OC IC                          PROCEDURE DATE:  07/21/2022          INTERPRETATION:  CLINICAL INFORMATION: Drainage from surgical site;   evaluate for abscess. 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    COMPARISON: 7/6/2022.    CONTRAST/COMPLICATIONS:  IV Contrast: Omnipaque 350  90 cc administered   10 cc discarded  Oral Contrast: Omnipaque 300  Complications: None reported at time of study completion    PROCEDURE:  CT of the Abdomen and Pelvis was performed.  Sagittal and coronal reformats were performed.    FINDINGS: There is a large intraperitoneal thick wall abscess identified   measuring 17.5 x 11.8 x 10.8 cm, which extends anteriorly and superiorly   to the site of the midline anterior abdominal wall incision. In addition   to fluid and air there is mottled hyperdensity within the collection   likely related to administered oral contrast. The greatest concentration   of contrast is identified within the left lateral aspect of the abscess   adjacent to a suture surgical line at the level of the mid sigmoid colon,   which may be the site of fistulous communication.    LOWER CHEST: Small right pleural effusion. Opacity within the posterior   right lower lobe may be related to atelectasis; underlying parenchymal   infiltrate cannot be fully excluded.    LIVER: Normal in size. Hepatic steatosis. No focal hepatic masses.  BILE DUCTS: Normal caliber.  GALLBLADDER: Multiple gallstones. No gallbladder wall thickening.  SPLEEN: Within normal limits.  PANCREAS: Within normal limits.  ADRENALS: Within normal limits.  KIDNEYS/URETERS: No hydronephrosis. No renal calculi. 4.7 cm   space-occupying lesion noted within the left kidney characterized as a   cyst on prior ultrasonography July 2, 2022. Additional subcentimeter   hypodense foci are noted within the bilateral renal parenchyma, too small   to characterize.    BLADDER: Within normal limits.  REPRODUCTIVE ORGANS: Prostate seed implants identified.    BOWEL: Surgical suture linesare identified within the mid sigmoid   colonic region as well as within the right lower quadrant. No evidence   for mechanical bowel obstruction.  PERITONEUM: Upper abdominal ascites identified.  VESSELS: Within normal limits.  RETROPERITONEUM/LYMPHNODES: No lymphadenopathy.  ABDOMINAL WALL: As described above.  BONES: Status post lumbar spine fusion surgery extending from L2 through   S1. Patient status post right hip arthroplasty. Degenerative changes   lower thoracic and lumbar spine.    IMPRESSION:  There is a large intraperitoneal thick wall abscess   identified measuring 17.5 x 11.8 x 10.8 cm, which extends anteriorly and   superiorly to the site of the midline anterior abdominal wall incision.   In addition to fluid and air there is mottled hyperdensity within the   collection likely related to administered oral contrast. The greatest   concentration of contrast is identified within the left lateral aspect of   the abscess adjacent to a suture surgical line at the level of the mid   sigmoid colon, which may be the site of fistulous communication.    < end of copied text >    Advanced directives addressed: full resuscitation

## 2022-07-25 NOTE — PROGRESS NOTE ADULT - SUBJECTIVE AND OBJECTIVE BOX
Pt. seen and examined at bedside this morning. He tolerated his diet. however, pt has been having recurrent diarrea during the day. He denies fever, chest pain, SOB, nausea, vomiting.  No acute events overnight.    Physical Exam:  General: AAOx3, Well developed, NAD  Chest: Normal respiratory effort  Heart: RRR, normotensive  Abdomen: incision with some  purulence and fibrinous tissue, covered with gauze,  abd nondistended, soft, nontender, drain is purulent  Neuro/Psych: No localized deficits. Normal speech, normal tone  Skin: Normal, no rashes, no lesions noted.   Extremities: Warm, well perfused, no edema, Pulses intact    Vitals:  T(C): 36.8 ( @ 21:20), Max: 37 ( @ 15:02)  HR: 51 ( @ 21:20) (51 - 60)  BP: 135/46 ( @ 21:20) (135/46 - 168/56)  RR: 18 ( @ 21:20) (18 - 18)  SpO2: 98% ( @ 21:20) (97% - 100%)     @ 07:  -   @ 07:00  --------------------------------------------------------  IN:    IV PiggyBack: 100 mL  Total IN: 100 mL    OUT:    Bulb (mL): 55 mL    Other (mL): 38 mL  Total OUT: 93 mL    Total NET: 7 mL       @ 07:01  -   @ 04:20  --------------------------------------------------------  IN:    dextrose 5% + sodium chloride 0.45% w/ Additives: 1000 mL  Total IN: 1000 mL    OUT:    Bulb (mL): 35 mL    Voided (mL): 475 mL  Total OUT: 510 mL    Total NET: 490 mL       @ 04:53                    7.6  CBC: 9.80>)-------(<530                     24.6                 142 | 116 | 22    CMP:  ----------------------< 136               3.8 | 21 | 1.76                      Ca:8.5  Phos:2.3  M.0               0.2|      |62        LFTs:  ------|124|-----             -|      |-      Culture - Abscess with Gram Stain (collected 22 @ 11:00)  Source: .Abscess lower abdominal abscess  Preliminary Report (22 @ 15:11):    Numerous Enterococcus faecalis    Moderate Clostridioides difficile (Previously Clostridum)    "Susceptibilities not performed"    Culture - Blood (collected 22 @ 14:00)  Source: .Blood None  Preliminary Report (22 @ 20:01):    No growth to date.    Culture - Urine (collected 22 @ 13:25)  Source: Clean Catch None  Final Report (22 @ 17:58):    <10,000 CFU/mL Normal Urogenital Wendi    Culture - Blood (collected 22 @ 13:22)  Source: .Blood None  Preliminary Report (22 @ 19:02):    No growth to date.      Current Inpatient Medications:  acetaminophen     Tablet .. 650 milliGRAM(s) Oral every 6 hours PRN  acetaminophen   IVPB .. 1000 milliGRAM(s) IV Intermittent once  dextrose 5% + sodium chloride 0.45% with potassium chloride 20 mEq/L 1000 milliLiter(s) (75 mL/Hr) IV Continuous <Continuous>  heparin   Injectable 5000 Unit(s) SubCutaneous every 8 hours  hydrOXYzine hydrochloride 25 milliGRAM(s) Oral every 12 hours PRN  insulin lispro (ADMELOG) corrective regimen sliding scale.   SubCutaneous three times a day before meals  levothyroxine 88 MICROGram(s) Oral daily  lisinopril 20 milliGRAM(s) Oral daily  melatonin 5 milliGRAM(s) Oral at bedtime  melatonin 3 milliGRAM(s) Oral at bedtime  ondansetron Injectable 4 milliGRAM(s) IV Push every 6 hours PRN  piperacillin/tazobactam IVPB.. 3.375 Gram(s) IV Intermittent every 8 hours  potassium chloride   Powder 20 milliEquivalent(s) Oral once  potassium phosphate / sodium phosphate Powder (PHOS-NaK) 2 Packet(s) Oral once  senna 2 Tablet(s) Oral at bedtime PRN  verapamil  milliGRAM(s) Oral daily         Pt. seen and examined at bedside this morning. He tolerated his diet. Pt continue to complain of diarrhea. He denies fever, chest pain, SOB, nausea, vomiting.  No acute events overnight. Drain culture resulted positive for C.diff     Vital Signs (24 Hrs):  T(C): 36.8 (07-24-22 @ 21:20), Max: 37 (07-24-22 @ 15:02)  HR: 63 (07-24-22 @ 23:00) (51 - 63)  BP: 135/46 (07-24-22 @ 21:20) (135/46 - 168/56)  RR: 18 (07-24-22 @ 21:20) (18 - 18)  SpO2: 98% (07-24-22 @ 21:20) (97% - 100%)  Wt(kg): --  Daily     Daily     I&O's Summary    24 Jul 2022 07:01  -  25 Jul 2022 07:00  --------------------------------------------------------  IN: 1000 mL / OUT: 540 mL / NET: 460 mL        Physical Exam:  General: AAOx3, Well developed, NAD  Chest: Normal respiratory effort  Heart: RRR, normotensive  Abdomen: incision with some  purulence and fibrinous tissue, covered with gauze,  abd nondistended, soft, nontender, drain is purulent  Neuro/Psych: No localized deficits. Normal speech, normal tone  Skin: Normal, no rashes, no lesions noted.   Extremities: Warm, well perfused, no edema, Pulses intact    .  LABS:                         See note  See Note )-----------( See note    ( 25 Jul 2022 07:47 )             See note    07-25    141  |  118<H>  |  17  ----------------------------<  135<H>  4.2   |  16<L>  |  1.73<H>    Ca    8.5      25 Jul 2022 07:47  Phos  2.1     07-25  Mg     2.0     07-25    TPro  6.3  /  Alb  2.0<L>  /  TBili  0.2  /  DBili  x   /  AST  62<H>  /  ALT  82<H>  /  AlkPhos  124<H>  07-24        CULTURE RESULTS:                07-22-22 @ 11:00  Specimen Source: --  Method Type: --  Gram Stain - RRL: --  Gram Stain - Wound: --  Bacteria: --  Culture Results:   Numerous Enterococcus faecalis  Moderate Clostridioides difficile (Previously Clostridum)  "Susceptibilities not performed"      Specimen Source:   Method Type:   Gram Stain:   Culture Results: Culture Results:   Numerous Enterococcus faecalis  Moderate Clostridioides difficile (Previously Clostridum)  "Susceptibilities not performed" (07-22-22 @ 11:00)  Culture Results:   No growth to date. (07-21-22 @ 14:00)  Culture Results:   <10,000 CFU/mL Normal Urogenital Wendi (07-21-22 @ 13:25)  Culture Results:   No growth to date. (07-21-22 @ 13:22)    Bacteria: Bacteria: Moderate (07-21-22 @ 13:25)          RADIOLOGY, EKG & ADDITIONAL TESTS: Reviewed.

## 2022-07-25 NOTE — CONSULT NOTE ADULT - ASSESSMENT
86 y/o male with h/o sepsis, neuropathy, prostate CA, Crohn's disease, hypothyroidism, depression, intervertebral disc disorder, DM, obesity, HTN, hypercholesterolemia, CAD w/ stents, macular degeneration, hearing loss and colon cancer s/p laparoscopic ileocolic resection on 6/29/22 with Dr. Ardon was admitted on7/21 with increased drainage from the surgery site. No fevers, chills, changes in appetite reported. He received zosyn IV. On 7/25 he was noted with loose stools and was reported with CDT in stool.     1. CDAD. Colon Ca s/p recent ileocolic resection. Abdominal wall cellulitis.  -s/p zosyn for 4-5 days  -stool for CDT noted  -no clinical signs of toxic megacolon  -start vancomycin 125 mg PO q6h  -reason for abx use and side effects reviewed with patient; monitor BMP  -contact isolation  -old chart reviewed to assess prior cultures  -monitor temps  -f/u CBC  -supportive care  2. Other issues:   -care per medicine

## 2022-07-26 LAB
ANION GAP SERPL CALC-SCNC: 7 MMOL/L — SIGNIFICANT CHANGE UP (ref 5–17)
APTT BLD: 30.1 SEC — SIGNIFICANT CHANGE UP (ref 27.5–35.5)
BASOPHILS # BLD AUTO: 0.09 K/UL — SIGNIFICANT CHANGE UP (ref 0–0.2)
BASOPHILS NFR BLD AUTO: 0.7 % — SIGNIFICANT CHANGE UP (ref 0–2)
BUN SERPL-MCNC: 16 MG/DL — SIGNIFICANT CHANGE UP (ref 7–23)
C DIFF BY PCR RESULT: SIGNIFICANT CHANGE UP
C DIFF TOX GENS STL QL NAA+PROBE: SIGNIFICANT CHANGE UP
CALCIUM SERPL-MCNC: 9 MG/DL — SIGNIFICANT CHANGE UP (ref 8.5–10.1)
CHLORIDE SERPL-SCNC: 115 MMOL/L — HIGH (ref 96–108)
CO2 SERPL-SCNC: 21 MMOL/L — LOW (ref 22–31)
CREAT SERPL-MCNC: 1.88 MG/DL — HIGH (ref 0.5–1.3)
CULTURE RESULTS: SIGNIFICANT CHANGE UP
CULTURE RESULTS: SIGNIFICANT CHANGE UP
EGFR: 34 ML/MIN/1.73M2 — LOW
EOSINOPHIL # BLD AUTO: 0.38 K/UL — SIGNIFICANT CHANGE UP (ref 0–0.5)
EOSINOPHIL NFR BLD AUTO: 2.9 % — SIGNIFICANT CHANGE UP (ref 0–6)
GLUCOSE SERPL-MCNC: 140 MG/DL — HIGH (ref 70–99)
HCT VFR BLD CALC: 29.7 % — LOW (ref 39–50)
HGB BLD-MCNC: 9 G/DL — LOW (ref 13–17)
IMM GRANULOCYTES NFR BLD AUTO: 0.4 % — SIGNIFICANT CHANGE UP (ref 0–1.5)
INR BLD: 1.29 RATIO — HIGH (ref 0.88–1.16)
LYMPHOCYTES # BLD AUTO: 1.73 K/UL — SIGNIFICANT CHANGE UP (ref 1–3.3)
LYMPHOCYTES # BLD AUTO: 13.3 % — SIGNIFICANT CHANGE UP (ref 13–44)
MAGNESIUM SERPL-MCNC: 2 MG/DL — SIGNIFICANT CHANGE UP (ref 1.6–2.6)
MCHC RBC-ENTMCNC: 26.2 PG — LOW (ref 27–34)
MCHC RBC-ENTMCNC: 30.3 GM/DL — LOW (ref 32–36)
MCV RBC AUTO: 86.6 FL — SIGNIFICANT CHANGE UP (ref 80–100)
MONOCYTES # BLD AUTO: 0.84 K/UL — SIGNIFICANT CHANGE UP (ref 0–0.9)
MONOCYTES NFR BLD AUTO: 6.5 % — SIGNIFICANT CHANGE UP (ref 2–14)
NEUTROPHILS # BLD AUTO: 9.91 K/UL — HIGH (ref 1.8–7.4)
NEUTROPHILS NFR BLD AUTO: 76.2 % — SIGNIFICANT CHANGE UP (ref 43–77)
PHOSPHATE SERPL-MCNC: 2.9 MG/DL — SIGNIFICANT CHANGE UP (ref 2.5–4.5)
PLATELET # BLD AUTO: 634 K/UL — HIGH (ref 150–400)
POTASSIUM SERPL-MCNC: 4.3 MMOL/L — SIGNIFICANT CHANGE UP (ref 3.5–5.3)
POTASSIUM SERPL-SCNC: 4.3 MMOL/L — SIGNIFICANT CHANGE UP (ref 3.5–5.3)
PROTHROM AB SERPL-ACNC: 15 SEC — HIGH (ref 10.5–13.4)
RBC # BLD: 3.43 M/UL — LOW (ref 4.2–5.8)
RBC # FLD: 16.7 % — HIGH (ref 10.3–14.5)
SARS-COV-2 RNA SPEC QL NAA+PROBE: SIGNIFICANT CHANGE UP
SODIUM SERPL-SCNC: 143 MMOL/L — SIGNIFICANT CHANGE UP (ref 135–145)
SPECIMEN SOURCE: SIGNIFICANT CHANGE UP
SPECIMEN SOURCE: SIGNIFICANT CHANGE UP
WBC # BLD: 13 K/UL — HIGH (ref 3.8–10.5)
WBC # FLD AUTO: 13 K/UL — HIGH (ref 3.8–10.5)

## 2022-07-26 PROCEDURE — 99231 SBSQ HOSP IP/OBS SF/LOW 25: CPT

## 2022-07-26 PROCEDURE — 49423 EXCHANGE DRAINAGE CATHETER: CPT

## 2022-07-26 PROCEDURE — 74018 RADEX ABDOMEN 1 VIEW: CPT | Mod: 26,76

## 2022-07-26 PROCEDURE — 75984 XRAY CONTROL CATHETER CHANGE: CPT | Mod: 26

## 2022-07-26 PROCEDURE — 71045 X-RAY EXAM CHEST 1 VIEW: CPT | Mod: 26

## 2022-07-26 RX ORDER — INSULIN LISPRO 100/ML
VIAL (ML) SUBCUTANEOUS EVERY 6 HOURS
Refills: 0 | Status: DISCONTINUED | OUTPATIENT
Start: 2022-07-26 | End: 2022-08-02

## 2022-07-26 RX ORDER — DEXTROSE MONOHYDRATE, SODIUM CHLORIDE, AND POTASSIUM CHLORIDE 50; .745; 4.5 G/1000ML; G/1000ML; G/1000ML
1000 INJECTION, SOLUTION INTRAVENOUS
Refills: 0 | Status: DISCONTINUED | OUTPATIENT
Start: 2022-07-26 | End: 2022-07-26

## 2022-07-26 RX ORDER — GLUCAGON INJECTION, SOLUTION 0.5 MG/.1ML
1 INJECTION, SOLUTION SUBCUTANEOUS ONCE
Refills: 0 | Status: DISCONTINUED | OUTPATIENT
Start: 2022-07-26 | End: 2022-08-09

## 2022-07-26 RX ORDER — DEXTROSE 50 % IN WATER 50 %
25 SYRINGE (ML) INTRAVENOUS ONCE
Refills: 0 | Status: DISCONTINUED | OUTPATIENT
Start: 2022-07-26 | End: 2022-08-09

## 2022-07-26 RX ORDER — PANTOPRAZOLE SODIUM 20 MG/1
40 TABLET, DELAYED RELEASE ORAL DAILY
Refills: 0 | Status: DISCONTINUED | OUTPATIENT
Start: 2022-07-26 | End: 2022-08-02

## 2022-07-26 RX ORDER — SODIUM CHLORIDE 9 MG/ML
1000 INJECTION, SOLUTION INTRAVENOUS
Refills: 0 | Status: DISCONTINUED | OUTPATIENT
Start: 2022-07-26 | End: 2022-07-28

## 2022-07-26 RX ORDER — SODIUM CHLORIDE 9 MG/ML
1000 INJECTION, SOLUTION INTRAVENOUS
Refills: 0 | Status: DISCONTINUED | OUTPATIENT
Start: 2022-07-26 | End: 2022-08-09

## 2022-07-26 RX ORDER — METRONIDAZOLE 500 MG
TABLET ORAL
Refills: 0 | Status: DISCONTINUED | OUTPATIENT
Start: 2022-07-26 | End: 2022-08-09

## 2022-07-26 RX ORDER — METRONIDAZOLE 500 MG
500 TABLET ORAL EVERY 8 HOURS
Refills: 0 | Status: DISCONTINUED | OUTPATIENT
Start: 2022-07-26 | End: 2022-08-09

## 2022-07-26 RX ORDER — DEXTROSE 50 % IN WATER 50 %
12.5 SYRINGE (ML) INTRAVENOUS ONCE
Refills: 0 | Status: DISCONTINUED | OUTPATIENT
Start: 2022-07-26 | End: 2022-08-09

## 2022-07-26 RX ORDER — NYSTATIN CREAM 100000 [USP'U]/G
1 CREAM TOPICAL
Refills: 0 | Status: DISCONTINUED | OUTPATIENT
Start: 2022-07-26 | End: 2022-08-09

## 2022-07-26 RX ORDER — METRONIDAZOLE 500 MG
500 TABLET ORAL ONCE
Refills: 0 | Status: COMPLETED | OUTPATIENT
Start: 2022-07-26 | End: 2022-07-26

## 2022-07-26 RX ORDER — MORPHINE SULFATE 50 MG/1
2 CAPSULE, EXTENDED RELEASE ORAL ONCE
Refills: 0 | Status: DISCONTINUED | OUTPATIENT
Start: 2022-07-26 | End: 2022-07-26

## 2022-07-26 RX ORDER — DEXTROSE 50 % IN WATER 50 %
15 SYRINGE (ML) INTRAVENOUS ONCE
Refills: 0 | Status: DISCONTINUED | OUTPATIENT
Start: 2022-07-26 | End: 2022-08-09

## 2022-07-26 RX ADMIN — DEXTROSE MONOHYDRATE, SODIUM CHLORIDE, AND POTASSIUM CHLORIDE 75 MILLILITER(S): 50; .745; 4.5 INJECTION, SOLUTION INTRAVENOUS at 06:01

## 2022-07-26 RX ADMIN — Medication 125 MILLIGRAM(S): at 06:02

## 2022-07-26 RX ADMIN — Medication 125 MILLIGRAM(S): at 18:09

## 2022-07-26 RX ADMIN — SODIUM CHLORIDE 120 MILLILITER(S): 9 INJECTION, SOLUTION INTRAVENOUS at 20:33

## 2022-07-26 RX ADMIN — Medication 88 MICROGRAM(S): at 06:02

## 2022-07-26 RX ADMIN — MORPHINE SULFATE 2 MILLIGRAM(S): 50 CAPSULE, EXTENDED RELEASE ORAL at 01:53

## 2022-07-26 RX ADMIN — Medication 100 MILLIGRAM(S): at 22:01

## 2022-07-26 RX ADMIN — NYSTATIN CREAM 1 APPLICATION(S): 100000 CREAM TOPICAL at 11:27

## 2022-07-26 RX ADMIN — PIPERACILLIN AND TAZOBACTAM 25 GRAM(S): 4; .5 INJECTION, POWDER, LYOPHILIZED, FOR SOLUTION INTRAVENOUS at 06:01

## 2022-07-26 RX ADMIN — Medication 125 MILLIGRAM(S): at 23:48

## 2022-07-26 RX ADMIN — Medication 1: at 18:01

## 2022-07-26 RX ADMIN — ONDANSETRON 4 MILLIGRAM(S): 8 TABLET, FILM COATED ORAL at 06:21

## 2022-07-26 RX ADMIN — ONDANSETRON 4 MILLIGRAM(S): 8 TABLET, FILM COATED ORAL at 11:26

## 2022-07-26 RX ADMIN — PIPERACILLIN AND TAZOBACTAM 25 GRAM(S): 4; .5 INJECTION, POWDER, LYOPHILIZED, FOR SOLUTION INTRAVENOUS at 22:56

## 2022-07-26 RX ADMIN — HEPARIN SODIUM 5000 UNIT(S): 5000 INJECTION INTRAVENOUS; SUBCUTANEOUS at 22:55

## 2022-07-26 RX ADMIN — Medication 100 MILLIGRAM(S): at 11:26

## 2022-07-26 RX ADMIN — PIPERACILLIN AND TAZOBACTAM 25 GRAM(S): 4; .5 INJECTION, POWDER, LYOPHILIZED, FOR SOLUTION INTRAVENOUS at 16:28

## 2022-07-26 NOTE — CONSULT NOTE ADULT - ASSESSMENT
Imp:  Anastomotic leak with abscess, cx pos for C. diff  ALso with ongoing diarrhea, presumably 2/2 C. diff  Colon thickening on CT very unlikely to represent Crohn's as he had no colitis on pre-op colonoscopy - favor this is related to c. diff    Rec:  Cont with PO vanco  AXR pending for today  Will d/w ID re adding IV flagyl as the PO vanco may not be absorbed to penetrate abscess cavity

## 2022-07-26 NOTE — PROGRESS NOTE ADULT - SUBJECTIVE AND OBJECTIVE BOX
86 y/o male w/ a PMHx of sepsis, neuropathy, prostate CA, Crohn's disease, hypothyroidism, depression, intervertebral disc disorder, DM, obesity, HTN, hypercholesterolemia, CAD w/ stents, macular degeneration, hearing loss and colon cancer s/p laparoscopic ileocolic resection on 6/29/22 with Dr. Ardon. pt presented to the ED with increased drainage from the surgery site. CT showed a large abdominal abscess. Pt now s/p IR placement of 12 Irish abdominal abscess drain. Repeat CT yesterday showed small improvement of collection size when compared to previous despite daily outputs of 40 to 50 CC's. Progressive elevation in WBC over the past two days. IR consulted for upsize of drainage catheter.     Vital Signs Last 24 Hrs  T(C): 37.1 (26 Jul 2022 09:46), Max: 37.1 (25 Jul 2022 21:12)  T(F): 98.7 (26 Jul 2022 09:46), Max: 98.8 (25 Jul 2022 21:12)  HR: 66 (26 Jul 2022 09:46) (62 - 68)  BP: 151/59 (26 Jul 2022 09:46) (134/47 - 161/53)  BP(mean): --  RR: 16 (26 Jul 2022 09:46) (16 - 18)  SpO2: 96% (26 Jul 2022 09:46) (96% - 99%)    Parameters below as of 26 Jul 2022 09:46  Patient On (Oxygen Delivery Method): room air      GENERAL APPEARANCE: Well developed, in no acute distress.    LUNGS: Auscultation of the lungs revealed normal breath sounds without any other adventitious sounds or rubs.    CARDIOVASCULAR: There was a regular rate and rhythm    ABDOMEN: Abdominal abscess drainage catheter with purulent material in the bulb. Catheter was flushed with no resistance, but there was difficulty aspirating back    NEUROLOGIC: Alert and oriented x 3. Normal affect.     CBC                        9.0    13.00 )-----------( 634      ( 26 Jul 2022 07:14 )             29.7       Chemistry  07-26    143  |  115<H>  |  16  ----------------------------<  140<H>  4.3   |  21<L>  |  1.88<H>    Ca    9.0      26 Jul 2022 07:14  Phos  2.9     07-26  Mg     2.0     07-26      Coags  PT/INR - ( 26 Jul 2022 07:14 )   PT: 15.0 sec;   INR: 1.29 ratio    PTT - ( 26 Jul 2022 07:14 )  PTT:30.1 sec

## 2022-07-26 NOTE — PROGRESS NOTE ADULT - ASSESSMENT
88 y/o male w/ a PMHx of sepsis, neuropathy, prostate CA, Crohn's disease, hypothyroidism, depression, intervertebral disc disorder, DM, obesity, HTN, hypercholesterolemia, CAD w/ stents, macular degeneration, hearing loss and colon cancer s/p laparoscopic ileocolic resection on 6/29/22.  CT scan shows large midline abdominal wall abscess, s/p IR drain 7/22. AFVSS, leukocytosis resolved, hh stable, cr up slightly, ast alt down  Today pt had several episodes of vomiting and abdomen is distended.   AXR showed dilated bowel loops    Plan    NPO  Pain control PRN  Routine vitals  Incentive spirometry  Hydration  Continue PPN for today  Replete electrolytes PRN  DVT ppx  OOBC, ambulation  Start vancomycin PO 500mg PO q6, Continue Zosyn  F/u ID reccs  NGT placement  Dispo: Home PT, [facility] possible DC today  Case management for drain care      Case was discussed with CRS team 88 y/o male w/ a PMHx of sepsis, neuropathy, prostate CA, Crohn's disease, hypothyroidism, depression, intervertebral disc disorder, DM, obesity, HTN, hypercholesterolemia, CAD w/ stents, macular degeneration, hearing loss and colon cancer s/p laparoscopic ileocolic resection on 6/29/22.  CT scan shows large midline abdominal wall abscess, s/p IR drain 7/22. AFVSS, leukocytosis resolved, hh stable, cr up slightly, ast alt down  Today pt had several episodes of vomiting and abdomen is distended.   AXR showed dilated bowel loops    Plan    NPO  Pain control PRN  Routine vitals  Incentive spirometry  Hydration  Continue PPN for today  Replete electrolytes PRN  DVT ppx  OOBC, ambulation  Start vancomycin PO 500mg PO q6, Continue Zosyn  F/u ID reccs  NGT placement  Dispo: Home PT, [facility] possible DC today  Case management for drain care      Case was discussed with CRS team    . Abdominal wall abscess associated with complication  from recent procedure (laparoscopic ileocolic resection)

## 2022-07-26 NOTE — PROCEDURE NOTE - PROCEDURE FINDINGS AND DETAILS
indwelling 12 fr abdominal drain removed over a wire and new 16 fr drain placed into large residual collection. ~5cc thick fluid with debris aspirated and drain connected to bulb.

## 2022-07-26 NOTE — PROGRESS NOTE ADULT - ASSESSMENT
88yo M with abdominal abscess s/p IR drainage  - Surgical team requesting upsize of abdominal abscess drain. Plan for upsizing of drainage catheter today  - Repeat Covid PCR obtained, pt is NPO

## 2022-07-26 NOTE — CDI QUERY NOTE - NSCDIOTHERTXTBX_GEN_ALL_CORE_HH
Could you please further clarify the following     A. Intra-abdominal abscess associated with complication  from recent procedure (laparoscopic ileocolic resection)  B. Abdominal wall abscess associated with complication  from recent procedure (laparoscopic ileocolic resection)  C. Peritoneal abscess localized associated with complication  from recent procedure (laparoscopic ileocolic resection)  D. Other please specify  E. Unable to determine    SUPPORTING DOCUMENTATION AND/OR CLINICAL EVIDENCE:    IR -7/26-· Subjective and Objective: 86 y/o male w/ a PMHx of sepsis, neuropathy, prostate CA, Crohn's disease, hypothyroidism, depression, intervertebral disc disorder, DM, obesity, HTN, hypercholesterolemia, CAD w/ stents, macular degeneration, hearing loss and colon cancer s/p laparoscopic ileocolic resection on 6/29/22 with Dr. Ardon. pt presented to the ED with increased drainage from the surgery site. CT showed a large abdominal abscess. Pt now s/p IR placement of 12 [place] abdominal abscess drain. Repeat CT yesterday showed small improvement of collection size when compared to previous despite daily outputs of 40 to 50 CC's. Progressive elevation in WBC over the past two days. IR consulted for upsize of drainage catheter.       ID-7/26-1. abdominal wall abscess d/t anastomotic leak/fistula s/p drainage. laparascopic ileocolic resection. diarrhea. colon cancer. crohns disease. PCN allergy- s/p IR drainage 7/22; body fluid cx growing c. diff/e faecalis - c diff growth in cx d/t anastomotic leak/fistula- colorectal surgery f/u noted, repeat Ct abd/pelvis reviewed from 7/25- case d/w colorectal surgery and gi  - on IV zosyn [id number] #5-6- on IV flagyl [id number] for c diff coverage - f/u c diff pcr, on oral vancomycin [id number] #2- continue with antibiotic coverage    GI- 7/26-Anastomotic leak with abscess, cx pos for C. diffALso with ongoing diarrhea, presumably 2/2 C. diffColon thickening on CT very unlikely to represent Crohn's as he had no colitis on pre-op colonoscopy - favor this is related to c. diffRec:Cont with PO vancoAXR pending for todayWill d/w ID re adding IV flagyl as the PO vanco may not be absorbed to penetrate abscess cavity    H&P s/p laparoscopic ileocolic resection on 6/29/22 with Dr. Ardon. pt presents to the ED with increased drainage from the surgery site. No fevers, chills, changes in appetite.       IR 7/22 -< from: IR Procedure (07.22.22 @ 10:50) >Impression:1. Successful CT-guided drainage of a lower abdominal abscess. However,   please note that the collection is loculated and therefore did not drain completely  88yo M with abdominal abscess following lap ileocolic resection, referred to IR for drainage-Problem/Recommendation - 1:·  Problem: Abscess of abdominal cavity. ·  Recommendation: - Case reviewed with Dr. Reed. Plan for IR drainage today.    IMPRESSION:  There is a large intraperitoneal thick wall abscess identified measuring 17.5 x 11.8 x 10.8 cm, which extends anteriorly and superiorly to the site of the midline anterior abdominal wall incision.   In addition to fluid and air there is mottled hyperdensity within the collection likely related to administered oral contrast. The greatest concentration of contrast is identified within the left lateral aspect of   the abscess adjacent to a suture surgical line at the level of the mid sigmoid colon, which may be the site of fistulous communication.

## 2022-07-26 NOTE — PROGRESS NOTE ADULT - SUBJECTIVE AND OBJECTIVE BOX
Pt. seen and examined at bedside this morning. He tolerated his diet. Pt continue to complain of diarrhea and 2 episodes of vomited today. He denies fever, chest pain, SOB  No acute events overnight. Drain culture resulted positive for C.diff. Pending Stool C. Diff    Vitals:  T(C): 37.1 ( @ 09:46), Max: 37.1 ( @ 21:12)  HR: 66 ( @ 09:46) (62 - 68)  BP: 151/59 ( @ 09:46) (134/47 - 161/53)  RR: 16 ( @ 09:46) (16 - 18)  SpO2: 96% ( @ 09:46) (96% - 99%)     @ 07:01  -   @ 07:00  --------------------------------------------------------  IN:    Oral Fluid: 30 mL  Total IN: 30 mL    OUT:    Bulb (mL): 25 mL    Voided (mL): 200 mL  Total OUT: 225 mL    Total NET: -195 mL       @ 07:01  -   @ 11:34  --------------------------------------------------------  IN:  Total IN: 0 mL    OUT:    Bulb (mL): 50 mL  Total OUT: 50 mL    Total NET: -50 mL      Physical Exam:  General: AAOx3, Well developed  Chest: Normal respiratory effort  Heart: RRR  Abdomen: distended, timpanic,  midline wound with minimal exudate, LANDRY drain with purulent fluid  Neuro/Psych: No localized deficits. Normal spech, normal tone  Skin: Normal, no rashes, no lesions noted.   Extremities: Warm, well perfused, no edema, Pulses intact     @ 07:14                    9.0  CBC: 13.00>)-------(<634                     29.7                 143 | 115 | 16    CMP:  ----------------------< 140               4.3 | 21 | 1.88                      Ca:9.0  Phos:2.9  M.0               -|      |-        LFTs:  ------|-|-----             -|      |-   @ 08:34                    8.1  CBC: 11.52>)-------(<516                     26.5                 - | - | -    CMP:  ----------------------< -               - | - | -                      Ca:-  Phos:-  Mg:-               -|      |-        LFTs:  ------|-|-----             -|      |-   @ 07:47                    See note  CBC: See Note>)-------(<See note                     See note                 141 | 118 | 17    CMP:  ----------------------< 135               4.2 | 16 | 1.73                      Ca:8.5  Phos:2.1  M.0               -|      |-        LFTs:  ------|-|-----             -|      |-      Culture - Abscess with Gram Stain (collected 22 @ 11:00)  Source: .Abscess lower abdominal abscess  Preliminary Report (22 @ 15:11):    Numerous Enterococcus faecalis    Moderate Clostridioides difficile (Previously Clostridum)    "Susceptibilities not performed"  Organism: Enterococcus faecalis (22 @ 12:55)  Organism: Enterococcus faecalis (22 @ 12:55)      -  Ampicillin: S <=2 Predicts results to ampicillin/sulbactam, amoxacillin-clavulanate and  piperacillin-tazobactam.      -  Tetra/Doxy: R >8      -  Vancomycin: S 2      Method Type: JAMI    Culture - Blood (collected 22 @ 14:00)  Source: .Blood None  Preliminary Report (22 @ 20:01):    No growth to date.    Culture - Urine (collected 22 @ 13:25)  Source: Clean Catch None  Final Report (22 @ 17:58):    <10,000 CFU/mL Normal Urogenital Wendi    Culture - Blood (collected 22 @ 13:22)  Source: .Blood None  Preliminary Report (22 @ 19:02):    No growth to date.      Current Inpatient Medications:  acetaminophen     Tablet .. 650 milliGRAM(s) Oral every 6 hours PRN  dextrose 5% + sodium chloride 0.45% with potassium chloride 20 mEq/L 1000 milliLiter(s) (75 mL/Hr) IV Continuous <Continuous>  heparin   Injectable 5000 Unit(s) SubCutaneous every 8 hours  hydrOXYzine hydrochloride 25 milliGRAM(s) Oral every 12 hours PRN  insulin lispro (ADMELOG) corrective regimen sliding scale.   SubCutaneous three times a day before meals  levothyroxine 88 MICROGram(s) Oral daily  lisinopril 20 milliGRAM(s) Oral daily  melatonin 5 milliGRAM(s) Oral at bedtime  melatonin 3 milliGRAM(s) Oral at bedtime  metroNIDAZOLE  IVPB 500 milliGRAM(s) IV Intermittent every 8 hours  metroNIDAZOLE  IVPB      nystatin Cream 1 Application(s) Topical two times a day  ondansetron Injectable 4 milliGRAM(s) IV Push every 6 hours PRN  piperacillin/tazobactam IVPB.. 3.375 Gram(s) IV Intermittent every 8 hours  senna 2 Tablet(s) Oral at bedtime PRN  vancomycin    Solution 125 milliGRAM(s) Oral every 6 hours  verapamil  milliGRAM(s) Oral daily

## 2022-07-26 NOTE — PROGRESS NOTE ADULT - ASSESSMENT
88 y/o male w/ a PMHx of sepsis, neuropathy, prostate CA, Crohn's disease, hypothyroidism, depression, intervertebral disc disorder, DM, obesity, HTN, hypercholesterolemia, CAD w/ stents, macular degeneration, hearing loss and colon cancer s/p laparoscopic ileocolic resection on 6/29/22 with Dr. Ardon. pt presents to the ED with increased drainage from the surgery site. No fevers, chills, changes in appetite. Imaging remarkable for large intraperitoneal thick wall abscess 17.5 x 11.8 x 10.8, IR consulted s/p IR drainage on 7/22, 40 cc pus drained, has drain in place, was given IV zosyn.    1. abdominal wall abscess d/t anastomotic leak/fistula s/p drainage. laparascopic ileocolic resection. diarrhea. colon cancer. crohns disease. PCN allergy  - s/p IR drainage 7/22; body fluid cx growing c. diff/e faecalis - c diff growth in cx d/t anastomotic leak/fistula  - colorectal surgery f/u noted, repeat Ct abd/pelvis reviewed from 7/25  - case d/w colorectal surgery and gi  - on IV zosyn [id number] #5-6  - on IV flagyl [id number] for c diff coverage   - f/u c diff pcr, on oral vancomycin [id number] #2  - continue with antibiotic coverage  - does not have true pcn allergy - tolerating abx without issues currently  - fu cbc  - supportive care  - tolerating abx well so far; no side effects noted  - reason for abx use and side effects reviewed with patient    2. other issues - care per medicine

## 2022-07-26 NOTE — PROGRESS NOTE ADULT - SUBJECTIVE AND OBJECTIVE BOX
Date of service: 07-26-22 @ 10:48    pt seen and examined  continues to have loose stools  abd drain in place   abd wall wound minimal drainage  afebrile    ROS: no fever or chills; denies dizziness, no HA, no SOB or cough, no constipation; no dysuria, no urinary frequency, no legs pain, no rashes      MEDICATIONS  (STANDING):  dextrose 5% + sodium chloride 0.45% with potassium chloride 20 mEq/L 1000 milliLiter(s) (75 mL/Hr) IV Continuous <Continuous>  heparin   Injectable 5000 Unit(s) SubCutaneous every 8 hours  insulin lispro (ADMELOG) corrective regimen sliding scale.   SubCutaneous three times a day before meals  levothyroxine 88 MICROGram(s) Oral daily  lisinopril 20 milliGRAM(s) Oral daily  melatonin 3 milliGRAM(s) Oral at bedtime  melatonin 5 milliGRAM(s) Oral at bedtime  metroNIDAZOLE  IVPB 500 milliGRAM(s) IV Intermittent once  metroNIDAZOLE  IVPB 500 milliGRAM(s) IV Intermittent every 8 hours  metroNIDAZOLE  IVPB      nystatin Cream 1 Application(s) Topical two times a day  piperacillin/tazobactam IVPB.. 3.375 Gram(s) IV Intermittent every 8 hours  vancomycin    Solution 125 milliGRAM(s) Oral every 6 hours  verapamil  milliGRAM(s) Oral daily      Vital Signs Last 24 Hrs  T(C): 37.1 (26 Jul 2022 09:46), Max: 37.1 (25 Jul 2022 21:12)  T(F): 98.7 (26 Jul 2022 09:46), Max: 98.8 (25 Jul 2022 21:12)  HR: 66 (26 Jul 2022 09:46) (62 - 68)  BP: 151/59 (26 Jul 2022 09:46) (134/47 - 161/53)  BP(mean): --  RR: 16 (26 Jul 2022 09:46) (16 - 18)  SpO2: 96% (26 Jul 2022 09:46) (96% - 100%)    Parameters below as of 26 Jul 2022 09:46  Patient On (Oxygen Delivery Method): room air      PE:  Constitutional: frail looking  HEENT: NC/AT, EOMI, PERRLA, conjunctivae clear; ears and nose atraumatic; pharynx benign  Neck: supple; thyroid not palpable  Back: no tenderness  Respiratory: respiratory effort normal; clear to auscultation  Cardiovascular: S1S2 regular, no murmurs  Abdomen: soft, not tender, not distended, positive BS; + ostomy in place + abd drain  Genitourinary: no suprapubic tenderness  Lymphatic: no LN palpable  Musculoskeletal: no muscle tenderness, no joint swelling or tenderness  Extremities: no pedal edema  Neurological/ Psychiatric: AxOx3, Judgement and insight normal;  moving all extremities  Skin: no rashes; no palpable lesions    Labs: all available labs reviewed                                              8.1    11.52 )-----------( 516      ( 25 Jul 2022 08:34 )             26.5     07-25    141  |  118<H>  |  17  ----------------------------<  135<H>  4.2   |  16<L>  |  1.73<H>    Ca    8.5      25 Jul 2022 07:47  Phos  2.1     07-25  Mg     2.0     07-25    TPro  6.3  /  Alb  2.0<L>  /  TBili  0.2  /  DBili  x   /  AST  62<H>  /  ALT  82<H>  /  AlkPhos  124<H>  07-24         Culture - Abscess with Gram Stain (07.22.22 @ 11:00)   Specimen Source: .Abscess lower abdominal abscess   Culture Results:   Numerous Enterococcus faecalis   Moderate Clostridioides difficile (Previously Clostridum)   "Susceptibilities not performed"    Culture - Blood (07.21.22 @ 14:00)   Specimen Source: .Blood None   Culture Results:   No growth to date. Culture - Urine (07.21.22 @ 13:25)   Specimen Source: Clean Catch None   Culture Results:   <10,000 CFU/mL Normal Urogenital Wendi       Radiology: all available radiological tests reviewed      ACC: [id number] EXAM:  CT ABDOMEN AND PELVIS OC IC                          PROCEDURE DATE:  07/21/2022          INTERPRETATION:  CLINICAL INFORMATION: Drainage from surgical site;   evaluate for abscess. 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    COMPARISON: 7/6/2022.    CONTRAST/COMPLICATIONS:  IV Contrast: Omnipaque 350  90 cc administered   10 cc discarded  Oral Contrast: Omnipaque 300  Complications: None reported at time of study completion    PROCEDURE:  CT of the Abdomen and Pelvis was performed.  Sagittal and coronal reformats were performed.    FINDINGS: There is a large intraperitoneal thick wall abscess identified   measuring 17.5 x 11.8 x 10.8 cm, which extends anteriorly and superiorly   to the site of the midline anterior abdominal wall incision. In addition   to fluid and air there is mottled hyperdensity within the collection   likely related to administered oral contrast. The greatest concentration   of contrast is identified within the left lateral aspect of the abscess   adjacent to a suture surgical line at the level of the mid sigmoid colon,   which may be the site of fistulous communication.    LOWER CHEST: Small right pleural effusion. Opacity within the posterior   right lower lobe may be related to atelectasis; underlying parenchymal   infiltrate cannot be fully excluded.    LIVER: Normal in size. Hepatic steatosis. No focal hepatic masses.  BILE DUCTS: Normal caliber.  GALLBLADDER: Multiple gallstones. No gallbladder wall thickening.  SPLEEN: Within normal limits.  PANCREAS: Within normal limits.  ADRENALS: Within normal limits.  KIDNEYS/URETERS: No hydronephrosis. No renal calculi. 4.7 cm   space-occupying lesion noted within the left kidney characterized as a   cyst on prior ultrasonography July 2, 2022. Additional subcentimeter   hypodense foci are noted within the bilateral renal parenchyma, too small   to characterize.    BLADDER: Within normal limits.  REPRODUCTIVE ORGANS: Prostate seed implants identified.    BOWEL: Surgical suture linesare identified within the mid sigmoid   colonic region as well as within the right lower quadrant. No evidence   for mechanical bowel obstruction.  PERITONEUM: Upper abdominal ascites identified.  VESSELS: Within normal limits.  RETROPERITONEUM/LYMPHNODES: No lymphadenopathy.  ABDOMINAL WALL: As described above.  BONES: Status post lumbar spine fusion surgery extending from L2 through   S1. Patient status post right hip arthroplasty. Degenerative changes   lower thoracic and lumbar spine.    IMPRESSION:  There is a large intraperitoneal thick wall abscess   identified measuring 17.5 x 11.8 x 10.8 cm, which extends anteriorly and   superiorly to the site of the midline anterior abdominal wall incision.   In addition to fluid and air there is mottled hyperdensity within the   collection likely related to administered oral contrast. The greatest   concentration of contrast is identified within the left lateral aspect of   the abscess adjacent to a suture surgical line at the level of the mid   sigmoid colon, which may be the site of fistulous communication.    < end of copied text >    Advanced directives addressed: full resuscitation

## 2022-07-26 NOTE — PROGRESS NOTE ADULT - ATTENDING COMMENTS
Seen and examined.  Vomited this AM.  AXR with gastric distention c/w ileus.  Started on po vanc and IV flagyl for C diff.  AFVSS  NAD  incision with mild purulence, soft, NT, moderate distention  Labs reviewed  A/P -   NPO for now, given emesis.  IV hydration.  IR for possible upsizing or additional catheter placement.  PT/OT.

## 2022-07-27 ENCOUNTER — TRANSCRIPTION ENCOUNTER (OUTPATIENT)
Age: 87
End: 2022-07-27

## 2022-07-27 LAB
-  LEVOFLOXACIN: SIGNIFICANT CHANGE UP
-  TRIMETHOPRIM/SULFAMETHOXAZOLE: SIGNIFICANT CHANGE UP
ALBUMIN SERPL ELPH-MCNC: 1.8 G/DL — LOW (ref 3.3–5)
ALP SERPL-CCNC: 78 U/L — SIGNIFICANT CHANGE UP (ref 40–120)
ALT FLD-CCNC: 36 U/L — SIGNIFICANT CHANGE UP (ref 12–78)
ANION GAP SERPL CALC-SCNC: 5 MMOL/L — SIGNIFICANT CHANGE UP (ref 5–17)
ANION GAP SERPL CALC-SCNC: 7 MMOL/L — SIGNIFICANT CHANGE UP (ref 5–17)
APTT BLD: 30.4 SEC — SIGNIFICANT CHANGE UP (ref 27.5–35.5)
AST SERPL-CCNC: 14 U/L — LOW (ref 15–37)
BASOPHILS # BLD AUTO: 0.09 K/UL — SIGNIFICANT CHANGE UP (ref 0–0.2)
BASOPHILS NFR BLD AUTO: 0.7 % — SIGNIFICANT CHANGE UP (ref 0–2)
BILIRUB SERPL-MCNC: 0.2 MG/DL — SIGNIFICANT CHANGE UP (ref 0.2–1.2)
BUN SERPL-MCNC: 15 MG/DL — SIGNIFICANT CHANGE UP (ref 7–23)
BUN SERPL-MCNC: 16 MG/DL — SIGNIFICANT CHANGE UP (ref 7–23)
CALCIUM SERPL-MCNC: 8.7 MG/DL — SIGNIFICANT CHANGE UP (ref 8.5–10.1)
CALCIUM SERPL-MCNC: 8.7 MG/DL — SIGNIFICANT CHANGE UP (ref 8.5–10.1)
CHLORIDE SERPL-SCNC: 112 MMOL/L — HIGH (ref 96–108)
CHLORIDE SERPL-SCNC: 114 MMOL/L — HIGH (ref 96–108)
CO2 SERPL-SCNC: 24 MMOL/L — SIGNIFICANT CHANGE UP (ref 22–31)
CO2 SERPL-SCNC: 24 MMOL/L — SIGNIFICANT CHANGE UP (ref 22–31)
CREAT SERPL-MCNC: 1.89 MG/DL — HIGH (ref 0.5–1.3)
CREAT SERPL-MCNC: 1.91 MG/DL — HIGH (ref 0.5–1.3)
CULTURE RESULTS: SIGNIFICANT CHANGE UP
EGFR: 34 ML/MIN/1.73M2 — LOW
EGFR: 34 ML/MIN/1.73M2 — LOW
EOSINOPHIL # BLD AUTO: 0.24 K/UL — SIGNIFICANT CHANGE UP (ref 0–0.5)
EOSINOPHIL NFR BLD AUTO: 1.9 % — SIGNIFICANT CHANGE UP (ref 0–6)
GLUCOSE SERPL-MCNC: 121 MG/DL — HIGH (ref 70–99)
GLUCOSE SERPL-MCNC: 158 MG/DL — HIGH (ref 70–99)
HCT VFR BLD CALC: 26.8 % — LOW (ref 39–50)
HCT VFR BLD CALC: 27.2 % — LOW (ref 39–50)
HGB BLD-MCNC: 8.3 G/DL — LOW (ref 13–17)
HGB BLD-MCNC: 8.5 G/DL — LOW (ref 13–17)
IMM GRANULOCYTES NFR BLD AUTO: 0.4 % — SIGNIFICANT CHANGE UP (ref 0–1.5)
INR BLD: 1.39 RATIO — HIGH (ref 0.88–1.16)
LYMPHOCYTES # BLD AUTO: 1.76 K/UL — SIGNIFICANT CHANGE UP (ref 1–3.3)
LYMPHOCYTES # BLD AUTO: 14.1 % — SIGNIFICANT CHANGE UP (ref 13–44)
MAGNESIUM SERPL-MCNC: 1.8 MG/DL — SIGNIFICANT CHANGE UP (ref 1.6–2.6)
MAGNESIUM SERPL-MCNC: 1.9 MG/DL — SIGNIFICANT CHANGE UP (ref 1.6–2.6)
MCHC RBC-ENTMCNC: 26.8 PG — LOW (ref 27–34)
MCHC RBC-ENTMCNC: 26.8 PG — LOW (ref 27–34)
MCHC RBC-ENTMCNC: 31 GM/DL — LOW (ref 32–36)
MCHC RBC-ENTMCNC: 31.3 GM/DL — LOW (ref 32–36)
MCV RBC AUTO: 85.8 FL — SIGNIFICANT CHANGE UP (ref 80–100)
MCV RBC AUTO: 86.5 FL — SIGNIFICANT CHANGE UP (ref 80–100)
METHOD TYPE: SIGNIFICANT CHANGE UP
MONOCYTES # BLD AUTO: 0.92 K/UL — HIGH (ref 0–0.9)
MONOCYTES NFR BLD AUTO: 7.4 % — SIGNIFICANT CHANGE UP (ref 2–14)
NEUTROPHILS # BLD AUTO: 9.45 K/UL — HIGH (ref 1.8–7.4)
NEUTROPHILS NFR BLD AUTO: 75.5 % — SIGNIFICANT CHANGE UP (ref 43–77)
ORGANISM # SPEC MICROSCOPIC CNT: SIGNIFICANT CHANGE UP
PHOSPHATE SERPL-MCNC: 2.9 MG/DL — SIGNIFICANT CHANGE UP (ref 2.5–4.5)
PHOSPHATE SERPL-MCNC: 3 MG/DL — SIGNIFICANT CHANGE UP (ref 2.5–4.5)
PLATELET # BLD AUTO: 554 K/UL — HIGH (ref 150–400)
PLATELET # BLD AUTO: 573 K/UL — HIGH (ref 150–400)
POTASSIUM SERPL-MCNC: 4 MMOL/L — SIGNIFICANT CHANGE UP (ref 3.5–5.3)
POTASSIUM SERPL-MCNC: 4.3 MMOL/L — SIGNIFICANT CHANGE UP (ref 3.5–5.3)
POTASSIUM SERPL-SCNC: 4 MMOL/L — SIGNIFICANT CHANGE UP (ref 3.5–5.3)
POTASSIUM SERPL-SCNC: 4.3 MMOL/L — SIGNIFICANT CHANGE UP (ref 3.5–5.3)
PROT SERPL-MCNC: 5.8 GM/DL — LOW (ref 6–8.3)
PROTHROM AB SERPL-ACNC: 16.2 SEC — HIGH (ref 10.5–13.4)
RBC # BLD: 3.1 M/UL — LOW (ref 4.2–5.8)
RBC # BLD: 3.17 M/UL — LOW (ref 4.2–5.8)
RBC # FLD: 16.7 % — HIGH (ref 10.3–14.5)
RBC # FLD: 16.8 % — HIGH (ref 10.3–14.5)
SODIUM SERPL-SCNC: 143 MMOL/L — SIGNIFICANT CHANGE UP (ref 135–145)
SODIUM SERPL-SCNC: 143 MMOL/L — SIGNIFICANT CHANGE UP (ref 135–145)
SPECIMEN SOURCE: SIGNIFICANT CHANGE UP
WBC # BLD: 12.51 K/UL — HIGH (ref 3.8–10.5)
WBC # BLD: 19.52 K/UL — HIGH (ref 3.8–10.5)
WBC # FLD AUTO: 12.51 K/UL — HIGH (ref 3.8–10.5)
WBC # FLD AUTO: 19.52 K/UL — HIGH (ref 3.8–10.5)

## 2022-07-27 PROCEDURE — 74018 RADEX ABDOMEN 1 VIEW: CPT | Mod: 26

## 2022-07-27 PROCEDURE — 49002 REOPENING OF ABDOMEN: CPT | Mod: 78,59

## 2022-07-27 PROCEDURE — 71045 X-RAY EXAM CHEST 1 VIEW: CPT | Mod: 26

## 2022-07-27 PROCEDURE — 44310 ILEOSTOMY/JEJUNOSTOMY: CPT | Mod: 78

## 2022-07-27 RX ORDER — FENTANYL CITRATE 50 UG/ML
25 INJECTION INTRAVENOUS
Refills: 0 | Status: DISCONTINUED | OUTPATIENT
Start: 2022-07-27 | End: 2022-07-27

## 2022-07-27 RX ORDER — SODIUM CHLORIDE 9 MG/ML
1000 INJECTION, SOLUTION INTRAVENOUS
Refills: 0 | Status: DISCONTINUED | OUTPATIENT
Start: 2022-07-27 | End: 2022-07-27

## 2022-07-27 RX ORDER — I.V. FAT EMULSION 20 G/100ML
33.3 EMULSION INTRAVENOUS
Qty: 80 | Refills: 0 | Status: DISCONTINUED | OUTPATIENT
Start: 2022-07-27 | End: 2022-08-09

## 2022-07-27 RX ORDER — MAGNESIUM SULFATE 500 MG/ML
1 VIAL (ML) INJECTION ONCE
Refills: 0 | Status: COMPLETED | OUTPATIENT
Start: 2022-07-27 | End: 2022-07-27

## 2022-07-27 RX ORDER — HYDROMORPHONE HYDROCHLORIDE 2 MG/ML
0.5 INJECTION INTRAMUSCULAR; INTRAVENOUS; SUBCUTANEOUS
Refills: 0 | Status: DISCONTINUED | OUTPATIENT
Start: 2022-07-27 | End: 2022-07-27

## 2022-07-27 RX ORDER — SODIUM CHLORIDE 9 MG/ML
1000 INJECTION, SOLUTION INTRAVENOUS ONCE
Refills: 0 | Status: COMPLETED | OUTPATIENT
Start: 2022-07-27 | End: 2022-07-27

## 2022-07-27 RX ORDER — ELECTROLYTE SOLUTION,INJ
1 VIAL (ML) INTRAVENOUS
Refills: 0 | Status: DISCONTINUED | OUTPATIENT
Start: 2022-07-27 | End: 2022-07-27

## 2022-07-27 RX ORDER — ONDANSETRON 8 MG/1
4 TABLET, FILM COATED ORAL ONCE
Refills: 0 | Status: DISCONTINUED | OUTPATIENT
Start: 2022-07-27 | End: 2022-07-27

## 2022-07-27 RX ADMIN — Medication 125 MILLIGRAM(S): at 11:20

## 2022-07-27 RX ADMIN — PIPERACILLIN AND TAZOBACTAM 25 GRAM(S): 4; .5 INJECTION, POWDER, LYOPHILIZED, FOR SOLUTION INTRAVENOUS at 23:56

## 2022-07-27 RX ADMIN — Medication 125 MILLIGRAM(S): at 18:05

## 2022-07-27 RX ADMIN — HYDROMORPHONE HYDROCHLORIDE 0.5 MILLIGRAM(S): 2 INJECTION INTRAMUSCULAR; INTRAVENOUS; SUBCUTANEOUS at 15:20

## 2022-07-27 RX ADMIN — I.V. FAT EMULSION 33.3 ML/HR: 20 EMULSION INTRAVENOUS at 23:24

## 2022-07-27 RX ADMIN — Medication 1 EACH: at 23:23

## 2022-07-27 RX ADMIN — SODIUM CHLORIDE 100 MILLILITER(S): 9 INJECTION, SOLUTION INTRAVENOUS at 15:35

## 2022-07-27 RX ADMIN — PIPERACILLIN AND TAZOBACTAM 25 GRAM(S): 4; .5 INJECTION, POWDER, LYOPHILIZED, FOR SOLUTION INTRAVENOUS at 08:08

## 2022-07-27 RX ADMIN — SODIUM CHLORIDE 120 MILLILITER(S): 9 INJECTION, SOLUTION INTRAVENOUS at 08:08

## 2022-07-27 RX ADMIN — NYSTATIN CREAM 1 APPLICATION(S): 100000 CREAM TOPICAL at 11:45

## 2022-07-27 RX ADMIN — Medication 125 MILLIGRAM(S): at 05:25

## 2022-07-27 RX ADMIN — SODIUM CHLORIDE 1000 MILLILITER(S): 9 INJECTION, SOLUTION INTRAVENOUS at 06:59

## 2022-07-27 RX ADMIN — Medication 100 MILLIGRAM(S): at 05:25

## 2022-07-27 RX ADMIN — PANTOPRAZOLE SODIUM 40 MILLIGRAM(S): 20 TABLET, DELAYED RELEASE ORAL at 11:20

## 2022-07-27 RX ADMIN — Medication 240 MILLIGRAM(S): at 11:17

## 2022-07-27 RX ADMIN — Medication 100 MILLIGRAM(S): at 22:30

## 2022-07-27 RX ADMIN — HYDROMORPHONE HYDROCHLORIDE 0.5 MILLIGRAM(S): 2 INJECTION INTRAMUSCULAR; INTRAVENOUS; SUBCUTANEOUS at 15:35

## 2022-07-27 RX ADMIN — Medication 88 MICROGRAM(S): at 05:25

## 2022-07-27 RX ADMIN — Medication 100 GRAM(S): at 11:17

## 2022-07-27 RX ADMIN — HEPARIN SODIUM 5000 UNIT(S): 5000 INJECTION INTRAVENOUS; SUBCUTANEOUS at 22:30

## 2022-07-27 RX ADMIN — Medication 1: at 18:04

## 2022-07-27 RX ADMIN — HYDROMORPHONE HYDROCHLORIDE 0.5 MILLIGRAM(S): 2 INJECTION INTRAMUSCULAR; INTRAVENOUS; SUBCUTANEOUS at 15:58

## 2022-07-27 NOTE — BRIEF OPERATIVE NOTE - NSICDXBRIEFPROCEDURE_GEN_ALL_CORE_FT
PROCEDURES:  CT guided abscess drainage 22-Jul-2022 11:06:45  Charly Reed  
PROCEDURES:  Diagnostic laparoscopy 27-Jul-2022 15:06:22  Tomas Hayward  Emergency exploratory laparotomy with washout of abdomen for nontraumatic indication 27-Jul-2022 15:07:38  Tomas Hayward  Open creation of diverting loop ileostomy 27-Jul-2022 15:07:50  Tomas Hayward

## 2022-07-27 NOTE — PROGRESS NOTE ADULT - ASSESSMENT
88 y/o male w/ a PMHx of sepsis, neuropathy, prostate CA, Crohn's disease, hypothyroidism, depression, intervertebral disc disorder, DM, obesity, HTN, hypercholesterolemia, CAD w/ stents, macular degeneration, hearing loss and colon cancer s/p laparoscopic ileocolic resection on 6/29/22.  CT scan shows large midline abdominal wall abscess, s/p IR drain 7/22, drain was upsized yesterday but drain output its still low. Pt for abscess drainage today in the OR    Plan    NPO  Pain control PRN  Routine vitals  Incentive spirometry  Hydration  Continue PPN for today  Replete electrolytes PRN  DVT ppx  OOBC, ambulation  vancomycin PO 500mg PO q6, Continue Zosyn  F/u ID reccs  NGT placement  Dispo: Home PT, VNS when stable  Case management for drain care      Case was discussed with CRS team    . Abdominal wall abscess associated with complication  from recent procedure (laparoscopic ileocolic resection)

## 2022-07-27 NOTE — CHART NOTE - NSCHARTNOTEFT_GEN_A_CORE
Intra-abdominal abscess associated with complication  from recent procedure (laparoscopic ileocolic resection)

## 2022-07-27 NOTE — PROGRESS NOTE ADULT - ASSESSMENT
87 CKD III sepsis, neuropathy, prostate CA, Crohn's disease, hypothyroidism, depression, intervertebral disc disorder, DM, obesity, HTN, hypercholesterolemia, CAD w/ stents, macular degeneration, hearing loss and colon cancer s/p laparoscopic ileocolic resection on 6/29/22 with Dr. Ardon. pt presents to the ED with increased drainage from the surgery site.    Yesenia on CKD - baseline 1.4-1.6   mild pre-renal state and still persist. Infection likely playing a role as well and PRESTON on 7/25- w Cr rising now   -IVF ongoing  -No nsaids, avoid further contrast IV if able   - in light of planned surgery and w persistent rise in Cr - will hold Lisinopril to allow for renal autoregulation during periop period  - trend Cr  - if BP rises may use alternative BPmeds- ie Amlodipnie , hydralazine  - avoid IV contrast     Wound drainage  -Surgery, fu cxs  -Broad spectrum abx    HTN   as above       d/w 1 n RN     thanks, will follow with you

## 2022-07-27 NOTE — PROGRESS NOTE ADULT - SUBJECTIVE AND OBJECTIVE BOX
Pt. seen and examined at bedside this morning. Patient stills having diarrhea, NGT in place with high output. He denies fever, chest pain, SOB  No acute events overnight.     Physical Exam:  General: AAOx3, Well developed  Chest: Normal respiratory effort  Heart: RRR  Abdomen: distended, tympanic midline wound with minimal exudate, LANDRY drain with purulent fluid  Neuro/Psych: No localized deficits. Normal speech, normal tone  Skin: Normal, no rashes, no lesions noted.   Extremities: Warm, well perfused, no edema, Pulses intact    Vitals:  T(C): 36.4 ( @ 09:19), Max: 37.1 ( @ 09:46)  HR: 70 ( @ :19) (64 - 70)  BP: 146/54 ( @ 09:19) (126/51 - 151/59)  RR: 18 ( @ 09:19) (16 - 18)  SpO2: 95% ( @ 09:19) (95% - 97%)     @ 07:01  -   @ 07:00  --------------------------------------------------------  IN:  Total IN: 0 mL    OUT:    Bulb (mL): 80 mL    Nasogastric/Oral tube (mL): 1250 mL    Voided (mL): 800 mL  Total OUT: 2130 mL    Total NET: -2130 mL       @ 04:53                    8.5  CBC: 12.51>)-------(<573                     27.2                 143 | 114 | 15    CMP:  ----------------------< 121               4.0 | 24 | 1.91                      Ca:8.7  Phos:2.9  M.8               -|      |-        LFTs:  ------|-|-----             -|      |-   @ 07:14                    9.0  CBC: 13.00>)-------(<634                     29.7                 143 | 115 | 16    CMP:  ----------------------< 140               4.3 | 21 | 1.88                      Ca:9.0  Phos:2.9  M.0               -|      |-        LFTs:  ------|-|-----             -|      |-      Culture - Abscess with Gram Stain (collected 22 @ 11:00)  Source: .Abscess lower abdominal abscess  Preliminary Report (22 @ 12:41):    Numerous Enterococcus faecalis    Moderate Clostridioides difficile (Previously Clostridum)    "Susceptibilities not performed"    Few Stenotrophomonas maltophilia Susceptibility to follow.    Moderate Staphylococcus epidermidis "Susceptibilities not performed"  Organism: Enterococcus faecalis (22 @ 12:55)  Organism: Enterococcus faecalis (22 @ 12:55)      -  Ampicillin: S <=2 Predicts results to ampicillin/sulbactam, amoxacillin-clavulanate and  piperacillin-tazobactam.      -  Tetra/Doxy: R >8      -  Vancomycin: S 2      Method Type: JAMI      Current Inpatient Medications:  acetaminophen     Tablet .. 650 milliGRAM(s) Oral every 6 hours PRN  dextrose 5%. 1000 milliLiter(s) (50 mL/Hr) IV Continuous <Continuous>  dextrose 5%. 1000 milliLiter(s) (100 mL/Hr) IV Continuous <Continuous>  dextrose 50% Injectable 25 Gram(s) IV Push once  dextrose 50% Injectable 12.5 Gram(s) IV Push once  dextrose 50% Injectable 25 Gram(s) IV Push once  dextrose Oral Gel 15 Gram(s) Oral once PRN  glucagon  Injectable 1 milliGRAM(s) IntraMuscular once  heparin   Injectable 5000 Unit(s) SubCutaneous every 8 hours  hydrOXYzine hydrochloride 25 milliGRAM(s) Oral every 12 hours PRN  insulin lispro (ADMELOG) corrective regimen sliding scale   SubCutaneous every 6 hours  lactated ringers. 1000 milliLiter(s) (120 mL/Hr) IV Continuous <Continuous>  levothyroxine 88 MICROGram(s) Oral daily  lisinopril 20 milliGRAM(s) Oral daily  melatonin 3 milliGRAM(s) Oral at bedtime  melatonin 5 milliGRAM(s) Oral at bedtime  metroNIDAZOLE  IVPB 500 milliGRAM(s) IV Intermittent every 8 hours  metroNIDAZOLE  IVPB      nystatin Cream 1 Application(s) Topical two times a day  ondansetron Injectable 4 milliGRAM(s) IV Push every 6 hours PRN  pantoprazole  Injectable 40 milliGRAM(s) IV Push daily  piperacillin/tazobactam IVPB.. 3.375 Gram(s) IV Intermittent every 8 hours  senna 2 Tablet(s) Oral at bedtime PRN  vancomycin    Solution 125 milliGRAM(s) Oral every 6 hours  verapamil  milliGRAM(s) Oral daily

## 2022-07-27 NOTE — PROGRESS NOTE ADULT - ATTENDING COMMENTS
Seen and examined.  NGT 1250 out.  Passed small amount of gas and stool this morning.  No abdominal pain.  Stool for c diff is negative.  AFVSS  NAD  incision with mild purulence, soft, NTND  Drain with thin purulence  WBC 12.5  A/P -  Discussed with patient yest re: operative washout and drainage as I suspect abdominal abscess with particulate matter and debris that is too viscous to be removed via IR drain. Also plan to inspect sigmoid anastomosis via rigid scope. If obvious defect, pt is agreeable to ostomy. Understands that it is temporary but pending clinical recovery and condition, may become permanent. Pt understands and is agreeable.  D/W patient nephew Tushar of above as well.

## 2022-07-27 NOTE — PROGRESS NOTE ADULT - SUBJECTIVE AND OBJECTIVE BOX
87y old  Male who presents with a chief complaint of abdominal wall abscess (27 Jul 2022 09:27). PMHX: sepsis, neuropathy, prostate CA, Crohn's disease, hypothyroidism, depression, intervertebral disc disorder, DM, obesity, HTN, hypercholesterolemia, CAD w/ stents, macular degeneration, hearing loss and colon cancer s/p laparoscopic ileocolic resection on 6/29/22 with Dr. Ardon; presented to [facility] ED with c/o increased drainage from the surgery site. CT showed lower abdominal collection from anastomotic leak/fistula. Drain placed -> growing C. diff. s/p IR Procedure (07.22.22 @ 10:50): CT-guided drainage of a lower abdominal abscess. However, please note that the collection is loculated and therefore did not drain completely. s/p C-Diff stool study neg for C-diff (7/25/22). NGT placed and present w/ good output. + diarrhea still. No abd pain. No N/V. No fevers, chills.         PAST MEDICAL & SURGICAL HISTORY:  Hearing loss  left hearing aid, right ear deaf      Macular degeneration  right eye      CAD (coronary artery disease)      Stented coronary artery  2 stents, 20 yrs ago, 15 yrs ago      HTN (hypertension)      Hypercholesterolemia      Obesity      Diabetes mellitus      Intervertebral disc disorder      Depression      Hypothyroidism      Crohn disease      Prostate CA  s/p XRT      Neuropathy      Sepsis  s/p back surgery      History of hip replacement, total, right      History of hip replacement, total, right  Revision      S/P lumbar fusion      S/P colon resection          MEDICATIONS  (STANDING):  dextrose 5%. 1000 milliLiter(s) (50 mL/Hr) IV Continuous <Continuous>  dextrose 5%. 1000 milliLiter(s) (100 mL/Hr) IV Continuous <Continuous>  dextrose 50% Injectable 25 Gram(s) IV Push once  dextrose 50% Injectable 12.5 Gram(s) IV Push once  dextrose 50% Injectable 25 Gram(s) IV Push once  glucagon  Injectable 1 milliGRAM(s) IntraMuscular once  heparin   Injectable 5000 Unit(s) SubCutaneous every 8 hours  insulin lispro (ADMELOG) corrective regimen sliding scale   SubCutaneous every 6 hours  lactated ringers. 1000 milliLiter(s) (120 mL/Hr) IV Continuous <Continuous>  levothyroxine 88 MICROGram(s) Oral daily  lisinopril 20 milliGRAM(s) Oral daily  magnesium sulfate  IVPB 1 Gram(s) IV Intermittent once  melatonin 5 milliGRAM(s) Oral at bedtime  melatonin 3 milliGRAM(s) Oral at bedtime  metroNIDAZOLE  IVPB 500 milliGRAM(s) IV Intermittent every 8 hours  metroNIDAZOLE  IVPB      nystatin Cream 1 Application(s) Topical two times a day  pantoprazole  Injectable 40 milliGRAM(s) IV Push daily  piperacillin/tazobactam IVPB.. 3.375 Gram(s) IV Intermittent every 8 hours  vancomycin    Solution 125 milliGRAM(s) Oral every 6 hours  verapamil  milliGRAM(s) Oral daily    MEDICATIONS  (PRN):  acetaminophen     Tablet .. 650 milliGRAM(s) Oral every 6 hours PRN Mild Pain (1 - 3), Moderate Pain (4 - 6)  dextrose Oral Gel 15 Gram(s) Oral once PRN Blood Glucose LESS THAN 70 milliGRAM(s)/deciliter  hydrOXYzine hydrochloride 25 milliGRAM(s) Oral every 12 hours PRN Anxiety  ondansetron Injectable 4 milliGRAM(s) IV Push every 6 hours PRN Nausea  senna 2 Tablet(s) Oral at bedtime PRN Constipation      REVIEW OF SYSTEMS:    RESPIRATORY: No shortness of breath  CARDIOVASCULAR: No chest pain  All other review of systems is negative unless indicated above.    Vital Signs Last 24 Hrs  T(C): 36.4 (27 Jul 2022 09:19), Max: 36.9 (26 Jul 2022 15:36)  T(F): 97.6 (27 Jul 2022 09:19), Max: 98.4 (26 Jul 2022 15:36)  HR: 70 (27 Jul 2022 09:19) (64 - 70)  BP: 146/54 (27 Jul 2022 09:19) (126/51 - 146/54)  BP(mean): 73 (26 Jul 2022 22:17) (70 - 73)  RR: 18 (27 Jul 2022 09:19) (18 - 18)  SpO2: 95% (27 Jul 2022 09:19) (95% - 97%)    Parameters below as of 27 Jul 2022 09:19  Patient On (Oxygen Delivery Method): room air        PHYSICAL EXAM:    Constitutional: NAD, well-developed, NGT from right nare -LCIWS and greenish fluid in tube  Respiratory: CTAB  Cardiovascular: S1 and S2, RRR  Gastrointestinal: BS+, soft, NT/ND, ABD dsg C/D/I, LANDRY in place w/ scant white fluid in tubing  Extremities: No peripheral edema  Psychiatric: Withdrawn    LABS:                        8.5    12.51 )-----------( 573      ( 27 Jul 2022 04:53 )             27.2     07-27    143  |  114<H>  |  15  ----------------------------<  121<H>  4.0   |  24  |  1.91<H>    Ca    8.7      27 Jul 2022 04:53  Phos  2.9     07-27  Mg     1.8     07-27      PT/INR - ( 27 Jul 2022 04:53 )   PT: 16.2 sec;   INR: 1.39 ratio         PTT - ( 27 Jul 2022 04:53 )  PTT:30.4 sec      RADIOLOGY & ADDITIONAL STUDIES:

## 2022-07-27 NOTE — PROGRESS NOTE ADULT - ASSESSMENT
88 y/o male w/ a PMHx of sepsis, neuropathy, prostate CA, Crohn's disease, hypothyroidism, depression, intervertebral disc disorder, DM, obesity, HTN, hypercholesterolemia, CAD w/ stents, macular degeneration, hearing loss and colon cancer s/p laparoscopic ileocolic resection on 6/29/22 with Dr. Ardon. pt presents to the ED with increased drainage from the surgery site. No fevers, chills, changes in appetite. Imaging remarkable for large intraperitoneal thick wall abscess 17.5 x 11.8 x 10.8, IR consulted s/p IR drainage on 7/22, 40 cc pus drained, has drain in place, was given IV zosyn.    1. abdominal wall abscess d/t anastomotic leak/fistula s/p drainage. laparascopic ileocolic resection. diarrhea. colon cancer. crohns disease. PCN allergy  - s/p IR drainage 7/22; body fluid cx growing c. diff/e faecalis - c diff growth in cx d/t anastomotic leak/fistula  - colorectal surgery f/u noted, repeat Ct abd/pelvis reviewed from 7/25  - plan for OR for drainage of collection 7/27  - on IV zosyn [id number] #6-7  - on IV flagyl [id number] for c diff coverage #2  - on oral vancomycin [id number] #3  - continue with above antibiotic coverage  - c diff pcr (-)  - does not have true pcn allergy - tolerating abx without issues currently  - fu cbc  - supportive care  - tolerating abx well so far; no side effects noted  - reason for abx use and side effects reviewed with patient    2. other issues - care per medicine

## 2022-07-27 NOTE — BRIEF OPERATIVE NOTE - NSICDXBRIEFPOSTOP_GEN_ALL_CORE_FT
POST-OP DIAGNOSIS:  Abscess of abdominal cavity 22-Jul-2022 11:07:11  Charly Reed  
POST-OP DIAGNOSIS:  Abscess of abdominal cavity 22-Jul-2022 11:07:11  Charly Reed

## 2022-07-27 NOTE — PROGRESS NOTE ADULT - SUBJECTIVE AND OBJECTIVE BOX
Patient is a 87y Male who reports no complaints as new, s/p IR. Very agitated/cranky again   midline abscess noted on CT - for ex lap drainage abd abscess   pt has NGT   pt very frustrated     MEDICATIONS  (STANDING):  dextrose 5%. 1000 milliLiter(s) (100 mL/Hr) IV Continuous <Continuous>  dextrose 5%. 1000 milliLiter(s) (50 mL/Hr) IV Continuous <Continuous>  dextrose 50% Injectable 25 Gram(s) IV Push once  dextrose 50% Injectable 12.5 Gram(s) IV Push once  dextrose 50% Injectable 25 Gram(s) IV Push once  fat emulsion (Fish Oil and Plant Based) 20% Infusion 33.3 mL/Hr (33.3 mL/Hr) IV Continuous <Continuous>  glucagon  Injectable 1 milliGRAM(s) IntraMuscular once  heparin   Injectable 5000 Unit(s) SubCutaneous every 8 hours  insulin lispro (ADMELOG) corrective regimen sliding scale   SubCutaneous every 6 hours  lactated ringers. 1000 milliLiter(s) (120 mL/Hr) IV Continuous <Continuous>  levothyroxine 88 MICROGram(s) Oral daily  melatonin 3 milliGRAM(s) Oral at bedtime  melatonin 5 milliGRAM(s) Oral at bedtime  metroNIDAZOLE  IVPB 500 milliGRAM(s) IV Intermittent every 8 hours  metroNIDAZOLE  IVPB      nystatin Cream 1 Application(s) Topical two times a day  pantoprazole  Injectable 40 milliGRAM(s) IV Push daily  Parenteral Nutrition - Adult 1 Each (92 mL/Hr) TPN Continuous <Continuous>  piperacillin/tazobactam IVPB.. 3.375 Gram(s) IV Intermittent every 8 hours  vancomycin    Solution 125 milliGRAM(s) Oral every 6 hours  verapamil  milliGRAM(s) Oral daily        Vital Signs Last 24 Hrs  T(C): 36.7 (2022 21:45), Max: 36.7 (2022 21:45)  I&O's Detail    2022 07:01  -  2022 07:00  --------------------------------------------------------  IN:  Total IN: 0 mL    OUT:    Bulb (mL): 80 mL    Nasogastric/Oral tube (mL): 1250 mL    Voided (mL): 800 mL  Total OUT: 2130 mL    Total NET: -2130 mL      2022 07:01  -  2022 22:06  --------------------------------------------------------  IN:    Other (mL): 1700 mL  Total IN: 1700 mL    OUT:    Blood Loss (mL): 100 mL    Bulb (mL): 80 mL    Nasogastric/Oral tube (mL): 450 mL    Other (mL): 100 mL  Total OUT: 730 mL    Total NET: 970 mL      T(F): 98 (2022 21:45), Max: 98 (2022 21:45)  HR: 70 (2022 21:45) (55 - 76)  BP: 122/54 (2022 21:45) (121/76 - 160/62)  BP(mean): 87 (2022 16:35) (73 - 87)  RR: 16 (2022 21:45) (12 - 18)  SpO2: 94% (2022 21:45) (93% - 100%)    Parameters below as of 2022 21:45  Patient On (Oxygen Delivery Method): room air        PHYSICAL EXAM:    Constitutional: frail  HEENT: MM  lungs: Dist   Cardiovascular: S1 and S2  drain  Extremities: No peripheral edema  Neurological: A/O x 3         LABS:    143    |  112    |  16     ----------------------------<  158       2022 15:13  4.3     |  24     |  1.89     143    |  114    |  15     ----------------------------<  121       2022 04:53  4.0     |  24     |  1.91     143    |  115    |  16     ----------------------------<  140       2022 07:14  4.3     |  21     |  1.88     Ca    8.7        2022 15:13  Ca    8.7        2022 04:53    Phos  3.0       2022 15:13  Phos  2.9       2022 04:53    Mg     1.9       2022 15:13  Mg     1.8       2022 04:53    TPro  5.8    /  Alb  1.8    /  TBili  0.2    /        2022 15:13  DBili  x      /  AST  14     /  ALT  36     /  AlkPhos  78       TPro  6.3    /  Alb  2.0    /  TBili  0.2    /        2022 04:53  DBili  x      /  AST  62     /  ALT  82     /  AlkPhos  124            Urine Studies:  Urinalysis Basic - ( 2022 13:25 )    Color: Yellow / Appearance: Clear / S.015 / pH: x  Gluc: x / Ketone: Negative  / Bili: Negative / Urobili: Negative   Blood: x / Protein: 30 mg/dL / Nitrite: Negative   Leuk Esterase: Negative / RBC: 0-2 /HPF / WBC 0-2   Sq Epi: x / Non Sq Epi: Few / Bacteria: Moderate            RADIOLOGY & ADDITIONAL STUDIES:

## 2022-07-27 NOTE — CHART NOTE - NSCHARTNOTEFT_GEN_A_CORE
Clinical Nutrition PN Follow Up Note    *86yo male admitted with large midline abdominal wall abscess s/p drainage on 7/22.    *current status: PPN d/c'd on 7/26 by MD Ardon.  Pt has been NPO with NGT to LWS (large output), large midline abdominal wall abscess (s/p IR drain 7/22), pending OR for drain.  d/w surgery, to restart PPN today (7/27) due to extended time NPO (>6 days).     *labs reviewed; 07-27; lytes WNL.  last bilirubin total WNL (7/24).  triglyceride WNL for lipids. POCT Wnl.  corrected Ca elevated, DO NOT supplement calcium outside of PN bag at this time.     143  |  114<H>  |  15  ----------------------------<  121<H>  4.0   |  24  |  1.91<H>    Ca    8.7      27 Jul 2022 04:53  Phos  2.9     07-27  Mg     1.8     07-27    HbA1c: A1C with Estimated Average Glucose Result: 6.6 % (06-28-22 @ 07:24)  Glucose: POCT Blood Glucose.: 120 mg/dL (07-27-22 @ 05:20)    Lipid Panel: Date/Time: 07-23-22 @ 04:53  Triglycerides, Serum: 172      POCT Blood Glucose.: 120 mg/dL (27 Jul 2022 05:20)  POCT Blood Glucose.: 123 mg/dL (26 Jul 2022 23:38)  POCT Blood Glucose.: 162 mg/dL (26 Jul 2022 17:44)  POCT Blood Glucose.: 157 mg/dL (26 Jul 2022 12:09)      *pertinent meds: heparin, sodium chloride 0.9%, levothyroxine, lisinopril, zofran, senna    *I&O's Detail    26 Jul 2022 07:01  -  27 Jul 2022 07:00  --------------------------------------------------------  IN:  Total IN: 0 mL    OUT:    Bulb (mL): 80 mL    Nasogastric/Oral tube (mL): 1250 mL    Voided (mL): 800 mL  Total OUT: 2130 mL    Total NET: -2130 mL      *negative fluid status: BM (+) on 7/26, liquid, loose.  pt on bowel regimen.     *Elvin Score of 16 ;  no PU documented; abd surgical wound. (+1) scrotum edema documented.    *Malnutrition: severe malnutrition in acute illness r/t decreased ability to meet increased nutr needs 2/2 abd surgery and abd wall abscess AEB NPO, mod muscle/fat wasting, 8% wt loss x 3 mo    Estimated Needs: based on 77Kg (wt from 7/22)  Calories: 1925-2310Kcal (25-30 Kcal/Kg)  Protein: 108-123 g protein (1.4-1.6 g/Kg)  Fluids:  5254-2823 mL (25-30 mL/Kg)    NPO    Weight History:  Height (cm): 167.6 (07-21-22 @ 12:16), 167.6 (07-06-22 @ 15:24), 167.6 (06-27-22 @ 19:20)  Weight (kg): 81.6 (07-21-22 @ 12:16), 78 (07-06-22 @ 15:24), 81.647 (06-27-22 @ 19:20)  BMI (kg/m2): 29 (07-21-22 @ 12:16), 27.8 (07-06-22 @ 15:24), 29.1 (06-27-22 @ 19:20)  BSA (m2): 1.91 (07-21-22 @ 12:16), 1.88 (07-06-22 @ 15:24), 1.91 (06-27-22 @ 19:20)      *will continue to monitor and adjust PN prn*    PPN Recommendations: via peripheral line  Total Volume: 2200mL  90 g  Amino Acids  100 g Dextrose  80 g Lipids 20% (  136 mEq Sodium Chloride  18 mEq Sodium Acetate  0 mmol Sodium Phosphate  25 mEq Potassium Chloride  6 mEq Potassium Acetate  20 mmol Potassium Phosphate  0 mEq Calcium Gluconate ( corrected Ca elevated, DO NOT supplement calcium outside of PN bag at this time. )  8 mEq Magnesium Sulfate  200 mg Thiamine  1 ml Trace Elements  10 ml MVI    Total Calories   1500Kcal    (   Meets  78%  of  Estimated Energy needs  and   83  %  Protein needs)(osmolarity <900)    Additional Recommendations:    1) Daily weights  2) Strict I & O's  3) Daily lyte checks including magnesium and phos  4) Weekly triglycerides/LFT checks  5) POCT q6hrs; maintain 140-180mg/dL  6) if pt to be on PN > 6 days, consider placing PICC line for TPN too meet 100% of nutr needs    *will monitor and adjust treatement plan prn*  Kayley Woodall MA, RDN, CDN, CNSC [phone]  Nutrition

## 2022-07-27 NOTE — PROGRESS NOTE ADULT - ASSESSMENT
87y old  Male who presents with a chief complaint of abdominal wall abscess (27 Jul 2022 09:27). PMHX: sepsis, neuropathy, prostate CA, Crohn's disease, hypothyroidism, depression, intervertebral disc disorder, DM, obesity, HTN, hypercholesterolemia, CAD w/ stents, macular degeneration, hearing loss and colon cancer s/p laparoscopic ileocolic resection on 6/29/22 with Dr. Ardon; presented to [facility] ED with c/o increased drainage from the surgery site. CT showed lower abdominal collection from anastomotic leak/fistula. Drain placed -> growing C. diff. s/p IR Procedure (07.22.22 @ 10:50): CT-guided drainage of a lower abdominal abscess. However, please note that the collection is loculated and therefore did not drain completely. s/p CDiff stool study (7/25/22) neg. NGT placed and present w/ good output.    Assessment/plan: Anastomotic leak with abscess, cx pos for C. diff and ongoing diarrhea, C-DIff stool study neg     -C/w IV ABX per primary team/ID  -Labs per primary team  -Will continue to follow    Above d/w GI attending Dr. Victor M Landry, NP-C

## 2022-07-27 NOTE — BRIEF OPERATIVE NOTE - NSICDXBRIEFPREOP_GEN_ALL_CORE_FT
PRE-OP DIAGNOSIS:  Abscess of abdominal cavity 22-Jul-2022 11:06:58  Charly Reed  
PRE-OP DIAGNOSIS:  Abscess of abdominal cavity 22-Jul-2022 11:06:58  Charly Reed

## 2022-07-27 NOTE — PROGRESS NOTE ADULT - SUBJECTIVE AND OBJECTIVE BOX
Date of service: 07-27-22 @ 12:16    pt seen and examined  ngt placed with high output  abd drain in place - tube upsized by IR but still low output  feels weak  laying in bed, nad  plan for OR for abscess drainage     ROS: no fever or chills; denies dizziness, no HA, no SOB or cough, no constipation; no dysuria, no urinary frequency, no legs pain, no rashes    MEDICATIONS  (STANDING):  dextrose 5%. 1000 milliLiter(s) (50 mL/Hr) IV Continuous <Continuous>  dextrose 5%. 1000 milliLiter(s) (100 mL/Hr) IV Continuous <Continuous>  dextrose 50% Injectable 25 Gram(s) IV Push once  dextrose 50% Injectable 12.5 Gram(s) IV Push once  dextrose 50% Injectable 25 Gram(s) IV Push once  glucagon  Injectable 1 milliGRAM(s) IntraMuscular once  heparin   Injectable 5000 Unit(s) SubCutaneous every 8 hours  insulin lispro (ADMELOG) corrective regimen sliding scale   SubCutaneous every 6 hours  lactated ringers. 1000 milliLiter(s) (120 mL/Hr) IV Continuous <Continuous>  levothyroxine 88 MICROGram(s) Oral daily  lisinopril 20 milliGRAM(s) Oral daily  melatonin 5 milliGRAM(s) Oral at bedtime  melatonin 3 milliGRAM(s) Oral at bedtime  metroNIDAZOLE  IVPB 500 milliGRAM(s) IV Intermittent every 8 hours  metroNIDAZOLE  IVPB      nystatin Cream 1 Application(s) Topical two times a day  pantoprazole  Injectable 40 milliGRAM(s) IV Push daily  piperacillin/tazobactam IVPB.. 3.375 Gram(s) IV Intermittent every 8 hours  vancomycin    Solution 125 milliGRAM(s) Oral every 6 hours  verapamil  milliGRAM(s) Oral daily      Vital Signs Last 24 Hrs  T(C): 36.4 (27 Jul 2022 09:19), Max: 36.9 (26 Jul 2022 15:36)  T(F): 97.6 (27 Jul 2022 09:19), Max: 98.4 (26 Jul 2022 15:36)  HR: 76 (27 Jul 2022 11:38) (64 - 76)  BP: 126/60 (27 Jul 2022 11:38) (126/51 - 146/54)  BP(mean): 73 (26 Jul 2022 22:17) (70 - 73)  RR: 18 (27 Jul 2022 09:19) (18 - 18)  SpO2: 95% (27 Jul 2022 09:19) (95% - 97%)    Parameters below as of 27 Jul 2022 09:19  Patient On (Oxygen Delivery Method): room air    PE:  Constitutional: frail looking, + NGT   HEENT: NC/AT, EOMI, PERRLA, conjunctivae clear; ears and nose atraumatic; pharynx benign  Neck: supple; thyroid not palpable  Back: no tenderness  Respiratory: respiratory effort normal; clear to auscultation  Cardiovascular: S1S2 regular, no murmurs  Abdomen: soft, not tender, not distended, positive BS; + ostomy in place + abd drain, midline abd wound   Genitourinary: no suprapubic tenderness  Lymphatic: no LN palpable  Musculoskeletal: no muscle tenderness, no joint swelling or tenderness  Extremities: no pedal edema  Neurological/ Psychiatric: AxOx3, Judgement and insight normal;  moving all extremities  Skin: no rashes; no palpable lesions    Labs: all available labs reviewed                                                         8.5    12.51 )-----------( 573      ( 27 Jul 2022 04:53 )             27.2     07-27    143  |  114<H>  |  15  ----------------------------<  121<H>  4.0   |  24  |  1.91<H>    Ca    8.7      27 Jul 2022 04:53  Phos  2.9     07-27  Mg     1.8     07-27        Culture - Abscess with Gram Stain (07.22.22 @ 11:00)   Specimen Source: .Abscess lower abdominal abscess   Culture Results:   Numerous Enterococcus faecalis   Moderate Clostridioides difficile (Previously Clostridum)   "Susceptibilities not performed"    Culture - Blood (07.21.22 @ 14:00)   Specimen Source: .Blood None   Culture Results:   No growth to date.   Culture - Urine (07.21.22 @ 13:25)   Specimen Source: Clean Catch None   Culture Results:   <10,000 CFU/mL Normal Urogenital Wendi     Radiology: all available radiological tests reviewed      ACC: [id number] EXAM:  CT ABDOMEN AND PELVIS OC IC                          PROCEDURE DATE:  07/21/2022          INTERPRETATION:  CLINICAL INFORMATION: Drainage from surgical site;   evaluate for abscess. 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    COMPARISON: 7/6/2022.    CONTRAST/COMPLICATIONS:  IV Contrast: Omnipaque 350  90 cc administered   10 cc discarded  Oral Contrast: Omnipaque 300  Complications: None reported at time of study completion    PROCEDURE:  CT of the Abdomen and Pelvis was performed.  Sagittal and coronal reformats were performed.    FINDINGS: There is a large intraperitoneal thick wall abscess identified   measuring 17.5 x 11.8 x 10.8 cm, which extends anteriorly and superiorly   to the site of the midline anterior abdominal wall incision. In addition   to fluid and air there is mottled hyperdensity within the collection   likely related to administered oral contrast. The greatest concentration   of contrast is identified within the left lateral aspect of the abscess   adjacent to a suture surgical line at the level of the mid sigmoid colon,   which may be the site of fistulous communication.    LOWER CHEST: Small right pleural effusion. Opacity within the posterior   right lower lobe may be related to atelectasis; underlying parenchymal   infiltrate cannot be fully excluded.    LIVER: Normal in size. Hepatic steatosis. No focal hepatic masses.  BILE DUCTS: Normal caliber.  GALLBLADDER: Multiple gallstones. No gallbladder wall thickening.  SPLEEN: Within normal limits.  PANCREAS: Within normal limits.  ADRENALS: Within normal limits.  KIDNEYS/URETERS: No hydronephrosis. No renal calculi. 4.7 cm   space-occupying lesion noted within the left kidney characterized as a   cyst on prior ultrasonography July 2, 2022. Additional subcentimeter   hypodense foci are noted within the bilateral renal parenchyma, too small   to characterize.    BLADDER: Within normal limits.  REPRODUCTIVE ORGANS: Prostate seed implants identified.    BOWEL: Surgical suture linesare identified within the mid sigmoid   colonic region as well as within the right lower quadrant. No evidence   for mechanical bowel obstruction.  PERITONEUM: Upper abdominal ascites identified.  VESSELS: Within normal limits.  RETROPERITONEUM/LYMPHNODES: No lymphadenopathy.  ABDOMINAL WALL: As described above.  BONES: Status post lumbar spine fusion surgery extending from L2 through   S1. Patient status post right hip arthroplasty. Degenerative changes   lower thoracic and lumbar spine.    IMPRESSION:  There is a large intraperitoneal thick wall abscess   identified measuring 17.5 x 11.8 x 10.8 cm, which extends anteriorly and   superiorly to the site of the midline anterior abdominal wall incision.   In addition to fluid and air there is mottled hyperdensity within the   collection likely related to administered oral contrast. The greatest   concentration of contrast is identified within the left lateral aspect of   the abscess adjacent to a suture surgical line at the level of the mid   sigmoid colon, which may be the site of fistulous communication.    < end of copied text >    Advanced directives addressed: full resuscitation Date of service: 07-27-22 @ 12:16    pt seen and examined  ngt placed with high output  abd drain in place - tube upsized by IR but still low output  feels weak  having diarrhea   plan for OR for abscess drainage     ROS: no fever or chills; denies dizziness, no HA, no SOB or cough, no constipation; no dysuria, no urinary frequency, no legs pain, no rashes    MEDICATIONS  (STANDING):  dextrose 5%. 1000 milliLiter(s) (50 mL/Hr) IV Continuous <Continuous>  dextrose 5%. 1000 milliLiter(s) (100 mL/Hr) IV Continuous <Continuous>  dextrose 50% Injectable 25 Gram(s) IV Push once  dextrose 50% Injectable 12.5 Gram(s) IV Push once  dextrose 50% Injectable 25 Gram(s) IV Push once  glucagon  Injectable 1 milliGRAM(s) IntraMuscular once  heparin   Injectable 5000 Unit(s) SubCutaneous every 8 hours  insulin lispro (ADMELOG) corrective regimen sliding scale   SubCutaneous every 6 hours  lactated ringers. 1000 milliLiter(s) (120 mL/Hr) IV Continuous <Continuous>  levothyroxine 88 MICROGram(s) Oral daily  lisinopril 20 milliGRAM(s) Oral daily  melatonin 5 milliGRAM(s) Oral at bedtime  melatonin 3 milliGRAM(s) Oral at bedtime  metroNIDAZOLE  IVPB 500 milliGRAM(s) IV Intermittent every 8 hours  metroNIDAZOLE  IVPB      nystatin Cream 1 Application(s) Topical two times a day  pantoprazole  Injectable 40 milliGRAM(s) IV Push daily  piperacillin/tazobactam IVPB.. 3.375 Gram(s) IV Intermittent every 8 hours  vancomycin    Solution 125 milliGRAM(s) Oral every 6 hours  verapamil  milliGRAM(s) Oral daily      Vital Signs Last 24 Hrs  T(C): 36.4 (27 Jul 2022 09:19), Max: 36.9 (26 Jul 2022 15:36)  T(F): 97.6 (27 Jul 2022 09:19), Max: 98.4 (26 Jul 2022 15:36)  HR: 76 (27 Jul 2022 11:38) (64 - 76)  BP: 126/60 (27 Jul 2022 11:38) (126/51 - 146/54)  BP(mean): 73 (26 Jul 2022 22:17) (70 - 73)  RR: 18 (27 Jul 2022 09:19) (18 - 18)  SpO2: 95% (27 Jul 2022 09:19) (95% - 97%)    Parameters below as of 27 Jul 2022 09:19  Patient On (Oxygen Delivery Method): room air    PE:  Constitutional: frail looking, + NGT   HEENT: NC/AT, EOMI, PERRLA, conjunctivae clear; ears and nose atraumatic; pharynx benign  Neck: supple; thyroid not palpable  Back: no tenderness  Respiratory: respiratory effort normal; clear to auscultation  Cardiovascular: S1S2 regular, no murmurs  Abdomen: soft, not tender, not distended, positive BS; + ostomy in place + abd drain, midline abd wound   Genitourinary: no suprapubic tenderness  Lymphatic: no LN palpable  Musculoskeletal: no muscle tenderness, no joint swelling or tenderness  Extremities: no pedal edema  Neurological/ Psychiatric: AxOx3, Judgement and insight normal;  moving all extremities  Skin: no rashes; no palpable lesions    Labs: all available labs reviewed                                                         8.5    12.51 )-----------( 573      ( 27 Jul 2022 04:53 )             27.2     07-27    143  |  114<H>  |  15  ----------------------------<  121<H>  4.0   |  24  |  1.91<H>    Ca    8.7      27 Jul 2022 04:53  Phos  2.9     07-27  Mg     1.8     07-27        Culture - Abscess with Gram Stain (07.22.22 @ 11:00)   Specimen Source: .Abscess lower abdominal abscess   Culture Results:   Numerous Enterococcus faecalis   Moderate Clostridioides difficile (Previously Clostridum)   "Susceptibilities not performed"    Culture - Blood (07.21.22 @ 14:00)   Specimen Source: .Blood None   Culture Results:   No growth to date.   Culture - Urine (07.21.22 @ 13:25)   Specimen Source: Clean Catch None   Culture Results:   <10,000 CFU/mL Normal Urogenital Wendi     Radiology: all available radiological tests reviewed      ACC: [id number] EXAM:  CT ABDOMEN AND PELVIS OC IC                          PROCEDURE DATE:  07/21/2022          INTERPRETATION:  CLINICAL INFORMATION: Drainage from surgical site;   evaluate for abscess. 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    COMPARISON: 7/6/2022.    CONTRAST/COMPLICATIONS:  IV Contrast: Omnipaque 350  90 cc administered   10 cc discarded  Oral Contrast: Omnipaque 300  Complications: None reported at time of study completion    PROCEDURE:  CT of the Abdomen and Pelvis was performed.  Sagittal and coronal reformats were performed.    FINDINGS: There is a large intraperitoneal thick wall abscess identified   measuring 17.5 x 11.8 x 10.8 cm, which extends anteriorly and superiorly   to the site of the midline anterior abdominal wall incision. In addition   to fluid and air there is mottled hyperdensity within the collection   likely related to administered oral contrast. The greatest concentration   of contrast is identified within the left lateral aspect of the abscess   adjacent to a suture surgical line at the level of the mid sigmoid colon,   which may be the site of fistulous communication.    LOWER CHEST: Small right pleural effusion. Opacity within the posterior   right lower lobe may be related to atelectasis; underlying parenchymal   infiltrate cannot be fully excluded.    LIVER: Normal in size. Hepatic steatosis. No focal hepatic masses.  BILE DUCTS: Normal caliber.  GALLBLADDER: Multiple gallstones. No gallbladder wall thickening.  SPLEEN: Within normal limits.  PANCREAS: Within normal limits.  ADRENALS: Within normal limits.  KIDNEYS/URETERS: No hydronephrosis. No renal calculi. 4.7 cm   space-occupying lesion noted within the left kidney characterized as a   cyst on prior ultrasonography July 2, 2022. Additional subcentimeter   hypodense foci are noted within the bilateral renal parenchyma, too small   to characterize.    BLADDER: Within normal limits.  REPRODUCTIVE ORGANS: Prostate seed implants identified.    BOWEL: Surgical suture linesare identified within the mid sigmoid   colonic region as well as within the right lower quadrant. No evidence   for mechanical bowel obstruction.  PERITONEUM: Upper abdominal ascites identified.  VESSELS: Within normal limits.  RETROPERITONEUM/LYMPHNODES: No lymphadenopathy.  ABDOMINAL WALL: As described above.  BONES: Status post lumbar spine fusion surgery extending from L2 through   S1. Patient status post right hip arthroplasty. Degenerative changes   lower thoracic and lumbar spine.    IMPRESSION:  There is a large intraperitoneal thick wall abscess   identified measuring 17.5 x 11.8 x 10.8 cm, which extends anteriorly and   superiorly to the site of the midline anterior abdominal wall incision.   In addition to fluid and air there is mottled hyperdensity within the   collection likely related to administered oral contrast. The greatest   concentration of contrast is identified within the left lateral aspect of   the abscess adjacent to a suture surgical line at the level of the mid   sigmoid colon, which may be the site of fistulous communication.    < end of copied text >    Advanced directives addressed: full resuscitation

## 2022-07-28 LAB
-  AMPICILLIN: SIGNIFICANT CHANGE UP
-  TETRACYCLINE: SIGNIFICANT CHANGE UP
-  VANCOMYCIN: SIGNIFICANT CHANGE UP
ANION GAP SERPL CALC-SCNC: 6 MMOL/L — SIGNIFICANT CHANGE UP (ref 5–17)
ANION GAP SERPL CALC-SCNC: 7 MMOL/L — SIGNIFICANT CHANGE UP (ref 5–17)
BUN SERPL-MCNC: 19 MG/DL — SIGNIFICANT CHANGE UP (ref 7–23)
BUN SERPL-MCNC: 31 MG/DL — HIGH (ref 7–23)
CALCIUM SERPL-MCNC: 7.8 MG/DL — LOW (ref 8.5–10.1)
CALCIUM SERPL-MCNC: 8.2 MG/DL — LOW (ref 8.5–10.1)
CHLORIDE SERPL-SCNC: 109 MMOL/L — HIGH (ref 96–108)
CHLORIDE SERPL-SCNC: 112 MMOL/L — HIGH (ref 96–108)
CO2 SERPL-SCNC: 22 MMOL/L — SIGNIFICANT CHANGE UP (ref 22–31)
CO2 SERPL-SCNC: 24 MMOL/L — SIGNIFICANT CHANGE UP (ref 22–31)
CREAT SERPL-MCNC: 1.95 MG/DL — HIGH (ref 0.5–1.3)
CREAT SERPL-MCNC: 2.42 MG/DL — HIGH (ref 0.5–1.3)
EGFR: 25 ML/MIN/1.73M2 — LOW
EGFR: 33 ML/MIN/1.73M2 — LOW
GLUCOSE SERPL-MCNC: 187 MG/DL — HIGH (ref 70–99)
GLUCOSE SERPL-MCNC: 187 MG/DL — HIGH (ref 70–99)
HCT VFR BLD CALC: 25 % — LOW (ref 39–50)
HCT VFR BLD CALC: 27.8 % — LOW (ref 39–50)
HGB BLD-MCNC: 7.6 G/DL — LOW (ref 13–17)
HGB BLD-MCNC: 8.6 G/DL — LOW (ref 13–17)
LACTATE SERPL-SCNC: 1.7 MMOL/L — SIGNIFICANT CHANGE UP (ref 0.7–2)
MAGNESIUM SERPL-MCNC: 1.7 MG/DL — SIGNIFICANT CHANGE UP (ref 1.6–2.6)
MCHC RBC-ENTMCNC: 26.2 PG — LOW (ref 27–34)
MCHC RBC-ENTMCNC: 26.6 PG — LOW (ref 27–34)
MCHC RBC-ENTMCNC: 30.4 GM/DL — LOW (ref 32–36)
MCHC RBC-ENTMCNC: 30.9 GM/DL — LOW (ref 32–36)
MCV RBC AUTO: 84.8 FL — SIGNIFICANT CHANGE UP (ref 80–100)
MCV RBC AUTO: 87.4 FL — SIGNIFICANT CHANGE UP (ref 80–100)
METHOD TYPE: SIGNIFICANT CHANGE UP
PHOSPHATE SERPL-MCNC: 3.9 MG/DL — SIGNIFICANT CHANGE UP (ref 2.5–4.5)
PLATELET # BLD AUTO: 480 K/UL — HIGH (ref 150–400)
PLATELET # BLD AUTO: 618 K/UL — HIGH (ref 150–400)
POTASSIUM SERPL-MCNC: 4.3 MMOL/L — SIGNIFICANT CHANGE UP (ref 3.5–5.3)
POTASSIUM SERPL-MCNC: 4.5 MMOL/L — SIGNIFICANT CHANGE UP (ref 3.5–5.3)
POTASSIUM SERPL-SCNC: 4.3 MMOL/L — SIGNIFICANT CHANGE UP (ref 3.5–5.3)
POTASSIUM SERPL-SCNC: 4.5 MMOL/L — SIGNIFICANT CHANGE UP (ref 3.5–5.3)
RBC # BLD: 2.86 M/UL — LOW (ref 4.2–5.8)
RBC # BLD: 3.28 M/UL — LOW (ref 4.2–5.8)
RBC # FLD: 17.1 % — HIGH (ref 10.3–14.5)
RBC # FLD: 17.5 % — HIGH (ref 10.3–14.5)
SODIUM SERPL-SCNC: 139 MMOL/L — SIGNIFICANT CHANGE UP (ref 135–145)
SODIUM SERPL-SCNC: 141 MMOL/L — SIGNIFICANT CHANGE UP (ref 135–145)
WBC # BLD: 20.86 K/UL — HIGH (ref 3.8–10.5)
WBC # BLD: 22.47 K/UL — HIGH (ref 3.8–10.5)
WBC # FLD AUTO: 20.86 K/UL — HIGH (ref 3.8–10.5)
WBC # FLD AUTO: 22.47 K/UL — HIGH (ref 3.8–10.5)

## 2022-07-28 RX ORDER — ACETAMINOPHEN 500 MG
1000 TABLET ORAL ONCE
Refills: 0 | Status: DISCONTINUED | OUTPATIENT
Start: 2022-07-28 | End: 2022-08-09

## 2022-07-28 RX ORDER — SODIUM CHLORIDE 9 MG/ML
1000 INJECTION, SOLUTION INTRAVENOUS
Refills: 0 | Status: DISCONTINUED | OUTPATIENT
Start: 2022-07-28 | End: 2022-07-29

## 2022-07-28 RX ORDER — SODIUM CHLORIDE 9 MG/ML
500 INJECTION INTRAMUSCULAR; INTRAVENOUS; SUBCUTANEOUS ONCE
Refills: 0 | Status: COMPLETED | OUTPATIENT
Start: 2022-07-28 | End: 2022-07-29

## 2022-07-28 RX ORDER — MORPHINE SULFATE 50 MG/1
2 CAPSULE, EXTENDED RELEASE ORAL ONCE
Refills: 0 | Status: DISCONTINUED | OUTPATIENT
Start: 2022-07-28 | End: 2022-07-28

## 2022-07-28 RX ORDER — MORPHINE SULFATE 50 MG/1
2 CAPSULE, EXTENDED RELEASE ORAL EVERY 4 HOURS
Refills: 0 | Status: DISCONTINUED | OUTPATIENT
Start: 2022-07-28 | End: 2022-07-29

## 2022-07-28 RX ORDER — I.V. FAT EMULSION 20 G/100ML
0.98 EMULSION INTRAVENOUS
Qty: 80 | Refills: 0 | Status: DISCONTINUED | OUTPATIENT
Start: 2022-07-28 | End: 2022-07-29

## 2022-07-28 RX ORDER — MORPHINE SULFATE 50 MG/1
4 CAPSULE, EXTENDED RELEASE ORAL EVERY 4 HOURS
Refills: 0 | Status: DISCONTINUED | OUTPATIENT
Start: 2022-07-28 | End: 2022-07-29

## 2022-07-28 RX ORDER — ELECTROLYTE SOLUTION,INJ
1 VIAL (ML) INTRAVENOUS
Refills: 0 | Status: DISCONTINUED | OUTPATIENT
Start: 2022-07-28 | End: 2022-07-28

## 2022-07-28 RX ORDER — SODIUM CHLORIDE 9 MG/ML
500 INJECTION, SOLUTION INTRAVENOUS ONCE
Refills: 0 | Status: COMPLETED | OUTPATIENT
Start: 2022-07-28 | End: 2022-07-29

## 2022-07-28 RX ADMIN — MORPHINE SULFATE 4 MILLIGRAM(S): 50 CAPSULE, EXTENDED RELEASE ORAL at 05:42

## 2022-07-28 RX ADMIN — HEPARIN SODIUM 5000 UNIT(S): 5000 INJECTION INTRAVENOUS; SUBCUTANEOUS at 14:54

## 2022-07-28 RX ADMIN — MORPHINE SULFATE 2 MILLIGRAM(S): 50 CAPSULE, EXTENDED RELEASE ORAL at 03:29

## 2022-07-28 RX ADMIN — SODIUM CHLORIDE 50 MILLILITER(S): 9 INJECTION, SOLUTION INTRAVENOUS at 20:34

## 2022-07-28 RX ADMIN — I.V. FAT EMULSION 33.33 GM/KG/DAY: 20 EMULSION INTRAVENOUS at 22:40

## 2022-07-28 RX ADMIN — Medication 2: at 13:38

## 2022-07-28 RX ADMIN — MORPHINE SULFATE 4 MILLIGRAM(S): 50 CAPSULE, EXTENDED RELEASE ORAL at 14:54

## 2022-07-28 RX ADMIN — Medication 125 MILLIGRAM(S): at 05:49

## 2022-07-28 RX ADMIN — Medication 100 MILLIGRAM(S): at 05:44

## 2022-07-28 RX ADMIN — Medication 125 MILLIGRAM(S): at 19:26

## 2022-07-28 RX ADMIN — HEPARIN SODIUM 5000 UNIT(S): 5000 INJECTION INTRAVENOUS; SUBCUTANEOUS at 21:28

## 2022-07-28 RX ADMIN — Medication 2: at 18:43

## 2022-07-28 RX ADMIN — Medication 1 EACH: at 22:41

## 2022-07-28 RX ADMIN — PANTOPRAZOLE SODIUM 40 MILLIGRAM(S): 20 TABLET, DELAYED RELEASE ORAL at 11:24

## 2022-07-28 RX ADMIN — HEPARIN SODIUM 5000 UNIT(S): 5000 INJECTION INTRAVENOUS; SUBCUTANEOUS at 05:44

## 2022-07-28 RX ADMIN — Medication 88 MICROGRAM(S): at 05:45

## 2022-07-28 RX ADMIN — Medication 125 MILLIGRAM(S): at 11:24

## 2022-07-28 RX ADMIN — Medication 100 MILLIGRAM(S): at 21:28

## 2022-07-28 RX ADMIN — Medication 125 MILLIGRAM(S): at 23:02

## 2022-07-28 RX ADMIN — Medication 100 MILLIGRAM(S): at 14:52

## 2022-07-28 RX ADMIN — PIPERACILLIN AND TAZOBACTAM 25 GRAM(S): 4; .5 INJECTION, POWDER, LYOPHILIZED, FOR SOLUTION INTRAVENOUS at 16:36

## 2022-07-28 RX ADMIN — Medication 1: at 22:56

## 2022-07-28 RX ADMIN — PIPERACILLIN AND TAZOBACTAM 25 GRAM(S): 4; .5 INJECTION, POWDER, LYOPHILIZED, FOR SOLUTION INTRAVENOUS at 07:15

## 2022-07-28 RX ADMIN — Medication 125 MILLIGRAM(S): at 01:01

## 2022-07-28 RX ADMIN — Medication 2: at 07:15

## 2022-07-28 RX ADMIN — MORPHINE SULFATE 4 MILLIGRAM(S): 50 CAPSULE, EXTENDED RELEASE ORAL at 21:28

## 2022-07-28 RX ADMIN — Medication 1: at 00:30

## 2022-07-28 RX ADMIN — NYSTATIN CREAM 1 APPLICATION(S): 100000 CREAM TOPICAL at 21:50

## 2022-07-28 NOTE — CHART NOTE - NSCHARTNOTEFT_GEN_A_CORE
Clinical Nutrition PN Follow Up Note    *88yo male admitted with large midline abdominal wall abscess s/p drainage on 7/22.    *7/27: PPN d/c'd on 7/26 by MD Ardon.  Pt has been NPO with NGT to LWS (large output), large midline abdominal wall abscess (s/p IR drain 7/22), pending OR for drain.  d/w surgery, to restart PPN today (7/27) due to extended time NPO (>6 days).     *current status: s/p lap washout of abdomen for nontraumatic indication; s/p creation of diverting loop ileostomy on 7/27.  found extensive adhesions, abscess within pelvis, minimal pus but copious fibrinous tissue and phlegmonous changes.  Pt remains NPO with NGT to LWS.  PPN Day #2 (started on 7/27).    *labs reviewed; 07-28; magnesium at lower limit, will increase in PN bag; other lytes WNL.  corrected Ca elevated, DO NOT supplement calcium outside of PN bag at this time. bilirubin total WNL.  triglycerides WNL for lipids.   POCT mostly WNL, received 2 units of regular insulin within last 24 hours, will not put insulin in PN bag at this time c/w sliding scale insulin to maintain tight glycemic control for optimal healing.    141  |  112<H>  |  19  ----------------------------<  187<H>  4.3   |  22  |  1.95<H>    Ca    8.2<L>      28 Jul 2022 05:37  Phos  3.9     07-28  Mg     1.7     07-28    TPro  5.8<L>  /  Alb  1.8<L>  /  TBili  0.2  /  DBili  x   /  AST  14<L>  /  ALT  36  /  AlkPhos  78  07-27      HbA1c: A1C with Estimated Average Glucose Result: 6.6 % (06-28-22 @ 07:24)  Glucose: POCT Blood Glucose.: 120 mg/dL (07-27-22 @ 05:20)    Lipid Panel: Date/Time: 07-23-22 @ 04:53  Triglycerides, Serum: 172    POCT Blood Glucose.: 158 mg/dL (28 Jul 2022 00:00)  POCT Blood Glucose.: 159 mg/dL (27 Jul 2022 22:01)  POCT Blood Glucose.: 182 mg/dL (27 Jul 2022 17:34)  POCT Blood Glucose.: 130 mg/dL (27 Jul 2022 11:50)      *pertinent meds: heparin, LR, levothyroxine, lisinopril, zofran, senna, admelog sliding scale, morphine, protonix    *I&O's Detail      27 Jul 2022 07:01  -  28 Jul 2022 06:31  --------------------------------------------------------  IN:    Other (mL): 1700 mL  Total IN: 1700 mL    OUT:    Blood Loss (mL): 100 mL    Bulb (mL): 80 mL    Nasogastric/Oral tube (mL): 450 mL    Other (mL): 100 mL  Total OUT: 730 mL    Total NET: 970 mL      *positive fluid status: BM (+) on 7/26, liquid, loose.  pt on bowel regimen.     *Elvin Score of 16 ;  no PU documented; abd surgical wound. (+1) scrotum edema documented.    *Malnutrition: severe malnutrition in acute illness r/t decreased ability to meet increased nutr needs 2/2 abd surgery and abd wall abscess AEB NPO, mod muscle/fat wasting, 8% wt loss x 3 mo    Estimated Needs: based on 77Kg (wt from 7/22)  Calories: 1925-2310Kcal (25-30 Kcal/Kg)  Protein: 108-123 g protein (1.4-1.6 g/Kg)  Fluids:  0956-4748 mL (25-30 mL/Kg)    NPO    Weight History:  Height (cm): 167.6 (07-21-22 @ 12:16), 167.6 (07-06-22 @ 15:24), 167.6 (06-27-22 @ 19:20)  Weight (kg): 81.6 (07-21-22 @ 12:16), 78 (07-06-22 @ 15:24), 81.647 (06-27-22 @ 19:20)  BMI (kg/m2): 29 (07-21-22 @ 12:16), 27.8 (07-06-22 @ 15:24), 29.1 (06-27-22 @ 19:20)  BSA (m2): 1.91 (07-21-22 @ 12:16), 1.88 (07-06-22 @ 15:24), 1.91 (06-27-22 @ 19:20)      *will continue to monitor and adjust PN prn*    PPN Recommendations: via peripheral line  Total Volume: 2200mL  90 g  Amino Acids  100 g Dextrose  80 g Lipids 20%   136 mEq Sodium Chloride  18 mEq Sodium Acetate  0 mmol Sodium Phosphate  25 mEq Potassium Chloride  6 mEq Potassium Acetate  20 mmol Potassium Phosphate  0 mEq Calcium Gluconate ( corrected Ca elevated, DO NOT supplement calcium outside of PN bag at this time. )  10 mEq Magnesium Sulfate  200 mg Thiamine  1 ml Trace Elements  10 ml MVI    Total Calories   1500Kcal    (   Meets  78%  of  Estimated Energy needs  and   83  %  Protein needs)(osmolarity <900)    Additional Recommendations:    1) Daily weights  2) Strict I & O's  3) Daily lyte checks including magnesium and phos  4) Weekly triglycerides/LFT checks  5) POCT q6hrs; maintain 140-180mg/dL  6) if pt to be on PN > 6 days, consider placing PICC line for TPN too meet 100% of nutr needs    *will monitor and adjust treatement plan prn*  Kayley Woodall MA, RDN, CDN, [facility] [phone]  Nutrition

## 2022-07-28 NOTE — PROGRESS NOTE ADULT - SUBJECTIVE AND OBJECTIVE BOX
Patient seen and examined at bedside this am. Patient taken yesterday for abdominal washout with loop ileostomy creation. Complaining of midline abdominal pain at incision site. NGT and caballero remain in place.  NGT 450cc output, UOP 400cc from 7pm-7am today. Shane drain: 190cc ss.  Ileostomy with 50cc sweat in bag.  Denies fever, chills, chest pain, sob,  nausea, vomiting, diarrhea.     Vital Signs (24 Hrs):  T(C): 37.5 (07-28-22 @ 09:09), Max: 37.5 (07-28-22 @ 09:09)  HR: 81 (07-28-22 @ 09:09) (55 - 81)  BP: 119/42 (07-28-22 @ 09:09) (119/42 - 160/62)  RR: 16 (07-28-22 @ 09:09) (12 - 16)  SpO2: 93% (07-28-22 @ 09:09) (93% - 100%)  Wt(kg): --  Daily     Daily     I&O's Summary    27 Jul 2022 07:01  -  28 Jul 2022 07:00  --------------------------------------------------------  IN: 1700 mL / OUT: 1190 mL / NET: 510 mL      PHYSICAL EXAM:   Gen: AAO3, NAD   Cardio: RRR   Pulm: equal chest rise B/L   Abdomen: soft, nondistended, appropriate tenderness to palpation       .  LABS:                         7.6    20.86 )-----------( 618      ( 28 Jul 2022 05:37 )             25.0     07-28    141  |  112<H>  |  19  ----------------------------<  187<H>  4.3   |  22  |  1.95<H>    Ca    8.2<L>      28 Jul 2022 05:37  Phos  3.9     07-28  Mg     1.7     07-28    TPro  5.5<L>  /  Alb  1.5<L>  /  TBili  0.2  /  DBili  0.1  /  AST  11<L>  /  ALT  26  /  AlkPhos  67  07-28    PT/INR - ( 27 Jul 2022 04:53 )   PT: 16.2 sec;   INR: 1.39 ratio         PTT - ( 27 Jul 2022 04:53 )  PTT:30.4 sec          RADIOLOGY, EKG & ADDITIONAL TESTS: Reviewed.

## 2022-07-28 NOTE — PROGRESS NOTE ADULT - ASSESSMENT
87M with history of sigmoidectomy and ileocecectomy 6/29, patient returned to  with pelvis abscess now s/p IR drainage and IR drain upsizing. Patient taken to OR for diagnostic lap converted to exploratory laparotomy, abdominal washout, diverting loop ileostomy POD 1    Plan:   pain control prn   monitor VS   NPO except meds  d/c NGT  monitor ostomy output   d/c caballero  PT   OOB/A    Plan discussed with colorectal surgery service

## 2022-07-28 NOTE — PROGRESS NOTE ADULT - ATTENDING COMMENTS
Patient denies any acute issues overnight.  NGT was removed this morning.  Denies nausea.  On exam abdomen is soft, moderately distended, and appropriately tender near incision.  Ostomy pink, perfused, bridge in place.  Bowel sweat only in appliance, no gas.      -- NGT removed, keep NPO for now.  Continue IVF and parenteral nutrition.  -- Ostomy care and education  -- Pain control - multimodal  -- Will need to closely monitor ostomy output once bowel function returns as patient has undergone previous small bowel resections.  -- DVT prophylaxis  -- Encourage OOB Patient seen and examined at bedside this morning. No acute events overnight. Patient is NPO, minimal stoma output, denies N/V/ Abdomen is soft, mildly distended, appropriately tender. Stoma is pink with minimal output. LANDRY drain with thick output, line stripped. Decreased urine output overnight    I&O's Detail    28 Jul 2022 07:01  -  29 Jul 2022 07:00  --------------------------------------------------------  IN:    PRBCs (Packed Red Blood Cells): 338 mL  Total IN: 338 mL    OUT:    Bulb (mL): 120 mL    Indwelling Catheter - Urethral (mL): 305 mL  Total OUT: 425 mL    Total NET: -87 mL      29 Jul 2022 07:01  -  29 Jul 2022 14:32  --------------------------------------------------------  IN:  Total IN: 0 mL    OUT:    Bulb (mL): 60 mL    Indwelling Catheter - Urethral (mL): 300 mL  Total OUT: 360 mL    Total NET: -360 mL    Vital Signs Last 24 Hrs  T(C): 36.8 (29 Jul 2022 09:12), Max: 36.8 (29 Jul 2022 09:12)  T(F): 98.3 (29 Jul 2022 09:12), Max: 98.3 (29 Jul 2022 09:12)  HR: 84 (29 Jul 2022 09:12) (81 - 84)  BP: 114/48 (29 Jul 2022 09:12) (106/41 - 114/48)  BP(mean): --  RR: 18 (29 Jul 2022 12:45) (18 - 18)  SpO2: 96% (29 Jul 2022 12:45) (91% - 96%)    Parameters below as of 29 Jul 2022 12:45  Patient On (Oxygen Delivery Method): room air                          8.3    21.66 )-----------( 424      ( 29 Jul 2022 04:46 )             26.5       A/P  Continue to ensure adequate pain control  Keep NPO with PPN for nutrition at this time  Patient hemodynamically stable, continue to monitor  Continue antibiotics per ID recommendation  Decreased urine output overnight, Cr now trending down. Nephrology contacted to discuss diuresis  Ostomy care and education  Daily physical therapy with OOB and ambulation  DVT prophylaxis  Continue supportive care during recovery

## 2022-07-29 LAB
ALBUMIN SERPL ELPH-MCNC: 1.4 G/DL — LOW (ref 3.3–5)
ALP SERPL-CCNC: 60 U/L — SIGNIFICANT CHANGE UP (ref 40–120)
ALT FLD-CCNC: 18 U/L — SIGNIFICANT CHANGE UP (ref 12–78)
ANION GAP SERPL CALC-SCNC: 7 MMOL/L — SIGNIFICANT CHANGE UP (ref 5–17)
APPEARANCE UR: CLEAR — SIGNIFICANT CHANGE UP
AST SERPL-CCNC: 8 U/L — LOW (ref 15–37)
BILIRUB SERPL-MCNC: 0.2 MG/DL — SIGNIFICANT CHANGE UP (ref 0.2–1.2)
BILIRUB UR-MCNC: NEGATIVE — SIGNIFICANT CHANGE UP
BUN SERPL-MCNC: 31 MG/DL — HIGH (ref 7–23)
CALCIUM SERPL-MCNC: 7.6 MG/DL — LOW (ref 8.5–10.1)
CHLORIDE SERPL-SCNC: 110 MMOL/L — HIGH (ref 96–108)
CO2 SERPL-SCNC: 21 MMOL/L — LOW (ref 22–31)
COLOR SPEC: YELLOW — SIGNIFICANT CHANGE UP
CREAT ?TM UR-MCNC: 127 MG/DL — SIGNIFICANT CHANGE UP
CREAT SERPL-MCNC: 2.25 MG/DL — HIGH (ref 0.5–1.3)
DIFF PNL FLD: ABNORMAL
EGFR: 28 ML/MIN/1.73M2 — LOW
GLUCOSE SERPL-MCNC: 188 MG/DL — HIGH (ref 70–99)
GLUCOSE UR QL: NEGATIVE — SIGNIFICANT CHANGE UP
HCT VFR BLD CALC: 26.5 % — LOW (ref 39–50)
HGB BLD-MCNC: 8.3 G/DL — LOW (ref 13–17)
KETONES UR-MCNC: ABNORMAL
LEUKOCYTE ESTERASE UR-ACNC: ABNORMAL
MAGNESIUM SERPL-MCNC: 1.8 MG/DL — SIGNIFICANT CHANGE UP (ref 1.6–2.6)
MCHC RBC-ENTMCNC: 26.9 PG — LOW (ref 27–34)
MCHC RBC-ENTMCNC: 31.3 GM/DL — LOW (ref 32–36)
MCV RBC AUTO: 85.8 FL — SIGNIFICANT CHANGE UP (ref 80–100)
NITRITE UR-MCNC: NEGATIVE — SIGNIFICANT CHANGE UP
PH UR: 5 — SIGNIFICANT CHANGE UP (ref 5–8)
PHOSPHATE SERPL-MCNC: 2.4 MG/DL — LOW (ref 2.5–4.5)
PLATELET # BLD AUTO: 424 K/UL — HIGH (ref 150–400)
POTASSIUM SERPL-MCNC: 4.1 MMOL/L — SIGNIFICANT CHANGE UP (ref 3.5–5.3)
POTASSIUM SERPL-SCNC: 4.1 MMOL/L — SIGNIFICANT CHANGE UP (ref 3.5–5.3)
PROT SERPL-MCNC: 5.1 GM/DL — LOW (ref 6–8.3)
PROT UR-MCNC: 15
RBC # BLD: 3.09 M/UL — LOW (ref 4.2–5.8)
RBC # FLD: 17.9 % — HIGH (ref 10.3–14.5)
SODIUM SERPL-SCNC: 138 MMOL/L — SIGNIFICANT CHANGE UP (ref 135–145)
SODIUM UR-SCNC: <20 MMOL/L — SIGNIFICANT CHANGE UP
SP GR SPEC: 1.02 — SIGNIFICANT CHANGE UP (ref 1.01–1.02)
UROBILINOGEN FLD QL: NEGATIVE — SIGNIFICANT CHANGE UP
WBC # BLD: 21.66 K/UL — HIGH (ref 3.8–10.5)
WBC # FLD AUTO: 21.66 K/UL — HIGH (ref 3.8–10.5)

## 2022-07-29 RX ORDER — PIPERACILLIN AND TAZOBACTAM 4; .5 G/20ML; G/20ML
3.38 INJECTION, POWDER, LYOPHILIZED, FOR SOLUTION INTRAVENOUS EVERY 12 HOURS
Refills: 0 | Status: DISCONTINUED | OUTPATIENT
Start: 2022-07-29 | End: 2022-08-01

## 2022-07-29 RX ORDER — HYDROMORPHONE HYDROCHLORIDE 2 MG/ML
2 INJECTION INTRAMUSCULAR; INTRAVENOUS; SUBCUTANEOUS EVERY 4 HOURS
Refills: 0 | Status: DISCONTINUED | OUTPATIENT
Start: 2022-07-29 | End: 2022-07-29

## 2022-07-29 RX ORDER — SODIUM CHLORIDE 9 MG/ML
1000 INJECTION, SOLUTION INTRAVENOUS
Refills: 0 | Status: DISCONTINUED | OUTPATIENT
Start: 2022-07-29 | End: 2022-07-29

## 2022-07-29 RX ORDER — VANCOMYCIN HCL 1 G
125 VIAL (EA) INTRAVENOUS EVERY 6 HOURS
Refills: 0 | Status: DISCONTINUED | OUTPATIENT
Start: 2022-07-29 | End: 2022-08-08

## 2022-07-29 RX ORDER — ACETAMINOPHEN 500 MG
650 TABLET ORAL EVERY 6 HOURS
Refills: 0 | Status: DISCONTINUED | OUTPATIENT
Start: 2022-07-29 | End: 2022-08-09

## 2022-07-29 RX ORDER — OXYCODONE HYDROCHLORIDE 5 MG/1
5 TABLET ORAL EVERY 6 HOURS
Refills: 0 | Status: DISCONTINUED | OUTPATIENT
Start: 2022-07-29 | End: 2022-08-02

## 2022-07-29 RX ORDER — SODIUM CHLORIDE 9 MG/ML
1000 INJECTION INTRAMUSCULAR; INTRAVENOUS; SUBCUTANEOUS
Refills: 0 | Status: DISCONTINUED | OUTPATIENT
Start: 2022-07-29 | End: 2022-08-01

## 2022-07-29 RX ORDER — PIPERACILLIN AND TAZOBACTAM 4; .5 G/20ML; G/20ML
3.38 INJECTION, POWDER, LYOPHILIZED, FOR SOLUTION INTRAVENOUS EVERY 8 HOURS
Refills: 0 | Status: DISCONTINUED | OUTPATIENT
Start: 2022-07-29 | End: 2022-07-29

## 2022-07-29 RX ORDER — I.V. FAT EMULSION 20 G/100ML
0.98 EMULSION INTRAVENOUS
Qty: 80 | Refills: 0 | Status: DISCONTINUED | OUTPATIENT
Start: 2022-07-29 | End: 2022-07-30

## 2022-07-29 RX ORDER — ELECTROLYTE SOLUTION,INJ
1 VIAL (ML) INTRAVENOUS
Refills: 0 | Status: DISCONTINUED | OUTPATIENT
Start: 2022-07-29 | End: 2022-07-29

## 2022-07-29 RX ADMIN — Medication 2: at 17:38

## 2022-07-29 RX ADMIN — PANTOPRAZOLE SODIUM 40 MILLIGRAM(S): 20 TABLET, DELAYED RELEASE ORAL at 09:14

## 2022-07-29 RX ADMIN — Medication 1: at 22:31

## 2022-07-29 RX ADMIN — HEPARIN SODIUM 5000 UNIT(S): 5000 INJECTION INTRAVENOUS; SUBCUTANEOUS at 05:23

## 2022-07-29 RX ADMIN — MORPHINE SULFATE 4 MILLIGRAM(S): 50 CAPSULE, EXTENDED RELEASE ORAL at 09:14

## 2022-07-29 RX ADMIN — SODIUM CHLORIDE 40 MILLILITER(S): 9 INJECTION, SOLUTION INTRAVENOUS at 12:45

## 2022-07-29 RX ADMIN — I.V. FAT EMULSION 33.33 GM/KG/DAY: 20 EMULSION INTRAVENOUS at 04:53

## 2022-07-29 RX ADMIN — HEPARIN SODIUM 5000 UNIT(S): 5000 INJECTION INTRAVENOUS; SUBCUTANEOUS at 22:21

## 2022-07-29 RX ADMIN — OXYCODONE HYDROCHLORIDE 5 MILLIGRAM(S): 5 TABLET ORAL at 18:45

## 2022-07-29 RX ADMIN — SODIUM CHLORIDE 500 MILLILITER(S): 9 INJECTION, SOLUTION INTRAVENOUS at 00:21

## 2022-07-29 RX ADMIN — Medication 88 MICROGRAM(S): at 05:24

## 2022-07-29 RX ADMIN — Medication 2: at 05:29

## 2022-07-29 RX ADMIN — Medication 100 MILLIGRAM(S): at 22:21

## 2022-07-29 RX ADMIN — Medication 5 MILLIGRAM(S): at 22:20

## 2022-07-29 RX ADMIN — PIPERACILLIN AND TAZOBACTAM 25 GRAM(S): 4; .5 INJECTION, POWDER, LYOPHILIZED, FOR SOLUTION INTRAVENOUS at 22:21

## 2022-07-29 RX ADMIN — Medication 100 MILLIGRAM(S): at 14:02

## 2022-07-29 RX ADMIN — Medication 3 MILLIGRAM(S): at 22:20

## 2022-07-29 RX ADMIN — Medication 125 MILLIGRAM(S): at 05:24

## 2022-07-29 RX ADMIN — Medication 2: at 12:44

## 2022-07-29 RX ADMIN — Medication 125 MILLIGRAM(S): at 17:38

## 2022-07-29 RX ADMIN — Medication 1 EACH: at 22:42

## 2022-07-29 RX ADMIN — SODIUM CHLORIDE 50 MILLILITER(S): 9 INJECTION INTRAMUSCULAR; INTRAVENOUS; SUBCUTANEOUS at 17:39

## 2022-07-29 RX ADMIN — Medication 100 MILLIGRAM(S): at 05:23

## 2022-07-29 RX ADMIN — SODIUM CHLORIDE 500 MILLILITER(S): 9 INJECTION INTRAMUSCULAR; INTRAVENOUS; SUBCUTANEOUS at 01:35

## 2022-07-29 RX ADMIN — I.V. FAT EMULSION 33.3 GM/KG/DAY: 20 EMULSION INTRAVENOUS at 22:41

## 2022-07-29 RX ADMIN — HEPARIN SODIUM 5000 UNIT(S): 5000 INJECTION INTRAVENOUS; SUBCUTANEOUS at 14:01

## 2022-07-29 RX ADMIN — NYSTATIN CREAM 1 APPLICATION(S): 100000 CREAM TOPICAL at 22:22

## 2022-07-29 NOTE — PROGRESS NOTE ADULT - SUBJECTIVE AND OBJECTIVE BOX
Date of service: 07-29-22 @ 11:54    pt seen and examined  ngt placed with high output  abd drain in place - tube upsized by IR but still low output  feels weak  having diarrhea       ROS: no fever or chills; denies dizziness, no HA, no SOB or cough, no constipation; no dysuria, no urinary frequency, no legs pain, no rashes    MEDICATIONS  (STANDING):  acetaminophen   IVPB .. 1000 milliGRAM(s) IV Intermittent once  dextrose 5% + sodium chloride 0.9%. 1000 milliLiter(s) (40 mL/Hr) IV Continuous <Continuous>  dextrose 5%. 1000 milliLiter(s) (50 mL/Hr) IV Continuous <Continuous>  dextrose 5%. 1000 milliLiter(s) (100 mL/Hr) IV Continuous <Continuous>  dextrose 50% Injectable 25 Gram(s) IV Push once  dextrose 50% Injectable 12.5 Gram(s) IV Push once  dextrose 50% Injectable 25 Gram(s) IV Push once  fat emulsion (Fish Oil and Plant Based) 20% Infusion 33.3 mL/Hr (33.3 mL/Hr) IV Continuous <Continuous>  fat emulsion (Fish Oil and Plant Based) 20% Infusion 0.98 Gm/kG/Day (33.3 mL/Hr) IV Continuous <Continuous>  fat emulsion (Fish Oil and Plant Based) 20% Infusion 0.98 Gm/kG/Day (33.33 mL/Hr) IV Continuous <Continuous>  glucagon  Injectable 1 milliGRAM(s) IntraMuscular once  heparin   Injectable 5000 Unit(s) SubCutaneous every 8 hours  insulin lispro (ADMELOG) corrective regimen sliding scale   SubCutaneous every 6 hours  levothyroxine 88 MICROGram(s) Oral daily  melatonin 5 milliGRAM(s) Oral at bedtime  melatonin 3 milliGRAM(s) Oral at bedtime  metroNIDAZOLE  IVPB 500 milliGRAM(s) IV Intermittent every 8 hours  metroNIDAZOLE  IVPB      morphine  - Injectable 4 milliGRAM(s) IV Push every 4 hours  nystatin Cream 1 Application(s) Topical two times a day  pantoprazole  Injectable 40 milliGRAM(s) IV Push daily  Parenteral Nutrition - Adult 1 Each (92 mL/Hr) TPN Continuous <Continuous>  Parenteral Nutrition - Adult 1 Each (92 mL/Hr) TPN Continuous <Continuous>  piperacillin/tazobactam IVPB.. 3.375 Gram(s) IV Intermittent every 8 hours  verapamil  milliGRAM(s) Oral daily    Vital Signs Last 24 Hrs  T(C): 36.8 (29 Jul 2022 09:12), Max: 36.8 (29 Jul 2022 09:12)  T(F): 98.3 (29 Jul 2022 09:12), Max: 98.3 (29 Jul 2022 09:12)  HR: 84 (29 Jul 2022 09:12) (81 - 84)  BP: 114/48 (29 Jul 2022 09:12) (106/41 - 114/48)  BP(mean): --  RR: 18 (29 Jul 2022 09:30) (18 - 18)  SpO2: 96% (29 Jul 2022 09:30) (91% - 96%)    Parameters below as of 29 Jul 2022 09:30  Patient On (Oxygen Delivery Method): nasal cannula  O2 Flow (L/min): 2    PE:  Constitutional: frail looking, + NGT   HEENT: NC/AT, EOMI, PERRLA, conjunctivae clear; ears and nose atraumatic; pharynx benign  Neck: supple; thyroid not palpable  Back: no tenderness  Respiratory: respiratory effort normal; clear to auscultation  Cardiovascular: S1S2 regular, no murmurs  Abdomen: soft, not tender, not distended, positive BS; + ostomy in place + abd drain, midline abd wound   Genitourinary: no suprapubic tenderness  Lymphatic: no LN palpable  Musculoskeletal: no muscle tenderness, no joint swelling or tenderness  Extremities: no pedal edema  Neurological/ Psychiatric: AxOx3, Judgement and insight normal;  moving all extremities  Skin: no rashes; no palpable lesions    Labs: all available labs reviewed                                                         8.5    12.51 )-----------( 573      ( 27 Jul 2022 04:53 )             27.2     07-27    143  |  114<H>  |  15  ----------------------------<  121<H>  4.0   |  24  |  1.91<H>    Ca    8.7      27 Jul 2022 04:53  Phos  2.9     07-27  Mg     1.8     07-27        Culture - Abscess with Gram Stain (07.22.22 @ 11:00)   Specimen Source: .Abscess lower abdominal abscess   Culture Results:   Numerous Enterococcus faecalis   Moderate Clostridioides difficile (Previously Clostridum)   "Susceptibilities not performed"    Culture - Blood (07.21.22 @ 14:00)   Specimen Source: .Blood None   Culture Results:   No growth to date.   Culture - Urine (07.21.22 @ 13:25)   Specimen Source: Clean Catch None   Culture Results:   <10,000 CFU/mL Normal Urogenital Wendi     Radiology: all available radiological tests reviewed      ACC: [id number] EXAM:  CT ABDOMEN AND PELVIS OC IC                          PROCEDURE DATE:  07/21/2022          INTERPRETATION:  CLINICAL INFORMATION: Drainage from surgical site;   evaluate for abscess. 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    COMPARISON: 7/6/2022.    CONTRAST/COMPLICATIONS:  IV Contrast: Omnipaque 350  90 cc administered   10 cc discarded  Oral Contrast: Omnipaque 300  Complications: None reported at time of study completion    PROCEDURE:  CT of the Abdomen and Pelvis was performed.  Sagittal and coronal reformats were performed.    FINDINGS: There is a large intraperitoneal thick wall abscess identified   measuring 17.5 x 11.8 x 10.8 cm, which extends anteriorly and superiorly   to the site of the midline anterior abdominal wall incision. In addition   to fluid and air there is mottled hyperdensity within the collection   likely related to administered oral contrast. The greatest concentration   of contrast is identified within the left lateral aspect of the abscess   adjacent to a suture surgical line at the level of the mid sigmoid colon,   which may be the site of fistulous communication.    LOWER CHEST: Small right pleural effusion. Opacity within the posterior   right lower lobe may be related to atelectasis; underlying parenchymal   infiltrate cannot be fully excluded.    LIVER: Normal in size. Hepatic steatosis. No focal hepatic masses.  BILE DUCTS: Normal caliber.  GALLBLADDER: Multiple gallstones. No gallbladder wall thickening.  SPLEEN: Within normal limits.  PANCREAS: Within normal limits.  ADRENALS: Within normal limits.  KIDNEYS/URETERS: No hydronephrosis. No renal calculi. 4.7 cm   space-occupying lesion noted within the left kidney characterized as a   cyst on prior ultrasonography July 2, 2022. Additional subcentimeter   hypodense foci are noted within the bilateral renal parenchyma, too small   to characterize.    BLADDER: Within normal limits.  REPRODUCTIVE ORGANS: Prostate seed implants identified.    BOWEL: Surgical suture linesare identified within the mid sigmoid   colonic region as well as within the right lower quadrant. No evidence   for mechanical bowel obstruction.  PERITONEUM: Upper abdominal ascites identified.  VESSELS: Within normal limits.  RETROPERITONEUM/LYMPHNODES: No lymphadenopathy.  ABDOMINAL WALL: As described above.  BONES: Status post lumbar spine fusion surgery extending from L2 through   S1. Patient status post right hip arthroplasty. Degenerative changes   lower thoracic and lumbar spine.    IMPRESSION:  There is a large intraperitoneal thick wall abscess   identified measuring 17.5 x 11.8 x 10.8 cm, which extends anteriorly and   superiorly to the site of the midline anterior abdominal wall incision.   In addition to fluid and air there is mottled hyperdensity within the   collection likely related to administered oral contrast. The greatest   concentration of contrast is identified within the left lateral aspect of   the abscess adjacent to a suture surgical line at the level of the mid   sigmoid colon, which may be the site of fistulous communication.    < end of copied text >    Advanced directives addressed: full resuscitation Date of service: 07-29-22 @ 11:54    pt seen and examined  s/p ex lap, abdominal washout, diverting loop ileostomy 7/28  has incisional abd pain  has some output from ileostomy  no fevers   feels weak      ROS: no fever or chills; denies dizziness, no HA, no SOB or cough, no constipation; no dysuria, no urinary frequency, no legs pain, no rashes    MEDICATIONS  (STANDING):  acetaminophen   IVPB .. 1000 milliGRAM(s) IV Intermittent once  dextrose 5% + sodium chloride 0.9%. 1000 milliLiter(s) (40 mL/Hr) IV Continuous <Continuous>  dextrose 5%. 1000 milliLiter(s) (50 mL/Hr) IV Continuous <Continuous>  dextrose 5%. 1000 milliLiter(s) (100 mL/Hr) IV Continuous <Continuous>  dextrose 50% Injectable 25 Gram(s) IV Push once  dextrose 50% Injectable 12.5 Gram(s) IV Push once  dextrose 50% Injectable 25 Gram(s) IV Push once  fat emulsion (Fish Oil and Plant Based) 20% Infusion 33.3 mL/Hr (33.3 mL/Hr) IV Continuous <Continuous>  fat emulsion (Fish Oil and Plant Based) 20% Infusion 0.98 Gm/kG/Day (33.3 mL/Hr) IV Continuous <Continuous>  fat emulsion (Fish Oil and Plant Based) 20% Infusion 0.98 Gm/kG/Day (33.33 mL/Hr) IV Continuous <Continuous>  glucagon  Injectable 1 milliGRAM(s) IntraMuscular once  heparin   Injectable 5000 Unit(s) SubCutaneous every 8 hours  insulin lispro (ADMELOG) corrective regimen sliding scale   SubCutaneous every 6 hours  levothyroxine 88 MICROGram(s) Oral daily  melatonin 5 milliGRAM(s) Oral at bedtime  melatonin 3 milliGRAM(s) Oral at bedtime  metroNIDAZOLE  IVPB 500 milliGRAM(s) IV Intermittent every 8 hours  metroNIDAZOLE  IVPB      morphine  - Injectable 4 milliGRAM(s) IV Push every 4 hours  nystatin Cream 1 Application(s) Topical two times a day  pantoprazole  Injectable 40 milliGRAM(s) IV Push daily  Parenteral Nutrition - Adult 1 Each (92 mL/Hr) TPN Continuous <Continuous>  Parenteral Nutrition - Adult 1 Each (92 mL/Hr) TPN Continuous <Continuous>  piperacillin/tazobactam IVPB.. 3.375 Gram(s) IV Intermittent every 8 hours  verapamil  milliGRAM(s) Oral daily    Vital Signs Last 24 Hrs  T(C): 36.8 (29 Jul 2022 09:12), Max: 36.8 (29 Jul 2022 09:12)  T(F): 98.3 (29 Jul 2022 09:12), Max: 98.3 (29 Jul 2022 09:12)  HR: 84 (29 Jul 2022 09:12) (81 - 84)  BP: 114/48 (29 Jul 2022 09:12) (106/41 - 114/48)  BP(mean): --  RR: 18 (29 Jul 2022 09:30) (18 - 18)  SpO2: 96% (29 Jul 2022 09:30) (91% - 96%)    Parameters below as of 29 Jul 2022 09:30  Patient On (Oxygen Delivery Method): nasal cannula  O2 Flow (L/min): 2    PE:  Constitutional: frail looking  HEENT: NC/AT, EOMI, PERRLA, conjunctivae clear; ears and nose atraumatic; pharynx benign  Neck: supple; thyroid not palpable  Back: no tenderness  Respiratory: respiratory effort normal; clear to auscultation  Cardiovascular: S1S2 regular, no murmurs  Abdomen: soft, not tender, not distended, positive BS; + ostomy in place + abd drain  Genitourinary: no suprapubic tenderness  Lymphatic: no LN palpable  Musculoskeletal: no muscle tenderness, no joint swelling or tenderness  Extremities: no pedal edema  Neurological/ Psychiatric: AxOx3, Judgement and insight normal;  moving all extremities  Skin: no rashes; no palpable lesions    Labs: all available labs reviewed                                                               8.3    21.66 )-----------( 424      ( 29 Jul 2022 04:46 )             26.5     07-29    138  |  110<H>  |  31<H>  ----------------------------<  188<H>  4.1   |  21<L>  |  2.25<H>    Ca    7.6<L>      29 Jul 2022 04:46  Phos  2.4     07-29  Mg     1.8     07-29    TPro  5.1<L>  /  Alb  1.4<L>  /  TBili  0.2  /  DBili  x   /  AST  8<L>  /  ALT  18  /  AlkPhos  60  07-29        Culture - Abscess with Gram Stain (07.22.22 @ 11:00)   Specimen Source: .Abscess lower abdominal abscess   Culture Results:   Numerous Enterococcus faecalis   Moderate Clostridioides difficile (Previously Clostridum)   "Susceptibilities not performed"    Culture - Blood (07.21.22 @ 14:00)   Specimen Source: .Blood None   Culture Results:   No growth to date.   Culture - Urine (07.21.22 @ 13:25)   Specimen Source: Clean Catch None   Culture Results:   <10,000 CFU/mL Normal Urogenital Wendi     Radiology: all available radiological tests reviewed      ACC: [id number] EXAM:  CT ABDOMEN AND PELVIS OC IC                          PROCEDURE DATE:  07/21/2022          INTERPRETATION:  CLINICAL INFORMATION: Drainage from surgical site;   evaluate for abscess. 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    COMPARISON: 7/6/2022.    CONTRAST/COMPLICATIONS:  IV Contrast: Omnipaque 350  90 cc administered   10 cc discarded  Oral Contrast: Omnipaque 300  Complications: None reported at time of study completion    PROCEDURE:  CT of the Abdomen and Pelvis was performed.  Sagittal and coronal reformats were performed.    FINDINGS: There is a large intraperitoneal thick wall abscess identified   measuring 17.5 x 11.8 x 10.8 cm, which extends anteriorly and superiorly   to the site of the midline anterior abdominal wall incision. In addition   to fluid and air there is mottled hyperdensity within the collection   likely related to administered oral contrast. The greatest concentration   of contrast is identified within the left lateral aspect of the abscess   adjacent to a suture surgical line at the level of the mid sigmoid colon,   which may be the site of fistulous communication.    LOWER CHEST: Small right pleural effusion. Opacity within the posterior   right lower lobe may be related to atelectasis; underlying parenchymal   infiltrate cannot be fully excluded.    LIVER: Normal in size. Hepatic steatosis. No focal hepatic masses.  BILE DUCTS: Normal caliber.  GALLBLADDER: Multiple gallstones. No gallbladder wall thickening.  SPLEEN: Within normal limits.  PANCREAS: Within normal limits.  ADRENALS: Within normal limits.  KIDNEYS/URETERS: No hydronephrosis. No renal calculi. 4.7 cm   space-occupying lesion noted within the left kidney characterized as a   cyst on prior ultrasonography July 2, 2022. Additional subcentimeter   hypodense foci are noted within the bilateral renal parenchyma, too small   to characterize.    BLADDER: Within normal limits.  REPRODUCTIVE ORGANS: Prostate seed implants identified.    BOWEL: Surgical suture linesare identified within the mid sigmoid   colonic region as well as within the right lower quadrant. No evidence   for mechanical bowel obstruction.  PERITONEUM: Upper abdominal ascites identified.  VESSELS: Within normal limits.  RETROPERITONEUM/LYMPHNODES: No lymphadenopathy.  ABDOMINAL WALL: As described above.  BONES: Status post lumbar spine fusion surgery extending from L2 through   S1. Patient status post right hip arthroplasty. Degenerative changes   lower thoracic and lumbar spine.    IMPRESSION:  There is a large intraperitoneal thick wall abscess   identified measuring 17.5 x 11.8 x 10.8 cm, which extends anteriorly and   superiorly to the site of the midline anterior abdominal wall incision.   In addition to fluid and air there is mottled hyperdensity within the   collection likely related to administered oral contrast. The greatest   concentration of contrast is identified within the left lateral aspect of   the abscess adjacent to a suture surgical line at the level of the mid   sigmoid colon, which may be the site of fistulous communication.    < end of copied text >    Advanced directives addressed: full resuscitation

## 2022-07-29 NOTE — PHYSICAL THERAPY INITIAL EVALUATION ADULT - IMPAIRMENTS FOUND, PT EVAL
gait, locomotion, and balance
gait, locomotion, and balance/gross motor/joint integrity and mobility/muscle strength

## 2022-07-29 NOTE — PHYSICAL THERAPY INITIAL EVALUATION ADULT - PERTINENT HX OF CURRENT PROBLEM, REHAB EVAL
88 y/o male w/ a PMHx of sepsis, neuropathy, prostate CA, Crohn's disease, hypothyroidism, depression, intervertebral disc disorder, DM, obesity, HTN, hypercholesterolemia, CAD w/ stents, macular degeneration, hearing loss and colon cancer s/p laparoscopic ileocolic resection on 6/29/22 with Dr. Ardon.
Pt presents to the ED with increased drainage from the surgery site. Imaging remarkable for large intraperitoneal thick wall abscess IR consulted s/p IR drainage on 7/22, 40 cc pus drained, has drain in place, was given IV zosyn., abdominal wall abscess d/t anastomotic leak/fistula s/p drainage/washout/ileostomy. s/p laproscopic ileocolic resection. colon cancer. crohn' s disease., s/p IR drainage 7/22; body fluid cx growing c. diff/e faecalis - c diff growth in cx d/t anastomotic leak/fistula

## 2022-07-29 NOTE — PROGRESS NOTE ADULT - ASSESSMENT
87M with history of sigmoidectomy and ileocecectomy 6/29, patient returned to  with pelvis abscess now s/p IR drainage and IR drain upsizing. Patient taken to OR for diagnostic lap converted to exploratory laparotomy, abdominal washout, diverting loop ileostomy POD 2    Plan:   pain control prn   monitor VS   NPO except meds, possible advance to [facility] in afternoon  monitor ostomy output   Possible DC caballero  PT   OOB/A    Plan discussed with colorectal surgery service  87M with history of sigmoidectomy and ileocecectomy 6/29, patient returned to  with pelvis abscess now s/p IR drainage and IR drain upsizing. Patient taken to OR for diagnostic lap converted to exploratory laparotomy, abdominal washout, diverting loop ileostomy POD 2. AFVSS, persistent leukocytosis    Plan:   Pain control prn   Monitor VS   NPO except meds, possible advance to [facility] in afternoon  monitor ostomy output   Possible DC caballero  PT   OOB/A  Infectious disease recommendations  REnal consult for ROSE on CKD.     Plan discussed with colorectal surgery service

## 2022-07-29 NOTE — PROGRESS NOTE ADULT - ASSESSMENT
87 CKD III sepsis, neuropathy, prostate CA, Crohn's disease, hypothyroidism, depression, intervertebral disc disorder, DM, obesity, HTN, hypercholesterolemia, CAD w/ stents, macular degeneration, hearing loss and colon cancer s/p laparoscopic ileocolic resection on 6/29/22 with Dr. Ardon. pt presents to the ED with increased drainage from the surgery site.    Yesenia on CKD - baseline 1.4-1.6   mild pre-renal state and still persist. Infection likely playing a role as well and PRESTON on 7/25 and further rise now POD 1 s/p ex lap, abdominal washout, diverting loop ileostomy 7/28  -IVF/PPN to keep positive  -Hopeful to achieve renal stability as distance from OR and renal perfusion improves  -No nsaids, avoid further contrast IV if able   -ACE hold    Wound drainage  -Surgery, fu cxs  -Broad spectrum abx  -ID on board, meds renally dosed    HTN   as above     dc with RN staff  Dr Vega to cover the weekend

## 2022-07-29 NOTE — PROGRESS NOTE ADULT - SUBJECTIVE AND OBJECTIVE BOX
Patient seen and examined at bedside this am. Complaining of midline abdominal pain at incision site, slightly improved from yesterday. NGT removed yesterday, remains NPO.  [place] remain in place UOP 300cc, given 1.5L crystalloid bolus overnight. Randy drain: 120cc ss.  Ileostomy with 30cc serous fluid.  Denies fever, chills, chest pain, sob,  nausea, vomiting, diarrhea.     Vital Signs (24 Hrs):  T(C): 36.7 (22 @ 20:29), Max: 36.7 (22 @ 17:30)  HR: 83 (22 @ 20:29) (81 - 83)  BP: 106/41 (22 @ 20:29) (106/41 - 119/42)  RR: 18 (22 @ 20:29) (18 - 18)  SpO2: 95% (22 @ 20:29) (93% - 96%)  Wt(kg): --  Daily     Daily     I&O's Summary    2022 07:01  -  2022 07:00  --------------------------------------------------------  IN: 338 mL / OUT: 425 mL / NET: -87 mL          PHYSICAL EXAM:   Gen: AAO3, NAD   Cardio: RRR   Pulm: equal chest rise B/L   Abdomen: soft, nondistended, appropriate tenderness to palpation, ostomy pink and patent, randy drain in place, midline incision with pervena in place.     .  LABS:                         8.3    21.66 )-----------( 424      ( 2022 04:46 )             26.5         138  |  110<H>  |  31<H>  ----------------------------<  188<H>  4.1   |  21<L>  |  2.25<H>    Ca    7.6<L>      2022 04:46  Phos  2.4       Mg     1.8         TPro  5.1<L>  /  Alb  1.4<L>  /  TBili  0.2  /  DBili  x   /  AST  8<L>  /  ALT  18  /  AlkPhos  60        Urinalysis Basic - ( 2022 02:30 )    Color: Yellow / Appearance: Clear / S.020 / pH: x  Gluc: x / Ketone: Trace  / Bili: Negative / Urobili: Negative   Blood: x / Protein: 15 / Nitrite: Negative   Leuk Esterase: Trace / RBC: 25-50 /HPF / WBC 6-10   Sq Epi: x / Non Sq Epi: Few / Bacteria: Moderate        Lactate, Blood: 1.7 mmol/L ( @ 22:40)      RADIOLOGY, EKG & ADDITIONAL TESTS: Reviewed.

## 2022-07-29 NOTE — PROGRESS NOTE ADULT - SUBJECTIVE AND OBJECTIVE BOX
Patient is a 87y Male who is asleep, on PPN      MEDICATIONS  (STANDING):  acetaminophen   IVPB .. 1000 milliGRAM(s) IV Intermittent once  dextrose 5%. 1000 milliLiter(s) (50 mL/Hr) IV Continuous <Continuous>  dextrose 5%. 1000 milliLiter(s) (100 mL/Hr) IV Continuous <Continuous>  dextrose 50% Injectable 25 Gram(s) IV Push once  dextrose 50% Injectable 12.5 Gram(s) IV Push once  dextrose 50% Injectable 25 Gram(s) IV Push once  fat emulsion (Fish Oil and Plant Based) 20% Infusion 33.3 mL/Hr (33.3 mL/Hr) IV Continuous <Continuous>  fat emulsion (Fish Oil and Plant Based) 20% Infusion 0.98 Gm/kG/Day (33.3 mL/Hr) IV Continuous <Continuous>  glucagon  Injectable 1 milliGRAM(s) IntraMuscular once  heparin   Injectable 5000 Unit(s) SubCutaneous every 8 hours  insulin lispro (ADMELOG) corrective regimen sliding scale   SubCutaneous every 6 hours  levothyroxine 88 MICROGram(s) Oral daily  melatonin 3 milliGRAM(s) Oral at bedtime  melatonin 5 milliGRAM(s) Oral at bedtime  metroNIDAZOLE  IVPB 500 milliGRAM(s) IV Intermittent every 8 hours  metroNIDAZOLE  IVPB      nystatin Cream 1 Application(s) Topical two times a day  pantoprazole  Injectable 40 milliGRAM(s) IV Push daily  Parenteral Nutrition - Adult 1 Each (92 mL/Hr) TPN Continuous <Continuous>  Parenteral Nutrition - Adult 1 Each (92 mL/Hr) TPN Continuous <Continuous>  piperacillin/tazobactam IVPB.. 3.375 Gram(s) IV Intermittent every 12 hours  sodium chloride 0.9%. 1000 milliLiter(s) (50 mL/Hr) IV Continuous <Continuous>  vancomycin    Solution 125 milliGRAM(s) Oral every 6 hours  verapamil  milliGRAM(s) Oral daily    MEDICATIONS  (PRN):  acetaminophen     Tablet .. 650 milliGRAM(s) Oral every 6 hours PRN Mild Pain (1 - 3)  dextrose Oral Gel 15 Gram(s) Oral once PRN Blood Glucose LESS THAN 70 milliGRAM(s)/deciliter  HYDROmorphone  Injectable 2 milliGRAM(s) IV Push every 4 hours PRN Severe Pain (7 - 10)  hydrOXYzine hydrochloride 25 milliGRAM(s) Oral every 12 hours PRN Anxiety  ondansetron Injectable 4 milliGRAM(s) IV Push every 6 hours PRN Nausea  oxyCODONE    IR 5 milliGRAM(s) Oral every 6 hours PRN Moderate Pain (4 - 6)  senna 2 Tablet(s) Oral at bedtime PRN Constipation        T(C): , Max: 36.9 (22 @ 15:41)  T(F): , Max: 98.4 (22 @ 15:41)  HR: 81 (22 @ 15:41)  BP: 135/49 (22 @ 15:41)  BP(mean): --  RR: 18 (22 @ 15:41)  SpO2: 93% (22 @ 15:41)  Wt(kg): --     @ 07:01  -   @ 07:00  --------------------------------------------------------  IN: 338 mL / OUT: 425 mL / NET: -87 mL     @ 07:01  -   @ 20:07  --------------------------------------------------------  IN: 0 mL / OUT: 815 mL / NET: -815 mL          PHYSICAL EXAM:    Constitutional: NAD,frail  HEENT:  MM  dist  Cardiovascular: S1 and S2  Extremities: No peripheral edema  Neurological: asleep but arousable        LABS:                        8.3    21.66 )-----------( 424      ( 2022 04:46 )             26.5     2022 04:46    138    |  110    |  31     ----------------------------<  188    4.1     |  21     |  2.25   2022 22:40    139    |  109    |  31     ----------------------------<  187    4.5     |  24     |  2.42   2022 05:37    141    |  112    |  19     ----------------------------<  187    4.3     |  22     |  1.95   2022 15:13    143    |  112    |  16     ----------------------------<  158    4.3     |  24     |  1.89   2022 04:53    143    |  114    |  15     ----------------------------<  121    4.0     |  24     |  1.91     Ca    7.6        2022 04:46  Ca    7.8        2022 22:40  Ca    8.2        2022 05:37  Ca    8.7        2022 15:13  Ca    8.7        2022 04:53  Phos  2.4       2022 04:46  Phos  3.9       2022 05:37  Phos  3.0       2022 15:13  Phos  2.9       2022 04:53  Phos  2.9       2022 07:14  Mg     1.8       2022 04:46  Mg     1.7       2022 05:37  Mg     1.9       2022 15:13  Mg     1.8       2022 04:53  Mg     2.0       2022 07:14    TPro  5.1    /  Alb  1.4    /  TBili  0.2    /  DBili  x      /  AST  8      /  ALT  18     /  AlkPhos  60     2022 04:46  TPro  5.5    /  Alb  1.5    /  TBili  0.2    /  DBili  0.1    /  AST  11     /  ALT  26     /  AlkPhos  67     2022 05:37  TPro  5.8    /  Alb  1.8    /  TBili  0.2    /  DBili  x      /  AST  14     /  ALT  36     /  AlkPhos  78     2022 15:13          Urine Studies:  Urinalysis Basic - ( 2022 02:30 )    Color: Yellow / Appearance: Clear / S.020 / pH: x  Gluc: x / Ketone: Trace  / Bili: Negative / Urobili: Negative   Blood: x / Protein: 15 / Nitrite: Negative   Leuk Esterase: Trace / RBC: 25-50 /HPF / WBC 6-10   Sq Epi: x / Non Sq Epi: Few / Bacteria: Moderate      Sodium, Random Urine: <20 mmol/L ( @ 02:30)  Creatinine, Random Urine: 127 mg/dL ( @ 02:30)        RADIOLOGY & ADDITIONAL STUDIES:

## 2022-07-29 NOTE — PHYSICAL THERAPY INITIAL EVALUATION ADULT - GENERAL OBSERVATIONS, REHAB EVAL
Pt encountered supine in bed on 2 North. Pt performed ther ex bed mobility, transfer training, and ambulation. Pt ambulated 100 feet with a rolling walker and CG. Pt returned to supine in bed in no acute distress, call bell within reach, bed alarm on.
Pt was found lying in bed on IV, TPN,caballero, o2, drain, Pt c/o severe abd pain and weak, after lot of motivation pt agrees to attempt oob

## 2022-07-29 NOTE — PROGRESS NOTE ADULT - ATTENDING COMMENTS
Seen and examined.  Marginal UOP overnight.  No reports of N/V.  AFVSS  NAD  prevena in place, ileostomy healthy, soft, mild tenderness, distended  Labs reviewed  A/P -   Cont NPO, IVF and PPN for now.  Await return of bowel function.  Keep caballero catheter in place while awaiting improvement in UOP. Await renal recs. ? utility of lasix vs cont awaiting mobilization of fluid into intravascular space.  Appropriate rise in Hgb after 1 unit.  PO vanc, IV flagyl and Zosyn per ID.  Discussed utility of ambulation to help alleviate ileus. Pt more motivated to ambulate.

## 2022-07-29 NOTE — PHYSICAL THERAPY INITIAL EVALUATION ADULT - ADDITIONAL COMMENTS
Pt amb 50' rw ccgx1 prior to surgery Pt amb 50' rw ccgx1 prior to surgery, Pt lives in an apartment with steps to enter but has a chair lift. Pt lives alone but uses an aide.

## 2022-07-29 NOTE — PROVIDER CONTACT NOTE (OTHER) - BACKGROUND
Pt s/p abdominal wash out and ileostomy creation on 7/27
s/p ileocolic resection complicated by abscess formation. + cultures
Pt s/p abdominal wash out and ileostomy creation on 7/27
Patient s/p abdominal wash out and ileostomy creation on 7/27

## 2022-07-30 LAB
ALBUMIN SERPL ELPH-MCNC: 1.3 G/DL — LOW (ref 3.3–5)
ALP SERPL-CCNC: 55 U/L — SIGNIFICANT CHANGE UP (ref 40–120)
ALT FLD-CCNC: 17 U/L — SIGNIFICANT CHANGE UP (ref 12–78)
ANION GAP SERPL CALC-SCNC: 8 MMOL/L — SIGNIFICANT CHANGE UP (ref 5–17)
AST SERPL-CCNC: 10 U/L — LOW (ref 15–37)
BILIRUB SERPL-MCNC: 0.2 MG/DL — SIGNIFICANT CHANGE UP (ref 0.2–1.2)
BUN SERPL-MCNC: 32 MG/DL — HIGH (ref 7–23)
CALCIUM SERPL-MCNC: 7.8 MG/DL — LOW (ref 8.5–10.1)
CHLORIDE SERPL-SCNC: 112 MMOL/L — HIGH (ref 96–108)
CO2 SERPL-SCNC: 19 MMOL/L — LOW (ref 22–31)
CREAT SERPL-MCNC: 1.79 MG/DL — HIGH (ref 0.5–1.3)
EGFR: 36 ML/MIN/1.73M2 — LOW
GLUCOSE SERPL-MCNC: 195 MG/DL — HIGH (ref 70–99)
HCT VFR BLD CALC: 24.7 % — LOW (ref 39–50)
HGB BLD-MCNC: 7.7 G/DL — LOW (ref 13–17)
MAGNESIUM SERPL-MCNC: 2 MG/DL — SIGNIFICANT CHANGE UP (ref 1.6–2.6)
MCHC RBC-ENTMCNC: 26.6 PG — LOW (ref 27–34)
MCHC RBC-ENTMCNC: 31.2 GM/DL — LOW (ref 32–36)
MCV RBC AUTO: 85.5 FL — SIGNIFICANT CHANGE UP (ref 80–100)
PHOSPHATE SERPL-MCNC: 1.7 MG/DL — LOW (ref 2.5–4.5)
PLATELET # BLD AUTO: 379 K/UL — SIGNIFICANT CHANGE UP (ref 150–400)
POTASSIUM SERPL-MCNC: 4.1 MMOL/L — SIGNIFICANT CHANGE UP (ref 3.5–5.3)
POTASSIUM SERPL-SCNC: 4.1 MMOL/L — SIGNIFICANT CHANGE UP (ref 3.5–5.3)
PROT SERPL-MCNC: 5.2 GM/DL — LOW (ref 6–8.3)
RBC # BLD: 2.89 M/UL — LOW (ref 4.2–5.8)
RBC # FLD: 17.9 % — HIGH (ref 10.3–14.5)
SODIUM SERPL-SCNC: 139 MMOL/L — SIGNIFICANT CHANGE UP (ref 135–145)
WBC # BLD: 16.2 K/UL — HIGH (ref 3.8–10.5)
WBC # FLD AUTO: 16.2 K/UL — HIGH (ref 3.8–10.5)

## 2022-07-30 PROCEDURE — 74018 RADEX ABDOMEN 1 VIEW: CPT | Mod: 26

## 2022-07-30 PROCEDURE — 71045 X-RAY EXAM CHEST 1 VIEW: CPT | Mod: 26

## 2022-07-30 RX ORDER — I.V. FAT EMULSION 20 G/100ML
0.99 EMULSION INTRAVENOUS
Qty: 80 | Refills: 0 | Status: DISCONTINUED | OUTPATIENT
Start: 2022-07-30 | End: 2022-07-30

## 2022-07-30 RX ORDER — ELECTROLYTE SOLUTION,INJ
1 VIAL (ML) INTRAVENOUS
Refills: 0 | Status: DISCONTINUED | OUTPATIENT
Start: 2022-07-30 | End: 2022-07-30

## 2022-07-30 RX ADMIN — I.V. FAT EMULSION 33.3 GM/KG/DAY: 20 EMULSION INTRAVENOUS at 05:40

## 2022-07-30 RX ADMIN — Medication 125 MILLIGRAM(S): at 00:26

## 2022-07-30 RX ADMIN — Medication 5 MILLIGRAM(S): at 22:11

## 2022-07-30 RX ADMIN — OXYCODONE HYDROCHLORIDE 5 MILLIGRAM(S): 5 TABLET ORAL at 17:07

## 2022-07-30 RX ADMIN — Medication 240 MILLIGRAM(S): at 10:46

## 2022-07-30 RX ADMIN — Medication 100 MILLIGRAM(S): at 13:35

## 2022-07-30 RX ADMIN — PIPERACILLIN AND TAZOBACTAM 25 GRAM(S): 4; .5 INJECTION, POWDER, LYOPHILIZED, FOR SOLUTION INTRAVENOUS at 10:46

## 2022-07-30 RX ADMIN — PIPERACILLIN AND TAZOBACTAM 25 GRAM(S): 4; .5 INJECTION, POWDER, LYOPHILIZED, FOR SOLUTION INTRAVENOUS at 23:59

## 2022-07-30 RX ADMIN — Medication 3 MILLIGRAM(S): at 22:11

## 2022-07-30 RX ADMIN — Medication 125 MILLIGRAM(S): at 12:32

## 2022-07-30 RX ADMIN — HEPARIN SODIUM 5000 UNIT(S): 5000 INJECTION INTRAVENOUS; SUBCUTANEOUS at 05:48

## 2022-07-30 RX ADMIN — Medication 100 MILLIGRAM(S): at 05:43

## 2022-07-30 RX ADMIN — Medication 1 EACH: at 23:00

## 2022-07-30 RX ADMIN — OXYCODONE HYDROCHLORIDE 5 MILLIGRAM(S): 5 TABLET ORAL at 16:37

## 2022-07-30 RX ADMIN — HEPARIN SODIUM 5000 UNIT(S): 5000 INJECTION INTRAVENOUS; SUBCUTANEOUS at 23:59

## 2022-07-30 RX ADMIN — Medication 125 MILLIGRAM(S): at 18:00

## 2022-07-30 RX ADMIN — PANTOPRAZOLE SODIUM 40 MILLIGRAM(S): 20 TABLET, DELAYED RELEASE ORAL at 10:45

## 2022-07-30 RX ADMIN — SODIUM CHLORIDE 50 MILLILITER(S): 9 INJECTION INTRAMUSCULAR; INTRAVENOUS; SUBCUTANEOUS at 12:48

## 2022-07-30 RX ADMIN — Medication 100 MILLIGRAM(S): at 22:10

## 2022-07-30 RX ADMIN — Medication 125 MILLIGRAM(S): at 05:44

## 2022-07-30 RX ADMIN — NYSTATIN CREAM 1 APPLICATION(S): 100000 CREAM TOPICAL at 22:11

## 2022-07-30 RX ADMIN — Medication 88 MICROGRAM(S): at 05:41

## 2022-07-30 RX ADMIN — NYSTATIN CREAM 1 APPLICATION(S): 100000 CREAM TOPICAL at 10:45

## 2022-07-30 RX ADMIN — Medication 2: at 05:54

## 2022-07-30 RX ADMIN — Medication 2: at 12:32

## 2022-07-30 RX ADMIN — Medication 85 MILLIMOLE(S): at 12:33

## 2022-07-30 RX ADMIN — I.V. FAT EMULSION 33.33 GM/KG/DAY: 20 EMULSION INTRAVENOUS at 23:54

## 2022-07-30 RX ADMIN — Medication 2: at 17:59

## 2022-07-30 NOTE — PROGRESS NOTE ADULT - ATTENDING COMMENTS
Patient seen and examined at bedside this morning. No acute events overnight. Patient is NPO, minimal stoma output, denies N/V/ Abdomen is soft, distended (more distended today), appropriately tender. Stoma is pink with minimal output. LANDRY drain with lighter output today, line stripped. Improved urine output    Vital Signs Last 24 Hrs  T(C): 36.9 (30 Jul 2022 15:11), Max: 36.9 (30 Jul 2022 15:11)  T(F): 98.4 (30 Jul 2022 15:11), Max: 98.4 (30 Jul 2022 15:11)  HR: 70 (30 Jul 2022 15:11) (70 - 78)  BP: 131/50 (30 Jul 2022 15:11) (131/50 - 158/54)  BP(mean): --  RR: 18 (30 Jul 2022 15:11) (18 - 18)  SpO2: 95% (30 Jul 2022 15:11) (93% - 95%)    Parameters below as of 30 Jul 2022 15:11  Patient On (Oxygen Delivery Method): room air                          7.7    16.20 )-----------( 379      ( 30 Jul 2022 06:31 )             24.7     07-30    139  |  112<H>  |  32<H>  ----------------------------<  195<H>  4.1   |  19<L>  |  1.79<H>    Ca    7.8<L>      30 Jul 2022 06:31  Phos  1.7     07-30  Mg     2.0     07-30    TPro  5.2<L>  /  Alb  1.3<L>  /  TBili  0.2  /  DBili  x   /  AST  10<L>  /  ALT  17  /  AlkPhos  55  07-30      A/P  Continue to ensure adequate pain control  Keep NPO with PPN for nutrition at this time  Patient refused insertion of NGT this morning  KUB obtained, likely ileus, will follow up with radiology  Patient hemodynamically stable, continue to monitor  Continue antibiotics per ID recommendation, WBC trending down  Cr improved, urine output improved  Discontinue Stroud catheter  Ostomy care and education  Daily physical therapy with OOB and ambulation  DVT prophylaxis  Continue supportive care during recovery

## 2022-07-30 NOTE — PROGRESS NOTE ADULT - SUBJECTIVE AND OBJECTIVE BOX
Patient seen and examined at bedside this am. Complaining back pain and body aches related to the bed and position, pt states he wants to go home.m  Stroud remain in place UOP 1000cc.  Randy drain: 120cc ss.  Ileostomy with 30cc serous fluid.  Denies fever, chills, chest pain, sob,  nausea, vomiting, diarrhea.       PHYSICAL EXAM:   Gen: AAO3, NAD   Cardio: RRR   Pulm: equal chest rise B/L   Abdomen: soft, distendedd, appropriate tenderness to palpation, ostomy pink and patent, randy drain in place, midline incision with prevena in place.     Vitals:  T(C): 36.6 ( @ 08:56), Max: 36.9 ( @ 15:41)  HR: 75 ( @ 08:56) (75 - 81)  BP: 158/54 ( @ 08:56) (135/49 - 158/54)  RR: 18 ( @ 08:56) (18 - 18)  SpO2: 93% ( @ 08:56) (93% - 95%)     @ 07:01  -   @ 07:00  --------------------------------------------------------  IN:  Total IN: 0 mL    OUT:    Bulb (mL): 130 mL    Ileostomy (mL): 125 mL    Indwelling Catheter - Urethral (mL): 1000 mL  Total OUT: 1255 mL    Total NET: -1255 mL       @ 07:01  -   @ 14:02  --------------------------------------------------------  IN:    sodium chloride 0.9%: 999 mL  Total IN: 999 mL    OUT:    Bulb (mL): 32 mL    Indwelling Catheter - Urethral (mL): 500 mL  Total OUT: 532 mL    Total NET: 467 mL         @ 06:31                    7.7  CBC: 16.20>)-------(<379                     24.7                 139 | 112 | 32    CMP:  ----------------------< 195               4.1 | 19 | 1.79                      Ca:7.8  Phos:1.7  M.0               0.2|      |10        LFTs:  ------|55|-----             -|      |-   @ 04:46                    8.3  CBC: 21.66>)-------(<424                     26.5                 138 | 110 | 31    CMP:  ----------------------< 188               4.1 | 21 | 2.25                      Ca:7.6  Phos:2.4  M.8               0.2|      |8        LFTs:  ------|60|-----             -|      |-      Culture - Abscess with Gram Stain (collected 22 @ 16:50)  Source: .Abscess abdomen wound  Preliminary Report (22 @ 10:53):    Few Enterococcus faecalis  Organism: Enterococcus faecalis (22 @ 12:51)  Organism: Enterococcus faecalis (22 @ 12:51)      -  Ampicillin: S <=2 Predicts results to ampicillin/sulbactam, amoxacillin-clavulanate and  piperacillin-tazobactam.      -  Tetra/Doxy: R >8      -  Vancomycin: S 2      Method Type: JAMI      Current Inpatient Medications:  acetaminophen     Tablet .. 650 milliGRAM(s) Oral every 6 hours PRN  acetaminophen   IVPB .. 1000 milliGRAM(s) IV Intermittent once  dextrose 5%. 1000 milliLiter(s) (50 mL/Hr) IV Continuous <Continuous>  dextrose 5%. 1000 milliLiter(s) (100 mL/Hr) IV Continuous <Continuous>  dextrose 50% Injectable 25 Gram(s) IV Push once  dextrose 50% Injectable 12.5 Gram(s) IV Push once  dextrose 50% Injectable 25 Gram(s) IV Push once  dextrose Oral Gel 15 Gram(s) Oral once PRN  fat emulsion (Fish Oil and Plant Based) 20% Infusion 33.3 mL/Hr (33.3 mL/Hr) IV Continuous <Continuous>  fat emulsion (Fish Oil and Plant Based) 20% Infusion 0.98 Gm/kG/Day (33.3 mL/Hr) IV Continuous <Continuous>  fat emulsion (Fish Oil and Plant Based) 20% Infusion 0.99 Gm/kG/Day (33.33 mL/Hr) IV Continuous <Continuous>  glucagon  Injectable 1 milliGRAM(s) IntraMuscular once  heparin   Injectable 5000 Unit(s) SubCutaneous every 8 hours  HYDROmorphone  Injectable 2 milliGRAM(s) IV Push every 4 hours PRN  hydrOXYzine hydrochloride 25 milliGRAM(s) Oral every 12 hours PRN  insulin lispro (ADMELOG) corrective regimen sliding scale   SubCutaneous every 6 hours  levothyroxine 88 MICROGram(s) Oral daily  melatonin 3 milliGRAM(s) Oral at bedtime  melatonin 5 milliGRAM(s) Oral at bedtime  metroNIDAZOLE  IVPB 500 milliGRAM(s) IV Intermittent every 8 hours  metroNIDAZOLE  IVPB      nystatin Cream 1 Application(s) Topical two times a day  ondansetron Injectable 4 milliGRAM(s) IV Push every 6 hours PRN  oxyCODONE    IR 5 milliGRAM(s) Oral every 6 hours PRN  pantoprazole  Injectable 40 milliGRAM(s) IV Push daily  Parenteral Nutrition - Adult 1 Each (92 mL/Hr) TPN Continuous <Continuous>  Parenteral Nutrition - Adult 1 Each (92 mL/Hr) TPN Continuous <Continuous>  piperacillin/tazobactam IVPB.. 3.375 Gram(s) IV Intermittent every 12 hours  senna 2 Tablet(s) Oral at bedtime PRN  sodium chloride 0.9%. 1000 milliLiter(s) (50 mL/Hr) IV Continuous <Continuous>  vancomycin    Solution 125 milliGRAM(s) Oral every 6 hours  verapamil  milliGRAM(s) Oral daily

## 2022-07-30 NOTE — CHART NOTE - NSCHARTNOTEFT_GEN_A_CORE
Clinical Nutrition PN Follow Up Note    *88yo male admitted with large midline abdominal wall abscess s/p drainage on 7/22. s/p lap washout and creation of ileostomy on 7/27.    *7/27: PPN d/c'd on 7/26 by MD Ardon.  Pt has been NPO with NGT to LWS (large output), large midline abdominal wall abscess (s/p IR drain 7/22), pending OR for drain.  d/w surgery, to restart PPN today (7/27) due to extended time NPO (>6 days).   *7/28: s/p lap washout of abdomen for nontraumatic indication; s/p creation of diverting loop ileostomy on 7/27.  found extensive adhesions, abscess within pelvis, minimal pus but copious fibrinous tissue and phlegmonous changes.  Pt remains NPO with NGT to LWS.  PPN Day #2 (started on 7/27).    *current status: PPN Day #4 (started on 7/27).  NGT removed.  pt with minimal output of urine and ileostomy past 24hours, additional IVF provided by surgery.  Pt remains NPO.    *labs reviewed; 07-30; hypophosphatemia will increase in PN bag.  corrected Ca elevated, DO NOT supplement calcium outside of PN bag at this time. bilirubin total WNL.  triglyceride within limits for lipids.  POCT elevated, received 9 units of sliding scale in past 24hours, will add 10 units of regular insulin into PN bag to aid in glycemic control.    07-30    139  |  112<H>  |  32<H>  ----------------------------<  195<H>  4.1   |  19<L>  |  1.79<H>    Ca    7.8<L>      30 Jul 2022 06:31  Phos  1.7     07-30  Mg     2.0     07-30    TPro  5.2<L>  /  Alb  1.3<L>  /  TBili  0.2  /  DBili  x   /  AST  10<L>  /  ALT  17  /  AlkPhos  55  07-30  BMI: BMI (kg/m2): 29 (07-21-22 @ 12:16)  HbA1c: A1C with Estimated Average Glucose Result: 6.6 % (06-28-22 @ 07:24)    Glucose: POCT Blood Glucose.: 211 mg/dL (07-30-22 @ 05:51)    BP: 158/54 (07-30-22 @ 08:56) (106/41 - 160/62)  Lipid Panel: Date/Time: 07-23-22 @ 04:53  Cholesterol, Serum: --  Direct LDL: --  HDL Cholesterol, Serum: --  Total Cholesterol/HDL Ration Measurement: --  Triglycerides, Serum: 172    POCT Blood Glucose.: 211 mg/dL (30 Jul 2022 05:51)  POCT Blood Glucose.: 199 mg/dL (29 Jul 2022 22:26)  POCT Blood Glucose.: 204 mg/dL (29 Jul 2022 17:33)  POCT Blood Glucose.: 201 mg/dL (29 Jul 2022 12:18)        *pertinent meds: heparin, LR, levothyroxine, lisinopril, zofran, senna, admelog sliding scale, morphine, protonix    *I&O's Detail    I&O's Detail    29 Jul 2022 07:01  -  30 Jul 2022 07:00  --------------------------------------------------------  IN:  Total IN: 0 mL    OUT:    Bulb (mL): 130 mL    Ileostomy (mL): 125 mL    Indwelling Catheter - Urethral (mL): 1000 mL  Total OUT: 1255 mL    Total NET: -1255 mL      *negative fluid status: BM (+) on 7/26 x 3 , liquid, loose.  pt on bowel regimen.     *Elvin Score of 16 ;  no PU documented; abd surgical wound. (+1) scrotum edema documented.    *Malnutrition: severe malnutrition in acute illness r/t decreased ability to meet increased nutr needs 2/2 abd surgery and abd wall abscess AEB NPO, mod muscle/fat wasting, 8% wt loss x 3 mo    Estimated Needs: based on 77Kg (wt from 7/22)  Calories: 1925-2310Kcal (25-30 Kcal/Kg)  Protein: 108-123 g protein (1.4-1.6 g/Kg)  Fluids:  9321-1961 mL (25-30 mL/Kg)    NPO    Weight History:  Height (cm): 167.6 (07-21-22 @ 12:16), 167.6 (07-06-22 @ 15:24), 167.6 (06-27-22 @ 19:20)  Weight (kg): 81.6 (07-21-22 @ 12:16), 78 (07-06-22 @ 15:24), 81.647 (06-27-22 @ 19:20)  BMI (kg/m2): 29 (07-21-22 @ 12:16), 27.8 (07-06-22 @ 15:24), 29.1 (06-27-22 @ 19:20)  BSA (m2): 1.91 (07-21-22 @ 12:16), 1.88 (07-06-22 @ 15:24), 1.91 (06-27-22 @ 19:20)      *will continue to monitor and adjust PN prn*    PPN Recommendations: via peripheral line  Total Volume: 2200mL  90 g  Amino Acids  100 g Dextrose  80 g Lipids 20%   134 mEq Sodium Chloride  0 mEq Sodium Acetate  15 mmol Sodium Phosphate  14 mEq Potassium Chloride  12 mEq Potassium Acetate  20 mmol Potassium Phosphate  0 mEq Calcium Gluconate ( corrected Ca elevated, DO NOT supplement calcium outside of PN bag at this time. )  12 mEq Magnesium Sulfate  200 mg Thiamine  10 units regular insulin  1 ml Trace Elements  10 ml MVI    Total Calories 1500Kcal    ( Meets  78%  of  Estimated Energy needs  and   83%  Protein needs)(osmolarity <900)    Additional Recommendations:    1) Daily weights  2) Strict I & O's  3) Daily lyte checks including magnesium and phos  4) Weekly triglycerides/LFT checks  5) POCT q6hrs; maintain 140-180mg/dL  6) if pt to be on PN > 6 days, consider placing PICC line for TPN too meet 100% of nutr needs    *will monitor and adjust treatement plan prn*  Vernell Alcala RD, CDN   Nutrition   Office# [phone] Cell# [phone] Clinical Nutrition PN Follow Up Note    *88yo male admitted with large midline abdominal wall abscess s/p drainage on 7/22. s/p lap washout and creation of ileostomy on 7/27.    *7/27: PPN d/c'd on 7/26 by MD Ardon.  Pt has been NPO with NGT to LWS (large output), large midline abdominal wall abscess (s/p IR drain 7/22), pending OR for drain.  d/w surgery, to restart PPN today (7/27) due to extended time NPO (>6 days).   *7/28: s/p lap washout of abdomen for nontraumatic indication; s/p creation of diverting loop ileostomy on 7/27.  found extensive adhesions, abscess within pelvis, minimal pus but copious fibrinous tissue and phlegmonous changes.  Pt remains NPO with NGT to LWS.  PPN Day #2 (started on 7/27).    *current status: PPN Day #4 (started on 7/27).  NGT removed.  pt with minimal output of urine and ileostomy past 24hours, additional IVF provided by surgery.  Pt remains NPO until complete bowel function returns. Encouraged to walk to resolve ileus.     *labs reviewed; 07-30; hypophosphatemia will increase in PN bag.  corrected Ca elevated, DO NOT supplement calcium outside of PN bag at this time. bilirubin total WNL.  triglyceride within limits for lipids.  POCT elevated, received 9 units of sliding scale in past 24hours, will add 10 units of regular insulin into PN bag to aid in glycemic control.    07-30    139  |  112<H>  |  32<H>  ----------------------------<  195<H>  4.1   |  19<L>  |  1.79<H>    Ca    7.8<L>      30 Jul 2022 06:31  Phos  1.7     07-30  Mg     2.0     07-30    TPro  5.2<L>  /  Alb  1.3<L>  /  TBili  0.2  /  DBili  x   /  AST  10<L>  /  ALT  17  /  AlkPhos  55  07-30  BMI: BMI (kg/m2): 29 (07-21-22 @ 12:16)  HbA1c: A1C with Estimated Average Glucose Result: 6.6 % (06-28-22 @ 07:24)    Glucose: POCT Blood Glucose.: 211 mg/dL (07-30-22 @ 05:51)    BP: 158/54 (07-30-22 @ 08:56) (106/41 - 160/62)  Lipid Panel: Date/Time: 07-23-22 @ 04:53  Cholesterol, Serum: --  Direct LDL: --  HDL Cholesterol, Serum: --  Total Cholesterol/HDL Ration Measurement: --  Triglycerides, Serum: 172    POCT Blood Glucose.: 211 mg/dL (30 Jul 2022 05:51)  POCT Blood Glucose.: 199 mg/dL (29 Jul 2022 22:26)  POCT Blood Glucose.: 204 mg/dL (29 Jul 2022 17:33)  POCT Blood Glucose.: 201 mg/dL (29 Jul 2022 12:18)        *pertinent meds: heparin, LR, levothyroxine, lisinopril, zofran, senna, admelog sliding scale, morphine, protonix    *I&O's Detail    I&O's Detail    29 Jul 2022 07:01  -  30 Jul 2022 07:00  --------------------------------------------------------  IN:  Total IN: 0 mL    OUT:    Bulb (mL): 130 mL    Ileostomy (mL): 125 mL    Indwelling Catheter - Urethral (mL): 1000 mL  Total OUT: 1255 mL    Total NET: -1255 mL      *negative fluid status: BM (+) on 7/26 x 3 , liquid, loose.  pt on bowel regimen.     *Elvin Score of 16 ;  no PU documented; abd surgical wound. (+1) scrotum edema documented.    *Malnutrition: severe malnutrition in acute illness r/t decreased ability to meet increased nutr needs 2/2 abd surgery and abd wall abscess AEB NPO, mod muscle/fat wasting, 8% wt loss x 3 mo    Estimated Needs: based on 77Kg (wt from 7/22)  Calories: 1925-2310Kcal (25-30 Kcal/Kg)  Protein: 108-123 g protein (1.4-1.6 g/Kg)  Fluids:  8817-1045 mL (25-30 mL/Kg)    NPO    Weight History:  Height (cm): 167.6 (07-21-22 @ 12:16), 167.6 (07-06-22 @ 15:24), 167.6 (06-27-22 @ 19:20)  Weight (kg): 81.6 (07-21-22 @ 12:16), 78 (07-06-22 @ 15:24), 81.647 (06-27-22 @ 19:20)  BMI (kg/m2): 29 (07-21-22 @ 12:16), 27.8 (07-06-22 @ 15:24), 29.1 (06-27-22 @ 19:20)  BSA (m2): 1.91 (07-21-22 @ 12:16), 1.88 (07-06-22 @ 15:24), 1.91 (06-27-22 @ 19:20)      *will continue to monitor and adjust PN prn*    PPN Recommendations: via peripheral line  Total Volume: 2200mL  90 g  Amino Acids  100 g Dextrose  80 g Lipids 20%   134 mEq Sodium Chloride  0 mEq Sodium Acetate  15 mmol Sodium Phosphate  14 mEq Potassium Chloride  12 mEq Potassium Acetate  20 mmol Potassium Phosphate  0 mEq Calcium Gluconate ( corrected Ca elevated, DO NOT supplement calcium outside of PN bag at this time. )  12 mEq Magnesium Sulfate  200 mg Thiamine  10 units regular insulin  1 ml Trace Elements  10 ml MVI    Total Calories 1500Kcal    ( Meets  78%  of  Estimated Energy needs  and   83%  Protein needs)(osmolarity <900)    Additional Recommendations:    1) Daily weights  2) Strict I & O's  3) Daily lyte checks including magnesium and phos  4) Weekly triglycerides/LFT checks  5) POCT q6hrs; maintain 140-180mg/dL  6) if pt to be on PN > 6 days, consider placing PICC line for TPN too meet 100% of nutr needs    *will monitor and adjust treatement plan prn*  Vernell Alcala RD, CDN   Nutrition   Office# [phone] Cell# [phone]

## 2022-07-30 NOTE — PROGRESS NOTE ADULT - SUBJECTIVE AND OBJECTIVE BOX
Patient is a 87y old  Male who presents with a chief complaint of abdominal wall abscess (29 Jul 2022 14:00)      Subective:  Frustrated at lack of progress  No vomiting    PAST MEDICAL & SURGICAL HISTORY:  Hearing loss  left hearing aid, right ear deaf      Macular degeneration  right eye      CAD (coronary artery disease)      Stented coronary artery  2 stents, 20 yrs ago, 15 yrs ago      HTN (hypertension)      Hypercholesterolemia      Obesity      Diabetes mellitus      Intervertebral disc disorder      Depression      Hypothyroidism      Crohn disease      Prostate CA  s/p XRT      Neuropathy      Sepsis  s/p back surgery      History of hip replacement, total, right      History of hip replacement, total, right  Revision      S/P lumbar fusion      S/P colon resection          MEDICATIONS  (STANDING):  acetaminophen   IVPB .. 1000 milliGRAM(s) IV Intermittent once  dextrose 5%. 1000 milliLiter(s) (50 mL/Hr) IV Continuous <Continuous>  dextrose 5%. 1000 milliLiter(s) (100 mL/Hr) IV Continuous <Continuous>  dextrose 50% Injectable 25 Gram(s) IV Push once  dextrose 50% Injectable 12.5 Gram(s) IV Push once  dextrose 50% Injectable 25 Gram(s) IV Push once  fat emulsion (Fish Oil and Plant Based) 20% Infusion 33.3 mL/Hr (33.3 mL/Hr) IV Continuous <Continuous>  fat emulsion (Fish Oil and Plant Based) 20% Infusion 0.98 Gm/kG/Day (33.3 mL/Hr) IV Continuous <Continuous>  glucagon  Injectable 1 milliGRAM(s) IntraMuscular once  heparin   Injectable 5000 Unit(s) SubCutaneous every 8 hours  insulin lispro (ADMELOG) corrective regimen sliding scale   SubCutaneous every 6 hours  levothyroxine 88 MICROGram(s) Oral daily  melatonin 3 milliGRAM(s) Oral at bedtime  melatonin 5 milliGRAM(s) Oral at bedtime  metroNIDAZOLE  IVPB 500 milliGRAM(s) IV Intermittent every 8 hours  metroNIDAZOLE  IVPB      nystatin Cream 1 Application(s) Topical two times a day  pantoprazole  Injectable 40 milliGRAM(s) IV Push daily  Parenteral Nutrition - Adult 1 Each (92 mL/Hr) TPN Continuous <Continuous>  piperacillin/tazobactam IVPB.. 3.375 Gram(s) IV Intermittent every 12 hours  sodium chloride 0.9%. 1000 milliLiter(s) (50 mL/Hr) IV Continuous <Continuous>  sodium phosphate IVPB 30 milliMole(s) IV Intermittent once  vancomycin    Solution 125 milliGRAM(s) Oral every 6 hours  verapamil  milliGRAM(s) Oral daily    MEDICATIONS  (PRN):  acetaminophen     Tablet .. 650 milliGRAM(s) Oral every 6 hours PRN Mild Pain (1 - 3)  dextrose Oral Gel 15 Gram(s) Oral once PRN Blood Glucose LESS THAN 70 milliGRAM(s)/deciliter  HYDROmorphone  Injectable 2 milliGRAM(s) IV Push every 4 hours PRN Severe Pain (7 - 10)  hydrOXYzine hydrochloride 25 milliGRAM(s) Oral every 12 hours PRN Anxiety  ondansetron Injectable 4 milliGRAM(s) IV Push every 6 hours PRN Nausea  oxyCODONE    IR 5 milliGRAM(s) Oral every 6 hours PRN Moderate Pain (4 - 6)  senna 2 Tablet(s) Oral at bedtime PRN Constipation      REVIEW OF SYSTEMS:    RESPIRATORY: No shortness of breath  CARDIOVASCULAR: No chest pain  All other review of systems is negative unless indicated above.    Vital Signs Last 24 Hrs  T(C): 36.6 (30 Jul 2022 08:56), Max: 36.9 (29 Jul 2022 15:41)  T(F): 97.9 (30 Jul 2022 08:56), Max: 98.4 (29 Jul 2022 15:41)  HR: 75 (30 Jul 2022 08:56) (75 - 81)  BP: 158/54 (30 Jul 2022 08:56) (135/49 - 158/54)  BP(mean): --  RR: 18 (30 Jul 2022 08:56) (18 - 18)  SpO2: 93% (30 Jul 2022 08:56) (93% - 96%)    Parameters below as of 30 Jul 2022 08:56  Patient On (Oxygen Delivery Method): room air        PHYSICAL EXAM:    Constitutional: NAD, well-developed  Respiratory: CTAB  Cardiovascular: S1 and S2, RRR  Gastrointestinal: BS+, soft, moderately distended, RLQ ileostomy, some fluid but no stool  Extremities: No peripheral edema  Psychiatric: Normal mood, normal affect    LABS:                        7.7    16.20 )-----------( 379      ( 30 Jul 2022 06:31 )             24.7     07-30    139  |  112<H>  |  32<H>  ----------------------------<  195<H>  4.1   |  19<L>  |  1.79<H>    Ca    7.8<L>      30 Jul 2022 06:31  Phos  1.7     07-30  Mg     2.0     07-30    TPro  5.2<L>  /  Alb  1.3<L>  /  TBili  0.2  /  DBili  x   /  AST  10<L>  /  ALT  17  /  AlkPhos  55  07-30      LIVER FUNCTIONS - ( 30 Jul 2022 06:31 )  Alb: 1.3 g/dL / Pro: 5.2 gm/dL / ALK PHOS: 55 U/L / ALT: 17 U/L / AST: 10 U/L / GGT: x             RADIOLOGY & ADDITIONAL STUDIES:

## 2022-07-30 NOTE — PROGRESS NOTE ADULT - ASSESSMENT
87M with history of sigmoidectomy and ileocecectomy 6/29, patient returned to  with pelvis abscess now s/p IR drainage and IR drain upsizing. Patient taken to OR for diagnostic lap converted to exploratory laparotomy, abdominal washout, diverting loop ileostomy POD 3. AFVSS, persistent leukocytosis but trending down.  Ostomy still not working, abdomen is distended, but patient refuses NGT placement    Plan:     Pain control prn   Monitor VS   NPO except meds  monitor ostomy output   Possible DC caballero  PT   OOB/A  Infectious disease recommendations  Discontinue caballero catheter  Keep PPN  AXR today    Plan discussed with colorectal surgery service

## 2022-07-30 NOTE — PROGRESS NOTE ADULT - ASSESSMENT
Imp:  S/P ileostomy for anastomotic leak  C. diff    Rec:  Cont PO vanco and IV flagyl  Post op care per surgery

## 2022-07-31 LAB
ANION GAP SERPL CALC-SCNC: 7 MMOL/L — SIGNIFICANT CHANGE UP (ref 5–17)
BASOPHILS # BLD AUTO: 0.08 K/UL — SIGNIFICANT CHANGE UP (ref 0–0.2)
BASOPHILS NFR BLD AUTO: 0.5 % — SIGNIFICANT CHANGE UP (ref 0–2)
BUN SERPL-MCNC: 31 MG/DL — HIGH (ref 7–23)
CALCIUM SERPL-MCNC: 7.5 MG/DL — LOW (ref 8.5–10.1)
CHLORIDE SERPL-SCNC: 112 MMOL/L — HIGH (ref 96–108)
CO2 SERPL-SCNC: 19 MMOL/L — LOW (ref 22–31)
CREAT SERPL-MCNC: 1.49 MG/DL — HIGH (ref 0.5–1.3)
CULTURE RESULTS: SIGNIFICANT CHANGE UP
EGFR: 45 ML/MIN/1.73M2 — LOW
EOSINOPHIL # BLD AUTO: 0.47 K/UL — SIGNIFICANT CHANGE UP (ref 0–0.5)
EOSINOPHIL NFR BLD AUTO: 2.8 % — SIGNIFICANT CHANGE UP (ref 0–6)
GLUCOSE SERPL-MCNC: 179 MG/DL — HIGH (ref 70–99)
HCT VFR BLD CALC: 24.3 % — LOW (ref 39–50)
HGB BLD-MCNC: 7.7 G/DL — LOW (ref 13–17)
IMM GRANULOCYTES NFR BLD AUTO: 1.3 % — SIGNIFICANT CHANGE UP (ref 0–1.5)
LYMPHOCYTES # BLD AUTO: 1.3 K/UL — SIGNIFICANT CHANGE UP (ref 1–3.3)
LYMPHOCYTES # BLD AUTO: 7.9 % — LOW (ref 13–44)
MAGNESIUM SERPL-MCNC: 1.8 MG/DL — SIGNIFICANT CHANGE UP (ref 1.6–2.6)
MCHC RBC-ENTMCNC: 26.7 PG — LOW (ref 27–34)
MCHC RBC-ENTMCNC: 31.7 GM/DL — LOW (ref 32–36)
MCV RBC AUTO: 84.4 FL — SIGNIFICANT CHANGE UP (ref 80–100)
MONOCYTES # BLD AUTO: 1.5 K/UL — HIGH (ref 0–0.9)
MONOCYTES NFR BLD AUTO: 9.1 % — SIGNIFICANT CHANGE UP (ref 2–14)
NEUTROPHILS # BLD AUTO: 12.97 K/UL — HIGH (ref 1.8–7.4)
NEUTROPHILS NFR BLD AUTO: 78.4 % — HIGH (ref 43–77)
ORGANISM # SPEC MICROSCOPIC CNT: SIGNIFICANT CHANGE UP
ORGANISM # SPEC MICROSCOPIC CNT: SIGNIFICANT CHANGE UP
PHOSPHATE SERPL-MCNC: 2.6 MG/DL — SIGNIFICANT CHANGE UP (ref 2.5–4.5)
PLATELET # BLD AUTO: 385 K/UL — SIGNIFICANT CHANGE UP (ref 150–400)
POTASSIUM SERPL-MCNC: 4.3 MMOL/L — SIGNIFICANT CHANGE UP (ref 3.5–5.3)
POTASSIUM SERPL-SCNC: 4.3 MMOL/L — SIGNIFICANT CHANGE UP (ref 3.5–5.3)
RBC # BLD: 2.88 M/UL — LOW (ref 4.2–5.8)
RBC # FLD: 17.9 % — HIGH (ref 10.3–14.5)
SODIUM SERPL-SCNC: 138 MMOL/L — SIGNIFICANT CHANGE UP (ref 135–145)
SPECIMEN SOURCE: SIGNIFICANT CHANGE UP
WBC # BLD: 16.54 K/UL — HIGH (ref 3.8–10.5)
WBC # FLD AUTO: 16.54 K/UL — HIGH (ref 3.8–10.5)

## 2022-07-31 RX ORDER — I.V. FAT EMULSION 20 G/100ML
0.99 EMULSION INTRAVENOUS
Qty: 80 | Refills: 0 | Status: DISCONTINUED | OUTPATIENT
Start: 2022-07-31 | End: 2022-07-31

## 2022-07-31 RX ORDER — ELECTROLYTE SOLUTION,INJ
1 VIAL (ML) INTRAVENOUS
Refills: 0 | Status: DISCONTINUED | OUTPATIENT
Start: 2022-07-31 | End: 2022-07-31

## 2022-07-31 RX ADMIN — Medication 100 MILLIGRAM(S): at 14:44

## 2022-07-31 RX ADMIN — Medication 125 MILLIGRAM(S): at 06:40

## 2022-07-31 RX ADMIN — Medication 25 MILLIGRAM(S): at 23:03

## 2022-07-31 RX ADMIN — Medication 3 MILLIGRAM(S): at 20:46

## 2022-07-31 RX ADMIN — Medication 2: at 12:11

## 2022-07-31 RX ADMIN — Medication 1: at 00:00

## 2022-07-31 RX ADMIN — NYSTATIN CREAM 1 APPLICATION(S): 100000 CREAM TOPICAL at 10:04

## 2022-07-31 RX ADMIN — Medication 100 MILLIGRAM(S): at 20:44

## 2022-07-31 RX ADMIN — HEPARIN SODIUM 5000 UNIT(S): 5000 INJECTION INTRAVENOUS; SUBCUTANEOUS at 14:46

## 2022-07-31 RX ADMIN — Medication 5 MILLIGRAM(S): at 17:45

## 2022-07-31 RX ADMIN — Medication 1 EACH: at 23:09

## 2022-07-31 RX ADMIN — Medication 100 MILLIGRAM(S): at 06:39

## 2022-07-31 RX ADMIN — SODIUM CHLORIDE 50 MILLILITER(S): 9 INJECTION INTRAMUSCULAR; INTRAVENOUS; SUBCUTANEOUS at 23:05

## 2022-07-31 RX ADMIN — Medication 2: at 17:52

## 2022-07-31 RX ADMIN — Medication 125 MILLIGRAM(S): at 00:01

## 2022-07-31 RX ADMIN — PIPERACILLIN AND TAZOBACTAM 25 GRAM(S): 4; .5 INJECTION, POWDER, LYOPHILIZED, FOR SOLUTION INTRAVENOUS at 23:04

## 2022-07-31 RX ADMIN — PIPERACILLIN AND TAZOBACTAM 25 GRAM(S): 4; .5 INJECTION, POWDER, LYOPHILIZED, FOR SOLUTION INTRAVENOUS at 10:04

## 2022-07-31 RX ADMIN — Medication 1: at 23:04

## 2022-07-31 RX ADMIN — Medication 125 MILLIGRAM(S): at 17:53

## 2022-07-31 RX ADMIN — SODIUM CHLORIDE 50 MILLILITER(S): 9 INJECTION INTRAMUSCULAR; INTRAVENOUS; SUBCUTANEOUS at 06:40

## 2022-07-31 RX ADMIN — HEPARIN SODIUM 5000 UNIT(S): 5000 INJECTION INTRAVENOUS; SUBCUTANEOUS at 06:39

## 2022-07-31 RX ADMIN — Medication 5 MILLIGRAM(S): at 20:46

## 2022-07-31 RX ADMIN — Medication 88 MICROGRAM(S): at 06:39

## 2022-07-31 RX ADMIN — Medication 125 MILLIGRAM(S): at 23:04

## 2022-07-31 RX ADMIN — Medication 125 MILLIGRAM(S): at 12:11

## 2022-07-31 RX ADMIN — Medication 2: at 06:40

## 2022-07-31 RX ADMIN — HEPARIN SODIUM 5000 UNIT(S): 5000 INJECTION INTRAVENOUS; SUBCUTANEOUS at 20:46

## 2022-07-31 RX ADMIN — Medication 240 MILLIGRAM(S): at 10:04

## 2022-07-31 RX ADMIN — PANTOPRAZOLE SODIUM 40 MILLIGRAM(S): 20 TABLET, DELAYED RELEASE ORAL at 10:04

## 2022-07-31 RX ADMIN — I.V. FAT EMULSION 33.7 GM/KG/DAY: 20 EMULSION INTRAVENOUS at 23:09

## 2022-07-31 NOTE — PROGRESS NOTE ADULT - ATTENDING COMMENTS
Patient was not available for their therapy session at this time.  Reason not seen: Other health personnel with patient (07/19/17 1146).  Re-Attempt Plan: Will re-attempt per established treatment plan (07/19/17 5830).     Patient seen and examined at bedside this morning. No acute events overnight. Patient is NPO, minimal stoma output (sweat), denies N/V/ Abdomen is soft, remains distended (unchanged from yesterday), appropriately tender. Stoma is pink with minimal output. LANDRY drain with serosanguineous output, line stripped.    I&O's Detail    30 Jul 2022 07:01  -  31 Jul 2022 07:00  --------------------------------------------------------  IN:    sodium chloride 0.9%: 999 mL  Total IN: 999 mL    OUT:    Bulb (mL): 57 mL    Ileostomy (mL): 90 mL    Indwelling Catheter - Urethral (mL): 500 mL    Voided (mL): 875 mL  Total OUT: 1522 mL    Total NET: -523 mL      31 Jul 2022 07:01  -  31 Jul 2022 13:39  --------------------------------------------------------  IN:  Total IN: 0 mL    OUT:    Voided (mL): 200 mL  Total OUT: 200 mL    Total NET: -200 mL      Vital Signs Last 24 Hrs  T(C): 37.1 (31 Jul 2022 09:23), Max: 37.1 (31 Jul 2022 09:23)  T(F): 98.7 (31 Jul 2022 09:23), Max: 98.7 (31 Jul 2022 09:23)  HR: 67 (31 Jul 2022 09:23) (67 - 70)  BP: 156/60 (31 Jul 2022 09:23) (131/50 - 156/60)  BP(mean): --  RR: 16 (31 Jul 2022 09:23) (16 - 18)  SpO2: 93% (31 Jul 2022 09:23) (87% - 95%)    Parameters below as of 31 Jul 2022 09:23  Patient On (Oxygen Delivery Method): nasal cannula  O2 Flow (L/min): 3                          7.7    16.54 )-----------( 385      ( 31 Jul 2022 04:04 )             24.3       A/P  Continue to ensure adequate pain control  Patient hemodynamically stable, continue to monitor  Keep NPO with PPN for nutrition at this time  No stoma output, will place a suppository via stoma  Plan for repeat CT A/P  Continue antibiotics per ID recommendation, WBC 16.54 from 16.20  Cr improved, urine output improved  Ostomy care and education  Daily physical therapy with OOB and ambulation  DVT prophylaxis  Continue supportive care during recovery

## 2022-07-31 NOTE — PROGRESS NOTE ADULT - ASSESSMENT
87M with history of sigmoidectomy and ileocecectomy 6/29, patient returned to  with pelvis abscess now s/p IR drainage and IR drain upsizing. Patient taken to OR for diagnostic lap converted to exploratory laparotomy, abdominal washout, diverting loop ileostomy POD 4. AFVSS, persistent leukocytosis. Ostomy not functional as of yet. AXR showed dilated loops of small bowel    Plan:   Pain and nausea control prn   Monitor VS   NPO except meds  Monitor ostomy output   PT   OOBC, ambulation   Infectious disease recommendations  Keep PPN    Plan discussed with colorectal surgery service

## 2022-07-31 NOTE — PROGRESS NOTE ADULT - ASSESSMENT
87 CKD III sepsis, neuropathy, prostate CA, Crohn's disease, hypothyroidism, depression, intervertebral disc disorder, DM, obesity, HTN, hypercholesterolemia, CAD w/ stents, macular degeneration, hearing loss and colon cancer s/p laparoscopic ileocolic resection on 6/29/22 with Dr. Ardon. pt presents to the ED with increased drainage from the surgery site.    Yesenia on CKD - baseline 1.4-1.6   mild pre-renal state and still persist. Infection likely playing a role as well and PRESTON on 7/25 and further rise now POD 1 s/p ex lap, abdominal washout, diverting loop ileostomy 7/28  -IVF/PPN to keep positive  -Hopeful to achieve renal stability as distance from OR and renal perfusion improves  -No nsaids, avoid further contrast IV if able   -ACE hold    Wound drainage  -Surgery, fu cxs  -Broad spectrum abx  -ID on board, meds renally dosed    HTN   as above     dc with RN staff  Dr Vega to cover the weekend      7/31  in bed wants to be dcd  no new complaints  Surgery input noted  caballero dc d  NPO on PPN  creat improving  anemia, transfuse as needed  leukocytosis improving

## 2022-07-31 NOTE — PROGRESS NOTE ADULT - SUBJECTIVE AND OBJECTIVE BOX
Pt. seen and examined at bedside this morning. Patient reports his pain is better, he is still bloated. He is frustrated and wants to go home. He denies fever, chest pain, SOB, nausea, vomiting. Yesterday he his saturation came down to 88%, was given 2 L of nasal cannula. CXR showed atelectasis b/l. His saturation improved to  94%    Vitals:  T(C): 37.1 ( @ :23), Max: 37.1 (:23)  HR: 67 (:) (67 - 70)  BP: 156/60 (:23) (131/50 - 156/60)  RR: 16 (:) (16 - 18)  SpO2: 93% (:23) (87% - 95%)     @ 07:01  -   @ 07:00  --------------------------------------------------------  IN:    sodium chloride 0.9%: 999 mL  Total IN: 999 mL    OUT:    Bulb (mL): 57 mL    Ileostomy (mL): 90 mL    Indwelling Catheter - Urethral (mL): 500 mL    Voided (mL): 875 mL  Total OUT: 1522 mL    Total NET: -523 mL       @ 07:  -   @ 10:18  --------------------------------------------------------  IN:  Total IN: 0 mL    OUT:    Voided (mL): 200 mL  Total OUT: 200 mL    Total NET: -200 mL    Physical Exam:  General: AAOx3, Well developed, NAD  Chest: Normal respiratory effort  Heart: RRR, hypertensive  Abdomen: prevena in place, ostomy is pink and patent with some whitish tissue on the mucosa, distended, soft, drain is serosanguineous  Neuro/Psych: No localized deficits. Normal speech, normal tone  Skin: Normal, no rashes, no lesions noted.   Extremities: Warm, well perfused, no edema, Pulses intact     @ 04:04                    7.7  CBC: 16.54>)-------(<385                     24.3                 138 | 112 | 31    CMP:  ----------------------< 179               4.3 | 19 | 1.49                      Ca:7.5  Phos:2.6  M.8               -|      |-        LFTs:  ------|-|-----             -|      |-   @ 06:31                    7.7  CBC: 16.20>)-------(<379                     24.7                 139 | 112 | 32    CMP:  ----------------------< 195               4.1 | 19 | 1.79                      Ca:7.8  Phos:1.7  M.0               0.2|      |10        LFTs:  ------|55|-----             -|      |-      Current Inpatient Medications:  acetaminophen     Tablet .. 650 milliGRAM(s) Oral every 6 hours PRN  acetaminophen   IVPB .. 1000 milliGRAM(s) IV Intermittent once  dextrose 5%. 1000 milliLiter(s) (50 mL/Hr) IV Continuous <Continuous>  dextrose 5%. 1000 milliLiter(s) (100 mL/Hr) IV Continuous <Continuous>  dextrose 50% Injectable 25 Gram(s) IV Push once  dextrose 50% Injectable 12.5 Gram(s) IV Push once  dextrose 50% Injectable 25 Gram(s) IV Push once  dextrose Oral Gel 15 Gram(s) Oral once PRN  fat emulsion (Fish Oil and Plant Based) 20% Infusion 33.3 mL/Hr (33.3 mL/Hr) IV Continuous <Continuous>  fat emulsion (Fish Oil and Plant Based) 20% Infusion 0.99 Gm/kG/Day (33.33 mL/Hr) IV Continuous <Continuous>  fat emulsion (Fish Oil and Plant Based) 20% Infusion 0.99 Gm/kG/Day (33.7 mL/Hr) IV Continuous <Continuous>  glucagon  Injectable 1 milliGRAM(s) IntraMuscular once  heparin   Injectable 5000 Unit(s) SubCutaneous every 8 hours  HYDROmorphone  Injectable 2 milliGRAM(s) IV Push every 4 hours PRN  hydrOXYzine hydrochloride 25 milliGRAM(s) Oral every 12 hours PRN  insulin lispro (ADMELOG) corrective regimen sliding scale   SubCutaneous every 6 hours  levothyroxine 88 MICROGram(s) Oral daily  melatonin 3 milliGRAM(s) Oral at bedtime  melatonin 5 milliGRAM(s) Oral at bedtime  metroNIDAZOLE  IVPB 500 milliGRAM(s) IV Intermittent every 8 hours  metroNIDAZOLE  IVPB      nystatin Cream 1 Application(s) Topical two times a day  ondansetron Injectable 4 milliGRAM(s) IV Push every 6 hours PRN  oxyCODONE    IR 5 milliGRAM(s) Oral every 6 hours PRN  pantoprazole  Injectable 40 milliGRAM(s) IV Push daily  Parenteral Nutrition - Adult 1 Each (92 mL/Hr) TPN Continuous <Continuous>  Parenteral Nutrition - Adult 1 Each (92 mL/Hr) TPN Continuous <Continuous>  piperacillin/tazobactam IVPB.. 3.375 Gram(s) IV Intermittent every 12 hours  senna 2 Tablet(s) Oral at bedtime PRN  sodium chloride 0.9%. 1000 milliLiter(s) (50 mL/Hr) IV Continuous <Continuous>  vancomycin    Solution 125 milliGRAM(s) Oral every 6 hours  verapamil  milliGRAM(s) Oral daily

## 2022-07-31 NOTE — CHART NOTE - NSCHARTNOTEFT_GEN_A_CORE
Clinical Nutrition PN Follow Up Note    *86yo male admitted with large midline abdominal wall abscess s/p drainage on 7/22. s/p lap washout and creation of ileostomy on 7/27.    *7/27: PPN d/c'd on 7/26 by MD Ardon.  Pt has been NPO with NGT to LWS (large output), large midline abdominal wall abscess (s/p IR drain 7/22), pending OR for drain.  d/w surgery, to restart PPN today (7/27) due to extended time NPO (>6 days).   *7/28: s/p lap washout of abdomen for nontraumatic indication; s/p creation of diverting loop ileostomy on 7/27.  found extensive adhesions, abscess within pelvis, minimal pus but copious fibrinous tissue and phlegmonous changes.  Pt remains NPO with NGT to LWS.  PPN Day #2 (started on 7/27).    *current status: PPN Day #5 (started on 7/27).  NGT removed.  pt with minimal output of urine and ileostomy past 24hours, additional IVF provided by surgery.  Pt remains NPO until complete bowel function returns. Encouraged to walk to resolve ileus.     *labs reviewed; 07-31; Phosphorus and Magnesium on lower range of normal will increase in PN bag.  corrected Ca elevated, DO NOT supplement calcium outside of PN bag at this time. bilirubin total WNL.  triglyceride within limits for lipids.  POCT elevated, received 9 units of sliding scale in past 24hours, will increase to 12 units of regular insulin in PN bag to aid in glycemic control.    07-31    138  |  112<H>  |  31<H>  ----------------------------<  179<H>  4.3   |  19<L>  |  1.49<H>    Ca    7.5<L>      31 Jul 2022 04:04  Phos  2.6     07-31  Mg     1.8     07-31    TPro  5.2<L>  /  Alb  1.3<L>  /  TBili  0.2  /  DBili  x   /  AST  10<L>  /  ALT  17  /  AlkPhos  55  07-30  BMI: BMI (kg/m2): 29 (07-21-22 @ 12:16)  HbA1c: A1C with Estimated Average Glucose Result: 6.6 % (06-28-22 @ 07:24)    Glucose: POCT Blood Glucose.: 218 mg/dL (07-31-22 @ 06:32)    BP: 131/50 (07-30-22 @ 15:11) (106/41 - 158/54)  Lipid Panel: Date/Time: 07-23-22 @ 04:53  Cholesterol, Serum: --  Direct LDL: --  HDL Cholesterol, Serum: --  Total Cholesterol/HDL Ration Measurement: --  Triglycerides, Serum: 172  POCT Blood Glucose.: 218 mg/dL (31 Jul 2022 06:32)  POCT Blood Glucose.: 177 mg/dL (30 Jul 2022 23:01)  POCT Blood Glucose.: 247 mg/dL (30 Jul 2022 17:42)  POCT Blood Glucose.: 238 mg/dL (30 Jul 2022 11:50)        *pertinent meds: heparin, LR, levothyroxine, lisinopril, zofran, senna, admelog sliding scale, morphine, protonix    *I&O's Detail      30 Jul 2022 07:01  -  31 Jul 2022 07:00  --------------------------------------------------------  IN:    sodium chloride 0.9%: 999 mL  Total IN: 999 mL    OUT:    Bulb (mL): 57 mL    Ileostomy (mL): 90 mL    Indwelling Catheter - Urethral (mL): 500 mL    Voided (mL): 875 mL  Total OUT: 1522 mL    Total NET: -523 mL        *negative fluid status: BM (+) on 7/26 x 3 , liquid, loose.  Minimal ostomy output noted. npt on bowel regimen.     *Elvin Score of 16 ;  no PU documented; abd surgical wound. (+1) scrotum edema documented.    *Malnutrition: severe malnutrition in acute illness r/t decreased ability to meet increased nutr needs 2/2 abd surgery and abd wall abscess AEB NPO, mod muscle/fat wasting, 8% wt loss x 3 mo    Estimated Needs: based on 77Kg (wt from 7/22)  Calories: 1925-2310Kcal (25-30 Kcal/Kg)  Protein: 108-123 g protein (1.4-1.6 g/Kg)  Fluids:  7891-2436 mL (25-30 mL/Kg)    Diet, NPO:   Except Medications (07-29-22 @ 18:15) [Active]      Weight History: no recent weights obtained  Height (cm): 167.6 (07-21-22 @ 12:16), 167.6 (07-06-22 @ 15:24), 167.6 (06-27-22 @ 19:20)  Weight (kg): 81.6 (07-21-22 @ 12:16), 78 (07-06-22 @ 15:24), 81.647 (06-27-22 @ 19:20)  BMI (kg/m2): 29 (07-21-22 @ 12:16), 27.8 (07-06-22 @ 15:24), 29.1 (06-27-22 @ 19:20)  BSA (m2): 1.91 (07-21-22 @ 12:16), 1.88 (07-06-22 @ 15:24), 1.91 (06-27-22 @ 19:20)      *will continue to monitor and adjust PN prn*    PPN Recommendations: via peripheral line  Total Volume: 2200mL  90 g  Amino Acids  100 g Dextrose  80 g Lipids 20%   134 mEq Sodium Chloride  0 mEq Sodium Acetate  15 mmol Sodium Phosphate  10 mEq Potassium Chloride  9 mEq Potassium Acetate  25 mmol Potassium Phosphate  0 mEq Calcium Gluconate ( corrected Ca elevated, DO NOT supplement calcium outside of PN bag at this time. )  15 mEq Magnesium Sulfate  200 mg Thiamine  12 units regular insulin  1 ml Trace Elements  10 ml MVI    Total Calories 1500Kcal  ( Meets  78%  of  Estimated Energy needs  and   83%  Protein needs)(osmolarity <900)    Additional Recommendations:    1) Daily weights  2) Strict I & O's  3) Daily lyte checks including magnesium and phos  4) Weekly triglycerides/LFT checks  5) POCT q6hrs; maintain 140-180mg/dL  6) if pt to be on PN > 6 days, consider placing PICC line for TPN too meet 100% of nutr needs    *will monitor and adjust treatement plan prn*  Vernell Alcala RD, CDN   Nutrition   Office# [phone] Cell# [phone]

## 2022-07-31 NOTE — PROGRESS NOTE ADULT - SUBJECTIVE AND OBJECTIVE BOX
Patient is a 87y Male who is asleep, on PPN      in bed wants to be dcd  no new complaints  Surgery input noted  federico phillips  NPO on PPN      MEDICATIONS  (STANDING):  acetaminophen   IVPB .. 1000 milliGRAM(s) IV Intermittent once  dextrose 5%. 1000 milliLiter(s) (50 mL/Hr) IV Continuous <Continuous>  dextrose 5%. 1000 milliLiter(s) (100 mL/Hr) IV Continuous <Continuous>  dextrose 50% Injectable 25 Gram(s) IV Push once  dextrose 50% Injectable 12.5 Gram(s) IV Push once  dextrose 50% Injectable 25 Gram(s) IV Push once  fat emulsion (Fish Oil and Plant Based) 20% Infusion 33.3 mL/Hr (33.3 mL/Hr) IV Continuous <Continuous>  fat emulsion (Fish Oil and Plant Based) 20% Infusion 0.98 Gm/kG/Day (33.3 mL/Hr) IV Continuous <Continuous>  glucagon  Injectable 1 milliGRAM(s) IntraMuscular once  heparin   Injectable 5000 Unit(s) SubCutaneous every 8 hours  insulin lispro (ADMELOG) corrective regimen sliding scale   SubCutaneous every 6 hours  levothyroxine 88 MICROGram(s) Oral daily  melatonin 3 milliGRAM(s) Oral at bedtime  melatonin 5 milliGRAM(s) Oral at bedtime  metroNIDAZOLE  IVPB 500 milliGRAM(s) IV Intermittent every 8 hours  metroNIDAZOLE  IVPB      nystatin Cream 1 Application(s) Topical two times a day  pantoprazole  Injectable 40 milliGRAM(s) IV Push daily  Parenteral Nutrition - Adult 1 Each (92 mL/Hr) TPN Continuous <Continuous>  Parenteral Nutrition - Adult 1 Each (92 mL/Hr) TPN Continuous <Continuous>  piperacillin/tazobactam IVPB.. 3.375 Gram(s) IV Intermittent every 12 hours  sodium chloride 0.9%. 1000 milliLiter(s) (50 mL/Hr) IV Continuous <Continuous>  vancomycin    Solution 125 milliGRAM(s) Oral every 6 hours  verapamil  milliGRAM(s) Oral daily    MEDICATIONS  (PRN):  acetaminophen     Tablet .. 650 milliGRAM(s) Oral every 6 hours PRN Mild Pain (1 - 3)  dextrose Oral Gel 15 Gram(s) Oral once PRN Blood Glucose LESS THAN 70 milliGRAM(s)/deciliter  HYDROmorphone  Injectable 2 milliGRAM(s) IV Push every 4 hours PRN Severe Pain (7 - 10)  hydrOXYzine hydrochloride 25 milliGRAM(s) Oral every 12 hours PRN Anxiety  ondansetron Injectable 4 milliGRAM(s) IV Push every 6 hours PRN Nausea  oxyCODONE    IR 5 milliGRAM(s) Oral every 6 hours PRN Moderate Pain (4 - 6)  senna 2 Tablet(s) Oral at bedtime PRN Constipation      Vital Signs Last 24 Hrs  T(C): 37.1 (:23), Max: 37.1 (2022 09:23)  T(F): 98.7 (2022 09:23), Max: 98.7 (:23)  HR: 67 (:) (67 - 70)  BP: 156/60 (:) (131/50 - 156/60)  BP(mean): --  RR: 16 (:) (16 - 18)  SpO2: 93% (:) (87% - 95%)    Parameters below as of 2022 09:23  Patient On (Oxygen Delivery Method): nasal cannula  O2 Flow (L/min): 3      I&O's Detail    2022 07:01  -  2022 07:00  --------------------------------------------------------  IN:    sodium chloride 0.9%: 999 mL  Total IN: 999 mL    OUT:    Bulb (mL): 57 mL    Ileostomy (mL): 90 mL    Indwelling Catheter - Urethral (mL): 500 mL    Voided (mL): 875 mL  Total OUT: 1522 mL    Total NET: -523 mL      2022 07:01  -  2022 11:53  --------------------------------------------------------  IN:  Total IN: 0 mL    OUT:    Voided (mL): 200 mL  Total OUT: 200 mL    Total NET: -200 mL              PHYSICAL EXAM:    Constitutional: NAD,frail  HEENT:  MM  dist  Cardiovascular: S1 and S2  Extremities: No peripheral edema  Neurological: asleep but arousable        LABS:                          7.7    16.54 )-----------( 385      ( 2022 04:04 )             24.3                           8.3    21.66 )-----------( 424      ( 2022 04:46 )             26.5     07-31    138  |  112<H>  |  31<H>  ----------------------------<  179<H>  4.3   |  19<L>  |  1.49<H>    Ca    7.5<L>      2022 04:04  Phos  2.6       Mg     1.8         TPro  5.2<L>  /  Alb  1.3<L>  /  TBili  0.2  /  DBili  x   /  AST  10<L>  /  ALT  17  /  AlkPhos  55  07-30      2022 04:46    138    |  110    |  31     ----------------------------<  188    4.1     |  21     |  2.25   2022 22:40    139    |  109    |  31     ----------------------------<  187    4.5     |  24     |  2.42   2022 05:37    141    |  112    |  19     ----------------------------<  187    4.3     |  22     |  1.95   2022 15:13    143    |  112    |  16     ----------------------------<  158    4.3     |  24     |  1.89   2022 04:53    143    |  114    |  15     ----------------------------<  121    4.0     |  24     |  1.91     Ca    7.6        2022 04:46  Ca    7.8        2022 22:40  Ca    8.2        2022 05:37  Ca    8.7        2022 15:13  Ca    8.7        2022 04:53  Phos  2.4       2022 04:46  Phos  3.9       2022 05:37  Phos  3.0       2022 15:13  Phos  2.9       2022 04:53  Phos  2.9       2022 07:14  Mg     1.8       2022 04:46  Mg     1.7       2022 05:37  Mg     1.9       2022 15:13  Mg     1.8       2022 04:53  Mg     2.0       2022 07:14    TPro  5.1    /  Alb  1.4    /  TBili  0.2    /  DBili  x      /  AST  8      /  ALT  18     /  AlkPhos  60     2022 04:46  TPro  5.5    /  Alb  1.5    /  TBili  0.2    /  DBili  0.1    /  AST  11     /  ALT  26     /  AlkPhos  67     2022 05:37  TPro  5.8    /  Alb  1.8    /  TBili  0.2    /  DBili  x      /  AST  14     /  ALT  36     /  AlkPhos  78     2022 15:13          Urine Studies:  Urinalysis Basic - ( 2022 02:30 )    Color: Yellow / Appearance: Clear / S.020 / pH: x  Gluc: x / Ketone: Trace  / Bili: Negative / Urobili: Negative   Blood: x / Protein: 15 / Nitrite: Negative   Leuk Esterase: Trace / RBC: 25-50 /HPF / WBC 6-10   Sq Epi: x / Non Sq Epi: Few / Bacteria: Moderate      Sodium, Random Urine: <20 mmol/L ( @ 02:30)  Creatinine, Random Urine: 127 mg/dL ( @ 02:30)        RADIOLOGY & ADDITIONAL STUDIES:

## 2022-08-01 LAB
ANION GAP SERPL CALC-SCNC: 8 MMOL/L — SIGNIFICANT CHANGE UP (ref 5–17)
BASOPHILS # BLD AUTO: 0.1 K/UL — SIGNIFICANT CHANGE UP (ref 0–0.2)
BASOPHILS NFR BLD AUTO: 0.5 % — SIGNIFICANT CHANGE UP (ref 0–2)
BUN SERPL-MCNC: 30 MG/DL — HIGH (ref 7–23)
CALCIUM SERPL-MCNC: 8 MG/DL — LOW (ref 8.5–10.1)
CHLORIDE SERPL-SCNC: 114 MMOL/L — HIGH (ref 96–108)
CO2 SERPL-SCNC: 17 MMOL/L — LOW (ref 22–31)
CREAT SERPL-MCNC: 1.37 MG/DL — HIGH (ref 0.5–1.3)
EGFR: 50 ML/MIN/1.73M2 — LOW
EOSINOPHIL # BLD AUTO: 0.37 K/UL — SIGNIFICANT CHANGE UP (ref 0–0.5)
EOSINOPHIL NFR BLD AUTO: 2 % — SIGNIFICANT CHANGE UP (ref 0–6)
GLUCOSE SERPL-MCNC: 196 MG/DL — HIGH (ref 70–99)
HCT VFR BLD CALC: 26.6 % — LOW (ref 39–50)
HGB BLD-MCNC: 8.3 G/DL — LOW (ref 13–17)
IMM GRANULOCYTES NFR BLD AUTO: 1.7 % — HIGH (ref 0–1.5)
LYMPHOCYTES # BLD AUTO: 1.16 K/UL — SIGNIFICANT CHANGE UP (ref 1–3.3)
LYMPHOCYTES # BLD AUTO: 6.3 % — LOW (ref 13–44)
MAGNESIUM SERPL-MCNC: 2 MG/DL — SIGNIFICANT CHANGE UP (ref 1.6–2.6)
MCHC RBC-ENTMCNC: 26.5 PG — LOW (ref 27–34)
MCHC RBC-ENTMCNC: 31.2 GM/DL — LOW (ref 32–36)
MCV RBC AUTO: 85 FL — SIGNIFICANT CHANGE UP (ref 80–100)
MONOCYTES # BLD AUTO: 1.75 K/UL — HIGH (ref 0–0.9)
MONOCYTES NFR BLD AUTO: 9.6 % — SIGNIFICANT CHANGE UP (ref 2–14)
NEUTROPHILS # BLD AUTO: 14.62 K/UL — HIGH (ref 1.8–7.4)
NEUTROPHILS NFR BLD AUTO: 79.9 % — HIGH (ref 43–77)
PHOSPHATE SERPL-MCNC: 2.7 MG/DL — SIGNIFICANT CHANGE UP (ref 2.5–4.5)
PLATELET # BLD AUTO: 456 K/UL — HIGH (ref 150–400)
POTASSIUM SERPL-MCNC: 4.2 MMOL/L — SIGNIFICANT CHANGE UP (ref 3.5–5.3)
POTASSIUM SERPL-SCNC: 4.2 MMOL/L — SIGNIFICANT CHANGE UP (ref 3.5–5.3)
RBC # BLD: 3.13 M/UL — LOW (ref 4.2–5.8)
RBC # FLD: 18 % — HIGH (ref 10.3–14.5)
SODIUM SERPL-SCNC: 139 MMOL/L — SIGNIFICANT CHANGE UP (ref 135–145)
WBC # BLD: 18.32 K/UL — HIGH (ref 3.8–10.5)
WBC # FLD AUTO: 18.32 K/UL — HIGH (ref 3.8–10.5)

## 2022-08-01 PROCEDURE — 74018 RADEX ABDOMEN 1 VIEW: CPT | Mod: 26,77

## 2022-08-01 PROCEDURE — 71045 X-RAY EXAM CHEST 1 VIEW: CPT | Mod: 26

## 2022-08-01 PROCEDURE — 74018 RADEX ABDOMEN 1 VIEW: CPT | Mod: 26

## 2022-08-01 RX ORDER — PROCHLORPERAZINE MALEATE 5 MG
5 TABLET ORAL EVERY 8 HOURS
Refills: 0 | Status: DISCONTINUED | OUTPATIENT
Start: 2022-08-01 | End: 2022-08-04

## 2022-08-01 RX ORDER — DEXTROSE MONOHYDRATE, SODIUM CHLORIDE, AND POTASSIUM CHLORIDE 50; .745; 4.5 G/1000ML; G/1000ML; G/1000ML
1000 INJECTION, SOLUTION INTRAVENOUS
Refills: 0 | Status: DISCONTINUED | OUTPATIENT
Start: 2022-08-01 | End: 2022-08-01

## 2022-08-01 RX ORDER — I.V. FAT EMULSION 20 G/100ML
0.98 EMULSION INTRAVENOUS
Qty: 80 | Refills: 0 | Status: DISCONTINUED | OUTPATIENT
Start: 2022-08-01 | End: 2022-08-01

## 2022-08-01 RX ORDER — PIPERACILLIN AND TAZOBACTAM 4; .5 G/20ML; G/20ML
3.38 INJECTION, POWDER, LYOPHILIZED, FOR SOLUTION INTRAVENOUS EVERY 8 HOURS
Refills: 0 | Status: DISCONTINUED | OUTPATIENT
Start: 2022-08-01 | End: 2022-08-09

## 2022-08-01 RX ORDER — PROCHLORPERAZINE MALEATE 5 MG
5 TABLET ORAL EVERY 8 HOURS
Refills: 0 | Status: DISCONTINUED | OUTPATIENT
Start: 2022-08-01 | End: 2022-08-01

## 2022-08-01 RX ORDER — ELECTROLYTE SOLUTION,INJ
1 VIAL (ML) INTRAVENOUS
Refills: 0 | Status: DISCONTINUED | OUTPATIENT
Start: 2022-08-01 | End: 2022-08-01

## 2022-08-01 RX ADMIN — PIPERACILLIN AND TAZOBACTAM 25 GRAM(S): 4; .5 INJECTION, POWDER, LYOPHILIZED, FOR SOLUTION INTRAVENOUS at 11:15

## 2022-08-01 RX ADMIN — Medication 1 EACH: at 23:23

## 2022-08-01 RX ADMIN — Medication 5 MILLIGRAM(S): at 02:19

## 2022-08-01 RX ADMIN — Medication 5 MILLIGRAM(S): at 21:19

## 2022-08-01 RX ADMIN — Medication 100 MILLIGRAM(S): at 21:19

## 2022-08-01 RX ADMIN — Medication 125 MILLIGRAM(S): at 06:14

## 2022-08-01 RX ADMIN — Medication 125 MILLIGRAM(S): at 11:14

## 2022-08-01 RX ADMIN — PANTOPRAZOLE SODIUM 40 MILLIGRAM(S): 20 TABLET, DELAYED RELEASE ORAL at 11:14

## 2022-08-01 RX ADMIN — Medication 1: at 05:33

## 2022-08-01 RX ADMIN — Medication 100 MILLIGRAM(S): at 05:33

## 2022-08-01 RX ADMIN — PIPERACILLIN AND TAZOBACTAM 25 GRAM(S): 4; .5 INJECTION, POWDER, LYOPHILIZED, FOR SOLUTION INTRAVENOUS at 21:18

## 2022-08-01 RX ADMIN — Medication 3 MILLIGRAM(S): at 21:19

## 2022-08-01 RX ADMIN — Medication 240 MILLIGRAM(S): at 11:16

## 2022-08-01 RX ADMIN — HEPARIN SODIUM 5000 UNIT(S): 5000 INJECTION INTRAVENOUS; SUBCUTANEOUS at 16:24

## 2022-08-01 RX ADMIN — Medication 88 MICROGRAM(S): at 06:14

## 2022-08-01 RX ADMIN — Medication 1: at 17:56

## 2022-08-01 RX ADMIN — Medication 125 MILLIGRAM(S): at 23:37

## 2022-08-01 RX ADMIN — HEPARIN SODIUM 5000 UNIT(S): 5000 INJECTION INTRAVENOUS; SUBCUTANEOUS at 21:18

## 2022-08-01 RX ADMIN — Medication 2: at 12:29

## 2022-08-01 RX ADMIN — ONDANSETRON 4 MILLIGRAM(S): 8 TABLET, FILM COATED ORAL at 01:00

## 2022-08-01 RX ADMIN — Medication 100 MILLIGRAM(S): at 16:23

## 2022-08-01 RX ADMIN — HEPARIN SODIUM 5000 UNIT(S): 5000 INJECTION INTRAVENOUS; SUBCUTANEOUS at 05:33

## 2022-08-01 RX ADMIN — NYSTATIN CREAM 1 APPLICATION(S): 100000 CREAM TOPICAL at 11:15

## 2022-08-01 RX ADMIN — ONDANSETRON 4 MILLIGRAM(S): 8 TABLET, FILM COATED ORAL at 07:24

## 2022-08-01 RX ADMIN — NYSTATIN CREAM 1 APPLICATION(S): 100000 CREAM TOPICAL at 21:45

## 2022-08-01 RX ADMIN — I.V. FAT EMULSION 33.33 GM/KG/DAY: 20 EMULSION INTRAVENOUS at 23:33

## 2022-08-01 RX ADMIN — Medication 125 MILLIGRAM(S): at 17:45

## 2022-08-01 NOTE — CHART NOTE - NSCHARTNOTEFT_GEN_A_CORE
Clinical Nutrition PN Follow Up Note:    *86yo male admitted with large midline abdominal wall abscess s/p drainage on 7/22. s/p lap washout and creation of ileostomy on 7/27.    *7/27: PPN d/c'd on 7/26 by MD Ardon.  Pt has been NPO with NGT to LWS (large output), large midline abdominal wall abscess (s/p IR drain 7/22), pending OR for drain.  d/w surgery, to restart PPN today (7/27) due to extended time NPO (>6 days).   *7/28: s/p lap washout of abdomen for nontraumatic indication; s/p creation of diverting loop ileostomy on 7/27.  found extensive adhesions, abscess within pelvis, minimal pus but copious fibrinous tissue and phlegmonous changes.  Pt remains NPO with NGT to LWS.  PPN Day #2 (started on 7/27).    *current status: PPN Day #6 (started on 7/27).  NGT removed.  pt with increased output of urine past 24 hrs.  Pt remains NPO until complete bowel function returns. Encouraged to walk to resolve ileus.     *labs reviewed; 08/1; Phosphorus and Magnesium trending up, but on lower range of normal will increase in PN bag. Corrected Ca elevated, DO NOT supplement calcium outside of PN bag at this time. Bilirubin total WNL.  Triglyceride WNL for lipids.  POCT remains elevated, received 8 units of sliding scale in past 24hours, will increase to 14 units of regular insulin in PN bag to aid in glycemic control.      Basic Metabolic Panel in AM (08.01.22 @ 06:43)   Sodium, Serum: 139 mmol/L   Potassium, Serum: 4.2 mmol/L   Chloride, Serum: 114 mmol/L   Carbon Dioxide, Serum: 17 mmol/L   Anion Gap, Serum: 8 mmol/L   Blood Urea Nitrogen, Serum: 30 mg/dL   Creatinine, Serum: 1.37 mg/dL   Glucose, Serum: 196 mg/dL   Calcium, Total Serum: 8.0 mg/dL         TPro  5.2<L>  /  Alb  1.3<L>  /  TBili  0.2  /  DBili  x   /  AST  10<L>  /  ALT  17  /  AlkPhos  55  07-30      BMI: BMI (kg/m2): 29 (07-21-22 @ 12:16)  HbA1c: A1C with Estimated Average Glucose Result: 6.6 % (06-28-22 @ 07:24)    POCT Blood Glucose.: 194 mg/dL (08.01.22 @ 05:26)       BP: 131/50 (07-30-22 @ 15:11) (106/41 - 158/54)  Lipid Panel: Date/Time: 07-23-22 @ 04:53  Cholesterol, Serum: --  Direct LDL: --  HDL Cholesterol, Serum: --  Total Cholesterol/HDL Ration Measurement: --  Triglycerides, Serum: 172  POCT Blood Glucose.: 194 mg/dL (08.01.22 @ 05:26)      *pertinent meds: heparin,  levothyroxine, glucagon, lisinopril, zofran, senna, admelog sliding scale, morphine, protonix    *I&O's Detail    31 Jul 2022 07:01  -  01 Aug 2022 07:00  --------------------------------------------------------  IN:  Total IN: 0 mL    OUT:    Bulb (mL): 80 mL    Ileostomy (mL): 40 mL    Voided (mL): 2355 mL  Total OUT: 2475 mL    Total NET: -2475 mL      01 Aug 2022 07:01  -  01 Aug 2022 08:41  --------------------------------------------------------  IN:  Total IN: 0 mL    OUT:    Voided (mL): 100 mL  Total OUT: 100 mL    Total NET: -100 mL        *negative fluid status: Dark green vomit as per flowsheet on 08/1,  BM (+) on 7/26 x 3 , liquid, loose.  Previous minimal output noted, pt output has increased. npt on bowel regimen.     *Elvin Score of 14 ;  no PU documented; abd surgical wound. (+1) scrotum edema documented.    *Malnutrition: severe malnutrition in acute illness r/t decreased ability to meet increased nutr needs 2/2 abd surgery and abd wall abscess AEB NPO, mod muscle/fat wasting, 8% wt loss x 3 mo    Estimated Needs: based on 77Kg (wt from 7/22)  Calories: 1925-2310Kcal (25-30 Kcal/Kg)  Protein: 108-123 g protein (1.4-1.6 g/Kg)  Fluids:  2559-1231 mL (25-30 mL/Kg)    Diet, NPO:   Except Medications (07-29-22 @ 18:15) [Active]      Weight History: no recent weights obtained  Height (cm): 167.6 (07-21-22 @ 12:16), 167.6 (07-06-22 @ 15:24), 167.6 (06-27-22 @ 19:20)  Weight (kg): 81.6 (07-21-22 @ 12:16), 78 (07-06-22 @ 15:24), 81.647 (06-27-22 @ 19:20)  BMI (kg/m2): 29 (07-21-22 @ 12:16), 27.8 (07-06-22 @ 15:24), 29.1 (06-27-22 @ 19:20)  BSA (m2): 1.91 (07-21-22 @ 12:16), 1.88 (07-06-22 @ 15:24), 1.91 (06-27-22 @ 19:20)      *will continue to monitor and adjust PN prn*    PPN Recommendations: via peripheral line  Total Volume: 2200mL  90 g  Amino Acids  100 g Dextrose  80 g Lipids 20%   134 mEq Sodium Chloride  0 mEq Sodium Acetate  15 mmol Sodium Phosphate  10 mEq Potassium Chloride  9 mEq Potassium Acetate  25 mmol Potassium Phosphate  0 mEq Calcium Gluconate ( corrected Ca elevated, DO NOT supplement calcium outside of PN bag at this time. )  15 mEq Magnesium Sulfate  200 mg Thiamine  12 units regular insulin  1 ml Trace Elements  10 ml MVI    Total Calories 1500Kcal  ( Meets  78%  of  Estimated Energy needs  and   83%  Protein needs)(osmolarity <900)    Additional Recommendations:    1) Daily weights  2) Strict I & O's  3) Daily lyte checks including magnesium and phos  4) Weekly triglycerides/LFT checks  5) POCT q6hrs; maintain 140-180mg/dL  6) if pt to be on PN > 6 days, consider placing PICC line for TPN too meet 100% of nutr needs    *will monitor and adjust treatement plan prn*  Vernell Alcala RD, CDN   Nutrition   Office# [phone] Cell# [phone] Clinical Nutrition PN Follow Up Note:    *86yo male admitted with large midline abdominal wall abscess s/p drainage on 7/22. s/p lap washout and creation of ileostomy on 7/27.    *7/27: PPN d/c'd on 7/26 by MD Ardon.  Pt has been NPO with NGT to LWS (large output), large midline abdominal wall abscess (s/p IR drain 7/22), pending OR for drain.  d/w surgery, to restart PPN today (7/27) due to extended time NPO (>6 days).   *7/28: s/p lap washout of abdomen for nontraumatic indication; s/p creation of diverting loop ileostomy on 7/27.  found extensive adhesions, abscess within pelvis, minimal pus but copious fibrinous tissue and phlegmonous changes.  Pt remains NPO with NGT to LWS.  PPN Day #2 (started on 7/27).    *current status: PPN Day #6 (started on 7/27).  NGT removed.  pt with improved output of urine past 24 hrs.  Pt remains NPO until complete bowel function returns. Encouraged to walk to resolve ileus.     *labs reviewed; 08/1; Phosphorus and Magnesium trending up, but on lower range of normal will increase in PN bag. Corrected Ca elevated, DO NOT supplement calcium outside of PN bag at this time. Bilirubin total WNL.  Triglyceride WNL for lipids.  POCT remains elevated, received 8 units of sliding scale in past 24hours, will increase to 14 units of regular insulin in PN bag to aid in glycemic control.      Basic Metabolic Panel in AM (08.01.22 @ 06:43)   Sodium, Serum: 139 mmol/L   Potassium, Serum: 4.2 mmol/L   Chloride, Serum: 114 mmol/L   Carbon Dioxide, Serum: 17 mmol/L   Anion Gap, Serum: 8 mmol/L   Blood Urea Nitrogen, Serum: 30 mg/dL   Creatinine, Serum: 1.37 mg/dL   Glucose, Serum: 196 mg/dL   Calcium, Total Serum: 8.0 mg/dL       TPro  5.2<L>  /  Alb  1.3<L>  /  TBili  0.2  /  DBili  x   /  AST  10<L>  /  ALT  17  /  AlkPhos  55  07-30      BMI: BMI (kg/m2): 29 (07-21-22 @ 12:16)  HbA1c: A1C with Estimated Average Glucose Result: 6.6 % (06-28-22 @ 07:24)    POCT Blood Glucose.: 194 mg/dL (08.01.22 @ 05:26)       BP: 131/50 (07-30-22 @ 15:11) (106/41 - 158/54)  Lipid Panel: Date/Time: 07-23-22 @ 04:53  Cholesterol, Serum: --  Direct LDL: --  HDL Cholesterol, Serum: --  Total Cholesterol/HDL Ration Measurement: --  Triglycerides, Serum: 172  POCT Blood Glucose.: 194 mg/dL (08.01.22 @ 05:26)      *pertinent meds: heparin,  levothyroxine, glucagon, lisinopril, zofran, senna, admelog sliding scale, morphine, protonix    *I&O's Detail    31 Jul 2022 07:01  -  01 Aug 2022 07:00  --------------------------------------------------------  IN:  Total IN: 0 mL    OUT:    Bulb (mL): 80 mL    Ileostomy (mL): 40 mL    Voided (mL): 2355 mL  Total OUT: 2475 mL    Total NET: -2475 mL      01 Aug 2022 07:01  -  01 Aug 2022 08:41  --------------------------------------------------------  IN:  Total IN: 0 mL    OUT:    Voided (mL): 100 mL  Total OUT: 100 mL    Total NET: -100 mL        *negative fluid status: Dark green vomit as per flowsheet on 08/1,  BM (+) on 7/26 x 3 , liquid, loose.  Previous minimal output noted, pt output has increased. npt on bowel regimen.     *Elvin Score of 14 ;  no PU documented; abd surgical wound. (+1) scrotum edema documented.    *Malnutrition: severe malnutrition in acute illness r/t decreased ability to meet increased nutr needs 2/2 abd surgery and abd wall abscess AEB NPO, mod muscle/fat wasting, 8% wt loss x 3 mo    Estimated Needs: based on 77Kg (wt from 7/22)  Calories: 1925-2310Kcal (25-30 Kcal/Kg)  Protein: 108-123 g protein (1.4-1.6 g/Kg)  Fluids:  2456-0513 mL (25-30 mL/Kg)    Diet, NPO:   Except Medications (07-29-22 @ 18:15) [Active]      Weight History: no recent weights obtained  Height (cm): 167.6 (07-21-22 @ 12:16), 167.6 (07-06-22 @ 15:24), 167.6 (06-27-22 @ 19:20)  Weight (kg): 81.6 (07-21-22 @ 12:16), 78 (07-06-22 @ 15:24), 81.647 (06-27-22 @ 19:20)  BMI (kg/m2): 29 (07-21-22 @ 12:16), 27.8 (07-06-22 @ 15:24), 29.1 (06-27-22 @ 19:20)  BSA (m2): 1.91 (07-21-22 @ 12:16), 1.88 (07-06-22 @ 15:24), 1.91 (06-27-22 @ 19:20)      *will continue to monitor and adjust PN prn*    PPN Recommendations: via peripheral line  Total Volume: 2200mL  90 g  Amino Acids  100 g Dextrose  80 g Lipids 20%   134 mEq Sodium Chloride  0 mEq Sodium Acetate  15 mmol Sodium Phosphate  10 mEq Potassium Chloride  9 mEq Potassium Acetate  25 mmol Potassium Phosphate  0 mEq Calcium Gluconate ( corrected Ca elevated, DO NOT supplement calcium outside of PN bag at this time. )  17 mEq Magnesium Sulfate  200 mg Thiamine  14 units regular insulin  1 ml Trace Elements  10 ml MVI    Total Calories 1500Kcal  ( Meets  78%  of  Estimated Energy needs  and   83%  Protein needs)(osmolarity <900)    Additional Recommendations:    1) Daily weights  2) Strict I & O's  3) Daily lyte checks including magnesium and phos  4) Weekly triglycerides/LFT checks  5) POCT q6hrs; maintain 140-180mg/dL  6) if pt to be on PN > 6 days, consider placing PICC line for TPN too meet 100% of nutr needs    *will monitor and adjust treatement plan prn*    Rica Pate M.S., Dietitian Registration Eligible [phone] Clinical Nutrition PN Follow Up Note:    *86yo male admitted with large midline abdominal wall abscess s/p drainage on 7/22. s/p lap washout and creation of ileostomy on 7/27.    *7/27: PPN d/c'd on 7/26 by MD Ardon.  Pt has been NPO with NGT to LWS (large output), large midline abdominal wall abscess (s/p IR drain 7/22), pending OR for drain.  d/w surgery, to restart PPN today (7/27) due to extended time NPO (>6 days).   *7/28: s/p lap washout of abdomen for nontraumatic indication; s/p creation of diverting loop ileostomy on 7/27.  found extensive adhesions, abscess within pelvis, minimal pus but copious fibrinous tissue and phlegmonous changes.  Pt remains NPO with NGT to LWS.      *current status: PPN Day #6 (started on 7/27).  NGT removed.  pt with improved output of urine past 24 hrs.  Pt remains NPO until complete bowel function returns. Encouraged to walk to resolve ileus.     *labs reviewed; 08/1; Phosphorus and Magnesium trending up, but on lower range of normal will increase in PN bag. Corrected Ca elevated, DO NOT supplement calcium outside of PN bag at this time. Bilirubin total WNL.  Triglyceride WNL for lipids.  POCT remains elevated, received 8 units of sliding scale in past 24hours, will increase to 14 units of regular insulin in PN bag to aid in glycemic control.      Basic Metabolic Panel in AM (08.01.22 @ 06:43)   Sodium, Serum: 139 mmol/L   Potassium, Serum: 4.2 mmol/L   Chloride, Serum: 114 mmol/L   Carbon Dioxide, Serum: 17 mmol/L   Anion Gap, Serum: 8 mmol/L   Blood Urea Nitrogen, Serum: 30 mg/dL   Creatinine, Serum: 1.37 mg/dL   Glucose, Serum: 196 mg/dL   Calcium, Total Serum: 8.0 mg/dL       TPro  5.2<L>  /  Alb  1.3<L>  /  TBili  0.2  /  DBili  x   /  AST  10<L>  /  ALT  17  /  AlkPhos  55  07-30      BMI: BMI (kg/m2): 29 (07-21-22 @ 12:16)  HbA1c: A1C with Estimated Average Glucose Result: 6.6 % (06-28-22 @ 07:24)    POCT Blood Glucose.: 194 mg/dL (08.01.22 @ 05:26)       BP: 131/50 (07-30-22 @ 15:11) (106/41 - 158/54)  Lipid Panel: Date/Time: 07-23-22 @ 04:53  Cholesterol, Serum: --  Direct LDL: --  HDL Cholesterol, Serum: --  Total Cholesterol/HDL Ration Measurement: --  Triglycerides, Serum: 172  POCT Blood Glucose.: 194 mg/dL (08.01.22 @ 05:26)      *pertinent meds: heparin,  levothyroxine, glucagon, lisinopril, zofran, senna, admelog sliding scale, morphine, protonix    *I&O's Detail    31 Jul 2022 07:01  -  01 Aug 2022 07:00  --------------------------------------------------------  IN:  Total IN: 0 mL    OUT:    Bulb (mL): 80 mL    Ileostomy (mL): 40 mL    Voided (mL): 2355 mL  Total OUT: 2475 mL    Total NET: -2475 mL      01 Aug 2022 07:01  -  01 Aug 2022 08:41  --------------------------------------------------------  IN:  Total IN: 0 mL    OUT:    Voided (mL): 100 mL  Total OUT: 100 mL    Total NET: -100 mL        *negative fluid status: Dark green vomit as per flowsheet on 08/1,  BM (+) on 7/26 x 3 , liquid, loose.  Previous minimal output noted, pt output has increased. npt on bowel regimen.     *Elvin Score of 14 ;  no PU documented; abd surgical wound. (+1) scrotum edema documented.    *Malnutrition: severe malnutrition in acute illness r/t decreased ability to meet increased nutr needs 2/2 abd surgery and abd wall abscess AEB NPO, mod muscle/fat wasting, 8% wt loss x 3 mo    Estimated Needs: based on 77Kg (wt from 7/22)  Calories: 1925-2310Kcal (25-30 Kcal/Kg)  Protein: 108-123 g protein (1.4-1.6 g/Kg)  Fluids:  6580-1140 mL (25-30 mL/Kg)    Diet, NPO:   Except Medications (07-29-22 @ 18:15) [Active]      Weight History: no recent weights obtained  Height (cm): 167.6 (07-21-22 @ 12:16), 167.6 (07-06-22 @ 15:24), 167.6 (06-27-22 @ 19:20)  Weight (kg): 81.6 (07-21-22 @ 12:16), 78 (07-06-22 @ 15:24), 81.647 (06-27-22 @ 19:20)  BMI (kg/m2): 29 (07-21-22 @ 12:16), 27.8 (07-06-22 @ 15:24), 29.1 (06-27-22 @ 19:20)  BSA (m2): 1.91 (07-21-22 @ 12:16), 1.88 (07-06-22 @ 15:24), 1.91 (06-27-22 @ 19:20)      *will continue to monitor and adjust PN prn*    PPN Recommendations: via peripheral line  Total Volume: 2200mL  90 g  Amino Acids  100 g Dextrose  80 g Lipids 20%   134 mEq Sodium Chloride  0 mEq Sodium Acetate  15 mmol Sodium Phosphate  10 mEq Potassium Chloride  9 mEq Potassium Acetate  25 mmol Potassium Phosphate  0 mEq Calcium Gluconate ( corrected Ca elevated, DO NOT supplement calcium outside of PN bag at this time. )  17 mEq Magnesium Sulfate  200 mg Thiamine  14 units regular insulin  1 ml Trace Elements  10 ml MVI    Total Calories 1500Kcal  ( Meets  78%  of  Estimated Energy needs  and   83%  Protein needs)(osmolarity <900)    Additional Recommendations:    1) Daily weights  2) Strict I & O's  3) Daily lyte checks including magnesium and phos  4) Weekly triglycerides/LFT checks  5) POCT q6hrs; maintain 140-180mg/dL  6) if pt to be on PN > 6 days, consider placing PICC line for TPN too meet 100% of nutr needs    *will monitor and adjust treatement plan prn*    Rica Pate M.S., Dietitian Registration Eligible [phone] Clinical Nutrition PN Follow Up Note:    *86yo male admitted with large midline abdominal wall abscess s/p drainage on 7/22. s/p lap washout and creation of ileostomy on 7/27.    *7/27: PPN d/c'd on 7/26 by MD Ardon.  Pt has been NPO with NGT to LWS (large output), large midline abdominal wall abscess (s/p IR drain 7/22), pending OR for drain.  d/w surgery, to restart PPN today (7/27) due to extended time NPO (>6 days).   *7/28: s/p lap washout of abdomen for nontraumatic indication; s/p creation of diverting loop ileostomy on 7/27.  found extensive adhesions, abscess within pelvis, minimal pus but copious fibrinous tissue and phlegmonous changes.  Pt remains NPO with NGT to LWS.      *current status: PPN Day #6 (started on 7/27).  NGT removed.  pt with improved output of urine past 24 hrs.  Pt remains NPO until complete bowel function returns. Encouraged to walk to resolve ileus.     *labs reviewed; 08/1; Phosphorus and Magnesium trending up, but on lower range of normal, will increase magnesium only in PN bag and will continue to monitor phosphorus. Corrected Ca elevated, DO NOT supplement calcium outside of PN bag at this time. Bilirubin total WNL.  Triglyceride WNL for lipids.  POCT remains elevated, received 8 units of sliding scale in past 24hours, will increase to 14 units of regular insulin in PN bag to aid in glycemic control.      08-01    139  |  114<H>  |  30<H>  ----------------------------<  196<H>  4.2   |  17<L>  |  1.37<H>    Ca    8.0<L>      01 Aug 2022 06:43  Phos  2.7     08-01  Mg     2.0     08-01        BMI: BMI (kg/m2): 29 (07-21-22 @ 12:16)  HbA1c: A1C with Estimated Average Glucose Result: 6.6 % (06-28-22 @ 07:24)    POCT Blood Glucose.: 194 mg/dL (08.01.22 @ 05:26)       BP: 131/50 (07-30-22 @ 15:11) (106/41 - 158/54)  Lipid Panel: Date/Time: 07-23-22 @ 04:53  Cholesterol, Serum: --  Direct LDL: --  HDL Cholesterol, Serum: --  Total Cholesterol/HDL Ration Measurement: --  Triglycerides, Serum: 172    POCT Blood Glucose.: 194 mg/dL (01 Aug 2022 05:26)  POCT Blood Glucose.: 177 mg/dL (31 Jul 2022 22:39)  POCT Blood Glucose.: 209 mg/dL (31 Jul 2022 17:49)  POCT Blood Glucose.: 226 mg/dL (31 Jul 2022 12:08)        *pertinent meds: heparin,  levothyroxine, glucagon, lisinopril, zofran, senna, admelog sliding scale, morphine, protonix    *I&O's Detail    31 Jul 2022 07:01  -  01 Aug 2022 07:00  --------------------------------------------------------  IN:  Total IN: 0 mL    OUT:    Bulb (mL): 80 mL    Ileostomy (mL): 40 mL    Voided (mL): 2355 mL  Total OUT: 2475 mL    Total NET: -2475 mL      01 Aug 2022 07:01  -  01 Aug 2022 08:41  --------------------------------------------------------  IN:  Total IN: 0 mL    OUT:    Voided (mL): 100 mL  Total OUT: 100 mL    Total NET: -100 mL        *negative fluid status: Dark green vomit as per flow sheet on 08/1,  BM (+) on 7/26 x 3 , liquid, loose.  Previous minimal output noted, pt output has increased. npt on bowel regimen.     *Elvin Score of 14 ;  no PU documented; abd surgical wound. (+1) scrotum edema documented.    *Malnutrition: severe malnutrition in acute illness r/t decreased ability to meet increased nutr needs 2/2 abd surgery and abd wall abscess AEB NPO, mod muscle/fat wasting, 8% wt loss x 3 mo    Estimated Needs: based on 77Kg (wt from 7/22)  Calories: 1925-2310Kcal (25-30 Kcal/Kg)  Protein: 108-123 g protein (1.4-1.6 g/Kg)  Fluids:  8111-7872 mL (25-30 mL/Kg)    Diet, NPO:   Except Medications (07-29-22 @ 18:15) [Active]      Weight History: no recent weights obtained  Height (cm): 167.6 (07-21-22 @ 12:16), 167.6 (07-06-22 @ 15:24), 167.6 (06-27-22 @ 19:20)  Weight (kg): 81.6 (07-21-22 @ 12:16), 78 (07-06-22 @ 15:24), 81.647 (06-27-22 @ 19:20)  BMI (kg/m2): 29 (07-21-22 @ 12:16), 27.8 (07-06-22 @ 15:24), 29.1 (06-27-22 @ 19:20)  BSA (m2): 1.91 (07-21-22 @ 12:16), 1.88 (07-06-22 @ 15:24), 1.91 (06-27-22 @ 19:20)      *will continue to monitor and adjust PN prn*    PPN Recommendations: via peripheral line  Total Volume: 2200mL  90 g  Amino Acids  100 g Dextrose  80 g Lipids 20%   134 mEq Sodium Chloride  0 mEq Sodium Acetate  15 mmol Sodium Phosphate  10 mEq Potassium Chloride  9 mEq Potassium Acetate  25 mmol Potassium Phosphate  0 mEq Calcium Gluconate ( corrected Ca elevated, DO NOT supplement calcium outside of PN bag at this time. )  17 mEq Magnesium Sulfate  200 mg Thiamine  14 units regular insulin  1 ml Trace Elements  10 ml MVI    Total Calories 1500Kcal  ( Meets  78%  of  Estimated Energy needs  and   83%  Protein needs)(osmolarity <900)    Additional Recommendations:    1) Daily weights  2) Strict I & O's  3) Daily lyte checks including magnesium and phos  4) Weekly triglycerides/LFT checks  5) POCT q6hrs; maintain 140-180mg/dL  6) if pt to be on PN > 6 days, consider placing PICC line for TPN too meet 100% of nutr needs    *will monitor and adjust treatement plan prn*    Rica Pate M.S., Dietitian Registration Eligible [phone]

## 2022-08-01 NOTE — PROGRESS NOTE ADULT - ATTENDING COMMENTS
Seen and examined.  Nauseated this AM. AXR with dilated stomach. Vomited.  NGT placed with 500cc out.  Ileostomy non functional yet.  AFVSS  NAD  incision clean, stoma healthy, soft, NT, distended  Labs reviewed  A/P - postop ileus  NGT, NPO, PICC line for TPN.  AXR in AM.  Ambulate.  Encouraged and reassured patient.

## 2022-08-01 NOTE — ADVANCED PRACTICE NURSE CONSULT - RECOMMEDATIONS
Pouch change: 	  -Gently remove pouch water moistened gauze   -Cleanse stoma site with water moistened gauze, gently pat dry  -Measure stoma, currently 1 3/4"  -Trace and cut current size on [place] 2 1/4” CeraPlus flat barrier  -Apply barrier to patient's skin  -Attach [place] 2 1/4” drainable lock and roll pouch (#[id number]) onto barrier  Empty pouch when 1/3 to no more than 1/2 full of effluent/flatus. Change pouching system q 3 - 4 days and prn for leaking. Next pouch change- 8/4/2022

## 2022-08-01 NOTE — PROGRESS NOTE ADULT - SUBJECTIVE AND OBJECTIVE BOX
Pt. seen and examined at bedside this morning. Patient reports his pain is better, abdomen is distended. He is frustrated and wants to go home. He denies fever, chest pain, SOB, nausea, vomiting.    Physical Exam:  General: AAOx3, Well developed, NAD  Chest: Normal respiratory effort  Heart: RRR, hypertensive  Abdomen: prevena in place, ostomy is pink and patent with some whitish tissue on the mucosa, distended, soft, drain is serosanguineous  Neuro/Psych: No localized deficits. Normal speech, normal tone  Skin: Normal, no rashes, no lesions noted.   Extremities: Warm, well perfused, no edema, Pulses intact    Vitals:  T(C): 36.5 ( @ 08:38), Max: 37 ( @ 16:51)  HR: 69 ( @ 08:38) (64 - 70)  BP: 157/73 ( @ 08:38) (138/58 - 157/73)  RR: 17 ( @ 08:38) (17 - 18)  SpO2: 94% ( @ 08:38) (94% - 98%)     @ 07:01  -   @ 07:00  --------------------------------------------------------  IN:  Total IN: 0 mL    OUT:    Bulb (mL): 80 mL    Ileostomy (mL): 40 mL    Voided (mL): 2355 mL  Total OUT: 2475 mL    Total NET: -2475 mL       @ 07:  -   @ 09:48  --------------------------------------------------------  IN:  Total IN: 0 mL    OUT:    Voided (mL): 100 mL  Total OUT: 100 mL    Total NET: -100 mL       @ 06:43                    8.3  CBC: 18.32>)-------(<456                     26.6                 139 | 114 | 30    CMP:  ----------------------< 196               4.2 | 17 | 1.37                      Ca:8.0  Phos:2.7  M.0               -|      |-        LFTs:  ------|-|-----             -|      |-  07-31 @ 04:04                    7.7  CBC: 16.54>)-------(<385                     24.3                 138 | 112 | 31    CMP:  ----------------------< 179               4.3 | 19 | 1.49                      Ca:7.5  Phos:2.6  M.8               -|      |-        LFTs:  ------|-|-----             -|      |-      Current Inpatient Medications:  acetaminophen     Tablet .. 650 milliGRAM(s) Oral every 6 hours PRN  acetaminophen   IVPB .. 1000 milliGRAM(s) IV Intermittent once  dextrose 5%. 1000 milliLiter(s) (50 mL/Hr) IV Continuous <Continuous>  dextrose 5%. 1000 milliLiter(s) (100 mL/Hr) IV Continuous <Continuous>  dextrose 50% Injectable 25 Gram(s) IV Push once  dextrose 50% Injectable 12.5 Gram(s) IV Push once  dextrose 50% Injectable 25 Gram(s) IV Push once  dextrose Oral Gel 15 Gram(s) Oral once PRN  fat emulsion (Fish Oil and Plant Based) 20% Infusion 33.3 mL/Hr (33.3 mL/Hr) IV Continuous <Continuous>  fat emulsion (Fish Oil and Plant Based) 20% Infusion 0.99 Gm/kG/Day (33.7 mL/Hr) IV Continuous <Continuous>  glucagon  Injectable 1 milliGRAM(s) IntraMuscular once  heparin   Injectable 5000 Unit(s) SubCutaneous every 8 hours  HYDROmorphone  Injectable 2 milliGRAM(s) IV Push every 4 hours PRN  hydrOXYzine hydrochloride 25 milliGRAM(s) Oral every 12 hours PRN  insulin lispro (ADMELOG) corrective regimen sliding scale   SubCutaneous every 6 hours  levothyroxine 88 MICROGram(s) Oral daily  melatonin 3 milliGRAM(s) Oral at bedtime  melatonin 5 milliGRAM(s) Oral at bedtime  metroNIDAZOLE  IVPB 500 milliGRAM(s) IV Intermittent every 8 hours  metroNIDAZOLE  IVPB      nystatin Cream 1 Application(s) Topical two times a day  ondansetron Injectable 4 milliGRAM(s) IV Push every 6 hours PRN  oxyCODONE    IR 5 milliGRAM(s) Oral every 6 hours PRN  pantoprazole  Injectable 40 milliGRAM(s) IV Push daily  Parenteral Nutrition - Adult 1 Each (92 mL/Hr) TPN Continuous <Continuous>  piperacillin/tazobactam IVPB.. 3.375 Gram(s) IV Intermittent every 12 hours  prochlorperazine   Injectable 5 milliGRAM(s) IV Push every 8 hours PRN  senna 2 Tablet(s) Oral at bedtime PRN  vancomycin    Solution 125 milliGRAM(s) Oral every 6 hours  verapamil  milliGRAM(s) Oral daily

## 2022-08-01 NOTE — PROVIDER CONTACT NOTE (OTHER) - RECOMMENDATIONS
Recommending CXR  Hold heparin
protocol reports to start d5w @100, however md reported earlier that pt will likely start clears soon
Scan to see what is going on
Continue Stroud catheter, and bladder scan x1. MD may had to determine bladder pressure but wants bladder scanned first.
Give pt a little break from IVF of NS@50 until morning.
Lower fluid rate
Incentive encouraged and will re-evaluate patient without O2
Continue Stroud catheter to monitor urine output.

## 2022-08-01 NOTE — PROGRESS NOTE ADULT - ASSESSMENT
87M with history of sigmoidectomy and ileocecectomy 6/29, patient returned to  with pelvis abscess now s/p IR drainage and IR drain upsizing. Patient taken to OR for diagnostic lap converted to exploratory laparotomy, abdominal washout, diverting loop ileostomy POD 5. AFVSS, persistent leukocytosis. Ostomy not functional as of yet. AXR showed dilated loops of small bowel. NGT -placed in the bedside    Plan:   Pain and nausea control prn   Monitor VS   NPO except meds  Monitor ostomy output   NGT to wall suction  PT   OOBC, ambulation   Infectious disease recommendations  Keep PPN  CXR and AXR now    Plan discussed with colorectal surgery service

## 2022-08-01 NOTE — PROVIDER CONTACT NOTE (OTHER) - ASSESSMENT
Pt not making adequate urine
distended abd. c/o indigestion. VSS
Pt has not been doing his deep breathing. Pt is not SOB and feels comfortable. Placed patient on 2L and O2 was 96%. Encouraged incentive spirometer and re-educated patient.
Bladder scanned 7mL in bladder--> not retaining
Pt denies any discomfort or pressure. Minimal output in ileostomy as well.

## 2022-08-01 NOTE — ADVANCED PRACTICE NURSE CONSULT - ASSESSMENT
Discussed PICC placement order with Dr. Friedman regarding elevated WBC, who authorized placement today. Pt confirmed using two identifiers (name and ). PICC placement procedure discussed with risks and benefits reviewed with daughter and patient. All questions answered. Daughter signed consent. Chart was reviewed. Pt prepped and draped in sterile fashion. Using strict aseptic technique that was maintained throughout procedure, performed hand hygiene, all team members maintained full barrier precautions, 2.5cc 1% lidocaine used subdermally. Using MST and guided by ultrasound[facility] with PASV technology 5Fr, lot #[id number], right brachial vein accessed. PICC trimmed to 38cm and inserted. Brisk blood return observed and flushed with 40cc NS. Stat lock and gauze applied with dressings, end cap and Curos cap applied. All sharps accounted for and disposed of in sharps container. Pt tolerated procedure well. Minimal blood loss noted. Do Not Use Extremity band applied to right wrist. CXR performed to confirm placement and no pneumothorax. Pt provided with PICC line care information. Report given to primary RN. 
88 y/o male w/ a PMHx of sepsis, neuropathy, prostate CA, Crohn's disease, hypothyroidism, depression, intervertebral disc disorder, DM, obesity, HTN, hypercholesterolemia, CAD w/ stents, macular degeneration, hearing loss and colon cancer s/p laparoscopic ileocolic resection on 6/29/22 with Dr. Ardon. On 7/27/2022 has a Diverting loop ileostomy Created.     Received patient sitting up in bed, awake, with recognition of WOCN from previous visits, made aware of purpose of WOCN, agreeable to pouch change. Patient currently with NG tube and full ostomy education will not be effective at this time. Patient very focused on the NG Tube.    Loop Ileotomy RUQ w/ Edwin drainable pouching system applied  in place, barrier intact, no leakage. Pouch gently removed from pt's abdomen w/ water moistened gauze.     Type of ostomy:  loop Ileostomy   Location: RUQ  Song: Removed   Size: Round in shape, now measuring 1 3/4" and protruding 1 1/4"   Mucosa: red, moist, remains edematous & slightly translucent   Peristomal skin: intact   Mucocutaneous junction: intact  Abdominal contours: round and firm   Output: ~no output or gas noted in ostomy bag  Midline/lap sites/ drains: Midline incision  -c/d/i; LANDRY x 1 to LLQ in place     Patient re-pouched w/ Edwin 2 1/4" CertaPlus flat barrier (1 3/4" ), and Edwin  2 1/4” drainable pouch w/ lock & roll closure.  Full ostomy lesson provided to patient.    Impression:  Loop Ileostomy  to RUQ-given pt's stomal characteristics and abdominal topography, pt still requires flat pouching system to protect peristomal skin w/ shrinking post-op stomal size    Patient observed entire pouch change, looking directly at stoma, asking  appropriate questions & able to verbalize key ostomy points. Patient again observed pouch opening/closing, again reviewed w/ patient that her stool consistency & frequency of Loop Ileostomy when it starts to function will require him to empty pouch several times each day. Patient reports that he has a home care aid that cares for him. Patient expressed feeling overwhelmed and frustrated with everything,  much emotional support & encouragement provided to patient.     Patient will require a 2 1/4" wafer and pouch flat system.

## 2022-08-01 NOTE — PROGRESS NOTE ADULT - SUBJECTIVE AND OBJECTIVE BOX
Date of service: 08-01-22 @ 13:19    pt seen and examined  s/p ex lap, abdominal washout, diverting loop ileostomy 7/27  no ostomy function yet  upset, anxious, wants to go home  afebrile      ROS: no fever or chills; denies dizziness, no HA, no SOB or cough, no constipation; no dysuria, no urinary frequency, no legs pain, no rashes    MEDICATIONS  (STANDING):  acetaminophen   IVPB .. 1000 milliGRAM(s) IV Intermittent once  dextrose 5%. 1000 milliLiter(s) (50 mL/Hr) IV Continuous <Continuous>  dextrose 5%. 1000 milliLiter(s) (100 mL/Hr) IV Continuous <Continuous>  dextrose 50% Injectable 25 Gram(s) IV Push once  dextrose 50% Injectable 12.5 Gram(s) IV Push once  dextrose 50% Injectable 25 Gram(s) IV Push once  fat emulsion (Fish Oil and Plant Based) 20% Infusion 33.3 mL/Hr (33.3 mL/Hr) IV Continuous <Continuous>  fat emulsion (Fish Oil and Plant Based) 20% Infusion 0.98 Gm/kG/Day (33.33 mL/Hr) IV Continuous <Continuous>  fat emulsion (Fish Oil and Plant Based) 20% Infusion 0.99 Gm/kG/Day (33.7 mL/Hr) IV Continuous <Continuous>  glucagon  Injectable 1 milliGRAM(s) IntraMuscular once  heparin   Injectable 5000 Unit(s) SubCutaneous every 8 hours  insulin lispro (ADMELOG) corrective regimen sliding scale   SubCutaneous every 6 hours  levothyroxine 88 MICROGram(s) Oral daily  melatonin 5 milliGRAM(s) Oral at bedtime  melatonin 3 milliGRAM(s) Oral at bedtime  metroNIDAZOLE  IVPB 500 milliGRAM(s) IV Intermittent every 8 hours  metroNIDAZOLE  IVPB      nystatin Cream 1 Application(s) Topical two times a day  pantoprazole  Injectable 40 milliGRAM(s) IV Push daily  Parenteral Nutrition - Adult 1 Each (92 mL/Hr) TPN Continuous <Continuous>  Parenteral Nutrition - Adult 1 Each (92 mL/Hr) TPN Continuous <Continuous>  piperacillin/tazobactam IVPB.. 3.375 Gram(s) IV Intermittent every 12 hours  vancomycin    Solution 125 milliGRAM(s) Oral every 6 hours  verapamil  milliGRAM(s) Oral daily    Vital Signs Last 24 Hrs  T(C): 36.5 (01 Aug 2022 08:38), Max: 37 (31 Jul 2022 16:51)  T(F): 97.7 (01 Aug 2022 08:38), Max: 98.6 (31 Jul 2022 16:51)  HR: 69 (01 Aug 2022 08:38) (64 - 70)  BP: 157/73 (01 Aug 2022 08:38) (138/58 - 157/73)  BP(mean): --  RR: 17 (01 Aug 2022 08:38) (17 - 18)  SpO2: 94% (01 Aug 2022 08:38) (94% - 98%)    Parameters below as of 01 Aug 2022 08:38  Patient On (Oxygen Delivery Method): room air    PE:  Constitutional: frail looking  HEENT: NC/AT, EOMI, PERRLA, conjunctivae clear; ears and nose atraumatic; pharynx benign  Neck: supple; thyroid not palpable  Back: no tenderness  Respiratory: respiratory effort normal; clear to auscultation  Cardiovascular: S1S2 regular, no murmurs  Abdomen: soft, not tender, not distended, positive BS; + ostomy in place + abd drain  Genitourinary: no suprapubic tenderness  Lymphatic: no LN palpable  Musculoskeletal: no muscle tenderness, no joint swelling or tenderness  Extremities: no pedal edema  Neurological/ Psychiatric: AxOx3, Judgement and insight normal;  moving all extremities  Skin: no rashes; no palpable lesions    Labs: all available labs reviewed                                                               8.3    18.32 )-----------( 456      ( 01 Aug 2022 06:43 )             26.6     08-01    139  |  114<H>  |  30<H>  ----------------------------<  196<H>  4.2   |  17<L>  |  1.37<H>    Ca    8.0<L>      01 Aug 2022 06:43  Phos  2.7     08-01  Mg     2.0     08-01    Culture - Abscess with Gram Stain (07.22.22 @ 11:00)   Specimen Source: .Abscess lower abdominal abscess   Culture Results:   Numerous Enterococcus faecalis   Moderate Clostridioides difficile (Previously Clostridum)   "Susceptibilities not performed"    Culture - Blood (07.21.22 @ 14:00)   Specimen Source: .Blood None   Culture Results:   No growth to date.   Culture - Urine (07.21.22 @ 13:25)   Specimen Source: Clean Catch None   Culture Results:   <10,000 CFU/mL Normal Urogenital Wendi     Radiology: all available radiological tests reviewed      ACC: [id number] EXAM:  CT ABDOMEN AND PELVIS OC IC                          PROCEDURE DATE:  07/21/2022          INTERPRETATION:  CLINICAL INFORMATION: Drainage from surgical site;   evaluate for abscess. 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    COMPARISON: 7/6/2022.    CONTRAST/COMPLICATIONS:  IV Contrast: Omnipaque 350  90 cc administered   10 cc discarded  Oral Contrast: Omnipaque 300  Complications: None reported at time of study completion    PROCEDURE:  CT of the Abdomen and Pelvis was performed.  Sagittal and coronal reformats were performed.    FINDINGS: There is a large intraperitoneal thick wall abscess identified   measuring 17.5 x 11.8 x 10.8 cm, which extends anteriorly and superiorly   to the site of the midline anterior abdominal wall incision. In addition   to fluid and air there is mottled hyperdensity within the collection   likely related to administered oral contrast. The greatest concentration   of contrast is identified within the left lateral aspect of the abscess   adjacent to a suture surgical line at the level of the mid sigmoid colon,   which may be the site of fistulous communication.    LOWER CHEST: Small right pleural effusion. Opacity within the posterior   right lower lobe may be related to atelectasis; underlying parenchymal   infiltrate cannot be fully excluded.    LIVER: Normal in size. Hepatic steatosis. No focal hepatic masses.  BILE DUCTS: Normal caliber.  GALLBLADDER: Multiple gallstones. No gallbladder wall thickening.  SPLEEN: Within normal limits.  PANCREAS: Within normal limits.  ADRENALS: Within normal limits.  KIDNEYS/URETERS: No hydronephrosis. No renal calculi. 4.7 cm   space-occupying lesion noted within the left kidney characterized as a   cyst on prior ultrasonography July 2, 2022. Additional subcentimeter   hypodense foci are noted within the bilateral renal parenchyma, too small   to characterize.    BLADDER: Within normal limits.  REPRODUCTIVE ORGANS: Prostate seed implants identified.    BOWEL: Surgical suture linesare identified within the mid sigmoid   colonic region as well as within the right lower quadrant. No evidence   for mechanical bowel obstruction.  PERITONEUM: Upper abdominal ascites identified.  VESSELS: Within normal limits.  RETROPERITONEUM/LYMPHNODES: No lymphadenopathy.  ABDOMINAL WALL: As described above.  BONES: Status post lumbar spine fusion surgery extending from L2 through   S1. Patient status post right hip arthroplasty. Degenerative changes   lower thoracic and lumbar spine.    IMPRESSION:  There is a large intraperitoneal thick wall abscess   identified measuring 17.5 x 11.8 x 10.8 cm, which extends anteriorly and   superiorly to the site of the midline anterior abdominal wall incision.   In addition to fluid and air there is mottled hyperdensity within the   collection likely related to administered oral contrast. The greatest   concentration of contrast is identified within the left lateral aspect of   the abscess adjacent to a suture surgical line at the level of the mid   sigmoid colon, which may be the site of fistulous communication.    < end of copied text >    Advanced directives addressed: full resuscitation

## 2022-08-01 NOTE — PROGRESS NOTE ADULT - SUBJECTIVE AND OBJECTIVE BOX
Patient is a 87y Male who     sleeping   NGT draining now    on PPN         MEDICATIONS  (STANDING):  acetaminophen   IVPB .. 1000 milliGRAM(s) IV Intermittent once  dextrose 5%. 1000 milliLiter(s) (50 mL/Hr) IV Continuous <Continuous>  dextrose 5%. 1000 milliLiter(s) (100 mL/Hr) IV Continuous <Continuous>  dextrose 50% Injectable 25 Gram(s) IV Push once  dextrose 50% Injectable 12.5 Gram(s) IV Push once  dextrose 50% Injectable 25 Gram(s) IV Push once  fat emulsion (Fish Oil and Plant Based) 20% Infusion 33.3 mL/Hr (33.3 mL/Hr) IV Continuous <Continuous>  fat emulsion (Fish Oil and Plant Based) 20% Infusion 0.98 Gm/kG/Day (33.33 mL/Hr) IV Continuous <Continuous>  glucagon  Injectable 1 milliGRAM(s) IntraMuscular once  heparin   Injectable 5000 Unit(s) SubCutaneous every 8 hours  insulin lispro (ADMELOG) corrective regimen sliding scale   SubCutaneous every 6 hours  levothyroxine 88 MICROGram(s) Oral daily  melatonin 3 milliGRAM(s) Oral at bedtime  melatonin 5 milliGRAM(s) Oral at bedtime  metroNIDAZOLE  IVPB 500 milliGRAM(s) IV Intermittent every 8 hours  metroNIDAZOLE  IVPB      nystatin Cream 1 Application(s) Topical two times a day  pantoprazole  Injectable 40 milliGRAM(s) IV Push daily  Parenteral Nutrition - Adult 1 Each (92 mL/Hr) TPN Continuous <Continuous>  Parenteral Nutrition - Adult 1 Each (92 mL/Hr) TPN Continuous <Continuous>  piperacillin/tazobactam IVPB.. 3.375 Gram(s) IV Intermittent every 8 hours  vancomycin    Solution 125 milliGRAM(s) Oral every 6 hours  verapamil  milliGRAM(s) Oral daily          Vital Signs Last 24 Hrs  T(C): 36.5 (01 Aug 2022 08:38), Max: 36.5 (01 Aug 2022 08:38)  T(F): 97.7 (01 Aug 2022 08:38), Max: 97.7 (01 Aug 2022 08:38)  HR: 69 (01 Aug 2022 08:38) (69 - 69)  BP: 157/73 (01 Aug 2022 08:38) (157/73 - 157/73)  BP(mean): --  RR: 17 (01 Aug 2022 08:38) (17 - 18)  SpO2: 94% (01 Aug 2022 08:38) (94% - 97%)    Parameters below as of 01 Aug 2022 08:38  Patient On (Oxygen Delivery Method): room air      I&O's Detail    2022 07:01  -  01 Aug 2022 07:00  --------------------------------------------------------  IN:  Total IN: 0 mL    OUT:    Bulb (mL): 80 mL    Ileostomy (mL): 40 mL    Voided (mL): 2355 mL  Total OUT: 2475 mL    Total NET: -2475 mL      01 Aug 2022 07:01  -  01 Aug 2022 22:01  --------------------------------------------------------  IN:  Total IN: 0 mL    OUT:    Bulb (mL): 75 mL    Nasogastric/Oral tube (mL): 850 mL    Voided (mL): 100 mL  Total OUT: 1025 mL    Total NET: -1025 mL          PHYSICAL EXAM:    Constitutional: NAD,frail  HEENT:  MM  dist  Cardiovascular: S1 and S2  Extremities: No peripheral edema  Neurological: asleep but arousable        LABS:                                     8.3    18.32 )-----------( 456      ( 01 Aug 2022 06:43 )             26.6                         7.7    16.54 )-----------( 385      ( 2022 04:04 )             24.3       139    |  114    |  30     ----------------------------<  196       01 Aug 2022 06:43  4.2     |  17     |  1.37     138    |  112    |  31     ----------------------------<  179       2022 04:04  4.3     |  19     |  1.49     139    |  112    |  32     ----------------------------<  195       2022 06:31  4.1     |  19     |  1.79     Ca    8.0        01 Aug 2022 06:43  Ca    7.5        2022 04:04    Phos  2.7       01 Aug 2022 06:43  Phos  2.6       2022 04:04    Mg     2.0       01 Aug 2022 06:43  Mg     1.8       2022 04:04    TPro  5.2    /  Alb  1.3    /  TBili  0.2     06:31  DBili  x      /  AST  10     /  ALT  17     /  AlkPhos  55       TPro  5.1    /  Alb  1.4    /  TBili  0.2     04:46  DBili  x      /  AST  8      /  ALT  18     /  AlkPhos  60               Urine Studies:  Urinalysis Basic - ( 2022 02:30 )    Color: Yellow / Appearance: Clear / S.020 / pH: x  Gluc: x / Ketone: Trace  / Bili: Negative / Urobili: Negative   Blood: x / Protein: 15 / Nitrite: Negative   Leuk Esterase: Trace / RBC: 25-50 /HPF / WBC 6-10   Sq Epi: x / Non Sq Epi: Few / Bacteria: Moderate      Sodium, Random Urine: <20 mmol/L ( @ 02:30)  Creatinine, Random Urine: 127 mg/dL ( @ 02:30)        RADIOLOGY & ADDITIONAL STUDIES:

## 2022-08-01 NOTE — PROVIDER CONTACT NOTE (OTHER) - SITUATION
Notified MD that patient had 150cc of urine throughout the day for me.
DR. BROWNLEE'S ANSWERING SERVICE AWARE OF CONSULT.
Pt bloated and distended
Pt c/o nausea, zofran not working
H/H 7.7/24.3 heparin due
Patient not making adequate urine throughout the night. Patient had an order to discontinue the catheter.
MD increased fluid rate, even though pt is junky
Patient continues to refuse PPN does not want it
Pt has PPN, IVABX, and IVF going and sounds congested/junky
Pt was 88% RA  91% 2L NC  94% 3L Nc pt using IS  Heparin due--> held during day for low H/H
pt projectile vomited x1.
Notified MD that patient O2 ranging between 89-91 on RA.

## 2022-08-01 NOTE — PROGRESS NOTE ADULT - ASSESSMENT
88 y/o male w/ a PMHx of sepsis, neuropathy, prostate CA, Crohn's disease, hypothyroidism, depression, intervertebral disc disorder, DM, obesity, HTN, hypercholesterolemia, CAD w/ stents, macular degeneration, hearing loss and colon cancer s/p laparoscopic ileocolic resection on 6/29/22 with Dr. Ardon. pt presents to the ED with increased drainage from the surgery site. No fevers, chills, changes in appetite. Imaging remarkable for large intraperitoneal thick wall abscess 17.5 x 11.8 x 10.8, IR consulted s/p IR drainage on 7/22, 40 cc pus drained, has drain in place, was given IV zosyn.    1. abdominal wall abscess d/t anastomotic leak/fistula s/p drainage/washout/ileostomy. s/p laparascopic ileocolic resection. colon cancer. crohns disease. PCN allergy  - s/p IR drainage 7/22; body fluid cx growing c. diff/e faecalis - c diff growth in cx d/t anastomotic leak/fistula  - s/p exlap, abdominal washout, diverting ileostomy 7/27  - f/u repeat abd xray/chest  - on IV zosyn 3.375 mgq8h #11-12  - on IV flagyl [id number] for c diff coverage #7  - on oral vancomycin [id number] #8  - continue with above antibiotic coverage  - c diff pcr (-)  - has leukocytosis, prob reactive f/u cbc  - pt clear from i.d standpoint for PICC line placement for TPN  - does not have true pcn allergy - tolerating abx without issues currently  - fu cbc  - supportive care  - tolerating abx well so far; no side effects noted  - reason for abx use and side effects reviewed with patient    2. other issues - care per medicine

## 2022-08-01 NOTE — PROGRESS NOTE ADULT - ASSESSMENT
87 CKD III sepsis, neuropathy, prostate CA, Crohn's disease, hypothyroidism, depression, intervertebral disc disorder, DM, obesity, HTN, hypercholesterolemia, CAD w/ stents, macular degeneration, hearing loss and colon cancer s/p laparoscopic ileocolic resection on 6/29/22 with Dr. Ardon. pt presents to the ED with increased drainage from the surgery site.    Yesenia on CKD - baseline 1.4-1.6   mild pre-renal state and PRESTON on 7/25 and s/p ex lap, abdominal washout, diverting loop ileostomy 7/28  -IVF/PPN to keep positive  -Hopeful to achieve renal stability time goes from OR and renal perfusion improves  -No nsaids, avoid further contrast IV if able   -ACE hold\  - no sig edema,   - low albumin - will third space eventually - increase protein intake     Wound drainage w SBO -  now NGT and back on PPN   -Surgery, fu cxs  -Broad spectrum abx  -ID on board, meds renally dosed    HTN   as above     d/w RN

## 2022-08-02 LAB
ANION GAP SERPL CALC-SCNC: 7 MMOL/L — SIGNIFICANT CHANGE UP (ref 5–17)
BASOPHILS # BLD AUTO: 0.08 K/UL — SIGNIFICANT CHANGE UP (ref 0–0.2)
BASOPHILS NFR BLD AUTO: 0.5 % — SIGNIFICANT CHANGE UP (ref 0–2)
BUN SERPL-MCNC: 32 MG/DL — HIGH (ref 7–23)
CALCIUM SERPL-MCNC: 8.3 MG/DL — LOW (ref 8.5–10.1)
CHLORIDE SERPL-SCNC: 114 MMOL/L — HIGH (ref 96–108)
CO2 SERPL-SCNC: 21 MMOL/L — LOW (ref 22–31)
CREAT SERPL-MCNC: 1.37 MG/DL — HIGH (ref 0.5–1.3)
EGFR: 50 ML/MIN/1.73M2 — LOW
EOSINOPHIL # BLD AUTO: 0.28 K/UL — SIGNIFICANT CHANGE UP (ref 0–0.5)
EOSINOPHIL NFR BLD AUTO: 1.6 % — SIGNIFICANT CHANGE UP (ref 0–6)
GLUCOSE SERPL-MCNC: 193 MG/DL — HIGH (ref 70–99)
HCT VFR BLD CALC: 25.4 % — LOW (ref 39–50)
HCT VFR BLD CALC: 26.4 % — LOW (ref 39–50)
HGB BLD-MCNC: 8.1 G/DL — LOW (ref 13–17)
HGB BLD-MCNC: 8.3 G/DL — LOW (ref 13–17)
IMM GRANULOCYTES NFR BLD AUTO: 1.3 % — SIGNIFICANT CHANGE UP (ref 0–1.5)
LYMPHOCYTES # BLD AUTO: 1.08 K/UL — SIGNIFICANT CHANGE UP (ref 1–3.3)
LYMPHOCYTES # BLD AUTO: 6.3 % — LOW (ref 13–44)
MAGNESIUM SERPL-MCNC: 2.1 MG/DL — SIGNIFICANT CHANGE UP (ref 1.6–2.6)
MCHC RBC-ENTMCNC: 26.6 PG — LOW (ref 27–34)
MCHC RBC-ENTMCNC: 26.9 PG — LOW (ref 27–34)
MCHC RBC-ENTMCNC: 31.4 GM/DL — LOW (ref 32–36)
MCHC RBC-ENTMCNC: 31.9 GM/DL — LOW (ref 32–36)
MCV RBC AUTO: 83.3 FL — SIGNIFICANT CHANGE UP (ref 80–100)
MCV RBC AUTO: 85.4 FL — SIGNIFICANT CHANGE UP (ref 80–100)
MONOCYTES # BLD AUTO: 1.78 K/UL — HIGH (ref 0–0.9)
MONOCYTES NFR BLD AUTO: 10.5 % — SIGNIFICANT CHANGE UP (ref 2–14)
NEUTROPHILS # BLD AUTO: 13.58 K/UL — HIGH (ref 1.8–7.4)
NEUTROPHILS NFR BLD AUTO: 79.8 % — HIGH (ref 43–77)
PHOSPHATE SERPL-MCNC: 3 MG/DL — SIGNIFICANT CHANGE UP (ref 2.5–4.5)
PLATELET # BLD AUTO: 356 K/UL — SIGNIFICANT CHANGE UP (ref 150–400)
PLATELET # BLD AUTO: 438 K/UL — HIGH (ref 150–400)
POTASSIUM SERPL-MCNC: 4.1 MMOL/L — SIGNIFICANT CHANGE UP (ref 3.5–5.3)
POTASSIUM SERPL-SCNC: 4.1 MMOL/L — SIGNIFICANT CHANGE UP (ref 3.5–5.3)
RBC # BLD: 3.05 M/UL — LOW (ref 4.2–5.8)
RBC # BLD: 3.09 M/UL — LOW (ref 4.2–5.8)
RBC # FLD: 18 % — HIGH (ref 10.3–14.5)
RBC # FLD: 18.5 % — HIGH (ref 10.3–14.5)
SODIUM SERPL-SCNC: 142 MMOL/L — SIGNIFICANT CHANGE UP (ref 135–145)
WBC # BLD: 17.02 K/UL — HIGH (ref 3.8–10.5)
WBC # BLD: 19.21 K/UL — HIGH (ref 3.8–10.5)
WBC # FLD AUTO: 17.02 K/UL — HIGH (ref 3.8–10.5)
WBC # FLD AUTO: 19.21 K/UL — HIGH (ref 3.8–10.5)

## 2022-08-02 RX ORDER — PANTOPRAZOLE SODIUM 20 MG/1
40 TABLET, DELAYED RELEASE ORAL EVERY 12 HOURS
Refills: 0 | Status: DISCONTINUED | OUTPATIENT
Start: 2022-08-02 | End: 2022-08-02

## 2022-08-02 RX ORDER — SODIUM CHLORIDE 9 MG/ML
1000 INJECTION INTRAMUSCULAR; INTRAVENOUS; SUBCUTANEOUS ONCE
Refills: 0 | Status: COMPLETED | OUTPATIENT
Start: 2022-08-02 | End: 2022-08-02

## 2022-08-02 RX ORDER — ELECTROLYTE SOLUTION,INJ
1 VIAL (ML) INTRAVENOUS
Refills: 0 | Status: DISCONTINUED | OUTPATIENT
Start: 2022-08-02 | End: 2022-08-02

## 2022-08-02 RX ORDER — INSULIN LISPRO 100/ML
VIAL (ML) SUBCUTANEOUS
Refills: 0 | Status: DISCONTINUED | OUTPATIENT
Start: 2022-08-02 | End: 2022-08-03

## 2022-08-02 RX ORDER — I.V. FAT EMULSION 20 G/100ML
0.98 EMULSION INTRAVENOUS
Qty: 80 | Refills: 0 | Status: DISCONTINUED | OUTPATIENT
Start: 2022-08-02 | End: 2022-08-02

## 2022-08-02 RX ORDER — FUROSEMIDE 40 MG
20 TABLET ORAL ONCE
Refills: 0 | Status: DISCONTINUED | OUTPATIENT
Start: 2022-08-02 | End: 2022-08-02

## 2022-08-02 RX ORDER — PANTOPRAZOLE SODIUM 20 MG/1
40 TABLET, DELAYED RELEASE ORAL EVERY 12 HOURS
Refills: 0 | Status: DISCONTINUED | OUTPATIENT
Start: 2022-08-02 | End: 2022-08-04

## 2022-08-02 RX ORDER — LISINOPRIL 2.5 MG/1
20 TABLET ORAL DAILY
Refills: 0 | Status: DISCONTINUED | OUTPATIENT
Start: 2022-08-02 | End: 2022-08-02

## 2022-08-02 RX ADMIN — HEPARIN SODIUM 5000 UNIT(S): 5000 INJECTION INTRAVENOUS; SUBCUTANEOUS at 06:11

## 2022-08-02 RX ADMIN — I.V. FAT EMULSION 33.33 GM/KG/DAY: 20 EMULSION INTRAVENOUS at 23:13

## 2022-08-02 RX ADMIN — HEPARIN SODIUM 5000 UNIT(S): 5000 INJECTION INTRAVENOUS; SUBCUTANEOUS at 15:45

## 2022-08-02 RX ADMIN — PANTOPRAZOLE SODIUM 40 MILLIGRAM(S): 20 TABLET, DELAYED RELEASE ORAL at 21:51

## 2022-08-02 RX ADMIN — Medication 1 EACH: at 23:14

## 2022-08-02 RX ADMIN — Medication 100 MILLIGRAM(S): at 06:13

## 2022-08-02 RX ADMIN — PIPERACILLIN AND TAZOBACTAM 25 GRAM(S): 4; .5 INJECTION, POWDER, LYOPHILIZED, FOR SOLUTION INTRAVENOUS at 06:13

## 2022-08-02 RX ADMIN — HEPARIN SODIUM 5000 UNIT(S): 5000 INJECTION INTRAVENOUS; SUBCUTANEOUS at 21:50

## 2022-08-02 RX ADMIN — Medication 125 MILLIGRAM(S): at 23:10

## 2022-08-02 RX ADMIN — NYSTATIN CREAM 1 APPLICATION(S): 100000 CREAM TOPICAL at 09:23

## 2022-08-02 RX ADMIN — Medication 100 MILLIGRAM(S): at 16:00

## 2022-08-02 RX ADMIN — Medication 100 MILLIGRAM(S): at 21:43

## 2022-08-02 RX ADMIN — PIPERACILLIN AND TAZOBACTAM 25 GRAM(S): 4; .5 INJECTION, POWDER, LYOPHILIZED, FOR SOLUTION INTRAVENOUS at 21:57

## 2022-08-02 RX ADMIN — SODIUM CHLORIDE 1000 MILLILITER(S): 9 INJECTION INTRAMUSCULAR; INTRAVENOUS; SUBCUTANEOUS at 16:08

## 2022-08-02 RX ADMIN — Medication 1: at 06:12

## 2022-08-02 RX ADMIN — NYSTATIN CREAM 1 APPLICATION(S): 100000 CREAM TOPICAL at 23:56

## 2022-08-02 RX ADMIN — Medication 2: at 12:10

## 2022-08-02 RX ADMIN — Medication 3 MILLIGRAM(S): at 21:56

## 2022-08-02 RX ADMIN — Medication 5 MILLIGRAM(S): at 21:56

## 2022-08-02 RX ADMIN — PIPERACILLIN AND TAZOBACTAM 25 GRAM(S): 4; .5 INJECTION, POWDER, LYOPHILIZED, FOR SOLUTION INTRAVENOUS at 15:46

## 2022-08-02 RX ADMIN — PANTOPRAZOLE SODIUM 40 MILLIGRAM(S): 20 TABLET, DELAYED RELEASE ORAL at 09:23

## 2022-08-02 RX ADMIN — Medication 240 MILLIGRAM(S): at 09:23

## 2022-08-02 NOTE — PROGRESS NOTE ADULT - ASSESSMENT
88 y/o male w/ a PMHx of sepsis, neuropathy, prostate CA, Crohn's disease, hypothyroidism, depression, intervertebral disc disorder, DM, obesity, HTN, hypercholesterolemia, CAD w/ stents, macular degeneration, hearing loss and colon cancer s/p laparoscopic ileocolic resection on 6/29/22 with Dr. Ardon. pt presents to the ED with increased drainage from the surgery site. No fevers, chills, changes in appetite. Imaging remarkable for large intraperitoneal thick wall abscess 17.5 x 11.8 x 10.8, IR consulted s/p IR drainage on 7/22, 40 cc pus drained, has drain in place, was given IV zosyn.    1. abdominal wall abscess d/t anastomotic leak/fistula s/p drainage/washout/ileostomy. ileus. s/p laparascopic ileocolic resection. colon cancer. crohns disease. PCN allergy  - s/p IR drainage 7/22; body fluid cx growing c. diff/e faecalis - c diff growth in cx d/t anastomotic leak/fistula  - s/p exlap, abdominal washout, diverting ileostomy 7/27  - on IV zosyn 3.375 mgq8h #12-13  - on IV flagyl [id number] for c diff coverage #8  - on oral vancomycin [id number] #9  - continue with above antibiotic coverage  - c diff pcr (-)  - on TPN via picc line  - does not have true pcn allergy - tolerating abx without issues currently  - supportive care  - tolerating abx well so far; no side effects noted  - reason for abx use and side effects reviewed with patient    2. other issues - care per medicine

## 2022-08-02 NOTE — PROGRESS NOTE ADULT - SUBJECTIVE AND OBJECTIVE BOX
Date of service: 08-02-22 @ 10:59    pt seen and examined  s/p ex lap, abdominal washout, diverting loop ileostomy 7/27  no ostomy function yet  ngt placed yesterday  has abd distension  if frustrated/upset over his condition     ROS: no fever or chills; denies dizziness, no HA, no SOB or cough, no constipation; no dysuria, no urinary frequency, no legs pain, no rashes      MEDICATIONS  (STANDING):  acetaminophen   IVPB .. 1000 milliGRAM(s) IV Intermittent once  dextrose 5%. 1000 milliLiter(s) (100 mL/Hr) IV Continuous <Continuous>  dextrose 5%. 1000 milliLiter(s) (50 mL/Hr) IV Continuous <Continuous>  dextrose 50% Injectable 25 Gram(s) IV Push once  dextrose 50% Injectable 12.5 Gram(s) IV Push once  dextrose 50% Injectable 25 Gram(s) IV Push once  fat emulsion (Fish Oil and Plant Based) 20% Infusion 33.3 mL/Hr (33.3 mL/Hr) IV Continuous <Continuous>  fat emulsion (Fish Oil and Plant Based) 20% Infusion 0.98 Gm/kG/Day (33.33 mL/Hr) IV Continuous <Continuous>  glucagon  Injectable 1 milliGRAM(s) IntraMuscular once  heparin   Injectable 5000 Unit(s) SubCutaneous every 8 hours  insulin lispro (ADMELOG) corrective regimen sliding scale   SubCutaneous every 6 hours  levothyroxine 88 MICROGram(s) Oral daily  melatonin 3 milliGRAM(s) Oral at bedtime  melatonin 5 milliGRAM(s) Oral at bedtime  metroNIDAZOLE  IVPB 500 milliGRAM(s) IV Intermittent every 8 hours  metroNIDAZOLE  IVPB      nystatin Cream 1 Application(s) Topical two times a day  pantoprazole  Injectable 40 milliGRAM(s) IV Push daily  Parenteral Nutrition - Adult 1 Each (92 mL/Hr) TPN Continuous <Continuous>  piperacillin/tazobactam IVPB.. 3.375 Gram(s) IV Intermittent every 8 hours  vancomycin    Solution 125 milliGRAM(s) Oral every 6 hours  verapamil  milliGRAM(s) Oral daily      Vital Signs Last 24 Hrs  T(C): 36.6 (02 Aug 2022 08:51), Max: 36.8 (02 Aug 2022 01:19)  T(F): 97.9 (02 Aug 2022 08:51), Max: 98.2 (02 Aug 2022 01:19)  HR: 70 (02 Aug 2022 08:51) (70 - 71)  BP: 171/65 (02 Aug 2022 08:51) (150/64 - 171/65)  BP(mean): --  RR: 17 (02 Aug 2022 08:51) (17 - 18)  SpO2: 92% (02 Aug 2022 08:51) (92% - 96%)    Parameters below as of 02 Aug 2022 08:51  Patient On (Oxygen Delivery Method): room air    PE:  Constitutional: frail looking  HEENT: NC/AT, EOMI, PERRLA, conjunctivae clear; ears and nose atraumatic; pharynx benign  Neck: supple; thyroid not palpable  Back: no tenderness  Respiratory: respiratory effort normal; clear to auscultation  Cardiovascular: S1S2 regular, no murmurs  Abdomen: soft, not tender, not distended, positive BS; + ostomy in place + abd drain  Genitourinary: no suprapubic tenderness  Lymphatic: no LN palpable  Musculoskeletal: no muscle tenderness, no joint swelling or tenderness  Extremities: no pedal edema  Neurological/ Psychiatric: AxOx3, Judgement and insight normal;  moving all extremities  Skin: no rashes; no palpable lesions    Labs: all available labs reviewed                                                      8.1    17.02 )-----------( 438      ( 02 Aug 2022 04:29 )             25.4     08-02    142  |  114<H>  |  32<H>  ----------------------------<  193<H>  4.1   |  21<L>  |  1.37<H>    Ca    8.3<L>      02 Aug 2022 04:29  Phos  3.0     08-02  Mg     2.1     08-02      Culture - Abscess with Gram Stain (07.22.22 @ 11:00)   Specimen Source: .Abscess lower abdominal abscess   Culture Results:   Numerous Enterococcus faecalis   Moderate Clostridioides difficile (Previously Clostridum)   "Susceptibilities not performed"    Culture - Blood (07.21.22 @ 14:00)   Specimen Source: .Blood None   Culture Results:   No growth to date.   Culture - Urine (07.21.22 @ 13:25)   Specimen Source: Clean Catch None   Culture Results:   <10,000 CFU/mL Normal Urogenital Wendi     Radiology: all available radiological tests reviewed      ACC: [id number] EXAM:  CT ABDOMEN AND PELVIS OC IC                          PROCEDURE DATE:  07/21/2022          INTERPRETATION:  CLINICAL INFORMATION: Drainage from surgical site;   evaluate for abscess. 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    COMPARISON: 7/6/2022.    CONTRAST/COMPLICATIONS:  IV Contrast: Omnipaque 350  90 cc administered   10 cc discarded  Oral Contrast: Omnipaque 300  Complications: None reported at time of study completion    PROCEDURE:  CT of the Abdomen and Pelvis was performed.  Sagittal and coronal reformats were performed.    FINDINGS: There is a large intraperitoneal thick wall abscess identified   measuring 17.5 x 11.8 x 10.8 cm, which extends anteriorly and superiorly   to the site of the midline anterior abdominal wall incision. In addition   to fluid and air there is mottled hyperdensity within the collection   likely related to administered oral contrast. The greatest concentration   of contrast is identified within the left lateral aspect of the abscess   adjacent to a suture surgical line at the level of the mid sigmoid colon,   which may be the site of fistulous communication.    LOWER CHEST: Small right pleural effusion. Opacity within the posterior   right lower lobe may be related to atelectasis; underlying parenchymal   infiltrate cannot be fully excluded.    LIVER: Normal in size. Hepatic steatosis. No focal hepatic masses.  BILE DUCTS: Normal caliber.  GALLBLADDER: Multiple gallstones. No gallbladder wall thickening.  SPLEEN: Within normal limits.  PANCREAS: Within normal limits.  ADRENALS: Within normal limits.  KIDNEYS/URETERS: No hydronephrosis. No renal calculi. 4.7 cm   space-occupying lesion noted within the left kidney characterized as a   cyst on prior ultrasonography July 2, 2022. Additional subcentimeter   hypodense foci are noted within the bilateral renal parenchyma, too small   to characterize.    BLADDER: Within normal limits.  REPRODUCTIVE ORGANS: Prostate seed implants identified.    BOWEL: Surgical suture linesare identified within the mid sigmoid   colonic region as well as within the right lower quadrant. No evidence   for mechanical bowel obstruction.  PERITONEUM: Upper abdominal ascites identified.  VESSELS: Within normal limits.  RETROPERITONEUM/LYMPHNODES: No lymphadenopathy.  ABDOMINAL WALL: As described above.  BONES: Status post lumbar spine fusion surgery extending from L2 through   S1. Patient status post right hip arthroplasty. Degenerative changes   lower thoracic and lumbar spine.    IMPRESSION:  There is a large intraperitoneal thick wall abscess   identified measuring 17.5 x 11.8 x 10.8 cm, which extends anteriorly and   superiorly to the site of the midline anterior abdominal wall incision.   In addition to fluid and air there is mottled hyperdensity within the   collection likely related to administered oral contrast. The greatest   concentration of contrast is identified within the left lateral aspect of   the abscess adjacent to a suture surgical line at the level of the mid   sigmoid colon, which may be the site of fistulous communication.    < end of copied text >    Advanced directives addressed: full resuscitation

## 2022-08-02 NOTE — PROGRESS NOTE ADULT - ASSESSMENT
87M with history of sigmoidectomy and ileocecectomy 6/29, patient returned to  with pelvis abscess now s/p IR drainage and IR drain upsizing. Patient taken to OR for diagnostic lap converted to exploratory laparotomy, abdominal washout, diverting loop ileostomy POD 5. AFVSS, persistent leukocytosis. Ostomy not functional as of yet. AXR showed dilated loops of small bowel. NGT -placed in the bedside 8/1    Plan:     Pain and nausea control prn   Monitor VS   NPO except meds  Monitor ostomy output   NGT to wall suction  PT   OOBC, ambulation   Infectious disease recommendations  TPN. picc line placed yesterday    Plan discussed with colorectal surgery service

## 2022-08-02 NOTE — PROGRESS NOTE ADULT - ATTENDING COMMENTS
Patient seen and examined. He is s/p ileocolic resection and sigmoidectomy for cancer complicated by a leak from the colorectal anastomosis and has undergone abscess drainage and diverting loop ileostomy. He has an ileus and still no output from ileostomy. On exam, he is in no distress and abdomen is quite distended but minimally tender. Ileostomy is edematous but viable. Drain with fibrinous SS output. Plan to continue bowel rest for ileus, NG, TPN and antibiotics for the abdominal abscess/infection.

## 2022-08-02 NOTE — CHART NOTE - NSCHARTNOTEFT_GEN_A_CORE
Clinical Nutrition PN Follow Up Note    *88yo male admitted with large midline abdominal wall abscess s/p drainage on 7/22. s/p lap washout and creation of ileostomy on 7/27.    *7/27: PPN d/c'd on 7/26 by MD Ardon.  Pt has been NPO with NGT to LWS (large output), large midline abdominal wall abscess (s/p IR drain 7/22), pending OR for drain.  d/w surgery, to restart PPN today (7/27) due to extended time NPO (>6 days).   *7/28: s/p lap washout of abdomen for nontraumatic indication; s/p creation of diverting loop ileostomy on 7/27.  found extensive adhesions, abscess within pelvis, minimal pus but copious fibrinous tissue and phlegmonous changes.  Pt remains NPO with NGT to LWS.  PPN Day #2 (started on 7/27).    *current status: PPN Day #5 (started on 7/27).  NGT removed.  pt with minimal output of urine and ileostomy past 24hours, additional IVF provided by surgery.  Pt remains NPO until complete bowel function returns. Encouraged to walk to resolve ileus.     *labs reviewed; 07-31; Phosphorus and Magnesium on lower range of normal will increase in PN bag.  corrected Ca elevated, DO NOT supplement calcium outside of PN bag at this time. bilirubin total WNL.  triglyceride within limits for lipids.  POCT elevated, received 9 units of sliding scale in past 24hours, will increase to 12 units of regular insulin in PN bag to aid in glycemic control.    07-31    138  |  112<H>  |  31<H>  ----------------------------<  179<H>  4.3   |  19<L>  |  1.49<H>    Ca    7.5<L>      31 Jul 2022 04:04  Phos  2.6     07-31  Mg     1.8     07-31    TPro  5.2<L>  /  Alb  1.3<L>  /  TBili  0.2  /  DBili  x   /  AST  10<L>  /  ALT  17  /  AlkPhos  55  07-30  BMI: BMI (kg/m2): 29 (07-21-22 @ 12:16)  HbA1c: A1C with Estimated Average Glucose Result: 6.6 % (06-28-22 @ 07:24)    Glucose: POCT Blood Glucose.: 218 mg/dL (07-31-22 @ 06:32)    BP: 131/50 (07-30-22 @ 15:11) (106/41 - 158/54)  Lipid Panel: Date/Time: 07-23-22 @ 04:53  Cholesterol, Serum: --  Direct LDL: --  HDL Cholesterol, Serum: --  Total Cholesterol/HDL Ration Measurement: --  Triglycerides, Serum: 172  POCT Blood Glucose.: 218 mg/dL (31 Jul 2022 06:32)  POCT Blood Glucose.: 177 mg/dL (30 Jul 2022 23:01)  POCT Blood Glucose.: 247 mg/dL (30 Jul 2022 17:42)  POCT Blood Glucose.: 238 mg/dL (30 Jul 2022 11:50)        *pertinent meds: heparin, LR, levothyroxine, lisinopril, zofran, senna, admelog sliding scale, morphine, protonix    *I&O's Detail      30 Jul 2022 07:01  -  31 Jul 2022 07:00  --------------------------------------------------------  IN:    sodium chloride 0.9%: 999 mL  Total IN: 999 mL    OUT:    Bulb (mL): 57 mL    Ileostomy (mL): 90 mL    Indwelling Catheter - Urethral (mL): 500 mL    Voided (mL): 875 mL  Total OUT: 1522 mL    Total NET: -523 mL        *negative fluid status: BM (+) on 7/26 x 3 , liquid, loose.  Minimal ostomy output noted. npt on bowel regimen.     *Elvin Score of 16 ;  no PU documented; abd surgical wound. (+1) scrotum edema documented.    *Malnutrition: severe malnutrition in acute illness r/t decreased ability to meet increased nutr needs 2/2 abd surgery and abd wall abscess AEB NPO, mod muscle/fat wasting, 8% wt loss x 3 mo    Estimated Needs: based on 77Kg (wt from 7/22)  Calories: 1925-2310Kcal (25-30 Kcal/Kg)  Protein: 108-123 g protein (1.4-1.6 g/Kg)  Fluids:  5884-2029 mL (25-30 mL/Kg)    Diet, NPO:   Except Medications (07-29-22 @ 18:15) [Active]      Weight History: no recent weights obtained  Height (cm): 167.6 (07-21-22 @ 12:16), 167.6 (07-06-22 @ 15:24), 167.6 (06-27-22 @ 19:20)  Weight (kg): 81.6 (07-21-22 @ 12:16), 78 (07-06-22 @ 15:24), 81.647 (06-27-22 @ 19:20)  BMI (kg/m2): 29 (07-21-22 @ 12:16), 27.8 (07-06-22 @ 15:24), 29.1 (06-27-22 @ 19:20)  BSA (m2): 1.91 (07-21-22 @ 12:16), 1.88 (07-06-22 @ 15:24), 1.91 (06-27-22 @ 19:20)      *will continue to monitor and adjust PN prn*    PPN Recommendations: via peripheral line  Total Volume: 2400 mL  110 g  Amino Acids  100 g Dextrose  80 g Lipids 20%   154 mEq Sodium Chloride  0 mEq Sodium Acetate  0 mmol Sodium Phosphate  0 mEq Potassium Chloride  0 mEq Potassium Acetate  40 mmol Potassium Phosphate  0 mEq Calcium Gluconate ( corrected Ca elevated, DO NOT supplement calcium outside of PN bag at this time. )  17 mEq Magnesium Sulfate  200 mg Thiamine  16 units regular insulin  1 ml Trace Elements  10 ml MVI    Total Calories 1580 Kcal  ( Meets  80%  of  Estimated Energy needs  and   100%  Protein needs)  (osmolarity ~865)    Additional Recommendations:    1) Daily weights  2) Strict I & O's  3) Daily lyte checks including magnesium and phos  4) Weekly triglycerides/LFT checks  5) POCT q6hrs; maintain 140-180mg/dL  6) if pt to be on PN > 6 days, consider placing PICC line for TPN too meet 100% of nutr needs    *will monitor and adjust treatement plan prn*  Myra Broderick, MS, RDN, [facility] [phone]  Nutrition  Clinical Nutrition PN Follow Up Note    *88yo male admitted with large midline abdominal wall abscess s/p drainage on 7/22. s/p lap washout and creation of ileostomy on 7/27.    *7/27: PPN d/c'd on 7/26 by MD Ardon.  Pt has been NPO with NGT to LWS (large output), large midline abdominal wall abscess (s/p IR drain 7/22), pending OR for drain.  d/w surgery, to restart PPN today (7/27) due to extended time NPO (>6 days).   *7/28: s/p lap washout of abdomen for nontraumatic indication; s/p creation of diverting loop ileostomy on 7/27.  found extensive adhesions, abscess within pelvis, minimal pus but copious fibrinous tissue and phlegmonous changes.  Pt remains NPO with NGT to LWS.      *current status on 8/1: PPN Day #7 (started on 7/27).  NGT removed. Pt remains NPO until complete bowel function returns. Encouraged to walk to resolve ileus. PT visit today - rec'd discharge to rehabilitation facility.     *labs reviewed; 08/2 - Increased total volume to 2400mL to dilute sodium - Na trending on higher end. Magnesium and phosphorus trending back up & WNL. Corrected Ca elevated, DO NOT supplement calcium outside of PN bag at this time. Bilirubin total WNL.  Triglyceride within limits for lipids.  POCT remains elevated, POCT elevated, received 5 units of sliding scale in past 24hours, will increase to 16 units of regular insulin in PN bag to aid in glycemic control.    08-02    142  |  114<H>  |  32<H>  ----------------------------<  193<H>  4.1   |  21<L>  |  1.37<H>    Ca    8.3<L>      02 Aug 2022 04:29  Phos  3.0     08-02  Mg     2.1     08-02      TPro  5.2<L>  /  Alb  1.3<L>  /  TBili  0.2  /  DBili  x   /  AST  10<L>  /  ALT  17  /  AlkPhos  55  07-30  BMI: BMI (kg/m2): 29 (07-21-22 @ 12:16)  HbA1c: A1C with Estimated Average Glucose Result: 6.6 % (06-28-22 @ 07:24)    BMI: BMI (kg/m2): 29 (07-21-22 @ 12:16)  HbA1c: A1C with Estimated Average Glucose Result: 6.6 % (06-28-22 @ 07:24)    Glucose: POCT Blood Glucose.: 216 mg/dL (08-02-22 @ 12:05)    BP: 171/65 (08-02-22 @ 08:51) (131/50 - 171/65)  Lipid Panel: Date/Time: 07-23-22 @ 04:53  Cholesterol, Serum: --  Direct LDL: --  HDL Cholesterol, Serum: --  Total Cholesterol/HDL Ration Measurement: --  Triglycerides, Serum: 172    POCT Blood Glucose.: 216 mg/dL (02 Aug 2022 12:05)  POCT Blood Glucose.: 189 mg/dL (02 Aug 2022 06:08)  POCT Blood Glucose.: 181 mg/dL (01 Aug 2022 23:25)  POCT Blood Glucose.: 174 mg/dL (01 Aug 2022 17:54)        *pertinent meds: heparin, LR, levothyroxine, lisinopril, vancomycin, zofran, senna, admelog sliding scale, morphine, protonix    *I&O's Detail      01 Aug 2022 07:01  -  02 Aug 2022 07:00  --------------------------------------------------------  IN:  Total IN: 0 mL    OUT:    Bulb (mL): 125 mL    Ileostomy (mL): 100 mL    Nasogastric/Oral tube (mL): 1500 mL    Voided (mL): 100 mL  Total OUT: 1825 mL    Total NET: -1825 mL          *Negative fluid status: BM (+) on 7/26 x 3 , liquid, loose.  Minimal ostomy output noted. pt on bowel regimen - senna prn     *Elvin Score of 14 ;  no PU documented; abd surgical wound. (+1) scrotum edema documented.    *Malnutrition: severe malnutrition in acute illness r/t decreased ability to meet increased nutr needs 2/2 abd surgery and abd wall abscess AEB NPO, mod muscle/fat wasting, 8% wt loss x 3 mo    Estimated Needs: based on 77Kg (wt from 7/22)  Calories: 1925-2310Kcal (25-30 Kcal/Kg)  Protein: 108-123 g protein (1.4-1.6 g/Kg)  Fluids:  5323-7489 mL (25-30 mL/Kg)    Diet, NPO:   Except Medications (07-29-22 @ 18:15) [Active]      Weight History: no recent weights obtained  Height (cm): 167.6 (07-21-22 @ 12:16), 167.6 (07-06-22 @ 15:24), 167.6 (06-27-22 @ 19:20)  Weight (kg): 81.6 (07-21-22 @ 12:16), 78 (07-06-22 @ 15:24), 81.647 (06-27-22 @ 19:20)  BMI (kg/m2): 29 (07-21-22 @ 12:16), 27.8 (07-06-22 @ 15:24), 29.1 (06-27-22 @ 19:20)  BSA (m2): 1.91 (07-21-22 @ 12:16), 1.88 (07-06-22 @ 15:24), 1.91 (06-27-22 @ 19:20)      *will continue to monitor and adjust PN prn*    PPN Recommendations: via peripheral line  Total Volume: 2400 mL  110 g  Amino Acids  100 g Dextrose  80 g Lipids 20%   154 mEq Sodium Chloride  0 mEq Sodium Acetate  0 mmol Sodium Phosphate  0 mEq Potassium Chloride  0 mEq Potassium Acetate  40 mmol Potassium Phosphate  0 mEq Calcium Gluconate ( corrected Ca elevated, DO NOT supplement calcium outside of PN bag at this time. )  17 mEq Magnesium Sulfate  200 mg Thiamine  16 units regular insulin  1 ml Trace Elements  10 ml MVI    Total Calories 1580 Kcal  ( Meets  80%  of  Estimated Energy needs  and   100%  Protein needs)  (osmolarity ~865)    Additional Recommendations:    1) Consider placing PICC line for TPN to meet 100% of nutr needs 2/2 pt on PN > 6 days   2) Daily weights  3) Strict I & O's  4) Daily lyte checks including magnesium and phos  5) Weekly triglycerides/LFT checks  6) POCT q6hrs; maintain 140-180mg/dL      *will monitor and adjust treatement plan prn*  Myra Broderick MS, RDN, [facility] [phone]  Nutrition   Rica Pate M.S., Dietitian Registration Eligible [phone] Clinical Nutrition PN Follow Up Note    *88 yo male admitted with large midline abdominal wall abscess s/p drainage on 7/22. s/p lap washout and creation of ileostomy on 7/27.    *7/27: PPN d/c'd on 7/26 by MD Ardon.  Pt has been NPO with NGT to LWS (large output), large midline abdominal wall abscess (s/p IR drain 7/22), pending OR for drain.  d/w surgery, to restart PPN today (7/27) due to extended time NPO (>6 days).   *7/28: s/p lap washout of abdomen for nontraumatic indication; s/p creation of diverting loop ileostomy on 7/27.  found extensive adhesions, abscess within pelvis, minimal pus but copious fibrinous tissue and phlegmonous changes.  Pt remains NPO with NGT to LWS.      *current status: PPN Day #7; PICC line placed yestersay, will change to TPN tomorrow.  NGT replaced on 8/1 to LWS. Pt remains NPO until complete bowel function returns. Encouraged to walk to resolve ileus as per surgery. PT following. Will begin to titrate dextrose up tomorrow by 50- 75g/day until goal of 250g met.    *labs reviewed; 08/2 - Increased total volume to 2400mL to dilute sodium - Na trending up on higher end and noted w/ large NGT output. Magnesium and phos WNL. Corrected Ca elevated, DO NOT supplement calcium outside of PN bag at this time. Bilirubin total WNL.  Triglyceride within limits for lipids.  POCT remains elevated, POCT elevated, received 5 units of sliding scale in past 24hours, will increase to 16 units of regular insulin in PN bag to aid in glycemic control.    08-02    142  |  114<H>  |  32<H>  ----------------------------<  193<H>  4.1   |  21<L>  |  1.37<H>    Ca    8.3<L>      02 Aug 2022 04:29  Phos  3.0     08-02  Mg     2.1     08-02      TPro  5.2<L>  /  Alb  1.3<L>  /  TBili  0.2  /  DBili  x   /  AST  10<L>  /  ALT  17  /  AlkPhos  55  07-30  BMI: BMI (kg/m2): 29 (07-21-22 @ 12:16)  HbA1c: A1C with Estimated Average Glucose Result: 6.6 % (06-28-22 @ 07:24)    BMI: BMI (kg/m2): 29 (07-21-22 @ 12:16)  HbA1c: A1C with Estimated Average Glucose Result: 6.6 % (06-28-22 @ 07:24)    Glucose: POCT Blood Glucose.: 216 mg/dL (08-02-22 @ 12:05)    BP: 171/65 (08-02-22 @ 08:51) (131/50 - 171/65)  Lipid Panel: Date/Time: 07-23-22 @ 04:53  Cholesterol, Serum: --  Direct LDL: --  HDL Cholesterol, Serum: --  Total Cholesterol/HDL Ration Measurement: --  Triglycerides, Serum: 172    POCT Blood Glucose.: 216 mg/dL (02 Aug 2022 12:05)  POCT Blood Glucose.: 189 mg/dL (02 Aug 2022 06:08)  POCT Blood Glucose.: 181 mg/dL (01 Aug 2022 23:25)  POCT Blood Glucose.: 174 mg/dL (01 Aug 2022 17:54)    *pertinent meds: heparin, LR, levothyroxine, lisinopril, vancomycin, zofran, senna, admelog sliding scale, morphine, protonix    *I&O's Detail      01 Aug 2022 07:01  -  02 Aug 2022 07:00  --------------------------------------------------------  IN:  Total IN: 0 mL    OUT:    Bulb (mL): 125 mL    Ileostomy (mL): 100 mL    Nasogastric/Oral tube (mL): 1500 mL    Voided (mL): 100 mL  Total OUT: 1825 mL    Total NET: -1825 mL  *Negative fluid status: BM (+) on 7/26 x 3 , liquid, loose.  Minimal ostomy output noted. 1500 mL output from NGT noted; total PN volume increased to 2400 mL. Will monitor/adjust prn. pt on bowel regimen - senna prn     *Elvin Score of 14 ;  no PU documented; abd surgical wound. (+1) scrotum edema documented.    *Malnutrition: severe malnutrition in acute illness r/t decreased ability to meet increased nutr needs 2/2 abd surgery and abd wall abscess AEB NPO, mod muscle/fat wasting, 8% wt loss x 3 mo    Estimated Needs: based on 77Kg (wt from 7/22)  Calories: 1925-2310Kcal (25-30 Kcal/Kg)  Protein: 108-123 g protein (1.4-1.6 g/Kg)  Fluids:  9567-0878 mL (25-30 mL/Kg)    Diet, NPO:   Except Medications (07-29-22 @ 18:15) [Active]      Weight History: no recent weights obtained  Height (cm): 167.6 (07-21-22 @ 12:16), 167.6 (07-06-22 @ 15:24), 167.6 (06-27-22 @ 19:20)  Weight (kg): 81.6 (07-21-22 @ 12:16), 78 (07-06-22 @ 15:24), 81.647 (06-27-22 @ 19:20)  BMI (kg/m2): 29 (07-21-22 @ 12:16), 27.8 (07-06-22 @ 15:24), 29.1 (06-27-22 @ 19:20)  BSA (m2): 1.91 (07-21-22 @ 12:16), 1.88 (07-06-22 @ 15:24), 1.91 (06-27-22 @ 19:20)      *will continue to monitor and adjust PN prn*    PPN Recommendations: via peripheral line  Total Volume: 2400 mL  110 g  Amino Acids  100 g Dextrose  80 g Lipids 20%   154 mEq Sodium Chloride  0 mEq Sodium Acetate  0 mmol Sodium Phosphate  0 mEq Potassium Chloride  0 mEq Potassium Acetate  40 mmol Potassium Phosphate  0 mEq Calcium Gluconate ( corrected Ca elevated, DO NOT supplement calcium outside of PN bag at this time. )  17 mEq Magnesium Sulfate  200 mg Thiamine  16 units regular insulin  1 ml Trace Elements  10 ml MVI    Total Calories 1580 Kcal  ( Meets  80%  of  Estimated Energy needs  and   100%  Protein needs)  (osmolarity ~865)    Additional Recommendations:    1) Titrate dextrose up to goal of 250g by 50- 75g/day.   2) Daily weights  3) Strict I & O's - monitor output closely  4) Daily lyte checks including magnesium and phos  5) Weekly triglycerides/LFT checks  6) POCT q6hrs; maintain 140-180mg/dL    *will monitor and adjust treatement plan prn*  Myra Broderick MS, RDN, [facility] [phone]  Nutrition   Rica Pate M.S., Dietitian Registration Eligible [phone]

## 2022-08-02 NOTE — PROGRESS NOTE ADULT - SUBJECTIVE AND OBJECTIVE BOX
Pt. seen and examined at bedside this morning. Patient reports his pain is better, abdomen is distended. He is frustrated and wants to go home. He denies fever, chest pain, SOB, nausea, vomiting.  NGT placed yesterday. PICC line placed     Physical Exam:  General: AAOx3, Well developed, NAD  Head: NGT placed  Chest: Normal respiratory effort  Heart: RRR, hypertensive  Abdomen:  ostomy is pink and patent with some whitish tissue on the mucosa, distended, soft, drain is serosanguineous  Neuro/Psych: No localized deficits. Normal speech, normal tone  Skin: Normal, no rashes, no lesions noted.   Extremities: Warm, well perfused, no edema, Pulses intact    Vitals:  T(C): 36.6 ( @ 08:51), Max: 36.8 ( @ 01:19)  HR: 70 ( @ 08:51) (70 - 71)  BP: 171/65 ( @ 08:51) (150/64 - 171/65)  RR: 17 ( @ 08:51) (17 - 18)  SpO2: 92% ( @ 08:51) (92% - 96%)     @ 07:01  -   @ 07:00  --------------------------------------------------------  IN:  Total IN: 0 mL    OUT:    Bulb (mL): 125 mL    Ileostomy (mL): 100 mL    Nasogastric/Oral tube (mL): 1500 mL    Voided (mL): 100 mL  Total OUT: 1825 mL    Total NET: -1825 mL       @ 04:29                    8.1  CBC: 17.02>)-------(<438                     25.4                 142 | 114 | 32    CMP:  ----------------------< 193               4.1 | 21 | 1.37                      Ca:8.3  Phos:3.0  M.1               -|      |-        LFTs:  ------|-|-----             -|      |-   @ 06:43                    8.3  CBC: 18.32>)-------(<456                     26.6                 139 | 114 | 30    CMP:  ----------------------< 196               4.2 | 17 | 1.37                      Ca:8.0  Phos:2.7  M.0               -|      |-        LFTs:  ------|-|-----             -|      |-      Current Inpatient Medications:  acetaminophen     Tablet .. 650 milliGRAM(s) Oral every 6 hours PRN  acetaminophen   IVPB .. 1000 milliGRAM(s) IV Intermittent once  dextrose 5%. 1000 milliLiter(s) (100 mL/Hr) IV Continuous <Continuous>  dextrose 5%. 1000 milliLiter(s) (50 mL/Hr) IV Continuous <Continuous>  dextrose 50% Injectable 25 Gram(s) IV Push once  dextrose 50% Injectable 12.5 Gram(s) IV Push once  dextrose 50% Injectable 25 Gram(s) IV Push once  dextrose Oral Gel 15 Gram(s) Oral once PRN  fat emulsion (Fish Oil and Plant Based) 20% Infusion 33.3 mL/Hr (33.3 mL/Hr) IV Continuous <Continuous>  fat emulsion (Fish Oil and Plant Based) 20% Infusion 0.98 Gm/kG/Day (33.33 mL/Hr) IV Continuous <Continuous>  glucagon  Injectable 1 milliGRAM(s) IntraMuscular once  heparin   Injectable 5000 Unit(s) SubCutaneous every 8 hours  HYDROmorphone  Injectable 2 milliGRAM(s) IV Push every 4 hours PRN  hydrOXYzine hydrochloride 25 milliGRAM(s) Oral every 12 hours PRN  insulin lispro (ADMELOG) corrective regimen sliding scale   SubCutaneous every 6 hours  levothyroxine 88 MICROGram(s) Oral daily  melatonin 5 milliGRAM(s) Oral at bedtime  melatonin 3 milliGRAM(s) Oral at bedtime  metroNIDAZOLE  IVPB 500 milliGRAM(s) IV Intermittent every 8 hours  metroNIDAZOLE  IVPB      nystatin Cream 1 Application(s) Topical two times a day  ondansetron Injectable 4 milliGRAM(s) IV Push every 6 hours PRN  oxyCODONE    IR 5 milliGRAM(s) Oral every 6 hours PRN  pantoprazole  Injectable 40 milliGRAM(s) IV Push daily  Parenteral Nutrition - Adult 1 Each (92 mL/Hr) TPN Continuous <Continuous>  piperacillin/tazobactam IVPB.. 3.375 Gram(s) IV Intermittent every 8 hours  prochlorperazine   Injectable 5 milliGRAM(s) IV Push every 8 hours PRN  senna 2 Tablet(s) Oral at bedtime PRN  vancomycin    Solution 125 milliGRAM(s) Oral every 6 hours  verapamil  milliGRAM(s) Oral daily

## 2022-08-03 LAB
ALBUMIN SERPL ELPH-MCNC: 1.5 G/DL — LOW (ref 3.3–5)
ALP SERPL-CCNC: 61 U/L — SIGNIFICANT CHANGE UP (ref 40–120)
ALT FLD-CCNC: 9 U/L — LOW (ref 12–78)
ANION GAP SERPL CALC-SCNC: 9 MMOL/L — SIGNIFICANT CHANGE UP (ref 5–17)
AST SERPL-CCNC: 13 U/L — LOW (ref 15–37)
BASOPHILS # BLD AUTO: 0.08 K/UL — SIGNIFICANT CHANGE UP (ref 0–0.2)
BASOPHILS NFR BLD AUTO: 0.4 % — SIGNIFICANT CHANGE UP (ref 0–2)
BILIRUB SERPL-MCNC: 0.3 MG/DL — SIGNIFICANT CHANGE UP (ref 0.2–1.2)
BUN SERPL-MCNC: 32 MG/DL — HIGH (ref 7–23)
CALCIUM SERPL-MCNC: 8.2 MG/DL — LOW (ref 8.5–10.1)
CHLORIDE SERPL-SCNC: 113 MMOL/L — HIGH (ref 96–108)
CO2 SERPL-SCNC: 19 MMOL/L — LOW (ref 22–31)
CREAT SERPL-MCNC: 1.32 MG/DL — HIGH (ref 0.5–1.3)
EGFR: 52 ML/MIN/1.73M2 — LOW
EOSINOPHIL # BLD AUTO: 0.36 K/UL — SIGNIFICANT CHANGE UP (ref 0–0.5)
EOSINOPHIL NFR BLD AUTO: 1.8 % — SIGNIFICANT CHANGE UP (ref 0–6)
GLUCOSE SERPL-MCNC: 202 MG/DL — HIGH (ref 70–99)
HCT VFR BLD CALC: 26 % — LOW (ref 39–50)
HCT VFR BLD CALC: 26.3 % — LOW (ref 39–50)
HGB BLD-MCNC: 8.1 G/DL — LOW (ref 13–17)
HGB BLD-MCNC: 8.3 G/DL — LOW (ref 13–17)
IMM GRANULOCYTES NFR BLD AUTO: 1.4 % — SIGNIFICANT CHANGE UP (ref 0–1.5)
LYMPHOCYTES # BLD AUTO: 1.51 K/UL — SIGNIFICANT CHANGE UP (ref 1–3.3)
LYMPHOCYTES # BLD AUTO: 7.7 % — LOW (ref 13–44)
MCHC RBC-ENTMCNC: 26.6 PG — LOW (ref 27–34)
MCHC RBC-ENTMCNC: 26.6 PG — LOW (ref 27–34)
MCHC RBC-ENTMCNC: 31.2 GM/DL — LOW (ref 32–36)
MCHC RBC-ENTMCNC: 31.6 GM/DL — LOW (ref 32–36)
MCV RBC AUTO: 84.3 FL — SIGNIFICANT CHANGE UP (ref 80–100)
MCV RBC AUTO: 85.5 FL — SIGNIFICANT CHANGE UP (ref 80–100)
MONOCYTES # BLD AUTO: 1.63 K/UL — HIGH (ref 0–0.9)
MONOCYTES NFR BLD AUTO: 8.3 % — SIGNIFICANT CHANGE UP (ref 2–14)
NEUTROPHILS # BLD AUTO: 15.77 K/UL — HIGH (ref 1.8–7.4)
NEUTROPHILS NFR BLD AUTO: 80.4 % — HIGH (ref 43–77)
PLATELET # BLD AUTO: 488 K/UL — HIGH (ref 150–400)
PLATELET # BLD AUTO: 498 K/UL — HIGH (ref 150–400)
POTASSIUM SERPL-MCNC: 4 MMOL/L — SIGNIFICANT CHANGE UP (ref 3.5–5.3)
POTASSIUM SERPL-SCNC: 4 MMOL/L — SIGNIFICANT CHANGE UP (ref 3.5–5.3)
PROT SERPL-MCNC: 5.8 GM/DL — LOW (ref 6–8.3)
RBC # BLD: 3.04 M/UL — LOW (ref 4.2–5.8)
RBC # BLD: 3.12 M/UL — LOW (ref 4.2–5.8)
RBC # FLD: 18.6 % — HIGH (ref 10.3–14.5)
RBC # FLD: 19 % — HIGH (ref 10.3–14.5)
SODIUM SERPL-SCNC: 141 MMOL/L — SIGNIFICANT CHANGE UP (ref 135–145)
WBC # BLD: 19.63 K/UL — HIGH (ref 3.8–10.5)
WBC # BLD: 22.77 K/UL — HIGH (ref 3.8–10.5)
WBC # FLD AUTO: 19.63 K/UL — HIGH (ref 3.8–10.5)
WBC # FLD AUTO: 22.77 K/UL — HIGH (ref 3.8–10.5)

## 2022-08-03 PROCEDURE — 74176 CT ABD & PELVIS W/O CONTRAST: CPT | Mod: 26

## 2022-08-03 PROCEDURE — 74018 RADEX ABDOMEN 1 VIEW: CPT | Mod: 26

## 2022-08-03 RX ORDER — HYDRALAZINE HCL 50 MG
10 TABLET ORAL ONCE
Refills: 0 | Status: COMPLETED | OUTPATIENT
Start: 2022-08-03 | End: 2022-08-03

## 2022-08-03 RX ORDER — FUROSEMIDE 40 MG
20 TABLET ORAL ONCE
Refills: 0 | Status: COMPLETED | OUTPATIENT
Start: 2022-08-03 | End: 2022-08-03

## 2022-08-03 RX ORDER — ELECTROLYTE SOLUTION,INJ
1 VIAL (ML) INTRAVENOUS
Refills: 0 | Status: DISCONTINUED | OUTPATIENT
Start: 2022-08-03 | End: 2022-08-03

## 2022-08-03 RX ORDER — INSULIN LISPRO 100/ML
VIAL (ML) SUBCUTANEOUS
Refills: 0 | Status: DISCONTINUED | OUTPATIENT
Start: 2022-08-03 | End: 2022-08-06

## 2022-08-03 RX ORDER — HYDRALAZINE HCL 50 MG
10 TABLET ORAL ONCE
Refills: 0 | Status: DISCONTINUED | OUTPATIENT
Start: 2022-08-03 | End: 2022-08-03

## 2022-08-03 RX ORDER — I.V. FAT EMULSION 20 G/100ML
0.98 EMULSION INTRAVENOUS
Qty: 80 | Refills: 0 | Status: DISCONTINUED | OUTPATIENT
Start: 2022-08-03 | End: 2022-08-03

## 2022-08-03 RX ADMIN — Medication 4: at 08:48

## 2022-08-03 RX ADMIN — Medication 20 MILLIGRAM(S): at 13:32

## 2022-08-03 RX ADMIN — Medication 5 MILLIGRAM(S): at 20:37

## 2022-08-03 RX ADMIN — Medication 25 MILLIGRAM(S): at 03:01

## 2022-08-03 RX ADMIN — Medication 125 MILLIGRAM(S): at 13:32

## 2022-08-03 RX ADMIN — NYSTATIN CREAM 1 APPLICATION(S): 100000 CREAM TOPICAL at 21:05

## 2022-08-03 RX ADMIN — I.V. FAT EMULSION 33.33 GM/KG/DAY: 20 EMULSION INTRAVENOUS at 22:34

## 2022-08-03 RX ADMIN — Medication 3: at 18:35

## 2022-08-03 RX ADMIN — Medication 125 MILLIGRAM(S): at 05:50

## 2022-08-03 RX ADMIN — Medication 125 MILLIGRAM(S): at 22:33

## 2022-08-03 RX ADMIN — Medication 10 MILLIGRAM(S): at 00:28

## 2022-08-03 RX ADMIN — Medication 100 MILLIGRAM(S): at 13:31

## 2022-08-03 RX ADMIN — Medication 4: at 13:29

## 2022-08-03 RX ADMIN — PIPERACILLIN AND TAZOBACTAM 25 GRAM(S): 4; .5 INJECTION, POWDER, LYOPHILIZED, FOR SOLUTION INTRAVENOUS at 16:31

## 2022-08-03 RX ADMIN — PIPERACILLIN AND TAZOBACTAM 25 GRAM(S): 4; .5 INJECTION, POWDER, LYOPHILIZED, FOR SOLUTION INTRAVENOUS at 05:50

## 2022-08-03 RX ADMIN — HEPARIN SODIUM 5000 UNIT(S): 5000 INJECTION INTRAVENOUS; SUBCUTANEOUS at 20:37

## 2022-08-03 RX ADMIN — Medication 3 MILLIGRAM(S): at 20:38

## 2022-08-03 RX ADMIN — HEPARIN SODIUM 5000 UNIT(S): 5000 INJECTION INTRAVENOUS; SUBCUTANEOUS at 13:32

## 2022-08-03 RX ADMIN — Medication 88 MICROGRAM(S): at 05:51

## 2022-08-03 RX ADMIN — Medication 100 MILLIGRAM(S): at 05:45

## 2022-08-03 RX ADMIN — PANTOPRAZOLE SODIUM 40 MILLIGRAM(S): 20 TABLET, DELAYED RELEASE ORAL at 20:37

## 2022-08-03 RX ADMIN — Medication 1 EACH: at 22:33

## 2022-08-03 RX ADMIN — PIPERACILLIN AND TAZOBACTAM 25 GRAM(S): 4; .5 INJECTION, POWDER, LYOPHILIZED, FOR SOLUTION INTRAVENOUS at 22:05

## 2022-08-03 RX ADMIN — HEPARIN SODIUM 5000 UNIT(S): 5000 INJECTION INTRAVENOUS; SUBCUTANEOUS at 05:51

## 2022-08-03 RX ADMIN — Medication 240 MILLIGRAM(S): at 09:35

## 2022-08-03 RX ADMIN — Medication 100 MILLIGRAM(S): at 20:37

## 2022-08-03 RX ADMIN — PANTOPRAZOLE SODIUM 40 MILLIGRAM(S): 20 TABLET, DELAYED RELEASE ORAL at 09:35

## 2022-08-03 RX ADMIN — NYSTATIN CREAM 1 APPLICATION(S): 100000 CREAM TOPICAL at 09:35

## 2022-08-03 NOTE — CHART NOTE - NSCHARTNOTEFT_GEN_A_CORE
Clinical Nutrition PN Follow Up Note    *86 yo male admitted with large midline abdominal wall abscess s/p drainage on 7/22. s/p lap washout and creation of ileostomy on 7/27.    *7/27: PPN d/c'd on 7/26 by MD Ardon.  Pt has been NPO with NGT to LWS (large output), large midline abdominal wall abscess (s/p IR drain 7/22), pending OR for drain.  d/w surgery, to restart PPN today (7/27) due to extended time NPO (>6 days).   *7/28: s/p lap washout of abdomen for nontraumatic indication; s/p creation of diverting loop ileostomy on 7/27.  found extensive adhesions, abscess within pelvis, minimal pus but copious fibrinous tissue and phlegmonous changes.  Pt remains NPO with NGT to LWS.    *8/1: PICC line placed, NGT replaced to LWS.     *current status: TPN Day #1; NGT is still in place to LWS - concern over dark reddish brown NGT output. Plan for CT w/ PO contrast today to r/o SBO; suspected post-op ileus. Pt remains NPO until complete bowel function returns. Encouraged to walk to resolve ileus as per surgery. Titrated dextrose up by 50g today, will continue to increase by 50-75g/day until goal of 250g met.    *labs reviewed; 08/3 - Continue w/ total volume to 2400mL to dilute sodium - Na decreased, but still on higher end. NGT output decreased by 750mL. Magnesium and phos WNL. Corrected Ca elevated, DO NOT supplement calcium outside of PN bag at this time. Bilirubin total WNL.  Triglyceride within limits for lipids.  POCT remains elevated, POCT elevated, received 4 units of sliding scale in past 24hours, will increase to 18 units of regular insulin in PN bag to aid in glycemic control.    08-03    141  |  113<H>  |  32<H>  ----------------------------<  202<H>  4.0   |  19<L>  |  1.32<H>    Ca    8.2<L>      03 Aug 2022 04:50  Phos  3.0     08-03  Mg     2.2     08-03    TPro  5.8<L>  /  Alb  1.5<L>  /  TBili  0.3  /  DBili  x   /  AST  13<L>  /  ALT  9<L>  /  AlkPhos  61  08-03    BMI: BMI (kg/m2): 29 (07-21-22 @ 12:16)  HbA1c: A1C with Estimated Average Glucose Result: 6.6 % (06-28-22 @ 07:24)      BP: 166/62 (08-03-22 @ 09:01) (138/58 - 181/70)  Lipid Panel: Date/Time: 07-23-22 @ 04:53  Cholesterol, Serum: --  Direct LDL: --  HDL Cholesterol, Serum: --  Total Cholesterol/HDL Ration Measurement: --  Triglycerides, Serum: 172    POCT Blood Glucose.: 226 mg/dL (03 Aug 2022 08:41)  POCT Blood Glucose.: 198 mg/dL (02 Aug 2022 23:07)  POCT Blood Glucose.: 216 mg/dL (02 Aug 2022 12:05)      *pertinent meds: heparin, LR, levothyroxine, vancomycin, prochlorperazine , senna, admelog sliding scale, morphine, protonix, verapamil       *I&O's Detail    02 Aug 2022 07:01  -  03 Aug 2022 07:00  --------------------------------------------------------  IN:    IV PiggyBack: 300 mL  Total IN: 300 mL    OUT:    Bulb (mL): 95 mL    Ileostomy (mL): 220 mL    Nasogastric/Oral tube (mL): 750 mL    Voided (mL): 610 mL  Total OUT: 1675 mL    Total NET: -1375 mL    *Negative fluid status: BM (+) on 7/26 x 3 , liquid, loose.  Minimal ostomy output noted. 750 mL output from NGT noted (decreased by 750mL from yesterday); continue total PN volume of 2400 mL. Will monitor/adjust prn. pt on bowel regimen - senna prn     *Elvin Score of 14 ;  no PU documented; abd surgical wound. (+1) scrotum edema documented.    *Malnutrition: severe malnutrition in acute illness r/t decreased ability to meet increased nutr needs 2/2 abd surgery and abd wall abscess AEB NPO, mod muscle/fat wasting, 8% wt loss x 3 mo    Estimated Needs: based on 77Kg (wt from 7/22)  Calories: 1925-2310Kcal (25-30 Kcal/Kg)  Protein: 108-123 g protein (1.4-1.6 g/Kg)  Fluids:  4634-6835 mL (25-30 mL/Kg)    Diet, NPO:   Except Medications (07-29-22 @ 18:15) [Active]      Weight History: no recent weights obtained  Height (cm): 167.6 (07-21-22 @ 12:16), 167.6 (07-06-22 @ 15:24), 167.6 (06-27-22 @ 19:20)  Weight (kg): 81.6 (07-21-22 @ 12:16), 78 (07-06-22 @ 15:24), 81.647 (06-27-22 @ 19:20)  BMI (kg/m2): 29 (07-21-22 @ 12:16), 27.8 (07-06-22 @ 15:24), 29.1 (06-27-22 @ 19:20)  BSA (m2): 1.91 (07-21-22 @ 12:16), 1.88 (07-06-22 @ 15:24), 1.91 (06-27-22 @ 19:20)      *will continue to monitor and adjust PN prn*    TPN Recommendations: via PICC line  Total Volume: 2400 mL  110 g  Amino Acids  150 g Dextrose  80 g Lipids 20%   154 mEq Sodium Chloride  0 mEq Sodium Acetate  0 mmol Sodium Phosphate  0 mEq Potassium Chloride  0 mEq Potassium Acetate  45 mmol Potassium Phosphate  0 mEq Calcium Gluconate ( corrected Ca elevated, DO NOT supplement calcium outside of PN bag at this time. )  17 mEq Magnesium Sulfate  200 mg Thiamine  18 units regular insulin  1 ml Trace Elements  10 ml MVI    Total Calories 2100 Kcal  ( Meets  88%  of  Estimated Energy needs  and   100%  Protein needs)  (osmolarity ~865)    Additional Recommendations:    1) Continue to titrate dextrose up to goal of 250g by 50- 75g/day.   2) Daily weights  3) Strict I & O's - monitor output closely  4) Daily lyte checks including magnesium and phos  5) Weekly triglycerides/LFT checks  6) POCT q6hrs; maintain 140-180mg/dL    *will monitor and adjust treatement plan prn*    Myra Broderick MS, RDN, [facility] [phone]  Nutrition   Rica Pate M.S., Dietitian Registration Eligible [phone] Clinical Nutrition PN Follow Up Note    *88 yo male admitted with large midline abdominal wall abscess s/p drainage on 7/22. s/p lap washout and creation of ileostomy on 7/27.    *7/27: PPN d/c'd on 7/26 by MD Ardon.  Pt has been NPO with NGT to LWS (large output), large midline abdominal wall abscess (s/p IR drain 7/22), pending OR for drain.  d/w surgery, to restart PPN today (7/27) due to extended time NPO (>6 days).   *7/28: s/p lap washout of abdomen for nontraumatic indication; s/p creation of diverting loop ileostomy on 7/27.  found extensive adhesions, abscess within pelvis, minimal pus but copious fibrinous tissue and phlegmonous changes.  Pt remains NPO with NGT to LWS.    *8/1: PICC line placed, NGT replaced to LWS.     *current status: TPN initiated today; NGT is still in place to LWS - concern over dark reddish brown NGT output. Plan for CT w/ PO contrast today to r/o SBO; suspected post-op ileus. Pt remains NPO until complete bowel function returns. Encouraged to walk to resolve ileus as per surgery. Titrated dextrose up by 50g today, will continue to increase by 50-75g/day until goal of 250g met.    *labs reviewed; 08/3 - Continue w/ total volume to 2400mL to dilute sodium - Na decreased, but still on higher end of normal. NGT output decreased, 750mL. Magnesium and phos WNL. Corrected Ca elevated, DO NOT supplement calcium outside of PN bag at this time. Bilirubin total WNL.  Triglyceride within limits for lipids.  POCT remains elevated, POCT elevated, received 4 units of sliding scale in past 24hours, will increase to 18 units of regular insulin in PN bag to aid in glycemic control.    08-03    141  |  113<H>  |  32<H>  ----------------------------<  202<H>  4.0   |  19<L>  |  1.32<H>    Ca    8.2<L>      03 Aug 2022 04:50  Phos  3.0     08-03  Mg     2.2     08-03    TPro  5.8<L>  /  Alb  1.5<L>  /  TBili  0.3  /  DBili  x   /  AST  13<L>  /  ALT  9<L>  /  AlkPhos  61  08-03    BMI: BMI (kg/m2): 29 (07-21-22 @ 12:16)  HbA1c: A1C with Estimated Average Glucose Result: 6.6 % (06-28-22 @ 07:24)      BP: 166/62 (08-03-22 @ 09:01) (138/58 - 181/70)  Lipid Panel: Date/Time: 07-23-22 @ 04:53  Triglycerides, Serum: 172    POCT Blood Glucose.: 226 mg/dL (03 Aug 2022 08:41)  POCT Blood Glucose.: 198 mg/dL (02 Aug 2022 23:07)  POCT Blood Glucose.: 216 mg/dL (02 Aug 2022 12:05)      *pertinent meds: heparin, LR, levothyroxine, vancomycin, prochlorperazine , senna, admelog sliding scale, morphine, protonix, verapamil       *I&O's Detail    02 Aug 2022 07:01  -  03 Aug 2022 07:00  --------------------------------------------------------  IN:    IV PiggyBack: 300 mL  Total IN: 300 mL    OUT:    Bulb (mL): 95 mL    Ileostomy (mL): 220 mL    Nasogastric/Oral tube (mL): 750 mL    Voided (mL): 610 mL  Total OUT: 1675 mL    Total NET: -1375 mL    *Negative fluid status: BM (+) on 7/26 x 3 , liquid, loose.  Minimal ostomy output noted. 750 mL output from NGT noted (decreased by 750mL from yesterday); continue total PN volume of 2400 mL. Will monitor/adjust prn. pt on bowel regimen - senna prn     *Elvin Score of 14 ;  no PU documented; abd surgical wound. (+1) scrotum edema documented.    *Malnutrition: severe malnutrition in acute illness r/t decreased ability to meet increased nutr needs 2/2 abd surgery and abd wall abscess AEB NPO, mod muscle/fat wasting, 8% wt loss x 3 mo    Estimated Needs: based on 77Kg (wt from 7/22)  Calories: 1925-2310Kcal (25-30 Kcal/Kg)  Protein: 108-123 g protein (1.4-1.6 g/Kg)  Fluids:  1212-0793 mL (25-30 mL/Kg)    Diet, NPO:   Except Medications (07-29-22 @ 18:15) [Active]      Weight History: no recent weights obtained  Height (cm): 167.6 (07-21-22 @ 12:16), 167.6 (07-06-22 @ 15:24), 167.6 (06-27-22 @ 19:20)  Weight (kg): 81.6 (07-21-22 @ 12:16), 78 (07-06-22 @ 15:24), 81.647 (06-27-22 @ 19:20)  BMI (kg/m2): 29 (07-21-22 @ 12:16), 27.8 (07-06-22 @ 15:24), 29.1 (06-27-22 @ 19:20)  BSA (m2): 1.91 (07-21-22 @ 12:16), 1.88 (07-06-22 @ 15:24), 1.91 (06-27-22 @ 19:20)      *will continue to monitor and adjust PN prn*    TPN Recommendations: via PICC line  Total Volume: 2400 mL  110 g  Amino Acids  150 g Dextrose  80 g Lipids 20%   154 mEq Sodium Chloride  0 mEq Sodium Acetate  0 mmol Sodium Phosphate  0 mEq Potassium Chloride  0 mEq Potassium Acetate  45 mmol Potassium Phosphate  0 mEq Calcium Gluconate ( corrected Ca elevated, DO NOT supplement calcium outside of PN bag at this time. )  17 mEq Magnesium Sulfate  200 mg Thiamine  18 units regular insulin  1 ml Trace Elements  10 ml MVI    Total Calories 2100 Kcal  ( Meets  88%  of  Estimated Energy needs  and   100%  Protein needs)    Additional Recommendations:    1) Continue to titrate dextrose up to goal of 250g by 50- 75g/day.   2) Daily weights  3) Strict I & O's - monitor output closely  4) Daily lyte checks including magnesium and phos  5) Weekly triglycerides/LFT checks  6) POCT q6hrs; maintain 140-180mg/dL    *will monitor and adjust treatement plan prn*    Myra Broderick MS, RDN, [facility] [phone]  Nutrition   Rica Pate M.S., Dietitian Registration Eligible [phone]

## 2022-08-03 NOTE — PROGRESS NOTE ADULT - ASSESSMENT
Imp:  Blood from NG is typically stress gastritis or NG related irritation  Bleeding stopped now    Rec:  Cont protonix 40 mg bid for now  Can change to PO once reliably tolerating POs

## 2022-08-03 NOTE — PROVIDER CONTACT NOTE (OTHER) - REASON
Pt bloated and distended
MD increased fluid rate, even though pt is junky
NEPHRO CONSULT
Patient continues to refuse PPN
Pt has PPN, IVABX, and IVF going and sounds congested/junky
pt having multiple bms wants PPN turned off.
Elevated BP and concern over dark reddish brown NGT output
repeat bgm after dexrose ivp is 229
Patient not making adequate urine throughout the night
H/H 7.7/24.3 heparin due
Patient O2 ranging between 89-91 on RA
Pt c/o nausea, zofran not working
Pt was desaturating during day; heparin dose due
pt projectile vomited x1
Patient had urine output of 150cc throughout the day

## 2022-08-03 NOTE — PROVIDER CONTACT NOTE (OTHER) - NAME OF MD/NP/PA/DO NOTIFIED:
MD Hayward
MD Hernandez
MD Mott
Surgical Resident Daquan Marcos
Estfanous
MD Alcaraz
Dr. Ralph
Dr. thornton
MD Alcaraz
Dr. Hayward
MD Hernandez
dr thornton
Surgical Resident Loree
MD Hernandez

## 2022-08-03 NOTE — PROGRESS NOTE ADULT - SUBJECTIVE AND OBJECTIVE BOX
Pt. seen and examined at bedside this morning. Patient reports his pain is better, abdomen is very distended. He is frustrated and wants to go home. He also want NGT out. He denies fever, chest pain, SOB, nausea, vomiting.  NGT in place.     Vital Signs Last 24 Hrs  T(C): 36.7 (03 Aug 2022 09:01), Max: 36.8 (02 Aug 2022 15:57)  T(F): 98 (03 Aug 2022 09:01), Max: 98.2 (02 Aug 2022 15:57)  HR: 75 (03 Aug 2022 09:01) (71 - 76)  BP: 166/62 (03 Aug 2022 09:01) (147/52 - 181/70)  RR: 18 (03 Aug 2022 09:01) (18 - 18)  SpO2: 95% (03 Aug 2022 09:01) (95% - 96%)    Parameters below as of 03 Aug 2022 09:01  Patient On (Oxygen Delivery Method): room air      Physical Exam:  General: AAOx3, Well developed, NAD  Head: NGT placed  Chest: Normal respiratory effort  Heart: RRR, hypertensive  Abdomen:  ostomy is pink and patent with some whitish tissue on the mucosa, distended, soft, drain is serosanguineous  Neuro/Psych: No localized deficits. Normal speech, normal tone  Skin: Normal, no rashes, no lesions noted.   Extremities: Warm, well perfused, no edema, Pulses intact    LABS:                        8.1    19.63 )-----------( 488      ( 03 Aug 2022 04:50 )             26.0     03 Aug 2022 04:50    141    |  113    |  32     ----------------------------<  202    4.0     |  19     |  1.32     Ca    8.2        03 Aug 2022 04:50  Phos  3.0       03 Aug 2022 04:50  Mg     2.2       03 Aug 2022 04:50    TPro  5.8    /  Alb  1.5    /  TBili  0.3    /  DBili  x      /  AST  13     /  ALT  9      /  AlkPhos  61     03 Aug 2022 04:50

## 2022-08-03 NOTE — PROGRESS NOTE ADULT - ASSESSMENT
86 y/o male w/ a PMHx of sepsis, neuropathy, prostate CA, Crohn's disease, hypothyroidism, depression, intervertebral disc disorder, DM, obesity, HTN, hypercholesterolemia, CAD w/ stents, macular degeneration, hearing loss and colon cancer s/p laparoscopic ileocolic resection on 6/29/22 with Dr. Ardon. pt presents to the ED with increased drainage from the surgery site. No fevers, chills, changes in appetite. Imaging remarkable for large intraperitoneal thick wall abscess 17.5 x 11.8 x 10.8, IR consulted s/p IR drainage on 7/22, 40 cc pus drained, has drain in place, was given IV zosyn.    1. abdominal wall abscess d/t anastomotic leak/fistula s/p drainage/washout/ileostomy. ileus. s/p laparascopic ileocolic resection. colon cancer. crohns disease. PCN allergy  - s/p IR drainage 7/22; body fluid cx growing c. diff/e faecalis - c diff growth in cx d/t anastomotic leak/fistula  - s/p exlap, abdominal washout, diverting ileostomy 7/27  - on IV zosyn 3.375 mgq8h #13-14  - on IV flagyl [id number] for c diff coverage #9  - on oral vancomycin [id number] #10  - continue with above antibiotic coverage  - leukocytosis 19, suspected ileus f/u repeat abd imaging   - c diff pcr (-)  - on TPN via picc line  - does not have true pcn allergy - tolerating abx without issues currently  - supportive care  - tolerating abx well so far; no side effects noted  - reason for abx use and side effects reviewed with patient    2. other issues - care per medicine

## 2022-08-03 NOTE — PROGRESS NOTE ADULT - ASSESSMENT
87 M with history of sigmoidectomy and ileocecectomy 6/29, patient returned to  with pelvis abscess now s/p IR drainage and IR drain upsizing. Patient taken to OR for diagnostic lap converted to exploratory laparotomy, abdominal washout, diverting loop ileostomy POD 6. AFVSS, persistent leukocytosis. Ostomy now functional. AXR showed dilated loops of small bowel. NGT -placed in the bedside 8/1 to suction.    Plan:  Suspecting postop ileus  Plan for CT w/ PO contrast today to r/o SBO  Pain and nausea control prn   Monitor VS   NPO except meds  Monitor ostomy output   NGT to wall suction  TPN for nutrion  PT daily as pt has neuropathy and difficulty walking  OOBC, ambulation   Infectious disease recs appreciated  DVT ppx      Plan discussed with colorectal surgery service

## 2022-08-03 NOTE — PROVIDER CONTACT NOTE (OTHER) - ACTION/TREATMENT ORDERED:
Hydralazine ordered and given, Surgical resident came to assess pt, reassess NGT output in 2 hrs, no orders for BGM of 198

## 2022-08-03 NOTE — PROGRESS NOTE ADULT - ATTENDING COMMENTS
Patient complaining of discomfort from NGT.  He underwent CT of the abdomen/pelvis with PO contrast earlier today.  Since return from CT, per bedside RN ostomy has put out 200cc liquid.  On exam, NGT in place with coffee-ground output, 750cc/24 hours.  Abdomen is soft but still distended and tympanic.  Stoma pink and perfused with small amount dark green-brown output in appliance.  Labs - leukocytosis; stable anemia with Hgb 8.1, improving ROSE with Cr 1.3 today, hyperglycemia.    -- Continue NPO, NGT  -- PPI for coffee-ground/bloody NGT output.  GI consult.  -- Follow up CT formal read  -- Abdominal X-ray later today to evaluate for contrast passage  -- Encourage OOB  -- Pain control  -- Drain care  -- Continue antibiotics- currently on Zosyn, PO vancomycin, IV Flagyl, ID following, appreciate recs  -- Ostomy care and education

## 2022-08-03 NOTE — PROVIDER CONTACT NOTE (OTHER) - DATE AND TIME:
01-Aug-2022 05:08
03-Aug-2022
21-Jul-2022 21:23
23-Jul-2022 04:11
22-Jul-2022 06:44
28-Jul-2022 19:45
31-Jul-2022 06:28
26-Jul-2022 07:48
01-Aug-2022 01:56
23-Jul-2022 04:45
30-Jul-2022 22:59
01-Aug-2022 03:07
01-Aug-2022 04:18
28-Jul-2022 11:30
29-Jul-2022 09:40

## 2022-08-03 NOTE — PROGRESS NOTE ADULT - SUBJECTIVE AND OBJECTIVE BOX
Patient is a 87y old  Male who presents with a chief complaint of abdominal wall abscess (03 Aug 2022 10:15)      Subective:  reconsulted for blood from NG  Some output from ostomy      PAST MEDICAL & SURGICAL HISTORY:  Hearing loss  left hearing aid, right ear deaf      Macular degeneration  right eye      CAD (coronary artery disease)      Stented coronary artery  2 stents, 20 yrs ago, 15 yrs ago      HTN (hypertension)      Hypercholesterolemia      Obesity      Diabetes mellitus      Intervertebral disc disorder      Depression      Hypothyroidism      Crohn disease      Prostate CA  s/p XRT      Neuropathy      Sepsis  s/p back surgery      History of hip replacement, total, right      History of hip replacement, total, right  Revision      S/P lumbar fusion      S/P colon resection          MEDICATIONS  (STANDING):  acetaminophen   IVPB .. 1000 milliGRAM(s) IV Intermittent once  dextrose 5%. 1000 milliLiter(s) (50 mL/Hr) IV Continuous <Continuous>  dextrose 5%. 1000 milliLiter(s) (100 mL/Hr) IV Continuous <Continuous>  dextrose 50% Injectable 25 Gram(s) IV Push once  dextrose 50% Injectable 12.5 Gram(s) IV Push once  dextrose 50% Injectable 25 Gram(s) IV Push once  fat emulsion (Fish Oil and Plant Based) 20% Infusion 33.3 mL/Hr (33.3 mL/Hr) IV Continuous <Continuous>  fat emulsion (Fish Oil and Plant Based) 20% Infusion 0.98 Gm/kG/Day (33.33 mL/Hr) IV Continuous <Continuous>  glucagon  Injectable 1 milliGRAM(s) IntraMuscular once  heparin   Injectable 5000 Unit(s) SubCutaneous every 8 hours  insulin lispro (ADMELOG) corrective regimen sliding scale   SubCutaneous three times a day before meals  levothyroxine 88 MICROGram(s) Oral daily  melatonin 5 milliGRAM(s) Oral at bedtime  melatonin 3 milliGRAM(s) Oral at bedtime  metroNIDAZOLE  IVPB 500 milliGRAM(s) IV Intermittent every 8 hours  metroNIDAZOLE  IVPB      nystatin Cream 1 Application(s) Topical two times a day  pantoprazole  Injectable 40 milliGRAM(s) IV Push every 12 hours  Parenteral Nutrition - Adult 1 Each (100 mL/Hr) TPN Continuous <Continuous>  Parenteral Nutrition - Adult 1 Each (100 mL/Hr) TPN Continuous <Continuous>  piperacillin/tazobactam IVPB.. 3.375 Gram(s) IV Intermittent every 8 hours  vancomycin    Solution 125 milliGRAM(s) Oral every 6 hours  verapamil  milliGRAM(s) Oral daily    MEDICATIONS  (PRN):  acetaminophen     Tablet .. 650 milliGRAM(s) Oral every 6 hours PRN Mild Pain (1 - 3)  dextrose Oral Gel 15 Gram(s) Oral once PRN Blood Glucose LESS THAN 70 milliGRAM(s)/deciliter  HYDROmorphone  Injectable 2 milliGRAM(s) IV Push every 4 hours PRN Severe Pain (7 - 10)  hydrOXYzine hydrochloride 25 milliGRAM(s) Oral every 12 hours PRN Anxiety  ondansetron Injectable 4 milliGRAM(s) IV Push every 6 hours PRN Nausea  prochlorperazine   Injectable 5 milliGRAM(s) IV Push every 8 hours PRN Nausea or vomiting  senna 2 Tablet(s) Oral at bedtime PRN Constipation        Vital Signs Last 24 Hrs  T(C): 36.4 (03 Aug 2022 15:03), Max: 36.7 (03 Aug 2022 09:01)  T(F): 97.5 (03 Aug 2022 15:03), Max: 98 (03 Aug 2022 09:01)  HR: 73 (03 Aug 2022 15:03) (71 - 81)  BP: 153/54 (03 Aug 2022 15:03) (147/52 - 173/66)  BP(mean): --  RR: 18 (03 Aug 2022 15:03) (18 - 18)  SpO2: 95% (03 Aug 2022 15:03) (95% - 95%)    Parameters below as of 03 Aug 2022 15:03  Patient On (Oxygen Delivery Method): room air        PHYSICAL EXAM:    Constitutional: NAD, NG with clear bilious fluid now  Respiratory: CTAB  Cardiovascular: S1 and S2, RRR  Gastrointestinal: BS+, soft, NT/ND, RLQ ileostomy  Extremities: No peripheral edema  Psychiatric: Normal mood, normal affect    LABS:                        8.1    19.63 )-----------( 488      ( 03 Aug 2022 04:50 )             26.0     08-03    141  |  113<H>  |  32<H>  ----------------------------<  202<H>  4.0   |  19<L>  |  1.32<H>    Ca    8.2<L>      03 Aug 2022 04:50  Phos  3.0     08-03  Mg     2.2     08-03    TPro  5.8<L>  /  Alb  1.5<L>  /  TBili  0.3  /  DBili  x   /  AST  13<L>  /  ALT  9<L>  /  AlkPhos  61  08-03      LIVER FUNCTIONS - ( 03 Aug 2022 04:50 )  Alb: 1.5 g/dL / Pro: 5.8 gm/dL / ALK PHOS: 61 U/L / ALT: 9 U/L / AST: 13 U/L / GGT: x             RADIOLOGY & ADDITIONAL STUDIES:

## 2022-08-04 ENCOUNTER — APPOINTMENT (OUTPATIENT)
Dept: COLORECTAL SURGERY | Facility: CLINIC | Age: 87
End: 2022-08-04

## 2022-08-04 LAB
ALBUMIN SERPL ELPH-MCNC: 1.6 G/DL — LOW (ref 3.3–5)
ALP SERPL-CCNC: 59 U/L — SIGNIFICANT CHANGE UP (ref 40–120)
ALT FLD-CCNC: 10 U/L — LOW (ref 12–78)
ANION GAP SERPL CALC-SCNC: 7 MMOL/L — SIGNIFICANT CHANGE UP (ref 5–17)
APTT BLD: 31.2 SEC — SIGNIFICANT CHANGE UP (ref 27.5–35.5)
AST SERPL-CCNC: 11 U/L — LOW (ref 15–37)
BASOPHILS # BLD AUTO: 0.08 K/UL — SIGNIFICANT CHANGE UP (ref 0–0.2)
BASOPHILS NFR BLD AUTO: 0.4 % — SIGNIFICANT CHANGE UP (ref 0–2)
BILIRUB SERPL-MCNC: 0.2 MG/DL — SIGNIFICANT CHANGE UP (ref 0.2–1.2)
BUN SERPL-MCNC: 32 MG/DL — HIGH (ref 7–23)
CALCIUM SERPL-MCNC: 8.3 MG/DL — LOW (ref 8.5–10.1)
CHLORIDE SERPL-SCNC: 111 MMOL/L — HIGH (ref 96–108)
CO2 SERPL-SCNC: 21 MMOL/L — LOW (ref 22–31)
CREAT SERPL-MCNC: 1.35 MG/DL — HIGH (ref 0.5–1.3)
EGFR: 51 ML/MIN/1.73M2 — LOW
EOSINOPHIL # BLD AUTO: 0.38 K/UL — SIGNIFICANT CHANGE UP (ref 0–0.5)
EOSINOPHIL NFR BLD AUTO: 2.1 % — SIGNIFICANT CHANGE UP (ref 0–6)
GLUCOSE SERPL-MCNC: 197 MG/DL — HIGH (ref 70–99)
HCT VFR BLD CALC: 25.5 % — LOW (ref 39–50)
HGB BLD-MCNC: 8.1 G/DL — LOW (ref 13–17)
IMM GRANULOCYTES NFR BLD AUTO: 0.9 % — SIGNIFICANT CHANGE UP (ref 0–1.5)
INR BLD: 1.32 RATIO — HIGH (ref 0.88–1.16)
LYMPHOCYTES # BLD AUTO: 1.05 K/UL — SIGNIFICANT CHANGE UP (ref 1–3.3)
LYMPHOCYTES # BLD AUTO: 5.9 % — LOW (ref 13–44)
MAGNESIUM SERPL-MCNC: 2.3 MG/DL — SIGNIFICANT CHANGE UP (ref 1.6–2.6)
MCHC RBC-ENTMCNC: 26.9 PG — LOW (ref 27–34)
MCHC RBC-ENTMCNC: 31.8 GM/DL — LOW (ref 32–36)
MCV RBC AUTO: 84.7 FL — SIGNIFICANT CHANGE UP (ref 80–100)
MONOCYTES # BLD AUTO: 1.4 K/UL — HIGH (ref 0–0.9)
MONOCYTES NFR BLD AUTO: 7.8 % — SIGNIFICANT CHANGE UP (ref 2–14)
NEUTROPHILS # BLD AUTO: 14.82 K/UL — HIGH (ref 1.8–7.4)
NEUTROPHILS NFR BLD AUTO: 82.9 % — HIGH (ref 43–77)
PHOSPHATE SERPL-MCNC: 3.5 MG/DL — SIGNIFICANT CHANGE UP (ref 2.5–4.5)
PLATELET # BLD AUTO: 470 K/UL — HIGH (ref 150–400)
POTASSIUM SERPL-MCNC: 3.9 MMOL/L — SIGNIFICANT CHANGE UP (ref 3.5–5.3)
POTASSIUM SERPL-SCNC: 3.9 MMOL/L — SIGNIFICANT CHANGE UP (ref 3.5–5.3)
PROT SERPL-MCNC: 5.7 GM/DL — LOW (ref 6–8.3)
PROTHROM AB SERPL-ACNC: 15.3 SEC — HIGH (ref 10.5–13.4)
RBC # BLD: 3.01 M/UL — LOW (ref 4.2–5.8)
RBC # FLD: 19.1 % — HIGH (ref 10.3–14.5)
SARS-COV-2 RNA SPEC QL NAA+PROBE: SIGNIFICANT CHANGE UP
SODIUM SERPL-SCNC: 139 MMOL/L — SIGNIFICANT CHANGE UP (ref 135–145)
TRIGL SERPL-MCNC: 293 MG/DL — HIGH
WBC # BLD: 17.89 K/UL — HIGH (ref 3.8–10.5)
WBC # FLD AUTO: 17.89 K/UL — HIGH (ref 3.8–10.5)

## 2022-08-04 PROCEDURE — 49406 IMAGE CATH FLUID PERI/RETRO: CPT

## 2022-08-04 RX ORDER — MAGNESIUM HYDROXIDE 400 MG/1
30 TABLET, CHEWABLE ORAL ONCE
Refills: 0 | Status: COMPLETED | OUTPATIENT
Start: 2022-08-04 | End: 2022-08-04

## 2022-08-04 RX ORDER — PANTOPRAZOLE SODIUM 20 MG/1
40 TABLET, DELAYED RELEASE ORAL
Refills: 0 | Status: DISCONTINUED | OUTPATIENT
Start: 2022-08-04 | End: 2022-08-05

## 2022-08-04 RX ORDER — HYDROCHLOROTHIAZIDE 25 MG
25 TABLET ORAL DAILY
Refills: 0 | Status: DISCONTINUED | OUTPATIENT
Start: 2022-08-04 | End: 2022-08-09

## 2022-08-04 RX ORDER — FUROSEMIDE 40 MG
20 TABLET ORAL ONCE
Refills: 0 | Status: COMPLETED | OUTPATIENT
Start: 2022-08-04 | End: 2022-08-04

## 2022-08-04 RX ORDER — METOCLOPRAMIDE HCL 10 MG
10 TABLET ORAL EVERY 8 HOURS
Refills: 0 | Status: DISCONTINUED | OUTPATIENT
Start: 2022-08-04 | End: 2022-08-09

## 2022-08-04 RX ORDER — LISINOPRIL 2.5 MG/1
20 TABLET ORAL DAILY
Refills: 0 | Status: DISCONTINUED | OUTPATIENT
Start: 2022-08-04 | End: 2022-08-09

## 2022-08-04 RX ORDER — I.V. FAT EMULSION 20 G/100ML
0.98 EMULSION INTRAVENOUS
Qty: 80 | Refills: 0 | Status: DISCONTINUED | OUTPATIENT
Start: 2022-08-04 | End: 2022-08-04

## 2022-08-04 RX ORDER — ELECTROLYTE SOLUTION,INJ
1 VIAL (ML) INTRAVENOUS
Refills: 0 | Status: DISCONTINUED | OUTPATIENT
Start: 2022-08-04 | End: 2022-08-04

## 2022-08-04 RX ADMIN — Medication 125 MILLIGRAM(S): at 22:41

## 2022-08-04 RX ADMIN — Medication 1 EACH: at 22:35

## 2022-08-04 RX ADMIN — Medication 5 MILLIGRAM(S): at 22:38

## 2022-08-04 RX ADMIN — Medication 1 TABLET(S): at 11:53

## 2022-08-04 RX ADMIN — Medication 5: at 05:39

## 2022-08-04 RX ADMIN — Medication 5: at 11:51

## 2022-08-04 RX ADMIN — NYSTATIN CREAM 1 APPLICATION(S): 100000 CREAM TOPICAL at 11:01

## 2022-08-04 RX ADMIN — PIPERACILLIN AND TAZOBACTAM 25 GRAM(S): 4; .5 INJECTION, POWDER, LYOPHILIZED, FOR SOLUTION INTRAVENOUS at 22:40

## 2022-08-04 RX ADMIN — Medication 125 MILLIGRAM(S): at 05:26

## 2022-08-04 RX ADMIN — Medication 88 MICROGRAM(S): at 05:26

## 2022-08-04 RX ADMIN — Medication 3: at 18:21

## 2022-08-04 RX ADMIN — HEPARIN SODIUM 5000 UNIT(S): 5000 INJECTION INTRAVENOUS; SUBCUTANEOUS at 22:40

## 2022-08-04 RX ADMIN — Medication 240 MILLIGRAM(S): at 10:58

## 2022-08-04 RX ADMIN — MAGNESIUM HYDROXIDE 30 MILLILITER(S): 400 TABLET, CHEWABLE ORAL at 10:57

## 2022-08-04 RX ADMIN — Medication 10 MILLIGRAM(S): at 14:25

## 2022-08-04 RX ADMIN — Medication 1 TABLET(S): at 22:37

## 2022-08-04 RX ADMIN — Medication 25 MILLIGRAM(S): at 02:22

## 2022-08-04 RX ADMIN — PANTOPRAZOLE SODIUM 40 MILLIGRAM(S): 20 TABLET, DELAYED RELEASE ORAL at 22:39

## 2022-08-04 RX ADMIN — I.V. FAT EMULSION 33.3 GM/KG/DAY: 20 EMULSION INTRAVENOUS at 22:36

## 2022-08-04 RX ADMIN — Medication 125 MILLIGRAM(S): at 11:52

## 2022-08-04 RX ADMIN — PIPERACILLIN AND TAZOBACTAM 25 GRAM(S): 4; .5 INJECTION, POWDER, LYOPHILIZED, FOR SOLUTION INTRAVENOUS at 05:25

## 2022-08-04 RX ADMIN — HEPARIN SODIUM 5000 UNIT(S): 5000 INJECTION INTRAVENOUS; SUBCUTANEOUS at 05:26

## 2022-08-04 RX ADMIN — Medication 100 MILLIGRAM(S): at 04:21

## 2022-08-04 RX ADMIN — Medication 3 MILLIGRAM(S): at 22:38

## 2022-08-04 RX ADMIN — Medication 20 MILLIGRAM(S): at 10:58

## 2022-08-04 RX ADMIN — Medication 100 MILLIGRAM(S): at 19:50

## 2022-08-04 RX ADMIN — Medication 25 MILLIGRAM(S): at 10:57

## 2022-08-04 RX ADMIN — Medication 100 MILLIGRAM(S): at 13:18

## 2022-08-04 RX ADMIN — LISINOPRIL 20 MILLIGRAM(S): 2.5 TABLET ORAL at 11:00

## 2022-08-04 RX ADMIN — Medication 125 MILLIGRAM(S): at 18:21

## 2022-08-04 RX ADMIN — PIPERACILLIN AND TAZOBACTAM 25 GRAM(S): 4; .5 INJECTION, POWDER, LYOPHILIZED, FOR SOLUTION INTRAVENOUS at 14:25

## 2022-08-04 RX ADMIN — ONDANSETRON 4 MILLIGRAM(S): 8 TABLET, FILM COATED ORAL at 02:34

## 2022-08-04 RX ADMIN — Medication 10 MILLIGRAM(S): at 22:40

## 2022-08-04 NOTE — PROGRESS NOTE ADULT - ATTENDING COMMENTS
Seen and examined.  No complaints.  CT A/P imaging and report reviewed.  Ileostomy now functional.  Trying to get OOB more.  AFVSS  NAD  dressing in place, soft, NT, moderate distention  Labs reviewed WBC increasing  A/P -   D/C NGT. Cont NPO except meds.  Shane drain removed.  Possible IR tap of RUQ collection.  WBC increasing. D/W Dr Lance, abx adjusted.  Will trend WBC.  Resume home ant HTN meds.  PT/OT.

## 2022-08-04 NOTE — CONSULT NOTE ADULT - SUBJECTIVE AND OBJECTIVE BOX
Patient is a 87y old  Male who presents with a chief complaint of abdominal wall abscess (2022 09:35)    HPI:  88 y/o male w/ a PMHx of sepsis, neuropathy, prostate CA, Crohn's disease, hypothyroidism, depression, intervertebral disc disorder, DM, obesity, HTN, hypercholesterolemia, CAD w/ stents, macular degeneration, hearing loss and colon cancer s/p laparoscopic ileocolic resection on 22 with Dr. Ardon. pt presents to the ED with increased drainage from the surgery site. No fevers, chills, changes in appetite. Imaging remarkable for large intraperitoneal thick wall abscess 17.5 x 11.8 x 10.8, IR consulted s/p IR drainage on , 40 cc pus drained, has drain in place, was given IV zosyn.    PMH: as above  PSH: as above  Meds: per reconciliation sheet, noted below  MEDICATIONS  (STANDING):  dextrose 5% + sodium chloride 0.45% with potassium chloride 20 mEq/L 1000 milliLiter(s) (75 mL/Hr) IV Continuous <Continuous>  heparin   Injectable 5000 Unit(s) SubCutaneous every 8 hours  insulin lispro (ADMELOG) corrective regimen sliding scale.   SubCutaneous three times a day before meals  levothyroxine 88 MICROGram(s) Oral daily  lisinopril 20 milliGRAM(s) Oral daily  melatonin 3 milliGRAM(s) Oral at bedtime  Parenteral Nutrition - Adult 1 Each (92 mL/Hr) TPN Continuous <Continuous>  Parenteral Nutrition - Adult 1 Each (92 mL/Hr) TPN Continuous <Continuous>  piperacillin/tazobactam IVPB.. 3.375 Gram(s) IV Intermittent every 8 hours  verapamil  milliGRAM(s) Oral daily    Allergies    chocolate (Unknown)  penicillin (Hives)    Intolerances      Social: no smoking, no alcohol, no illegal drugs; no recent travel, no exposure to TB  FAMILY HISTORY:  No pertinent family history in first degree relatives       no history of premature cardiovascular disease in first degree relatives    ROS: the patient denies fever, no chills, no HA, no dizziness, no sore throat, no blurry vision, no CP, no palpitations, no SOB, no cough, +abdominal pain, no diarrhea, no N/V, no dysuria, no leg pain, no claudication, no rash, no joint aches, no rectal pain or bleeding, no night sweats    All other systems reviewed and are negative    Vital Signs Last 24 Hrs  T(C): 36.8 (2022 09:27), Max: 37.2 (2022 05:11)  T(F): 98.3 (2022 09:), Max: 98.9 (2022 05:11)  HR: 61 (:) (61 - 62)  BP: 137/54 (:) (137/54 - 152/54)  BP(mean): --  RR: 18 (:) (16 - 18)  SpO2: 96% (:) (96% - 98%)    Parameters below as of :  Patient On (Oxygen Delivery Method): room air      PE:  Constitutional: frail looking  HEENT: NC/AT, EOMI, PERRLA, conjunctivae clear; ears and nose atraumatic; pharynx benign  Neck: supple; thyroid not palpable  Back: no tenderness  Respiratory: respiratory effort normal; clear to auscultation  Cardiovascular: S1S2 regular, no murmurs  Abdomen: soft, not tender, not distended, positive BS; + ostomy in place + abd drain  Genitourinary: no suprapubic tenderness  Lymphatic: no LN palpable  Musculoskeletal: no muscle tenderness, no joint swelling or tenderness  Extremities: no pedal edema  Neurological/ Psychiatric: AxOx3, Judgement and insight normal;  moving all extremities  Skin: no rashes; no palpable lesions    Labs: all available labs reviewed                        8.3    1144 )-----------( 572      ( 2022 04:53 )             26.6     07-23    141  |  114<H>  |  29<H>  ----------------------------<  151<H>  3.8   |  21<L>  |  1.85<H>    Ca    8.9      2022 04:53  Phos  3.0     07-23  Mg     2.1     07-23    TPro  6.5  /  Alb  1.8<L>  /  TBili  0.2  /  DBili  x   /  AST  78<H>  /  ALT  111<H>  /  AlkPhos  141<H>  07-     LIVER FUNCTIONS - ( 2022 04:53 )  Alb: 1.8 g/dL / Pro: 6.5 gm/dL / ALK PHOS: 141 U/L / ALT: 111 U/L / AST: 78 U/L / GGT: x           Urinalysis Basic - ( 2022 13:25 )    Color: Yellow / Appearance: Clear / S.015 / pH: x  Gluc: x / Ketone: Negative  / Bili: Negative / Urobili: Negative   Blood: x / Protein: 30 mg/dL / Nitrite: Negative   Leuk Esterase: Negative / RBC: 0-2 /HPF / WBC 0-2   Sq Epi: x / Non Sq Epi: Few / Bacteria: Moderate        Radiology: all available radiological tests reviewed      ACC: [id number] EXAM:  CT ABDOMEN AND PELVIS OC IC                          PROCEDURE DATE:  2022          INTERPRETATION:  CLINICAL INFORMATION: Drainage from surgical site;   evaluate for abscess. History of ileocolic resection 2022.    COMPARISON: 2022.    CONTRAST/COMPLICATIONS:  IV Contrast: Omnipaque 350  90 cc administered   10 cc discarded  Oral Contrast: Omnipaque 300  Complications: None reported at time of study completion    PROCEDURE:  CT of the Abdomen and Pelvis was performed.  Sagittal and coronal reformats were performed.    FINDINGS: There is a large intraperitoneal thick wall abscess identified   measuring 17.5 x 11.8 x 10.8 cm, which extends anteriorly and superiorly   to the site of the midline anterior abdominal wall incision. In addition   to fluid and air there is mottled hyperdensity within the collection   likely related to administered oral contrast. The greatest concentration   of contrast is identified within the left lateral aspect of the abscess   adjacent to a suture surgical line at the level of the mid sigmoid colon,   which may be the site of fistulous communication.    LOWER CHEST: Small right pleural effusion. Opacity within the posterior   right lower lobe may be related to atelectasis; underlying parenchymal   infiltrate cannot be fully excluded.    LIVER: Normal in size. Hepatic steatosis. No focal hepatic masses.  BILE DUCTS: Normal caliber.  GALLBLADDER: Multiple gallstones. No gallbladder wall thickening.  SPLEEN: Within normal limits.  PANCREAS: Within normal limits.  ADRENALS: Within normal limits.  KIDNEYS/URETERS: No hydronephrosis. No renal calculi. 4.7 cm   space-occupying lesion noted within the left kidney characterized as a   cyst on prior ultrasonography 2022. Additional subcentimeter   hypodense foci are noted within the bilateral renal parenchyma, too small   to characterize.    BLADDER: Within normal limits.  REPRODUCTIVE ORGANS: Prostate seed implants identified.    BOWEL: Surgical suture linesare identified within the mid sigmoid   colonic region as well as within the right lower quadrant. No evidence   for mechanical bowel obstruction.  PERITONEUM: Upper abdominal ascites identified.  VESSELS: Within normal limits.  RETROPERITONEUM/LYMPHNODES: No lymphadenopathy.  ABDOMINAL WALL: As described above.  BONES: Status post lumbar spine fusion surgery extending from L2 through   S1. Patient status post right hip arthroplasty. Degenerative changes   lower thoracic and lumbar spine.    IMPRESSION:  There is a large intraperitoneal thick wall abscess   identified measuring 17.5 x 11.8 x 10.8 cm, which extends anteriorly and   superiorly to the site of the midline anterior abdominal wall incision.   In addition to fluid and air there is mottled hyperdensity within the   collection likely related to administered oral contrast. The greatest   concentration of contrast is identified within the left lateral aspect of   the abscess adjacent to a suture surgical line at the level of the mid   sigmoid colon, which may be the site of fistulous communication.    < end of copied text >    Advanced directives addressed: full resuscitation
87 CKD III sepsis, neuropathy, prostate CA, Crohn's disease, hypothyroidism, depression, intervertebral disc disorder, DM, obesity, HTN, hypercholesterolemia, CAD w/ stents, macular degeneration, hearing loss and colon cancer s/p laparoscopic ileocolic resection on 22 with Dr. Ardon. pt presents to the ED with increased drainage from the surgery site. Has baseline CKD III 1.4-1.6, seen by Dr Daigle on recent admit. patient admitted and seen by surgical teams for management.     Patient is a 87y Male whom presented to the hospital with     PAST MEDICAL & SURGICAL HISTORY:  Hearing loss  left hearing aid, right ear deaf      Macular degeneration  right eye      CAD (coronary artery disease)      Stented coronary artery  2 stents, 20 yrs ago, 15 yrs ago      HTN (hypertension)      Hypercholesterolemia      Obesity      Diabetes mellitus      Intervertebral disc disorder      Depression      Hypothyroidism      Crohn disease      Prostate CA  s/p XRT      Neuropathy      Sepsis  s/p back surgery      History of hip replacement, total, right      History of hip replacement, total, right  Revision      S/P lumbar fusion      S/P colon resection          MEDICATIONS  (STANDING):  hydrochlorothiazide 12.5 milliGRAM(s) Oral daily  insulin lispro (ADMELOG) corrective regimen sliding scale.   SubCutaneous three times a day before meals  levothyroxine 88 MICROGram(s) Oral daily  lisinopril 20 milliGRAM(s) Oral daily  melatonin 3 milliGRAM(s) Oral at bedtime  Parenteral Nutrition - Adult 1 Each (92 mL/Hr) TPN Continuous <Continuous>  piperacillin/tazobactam IVPB.. 3.375 Gram(s) IV Intermittent every 8 hours  sodium chloride 0.9%. 1000 milliLiter(s) (100 mL/Hr) IV Continuous <Continuous>  verapamil  milliGRAM(s) Oral daily    MEDICATIONS  (PRN):  acetaminophen     Tablet .. 650 milliGRAM(s) Oral every 6 hours PRN Mild Pain (1 - 3), Moderate Pain (4 - 6)  HYDROmorphone  Injectable 0.5 milliGRAM(s) IV Push every 3 hours PRN Severe Pain (7 - 10)  ondansetron Injectable 4 milliGRAM(s) IV Push every 6 hours PRN Nausea  senna 2 Tablet(s) Oral at bedtime PRN Constipation      Allergies    chocolate (Unknown)  penicillin (Hives)    Intolerances        SOCIAL HISTORY:    FAMILY HISTORY:  No pertinent family history in first degree relatives        REVIEW OF SYSTEMS:    CONSTITUTIONAL: No weakness, fevers or chills  EYES/ENT: No visual changes;  No vertigo or throat pain   NECK: No pain or stiffness  RESPIRATORY: No cough, wheezing, hemoptysis; No shortness of breath  CARDIOVASCULAR: No chest pain or palpitations  GASTROINTESTINAL: No abdominal or epigastric pain. No nausea, vomiting, or hematemesis; No diarrhea or constipation. No melena or hematochezia.  GENITOURINARY: No dysuria, frequency or hematuria  NEUROLOGICAL: No numbness or weakness  SKIN: No itching, burning, rashes, or lesions   All other review of systems is negative unless indicated above.      T(C): , Max: 37.1 (22 @ 20:45)  T(F): , Max: 98.7 (22 @ 20:45)  HR: 62 (22 @ 15:31)  BP: 152/54 (22 @ 15:31)  BP(mean): 87 (22 @ 20:45)  RR: 17 (22 @ 15:31)  SpO2: 98% (22 @ 15:31)  Wt(kg): --     @ 07:  -   @ 07:00  --------------------------------------------------------  IN: 0 mL / OUT: 150 mL / NET: -150 mL     @ 07:  -   @ 15:48  --------------------------------------------------------  IN: 0 mL / OUT: 217 mL / NET: -217 mL          PHYSICAL EXAM:    Constitutional: NAD, frail  HEENT:  MM  Neck: No LAD, No JVD  Respiratory: dist  Cardiovascular: S1 and S2   Extremities chr  peripheral edema  Neurological: Alert  Psychiatric: Normal mood, normal affect           LABS:                        8.4    11.75 )-----------( 571      ( 2022 11:28 )             27.3     2022 11:28    144    |  115    |  33     ----------------------------<  100    4.3     |  22     |  1.77   2022 14:00    142    |  116    |  41     ----------------------------<  119    4.5     |  22     |  1.94     Ca    9.0        2022 11:28  Ca    9.5        2022 14:00  Phos  3.5       2022 11:28  Mg     2.2       2022 11:28    TPro  7.3    /  Alb  2.1    /  TBili  0.2    /  DBili  x      /  AST  99     /  ALT  94     /  AlkPhos  160    2022 14:00          Urine Studies:  Urinalysis Basic - ( 2022 13:25 )    Color: Yellow / Appearance: Clear / S.015 / pH: x  Gluc: x / Ketone: Negative  / Bili: Negative / Urobili: Negative   Blood: x / Protein: 30 mg/dL / Nitrite: Negative   Leuk Esterase: Negative / RBC: 0-2 /HPF / WBC 0-2   Sq Epi: x / Non Sq Epi: Few / Bacteria: Moderate            RADIOLOGY & ADDITIONAL STUDIES:                
HPI:  88 y/o male w/ a PMHx of sepsis, neuropathy, prostate CA, Crohn's disease, hypothyroidism, depression, intervertebral disc disorder, DM, obesity, HTN, hypercholesterolemia, CAD w/ stents, macular degeneration, hearing loss and colon cancer s/p laparoscopic ileocolic resection on 6/29/22 with Dr. Ardon. pt presents to the ED with increased drainage from the surgery site. No fevers, chills, changes in appetite.  (21 Jul 2022 17:15)  ------------------------------  CT showed lower abdominal collection from anastomotic leak/fistula  Drain placed -> growing C. diff  Continues with diarrhea x 4 overnight  Also vomited last night    PAST MEDICAL & SURGICAL HISTORY:  Hearing loss  left hearing aid, right ear deaf      Macular degeneration  right eye      CAD (coronary artery disease)      Stented coronary artery  2 stents, 20 yrs ago, 15 yrs ago      HTN (hypertension)      Hypercholesterolemia      Obesity      Diabetes mellitus      Intervertebral disc disorder      Depression      Hypothyroidism      Crohn disease      Prostate CA  s/p XRT      Neuropathy      Sepsis  s/p back surgery      History of hip replacement, total, right      History of hip replacement, total, right  Revision      S/P lumbar fusion      S/P colon resection          Home Medications:  ascorbic acid 1000 mg oral tablet: 1 tab(s) orally once a day (21 Jul 2022 17:22)  aspirin 81 mg oral delayed release tablet: 1 tab(s) orally once a day (at bedtime) (21 Jul 2022 17:22)  atorvastatin 20 mg oral tablet: 1 tab(s) orally once a day (21 Jul 2022 17:22)  colestipol 1 g oral tablet: 1 tab(s) orally once a day (at bedtime) (21 Jul 2022 17:22)  diphenoxylate-atropine 2.5 mg-0.025 mg oral tablet: 2 tab(s) orally 3 times a day (21 Jul 2022 17:22)  gabapentin 100 mg oral capsule: 3 cap(s) orally once a day (at bedtime) (21 Jul 2022 17:22)  glimepiride 1 mg oral tablet: 1 tab(s) orally once a day (21 Jul 2022 17:22)  levothyroxine 88 mcg (0.088 mg) oral tablet: 1 tab(s) orally once a day (21 Jul 2022 17:22)  lisinopril-hydrochlorothiazide 20 mg-25 mg oral tablet: 1 tab(s) orally once a day (21 Jul 2022 17:22)  metFORMIN 500 mg oral tablet: 1 tab(s) orally 2 times a day (21 Jul 2022 17:22)  Multiple Vitamins oral tablet: 1 tab(s) orally once a day (21 Jul 2022 17:22)  PreserVision AREDS 2 oral capsule: 1 cap(s) orally 2 times a day (21 Jul 2022 17:22)  traMADol 50 mg oral tablet: 1 tab(s) orally 3 times a day (21 Jul 2022 17:22)  verapamil 240 mg/24 hours oral tablet, extended release: 1 tab(s) orally once a day (21 Jul 2022 17:22)  Vitamin D3 50 mcg (2000 intl units) oral tablet: 1 tab(s) orally once a day (21 Jul 2022 17:22)      MEDICATIONS  (STANDING):  dextrose 5% + sodium chloride 0.45% with potassium chloride 20 mEq/L 1000 milliLiter(s) (75 mL/Hr) IV Continuous <Continuous>  heparin   Injectable 5000 Unit(s) SubCutaneous every 8 hours  insulin lispro (ADMELOG) corrective regimen sliding scale.   SubCutaneous three times a day before meals  levothyroxine 88 MICROGram(s) Oral daily  lisinopril 20 milliGRAM(s) Oral daily  melatonin 5 milliGRAM(s) Oral at bedtime  melatonin 3 milliGRAM(s) Oral at bedtime  nystatin Cream 1 Application(s) Topical two times a day  piperacillin/tazobactam IVPB.. 3.375 Gram(s) IV Intermittent every 8 hours  vancomycin    Solution 125 milliGRAM(s) Oral every 6 hours  verapamil  milliGRAM(s) Oral daily    MEDICATIONS  (PRN):  acetaminophen     Tablet .. 650 milliGRAM(s) Oral every 6 hours PRN Mild Pain (1 - 3), Moderate Pain (4 - 6)  hydrOXYzine hydrochloride 25 milliGRAM(s) Oral every 12 hours PRN Anxiety  ondansetron Injectable 4 milliGRAM(s) IV Push every 6 hours PRN Nausea  senna 2 Tablet(s) Oral at bedtime PRN Constipation      Allergies    chocolate (Unknown)  penicillin (Hives)    Intolerances        SOCIAL HISTORY:    FAMILY HISTORY:  No pertinent family history in first degree relatives        ROS  As above  Otherwise unremarkable    Vital Signs Last 24 Hrs  T(C): 37.1 (25 Jul 2022 21:12), Max: 37.1 (25 Jul 2022 21:12)  T(F): 98.8 (25 Jul 2022 21:12), Max: 98.8 (25 Jul 2022 21:12)  HR: 68 (25 Jul 2022 21:12) (62 - 68)  BP: 134/47 (25 Jul 2022 21:12) (134/47 - 161/53)  BP(mean): --  RR: 18 (25 Jul 2022 21:12) (18 - 18)  SpO2: 98% (25 Jul 2022 21:12) (98% - 100%)    Parameters below as of 25 Jul 2022 15:14  Patient On (Oxygen Delivery Method): room air        Constitutional: NAD, well-developed  Respiratory: CTAB  Cardiovascular: S1 and S2, RRR  Gastrointestinal: BS+, soft, NT/ND  Extremities: No peripheral edema  Psychiatric: Normal mood, normal affect  Skin: No rashes    LABS:                        8.1    11.52 )-----------( 516      ( 25 Jul 2022 08:34 )             26.5     07-25    141  |  118<H>  |  17  ----------------------------<  135<H>  4.2   |  16<L>  |  1.73<H>    Ca    8.5      25 Jul 2022 07:47  Phos  2.1     07-25  Mg     2.0     07-25            RADIOLOGY & ADDITIONAL STUDIES:
Patient is a 87y old  Male who presents with a chief complaint of abdominal wall abscess     HPI:  88 y/o male with h/o sepsis, neuropathy, prostate CA, Crohn's disease, hypothyroidism, depression, intervertebral disc disorder, DM, obesity, HTN, hypercholesterolemia, CAD w/ stents, macular degeneration, hearing loss and colon cancer s/p laparoscopic ileocolic resection on 6/29/22 with Dr. Ardon was admitted on7/21 with increased drainage from the surgery site. No fevers, chills, changes in appetite reported. He received zosyn IV. On 7/25 he was noted with loose stools and was reported with CDT in stool.     PMH: as above  PSH: as above  Meds: per reconciliation sheet, noted below  MEDICATIONS  (STANDING):  dextrose 5% + sodium chloride 0.45% with potassium chloride 20 mEq/L 1000 milliLiter(s) (75 mL/Hr) IV Continuous <Continuous>  heparin   Injectable 5000 Unit(s) SubCutaneous every 8 hours  insulin lispro (ADMELOG) corrective regimen sliding scale.   SubCutaneous three times a day before meals  levothyroxine 88 MICROGram(s) Oral daily  lisinopril 20 milliGRAM(s) Oral daily  melatonin 5 milliGRAM(s) Oral at bedtime  melatonin 3 milliGRAM(s) Oral at bedtime  metroNIDAZOLE  IVPB 500 milliGRAM(s) IV Intermittent every 8 hours  piperacillin/tazobactam IVPB.. 3.375 Gram(s) IV Intermittent every 8 hours  vancomycin    Solution 125 milliGRAM(s) Oral every 6 hours  verapamil  milliGRAM(s) Oral daily    MEDICATIONS  (PRN):  acetaminophen     Tablet .. 650 milliGRAM(s) Oral every 6 hours PRN Mild Pain (1 - 3), Moderate Pain (4 - 6)  hydrOXYzine hydrochloride 25 milliGRAM(s) Oral every 12 hours PRN Anxiety  ondansetron Injectable 4 milliGRAM(s) IV Push every 6 hours PRN Nausea  senna 2 Tablet(s) Oral at bedtime PRN Constipation    Allergies    chocolate (Unknown)  penicillin (Hives)    Intolerances      Social: no smoking, no alcohol, no illegal drugs; no recent travel, no exposure to TB  FAMILY HISTORY:  No pertinent family history in first degree relatives      no history of premature cardiovascular disease in first degree relatives    ROS: the patient denies fever, no chills, no HA, no seizures, no dizziness, no sore throat, no nasal congestion, no blurry vision, no CP, no palpitations, no SOB, no cough, no abdominal pain, no diarrhea, no N/V, no dysuria, no leg pain, no claudication, no rash, no joint aches, no rectal pain or bleeding, no night sweats  All other systems reviewed and are negative    Vital Signs Last 24 Hrs  T(C): 37 (25 Jul 2022 15:14), Max: 37 (25 Jul 2022 15:14)  T(F): 98.6 (25 Jul 2022 15:14), Max: 98.6 (25 Jul 2022 15:14)  HR: 62 (25 Jul 2022 15:14) (51 - 63)  BP: 161/53 (25 Jul 2022 15:14) (135/46 - 161/53)  BP(mean): --  RR: 18 (25 Jul 2022 15:14) (18 - 18)  SpO2: 98% (25 Jul 2022 15:14) (98% - 100%)    Parameters below as of 25 Jul 2022 15:14  Patient On (Oxygen Delivery Method): room air      Daily     Daily     PE:    Constitutional:  No acute distress  HEENT: NC/AT, EOMI, PERRLA, conjunctivae clear; ears and nose atraumatic; pharynx benign  Neck: supple; thyroid not palpable  Back: no tenderness  Respiratory: respiratory effort normal; clear to auscultation  Cardiovascular: S1S2 regular, no murmurs  Abdomen: soft, not tender, not distended, positive BS; no liver or spleen organomegaly  Genitourinary: no suprapubic tenderness  Lymphatic: no LN palpable  Musculoskeletal: no muscle tenderness, no joint swelling or tenderness  Extremities: no pedal edema  Neurological/ Psychiatric: AxOx3, judgement and insight normal; moving all extremities  Skin: no rashes; no palpable lesions    Labs: all available labs reviewed                        8.1    11.52 )-----------( 516      ( 25 Jul 2022 08:34 )             26.5     07-25    141  |  118<H>  |  17  ----------------------------<  135<H>  4.2   |  16<L>  |  1.73<H>    Ca    8.5      25 Jul 2022 07:47  Phos  2.1     07-25  Mg     2.0     07-25    TPro  6.3  /  Alb  2.0<L>  /  TBili  0.2  /  DBili  x   /  AST  62<H>  /  ALT  82<H>  /  AlkPhos  124<H>  07-24     LIVER FUNCTIONS - ( 24 Jul 2022 04:53 )  Alb: 2.0 g/dL / Pro: 6.3 gm/dL / ALK PHOS: 124 U/L / ALT: 82 U/L / AST: 62 U/L / GGT: x                 COVID-19 PCR: NotDete (07-21-22 @ 16:32)          Radiology: all available radiological tests reviewed    Advanced directives addressed: full resuscitation
Chief Complaint:  Patient is a 87y old  Male who presents with a chief complaint of abdominal wall abscess    HPI:  88 y/o male w/ a PMHx of sepsis, neuropathy, prostate CA, Crohn's disease, hypothyroidism, depression, intervertebral disc disorder, DM, obesity, HTN, hypercholesterolemia, CAD w/ stents, macular degeneration, hearing loss and colon cancer s/p laparoscopic ileocolic resection on 6/29/22 with Dr. Ardon. pt presents to the ED with increased drainage from the surgery site. CT showed a large abdominal abscess. IR consulted for drainage. Pt seen at bedside with no complaints. He denied fevers, chills, NVD.    Allergies  chocolate (Unknown)  penicillin (Hives)    MEDICATIONS  (STANDING):  hydrochlorothiazide 12.5 milliGRAM(s) Oral daily  insulin lispro (ADMELOG) corrective regimen sliding scale.   SubCutaneous three times a day before meals  levothyroxine 88 MICROGram(s) Oral daily  lisinopril 20 milliGRAM(s) Oral daily  melatonin 3 milliGRAM(s) Oral at bedtime  piperacillin/tazobactam IVPB.. 3.375 Gram(s) IV Intermittent every 8 hours  sodium chloride 0.9%. 1000 milliLiter(s) (100 mL/Hr) IV Continuous <Continuous>  verapamil  milliGRAM(s) Oral daily    MEDICATIONS  (PRN):  morphine  - Injectable 2 milliGRAM(s) IV Push every 4 hours PRN Severe Pain (7 - 10)  ondansetron Injectable 4 milliGRAM(s) IV Push every 6 hours PRN Nausea  senna 2 Tablet(s) Oral at bedtime PRN Constipation      PAST MEDICAL & SURGICAL HISTORY:  Hearing loss  left hearing aid, right ear deaf      Macular degeneration  right eye      CAD (coronary artery disease)      Stented coronary artery  2 stents, 20 yrs ago, 15 yrs ago      HTN (hypertension)      Hypercholesterolemia      Obesity      Diabetes mellitus      Intervertebral disc disorder      Depression      Hypothyroidism      Crohn disease      Prostate CA  s/p XRT      Neuropathy      Sepsis  s/p back surgery      History of hip replacement, total, right      History of hip replacement, total, right  Revision      S/P lumbar fusion      S/P colon resection          FAMILY HISTORY:  No pertinent family history in first degree relatives        Review of Systems:  As per HPI    Physical Exam:  Vital Signs Last 24 Hrs  T(C): 36.5 (22 Jul 2022 09:13), Max: 37.1 (21 Jul 2022 20:45)  T(F): 97.7 (22 Jul 2022 09:13), Max: 98.7 (21 Jul 2022 20:45)  HR: 59 (22 Jul 2022 09:13) (59 - 63)  BP: 148/52 (22 Jul 2022 09:13) (122/54 - 153/56)  BP(mean): 87 (21 Jul 2022 20:45) (81 - 87)  RR: 16 (22 Jul 2022 09:13) (16 - 18)  SpO2: 99% (22 Jul 2022 09:13) (96% - 99%)    Parameters below as of 22 Jul 2022 09:13  Patient On (Oxygen Delivery Method): room air        GENERAL APPEARANCE: Well developed, in no acute distress.    LUNGS: Auscultation of the lungs revealed normal breath sounds without any other adventitious sounds or rubs.    CARDIOVASCULAR: There was a regular rate and rhythm    ABDOMEN: + Purulent drainage from abdominal surgical wound. Soft.    NEUROLOGIC: Alert and oriented x 3. Normal affect.     CBC                        7.5    10.70 )-----------( 585      ( 21 Jul 2022 13:22 )             24.6       Chemistry  07-21    142  |  116<H>  |  41<H>  ----------------------------<  119<H>  4.5   |  22  |  1.94<H>    Ca    9.5      21 Jul 2022 14:00    TPro  7.3  /  Alb  2.1<L>  /  TBili  0.2  /  DBili  x   /  AST  99<H>  /  ALT  94<H>  /  AlkPhos  160<H>  07-21      PT/INR - ( 21 Jul 2022 13:22 )   PT: 15.9 sec;   INR: 1.37 ratio    PTT - ( 21 Jul 2022 13:22 )  PTT:33.7 sec    < from: CT Abdomen and Pelvis w/ Oral Cont and w/ IV Cont (07.21.22 @ 15:43) >  IMPRESSION:  There is a large intraperitoneal thick wall abscess   identified measuring 17.5 x 11.8 x 10.8 cm, which extends anteriorly and   superiorly to the site of the midline anterior abdominal wall incision.   In addition to fluid and air there is mottled hyperdensity within the   collection likely related to administered oral contrast. The greatest   concentration of contrast is identified within the left lateral aspect of   the abscess adjacent to a suture surgical line at the level of the mid   sigmoid colon, which may be the site of fistulous communication.    < end of copied text >  
Chief Complaint:  Patient is a 87y old  Male who presents with a chief complaint of RUQ abscess    HPI:  88 y/o male w/ a PMHx of sepsis, neuropathy, prostate CA, Crohn's disease, hypothyroidism, depression, intervertebral disc disorder, DM, obesity, HTN, hypercholesterolemia, CAD w/ stents, macular degeneration, hearing loss and colon cancer s/p laparoscopic ileocolic resection on 6/29/22 with Dr. Ardon. pt presented to the ED with increased drainage from the surgery site. CT showed a large abdominal abscess. The collection was drained by IR. Due to fibrinous nature of collection, IR drains did not work and pt required surgical washout. Pt with persistent WBC elevation after washout. Repeat imaging showed RUQ collection, possibly abscess. IR consulted for drainage. Pt seen at bedside, reported feeling ok. Denied fever, CP, SOB, NVD.     Allergies  chocolate (Unknown)  penicillin (Hives)    MEDICATIONS  (STANDING):  acetaminophen   IVPB .. 1000 milliGRAM(s) IV Intermittent once  dextrose 5%. 1000 milliLiter(s) (100 mL/Hr) IV Continuous <Continuous>  dextrose 5%. 1000 milliLiter(s) (50 mL/Hr) IV Continuous <Continuous>  dextrose 50% Injectable 25 Gram(s) IV Push once  dextrose 50% Injectable 12.5 Gram(s) IV Push once  dextrose 50% Injectable 25 Gram(s) IV Push once  fat emulsion (Fish Oil and Plant Based) 20% Infusion 33.3 mL/Hr (33.3 mL/Hr) IV Continuous <Continuous>  fat emulsion (Fish Oil and Plant Based) 20% Infusion 0.98 Gm/kG/Day (33.33 mL/Hr) IV Continuous <Continuous>  furosemide   Injectable 20 milliGRAM(s) IV Push once  glucagon  Injectable 1 milliGRAM(s) IntraMuscular once  heparin   Injectable 5000 Unit(s) SubCutaneous every 8 hours  hydrochlorothiazide 25 milliGRAM(s) Oral daily  insulin lispro (ADMELOG) corrective regimen sliding scale   SubCutaneous three times a day before meals  levothyroxine 88 MICROGram(s) Oral daily  lisinopril 20 milliGRAM(s) Oral daily  magnesium hydroxide Suspension 30 milliLiter(s) Oral once  melatonin 3 milliGRAM(s) Oral at bedtime  melatonin 5 milliGRAM(s) Oral at bedtime  metoclopramide Injectable 10 milliGRAM(s) IV Push every 8 hours  metroNIDAZOLE  IVPB 500 milliGRAM(s) IV Intermittent every 8 hours  metroNIDAZOLE  IVPB      nystatin Cream 1 Application(s) Topical two times a day  pantoprazole   Suspension 40 milliGRAM(s) Oral two times a day  Parenteral Nutrition - Adult 1 Each (100 mL/Hr) TPN Continuous <Continuous>  piperacillin/tazobactam IVPB.. 3.375 Gram(s) IV Intermittent every 8 hours  trimethoprim   80 mG/sulfamethoxazole 400 mG 1 Tablet(s) Oral two times a day  vancomycin    Solution 125 milliGRAM(s) Oral every 6 hours  verapamil  milliGRAM(s) Oral daily    MEDICATIONS  (PRN):  acetaminophen     Tablet .. 650 milliGRAM(s) Oral every 6 hours PRN Mild Pain (1 - 3)  dextrose Oral Gel 15 Gram(s) Oral once PRN Blood Glucose LESS THAN 70 milliGRAM(s)/deciliter  HYDROmorphone  Injectable 2 milliGRAM(s) IV Push every 4 hours PRN Severe Pain (7 - 10)  hydrOXYzine hydrochloride 25 milliGRAM(s) Oral every 12 hours PRN Anxiety  ondansetron Injectable 4 milliGRAM(s) IV Push every 6 hours PRN Nausea      PAST MEDICAL & SURGICAL HISTORY:  Hearing loss  left hearing aid, right ear deaf      Macular degeneration  right eye      CAD (coronary artery disease)      Stented coronary artery  2 stents, 20 yrs ago, 15 yrs ago      HTN (hypertension)      Hypercholesterolemia      Obesity      Diabetes mellitus      Intervertebral disc disorder      Depression      Hypothyroidism      Crohn disease      Prostate CA  s/p XRT      Neuropathy      Sepsis  s/p back surgery      History of hip replacement, total, right      History of hip replacement, total, right  Revision      S/P lumbar fusion      S/P colon resection          FAMILY HISTORY:  No pertinent family history in first degree relatives        Review of Systems:  As per HPI      Vital Signs Last 24 Hrs  T(C): 37.2 (04 Aug 2022 08:37), Max: 37.2 (04 Aug 2022 08:37)  T(F): 99 (04 Aug 2022 08:37), Max: 99 (04 Aug 2022 08:37)  HR: 70 (04 Aug 2022 08:37) (69 - 81)  BP: 154/64 (04 Aug 2022 08:37) (145/54 - 158/53)  BP(mean): --  RR: 17 (04 Aug 2022 08:37) (17 - 18)  SpO2: 94% (04 Aug 2022 08:37) (94% - 95%)    Parameters below as of 04 Aug 2022 08:37  Patient On (Oxygen Delivery Method): room air      GENERAL APPEARANCE: Well developed, in no acute distress.    LUNGS: Auscultation of the lungs revealed normal breath sounds without any other adventitious sounds or rubs.    CARDIOVASCULAR: There was a regular rate and rhythm    ABDOMEN: Soft and nontender with normal bowel sounds.     NEUROLOGIC: Alert and oriented x 3. Normal affect.     CBC                        8.1    17.89 )-----------( 470      ( 04 Aug 2022 07:24 )             25.5       Chemistry  08-04    139  |  111<H>  |  32<H>  ----------------------------<  197<H>  3.9   |  21<L>  |  1.35<H>    Ca    8.3<L>      04 Aug 2022 07:24  Phos  3.5     08-04  Mg     2.3     08-04    TPro  5.7<L>  /  Alb  1.6<L>  /  TBili  0.2  /  DBili  x   /  AST  11<L>  /  ALT  10<L>  /  AlkPhos  59  08-04    Coags  PT/INR - ( 04 Aug 2022 08:41 )   PT: 15.3 sec;   INR: 1.32 ratio    PTT - ( 04 Aug 2022 08:41 )  PTT:31.2 sec    < from: CT Abdomen and Pelvis w/ Oral Cont (08.03.22 @ 11:29) >  IMPRESSION:  4.1 x 2.9 cm x 6.7 pocket of right upper quadrant interloop extraluminal   fluid.    < end of copied text >

## 2022-08-04 NOTE — CONSULT NOTE ADULT - ASSESSMENT
86yo M with RUQ abscess, referred to IR for drainage  - Persistent WBC elevation  - On heparin  - VSS  - Will need COVID PCR repeat

## 2022-08-04 NOTE — BRIEF OPERATIVE NOTE - OPERATION/FINDINGS
12 fr pigtail catheter in lower abdominal abscess vial RLQ. 40 cc pus drained. Connected to LANDRY bulb
12F drain placed into tanja-stoma collection, 40cc jer pus removed. Drain left in place to gravity.
Extensive adhesions, abscess within pelvis, minimal pus but copious fibrinous tissue and phlegmonous changes   Diverting loop ileostomy created

## 2022-08-04 NOTE — CONSULT NOTE ADULT - REASON FOR ADMISSION
abdominal wall abscess

## 2022-08-04 NOTE — PROGRESS NOTE ADULT - ASSESSMENT
87 M with history of sigmoidectomy and ileocecectomy 6/29, patient returned to  with pelvis abscess now s/p IR drainage and IR drain upsizing. Patient taken to OR for diagnostic lap converted to exploratory laparotomy, abdominal washout, diverting loop ileostomy POD 6. AFVSS, persistent leukocytosis. Ostomy now functional. AXR showed dilated loops of small bowel. NGT -placed in the bedside 8/1 to suction.    Plan:    Pain and nausea control prn   Monitor VS   NPO except meds  Monitor ostomy output   remove NGT   remove drain  TPN for nutrition  PT daily as pt has neuropathy and difficulty walking  OOBC, ambulation   Infectious disease recs appreciated  DVT ppx  IR for drainage of RUQ fluid collection      Plan discussed with colorectal surgery service  87 M with history of sigmoidectomy and ileocecectomy 6/29, patient returned to  with pelvis abscess now s/p IR drainage and IR drain upsizing. Patient taken to OR for diagnostic lap converted to exploratory laparotomy, abdominal washout, diverting loop ileostomy POD 8. AFVSS, persistent leukocytosis. Ostomy now functional. AXR showed dilated loops of small bowel. NGT -placed in the bedside 8/1 to suction.    Plan:    Pain and nausea control prn   Monitor VS   NPO except meds  Monitor ostomy output   remove NGT   remove drain  TPN for nutrition  PT daily as pt has neuropathy and difficulty walking  OOBC, ambulation   Infectious disease recs appreciated  DVT ppx  IR for drainage of RUQ fluid collection      Plan discussed with colorectal surgery service

## 2022-08-04 NOTE — CONSULT NOTE ADULT - PROVIDER SPECIALTY LIST ADULT
Infectious Disease
Intervent Radiology
Gastroenterology
Infectious Disease
Nephrology
Intervent Radiology

## 2022-08-04 NOTE — CONSULT NOTE ADULT - PROBLEM SELECTOR RECOMMENDATION 9
- Case reviewed with Dr. Yu. Plan for drainage of RUQ collection tomorrow  - Keep pt NPO after midnight  - Repeat COVID PCR  - Hold heparin 4 hours prior  - Repeat AM labs (CBC, CMP, Coags)  - Surgical resident aware - Case reviewed with Dr. Yu. Plan for drainage of RUQ collection tomorrow  - Keep pt NPO after midnight  - Repeat COVID PCR  - Hold heparin 4 hours prior  - Repeat AM labs (CBC, CMP, Coags)  - Surgical resident aware    Addendum:  Plan for procedure today as per Dr. Yu

## 2022-08-04 NOTE — PROGRESS NOTE ADULT - ASSESSMENT
88 y/o male w/ a PMHx of sepsis, neuropathy, prostate CA, Crohn's disease, hypothyroidism, depression, intervertebral disc disorder, DM, obesity, HTN, hypercholesterolemia, CAD w/ stents, macular degeneration, hearing loss and colon cancer s/p laparoscopic ileocolic resection on 6/29/22 with Dr. Ardon. pt presents to the ED with increased drainage from the surgery site. No fevers, chills, changes in appetite. Imaging remarkable for large intraperitoneal thick wall abscess 17.5 x 11.8 x 10.8, IR consulted s/p IR drainage on 7/22, 40 cc pus drained, has drain in place, was given IV zosyn.    1. abdominal wall abscess d/t anastomotic leak/fistula s/p drainage/washout/ileostomy. ileus. s/p laparascopic ileocolic resection. colon cancer. crohns disease. PCN allergy  - s/p IR drainage 7/22; body fluid cx growing c. 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 maltophilia  - continue with above antibiotic coverage  - c diff pcr (-)  - on TPN via picc line  - does not have true pcn allergy - tolerating abx without issues currently  - supportive care  - tolerating abx well so far; no side effects noted  - to be evaluated by IR for possible drainage of fluid collection    2. other issues - care per medicine

## 2022-08-04 NOTE — PROGRESS NOTE ADULT - SUBJECTIVE AND OBJECTIVE BOX
Pt. seen and examined at bedside this morning. Patient reports his pain is better, abdomen is distended. He is frustrated and wants to go home. He also want NGT out. He denies fever, chest pain, SOB, nausea, vomiting.  NGT in place. Ostomy is functional with dark green liquid stools    Physical Exam:  General: AAOx3, Well developed, NAD  Head: NGT placed  Chest: Normal respiratory effort  Heart: RRR  Abdomen:  ostomy is pink and patent with dark green liquid stools, distended, soft, drain is serosanguineous  Neuro/Psych: No localized deficits. Normal speech, normal tone  Skin: Normal, no rashes, no lesions noted.   Extremities: Warm, well perfused, no edema, Pulses intact    Vitals:  T(C): 36.9 ( @ 20:36), Max: 36.9 ( @ 20:36)  HR: 69 ( @ 20:36) (69 - 81)  BP: 145/54 ( @ 20:36) (145/54 - 166/62)  RR: 18 ( @ 20:36) (18 - 18)  SpO2: 94% ( @ 20:36) (94% - 95%)     @ 07:01  -  04 @ 07:00  --------------------------------------------------------  IN:  Total IN: 0 mL    OUT:    Bulb (mL): 22.5 mL    Ileostomy (mL): 510 mL    Nasogastric/Oral tube (mL): 725 mL    Voided (mL): 1450 mL  Total OUT: 2707.5 mL    Total NET: -2707.5 mL       @ 07:01  -   @ 08:31  --------------------------------------------------------  IN:  Total IN: 0 mL    OUT:    Voided (mL): 175 mL  Total OUT: 175 mL    Total NET: -175 mL          Physical Exam:  General: AAOx3, Well developed, NAD  Chest: Normal respiratory effort  Heart: RRR  Abdomen: Soft, NTND, no masses  Neuro/Psych: No localized deficits. Normal spech, normal tone  Skin: Normal, no rashes, no lesions noted.   Extremities: Warm, well perfused, no edema, Pulses intact    08 @ 07:24                    8.1  CBC: 17.89>)-------(<470                     25.5                 139 | 111 | 32    CMP:  ----------------------< 197               3.9 | 21 | 1.35                      Ca:8.3  Phos:3.5  M.3               0.2|      |11        LFTs:  ------|59|-----             -|      |-   @ 19:48                    8.3  CBC: 22.77>)-------(<498                     26.3                 - | - | -    CMP:  ----------------------< -               - | - | -                      Ca:-  Phos:-  Mg:-               -|      |-        LFTs:  ------|-|-----             -|      |-   @ 04:50                    8.1  CBC: 19.63>)-------(<488                     26.0                 141 | 113 | 32    CMP:  ----------------------< 202               4.0 | 19 | 1.32                      Ca:8.2  Phos:3.0  M.2               0.3|      |13        LFTs:  ------|61|-----             -|      |-      Current Inpatient Medications:  acetaminophen     Tablet .. 650 milliGRAM(s) Oral every 6 hours PRN  acetaminophen   IVPB .. 1000 milliGRAM(s) IV Intermittent once  dextrose 5%. 1000 milliLiter(s) (50 mL/Hr) IV Continuous <Continuous>  dextrose 5%. 1000 milliLiter(s) (100 mL/Hr) IV Continuous <Continuous>  dextrose 50% Injectable 25 Gram(s) IV Push once  dextrose 50% Injectable 12.5 Gram(s) IV Push once  dextrose 50% Injectable 25 Gram(s) IV Push once  dextrose Oral Gel 15 Gram(s) Oral once PRN  fat emulsion (Fish Oil and Plant Based) 20% Infusion 33.3 mL/Hr (33.3 mL/Hr) IV Continuous <Continuous>  fat emulsion (Fish Oil and Plant Based) 20% Infusion 0.98 Gm/kG/Day (33.33 mL/Hr) IV Continuous <Continuous>  glucagon  Injectable 1 milliGRAM(s) IntraMuscular once  heparin   Injectable 5000 Unit(s) SubCutaneous every 8 hours  HYDROmorphone  Injectable 2 milliGRAM(s) IV Push every 4 hours PRN  hydrOXYzine hydrochloride 25 milliGRAM(s) Oral every 12 hours PRN  insulin lispro (ADMELOG) corrective regimen sliding scale   SubCutaneous three times a day before meals  levothyroxine 88 MICROGram(s) Oral daily  melatonin 5 milliGRAM(s) Oral at bedtime  melatonin 3 milliGRAM(s) Oral at bedtime  metroNIDAZOLE  IVPB 500 milliGRAM(s) IV Intermittent every 8 hours  metroNIDAZOLE  IVPB      nystatin Cream 1 Application(s) Topical two times a day  ondansetron Injectable 4 milliGRAM(s) IV Push every 6 hours PRN  pantoprazole  Injectable 40 milliGRAM(s) IV Push every 12 hours  Parenteral Nutrition - Adult 1 Each (100 mL/Hr) TPN Continuous <Continuous>  piperacillin/tazobactam IVPB.. 3.375 Gram(s) IV Intermittent every 8 hours  prochlorperazine   Injectable 5 milliGRAM(s) IV Push every 8 hours PRN  senna 2 Tablet(s) Oral at bedtime PRN  vancomycin    Solution 125 milliGRAM(s) Oral every 6 hours  verapamil  milliGRAM(s) Oral daily       Pt. seen and examined at bedside this morning. Patient reports his pain is better, abdomen is distended. He is frustrated and wants to go home. He also want NGT out. He denies fever, chest pain, SOB, nausea, vomiting.  NGT in place. Ostomy is functional with dark green liquid stools    Physical Exam:  General: AAOx3, Well developed, NAD  Head: NGT placed  Chest: Normal respiratory effort  Heart: RRR  Abdomen:  ostomy is pink and patent with dark green liquid stools, distended, soft, drain is serosanguineous  Neuro/Psych: No localized deficits. Normal speech, normal tone  Skin: Normal, no rashes, no lesions noted.   Extremities: Warm, well perfused, no edema, Pulses intact    Vitals:  T(C): 36.9 ( @ 20:36), Max: 36.9 ( @ 20:36)  HR: 69 ( @ 20:36) (69 - 81)  BP: 145/54 ( @ 20:36) (145/54 - 166/62)  RR: 18 ( @ 20:36) (18 - 18)  SpO2: 94% ( @ 20:36) (94% - 95%)     @ 07:01  -   @ 07:00  --------------------------------------------------------  IN:  Total IN: 0 mL    OUT:    Bulb (mL): 22.5 mL    Ileostomy (mL): 510 mL    Nasogastric/Oral tube (mL): 725 mL    Voided (mL): 1450 mL  Total OUT: 2707.5 mL    Total NET: -2707.5 mL       @ 07:01  -   @ 08:31  --------------------------------------------------------  IN:  Total IN: 0 mL    OUT:    Voided (mL): 175 mL  Total OUT: 175 mL    Total NET: -175 mL       @ 07:24                    8.1  CBC: 17.89>)-------(<470                     25.5                 139 | 111 | 32    CMP:  ----------------------< 197               3.9 | 21 | 1.35                      Ca:8.3  Phos:3.5  M.3               0.2|      |11        LFTs:  ------|59|-----             -|      |-   @ 19:48                    8.3  CBC: 22.77>)-------(<498                     26.3                 - | - | -    CMP:  ----------------------< -               - | - | -                      Ca:-  Phos:-  Mg:-               -|      |-        LFTs:  ------|-|-----             -|      |-   @ 04:50                    8.1  CBC: 19.63>)-------(<488                     26.0                 141 | 113 | 32    CMP:  ----------------------< 202               4.0 | 19 | 1.32                      Ca:8.2  Phos:3.0  M.2               0.3|      |13        LFTs:  ------|61|-----             -|      |-      Current Inpatient Medications:  acetaminophen     Tablet .. 650 milliGRAM(s) Oral every 6 hours PRN  acetaminophen   IVPB .. 1000 milliGRAM(s) IV Intermittent once  dextrose 5%. 1000 milliLiter(s) (50 mL/Hr) IV Continuous <Continuous>  dextrose 5%. 1000 milliLiter(s) (100 mL/Hr) IV Continuous <Continuous>  dextrose 50% Injectable 25 Gram(s) IV Push once  dextrose 50% Injectable 12.5 Gram(s) IV Push once  dextrose 50% Injectable 25 Gram(s) IV Push once  dextrose Oral Gel 15 Gram(s) Oral once PRN  fat emulsion (Fish Oil and Plant Based) 20% Infusion 33.3 mL/Hr (33.3 mL/Hr) IV Continuous <Continuous>  fat emulsion (Fish Oil and Plant Based) 20% Infusion 0.98 Gm/kG/Day (33.33 mL/Hr) IV Continuous <Continuous>  glucagon  Injectable 1 milliGRAM(s) IntraMuscular once  heparin   Injectable 5000 Unit(s) SubCutaneous every 8 hours  HYDROmorphone  Injectable 2 milliGRAM(s) IV Push every 4 hours PRN  hydrOXYzine hydrochloride 25 milliGRAM(s) Oral every 12 hours PRN  insulin lispro (ADMELOG) corrective regimen sliding scale   SubCutaneous three times a day before meals  levothyroxine 88 MICROGram(s) Oral daily  melatonin 5 milliGRAM(s) Oral at bedtime  melatonin 3 milliGRAM(s) Oral at bedtime  metroNIDAZOLE  IVPB 500 milliGRAM(s) IV Intermittent every 8 hours  metroNIDAZOLE  IVPB      nystatin Cream 1 Application(s) Topical two times a day  ondansetron Injectable 4 milliGRAM(s) IV Push every 6 hours PRN  pantoprazole  Injectable 40 milliGRAM(s) IV Push every 12 hours  Parenteral Nutrition - Adult 1 Each (100 mL/Hr) TPN Continuous <Continuous>  piperacillin/tazobactam IVPB.. 3.375 Gram(s) IV Intermittent every 8 hours  prochlorperazine   Injectable 5 milliGRAM(s) IV Push every 8 hours PRN  senna 2 Tablet(s) Oral at bedtime PRN  vancomycin    Solution 125 milliGRAM(s) Oral every 6 hours  verapamil  milliGRAM(s) Oral daily

## 2022-08-04 NOTE — CONSULT NOTE ADULT - CONSULT REQUESTED DATE/TIME
25-Jul-2022 16:41
22-Jul-2022 09:47
23-Jul-2022 11:00
04-Aug-2022 09:04
22-Jul-2022 09:00
26-Jul-2022 06:32

## 2022-08-04 NOTE — PROGRESS NOTE ADULT - SUBJECTIVE AND OBJECTIVE BOX
Date of service: 08-04-22 @ 09:07    Patient seen lying in bed; has persistent abdominal distention  Had ngt removed today  Is afebrile, had imaging found to have another collection in abdominal fluid collection, to be evaluated by IR    ROS unable to obtain secondary to patient medical condition      MEDICATIONS  (STANDING):  acetaminophen   IVPB .. 1000 milliGRAM(s) IV Intermittent once  dextrose 5%. 1000 milliLiter(s) (50 mL/Hr) IV Continuous <Continuous>  dextrose 5%. 1000 milliLiter(s) (100 mL/Hr) IV Continuous <Continuous>  dextrose 50% Injectable 25 Gram(s) IV Push once  dextrose 50% Injectable 12.5 Gram(s) IV Push once  dextrose 50% Injectable 25 Gram(s) IV Push once  fat emulsion (Fish Oil and Plant Based) 20% Infusion 33.3 mL/Hr (33.3 mL/Hr) IV Continuous <Continuous>  fat emulsion (Fish Oil and Plant Based) 20% Infusion 0.98 Gm/kG/Day (33.33 mL/Hr) IV Continuous <Continuous>  glucagon  Injectable 1 milliGRAM(s) IntraMuscular once  heparin   Injectable 5000 Unit(s) SubCutaneous every 8 hours  insulin lispro (ADMELOG) corrective regimen sliding scale   SubCutaneous three times a day before meals  levothyroxine 88 MICROGram(s) Oral daily  melatonin 5 milliGRAM(s) Oral at bedtime  melatonin 3 milliGRAM(s) Oral at bedtime  metroNIDAZOLE  IVPB 500 milliGRAM(s) IV Intermittent every 8 hours  metroNIDAZOLE  IVPB      nystatin Cream 1 Application(s) Topical two times a day  pantoprazole  Injectable 40 milliGRAM(s) IV Push every 12 hours  Parenteral Nutrition - Adult 1 Each (100 mL/Hr) TPN Continuous <Continuous>  piperacillin/tazobactam IVPB.. 3.375 Gram(s) IV Intermittent every 8 hours  trimethoprim   80 mG/sulfamethoxazole 400 mG 1 Tablet(s) Oral two times a day  vancomycin    Solution 125 milliGRAM(s) Oral every 6 hours  verapamil  milliGRAM(s) Oral daily    MEDICATIONS  (PRN):  acetaminophen     Tablet .. 650 milliGRAM(s) Oral every 6 hours PRN Mild Pain (1 - 3)  dextrose Oral Gel 15 Gram(s) Oral once PRN Blood Glucose LESS THAN 70 milliGRAM(s)/deciliter  HYDROmorphone  Injectable 2 milliGRAM(s) IV Push every 4 hours PRN Severe Pain (7 - 10)  hydrOXYzine hydrochloride 25 milliGRAM(s) Oral every 12 hours PRN Anxiety  ondansetron Injectable 4 milliGRAM(s) IV Push every 6 hours PRN Nausea  prochlorperazine   Injectable 5 milliGRAM(s) IV Push every 8 hours PRN Nausea or vomiting  senna 2 Tablet(s) Oral at bedtime PRN Constipation      Vital Signs Last 24 Hrs  T(C): 37.2 (04 Aug 2022 08:37), Max: 37.2 (04 Aug 2022 08:37)  T(F): 99 (04 Aug 2022 08:37), Max: 99 (04 Aug 2022 08:37)  HR: 70 (04 Aug 2022 08:37) (69 - 81)  BP: 154/64 (04 Aug 2022 08:37) (145/54 - 158/53)  BP(mean): --  RR: 17 (04 Aug 2022 08:37) (17 - 18)  SpO2: 94% (04 Aug 2022 08:37) (94% - 95%)    Parameters below as of 04 Aug 2022 08:37  Patient On (Oxygen Delivery Method): room air            Physical Exam:    PE:  Constitutional: frail looking  HEENT: NC/AT, EOMI, PERRLA, conjunctivae clear; ears and nose atraumatic; pharynx benign  Neck: supple; thyroid not palpable  Back: no tenderness  Respiratory: respiratory effort normal; clear to auscultation  Cardiovascular: S1S2 regular, no murmurs  Abdomen: soft, not tender, abd distended, + ostomy in place + abd drain  Genitourinary: no suprapubic tenderness  Musculoskeletal: no muscle tenderness, no joint swelling or tenderness  Extremities: no pedal edema  Neurological/ Psychiatric: AxOx3, Judgement and insight normal;  moving all extremities  Skin: no rashes; no palpable lesions    Labs: all available labs reviewed                                         Labs:                        8.1    17.89 )-----------( 470      ( 04 Aug 2022 07:24 )             25.5     08-04    139  |  111<H>  |  32<H>  ----------------------------<  197<H>  3.9   |  21<L>  |  1.35<H>    Ca    8.3<L>      04 Aug 2022 07:24  Phos  3.5     08-04  Mg     2.3     08-04    TPro  5.7<L>  /  Alb  1.6<L>  /  TBili  0.2  /  DBili  x   /  AST  11<L>  /  ALT  10<L>  /  AlkPhos  59  08-04           Cultures:       < from: CT Abdomen and Pelvis w/ Oral Cont (08.03.22 @ 11:29) >    ACC: [id number] EXAM:  CT ABDOMEN AND PELVIS OC                          PROCEDURE DATE:  08/03/2022          INTERPRETATION:  CLINICAL INFORMATION: Rule out abscess    COMPARISON: CT abdomen/pelvis July 25, 2022, ultrasound July 02, 2022    CONTRAST/COMPLICATIONS:  IV Contrast: NONE  Oral Contrast: Omnipaque 300  Complications: None reported at time of study completion    PROCEDURE:  CT of the Abdomen and Pelvis was performed.  Sagittal and coronal reformats were performed.    FINDINGS:  LOWER CHEST: Bilateral pleural effusions, right side greater than left,   increased, with associated compressive atelectasis. Coronary artery   calcifications.    LIVER: Within normal limits.  BILE DUCTS: Normal caliber.  GALLBLADDER: Cholelithiasis.  SPLEEN: Within normal limits.  PANCREAS: Within normal limits.  ADRENALS: Within normal limits.  KIDNEYS/URETERS: 5.2 cm left renal hypodensity indeterminate on CT scan,   however shown to be a cyst on recent ultrasound.    BLADDER: Within normal limits.  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    BOWEL: No bowel obstruction. Status post ileocolic and sigmoid   anastomoses and interval creation of right lower quadrant ileostomy.   Mildly dilated and fluid-filled small bowel loops which canbe traced to   the point where the bowel exits through the abdominal wall into the   ileostomy.  PERITONEUM: Moderate amount of ascites, decreased. Poorly defined pocket   of gas and fluid in the anterior right upper quadrant (2; 16) measuring   approximately 4.1 x 2.9 cm x 6.7. Additional small gas-containing pocket   immediately subjacent to the ventral abdominal wall in the midline.  VESSELS: Within normal limits.  RETROPERITONEUM/LYMPH NODES: No lymphadenopathy.  ABDOMINAL WALL: Midline postsurgical changes.  BONES: No acute findings. L2-S1 posterior fusion with interpedicular   screws and connecting rods. Right hip arthroplasty.    IMPRESSION:  4.1 x 2.9 cm x 6.7 pocket of right upper quadrant interloop extraluminal   fluid.    Dilated small bowel loops which could be secondary to ileus versus   obstruction with a transition point where the bowel exiting through the   abdominal wall into the ileostomy.    Moderate amount of ascites, decreased in volume.    Increase in bilateral pleural effusions.    < end of copied text >                            Radiology: all available radiological tests reviewed      ACC: [id number] EXAM:  CT ABDOMEN AND PELVIS OC IC                          PROCEDURE DATE:  07/21/2022          INTERPRETATION:  CLINICAL INFORMATION: Drainage from surgical site;   evaluate for abscess. 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    COMPARISON: 7/6/2022.    CONTRAST/COMPLICATIONS:  IV Contrast: Omnipaque 350  90 cc administered   10 cc discarded  Oral Contrast: Omnipaque 300  Complications: None reported at time of study completion    PROCEDURE:  CT of the Abdomen and Pelvis was performed.  Sagittal and coronal reformats were performed.    FINDINGS: There is a large intraperitoneal thick wall abscess identified   measuring 17.5 x 11.8 x 10.8 cm, which extends anteriorly and superiorly   to the site of the midline anterior abdominal wall incision. In addition   to fluid and air there is mottled hyperdensity within the collection   likely related to administered oral contrast. The greatest concentration   of contrast is identified within the left lateral aspect of the abscess   adjacent to a suture surgical line at the level of the mid sigmoid colon,   which may be the site of fistulous communication.    LOWER CHEST: Small right pleural effusion. Opacity within the posterior   right lower lobe may be related to atelectasis; underlying parenchymal   infiltrate cannot be fully excluded.    LIVER: Normal in size. Hepatic steatosis. No focal hepatic masses.  BILE DUCTS: Normal caliber.  GALLBLADDER: Multiple gallstones. No gallbladder wall thickening.  SPLEEN: Within normal limits.  PANCREAS: Within normal limits.  ADRENALS: Within normal limits.  KIDNEYS/URETERS: No hydronephrosis. No renal calculi. 4.7 cm   space-occupying lesion noted within the left kidney characterized as a   cyst on prior ultrasonography July 2, 2022. Additional subcentimeter   hypodense foci are noted within the bilateral renal parenchyma, too small   to characterize.    BLADDER: Within normal limits.  REPRODUCTIVE ORGANS: Prostate seed implants identified.    BOWEL: Surgical suture linesare identified within the mid sigmoid   colonic region as well as within the right lower quadrant. No evidence   for mechanical bowel obstruction.  PERITONEUM: Upper abdominal ascites identified.  VESSELS: Within normal limits.  RETROPERITONEUM/LYMPHNODES: No lymphadenopathy.  ABDOMINAL WALL: As described above.  BONES: Status post lumbar spine fusion surgery extending from L2 through   S1. Patient status post right hip arthroplasty. Degenerative changes   lower thoracic and lumbar spine.    IMPRESSION:  There is a large intraperitoneal thick wall abscess   identified measuring 17.5 x 11.8 x 10.8 cm, which extends anteriorly and   superiorly to the site of the midline anterior abdominal wall incision.   In addition to fluid and air there is mottled hyperdensity within the   collection likely related to administered oral contrast. The greatest   concentration of contrast is identified within the left lateral aspect of   the abscess adjacent to a suture surgical line at the level of the mid   sigmoid colon, which may be the site of fistulous communication.    < end of copied text >    Advanced directives addressed: full resuscitation

## 2022-08-04 NOTE — CHART NOTE - NSCHARTNOTEFT_GEN_A_CORE
Clinical Nutrition PN Follow Up Note    *88 yo male admitted with large midline abdominal wall abscess s/p drainage on 7/22. s/p lap washout and creation of ileostomy on 7/27.    *7/27: PPN d/c'd on 7/26 by MD Ardon.  Pt has been NPO with NGT to LWS (large output), large midline abdominal wall abscess (s/p IR drain 7/22), pending OR for drain.  d/w surgery, to restart PPN today (7/27) due to extended time NPO (>6 days).   *7/28: s/p lap washout of abdomen for nontraumatic indication; s/p creation of diverting loop ileostomy on 7/27.  found extensive adhesions, abscess within pelvis, minimal pus but copious fibrinous tissue and phlegmonous changes.  Pt remains NPO with NGT to LWS.    *8/1: PICC line placed, NGT replaced to LWS.     *current status: 08/4 - TPN day #2 (initiated on 08/3).  D/C NGT; randy drain removed. Pt was experiencing bleeding from NG yesterday & was caused by either stress gastritis or NG related irritation as per GI. Ostomy now functional - AXR showed dilated loops of small bowel.     *labs reviewed; 08/4 - Continue w/ total volume to 2400mL to dilute sodium - Na decreased & WNL, urine output increased over past 24 hrs.  Magnesium trending up, decreased magnesium in PN bag. Phos WNL. Corrected Ca elevated, DO NOT supplement calcium outside of PN bag at this time. Bilirubin total WNL.  Triglyceride within limits for lipids.  POCT remains elevated, POCT elevated, received 8 units of sliding scale in past 24hours, will increase to 20 units of regular insulin in PN bag to aid in glycemic control.      BMI: BMI (kg/m2): 29 (07-21-22 @ 12:16)  HbA1c: A1C with Estimated Average Glucose Result: 6.6 % (06-28-22 @ 07:24)    Glucose: POCT Blood Glucose.: 213 mg/dL (08-04-22 @ 05:36)    BP: 154/64 (08-04-22 @ 08:37) (145/54 - 181/70)  Lipid Panel: Date/Time: 07-23-22 @ 04:53  Cholesterol, Serum: --  Direct LDL: --  HDL Cholesterol, Serum: --  Total Cholesterol/HDL Ration Measurement: --  Triglycerides, Serum: 172    CAPILLARY BLOOD GLUCOSE    POCT Blood Glucose.: 213 mg/dL (04 Aug 2022 05:36)  POCT Blood Glucose.: 170 mg/dL (03 Aug 2022 22:35)  POCT Blood Glucose.: 177 mg/dL (03 Aug 2022 17:49)  POCT Blood Glucose.: 216 mg/dL (03 Aug 2022 12:54)      *pertinent meds: heparin, LR, levothyroxine, vancomycin, prochlorperazine , senna, admelog sliding scale, morphine, protonix, verapamil       *I&O's Detail    I&O's Detail    03 Aug 2022 07:01  -  04 Aug 2022 07:00  --------------------------------------------------------  IN:  Total IN: 0 mL    OUT:    Bulb (mL): 22.5 mL    Ileostomy (mL): 510 mL    Nasogastric/Oral tube (mL): 725 mL    Voided (mL): 1450 mL  Total OUT: 2707.5 mL    Total NET: -2707.5 mL      04 Aug 2022 07:01  -  04 Aug 2022 11:09  --------------------------------------------------------  IN:  Total IN: 0 mL    OUT:    Voided (mL): 350 mL  Total OUT: 350 mL    Total NET: -350 mL    *Negative fluid status: BM (+) on 7/26 x 3 , liquid, loose.  Minimal ostomy output noted. 725 mL output from NGT noted; continue total PN volume of 2400 mL. Will monitor/adjust prn. pt on bowel regimen - senna prn     *Elvin Score of 13 ;  no PU documented; abd surgical wound. (+1) scrotum edema documented.    *Malnutrition: severe malnutrition in acute illness r/t decreased ability to meet increased nutr needs 2/2 abd surgery and abd wall abscess AEB NPO, mod muscle/fat wasting, 8% wt loss x 3 mo    Estimated Needs: based on 77Kg (wt from 7/22)  Calories: 1925-2310Kcal (25-30 Kcal/Kg)  Protein: 108-123 g protein (1.4-1.6 g/Kg)  Fluids:  5708-4365 mL (25-30 mL/Kg)    Diet, NPO:   Except Medications (07-29-22 @ 18:15) [Active]      Weight History: no recent weights obtained  Height (cm): 167.6 (07-21-22 @ 12:16), 167.6 (07-06-22 @ 15:24), 167.6 (06-27-22 @ 19:20)  Weight (kg): 81.6 (07-21-22 @ 12:16), 78 (07-06-22 @ 15:24), 81.647 (06-27-22 @ 19:20)  BMI (kg/m2): 29 (07-21-22 @ 12:16), 27.8 (07-06-22 @ 15:24), 29.1 (06-27-22 @ 19:20)  BSA (m2): 1.91 (07-21-22 @ 12:16), 1.88 (07-06-22 @ 15:24), 1.91 (06-27-22 @ 19:20)      *will continue to monitor and adjust PN prn*    TPN Recommendations: via PICC line  Total Volume: 2400 mL  110 g  Amino Acids  200 g Dextrose  80 g Lipids 20%   154 mEq Sodium Chloride  0 mEq Sodium Acetate  0 mmol Sodium Phosphate  0 mEq Potassium Chloride  0 mEq Potassium Acetate  45 mmol Potassium Phosphate  0 mEq Calcium Gluconate ( corrected Ca elevated, DO NOT supplement calcium outside of PN bag at this time. )  15 mEq Magnesium Sulfate  200 mg Thiamine  20 units regular insulin  1 ml Trace Elements  10 ml MVI    Total Calories 2280 Kcal  ( Meets  95%  of  Estimated Energy needs  and   100%  Protein needs)    Additional Recommendations:    1) Continue to titrate dextrose up to goal of 250g by 50- 75g/day.   2) Daily weights  3) Strict I & O's - monitor output closely  4) Daily lyte checks including magnesium and phos  5) Weekly triglycerides/LFT checks  6) POCT q6hrs; maintain 140-180mg/dL    *will monitor and adjust treatement plan prn*    Myra Broderick MS, RDN, [facility] [phone]  Nutrition   Rica Pate M.S., Dietitian Registration Eligible [phone] Clinical Nutrition PN Follow Up Note    *86 yo male admitted with large midline abdominal wall abscess s/p drainage on 7/22. s/p lap washout and creation of ileostomy on 7/27.    *7/27: PPN d/c'd on 7/26 by MD Ardon.  Pt has been NPO with NGT to LWS (large output), large midline abdominal wall abscess (s/p IR drain 7/22), pending OR for drain.  d/w surgery, to restart PPN today (7/27) due to extended time NPO (>6 days).   *7/28: s/p lap washout of abdomen for nontraumatic indication; s/p creation of diverting loop ileostomy on 7/27.  found extensive adhesions, abscess within pelvis, minimal pus but copious fibrinous tissue and phlegmonous changes.  Pt remains NPO with NGT to LWS.    *8/1: PICC line placed, NGT replaced to LWS.     *current status: 08/4 - TPN day #2 (initiated on 08/3).  D/C NGT; randy drain removed. Pt was experiencing bleeding from NG yesterday & was caused by either stress gastritis or NG related irritation as per GI. Ostomy now functional - AXR showed dilated loops of small bowel.     *labs reviewed; 08/4 - Continue w/ total volume to 2400mL to dilute sodium - Na decreased & WNL, urine output increased over past 24 hrs.  Magnesium trending up, decreased magnesium in PN bag. Phos WNL. Corrected Ca elevated, DO NOT supplement calcium outside of PN bag at this time. Bilirubin total WNL.  Triglyceride within limits for lipids.  POCT remains elevated, POCT elevated, received 8 units of sliding scale in past 24hours, will increase to 20 units of regular insulin in PN bag to aid in glycemic control.    08-04    139  |  111<H>  |  32<H>  ----------------------------<  197<H>  3.9   |  21<L>  |  1.35<H>    Ca    8.3<L>      04 Aug 2022 07:24  Phos  3.5     08-04  Mg     2.3     08-04    TPro  5.7<L>  /  Alb  1.6<L>  /  TBili  0.2  /  DBili  x   /  AST  11<L>  /  ALT  10<L>  /  AlkPhos  59  08-04        BMI: BMI (kg/m2): 29 (07-21-22 @ 12:16)  HbA1c: A1C with Estimated Average Glucose Result: 6.6 % (06-28-22 @ 07:24)    Glucose: POCT Blood Glucose.: 213 mg/dL (08-04-22 @ 05:36)    BP: 154/64 (08-04-22 @ 08:37) (145/54 - 181/70)  Lipid Panel: Date/Time: 07-23-22 @ 04:53  Cholesterol, Serum: --  Direct LDL: --  HDL Cholesterol, Serum: --  Total Cholesterol/HDL Ration Measurement: --  Triglycerides, Serum: 172    CAPILLARY BLOOD GLUCOSE    POCT Blood Glucose.: 213 mg/dL (04 Aug 2022 05:36)  POCT Blood Glucose.: 170 mg/dL (03 Aug 2022 22:35)  POCT Blood Glucose.: 177 mg/dL (03 Aug 2022 17:49)  POCT Blood Glucose.: 216 mg/dL (03 Aug 2022 12:54)      *pertinent meds: heparin, LR, levothyroxine, vancomycin, prochlorperazine , senna, admelog sliding scale, morphine, protonix, verapamil       *I&O's Detail    I&O's Detail    03 Aug 2022 07:01  -  04 Aug 2022 07:00  --------------------------------------------------------  IN:  Total IN: 0 mL    OUT:    Bulb (mL): 22.5 mL    Ileostomy (mL): 510 mL    Nasogastric/Oral tube (mL): 725 mL    Voided (mL): 1450 mL  Total OUT: 2707.5 mL    Total NET: -2707.5 mL      04 Aug 2022 07:01  -  04 Aug 2022 11:09  --------------------------------------------------------  IN:  Total IN: 0 mL    OUT:    Voided (mL): 350 mL  Total OUT: 350 mL    Total NET: -350 mL    *Negative fluid status: BM (+) on 7/26 x 3 , liquid, loose.  Minimal ostomy output noted. 725 mL output from NGT noted; continue total PN volume of 2400 mL. Will monitor/adjust prn. pt on bowel regimen - senna prn     *Elvin Score of 13 ;  no PU documented; abd surgical wound. (+1) scrotum edema documented.    *Malnutrition: severe malnutrition in acute illness r/t decreased ability to meet increased nutr needs 2/2 abd surgery and abd wall abscess AEB NPO, mod muscle/fat wasting, 8% wt loss x 3 mo    Estimated Needs: based on 77Kg (wt from 7/22)  Calories: 1925-2310Kcal (25-30 Kcal/Kg)  Protein: 108-123 g protein (1.4-1.6 g/Kg)  Fluids:  1661-2202 mL (25-30 mL/Kg)    Diet, NPO:   Except Medications (07-29-22 @ 18:15) [Active]      Weight History: no recent weights obtained  Height (cm): 167.6 (07-21-22 @ 12:16), 167.6 (07-06-22 @ 15:24), 167.6 (06-27-22 @ 19:20)  Weight (kg): 81.6 (07-21-22 @ 12:16), 78 (07-06-22 @ 15:24), 81.647 (06-27-22 @ 19:20)  BMI (kg/m2): 29 (07-21-22 @ 12:16), 27.8 (07-06-22 @ 15:24), 29.1 (06-27-22 @ 19:20)  BSA (m2): 1.91 (07-21-22 @ 12:16), 1.88 (07-06-22 @ 15:24), 1.91 (06-27-22 @ 19:20)      *will continue to monitor and adjust PN prn*    TPN Recommendations: via PICC line  Total Volume: 2400 mL  110 g  Amino Acids  200 g Dextrose  80 g Lipids 20%   154 mEq Sodium Chloride  0 mEq Sodium Acetate  0 mmol Sodium Phosphate  0 mEq Potassium Chloride  0 mEq Potassium Acetate  45 mmol Potassium Phosphate  0 mEq Calcium Gluconate ( corrected Ca elevated, DO NOT supplement calcium outside of PN bag at this time. )  15 mEq Magnesium Sulfate  200 mg Thiamine  20 units regular insulin  1 ml Trace Elements  10 ml MVI    Total Calories 2280 Kcal  ( Meets  95%  of  Estimated Energy needs  and   100%  Protein needs)    Additional Recommendations:    1) Continue to titrate dextrose up to goal of 250g by 50- 75g/day.   2) Daily weights  3) Strict I & O's - monitor output closely  4) Daily lyte checks including magnesium and phos  5) Weekly triglycerides/LFT checks  6) POCT q6hrs; maintain 140-180mg/dL    *will monitor and adjust treatement plan prn*    Myra Broderick MS, RDN, [facility] [phone]  Nutrition   Rica Pate M.S., Dietitian Registration Eligible [phone]

## 2022-08-05 LAB
BASOPHILS # BLD AUTO: 0.08 K/UL — SIGNIFICANT CHANGE UP (ref 0–0.2)
BASOPHILS NFR BLD AUTO: 0.5 % — SIGNIFICANT CHANGE UP (ref 0–2)
EOSINOPHIL # BLD AUTO: 0.52 K/UL — HIGH (ref 0–0.5)
EOSINOPHIL NFR BLD AUTO: 3.5 % — SIGNIFICANT CHANGE UP (ref 0–6)
HCT VFR BLD CALC: 24.8 % — LOW (ref 39–50)
HGB BLD-MCNC: 7.8 G/DL — LOW (ref 13–17)
IMM GRANULOCYTES NFR BLD AUTO: 0.9 % — SIGNIFICANT CHANGE UP (ref 0–1.5)
LYMPHOCYTES # BLD AUTO: 1.29 K/UL — SIGNIFICANT CHANGE UP (ref 1–3.3)
LYMPHOCYTES # BLD AUTO: 8.6 % — LOW (ref 13–44)
MAGNESIUM SERPL-MCNC: 2.4 MG/DL — SIGNIFICANT CHANGE UP (ref 1.6–2.6)
MCHC RBC-ENTMCNC: 27 PG — SIGNIFICANT CHANGE UP (ref 27–34)
MCHC RBC-ENTMCNC: 31.5 GM/DL — LOW (ref 32–36)
MCV RBC AUTO: 85.8 FL — SIGNIFICANT CHANGE UP (ref 80–100)
MONOCYTES # BLD AUTO: 1.21 K/UL — HIGH (ref 0–0.9)
MONOCYTES NFR BLD AUTO: 8.1 % — SIGNIFICANT CHANGE UP (ref 2–14)
NEUTROPHILS # BLD AUTO: 11.79 K/UL — HIGH (ref 1.8–7.4)
NEUTROPHILS NFR BLD AUTO: 78.4 % — HIGH (ref 43–77)
PHOSPHATE SERPL-MCNC: 4.2 MG/DL — SIGNIFICANT CHANGE UP (ref 2.5–4.5)
PLATELET # BLD AUTO: 427 K/UL — HIGH (ref 150–400)
RBC # BLD: 2.89 M/UL — LOW (ref 4.2–5.8)
RBC # FLD: 19.3 % — HIGH (ref 10.3–14.5)
WBC # BLD: 15.03 K/UL — HIGH (ref 3.8–10.5)
WBC # FLD AUTO: 15.03 K/UL — HIGH (ref 3.8–10.5)

## 2022-08-05 RX ORDER — PANTOPRAZOLE SODIUM 20 MG/1
40 TABLET, DELAYED RELEASE ORAL DAILY
Refills: 0 | Status: DISCONTINUED | OUTPATIENT
Start: 2022-08-05 | End: 2022-08-09

## 2022-08-05 RX ORDER — ELECTROLYTE SOLUTION,INJ
1 VIAL (ML) INTRAVENOUS
Refills: 0 | Status: DISCONTINUED | OUTPATIENT
Start: 2022-08-05 | End: 2022-08-05

## 2022-08-05 RX ORDER — I.V. FAT EMULSION 20 G/100ML
0.61 EMULSION INTRAVENOUS
Qty: 50 | Refills: 0 | Status: DISCONTINUED | OUTPATIENT
Start: 2022-08-05 | End: 2022-08-05

## 2022-08-05 RX ADMIN — Medication 5: at 05:36

## 2022-08-05 RX ADMIN — I.V. FAT EMULSION 20.7 GM/KG/DAY: 20 EMULSION INTRAVENOUS at 23:07

## 2022-08-05 RX ADMIN — Medication 100 MILLIGRAM(S): at 13:16

## 2022-08-05 RX ADMIN — HEPARIN SODIUM 5000 UNIT(S): 5000 INJECTION INTRAVENOUS; SUBCUTANEOUS at 14:46

## 2022-08-05 RX ADMIN — Medication 5: at 12:16

## 2022-08-05 RX ADMIN — Medication 3 MILLIGRAM(S): at 23:02

## 2022-08-05 RX ADMIN — Medication 125 MILLIGRAM(S): at 22:42

## 2022-08-05 RX ADMIN — PIPERACILLIN AND TAZOBACTAM 25 GRAM(S): 4; .5 INJECTION, POWDER, LYOPHILIZED, FOR SOLUTION INTRAVENOUS at 23:01

## 2022-08-05 RX ADMIN — Medication 240 MILLIGRAM(S): at 10:12

## 2022-08-05 RX ADMIN — NYSTATIN CREAM 1 APPLICATION(S): 100000 CREAM TOPICAL at 23:03

## 2022-08-05 RX ADMIN — PIPERACILLIN AND TAZOBACTAM 25 GRAM(S): 4; .5 INJECTION, POWDER, LYOPHILIZED, FOR SOLUTION INTRAVENOUS at 14:47

## 2022-08-05 RX ADMIN — HEPARIN SODIUM 5000 UNIT(S): 5000 INJECTION INTRAVENOUS; SUBCUTANEOUS at 23:01

## 2022-08-05 RX ADMIN — NYSTATIN CREAM 1 APPLICATION(S): 100000 CREAM TOPICAL at 00:58

## 2022-08-05 RX ADMIN — PANTOPRAZOLE SODIUM 40 MILLIGRAM(S): 20 TABLET, DELAYED RELEASE ORAL at 10:13

## 2022-08-05 RX ADMIN — Medication 5 MILLIGRAM(S): at 23:02

## 2022-08-05 RX ADMIN — LISINOPRIL 20 MILLIGRAM(S): 2.5 TABLET ORAL at 10:13

## 2022-08-05 RX ADMIN — Medication 10 MILLIGRAM(S): at 23:02

## 2022-08-05 RX ADMIN — Medication 25 MILLIGRAM(S): at 05:33

## 2022-08-05 RX ADMIN — Medication 10 MILLIGRAM(S): at 14:46

## 2022-08-05 RX ADMIN — Medication 1 TABLET(S): at 10:13

## 2022-08-05 RX ADMIN — Medication 1 EACH: at 23:07

## 2022-08-05 RX ADMIN — PIPERACILLIN AND TAZOBACTAM 25 GRAM(S): 4; .5 INJECTION, POWDER, LYOPHILIZED, FOR SOLUTION INTRAVENOUS at 05:35

## 2022-08-05 RX ADMIN — Medication 1 TABLET(S): at 23:03

## 2022-08-05 RX ADMIN — Medication 125 MILLIGRAM(S): at 12:16

## 2022-08-05 RX ADMIN — Medication 100 MILLIGRAM(S): at 23:01

## 2022-08-05 RX ADMIN — NYSTATIN CREAM 1 APPLICATION(S): 100000 CREAM TOPICAL at 10:16

## 2022-08-05 RX ADMIN — Medication 125 MILLIGRAM(S): at 05:35

## 2022-08-05 RX ADMIN — Medication 5: at 17:50

## 2022-08-05 RX ADMIN — Medication 25 MILLIGRAM(S): at 10:13

## 2022-08-05 RX ADMIN — HEPARIN SODIUM 5000 UNIT(S): 5000 INJECTION INTRAVENOUS; SUBCUTANEOUS at 05:35

## 2022-08-05 RX ADMIN — Medication 125 MILLIGRAM(S): at 23:04

## 2022-08-05 RX ADMIN — Medication 88 MICROGRAM(S): at 05:34

## 2022-08-05 RX ADMIN — Medication 10 MILLIGRAM(S): at 05:34

## 2022-08-05 RX ADMIN — Medication 100 MILLIGRAM(S): at 05:06

## 2022-08-05 NOTE — PROGRESS NOTE ADULT - ASSESSMENT
88 y/o male w/ a PMHx of sepsis, neuropathy, prostate CA, Crohn's disease, hypothyroidism, depression, intervertebral disc disorder, DM, obesity, HTN, hypercholesterolemia, CAD w/ stents, macular degeneration, hearing loss and colon cancer s/p laparoscopic ileocolic resection on 6/29/22 with Dr. Ardon. pt presents to the ED with increased drainage from the surgery site. No fevers, chills, changes in appetite. Imaging remarkable for large intraperitoneal thick wall abscess 17.5 x 11.8 x 10.8, IR consulted s/p IR drainage on 7/22, 40 cc pus drained, has drain in place, was given IV zosyn.    1. abdominal wall abscess d/t anastomotic leak/fistula s/p drainage/washout/ileostomy. ileus. s/p laparascopic ileocolic resection. colon cancer. crohns disease. PCN allergy  - s/p IR drainage 7/22; body fluid cx growing c. 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 maltophilia, day #2  - continue with above antibiotic coverage  - c diff pcr (-)  - on TPN via picc line  - does not have true pcn allergy - tolerating abx without issues currently  - supportive care  - tolerating abx well so far; no side effects noted      2. other issues - care per medicine

## 2022-08-05 NOTE — PROGRESS NOTE ADULT - SUBJECTIVE AND OBJECTIVE BOX
Pt. seen and examined at bedside this morning. Patient reports his pain is better, abdomen is less distended. NGT was removed yesterday as well as randy drain. He denies fever, chest pain, SOB, nausea, vomiting.  Ostomy is functional with dark green liquid stools    Physical Exam:  General: AAOx3, Well developed, NAD  Head: NGT placed  Chest: Normal respiratory effort  Heart: RRR  Abdomen:  ostomy is pink and patent with dark green liquid stools, distended, soft, drain is serosanguineous, RUQ IR drain in place with serous fluid with debri  Neuro/Psych: No localized deficits. Normal speech, normal tone  Skin: Normal, no rashes, no lesions noted.   Extremities: Warm, well perfused, no edema, Pulses intact    Vitals:  T(C): 37 ( @ 00:00), Max: 37.2 ( @ 08:37)  HR: 72 ( @ 00:00) (60 - 82)  BP: 115/63 ( @ 00:00) (96/50 - 154/64)  RR: 18 ( @ 00:00) (17 - 30)  SpO2: 95% ( @ 00:00) (92% - 96%)     @ 07:01  -   @ 07:00  --------------------------------------------------------  IN:  Total IN: 0 mL    OUT:    Drain (mL): 50 mL    Ileostomy (mL): 520 mL    Voided (mL): 1165 mL  Total OUT: 1735 mL    Total NET: -1735 mL       @ 03:48                    7.8  CBC: 15.03>)-------(<427                     24.8                 139 | 111 | 39    CMP:  ----------------------< 191               3.9 | 21 | 1.50                      Ca:8.1  Phos:4.2  M.4               0.2|      |8        LFTs:  ------|59|-----             -|      |-  08-04 @ 07:24                    8.1  CBC: 17.89>)-------(<470                     25.5                 139 | 111 | 32    CMP:  ----------------------< 197               3.9 | 21 | 1.35                      Ca:8.3  Phos:3.5  M.3               0.2|      |11        LFTs:  ------|59|-----             -|      |-      Current Inpatient Medications:  acetaminophen     Tablet .. 650 milliGRAM(s) Oral every 6 hours PRN  acetaminophen   IVPB .. 1000 milliGRAM(s) IV Intermittent once  dextrose 5%. 1000 milliLiter(s) (100 mL/Hr) IV Continuous <Continuous>  dextrose 5%. 1000 milliLiter(s) (50 mL/Hr) IV Continuous <Continuous>  dextrose 50% Injectable 25 Gram(s) IV Push once  dextrose 50% Injectable 12.5 Gram(s) IV Push once  dextrose 50% Injectable 25 Gram(s) IV Push once  dextrose Oral Gel 15 Gram(s) Oral once PRN  fat emulsion (Fish Oil and Plant Based) 20% Infusion 33.3 mL/Hr (33.3 mL/Hr) IV Continuous <Continuous>  fat emulsion (Fish Oil and Plant Based) 20% Infusion 0.98 Gm/kG/Day (33.3 mL/Hr) IV Continuous <Continuous>  glucagon  Injectable 1 milliGRAM(s) IntraMuscular once  heparin   Injectable 5000 Unit(s) SubCutaneous every 8 hours  hydrochlorothiazide 25 milliGRAM(s) Oral daily  HYDROmorphone  Injectable 2 milliGRAM(s) IV Push every 4 hours PRN  hydrOXYzine hydrochloride 25 milliGRAM(s) Oral every 12 hours PRN  insulin lispro (ADMELOG) corrective regimen sliding scale   SubCutaneous three times a day before meals  levothyroxine 88 MICROGram(s) Oral daily  lisinopril 20 milliGRAM(s) Oral daily  melatonin 3 milliGRAM(s) Oral at bedtime  melatonin 5 milliGRAM(s) Oral at bedtime  metoclopramide Injectable 10 milliGRAM(s) IV Push every 8 hours  metroNIDAZOLE  IVPB 500 milliGRAM(s) IV Intermittent every 8 hours  metroNIDAZOLE  IVPB      nystatin Cream 1 Application(s) Topical two times a day  ondansetron Injectable 4 milliGRAM(s) IV Push every 6 hours PRN  pantoprazole   Suspension 40 milliGRAM(s) Oral two times a day  Parenteral Nutrition - Adult 1 Each (100 mL/Hr) TPN Continuous <Continuous>  piperacillin/tazobactam IVPB.. 3.375 Gram(s) IV Intermittent every 8 hours  trimethoprim   80 mG/sulfamethoxazole 400 mG 1 Tablet(s) Oral two times a day  vancomycin    Solution 125 milliGRAM(s) Oral every 6 hours  verapamil  milliGRAM(s) Oral daily

## 2022-08-05 NOTE — CHART NOTE - NSCHARTNOTEFT_GEN_A_CORE
Clinical Nutrition PN Follow Up Note    *86 yo male admitted with large midline abdominal wall abscess s/p drainage on 7/22. s/p lap washout and creation of ileostomy on 7/27.    *7/27: PPN d/c'd on 7/26 by MD Ardon.  Pt has been NPO with NGT to LWS (large output), large midline abdominal wall abscess (s/p IR drain 7/22), pending OR for drain.  d/w surgery, to restart PPN today (7/27) due to extended time NPO (>6 days).   *7/28: s/p lap washout of abdomen for nontraumatic indication; s/p creation of diverting loop ileostomy on 7/27.  found extensive adhesions, abscess within pelvis, minimal pus but copious fibrinous tissue and phlegmonous changes.  Pt remains NPO with NGT to LWS.    *8/1: PICC line placed, NGT replaced to LWS.   *8/4: TPN day #2 (initiated on 08/3).  D/C NGT; randy drain removed. Pt was experiencing bleeding from NG yesterday & was caused by either stress gastritis or NG related irritation as per GI. Ostomy now functional - AXR showed dilated loops of small bowel.     *current status: TPN day #4; s/p 12F drain placed into tanja-stoma collection with 40cc jer pus removed and drain left in place to gravity.  pt remains NPO and TPN to be continued.    *labs reviewed;08-05; phos and magnesium near upper limit, will decrease in PN bag.  bilirubin total WNL.  triglycerides elevated, will decrease lipids.  POCT remain elevated with 13 units of sliding scale provided outside PN bag on 8/4, will increase insulin in PN bag.  corrected Ca elevated, DO NOT supplement calcium outside of PN bag at this time.     139  |  111<H>  |  39<H>  ----------------------------<  191<H>  3.9   |  21<L>  |  1.50<H>    Ca    8.1<L>      05 Aug 2022 03:48  Phos  4.2     08-05  Mg     2.4     08-05    TPro  5.6<L>  /  Alb  1.6<L>  /  TBili  0.2  /  DBili  x   /  AST  8<L>  /  ALT  9<L>  /  AlkPhos  59  08-05    HbA1c: A1C with Estimated Average Glucose Result: 6.6 % (06-28-22 @ 07:24)  Glucose: POCT Blood Glucose.: 203 mg/dL (08-05-22 @ 05:10)  Lipid Panel: Date/Time: 08-04-22 @ 07:24  Triglycerides, Serum: 293    POCT Blood Glucose.: 203 mg/dL (05 Aug 2022 05:10)  POCT Blood Glucose.: 201 mg/dL (04 Aug 2022 22:38)  POCT Blood Glucose.: 196 mg/dL (04 Aug 2022 18:19)  POCT Blood Glucose.: 224 mg/dL (04 Aug 2022 11:36)        *pertinent meds: heparin, levothyroxine, vancomycin, prochlorperazine , senna, admelog sliding scale, morphine, protonix, verapamil       *II&O's Detail    04 Aug 2022 07:01  -  05 Aug 2022 07:00  --------------------------------------------------------  IN:  Total IN: 0 mL    OUT:    Drain (mL): 50 mL    Ileostomy (mL): 520 mL    Voided (mL): 1165 mL  Total OUT: 1735 mL    Total NET: -1735 mL      *Negative fluid status: BM (+) on 8/4 , liquid, loose.  ostomy output noted. continue total PN volume of 2400 mL. Will monitor/adjust prn. pt on bowel regimen - senna prn     *Elvin Score of 13 ;  no PU documented; abd surgical wound with abscess. (+1) generalized edema documented.    *Malnutrition: severe malnutrition in acute illness r/t decreased ability to meet increased nutr needs 2/2 abd surgery and abd wall abscess AEB NPO, mod muscle/fat wasting, 8% wt loss x 3 mo    Estimated Needs: based on 77Kg (wt from 7/22)  Calories: 1925-2310Kcal (25-30 Kcal/Kg)  Protein: 108-123 g protein (1.4-1.6 g/Kg)  Fluids:  7352-3920 mL (25-30 mL/Kg)    Diet, NPO:   Except Medications (07-29-22 @ 18:15) [Active]      Weight History:   90.1Kg (8/4); likely increased from edema.  Height (cm): 167.6 (07-21-22 @ 12:16), 167.6 (07-06-22 @ 15:24), 167.6 (06-27-22 @ 19:20)  Weight (kg): 81.6 (07-21-22 @ 12:16), 78 (07-06-22 @ 15:24), 81.647 (06-27-22 @ 19:20)  BMI (kg/m2): 29 (07-21-22 @ 12:16), 27.8 (07-06-22 @ 15:24), 29.1 (06-27-22 @ 19:20)  BSA (m2): 1.91 (07-21-22 @ 12:16), 1.88 (07-06-22 @ 15:24), 1.91 (06-27-22 @ 19:20)      *will continue to monitor and adjust PN prn*    TPN Recommendations: via PICC line  Total Volume: 2400 mL  120 g  Amino Acids  200 g Dextrose  50 g Lipids 20%   154 mEq Sodium Chloride  0 mEq Sodium Acetate  0 mmol Sodium Phosphate  0 mEq Potassium Chloride  22 mEq Potassium Acetate  30 mmol Potassium Phosphate  0 mEq Calcium Gluconate ( corrected Ca elevated, DO NOT supplement calcium outside of PN bag at this time. )  7 mEq Magnesium Sulfate  200 mg Thiamine  25 units regular insulin  1 ml Trace Elements  10 ml MVI    Total Calories 1660 Kcal  ( Meets  86%  of  Estimated Energy needs  and   100%  Protein needs)    Additional Recommendations:    1) Continue to titrate dextrose up to goal of 250g by 50- 75g/day.   2) Daily weights  3) Strict I & O's - monitor output closely  4) Daily lyte checks including magnesium and phos  5) Weekly triglycerides/LFT checks  6) POCT q6hrs; maintain 140-180mg/dL    *will monitor and adjust treatement plan prn*  Kayley Woodall MA, RDN, CDN, CNSC [phone]  Nutrition

## 2022-08-05 NOTE — PROGRESS NOTE ADULT - SUBJECTIVE AND OBJECTIVE BOX
Date of service: 08-05-22 @ 09:28      Patient lying in bed; has been started on liquid diet, had drain placed in new fluid collection  Is comfortable, less abdominal distention      ROS unable to obtain secondary to patient medical condition     MEDICATIONS  (STANDING):  acetaminophen   IVPB .. 1000 milliGRAM(s) IV Intermittent once  dextrose 5%. 1000 milliLiter(s) (100 mL/Hr) IV Continuous <Continuous>  dextrose 5%. 1000 milliLiter(s) (50 mL/Hr) IV Continuous <Continuous>  dextrose 50% Injectable 25 Gram(s) IV Push once  dextrose 50% Injectable 12.5 Gram(s) IV Push once  dextrose 50% Injectable 25 Gram(s) IV Push once  fat emulsion (Fish Oil and Plant Based) 20% Infusion 33.3 mL/Hr (33.3 mL/Hr) IV Continuous <Continuous>  fat emulsion (Fish Oil and Plant Based) 20% Infusion 0.98 Gm/kG/Day (33.3 mL/Hr) IV Continuous <Continuous>  glucagon  Injectable 1 milliGRAM(s) IntraMuscular once  heparin   Injectable 5000 Unit(s) SubCutaneous every 8 hours  hydrochlorothiazide 25 milliGRAM(s) Oral daily  insulin lispro (ADMELOG) corrective regimen sliding scale   SubCutaneous three times a day before meals  levothyroxine 88 MICROGram(s) Oral daily  lisinopril 20 milliGRAM(s) Oral daily  melatonin 3 milliGRAM(s) Oral at bedtime  melatonin 5 milliGRAM(s) Oral at bedtime  metoclopramide Injectable 10 milliGRAM(s) IV Push every 8 hours  metroNIDAZOLE  IVPB 500 milliGRAM(s) IV Intermittent every 8 hours  metroNIDAZOLE  IVPB      nystatin Cream 1 Application(s) Topical two times a day  pantoprazole   Suspension 40 milliGRAM(s) Oral two times a day  Parenteral Nutrition - Adult 1 Each (100 mL/Hr) TPN Continuous <Continuous>  piperacillin/tazobactam IVPB.. 3.375 Gram(s) IV Intermittent every 8 hours  trimethoprim   80 mG/sulfamethoxazole 400 mG 1 Tablet(s) Oral two times a day  vancomycin    Solution 125 milliGRAM(s) Oral every 6 hours  verapamil  milliGRAM(s) Oral daily    MEDICATIONS  (PRN):  acetaminophen     Tablet .. 650 milliGRAM(s) Oral every 6 hours PRN Mild Pain (1 - 3)  dextrose Oral Gel 15 Gram(s) Oral once PRN Blood Glucose LESS THAN 70 milliGRAM(s)/deciliter  HYDROmorphone  Injectable 2 milliGRAM(s) IV Push every 4 hours PRN Severe Pain (7 - 10)  hydrOXYzine hydrochloride 25 milliGRAM(s) Oral every 12 hours PRN Anxiety  ondansetron Injectable 4 milliGRAM(s) IV Push every 6 hours PRN Nausea      Vital Signs Last 24 Hrs  T(C): 36.5 (05 Aug 2022 09:11), Max: 37 (05 Aug 2022 00:00)  T(F): 97.7 (05 Aug 2022 09:11), Max: 98.6 (05 Aug 2022 00:00)  HR: 69 (05 Aug 2022 09:11) (60 - 82)  BP: 127/50 (05 Aug 2022 09:11) (96/50 - 129/49)  BP(mean): --  RR: 17 (05 Aug 2022 09:11) (17 - 30)  SpO2: 96% (05 Aug 2022 09:11) (92% - 96%)    Parameters below as of 05 Aug 2022 09:11  Patient On (Oxygen Delivery Method): room air            Physical Exam:        Physical Exam:    PE:  Constitutional: frail looking  HEENT: NC/AT, EOMI, PERRLA, conjunctivae clear; ears and nose atraumatic; pharynx benign  Neck: supple; thyroid not palpable  Back: no tenderness  Respiratory: respiratory effort normal; clear to auscultation  Cardiovascular: S1S2 regular, no murmurs  Abdomen: soft, not tender, abd distended, + ostomy in place + abd drain  Genitourinary: no suprapubic tenderness  Musculoskeletal: no muscle tenderness, no joint swelling or tenderness  Extremities: no pedal edema  Neurological/ Psychiatric: AxOx3, Judgement and insight normal;  moving all extremities  Skin: no rashes; no palpable lesions    Labs: all available labs reviewed                                          Labs:                        7.8    15.03 )-----------( 427      ( 05 Aug 2022 03:48 )             24.8     08-05    139  |  111<H>  |  39<H>  ----------------------------<  191<H>  3.9   |  21<L>  |  1.50<H>    Ca    8.1<L>      05 Aug 2022 03:48  Phos  4.2     08-05  Mg     2.4     08-05    TPro  5.6<L>  /  Alb  1.6<L>  /  TBili  0.2  /  DBili  x   /  AST  8<L>  /  ALT  9<L>  /  AlkPhos  59  08-05           Cultures:                 < from: CT Abdomen and Pelvis w/ Oral Cont (08.03.22 @ 11:29) >    ACC: [id number] EXAM:  CT ABDOMEN AND PELVIS OC                          PROCEDURE DATE:  08/03/2022          INTERPRETATION:  CLINICAL INFORMATION: Rule out abscess    COMPARISON: CT abdomen/pelvis July 25, 2022, ultrasound July 02, 2022    CONTRAST/COMPLICATIONS:  IV Contrast: NONE  Oral Contrast: Omnipaque 300  Complications: None reported at time of study completion    PROCEDURE:  CT of the Abdomen and Pelvis was performed.  Sagittal and coronal reformats were performed.    FINDINGS:  LOWER CHEST: Bilateral pleural effusions, right side greater than left,   increased, with associated compressive atelectasis. Coronary artery   calcifications.    LIVER: Within normal limits.  BILE DUCTS: Normal caliber.  GALLBLADDER: Cholelithiasis.  SPLEEN: Within normal limits.  PANCREAS: Within normal limits.  ADRENALS: Within normal limits.  KIDNEYS/URETERS: 5.2 cm left renal hypodensity indeterminate on CT scan,   however shown to be a cyst on recent ultrasound.    BLADDER: Within normal limits.  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    BOWEL: No bowel obstruction. Status post ileocolic and sigmoid   anastomoses and interval creation of right lower quadrant ileostomy.   Mildly dilated and fluid-filled small bowel loops which canbe traced to   the point where the bowel exits through the abdominal wall into the   ileostomy.  PERITONEUM: Moderate amount of ascites, decreased. Poorly defined pocket   of gas and fluid in the anterior right upper quadrant (2; 16) measuring   approximately 4.1 x 2.9 cm x 6.7. Additional small gas-containing pocket   immediately subjacent to the ventral abdominal wall in the midline.  VESSELS: Within normal limits.  RETROPERITONEUM/LYMPH NODES: No lymphadenopathy.  ABDOMINAL WALL: Midline postsurgical changes.  BONES: No acute findings. L2-S1 posterior fusion with interpedicular   screws and connecting rods. Right hip arthroplasty.    IMPRESSION:  4.1 x 2.9 cm x 6.7 pocket of right upper quadrant interloop extraluminal   fluid.    Dilated small bowel loops which could be secondary to ileus versus   obstruction with a transition point where the bowel exiting through the   abdominal wall into the ileostomy.    Moderate amount of ascites, decreased in volume.    Increase in bilateral pleural effusions.    < end of copied text >                            Radiology: all available radiological tests reviewed      ACC: [id number] EXAM:  CT ABDOMEN AND PELVIS OC IC                          PROCEDURE DATE:  07/21/2022          INTERPRETATION:  CLINICAL INFORMATION: Drainage from surgical site;   evaluate for abscess. 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    COMPARISON: 7/6/2022.    CONTRAST/COMPLICATIONS:  IV Contrast: Omnipaque 350  90 cc administered   10 cc discarded  Oral Contrast: Omnipaque 300  Complications: None reported at time of study completion    PROCEDURE:  CT of the Abdomen and Pelvis was performed.  Sagittal and coronal reformats were performed.    FINDINGS: There is a large intraperitoneal thick wall abscess identified   measuring 17.5 x 11.8 x 10.8 cm, which extends anteriorly and superiorly   to the site of the midline anterior abdominal wall incision. In addition   to fluid and air there is mottled hyperdensity within the collection   likely related to administered oral contrast. The greatest concentration   of contrast is identified within the left lateral aspect of the abscess   adjacent to a suture surgical line at the level of the mid sigmoid colon,   which may be the site of fistulous communication.    LOWER CHEST: Small right pleural effusion. Opacity within the posterior   right lower lobe may be related to atelectasis; underlying parenchymal   infiltrate cannot be fully excluded.    LIVER: Normal in size. Hepatic steatosis. No focal hepatic masses.  BILE DUCTS: Normal caliber.  GALLBLADDER: Multiple gallstones. No gallbladder wall thickening.  SPLEEN: Within normal limits.  PANCREAS: Within normal limits.  ADRENALS: Within normal limits.  KIDNEYS/URETERS: No hydronephrosis. No renal calculi. 4.7 cm   space-occupying lesion noted within the left kidney characterized as a   cyst on prior ultrasonography July 2, 2022. Additional subcentimeter   hypodense foci are noted within the bilateral renal parenchyma, too small   to characterize.    BLADDER: Within normal limits.  REPRODUCTIVE ORGANS: Prostate seed implants identified.    BOWEL: Surgical suture linesare identified within the mid sigmoid   colonic region as well as within the right lower quadrant. No evidence   for mechanical bowel obstruction.  PERITONEUM: Upper abdominal ascites identified.  VESSELS: Within normal limits.  RETROPERITONEUM/LYMPHNODES: No lymphadenopathy.  ABDOMINAL WALL: As described above.  BONES: Status post lumbar spine fusion surgery extending from L2 through   S1. Patient status post right hip arthroplasty. Degenerative changes   lower thoracic and lumbar spine.    IMPRESSION:  There is a large intraperitoneal thick wall abscess   identified measuring 17.5 x 11.8 x 10.8 cm, which extends anteriorly and   superiorly to the site of the midline anterior abdominal wall incision.   In addition to fluid and air there is mottled hyperdensity within the   collection likely related to administered oral contrast. The greatest   concentration of contrast is identified within the left lateral aspect of   the abscess adjacent to a suture surgical line at the level of the mid   sigmoid colon, which may be the site of fistulous communication.    < end of copied text >    Advanced directives addressed: full resuscitation

## 2022-08-05 NOTE — PROGRESS NOTE ADULT - ATTENDING COMMENTS
Patient seen and examined at bedside. Patient reports feeling better today, he is tolerating clear liquids, denies N/V abdominal pain, only reports discomfort. Abdomen is softly distended, nontender, ostomy with succus to the appliance. Patient reports ambulating in the room (on contact precautions), and he is using his IS.    Vital Signs Last 24 Hrs  T(C): 36.5 (05 Aug 2022 09:11), Max: 37 (05 Aug 2022 00:00)  T(F): 97.7 (05 Aug 2022 09:11), Max: 98.6 (05 Aug 2022 00:00)  HR: 69 (05 Aug 2022 09:11) (60 - 82)  BP: 127/50 (05 Aug 2022 09:11) (96/50 - 129/49)  BP(mean): --  RR: 17 (05 Aug 2022 09:11) (17 - 30)  SpO2: 96% (05 Aug 2022 09:11) (92% - 96%)    Parameters below as of 05 Aug 2022 09:11  Patient On (Oxygen Delivery Method): room air                          7.8    15.03 )-----------( 427      ( 05 Aug 2022 03:48 )             24.8     A/P  Tolerating clear liquids, will maintain him on clears today  Continue TPN  Ileostomy now functional, follow up for decreased abdominal distention  Antibiotics per ID  Continue with OOB to chair and ambulation  IS use 10/hr  Daily physical therapy  Continue supportive care during recovery, we will continue to advance diet as appropriate

## 2022-08-05 NOTE — PROGRESS NOTE ADULT - ASSESSMENT
87 M with history of sigmoidectomy and ileocecectomy 6/29, patient returned to  with pelvis abscess now s/p IR drainage and IR drain upsizing. Patient taken to OR for diagnostic lap converted to exploratory laparotomy, abdominal washout, diverting loop ileostomy POD 9 (7/27). AFVSS, persistent leukocytosis. Ostomy now functional. AXR showed dilated loops of small bowel. NGT -placed in the bedside 8/1 to suction. WBC trending down 15.8    Plan:    Pain and nausea control prn   Monitor VS   NPO except meds possible advance to CLD  Monitor ostomy output   Monitor IR drain   TPN for nutrition  PT daily as pt has neuropathy and difficulty walking  OOBC, ambulation   Infectious disease recs appreciated, Bactrim added  DVT ppx    Plan discussed with colorectal surgery service

## 2022-08-06 LAB
ALBUMIN SERPL ELPH-MCNC: 1.6 G/DL — LOW (ref 3.3–5)
ALP SERPL-CCNC: 60 U/L — SIGNIFICANT CHANGE UP (ref 40–120)
ALT FLD-CCNC: 9 U/L — LOW (ref 12–78)
ANION GAP SERPL CALC-SCNC: 7 MMOL/L — SIGNIFICANT CHANGE UP (ref 5–17)
AST SERPL-CCNC: 7 U/L — LOW (ref 15–37)
BASOPHILS # BLD AUTO: 0.08 K/UL — SIGNIFICANT CHANGE UP (ref 0–0.2)
BASOPHILS NFR BLD AUTO: 0.5 % — SIGNIFICANT CHANGE UP (ref 0–2)
BILIRUB SERPL-MCNC: 0.2 MG/DL — SIGNIFICANT CHANGE UP (ref 0.2–1.2)
BUN SERPL-MCNC: 39 MG/DL — HIGH (ref 7–23)
CALCIUM SERPL-MCNC: 8.3 MG/DL — LOW (ref 8.5–10.1)
CHLORIDE SERPL-SCNC: 110 MMOL/L — HIGH (ref 96–108)
CO2 SERPL-SCNC: 20 MMOL/L — LOW (ref 22–31)
CREAT SERPL-MCNC: 1.42 MG/DL — HIGH (ref 0.5–1.3)
EGFR: 48 ML/MIN/1.73M2 — LOW
EOSINOPHIL # BLD AUTO: 0.38 K/UL — SIGNIFICANT CHANGE UP (ref 0–0.5)
EOSINOPHIL NFR BLD AUTO: 2.6 % — SIGNIFICANT CHANGE UP (ref 0–6)
GLUCOSE SERPL-MCNC: 201 MG/DL — HIGH (ref 70–99)
HCT VFR BLD CALC: 23.8 % — LOW (ref 39–50)
HGB BLD-MCNC: 7.5 G/DL — LOW (ref 13–17)
IMM GRANULOCYTES NFR BLD AUTO: 0.7 % — SIGNIFICANT CHANGE UP (ref 0–1.5)
LYMPHOCYTES # BLD AUTO: 1.07 K/UL — SIGNIFICANT CHANGE UP (ref 1–3.3)
LYMPHOCYTES # BLD AUTO: 7.3 % — LOW (ref 13–44)
MCHC RBC-ENTMCNC: 26.8 PG — LOW (ref 27–34)
MCHC RBC-ENTMCNC: 31.5 GM/DL — LOW (ref 32–36)
MCV RBC AUTO: 85 FL — SIGNIFICANT CHANGE UP (ref 80–100)
MONOCYTES # BLD AUTO: 1.11 K/UL — HIGH (ref 0–0.9)
MONOCYTES NFR BLD AUTO: 7.5 % — SIGNIFICANT CHANGE UP (ref 2–14)
NEUTROPHILS # BLD AUTO: 11.97 K/UL — HIGH (ref 1.8–7.4)
NEUTROPHILS NFR BLD AUTO: 81.4 % — HIGH (ref 43–77)
PLATELET # BLD AUTO: 461 K/UL — HIGH (ref 150–400)
POTASSIUM SERPL-MCNC: 4.3 MMOL/L — SIGNIFICANT CHANGE UP (ref 3.5–5.3)
POTASSIUM SERPL-SCNC: 4.3 MMOL/L — SIGNIFICANT CHANGE UP (ref 3.5–5.3)
PROT SERPL-MCNC: 5.9 GM/DL — LOW (ref 6–8.3)
RBC # BLD: 2.8 M/UL — LOW (ref 4.2–5.8)
RBC # FLD: 19.3 % — HIGH (ref 10.3–14.5)
SODIUM SERPL-SCNC: 137 MMOL/L — SIGNIFICANT CHANGE UP (ref 135–145)
WBC # BLD: 14.72 K/UL — HIGH (ref 3.8–10.5)
WBC # FLD AUTO: 14.72 K/UL — HIGH (ref 3.8–10.5)

## 2022-08-06 RX ORDER — SIMETHICONE 80 MG/1
40 TABLET, CHEWABLE ORAL EVERY 6 HOURS
Refills: 0 | Status: DISCONTINUED | OUTPATIENT
Start: 2022-08-06 | End: 2022-08-09

## 2022-08-06 RX ORDER — I.V. FAT EMULSION 20 G/100ML
0.61 EMULSION INTRAVENOUS
Qty: 50 | Refills: 0 | Status: DISCONTINUED | OUTPATIENT
Start: 2022-08-06 | End: 2022-08-06

## 2022-08-06 RX ORDER — SIMETHICONE 80 MG/1
80 TABLET, CHEWABLE ORAL ONCE
Refills: 0 | Status: COMPLETED | OUTPATIENT
Start: 2022-08-06 | End: 2022-08-06

## 2022-08-06 RX ORDER — INSULIN LISPRO 100/ML
VIAL (ML) SUBCUTANEOUS
Refills: 0 | Status: DISCONTINUED | OUTPATIENT
Start: 2022-08-06 | End: 2022-08-07

## 2022-08-06 RX ORDER — ELECTROLYTE SOLUTION,INJ
1 VIAL (ML) INTRAVENOUS
Refills: 0 | Status: DISCONTINUED | OUTPATIENT
Start: 2022-08-06 | End: 2022-08-06

## 2022-08-06 RX ADMIN — LISINOPRIL 20 MILLIGRAM(S): 2.5 TABLET ORAL at 10:37

## 2022-08-06 RX ADMIN — Medication 1 EACH: at 23:43

## 2022-08-06 RX ADMIN — I.V. FAT EMULSION 20.7 GM/KG/DAY: 20 EMULSION INTRAVENOUS at 23:44

## 2022-08-06 RX ADMIN — HEPARIN SODIUM 5000 UNIT(S): 5000 INJECTION INTRAVENOUS; SUBCUTANEOUS at 06:29

## 2022-08-06 RX ADMIN — PIPERACILLIN AND TAZOBACTAM 25 GRAM(S): 4; .5 INJECTION, POWDER, LYOPHILIZED, FOR SOLUTION INTRAVENOUS at 14:45

## 2022-08-06 RX ADMIN — Medication 240 MILLIGRAM(S): at 10:38

## 2022-08-06 RX ADMIN — Medication 1 TABLET(S): at 10:38

## 2022-08-06 RX ADMIN — Medication 100 MILLIGRAM(S): at 22:58

## 2022-08-06 RX ADMIN — Medication 25 MILLIGRAM(S): at 22:55

## 2022-08-06 RX ADMIN — Medication 125 MILLIGRAM(S): at 12:23

## 2022-08-06 RX ADMIN — Medication 7: at 18:13

## 2022-08-06 RX ADMIN — Medication 125 MILLIGRAM(S): at 06:30

## 2022-08-06 RX ADMIN — Medication 3 MILLIGRAM(S): at 22:56

## 2022-08-06 RX ADMIN — NYSTATIN CREAM 1 APPLICATION(S): 100000 CREAM TOPICAL at 10:50

## 2022-08-06 RX ADMIN — HEPARIN SODIUM 5000 UNIT(S): 5000 INJECTION INTRAVENOUS; SUBCUTANEOUS at 22:57

## 2022-08-06 RX ADMIN — Medication 100 MILLIGRAM(S): at 17:26

## 2022-08-06 RX ADMIN — NYSTATIN CREAM 1 APPLICATION(S): 100000 CREAM TOPICAL at 22:58

## 2022-08-06 RX ADMIN — PANTOPRAZOLE SODIUM 40 MILLIGRAM(S): 20 TABLET, DELAYED RELEASE ORAL at 10:38

## 2022-08-06 RX ADMIN — Medication 10 MILLIGRAM(S): at 22:57

## 2022-08-06 RX ADMIN — Medication 125 MILLIGRAM(S): at 23:49

## 2022-08-06 RX ADMIN — SIMETHICONE 80 MILLIGRAM(S): 80 TABLET, CHEWABLE ORAL at 00:21

## 2022-08-06 RX ADMIN — Medication 125 MILLIGRAM(S): at 18:08

## 2022-08-06 RX ADMIN — HEPARIN SODIUM 5000 UNIT(S): 5000 INJECTION INTRAVENOUS; SUBCUTANEOUS at 14:45

## 2022-08-06 RX ADMIN — Medication 88 MICROGRAM(S): at 06:34

## 2022-08-06 RX ADMIN — Medication 100 MILLIGRAM(S): at 05:27

## 2022-08-06 RX ADMIN — Medication 10 MILLIGRAM(S): at 06:30

## 2022-08-06 RX ADMIN — Medication 10 MILLIGRAM(S): at 17:26

## 2022-08-06 RX ADMIN — Medication 5: at 08:15

## 2022-08-06 RX ADMIN — SIMETHICONE 40 MILLIGRAM(S): 80 TABLET, CHEWABLE ORAL at 12:12

## 2022-08-06 RX ADMIN — Medication 5 MILLIGRAM(S): at 22:57

## 2022-08-06 RX ADMIN — PIPERACILLIN AND TAZOBACTAM 25 GRAM(S): 4; .5 INJECTION, POWDER, LYOPHILIZED, FOR SOLUTION INTRAVENOUS at 06:30

## 2022-08-06 RX ADMIN — Medication 5: at 12:22

## 2022-08-06 RX ADMIN — Medication 25 MILLIGRAM(S): at 10:38

## 2022-08-06 RX ADMIN — PIPERACILLIN AND TAZOBACTAM 25 GRAM(S): 4; .5 INJECTION, POWDER, LYOPHILIZED, FOR SOLUTION INTRAVENOUS at 22:58

## 2022-08-06 NOTE — PROGRESS NOTE ADULT - SUBJECTIVE AND OBJECTIVE BOX
Pt seen and examined at bedside this morning. Resting comfortably, complains that he would like to eat real food.  Tolerated CLD without nausea or vomiting yesterday.  Afebrile, VSS. Denies fever, chills, chest pain, sob, abdominal pain, n/v/d.     Vital Signs (24 Hrs):  T(C): 36.2 (08-06-22 @ 09:03), Max: 37 (08-05-22 @ 20:36)  HR: 72 (08-06-22 @ 09:03) (72 - 76)  BP: 146/52 (08-06-22 @ 09:03) (126/54 - 146/52)  RR: 17 (08-06-22 @ 09:03) (17 - 18)  SpO2: 94% (08-06-22 @ 09:03) (94% - 95%)  Wt(kg): --  Daily     Daily     I&O's Summary    05 Aug 2022 07:01  -  06 Aug 2022 07:00  --------------------------------------------------------  IN: 0 mL / OUT: 1325 mL / NET: -1325 mL        Physical Exam:  General: AAOx3, Well developed, NAD  Head: NGT placed  Chest: Normal respiratory effort  Heart: RRR  Abdomen:  ostomy is pink and patent with dark green liquid stools, more formed today, remains distended, soft, IR drain in place with serous fluid with debris  Neuro/Psych: No localized deficits. Normal speech, normal tone  Skin: Normal, no rashes, no lesions noted.   Extremities: Warm, well perfused, no edema, Pulses intact    .  LABS:                         7.5    14.72 )-----------( 461      ( 06 Aug 2022 04:41 )             23.8     08-06    137  |  110<H>  |  39<H>  ----------------------------<  201<H>  4.3   |  20<L>  |  1.42<H>    Ca    8.3<L>      06 Aug 2022 04:41  Phos  3.7     08-06  Mg     2.2     08-06    TPro  5.9<L>  /  Alb  1.6<L>  /  TBili  0.2  /  DBili  x   /  AST  7<L>  /  ALT  9<L>  /  AlkPhos  60  08-06    PT/INR - ( 05 Aug 2022 07:42 )   PT: 14.7 sec;   INR: 1.26 ratio         PTT - ( 05 Aug 2022 07:42 )  PTT:34.7 sec          RADIOLOGY, EKG & ADDITIONAL TESTS: Reviewed.

## 2022-08-06 NOTE — PROGRESS NOTE ADULT - ATTENDING COMMENTS
Patient seen and examined, denies any acute issues.  Tolerated clear liquids without N/V but is just unhappy with the taste/consistency of the clears.  Ostomy functioning.  On exam abdomen soft, distended, appropriately tender near incision without rebound/guarding.  Ostomy with brown output in appliance.  Drain in place with serosanguinous output.    -- Advance to full liquid diet  -- Continue antibiotics - currently on Bactrim, vancomycin, Flagyl, Zosyn as per ID, appreciate recs  -- Continue ostomy care and education  -- Multimodal pain control  -- Encourage OOB  -- DVT prophylaxis

## 2022-08-06 NOTE — CHART NOTE - NSCHARTNOTEFT_GEN_A_CORE
Clinical Nutrition PN Follow Up Note    *88 yo male admitted with large midline abdominal wall abscess s/p drainage on 7/22. s/p lap washout and creation of ileostomy on 7/27.    *7/27: PPN d/c'd on 7/26 by MD Ardon.  Pt has been NPO with NGT to LWS (large output), large midline abdominal wall abscess (s/p IR drain 7/22), pending OR for drain.  d/w surgery, to restart PPN today (7/27) due to extended time NPO (>6 days).   *7/28: s/p lap washout of abdomen for nontraumatic indication; s/p creation of diverting loop ileostomy on 7/27.  found extensive adhesions, abscess within pelvis, minimal pus but copious fibrinous tissue and phlegmonous changes.  Pt remains NPO with NGT to LWS.    *8/1: PICC line placed, NGT replaced to LWS.   *8/4: TPN day #2 (initiated on 08/3).  D/C NGT; randy drain removed. Pt was experiencing bleeding from NG yesterday & was caused by either stress gastritis or NG related irritation as per GI. Ostomy now functional - AXR showed dilated loops of small bowel.     *current status: TPN day #5; s/p 12F drain placed into tanja-stoma collection with 40cc jer pus removed and drain left in place to gravity.  pt on clear liquid diet with minimal PO intake due to dislike of clears.  d/w Surgery, will advance diet.  pt wants ensure enlive chocolate. will c/w TPN until pt meeting >50% of nutr via PO diet.  lactose intolerant.    *labs reviewed;08-06; lytes WNL.  corrected Ca elevated, DO NOT supplement calcium outside of PN bag at this time. POCT elevated, received 15 units sliding scale insulin on 8/5, will increase insulin in PN bag to aid with glycemic control.  bilirubin total WNL.  triglycerides within limits for lipids, will continue to monitor.    137  |  110<H>  |  39<H>  ----------------------------<  201<H>  4.3   |  20<L>  |  1.42<H>    Ca    8.3<L>      06 Aug 2022 04:41  Phos  3.7     08-06  Mg     2.2     08-06    TPro  5.9<L>  /  Alb  1.6<L>  /  TBili  0.2  /  DBili  x   /  AST  7<L>  /  ALT  9<L>  /  AlkPhos  60  08-06      HbA1c: A1C with Estimated Average Glucose Result: 6.6 % (06-28-22 @ 07:24)  Glucose: POCT Blood Glucose.: 203 mg/dL (08-05-22 @ 05:10)  Lipid Panel: Date/Time: 08-04-22 @ 07:24  Triglycerides, Serum: 293  POCT Blood Glucose.: 215 mg/dL (06 Aug 2022 08:10)  POCT Blood Glucose.: 205 mg/dL (05 Aug 2022 23:02)  POCT Blood Glucose.: 203 mg/dL (05 Aug 2022 17:16)  POCT Blood Glucose.: 230 mg/dL (05 Aug 2022 12:14)      *pertinent meds: heparin, levothyroxine, vancomycin, prochlorperazine , senna, admelog sliding scale, morphine, protonix, verapamil       *I&O's Detail    05 Aug 2022 07:01  -  06 Aug 2022 07:00  --------------------------------------------------------  IN:  Total IN: 0 mL    OUT:    Ileostomy (mL): 775 mL    Voided (mL): 550 mL  Total OUT: 1325 mL    Total NET: -1325 mL      *Negative fluid status: BM (+) on 8/4 , liquid, loose.  ostomy output noted. continue total PN volume of 2400 mL (not documented). Will monitor/adjust prn. pt on bowel regimen - senna prn     *Elvin Score of 13 ;  no PU documented; abd surgical wound with abscess. (+1) generalized edema documented.    *Malnutrition: severe malnutrition in acute illness r/t decreased ability to meet increased nutr needs 2/2 abd surgery and abd wall abscess AEB NPO, mod muscle/fat wasting, 8% wt loss x 3 mo    Estimated Needs: based on 77Kg (wt from 7/22)  Calories: 1925-2310Kcal (25-30 Kcal/Kg)  Protein: 108-123 g protein (1.4-1.6 g/Kg)  Fluids:  0590-9929 mL (25-30 mL/Kg)    Diet, Full Liquid (08-06-22 @ 09:13)    Weight History:   90.1Kg (8/4); likely increased from edema.  Height (cm): 167.6 (07-21-22 @ 12:16), 167.6 (07-06-22 @ 15:24), 167.6 (06-27-22 @ 19:20)  Weight (kg): 81.6 (07-21-22 @ 12:16), 78 (07-06-22 @ 15:24), 81.647 (06-27-22 @ 19:20)  BMI (kg/m2): 29 (07-21-22 @ 12:16), 27.8 (07-06-22 @ 15:24), 29.1 (06-27-22 @ 19:20)  BSA (m2): 1.91 (07-21-22 @ 12:16), 1.88 (07-06-22 @ 15:24), 1.91 (06-27-22 @ 19:20)      *will continue to monitor and adjust PN prn*    TPN Recommendations: via PICC line  Total Volume: 2400 mL  120 g  Amino Acids  200 g Dextrose  50 g Lipids 20%   154 mEq Sodium Chloride  0 mEq Sodium Acetate  0 mmol Sodium Phosphate  0 mEq Potassium Chloride  22 mEq Potassium Acetate  30 mmol Potassium Phosphate  0 mEq Calcium Gluconate ( corrected Ca elevated, DO NOT supplement calcium outside of PN bag at this time. )  7 mEq Magnesium Sulfate  200 mg Thiamine  25 units regular insulin  1 ml Trace Elements  10 ml MVI    Total Calories 1660 Kcal  ( Meets  86%  of  Estimated Energy needs  and   100%  Protein needs)    Additional Recommendations:    1) Continue to titrate dextrose up to goal of 250g by 50- 75g/day.   2) Daily weights  3) Strict I & O's - monitor output closely  4) Daily lyte checks including magnesium and phos  5) Weekly triglycerides/LFT checks  6) POCT q6hrs; maintain 140-180mg/dL    *will monitor and adjust treatement plan prn*  Kayley Woodall MA, RDN, CDN, [facility] [phone]  Nutrition  Clinical Nutrition PN Follow Up Note    *88 yo male admitted with large midline abdominal wall abscess s/p drainage on 7/22. s/p lap washout and creation of ileostomy on 7/27.    *7/27: PPN d/c'd on 7/26 by MD Ardon.  Pt has been NPO with NGT to LWS (large output), large midline abdominal wall abscess (s/p IR drain 7/22), pending OR for drain.  d/w surgery, to restart PPN today (7/27) due to extended time NPO (>6 days).   *7/28: s/p lap washout of abdomen for nontraumatic indication; s/p creation of diverting loop ileostomy on 7/27.  found extensive adhesions, abscess within pelvis, minimal pus but copious fibrinous tissue and phlegmonous changes.  Pt remains NPO with NGT to LWS.    *8/1: PICC line placed, NGT replaced to LWS.   *8/4: TPN day #2 (initiated on 08/3).  D/C NGT; randy drain removed. Pt was experiencing bleeding from NG yesterday & was caused by either stress gastritis or NG related irritation as per GI. Ostomy now functional - AXR showed dilated loops of small bowel.     *current status: TPN day #5; s/p 12F drain placed into tanja-stoma collection with 40cc jer pus removed and drain left in place to gravity.  pt on clear liquid diet with minimal PO intake due to dislike of clears.  d/w Surgery, will advance diet.  pt wants ensure enlive chocolate. will c/w TPN until pt meeting >50% of nutr via PO diet.  lactose intolerant.    *labs reviewed;08-06; lytes WNL.  corrected Ca elevated, DO NOT supplement calcium outside of PN bag at this time. POCT elevated, received 15 units sliding scale insulin on 8/5, will increase insulin in PN bag to aid with glycemic control.  bilirubin total WNL.  triglycerides within limits for lipids, will continue to monitor.    137  |  110<H>  |  39<H>  ----------------------------<  201<H>  4.3   |  20<L>  |  1.42<H>    Ca    8.3<L>      06 Aug 2022 04:41  Phos  3.7     08-06  Mg     2.2     08-06    TPro  5.9<L>  /  Alb  1.6<L>  /  TBili  0.2  /  DBili  x   /  AST  7<L>  /  ALT  9<L>  /  AlkPhos  60  08-06      HbA1c: A1C with Estimated Average Glucose Result: 6.6 % (06-28-22 @ 07:24)  Glucose: POCT Blood Glucose.: 203 mg/dL (08-05-22 @ 05:10)  Lipid Panel: Date/Time: 08-04-22 @ 07:24  Triglycerides, Serum: 293  POCT Blood Glucose.: 215 mg/dL (06 Aug 2022 08:10)  POCT Blood Glucose.: 205 mg/dL (05 Aug 2022 23:02)  POCT Blood Glucose.: 203 mg/dL (05 Aug 2022 17:16)  POCT Blood Glucose.: 230 mg/dL (05 Aug 2022 12:14)      *pertinent meds: heparin, levothyroxine, vancomycin, prochlorperazine , senna, admelog sliding scale, morphine, protonix, verapamil       *I&O's Detail    05 Aug 2022 07:01  -  06 Aug 2022 07:00  --------------------------------------------------------  IN:  Total IN: 0 mL    OUT:    Ileostomy (mL): 775 mL    Voided (mL): 550 mL  Total OUT: 1325 mL    Total NET: -1325 mL      *Negative fluid status: BM (+) on 8/4 , liquid, loose.  ostomy output noted. continue total PN volume of 2400 mL (not documented). Will monitor/adjust prn. pt on bowel regimen - senna prn     *Elvin Score of 13 ;  no PU documented; abd surgical wound with abscess. (+1) generalized edema documented.    *Malnutrition: severe malnutrition in acute illness r/t decreased ability to meet increased nutr needs 2/2 abd surgery and abd wall abscess AEB NPO, mod muscle/fat wasting, 8% wt loss x 3 mo    Estimated Needs: based on 77Kg (wt from 7/22)  Calories: 1925-2310Kcal (25-30 Kcal/Kg)  Protein: 108-123 g protein (1.4-1.6 g/Kg)  Fluids:  0952-7321 mL (25-30 mL/Kg)    Diet, Full Liquid (08-06-22 @ 09:13)    Weight History:   90.1Kg (8/4); likely increased from edema.  Height (cm): 167.6 (07-21-22 @ 12:16), 167.6 (07-06-22 @ 15:24), 167.6 (06-27-22 @ 19:20)  Weight (kg): 81.6 (07-21-22 @ 12:16), 78 (07-06-22 @ 15:24), 81.647 (06-27-22 @ 19:20)  BMI (kg/m2): 29 (07-21-22 @ 12:16), 27.8 (07-06-22 @ 15:24), 29.1 (06-27-22 @ 19:20)  BSA (m2): 1.91 (07-21-22 @ 12:16), 1.88 (07-06-22 @ 15:24), 1.91 (06-27-22 @ 19:20)      *will continue to monitor and adjust PN prn*    TPN Recommendations: via PICC line  Total Volume: 2400 mL  120 g  Amino Acids  200 g Dextrose  50 g Lipids 20%   154 mEq Sodium Chloride  0 mEq Sodium Acetate  0 mmol Sodium Phosphate  0 mEq Potassium Chloride  22 mEq Potassium Acetate  30 mmol Potassium Phosphate  0 mEq Calcium Gluconate ( corrected Ca elevated, DO NOT supplement calcium outside of PN bag at this time. )  7 mEq Magnesium Sulfate  200 mg Thiamine  35 units regular insulin  1 ml Trace Elements  10 ml MVI    Total Calories 1660 Kcal  ( Meets  86%  of  Estimated Energy needs  and   100%  Protein needs)    Additional Recommendations:    1) Continue to titrate dextrose up to goal of 250g by 50- 75g/day.   2) Daily weights  3) Strict I & O's - monitor output closely  4) Daily lyte checks including magnesium and phos  5) Weekly triglycerides/LFT checks  6) POCT q6hrs; maintain 140-180mg/dL    *will monitor and adjust treatement plan prn*  Kayley Woodall MA, RDN, CDN, [facility] [phone]  Nutrition

## 2022-08-06 NOTE — PROGRESS NOTE ADULT - ASSESSMENT
87 M with history of sigmoidectomy and ileocecectomy 6/29, patient returned to  with pelvis abscess now s/p IR drainage and IR drain upsizing. Patient taken to OR for diagnostic lap converted to exploratory laparotomy, abdominal washout, diverting loop ileostomy POD 10 (7/27). AFVSS, persistent leukocytosis. Ostomy now functional.     Plan:  Pain and nausea control prn   Monitor VS   Advance to FLD  Monitor ostomy output   Monitor IR drain   TPN for nutrition  PT daily as pt has neuropathy and difficulty walking  OOBC, ambulation   Infectious disease recs appreciated, Bactrim added  DVT ppx    Plan discussed with colorectal surgery service

## 2022-08-06 NOTE — PROGRESS NOTE ADULT - ASSESSMENT
86 y/o male w/ a PMHx of sepsis, neuropathy, prostate CA, Crohn's disease, hypothyroidism, depression, intervertebral disc disorder, DM, obesity, HTN, hypercholesterolemia, CAD w/ stents, macular degeneration, hearing loss and colon cancer s/p laparoscopic ileocolic resection on 6/29/22 with Dr. Ardon. pt presents to the ED with increased drainage from the surgery site. No fevers, chills, changes in appetite. Imaging remarkable for large intraperitoneal thick wall abscess 17.5 x 11.8 x 10.8, IR consulted s/p IR drainage on 7/22, 40 cc pus drained, has drain in place, was given IV zosyn.    1. abdominal wall abscess d/t anastomotic leak/fistula s/p drainage/washout/ileostomy. ileus. s/p laparascopic ileocolic resection. colon cancer. crohns disease. PCN allergy  - s/p IR drainage 7/22; body fluid cx growing c. 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 maltophilia, day #3  - continue with above antibiotic coverage  - c diff pcr (-)  - on TPN via picc line  - does not have true pcn allergy - tolerating abx without issues currently  - supportive care  - tolerating abx well so far; no side effects noted  - new drain in right upper quadrant    2. other issues - care per medicine

## 2022-08-06 NOTE — PROGRESS NOTE ADULT - NUTRITIONAL ASSESSMENT
This patient has been assessed with a concern for Malnutrition and has been determined to have a diagnosis/diagnoses of Severe protein-calorie malnutrition.    This patient is being managed with:   Diet Full Liquid-  Entered: Aug  6 2022  9:13AM    Parenteral Nutrition - Adult-  Entered: Aug  5 2022 10:00PM    fat emulsion (Fish Oil and Plant Based) 20% Infusion-[Known as SMOFLIPID 20% Infusion]  50 Gram(s) in IV Solution 250 milliLiter(s) infuse at 20.7 mL/Hr  Dose Rate: 0.61 Gm/kG/Day Infuse Over: 12 Hours; Stop After 12 Hours  Administration Instructions: Use 1.2 micron in-line filter  Entered: Aug  5 2022 10:00PM    fat emulsion (Fish Oil and Plant Based) 20% Infusion-[Known as SMOFLIPID 20% Infusion]  80 Gram(s) in IV Solution 400 milliLiter(s) infuse at 33.3 mL/Hr  Dose Rate: 33.3 mL/Hr Infuse Over: 12 Hours  Administration Instructions: Use 1.2 micron in-line filter  Entered: Jul 27 2022  5:00PM    
Entered: Jul 30 2022 10:00PM    Diet NPO-  Except Medications  Entered: Jul 29 2022  6:15PM    fat emulsion (Fish Oil and Plant Based) 20% Infusion-[Known as SMOFLIPID 20% Infusion]  80 Gram(s) in IV Solution 400 milliLiter(s) infuse at 33.3 mL/Hr  Dose Rate: 33.3 mL/Hr Infuse Over: 12 Hours  Administration Instructions: Use 1.2 micron in-line filter  Entered: Jul 27 2022  5:00PM

## 2022-08-06 NOTE — PROGRESS NOTE ADULT - SUBJECTIVE AND OBJECTIVE BOX
Date of service: 08-06-22 @ 09:01      Patient lying in bed; did not sleep well overnight, afebrile      ROS unable to obtain secondary to patient medical condition     MEDICATIONS  (STANDING):  acetaminophen   IVPB .. 1000 milliGRAM(s) IV Intermittent once  dextrose 5%. 1000 milliLiter(s) (50 mL/Hr) IV Continuous <Continuous>  dextrose 5%. 1000 milliLiter(s) (100 mL/Hr) IV Continuous <Continuous>  dextrose 50% Injectable 25 Gram(s) IV Push once  dextrose 50% Injectable 12.5 Gram(s) IV Push once  dextrose 50% Injectable 25 Gram(s) IV Push once  fat emulsion (Fish Oil and Plant Based) 20% Infusion 33.3 mL/Hr (33.3 mL/Hr) IV Continuous <Continuous>  fat emulsion (Fish Oil and Plant Based) 20% Infusion 0.61 Gm/kG/Day (20.7 mL/Hr) IV Continuous <Continuous>  glucagon  Injectable 1 milliGRAM(s) IntraMuscular once  heparin   Injectable 5000 Unit(s) SubCutaneous every 8 hours  hydrochlorothiazide 25 milliGRAM(s) Oral daily  insulin lispro (ADMELOG) corrective regimen sliding scale   SubCutaneous three times a day before meals  levothyroxine 88 MICROGram(s) Oral daily  lisinopril 20 milliGRAM(s) Oral daily  melatonin 3 milliGRAM(s) Oral at bedtime  melatonin 5 milliGRAM(s) Oral at bedtime  metoclopramide Injectable 10 milliGRAM(s) IV Push every 8 hours  metroNIDAZOLE  IVPB 500 milliGRAM(s) IV Intermittent every 8 hours  metroNIDAZOLE  IVPB      nystatin Cream 1 Application(s) Topical two times a day  pantoprazole    Tablet 40 milliGRAM(s) Oral daily  Parenteral Nutrition - Adult 1 Each (100 mL/Hr) TPN Continuous <Continuous>  piperacillin/tazobactam IVPB.. 3.375 Gram(s) IV Intermittent every 8 hours  trimethoprim   80 mG/sulfamethoxazole 400 mG 1 Tablet(s) Oral two times a day  vancomycin    Solution 125 milliGRAM(s) Oral every 6 hours  verapamil  milliGRAM(s) Oral daily    MEDICATIONS  (PRN):  acetaminophen     Tablet .. 650 milliGRAM(s) Oral every 6 hours PRN Mild Pain (1 - 3)  dextrose Oral Gel 15 Gram(s) Oral once PRN Blood Glucose LESS THAN 70 milliGRAM(s)/deciliter  hydrOXYzine hydrochloride 25 milliGRAM(s) Oral every 12 hours PRN Anxiety  ondansetron Injectable 4 milliGRAM(s) IV Push every 6 hours PRN Nausea      Vital Signs Last 24 Hrs  T(C): 37 (05 Aug 2022 20:36), Max: 37 (05 Aug 2022 20:36)  T(F): 98.6 (05 Aug 2022 20:36), Max: 98.6 (05 Aug 2022 20:36)  HR: 72 (05 Aug 2022 20:36) (69 - 76)  BP: 126/54 (05 Aug 2022 20:36) (126/54 - 134/52)  BP(mean): --  RR: 18 (05 Aug 2022 20:36) (17 - 18)  SpO2: 94% (05 Aug 2022 20:36) (94% - 96%)    Parameters below as of 05 Aug 2022 20:36  Patient On (Oxygen Delivery Method): room air            Physical Exam:          PE:  Constitutional: frail looking  HEENT: NC/AT, EOMI, PERRLA, conjunctivae clear; ears and nose atraumatic; pharynx benign  Neck: supple; thyroid not palpable  Back: no tenderness  Respiratory: respiratory effort normal; clear to auscultation  Cardiovascular: S1S2 regular, no murmurs  Abdomen: soft, not tender, abd distended, + ostomy in place + abd drain  Genitourinary: no suprapubic tenderness  Musculoskeletal: no muscle tenderness, no joint swelling or tenderness  Extremities: no pedal edema  Neurological/ Psychiatric: AxOx3, Judgement and insight normal;  moving all extremities  Skin: no rashes; no palpable lesions    Labs: all available labs reviewed                                          Labs:                        7.5    14.72 )-----------( 461      ( 06 Aug 2022 04:41 )             23.8     08-06    137  |  110<H>  |  39<H>  ----------------------------<  201<H>  4.3   |  20<L>  |  1.42<H>    Ca    8.3<L>      06 Aug 2022 04:41  Phos  3.7     08-06  Mg     2.2     08-06    TPro  5.9<L>  /  Alb  1.6<L>  /  TBili  0.2  /  DBili  x   /  AST  7<L>  /  ALT  9<L>  /  AlkPhos  60  08-06           Cultures:       Culture - Abscess with Gram Stain (collected 08-04-22 @ 17:00)  Source: .Abscess ABD ABCESS DRAIN  Gram Stain (08-05-22 @ 14:28):    Moderate polymorphonuclear leukocytes per low power field    No organisms seen per oil power field              < from: CT Abdomen and Pelvis w/ Oral Cont (08.03.22 @ 11:29) >    ACC: [id number] EXAM:  CT ABDOMEN AND PELVIS OC                          PROCEDURE DATE:  08/03/2022          INTERPRETATION:  CLINICAL INFORMATION: Rule out abscess    COMPARISON: CT abdomen/pelvis July 25, 2022, ultrasound July 02, 2022    CONTRAST/COMPLICATIONS:  IV Contrast: NONE  Oral Contrast: Omnipaque 300  Complications: None reported at time of study completion    PROCEDURE:  CT of the Abdomen and Pelvis was performed.  Sagittal and coronal reformats were performed.    FINDINGS:  LOWER CHEST: Bilateral pleural effusions, right side greater than left,   increased, with associated compressive atelectasis. Coronary artery   calcifications.    LIVER: Within normal limits.  BILE DUCTS: Normal caliber.  GALLBLADDER: Cholelithiasis.  SPLEEN: Within normal limits.  PANCREAS: Within normal limits.  ADRENALS: Within normal limits.  KIDNEYS/URETERS: 5.2 cm left renal hypodensity indeterminate on CT scan,   however shown to be a cyst on recent ultrasound.    BLADDER: Within normal limits.  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    BOWEL: No bowel obstruction. Status post ileocolic and sigmoid   anastomoses and interval creation of right lower quadrant ileostomy.   Mildly dilated and fluid-filled small bowel loops which canbe traced to   the point where the bowel exits through the abdominal wall into the   ileostomy.  PERITONEUM: Moderate amount of ascites, decreased. Poorly defined pocket   of gas and fluid in the anterior right upper quadrant (2; 16) measuring   approximately 4.1 x 2.9 cm x 6.7. Additional small gas-containing pocket   immediately subjacent to the ventral abdominal wall in the midline.  VESSELS: Within normal limits.  RETROPERITONEUM/LYMPH NODES: No lymphadenopathy.  ABDOMINAL WALL: Midline postsurgical changes.  BONES: No acute findings. L2-S1 posterior fusion with interpedicular   screws and connecting rods. Right hip arthroplasty.    IMPRESSION:  4.1 x 2.9 cm x 6.7 pocket of right upper quadrant interloop extraluminal   fluid.    Dilated small bowel loops which could be secondary to ileus versus   obstruction with a transition point where the bowel exiting through the   abdominal wall into the ileostomy.    Moderate amount of ascites, decreased in volume.    Increase in bilateral pleural effusions.    < end of copied text >                            Radiology: all available radiological tests reviewed      ACC: [id number] EXAM:  CT ABDOMEN AND PELVIS OC IC                          PROCEDURE DATE:  07/21/2022          INTERPRETATION:  CLINICAL INFORMATION: Drainage from surgical site;   evaluate for abscess. 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    COMPARISON: 7/6/2022.    CONTRAST/COMPLICATIONS:  IV Contrast: Omnipaque 350  90 cc administered   10 cc discarded  Oral Contrast: Omnipaque 300  Complications: None reported at time of study completion    PROCEDURE:  CT of the Abdomen and Pelvis was performed.  Sagittal and coronal reformats were performed.    FINDINGS: There is a large intraperitoneal thick wall abscess identified   measuring 17.5 x 11.8 x 10.8 cm, which extends anteriorly and superiorly   to the site of the midline anterior abdominal wall incision. In addition   to fluid and air there is mottled hyperdensity within the collection   likely related to administered oral contrast. The greatest concentration   of contrast is identified within the left lateral aspect of the abscess   adjacent to a suture surgical line at the level of the mid sigmoid colon,   which may be the site of fistulous communication.    LOWER CHEST: Small right pleural effusion. Opacity within the posterior   right lower lobe may be related to atelectasis; underlying parenchymal   infiltrate cannot be fully excluded.    LIVER: Normal in size. Hepatic steatosis. No focal hepatic masses.  BILE DUCTS: Normal caliber.  GALLBLADDER: Multiple gallstones. No gallbladder wall thickening.  SPLEEN: Within normal limits.  PANCREAS: Within normal limits.  ADRENALS: Within normal limits.  KIDNEYS/URETERS: No hydronephrosis. No renal calculi. 4.7 cm   space-occupying lesion noted within the left kidney characterized as a   cyst on prior ultrasonography July 2, 2022. Additional subcentimeter   hypodense foci are noted within the bilateral renal parenchyma, too small   to characterize.    BLADDER: Within normal limits.  REPRODUCTIVE ORGANS: Prostate seed implants identified.    BOWEL: Surgical suture linesare identified within the mid sigmoid   colonic region as well as within the right lower quadrant. No evidence   for mechanical bowel obstruction.  PERITONEUM: Upper abdominal ascites identified.  VESSELS: Within normal limits.  RETROPERITONEUM/LYMPHNODES: No lymphadenopathy.  ABDOMINAL WALL: As described above.  BONES: Status post lumbar spine fusion surgery extending from L2 through   S1. Patient status post right hip arthroplasty. Degenerative changes   lower thoracic and lumbar spine.    IMPRESSION:  There is a large intraperitoneal thick wall abscess   identified measuring 17.5 x 11.8 x 10.8 cm, which extends anteriorly and   superiorly to the site of the midline anterior abdominal wall incision.   In addition to fluid and air there is mottled hyperdensity within the   collection likely related to administered oral contrast. The greatest   concentration of contrast is identified within the left lateral aspect of   the abscess adjacent to a suture surgical line at the level of the mid   sigmoid colon, which may be the site of fistulous communication.    < end of copied text >    Advanced directives addressed: full resuscitation

## 2022-08-07 LAB
ALBUMIN SERPL ELPH-MCNC: 1.7 G/DL — LOW (ref 3.3–5)
ALP SERPL-CCNC: 58 U/L — SIGNIFICANT CHANGE UP (ref 40–120)
ALT FLD-CCNC: 10 U/L — LOW (ref 12–78)
ANION GAP SERPL CALC-SCNC: 8 MMOL/L — SIGNIFICANT CHANGE UP (ref 5–17)
AST SERPL-CCNC: 11 U/L — LOW (ref 15–37)
BILIRUB SERPL-MCNC: 0.2 MG/DL — SIGNIFICANT CHANGE UP (ref 0.2–1.2)
BUN SERPL-MCNC: 39 MG/DL — HIGH (ref 7–23)
CALCIUM SERPL-MCNC: 8.7 MG/DL — SIGNIFICANT CHANGE UP (ref 8.5–10.1)
CHLORIDE SERPL-SCNC: 110 MMOL/L — HIGH (ref 96–108)
CO2 SERPL-SCNC: 20 MMOL/L — LOW (ref 22–31)
CREAT SERPL-MCNC: 1.47 MG/DL — HIGH (ref 0.5–1.3)
EGFR: 46 ML/MIN/1.73M2 — LOW
GLUCOSE SERPL-MCNC: 207 MG/DL — HIGH (ref 70–99)
HCT VFR BLD CALC: 23.9 % — LOW (ref 39–50)
HGB BLD-MCNC: 7.5 G/DL — LOW (ref 13–17)
MCHC RBC-ENTMCNC: 27.1 PG — SIGNIFICANT CHANGE UP (ref 27–34)
MCHC RBC-ENTMCNC: 31.4 GM/DL — LOW (ref 32–36)
MCV RBC AUTO: 86.3 FL — SIGNIFICANT CHANGE UP (ref 80–100)
PHOSPHATE SERPL-MCNC: 3.3 MG/DL — SIGNIFICANT CHANGE UP (ref 2.5–4.5)
PLATELET # BLD AUTO: 467 K/UL — HIGH (ref 150–400)
POTASSIUM SERPL-MCNC: 4.7 MMOL/L — SIGNIFICANT CHANGE UP (ref 3.5–5.3)
POTASSIUM SERPL-SCNC: 4.7 MMOL/L — SIGNIFICANT CHANGE UP (ref 3.5–5.3)
PROT SERPL-MCNC: 5.9 GM/DL — LOW (ref 6–8.3)
RBC # BLD: 2.77 M/UL — LOW (ref 4.2–5.8)
RBC # FLD: 19.5 % — HIGH (ref 10.3–14.5)
SODIUM SERPL-SCNC: 138 MMOL/L — SIGNIFICANT CHANGE UP (ref 135–145)
WBC # BLD: 13.8 K/UL — HIGH (ref 3.8–10.5)
WBC # FLD AUTO: 13.8 K/UL — HIGH (ref 3.8–10.5)

## 2022-08-07 RX ORDER — INSULIN GLARGINE 100 [IU]/ML
12 INJECTION, SOLUTION SUBCUTANEOUS AT BEDTIME
Refills: 0 | Status: DISCONTINUED | OUTPATIENT
Start: 2022-08-07 | End: 2022-08-09

## 2022-08-07 RX ORDER — INSULIN LISPRO 100/ML
VIAL (ML) SUBCUTANEOUS
Refills: 0 | Status: DISCONTINUED | OUTPATIENT
Start: 2022-08-07 | End: 2022-08-09

## 2022-08-07 RX ORDER — INSULIN LISPRO 100/ML
4 VIAL (ML) SUBCUTANEOUS
Refills: 0 | Status: DISCONTINUED | OUTPATIENT
Start: 2022-08-07 | End: 2022-08-09

## 2022-08-07 RX ADMIN — Medication 125 MILLIGRAM(S): at 17:55

## 2022-08-07 RX ADMIN — HEPARIN SODIUM 5000 UNIT(S): 5000 INJECTION INTRAVENOUS; SUBCUTANEOUS at 22:59

## 2022-08-07 RX ADMIN — Medication 1 TABLET(S): at 10:31

## 2022-08-07 RX ADMIN — Medication 2: at 17:56

## 2022-08-07 RX ADMIN — Medication 100 MILLIGRAM(S): at 13:34

## 2022-08-07 RX ADMIN — PIPERACILLIN AND TAZOBACTAM 25 GRAM(S): 4; .5 INJECTION, POWDER, LYOPHILIZED, FOR SOLUTION INTRAVENOUS at 13:34

## 2022-08-07 RX ADMIN — Medication 10 MILLIGRAM(S): at 05:57

## 2022-08-07 RX ADMIN — INSULIN GLARGINE 12 UNIT(S): 100 INJECTION, SOLUTION SUBCUTANEOUS at 23:02

## 2022-08-07 RX ADMIN — Medication 1 TABLET(S): at 23:01

## 2022-08-07 RX ADMIN — PANTOPRAZOLE SODIUM 40 MILLIGRAM(S): 20 TABLET, DELAYED RELEASE ORAL at 10:30

## 2022-08-07 RX ADMIN — Medication 125 MILLIGRAM(S): at 12:04

## 2022-08-07 RX ADMIN — Medication 100 MILLIGRAM(S): at 05:57

## 2022-08-07 RX ADMIN — HEPARIN SODIUM 5000 UNIT(S): 5000 INJECTION INTRAVENOUS; SUBCUTANEOUS at 13:34

## 2022-08-07 RX ADMIN — LISINOPRIL 20 MILLIGRAM(S): 2.5 TABLET ORAL at 10:31

## 2022-08-07 RX ADMIN — Medication 240 MILLIGRAM(S): at 10:31

## 2022-08-07 RX ADMIN — Medication 7: at 08:05

## 2022-08-07 RX ADMIN — Medication 1 TABLET(S): at 01:51

## 2022-08-07 RX ADMIN — Medication 4 UNIT(S): at 10:33

## 2022-08-07 RX ADMIN — HEPARIN SODIUM 5000 UNIT(S): 5000 INJECTION INTRAVENOUS; SUBCUTANEOUS at 05:57

## 2022-08-07 RX ADMIN — PIPERACILLIN AND TAZOBACTAM 25 GRAM(S): 4; .5 INJECTION, POWDER, LYOPHILIZED, FOR SOLUTION INTRAVENOUS at 23:01

## 2022-08-07 RX ADMIN — Medication 10 MILLIGRAM(S): at 13:34

## 2022-08-07 RX ADMIN — Medication 25 MILLIGRAM(S): at 10:31

## 2022-08-07 RX ADMIN — Medication 88 MICROGRAM(S): at 05:57

## 2022-08-07 RX ADMIN — Medication 10 MILLIGRAM(S): at 23:01

## 2022-08-07 RX ADMIN — Medication 125 MILLIGRAM(S): at 05:57

## 2022-08-07 RX ADMIN — NYSTATIN CREAM 1 APPLICATION(S): 100000 CREAM TOPICAL at 10:01

## 2022-08-07 RX ADMIN — Medication 100 MILLIGRAM(S): at 23:00

## 2022-08-07 RX ADMIN — Medication 25 MILLIGRAM(S): at 12:01

## 2022-08-07 RX ADMIN — Medication 5 MILLIGRAM(S): at 23:00

## 2022-08-07 RX ADMIN — Medication 2: at 13:39

## 2022-08-07 RX ADMIN — PIPERACILLIN AND TAZOBACTAM 25 GRAM(S): 4; .5 INJECTION, POWDER, LYOPHILIZED, FOR SOLUTION INTRAVENOUS at 05:57

## 2022-08-07 RX ADMIN — Medication 3 MILLIGRAM(S): at 23:00

## 2022-08-07 NOTE — PROGRESS NOTE ADULT - ATTENDING COMMENTS
Patient tolerated full liquids without issues.  On exam abdomen is soft, distended but less so than yesterday, ostomy pink and patent with thin brown output in appliance.  No rebound/guarding.  Drain in place.  Leukocytosis still slowly downtrending.    -- Advance to low residue diet  -- Will allow TPN to run out today - DC afterward  -- Multimodal pain control  -- Encourage ambulation  -- DVT prophylaxis  -- Ostomy care and education

## 2022-08-07 NOTE — PROGRESS NOTE ADULT - ASSESSMENT
87 M with history of sigmoidectomy and ileocecectomy 6/29, patient returned to  with pelvis abscess now s/p IR drainage and IR drain upsizing. Patient taken to OR for diagnostic lap converted to exploratory laparotomy, abdominal washout, diverting loop ileostomy POD 11 (7/27). AFVSS, leukocytosis improving. Ostomy now functional.     Plan:  Pain and nausea control prn   Monitor VS   Advance to low residue diet  Monitor ostomy output   Monitor IR drain   TPN for nutrition  PT daily as pt has neuropathy and difficulty walking  OOBC, ambulation   Infectious disease recs appreciated, Bactrim added  DVT ppx    Plan discussed with colorectal surgery service

## 2022-08-07 NOTE — PROGRESS NOTE ADULT - SUBJECTIVE AND OBJECTIVE BOX
Pt seen and examined at bedside this morning. Resting comfortably, complains that he would like to eat real food.  Tolerated FLD without nausea or vomiting yesterday.  Afebrile, VSS. Denies fever, chills, chest pain, sob, abdominal pain, n/v/d.       Physical Exam:  General: AAOx3, Well developed, NAD  Head: NGT placed  Chest: Normal respiratory effort  Heart: RRR  Abdomen:  ostomy is pink and patent with dark green liquid stools, more formed today, remains distended, soft, IR drain in place with serous fluid with debris  Neuro/Psych: No localized deficits. Normal speech, normal tone  Skin: Normal, no rashes, no lesions noted.   Extremities: Warm, well perfused, no edema, Pulses intact    Vitals:  T(C): 36.2 ( @ 22:11), Max: 36.8 ( @ 15:40)  HR: 74 ( @ 22:11) (68 - 74)  BP: 146/60 ( @ 22:11) (136/50 - 146/60)  RR: 17 ( @ 22:11) (17 - 17)  SpO2: 94% ( @ 22:11) (93% - 94%)    08 @ 07:01  -  08 @ 07:00  --------------------------------------------------------  IN:    Fat Emulsion (Fish Oil &amp; Plant Based) 20% Infusion: 400 mL    TPN (Total Parenteral Nutrition): 2400 mL  Total IN: 2800 mL    OUT:    Drain (mL): 20 mL    Ileostomy (mL): 775 mL    Voided (mL): 725 mL  Total OUT: 1520 mL    Total NET: 1280 mL       @ 06:48                    7.5  CBC: 13.80>)-------(<467                     23.9                 138 | 110 | 39    CMP:  ----------------------< 207               4.7 | 20 | 1.47                      Ca:8.7  Phos:3.3  M.1               0.2|      |11        LFTs:  ------|58|-----             -|      |-  08 @ 04:41                    7.5  CBC: 14.72>)-------(<461                     23.8                 137 | 110 | 39    CMP:  ----------------------< 201               4.3 | 20 | 1.42                      Ca:8.3  Phos:3.7  M.2               0.2|      |7        LFTs:  ------|60|-----             -|      |-      Culture - Abscess with Gram Stain (collected 22 @ 17:00)  Source: .Abscess ABD ABCESS DRAIN  Gram Stain (22 @ 14:28):    Moderate polymorphonuclear leukocytes per low power field    No organisms seen per oil power field  Preliminary Report (22 @ 09:43):    No growth to date.      Current Inpatient Medications:  acetaminophen     Tablet .. 650 milliGRAM(s) Oral every 6 hours PRN  acetaminophen   IVPB .. 1000 milliGRAM(s) IV Intermittent once  dextrose 5%. 1000 milliLiter(s) (100 mL/Hr) IV Continuous <Continuous>  dextrose 5%. 1000 milliLiter(s) (50 mL/Hr) IV Continuous <Continuous>  dextrose 50% Injectable 25 Gram(s) IV Push once  dextrose 50% Injectable 12.5 Gram(s) IV Push once  dextrose 50% Injectable 25 Gram(s) IV Push once  dextrose Oral Gel 15 Gram(s) Oral once PRN  fat emulsion (Fish Oil and Plant Based) 20% Infusion 0.61 Gm/kG/Day (20.7 mL/Hr) IV Continuous <Continuous>  fat emulsion (Fish Oil and Plant Based) 20% Infusion 33.3 mL/Hr (33.3 mL/Hr) IV Continuous <Continuous>  glucagon  Injectable 1 milliGRAM(s) IntraMuscular once  heparin   Injectable 5000 Unit(s) SubCutaneous every 8 hours  hydrochlorothiazide 25 milliGRAM(s) Oral daily  hydrOXYzine hydrochloride 25 milliGRAM(s) Oral every 12 hours PRN  insulin lispro (ADMELOG) corrective regimen sliding scale   SubCutaneous three times a day before meals  levothyroxine 88 MICROGram(s) Oral daily  lisinopril 20 milliGRAM(s) Oral daily  melatonin 3 milliGRAM(s) Oral at bedtime  melatonin 5 milliGRAM(s) Oral at bedtime  metoclopramide Injectable 10 milliGRAM(s) IV Push every 8 hours  metroNIDAZOLE  IVPB 500 milliGRAM(s) IV Intermittent every 8 hours  metroNIDAZOLE  IVPB      nystatin Cream 1 Application(s) Topical two times a day  ondansetron Injectable 4 milliGRAM(s) IV Push every 6 hours PRN  pantoprazole    Tablet 40 milliGRAM(s) Oral daily  Parenteral Nutrition - Adult 1 Each (100 mL/Hr) TPN Continuous <Continuous>  piperacillin/tazobactam IVPB.. 3.375 Gram(s) IV Intermittent every 8 hours  simethicone 40 milliGRAM(s) Chew every 6 hours PRN  trimethoprim   80 mG/sulfamethoxazole 400 mG 1 Tablet(s) Oral two times a day  vancomycin    Solution 125 milliGRAM(s) Oral every 6 hours  verapamil  milliGRAM(s) Oral daily

## 2022-08-07 NOTE — CHART NOTE - NSCHARTNOTESELECT_GEN_ALL_CORE
Dietitian F/U
Dietitian F/U TPN Note
Dietitian PPN f/u Note
Dietitian BRIEF Note
Dietitian F/U
Dietitian F/U
Dietitian F/U TPN Note
Dietitian F/U w/ PPN
Dietitian F/U w/ PPN recs
Dietitian PPN f/u
Dietitian PPN f/u NOte
Event Note

## 2022-08-07 NOTE — CHART NOTE - NSCHARTNOTEFT_GEN_A_CORE
Clinical Nutrition BRIEF Note    *88 yo male admitted with large midline abdominal wall abscess s/p drainage on 7/22. s/p lap washout and creation of ileostomy on 7/27.    *7/27: PPN d/c'd on 7/26 by MD Ardon.  Pt has been NPO with NGT to LWS (large output), large midline abdominal wall abscess (s/p IR drain 7/22), pending OR for drain.  d/w surgery, to restart PPN today (7/27) due to extended time NPO (>6 days).   *7/28: s/p lap washout of abdomen for nontraumatic indication; s/p creation of diverting loop ileostomy on 7/27.  found extensive adhesions, abscess within pelvis, minimal pus but copious fibrinous tissue and phlegmonous changes.  Pt remains NPO with NGT to LWS.    *8/1: PICC line placed, NGT replaced to LWS.   *8/4: TPN day #2 (initiated on 08/3).  D/C NGT; randy drain removed. Pt was experiencing bleeding from NG yesterday & was caused by either stress gastritis or NG related irritation as per GI. Ostomy now functional - AXR showed dilated loops of small bowel.     *current status: pt tolerating full liquid diet, being advanced to low fiber diet as per surgery.  d/w pt, pt stated he is NOT allergic to chocolate. will remove allergy and add ensure max chocolate as requested.  d/w surgery, will stop PN after today's dose.    *labs reviewed;08-07; POCT elevated, add insulin outside of PN bag as PN will be stopped tonight.  lytes WNL.      138  |  110<H>  |  39<H>  ----------------------------<  207<H>  4.7   |  20<L>  |  1.47<H>    Ca    8.7      07 Aug 2022 06:48  Phos  3.3     08-07  Mg     2.1     08-07    TPro  5.9<L>  /  Alb  1.7<L>  /  TBili  0.2  /  DBili  x   /  AST  11<L>  /  ALT  10<L>  /  AlkPhos  58  08-07      HbA1c: A1C with Estimated Average Glucose Result: 6.6 % (06-28-22 @ 07:24)  Glucose: POCT Blood Glucose.: 203 mg/dL (08-05-22 @ 05:10)  Lipid Panel: Date/Time: 08-04-22 @ 07:24  Triglycerides, Serum: 293      POCT Blood Glucose.: 203 mg/dL (07 Aug 2022 07:57)  POCT Blood Glucose.: 224 mg/dL (06 Aug 2022 23:46)  POCT Blood Glucose.: 204 mg/dL (06 Aug 2022 18:12)  POCT Blood Glucose.: 246 mg/dL (06 Aug 2022 12:19)      *pertinent meds: heparin, levothyroxine, vancomycin, prochlorperazine , senna, admelog sliding scale, morphine, protonix, verapamil       *I&O's Detail    06 Aug 2022 07:01  -  07 Aug 2022 07:00  --------------------------------------------------------  IN:    Fat Emulsion (Fish Oil &amp; Plant Based) 20% Infusion: 400 mL    TPN (Total Parenteral Nutrition): 2400 mL  Total IN: 2800 mL    OUT:    Drain (mL): 20 mL    Ileostomy (mL): 775 mL    Voided (mL): 725 mL  Total OUT: 1520 mL    Total NET: 1280 mL      *positive fluid status: BM (+) on 8/6 , liquid, loose.  ostomy output noted. pt on bowel regimen - senna prn     *Elvin Score of 13 ;  no PU documented; abd surgical wound with abscess. (+1) generalized , (+2) Lt/Rt ankle/foot/leg edema documented.    *Malnutrition: severe malnutrition in acute illness r/t decreased ability to meet increased nutr needs 2/2 abd surgery and abd wall abscess AEB NPO, mod muscle/fat wasting, 8% wt loss x 3 mo    Estimated Needs: based on 77Kg (wt from 7/22)  Calories: 1925-2310Kcal (25-30 Kcal/Kg)  Protein: 108-123 g protein (1.4-1.6 g/Kg)  Fluids:  7841-7251 mL (25-30 mL/Kg)    Diet, Full Liquid (08-06-22 @ 09:13)    Weight History:   90.1Kg (8/4); likely increased from edema.  Height (cm): 167.6 (07-21-22 @ 12:16), 167.6 (07-06-22 @ 15:24), 167.6 (06-27-22 @ 19:20)  Weight (kg): 81.6 (07-21-22 @ 12:16), 78 (07-06-22 @ 15:24), 81.647 (06-27-22 @ 19:20)  BMI (kg/m2): 29 (07-21-22 @ 12:16), 27.8 (07-06-22 @ 15:24), 29.1 (06-27-22 @ 19:20)  BSA (m2): 1.91 (07-21-22 @ 12:16), 1.88 (07-06-22 @ 15:24), 1.91 (06-27-22 @ 19:20)      RECOMMENDATIONS:  1) add ensure max BID  2) add MVI with minerals daily to ensure 100% RDI met  3) add 100mg thiamine daily  4) obtain new wt when feasible to track/trend changes  5) monitor POCT; adjust insulin regimen prn for tight glycemic control  6) stop PN after today's dose  *will monitor and adjust treatement plan prn*  Kayley Woodall MA, RDN, CDN, CNSC [phone]  Nutrition

## 2022-08-08 LAB
ANION GAP SERPL CALC-SCNC: 4 MMOL/L — LOW (ref 5–17)
BASOPHILS # BLD AUTO: 0.1 K/UL — SIGNIFICANT CHANGE UP (ref 0–0.2)
BASOPHILS NFR BLD AUTO: 0.7 % — SIGNIFICANT CHANGE UP (ref 0–2)
BUN SERPL-MCNC: 45 MG/DL — HIGH (ref 7–23)
CALCIUM SERPL-MCNC: 8.8 MG/DL — SIGNIFICANT CHANGE UP (ref 8.5–10.1)
CHLORIDE SERPL-SCNC: 111 MMOL/L — HIGH (ref 96–108)
CO2 SERPL-SCNC: 22 MMOL/L — SIGNIFICANT CHANGE UP (ref 22–31)
CREAT SERPL-MCNC: 1.48 MG/DL — HIGH (ref 0.5–1.3)
EGFR: 46 ML/MIN/1.73M2 — LOW
EOSINOPHIL # BLD AUTO: 0.42 K/UL — SIGNIFICANT CHANGE UP (ref 0–0.5)
EOSINOPHIL NFR BLD AUTO: 2.8 % — SIGNIFICANT CHANGE UP (ref 0–6)
GLUCOSE SERPL-MCNC: 158 MG/DL — HIGH (ref 70–99)
HCT VFR BLD CALC: 24.1 % — LOW (ref 39–50)
HGB BLD-MCNC: 7.6 G/DL — LOW (ref 13–17)
IMM GRANULOCYTES NFR BLD AUTO: 0.7 % — SIGNIFICANT CHANGE UP (ref 0–1.5)
LYMPHOCYTES # BLD AUTO: 1.42 K/UL — SIGNIFICANT CHANGE UP (ref 1–3.3)
LYMPHOCYTES # BLD AUTO: 9.4 % — LOW (ref 13–44)
MAGNESIUM SERPL-MCNC: 2 MG/DL — SIGNIFICANT CHANGE UP (ref 1.6–2.6)
MCHC RBC-ENTMCNC: 26.9 PG — LOW (ref 27–34)
MCHC RBC-ENTMCNC: 31.5 GM/DL — LOW (ref 32–36)
MCV RBC AUTO: 85.2 FL — SIGNIFICANT CHANGE UP (ref 80–100)
MONOCYTES # BLD AUTO: 1.03 K/UL — HIGH (ref 0–0.9)
MONOCYTES NFR BLD AUTO: 6.8 % — SIGNIFICANT CHANGE UP (ref 2–14)
NEUTROPHILS # BLD AUTO: 12.09 K/UL — HIGH (ref 1.8–7.4)
NEUTROPHILS NFR BLD AUTO: 79.6 % — HIGH (ref 43–77)
PHOSPHATE SERPL-MCNC: 2.7 MG/DL — SIGNIFICANT CHANGE UP (ref 2.5–4.5)
PLATELET # BLD AUTO: 489 K/UL — HIGH (ref 150–400)
POTASSIUM SERPL-MCNC: 5.1 MMOL/L — SIGNIFICANT CHANGE UP (ref 3.5–5.3)
POTASSIUM SERPL-SCNC: 5.1 MMOL/L — SIGNIFICANT CHANGE UP (ref 3.5–5.3)
RBC # BLD: 2.83 M/UL — LOW (ref 4.2–5.8)
RBC # FLD: 19.7 % — HIGH (ref 10.3–14.5)
SARS-COV-2 RNA SPEC QL NAA+PROBE: SIGNIFICANT CHANGE UP
SODIUM SERPL-SCNC: 137 MMOL/L — SIGNIFICANT CHANGE UP (ref 135–145)
WBC # BLD: 15.17 K/UL — HIGH (ref 3.8–10.5)
WBC # FLD AUTO: 15.17 K/UL — HIGH (ref 3.8–10.5)

## 2022-08-08 RX ORDER — SODIUM CHLORIDE 9 MG/ML
250 INJECTION, SOLUTION INTRAVENOUS
Refills: 0 | Status: DISCONTINUED | OUTPATIENT
Start: 2022-08-08 | End: 2022-08-09

## 2022-08-08 RX ADMIN — Medication 88 MICROGRAM(S): at 06:11

## 2022-08-08 RX ADMIN — PIPERACILLIN AND TAZOBACTAM 25 GRAM(S): 4; .5 INJECTION, POWDER, LYOPHILIZED, FOR SOLUTION INTRAVENOUS at 13:53

## 2022-08-08 RX ADMIN — Medication 125 MILLIGRAM(S): at 06:14

## 2022-08-08 RX ADMIN — PIPERACILLIN AND TAZOBACTAM 25 GRAM(S): 4; .5 INJECTION, POWDER, LYOPHILIZED, FOR SOLUTION INTRAVENOUS at 23:58

## 2022-08-08 RX ADMIN — Medication 125 MILLIGRAM(S): at 12:04

## 2022-08-08 RX ADMIN — INSULIN GLARGINE 12 UNIT(S): 100 INJECTION, SOLUTION SUBCUTANEOUS at 23:57

## 2022-08-08 RX ADMIN — Medication 1 TABLET(S): at 23:58

## 2022-08-08 RX ADMIN — Medication 100 MILLIGRAM(S): at 06:13

## 2022-08-08 RX ADMIN — Medication 1: at 08:28

## 2022-08-08 RX ADMIN — Medication 1 TABLET(S): at 10:00

## 2022-08-08 RX ADMIN — Medication 240 MILLIGRAM(S): at 10:00

## 2022-08-08 RX ADMIN — Medication 125 MILLIGRAM(S): at 01:11

## 2022-08-08 RX ADMIN — PIPERACILLIN AND TAZOBACTAM 25 GRAM(S): 4; .5 INJECTION, POWDER, LYOPHILIZED, FOR SOLUTION INTRAVENOUS at 06:13

## 2022-08-08 RX ADMIN — Medication 10 MILLIGRAM(S): at 23:57

## 2022-08-08 RX ADMIN — Medication 100 MILLIGRAM(S): at 21:44

## 2022-08-08 RX ADMIN — Medication 100 MILLIGRAM(S): at 13:53

## 2022-08-08 RX ADMIN — Medication 10 MILLIGRAM(S): at 06:12

## 2022-08-08 RX ADMIN — PANTOPRAZOLE SODIUM 40 MILLIGRAM(S): 20 TABLET, DELAYED RELEASE ORAL at 10:00

## 2022-08-08 RX ADMIN — HEPARIN SODIUM 5000 UNIT(S): 5000 INJECTION INTRAVENOUS; SUBCUTANEOUS at 06:09

## 2022-08-08 RX ADMIN — Medication 25 MILLIGRAM(S): at 10:00

## 2022-08-08 RX ADMIN — Medication 10 MILLIGRAM(S): at 13:53

## 2022-08-08 RX ADMIN — SODIUM CHLORIDE 50 MILLILITER(S): 9 INJECTION, SOLUTION INTRAVENOUS at 15:55

## 2022-08-08 RX ADMIN — HEPARIN SODIUM 5000 UNIT(S): 5000 INJECTION INTRAVENOUS; SUBCUTANEOUS at 13:53

## 2022-08-08 RX ADMIN — LISINOPRIL 20 MILLIGRAM(S): 2.5 TABLET ORAL at 10:00

## 2022-08-08 RX ADMIN — Medication 4 UNIT(S): at 08:28

## 2022-08-08 RX ADMIN — Medication 25 MILLIGRAM(S): at 02:51

## 2022-08-08 RX ADMIN — HEPARIN SODIUM 5000 UNIT(S): 5000 INJECTION INTRAVENOUS; SUBCUTANEOUS at 23:58

## 2022-08-08 RX ADMIN — NYSTATIN CREAM 1 APPLICATION(S): 100000 CREAM TOPICAL at 09:56

## 2022-08-08 NOTE — PROGRESS NOTE ADULT - ASSESSMENT
87 M with history of sigmoidectomy and ileocecectomy 6/29, patient returned to  with pelvis abscess now s/p IR drainage and IR drain upsizing. Patient taken to OR for diagnostic lap converted to exploratory laparotomy, abdominal washout, diverting loop ileostomy POD 12 (7/27). AFVSS, leukocytosis improving. Ostomy now functional.     Plan:  Pain and nausea control prn   Monitor VS   c/w low residue diet  Monitor ostomy output   Monitor IR drain   Discontinue TPN   PT daily as pt has neuropathy and difficulty walking  OOBC, ambulation   Infectious disease recs appreciated, Bactrim added, discharge reccomendations pending  DVT ppx    Plan discussed with colorectal surgery service

## 2022-08-08 NOTE — PROGRESS NOTE ADULT - ASSESSMENT
86 y/o male w/ a PMHx of sepsis, neuropathy, prostate CA, Crohn's disease, hypothyroidism, depression, intervertebral disc disorder, DM, obesity, HTN, hypercholesterolemia, CAD w/ stents, macular degeneration, hearing loss and colon cancer s/p laparoscopic ileocolic resection on 6/29/22 with Dr. Ardon. pt presents to the ED with increased drainage from the surgery site. No fevers, chills, changes in appetite. Imaging remarkable for large intraperitoneal thick wall abscess 17.5 x 11.8 x 10.8, IR consulted s/p IR drainage on 7/22, 40 cc pus drained, has drain in place, was given IV zosyn.    1. abdominal wall abscess d/t anastomotic leak/fistula s/p drainage/washout/ileostomy. ileus. s/p laparascopic ileocolic resection. colon cancer. crohns disease. PCN allergy  - s/p IR drainage 7/22; body fluid cx growing c. 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 maltophilia, day #5, complete 2 more days   - continue with above antibiotic coverage  - c diff pcr (-)  - does not have true pcn allergy - tolerating abx without issues currently  - supportive care  - tolerating abx well so far; no side effects noted    2. other issues - care per medicine

## 2022-08-08 NOTE — PROGRESS NOTE ADULT - ATTENDING COMMENTS
Patient seen and examined this morning. He is comfortable sitting up in chair. Patient is tolerating a low residue diet, denies N/V and having stoma function. Abdomen is distended, less distended than on prior exams, nontender, no guarding or rebound, ostomy is pink and productive of succus.    Vital Signs Last 24 Hrs  T(C): 37.1 (08 Aug 2022 08:44), Max: 37.1 (08 Aug 2022 08:44)  T(F): 98.8 (08 Aug 2022 08:44), Max: 98.8 (08 Aug 2022 08:44)  HR: 74 (08 Aug 2022 08:44) (73 - 75)  BP: 119/55 (08 Aug 2022 08:44) (119/55 - 150/53)  BP(mean): --  RR: 18 (08 Aug 2022 08:44) (18 - 18)  SpO2: 95% (08 Aug 2022 08:44) (94% - 95%)    Parameters below as of 08 Aug 2022 08:44  Patient On (Oxygen Delivery Method): room air                          7.6    15.17 )-----------( 489      ( 08 Aug 2022 05:44 )             24.1     08-08    137  |  111<H>  |  45<H>  ----------------------------<  158<H>  5.1   |  22  |  1.48<H>    Ca    8.8      08 Aug 2022 05:44  Phos  2.7     08-08  Mg     2.0     08-08    TPro  5.9<L>  /  Alb  1.7<L>  /  TBili  0.2  /  DBili  x   /  AST  11<L>  /  ALT  10<L>  /  AlkPhos  58  08-07      A/P  Continue with multimodal pain control  Continue low residue diet  TPN discontinued  Follow up stoma output, replace half of the losses Qshift  Ostomy care and education .  Encourage ambulation  DVT prophylaxis  CM for disposition/discharge planing  Continue supportive care during recovery

## 2022-08-08 NOTE — PROGRESS NOTE ADULT - SUBJECTIVE AND OBJECTIVE BOX
Date of service: 08-08-22 @ 14:31    Pt sitting in chair this am  Denies abd pain  Feels better  Tolerating diet      ROS: no fever or chills; denies dizziness, no HA, no SOB or cough, no abdominal pain, no diarrhea or constipation; no dysuria, no urinary frequency, no legs pain, no rashes    MEDICATIONS  (STANDING):  acetaminophen   IVPB .. 1000 milliGRAM(s) IV Intermittent once  dextrose 5%. 1000 milliLiter(s) (100 mL/Hr) IV Continuous <Continuous>  dextrose 5%. 1000 milliLiter(s) (50 mL/Hr) IV Continuous <Continuous>  dextrose 50% Injectable 25 Gram(s) IV Push once  dextrose 50% Injectable 12.5 Gram(s) IV Push once  dextrose 50% Injectable 25 Gram(s) IV Push once  fat emulsion (Fish Oil and Plant Based) 20% Infusion 33.3 mL/Hr (33.3 mL/Hr) IV Continuous <Continuous>  glucagon  Injectable 1 milliGRAM(s) IntraMuscular once  heparin   Injectable 5000 Unit(s) SubCutaneous every 8 hours  hydrochlorothiazide 25 milliGRAM(s) Oral daily  insulin glargine Injectable (LANTUS) 12 Unit(s) SubCutaneous at bedtime  insulin lispro (ADMELOG) corrective regimen sliding scale   SubCutaneous three times a day before meals  insulin lispro Injectable (ADMELOG) 4 Unit(s) SubCutaneous before breakfast  lactated ringers. 250 milliLiter(s) (50 mL/Hr) IV Continuous <Continuous>  levothyroxine 88 MICROGram(s) Oral daily  lisinopril 20 milliGRAM(s) Oral daily  melatonin 3 milliGRAM(s) Oral at bedtime  melatonin 5 milliGRAM(s) Oral at bedtime  metoclopramide Injectable 10 milliGRAM(s) IV Push every 8 hours  metroNIDAZOLE  IVPB 500 milliGRAM(s) IV Intermittent every 8 hours  metroNIDAZOLE  IVPB      nystatin Cream 1 Application(s) Topical two times a day  pantoprazole    Tablet 40 milliGRAM(s) Oral daily  piperacillin/tazobactam IVPB.. 3.375 Gram(s) IV Intermittent every 8 hours  trimethoprim   80 mG/sulfamethoxazole 400 mG 1 Tablet(s) Oral two times a day  vancomycin    Solution 125 milliGRAM(s) Oral every 6 hours  verapamil  milliGRAM(s) Oral daily        Vital Signs Last 24 Hrs  T(C): 37.1 (08 Aug 2022 08:44), Max: 37.1 (08 Aug 2022 08:44)  T(F): 98.8 (08 Aug 2022 08:44), Max: 98.8 (08 Aug 2022 08:44)  HR: 74 (08 Aug 2022 08:44) (73 - 75)  BP: 119/55 (08 Aug 2022 08:44) (119/55 - 150/53)  BP(mean): --  RR: 18 (08 Aug 2022 08:44) (18 - 18)  SpO2: 95% (08 Aug 2022 08:44) (94% - 95%)    Parameters below as of 08 Aug 2022 08:44  Patient On (Oxygen Delivery Method): room air      PE:  Constitutional: frail looking  HEENT: NC/AT, EOMI, PERRLA, conjunctivae clear; ears and nose atraumatic; pharynx benign  Neck: supple; thyroid not palpable  Back: no tenderness  Respiratory: respiratory effort normal; clear to auscultation  Cardiovascular: S1S2 regular, no murmurs  Abdomen: soft, not tender, abd distended, + ostomy in place + abd drain  Genitourinary: no suprapubic tenderness  Musculoskeletal: no muscle tenderness, no joint swelling or tenderness  Extremities: no pedal edema  Neurological/ Psychiatric: AxOx3, Judgement and insight normal;  moving all extremities  Skin: no rashes; no palpable lesions    Labs: all available labs reviewed                                                      7.6    15.17 )-----------( 489      ( 08 Aug 2022 05:44 )             24.1     08-08    137  |  111<H>  |  45<H>  ----------------------------<  158<H>  5.1   |  22  |  1.48<H>    Ca    8.8      08 Aug 2022 05:44  Phos  2.7     08-08  Mg     2.0     08-08    TPro  5.9<L>  /  Alb  1.7<L>  /  TBili  0.2  /  DBili  x   /  AST  11<L>  /  ALT  10<L>  /  AlkPhos  58  08-07           Cultures:       Culture - Abscess with Gram Stain (collected 08-04-22 @ 17:00)  Source: .Abscess ABD ABCESS DRAIN  Gram Stain (08-05-22 @ 14:28):    Moderate polymorphonuclear leukocytes per low power field    No organisms seen per oil power field          < from: CT Abdomen and Pelvis w/ Oral Cont (08.03.22 @ 11:29) >    ACC: [id number] EXAM:  CT ABDOMEN AND PELVIS OC                          PROCEDURE DATE:  08/03/2022          INTERPRETATION:  CLINICAL INFORMATION: Rule out abscess    COMPARISON: CT abdomen/pelvis July 25, 2022, ultrasound July 02, 2022    CONTRAST/COMPLICATIONS:  IV Contrast: NONE  Oral Contrast: Omnipaque 300  Complications: None reported at time of study completion    PROCEDURE:  CT of the Abdomen and Pelvis was performed.  Sagittal and coronal reformats were performed.    FINDINGS:  LOWER CHEST: Bilateral pleural effusions, right side greater than left,   increased, with associated compressive atelectasis. Coronary artery   calcifications.    LIVER: Within normal limits.  BILE DUCTS: Normal caliber.  GALLBLADDER: Cholelithiasis.  SPLEEN: Within normal limits.  PANCREAS: Within normal limits.  ADRENALS: Within normal limits.  KIDNEYS/URETERS: 5.2 cm left renal hypodensity indeterminate on CT scan,   however shown to be a cyst on recent ultrasound.    BLADDER: Within normal limits.  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    BOWEL: No bowel obstruction. Status post ileocolic and sigmoid   anastomoses and interval creation of right lower quadrant ileostomy.   Mildly dilated and fluid-filled small bowel loops which canbe traced to   the point where the bowel exits through the abdominal wall into the   ileostomy.  PERITONEUM: Moderate amount of ascites, decreased. Poorly defined pocket   of gas and fluid in the anterior right upper quadrant (2; 16) measuring   approximately 4.1 x 2.9 cm x 6.7. Additional small gas-containing pocket   immediately subjacent to the ventral abdominal wall in the midline.  VESSELS: Within normal limits.  RETROPERITONEUM/LYMPH NODES: No lymphadenopathy.  ABDOMINAL WALL: Midline postsurgical changes.  BONES: No acute findings. L2-S1 posterior fusion with interpedicular   screws and connecting rods. Right hip arthroplasty.    IMPRESSION:  4.1 x 2.9 cm x 6.7 pocket of right upper quadrant interloop extraluminal   fluid.    Dilated small bowel loops which could be secondary to ileus versus   obstruction with a transition point where the bowel exiting through the   abdominal wall into the ileostomy.    Moderate amount of ascites, decreased in volume.    Increase in bilateral pleural effusions.    < end of copied text >                            Radiology: all available radiological tests reviewed      ACC: [id number] EXAM:  CT ABDOMEN AND PELVIS OC IC                          PROCEDURE DATE:  07/21/2022          INTERPRETATION:  CLINICAL INFORMATION: Drainage from surgical site;   evaluate for abscess. 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    COMPARISON: 7/6/2022.    CONTRAST/COMPLICATIONS:  IV Contrast: Omnipaque 350  90 cc administered   10 cc discarded  Oral Contrast: Omnipaque 300  Complications: None reported at time of study completion    PROCEDURE:  CT of the Abdomen and Pelvis was performed.  Sagittal and coronal reformats were performed.    FINDINGS: There is a large intraperitoneal thick wall abscess identified   measuring 17.5 x 11.8 x 10.8 cm, which extends anteriorly and superiorly   to the site of the midline anterior abdominal wall incision. In addition   to fluid and air there is mottled hyperdensity within the collection   likely related to administered oral contrast. The greatest concentration   of contrast is identified within the left lateral aspect of the abscess   adjacent to a suture surgical line at the level of the mid sigmoid colon,   which may be the site of fistulous communication.    LOWER CHEST: Small right pleural effusion. Opacity within the posterior   right lower lobe may be related to atelectasis; underlying parenchymal   infiltrate cannot be fully excluded.    LIVER: Normal in size. Hepatic steatosis. No focal hepatic masses.  BILE DUCTS: Normal caliber.  GALLBLADDER: Multiple gallstones. No gallbladder wall thickening.  SPLEEN: Within normal limits.  PANCREAS: Within normal limits.  ADRENALS: Within normal limits.  KIDNEYS/URETERS: No hydronephrosis. No renal calculi. 4.7 cm   space-occupying lesion noted within the left kidney characterized as a   cyst on prior ultrasonography July 2, 2022. Additional subcentimeter   hypodense foci are noted within the bilateral renal parenchyma, too small   to characterize.    BLADDER: Within normal limits.  REPRODUCTIVE ORGANS: Prostate seed implants identified.    BOWEL: Surgical suture linesare identified within the mid sigmoid   colonic region as well as within the right lower quadrant. No evidence   for mechanical bowel obstruction.  PERITONEUM: Upper abdominal ascites identified.  VESSELS: Within normal limits.  RETROPERITONEUM/LYMPHNODES: No lymphadenopathy.  ABDOMINAL WALL: As described above.  BONES: Status post lumbar spine fusion surgery extending from L2 through   S1. Patient status post right hip arthroplasty. Degenerative changes   lower thoracic and lumbar spine.    IMPRESSION:  There is a large intraperitoneal thick wall abscess   identified measuring 17.5 x 11.8 x 10.8 cm, which extends anteriorly and   superiorly to the site of the midline anterior abdominal wall incision.   In addition to fluid and air there is mottled hyperdensity within the   collection likely related to administered oral contrast. The greatest   concentration of contrast is identified within the left lateral aspect of   the abscess adjacent to a suture surgical line at the level of the mid   sigmoid colon, which may be the site of fistulous communication.    < end of copied text >    Advanced directives addressed: full resuscitation

## 2022-08-08 NOTE — PROGRESS NOTE ADULT - SUBJECTIVE AND OBJECTIVE BOX
Pt seen and examined at bedside this morning. Resting comfortably, complains that he would like to eat real food.  Tolerated LRD without nausea or vomiting yesterday.  Afebrile, VSS. Denies fever, chills, chest pain, sob, abdominal pain, n/v/d.       Physical Exam:  General: AAOx3, Well developed, NAD  Head: NGT placed  Chest: Normal respiratory effort  Heart: RRR  Abdomen:  ostomy is pink and patent with dark green liquid stools, more formed today, remains distended, soft, IR drain in place with serous fluid with debris  Neuro/Psych: No localized deficits. Normal speech, normal tone  Skin: Normal, no rashes, no lesions noted.   Extremities: Warm, well perfused, no edema, Pulses intact    Vitals:  T(C): 36.8 ( @ 21:30), Max: 36.9 ( @ 09:08)  HR: 73 ( @ 21:30) (73 - 82)  BP: 150/53 ( @ 21:30) (139/56 - 151/63)  RR: 18 ( @ 21:30) (18 - 18)  SpO2: 95% ( @ 21:30) (94% - 96%)    0806 @ 07:01  -  08 @ 07:00  --------------------------------------------------------  IN:    Fat Emulsion (Fish Oil &amp; Plant Based) 20% Infusion: 400 mL    TPN (Total Parenteral Nutrition): 2400 mL  Total IN: 2800 mL    OUT:    Drain (mL): 20 mL    Ileostomy (mL): 775 mL    Voided (mL): 725 mL  Total OUT: 1520 mL    Total NET: 1280 mL      08 @ 07:01  -   @ 05:32  --------------------------------------------------------  IN:    Fat Emulsion (Fish Oil &amp; Plant Based) 20% Infusion: 400 mL    Oral Fluid: 600 mL    TPN (Total Parenteral Nutrition): 2400 mL  Total IN: 3400 mL    OUT:    Drain (mL): 15 mL    Ileostomy (mL): 750 mL    Voided (mL): 980 mL  Total OUT: 1745 mL    Total NET: 1655 mL      08 @ 06:48                    7.5  CBC: 13.80>)-------(<467                     23.9                 138 | 110 | 39    CMP:  ----------------------< 207               4.7 | 20 | 1.47                      Ca:8.7  Phos:3.3  M.1               0.2|      |11        LFTs:  ------|58|-----             -|      |-      Culture - Abscess with Gram Stain (collected 22 @ 17:00)  Source: .Abscess ABD ABCESS DRAIN  Gram Stain (22 @ 14:28):    Moderate polymorphonuclear leukocytes per low power field    No organisms seen per oil power field  Preliminary Report (22 @ 09:43):    No growth to date.      Current Inpatient Medications:  acetaminophen     Tablet .. 650 milliGRAM(s) Oral every 6 hours PRN  acetaminophen   IVPB .. 1000 milliGRAM(s) IV Intermittent once  dextrose 5%. 1000 milliLiter(s) (100 mL/Hr) IV Continuous <Continuous>  dextrose 5%. 1000 milliLiter(s) (50 mL/Hr) IV Continuous <Continuous>  dextrose 50% Injectable 25 Gram(s) IV Push once  dextrose 50% Injectable 12.5 Gram(s) IV Push once  dextrose 50% Injectable 25 Gram(s) IV Push once  dextrose Oral Gel 15 Gram(s) Oral once PRN  fat emulsion (Fish Oil and Plant Based) 20% Infusion 33.3 mL/Hr (33.3 mL/Hr) IV Continuous <Continuous>  glucagon  Injectable 1 milliGRAM(s) IntraMuscular once  heparin   Injectable 5000 Unit(s) SubCutaneous every 8 hours  hydrochlorothiazide 25 milliGRAM(s) Oral daily  hydrOXYzine hydrochloride 25 milliGRAM(s) Oral every 12 hours PRN  insulin glargine Injectable (LANTUS) 12 Unit(s) SubCutaneous at bedtime  insulin lispro (ADMELOG) corrective regimen sliding scale   SubCutaneous three times a day before meals  insulin lispro Injectable (ADMELOG) 4 Unit(s) SubCutaneous before breakfast  levothyroxine 88 MICROGram(s) Oral daily  lisinopril 20 milliGRAM(s) Oral daily  melatonin 3 milliGRAM(s) Oral at bedtime  melatonin 5 milliGRAM(s) Oral at bedtime  metoclopramide Injectable 10 milliGRAM(s) IV Push every 8 hours  metroNIDAZOLE  IVPB      metroNIDAZOLE  IVPB 500 milliGRAM(s) IV Intermittent every 8 hours  nystatin Cream 1 Application(s) Topical two times a day  ondansetron Injectable 4 milliGRAM(s) IV Push every 6 hours PRN  pantoprazole    Tablet 40 milliGRAM(s) Oral daily  Parenteral Nutrition - Adult 1 Each (100 mL/Hr) TPN Continuous <Continuous>  piperacillin/tazobactam IVPB.. 3.375 Gram(s) IV Intermittent every 8 hours  simethicone 40 milliGRAM(s) Chew every 6 hours PRN  trimethoprim   80 mG/sulfamethoxazole 400 mG 1 Tablet(s) Oral two times a day  vancomycin    Solution 125 milliGRAM(s) Oral every 6 hours  verapamil  milliGRAM(s) Oral daily

## 2022-08-09 ENCOUNTER — TRANSCRIPTION ENCOUNTER (OUTPATIENT)
Age: 87
End: 2022-08-09

## 2022-08-09 VITALS
RESPIRATION RATE: 18 BRPM | TEMPERATURE: 98 F | SYSTOLIC BLOOD PRESSURE: 142 MMHG | DIASTOLIC BLOOD PRESSURE: 54 MMHG | OXYGEN SATURATION: 94 % | HEART RATE: 66 BPM

## 2022-08-09 DIAGNOSIS — E78.5 HYPERLIPIDEMIA, UNSPECIFIED: ICD-10-CM

## 2022-08-09 DIAGNOSIS — I10 ESSENTIAL (PRIMARY) HYPERTENSION: ICD-10-CM

## 2022-08-09 DIAGNOSIS — N18.9 CHRONIC KIDNEY DISEASE, UNSPECIFIED: ICD-10-CM

## 2022-08-09 LAB
ANION GAP SERPL CALC-SCNC: 5 MMOL/L — SIGNIFICANT CHANGE UP (ref 5–17)
BASOPHILS # BLD AUTO: 0.14 K/UL — SIGNIFICANT CHANGE UP (ref 0–0.2)
BASOPHILS NFR BLD AUTO: 1 % — SIGNIFICANT CHANGE UP (ref 0–2)
BUN SERPL-MCNC: 39 MG/DL — HIGH (ref 7–23)
CALCIUM SERPL-MCNC: 9.1 MG/DL — SIGNIFICANT CHANGE UP (ref 8.5–10.1)
CHLORIDE SERPL-SCNC: 111 MMOL/L — HIGH (ref 96–108)
CO2 SERPL-SCNC: 22 MMOL/L — SIGNIFICANT CHANGE UP (ref 22–31)
CREAT SERPL-MCNC: 1.72 MG/DL — HIGH (ref 0.5–1.3)
EGFR: 38 ML/MIN/1.73M2 — LOW
EOSINOPHIL # BLD AUTO: 0.43 K/UL — SIGNIFICANT CHANGE UP (ref 0–0.5)
EOSINOPHIL NFR BLD AUTO: 3 % — SIGNIFICANT CHANGE UP (ref 0–6)
GLUCOSE SERPL-MCNC: 120 MG/DL — HIGH (ref 70–99)
HCT VFR BLD CALC: 24.4 % — LOW (ref 39–50)
HGB BLD-MCNC: 7.7 G/DL — LOW (ref 13–17)
IMM GRANULOCYTES NFR BLD AUTO: 0.4 % — SIGNIFICANT CHANGE UP (ref 0–1.5)
LYMPHOCYTES # BLD AUTO: 1.49 K/UL — SIGNIFICANT CHANGE UP (ref 1–3.3)
LYMPHOCYTES # BLD AUTO: 10.4 % — LOW (ref 13–44)
MAGNESIUM SERPL-MCNC: 2 MG/DL — SIGNIFICANT CHANGE UP (ref 1.6–2.6)
MCHC RBC-ENTMCNC: 26.7 PG — LOW (ref 27–34)
MCHC RBC-ENTMCNC: 31.6 GM/DL — LOW (ref 32–36)
MCV RBC AUTO: 84.7 FL — SIGNIFICANT CHANGE UP (ref 80–100)
MONOCYTES # BLD AUTO: 0.97 K/UL — HIGH (ref 0–0.9)
MONOCYTES NFR BLD AUTO: 6.8 % — SIGNIFICANT CHANGE UP (ref 2–14)
NEUTROPHILS # BLD AUTO: 11.19 K/UL — HIGH (ref 1.8–7.4)
NEUTROPHILS NFR BLD AUTO: 78.4 % — HIGH (ref 43–77)
PHOSPHATE SERPL-MCNC: 3 MG/DL — SIGNIFICANT CHANGE UP (ref 2.5–4.5)
PLATELET # BLD AUTO: 483 K/UL — HIGH (ref 150–400)
POTASSIUM SERPL-MCNC: 4.9 MMOL/L — SIGNIFICANT CHANGE UP (ref 3.5–5.3)
POTASSIUM SERPL-SCNC: 4.9 MMOL/L — SIGNIFICANT CHANGE UP (ref 3.5–5.3)
RBC # BLD: 2.88 M/UL — LOW (ref 4.2–5.8)
RBC # FLD: 19.8 % — HIGH (ref 10.3–14.5)
SODIUM SERPL-SCNC: 138 MMOL/L — SIGNIFICANT CHANGE UP (ref 135–145)
WBC # BLD: 14.28 K/UL — HIGH (ref 3.8–10.5)
WBC # FLD AUTO: 14.28 K/UL — HIGH (ref 3.8–10.5)

## 2022-08-09 RX ORDER — NYSTATIN CREAM 100000 [USP'U]/G
1 CREAM TOPICAL
Qty: 0 | Refills: 0 | DISCHARGE
Start: 2022-08-09

## 2022-08-09 RX ORDER — ACETAMINOPHEN 500 MG
2 TABLET ORAL
Qty: 0 | Refills: 0 | DISCHARGE
Start: 2022-08-09

## 2022-08-09 RX ORDER — SODIUM CHLORIDE 9 MG/ML
1000 INJECTION INTRAMUSCULAR; INTRAVENOUS; SUBCUTANEOUS
Refills: 0 | Status: DISCONTINUED | OUTPATIENT
Start: 2022-08-09 | End: 2022-08-09

## 2022-08-09 RX ADMIN — Medication 25 MILLIGRAM(S): at 09:44

## 2022-08-09 RX ADMIN — Medication 1 TABLET(S): at 09:44

## 2022-08-09 RX ADMIN — Medication 10 MILLIGRAM(S): at 06:30

## 2022-08-09 RX ADMIN — Medication 10 MILLIGRAM(S): at 13:39

## 2022-08-09 RX ADMIN — Medication 100 MILLIGRAM(S): at 06:30

## 2022-08-09 RX ADMIN — PANTOPRAZOLE SODIUM 40 MILLIGRAM(S): 20 TABLET, DELAYED RELEASE ORAL at 09:44

## 2022-08-09 RX ADMIN — Medication 1: at 11:54

## 2022-08-09 RX ADMIN — Medication 4 UNIT(S): at 08:47

## 2022-08-09 RX ADMIN — LISINOPRIL 20 MILLIGRAM(S): 2.5 TABLET ORAL at 09:48

## 2022-08-09 RX ADMIN — HEPARIN SODIUM 5000 UNIT(S): 5000 INJECTION INTRAVENOUS; SUBCUTANEOUS at 06:29

## 2022-08-09 RX ADMIN — SODIUM CHLORIDE 50 MILLILITER(S): 9 INJECTION INTRAMUSCULAR; INTRAVENOUS; SUBCUTANEOUS at 06:31

## 2022-08-09 RX ADMIN — PIPERACILLIN AND TAZOBACTAM 25 GRAM(S): 4; .5 INJECTION, POWDER, LYOPHILIZED, FOR SOLUTION INTRAVENOUS at 09:41

## 2022-08-09 RX ADMIN — Medication 240 MILLIGRAM(S): at 09:43

## 2022-08-09 RX ADMIN — HEPARIN SODIUM 5000 UNIT(S): 5000 INJECTION INTRAVENOUS; SUBCUTANEOUS at 13:40

## 2022-08-09 RX ADMIN — NYSTATIN CREAM 1 APPLICATION(S): 100000 CREAM TOPICAL at 09:48

## 2022-08-09 RX ADMIN — SIMETHICONE 40 MILLIGRAM(S): 80 TABLET, CHEWABLE ORAL at 09:45

## 2022-08-09 NOTE — DISCHARGE NOTE PROVIDER - DISCHARGE SERVICE FOR PATIENT
History  Chief Complaint   Patient presents with    Croup     cough started today  C/o headache-mom worried he is describing sx he has before seizure     This is a 10year old male who is brought in for cough  Patient has a seizure disorder history so mother wanted to ensure the she was being aggressive in order to prevent seizures  Both patient and his sister have had a runny nose for the last 24 hours  Earlier today the patient started to have a mild, intermittent barking cough  He complained of some mild generalized malaise and fatigue  Is still eating and drinking but had an episode of post tussive emesis earlier  He is still active and playful  Patient has been attending public school in [place]  Prior to Admission Medications   Prescriptions Last Dose Informant Patient Reported? Taking? albuterol (PROAIR HFA) 90 mcg/act inhaler   Yes Yes   Sig: Inhale 2 puffs   beclomethasone (QVAR) 40 MCG/ACT inhaler   Yes No   Si puff   montelukast (SINGULAIR) 4 mg chewable tablet   Yes Yes   Sig: Chew 4 mg daily at bedtime      Facility-Administered Medications: None       Past Medical History:   Diagnosis Date    Asthma     Seizures (HCC)        Past Surgical History:   Procedure Laterality Date    HERNIA REPAIR         History reviewed  No pertinent family history  I have reviewed and agree with the history as documented  E-Cigarette/Vaping     E-Cigarette/Vaping Substances     Social History     Tobacco Use    Smoking status: Never Smoker    Smokeless tobacco: Never Used   Substance Use Topics    Alcohol use: Not on file    Drug use: Not on file       Review of Systems   Constitutional: Positive for chills and fatigue  Negative for fever  HENT: Positive for congestion and rhinorrhea  Negative for trouble swallowing  Respiratory: Positive for cough  Negative for choking, shortness of breath and wheezing  Cardiovascular: Negative for chest pain     Gastrointestinal: Negative for abdominal pain    Endocrine: Negative for polyuria  Genitourinary: Negative for dysuria  Skin: Negative for rash  Neurological: Negative for dizziness and weakness  Physical Exam  Physical Exam  Constitutional:       General: He is active  He is not in acute distress  Appearance: Normal appearance  He is well-developed and normal weight  He is not toxic-appearing or diaphoretic  HENT:      Right Ear: Tympanic membrane, ear canal and external ear normal       Left Ear: Tympanic membrane, ear canal and external ear normal       Nose: Rhinorrhea present  Mouth/Throat:      Mouth: Mucous membranes are moist    Eyes:      Conjunctiva/sclera: Conjunctivae normal       Pupils: Pupils are equal, round, and reactive to light  Neck:      Musculoskeletal: Normal range of motion and neck supple  Cardiovascular:      Rate and Rhythm: Regular rhythm  Heart sounds: S1 normal and S2 normal    Pulmonary:      Effort: Pulmonary effort is normal  No respiratory distress or retractions  Breath sounds: Normal breath sounds  No stridor or decreased air movement  No wheezing, rhonchi or rales  Abdominal:      General: Bowel sounds are normal  There is no distension  Palpations: Abdomen is soft  Tenderness: There is no abdominal tenderness  There is no guarding  Skin:     General: Skin is warm and dry  Capillary Refill: Capillary refill takes less than 2 seconds  Findings: No rash  Neurological:      Mental Status: He is alert           Vital Signs  ED Triage Vitals [09/10/20 2125]   Temperature Pulse Respirations BP SpO2   97 8 °F (36 6 °C) 80 20 -- 96 %      Temp src Heart Rate Source Patient Position - Orthostatic VS BP Location FiO2 (%)   -- -- -- -- --      Pain Score       --           Vitals:    09/10/20 2125 09/10/20 2249   Pulse: 80 90         Visual Acuity      ED Medications  Medications   dexamethasone 10 mg/mL oral liquid 12 mg 1 2 mL (12 mg Oral Given 9/10/20 2229) Diagnostic Studies  Results Reviewed     Procedure Component Value Units Date/Time    Novel Coronavirus Evie JAUREGUI [facility] [[id number]]  (Normal) Collected:  09/10/20 2156    Lab Status:  Final result Specimen:  Nares from Throat Updated:  09/10/20 2249     SARS-CoV-2 Negative    Narrative: The specimen collection materials, transport medium, and/or testing methodology utilized in the production of these test results have been proven to be reliable in a limited validation with an abbreviated program under the Emergency Utilization Authorization provided by the FDA  Testing reported as "Presumptive positive" will be confirmed with secondary testing with a reference laboratory to ensure result accuracy  Clinical caution and judgement should be used with the interpretation of these results with consideration of the clinical impression and other laboratory testing  Testing reported as "Positive" or "Negative" has been proven to be accurate according to standard laboratory validation requirements  All testing is performed with control materials showing appropriate reactivity at standard intervals  XR chest 2 views   ED Interpretation by Carlos Multani MD (09/10 2233)   naf      Final Result by Gustabo Goltz, MD (09/11 2714)      No acute cardiopulmonary disease  Workstation performed: [id number]                    Procedures  Procedures         ED Course                                       MDM  Number of Diagnoses or Management Options  Cough: new and requires workup  Diagnosis management comments: This is a  10year old male who had a croupy sounding cough while in the ED  Patient appears clinically well and is saturating well on room air  Patient given a dose of dexamethasone for possible croup  Patient covid tested, results pending  Discussed isolation in depth  Reassured by him still being interactive, as well as eating and drinking   Discussed warning signs and symptoms with the patients mother as well as when to return to the emergency department versus follow up with [facility]  Patients mother states understanding and agreement with the plan  Amount and/or Complexity of Data Reviewed  Tests in the radiology section of CPT®: ordered and reviewed  Independent visualization of images, tracings, or specimens: yes        Disposition  Final diagnoses:   Cough     Time reflects when diagnosis was documented in both MDM as applicable and the Disposition within this note     Time User Action Codes Description Comment    9/10/2020 10:33 PM Helen Curran Add [R05] Cough       ED Disposition     ED Disposition Condition Date/Time Comment    Discharge Stable Thu Sep 10, 2020 10:33 PM Meena Peterson discharge to home/self care  Follow-up Information     Follow up With Specialties Details Why [address], DO Pediatrics In 1 day  [facility]  [address]  [address]  [phone]            Discharge Medication List as of 9/10/2020 10:33 PM      CONTINUE these medications which have NOT CHANGED    Details   albuterol (PROAIR HFA) 90 mcg/act inhaler Inhale 2 puffs, Starting Wed 10/25/2017, Historical Med      montelukast (SINGULAIR) 4 mg chewable tablet Chew 4 mg daily at bedtime, Historical Med      beclomethasone (QVAR) 40 MCG/ACT inhaler 1 puff, Starting Thu 10/26/2017, Historical Med           No discharge procedures on file      PDMP Review     None          ED Provider  Electronically Signed by           Angel Price MD  09/11/20 5841 on the discharge service for the patient. I have reviewed and made amendments to the documentation where necessary.

## 2022-08-09 NOTE — DISCHARGE NOTE PROVIDER - CARE PROVIDER_API CALL
Gabrielle Ardon)  ColonRectal Surgery; Surgery  [address] [place] [address]  Phone: [phone]  Fax: [phone]  Follow Up Time: 1 week    Kurt Vega (DO)  Nephrology  [address] [facility], [address]  [place] [address]  Phone: [phone]  Fax: [phone]  Follow Up Time: 1 week

## 2022-08-09 NOTE — DISCHARGE NOTE PROVIDER - NSDCHHNEEDSERVICEOTHER_GEN_ALL_CORE_FT
IR drain care,  flush 10cc saline twice a day, then place back to gravity, record output  stoma care, record output, if >1L need increase hydration and metamucil as needed

## 2022-08-09 NOTE — PROGRESS NOTE ADULT - ASSESSMENT
87 M with history of sigmoidectomy and ileocecectomy 6/29, patient returned to  with pelvis abscess now s/p IR drainage and IR drain upsizing. Patient taken to OR for diagnostic lap converted to exploratory laparotomy, abdominal washout, diverting loop ileostomy POD 13 (7/27). AFVSS, leukocytosis improving. Ostomy now functional.     Plan:  Pain and nausea control prn   Monitor VS   c/w low residue diet  Monitor ostomy output   Monitor IR drain   Discontinue TPN   PT daily as pt has neuropathy and difficulty walking  OOBC, ambulation   Infectious disease recs appreciated, Bactrim added, discharge recommendations appreicated  DVT ppx  Dispo: discharge to [facility] with PO abx 10 days per ID recs    Plan discussed with colorectal surgery service

## 2022-08-09 NOTE — DISCHARGE NOTE PROVIDER - NSDCCPTREATMENT_GEN_ALL_CORE_FT
PRINCIPAL PROCEDURE  Procedure: Emergency exploratory laparotomy with washout of abdomen for nontraumatic indication  Findings and Treatment:       SECONDARY PROCEDURE  Procedure: Open creation of diverting loop ileostomy  Findings and Treatment:

## 2022-08-09 NOTE — DISCHARGE NOTE NURSING/CASE MANAGEMENT/SOCIAL WORK - PATIENT PORTAL LINK FT
You can access the FollowMyHealth Patient Portal offered by [facility] by registering at the following website: [email or web address]. By joining [facility]’s FollowMyHealth portal, you will also be able to view your health information using other applications (apps) compatible with our system.

## 2022-08-09 NOTE — DISCHARGE NOTE PROVIDER - NSDCCPCAREPLAN_GEN_ALL_CORE_FT
PRINCIPAL DISCHARGE DIAGNOSIS  Diagnosis: Abdominal wall abscess  Assessment and Plan of Treatment:       SECONDARY DISCHARGE DIAGNOSES  Diagnosis: Abscess of abdominal cavity  Assessment and Plan of Treatment:

## 2022-08-09 NOTE — DISCHARGE NOTE PROVIDER - DETAILS OF MALNUTRITION DIAGNOSIS/DIAGNOSES
This patient has been assessed with a concern for Malnutrition and was treated during this hospitalization for the following Nutrition diagnosis/diagnoses:     -  07/22/2022: Severe protein-calorie malnutrition

## 2022-08-09 NOTE — DISCHARGE NOTE NURSING/CASE MANAGEMENT/SOCIAL WORK - NSDCPEFALRISK_GEN_ALL_CORE
For information on Fall & Injury Prevention, visit: [email or web address] OR  [email or web address] OR  https://www.cdc.gov/steadi/patient.html

## 2022-08-09 NOTE — DISCHARGE NOTE PROVIDER - NSDCFUSCHEDAPPT_GEN_ALL_CORE_FT
Gabrielle Ardon  [facility] Physician [address]  Scheduled Appointment: 10/03/2022

## 2022-08-09 NOTE — DISCHARGE NOTE PROVIDER - NSDCHC_MEDRECSTATUS_GEN_ALL_CORE
Admission Reconciliation is Completed  Discharge Reconciliation is Completed report to emmanuel saravia rn , oncoming shift

## 2022-08-09 NOTE — PROGRESS NOTE ADULT - SUBJECTIVE AND OBJECTIVE BOX
Pt seen and examined at bedside this morning. Resting comfortably.  Tolerated LRD without nausea or vomiting yesterday.  Afebrile, VSS. Denies fever, chills, chest pain, sob, abdominal pain, n/v/d.     Vital Signs Last 24 Hrs  T(C): 37 (09 Aug 2022 09:14), Max: 37.1 (08 Aug 2022 16:04)  T(F): 98.6 (09 Aug 2022 09:14), Max: 98.7 (08 Aug 2022 16:04)  HR: 75 (09 Aug 2022 09:14) (75 - 85)  BP: 120/54 (09 Aug 2022 09:14) (120/54 - 139/54)  RR: 18 (09 Aug 2022 09:14) (18 - 18)  SpO2: 95% (09 Aug 2022 09:14) (95% - 97%)    Parameters below as of 09 Aug 2022 09:14  Patient On (Oxygen Delivery Method): room air        Physical Exam:  General: AAOx3, Well developed, NAD  Head: NGT placed  Chest: Normal respiratory effort  Heart: RRR  Abdomen:  ostomy is pink and patent with dark green liquid stools, more formed today, remains distended, soft, IR drain in place with serous fluid with debris  Neuro/Psych: No localized deficits. Normal speech, normal tone  Skin: Normal, no rashes, no lesions noted.   Extremities: Warm, well perfused, no edema, Pulses intact    LABS:                        7.7    14.28 )-----------( 483      ( 09 Aug 2022 04:58 )             24.4     09 Aug 2022 04:58    138    |  111    |  39     ----------------------------<  120    4.9     |  22     |  1.72     Ca    9.1        09 Aug 2022 04:58  Phos  3.0       09 Aug 2022 04:58  Mg     2.0       09 Aug 2022 04:58      Current Inpatient Medications:  acetaminophen     Tablet .. 650 milliGRAM(s) Oral every 6 hours PRN  acetaminophen   IVPB .. 1000 milliGRAM(s) IV Intermittent once  dextrose 5%. 1000 milliLiter(s) (100 mL/Hr) IV Continuous <Continuous>  dextrose 5%. 1000 milliLiter(s) (50 mL/Hr) IV Continuous <Continuous>  dextrose 50% Injectable 25 Gram(s) IV Push once  dextrose 50% Injectable 12.5 Gram(s) IV Push once  dextrose 50% Injectable 25 Gram(s) IV Push once  dextrose Oral Gel 15 Gram(s) Oral once PRN  fat emulsion (Fish Oil and Plant Based) 20% Infusion 33.3 mL/Hr (33.3 mL/Hr) IV Continuous <Continuous>  glucagon  Injectable 1 milliGRAM(s) IntraMuscular once  heparin   Injectable 5000 Unit(s) SubCutaneous every 8 hours  hydrochlorothiazide 25 milliGRAM(s) Oral daily  hydrOXYzine hydrochloride 25 milliGRAM(s) Oral every 12 hours PRN  insulin glargine Injectable (LANTUS) 12 Unit(s) SubCutaneous at bedtime  insulin lispro (ADMELOG) corrective regimen sliding scale   SubCutaneous three times a day before meals  insulin lispro Injectable (ADMELOG) 4 Unit(s) SubCutaneous before breakfast  levothyroxine 88 MICROGram(s) Oral daily  lisinopril 20 milliGRAM(s) Oral daily  melatonin 3 milliGRAM(s) Oral at bedtime  melatonin 5 milliGRAM(s) Oral at bedtime  metoclopramide Injectable 10 milliGRAM(s) IV Push every 8 hours  metroNIDAZOLE  IVPB      metroNIDAZOLE  IVPB 500 milliGRAM(s) IV Intermittent every 8 hours  nystatin Cream 1 Application(s) Topical two times a day  ondansetron Injectable 4 milliGRAM(s) IV Push every 6 hours PRN  pantoprazole    Tablet 40 milliGRAM(s) Oral daily  Parenteral Nutrition - Adult 1 Each (100 mL/Hr) TPN Continuous <Continuous>  piperacillin/tazobactam IVPB.. 3.375 Gram(s) IV Intermittent every 8 hours  simethicone 40 milliGRAM(s) Chew every 6 hours PRN  trimethoprim   80 mG/sulfamethoxazole 400 mG 1 Tablet(s) Oral two times a day  vancomycin    Solution 125 milliGRAM(s) Oral every 6 hours  verapamil  milliGRAM(s) Oral daily

## 2022-08-09 NOTE — PROGRESS NOTE ADULT - ATTENDING COMMENTS
Seen and examined.  No complaints.  Larisa po, ostomy functional.  AFVSS  NAD  incision CDI, stoma productive, soft NTND  Labs reviewed  A/P -   Cont current diet.  Cont drain.  Abx per ID.  D/C to [facility].

## 2022-08-09 NOTE — DISCHARGE NOTE PROVIDER - CARE PROVIDERS DIRECT ADDRESSES
,[email or web address]_Unknown

## 2022-08-09 NOTE — PROGRESS NOTE ADULT - PROVIDER SPECIALTY LIST ADULT
Colorectal Surgery
Infectious Disease
Nephrology
Colorectal Surgery
Gastroenterology
Infectious Disease
Nephrology
Colorectal Surgery
Gastroenterology
Infectious Disease
Nephrology
Colorectal Surgery
Gastroenterology
Infectious Disease
Infectious Disease
Intervent Radiology
Nephrology

## 2022-08-09 NOTE — DISCHARGE NOTE PROVIDER - PROVIDER TOKENS
PROVIDER:[TOKEN:[[id number]],FOLLOWUP:[1 week]],PROVIDER:[TOKEN:[[id number]],FOLLOWUP:[1 week]]

## 2022-08-09 NOTE — DISCHARGE NOTE PROVIDER - NSDCMRMEDTOKEN_GEN_ALL_CORE_FT
acetaminophen 325 mg oral tablet: 2 tab(s) orally every 6 hours, As needed, Mild Pain (1 - 3)  amoxicillin-clavulanate 875 mg-125 mg oral tablet: 1 tab(s) orally 2 times a day   ascorbic acid 1000 mg oral tablet: 1 tab(s) orally once a day  aspirin 81 mg oral delayed release tablet: 1 tab(s) orally once a day (at bedtime)  atorvastatin 20 mg oral tablet: 1 tab(s) orally once a day  colestipol 1 g oral tablet: 1 tab(s) orally once a day (at bedtime)  diphenoxylate-atropine 2.5 mg-0.025 mg oral tablet: 2 tab(s) orally 3 times a day  gabapentin 100 mg oral capsule: 3 cap(s) orally once a day (at bedtime)  glimepiride 1 mg oral tablet: 1 tab(s) orally once a day  levothyroxine 88 mcg (0.088 mg) oral tablet: 1 tab(s) orally once a day  lisinopril-hydrochlorothiazide 20 mg-25 mg oral tablet: 1 tab(s) orally once a day  metFORMIN 500 mg oral tablet: 1 tab(s) orally 2 times a day  Multiple Vitamins oral tablet: 1 tab(s) orally once a day  nystatin 100,000 units/g topical cream: 1 application topically 2 times a day  PreserVision AREDS 2 oral capsule: 1 cap(s) orally 2 times a day  traMADol 50 mg oral tablet: 1 tab(s) orally 3 times a day  verapamil 240 mg/24 hours oral tablet, extended release: 1 tab(s) orally once a day  Vitamin D3 50 mcg (2000 intl units) oral tablet: 1 tab(s) orally once a day   acetaminophen 325 mg oral tablet: 2 tab(s) orally every 6 hours, As needed, Mild Pain (1 - 3)  amoxicillin-clavulanate 875 mg-125 mg oral tablet: 1 tab(s) orally 2 times a day   ascorbic acid 1000 mg oral tablet: 1 tab(s) orally once a day  aspirin 81 mg oral delayed release tablet: 1 tab(s) orally once a day (at bedtime)  atorvastatin 20 mg oral tablet: 1 tab(s) orally once a day  colestipol 1 g oral tablet: 1 tab(s) orally once a day (at bedtime)  diphenoxylate-atropine 2.5 mg-0.025 mg oral tablet: 2 tab(s) orally 3 times a day  gabapentin 100 mg oral capsule: 3 cap(s) orally once a day (at bedtime)  glimepiride 1 mg oral tablet: 1 tab(s) orally once a day  levothyroxine 88 mcg (0.088 mg) oral tablet: 1 tab(s) orally once a day  lisinopril-hydrochlorothiazide 20 mg-25 mg oral tablet: 1 tab(s) orally once a day  metFORMIN 500 mg oral tablet: 1 tab(s) orally 2 times a day  Multiple Vitamins oral tablet: 1 tab(s) orally once a day  nystatin 100,000 units/g topical cream: 1 application topically 2 times a day  PreserVision AREDS 2 oral capsule: 1 cap(s) orally 2 times a day  sulfamethoxazole-trimethoprim 800 mg-160 mg oral tablet: 1 tab(s) orally 2 times a day   traMADol 50 mg oral tablet: 1 tab(s) orally 3 times a day  verapamil 240 mg/24 hours oral tablet, extended release: 1 tab(s) orally once a day  Vitamin D3 50 mcg (2000 intl units) oral tablet: 1 tab(s) orally once a day

## 2022-08-16 DIAGNOSIS — Z20.822 CONTACT WITH AND (SUSPECTED) EXPOSURE TO COVID-19: ICD-10-CM

## 2022-08-16 DIAGNOSIS — T81.41XA INFECTION FOLLOWING A PROCEDURE, SUPERFICIAL INCISIONAL SURGICAL SITE, INITIAL ENCOUNTER: ICD-10-CM
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
## 2022-08-16 DIAGNOSIS — Z79.84 LONG TERM (CURRENT) USE OF ORAL HYPOGLYCEMIC DRUGS: ICD-10-CM

## 2022-08-16 DIAGNOSIS — Z85.46 PERSONAL HISTORY OF MALIGNANT NEOPLASM OF PROSTATE: ICD-10-CM

## 2022-08-16 DIAGNOSIS — Z88.0 ALLERGY STATUS TO PENICILLIN: ICD-10-CM

## 2022-08-16 DIAGNOSIS — E03.9 HYPOTHYROIDISM, UNSPECIFIED: ICD-10-CM

## 2022-08-16 DIAGNOSIS — E11.9 TYPE 2 DIABETES MELLITUS WITHOUT COMPLICATIONS: ICD-10-CM

## 2022-08-16 DIAGNOSIS — E43 UNSPECIFIED SEVERE PROTEIN-CALORIE MALNUTRITION: ICD-10-CM

## 2022-08-16 DIAGNOSIS — Z79.890 HORMONE REPLACEMENT THERAPY: ICD-10-CM

## 2022-08-16 DIAGNOSIS — H91.93 UNSPECIFIED HEARING LOSS, BILATERAL: ICD-10-CM

## 2022-08-16 DIAGNOSIS — K50.90 CROHN'S DISEASE, UNSPECIFIED, WITHOUT COMPLICATIONS: ICD-10-CM

## 2022-08-16 DIAGNOSIS — L02.211 CUTANEOUS ABSCESS OF ABDOMINAL WALL: ICD-10-CM

## 2022-08-16 DIAGNOSIS — N17.9 ACUTE KIDNEY FAILURE, UNSPECIFIED: ICD-10-CM

## 2022-08-16 DIAGNOSIS — Z95.5 PRESENCE OF CORONARY ANGIOPLASTY IMPLANT AND GRAFT: ICD-10-CM

## 2022-08-16 DIAGNOSIS — Z79.899 OTHER LONG TERM (CURRENT) DRUG THERAPY: ICD-10-CM

## 2022-08-16 DIAGNOSIS — Z79.82 LONG TERM (CURRENT) USE OF ASPIRIN: ICD-10-CM

## 2022-08-16 DIAGNOSIS — Z91.018 ALLERGY TO OTHER FOODS: ICD-10-CM

## 2022-08-16 DIAGNOSIS — E78.00 PURE HYPERCHOLESTEROLEMIA, UNSPECIFIED: ICD-10-CM

## 2022-08-16 DIAGNOSIS — H35.30 UNSPECIFIED MACULAR DEGENERATION: ICD-10-CM

## 2022-08-16 DIAGNOSIS — Z96.641 PRESENCE OF RIGHT ARTIFICIAL HIP JOINT: ICD-10-CM
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
## 2022-08-16 DIAGNOSIS — I25.10 ATHEROSCLEROTIC HEART DISEASE OF NATIVE CORONARY ARTERY WITHOUT ANGINA PECTORIS: ICD-10-CM

## 2022-08-26 NOTE — H&P PST ADULT - LOCATION OF BACK PAIN
Discussed EMU admission and what to expect. Shanice needs to be schedule for admission around her menses. October 24th at 0900 works well for her. Dr. Ayala is on call.    Will discuss with Dr. Katz and confirm scheduling before proceeding. Will follow up with Shanice next week.   lumbar spine

## 2022-10-03 ENCOUNTER — APPOINTMENT (OUTPATIENT)
Dept: COLORECTAL SURGERY | Facility: CLINIC | Age: 87
End: 2022-10-03

## 2022-11-15 NOTE — PATIENT PROFILE ADULT - TRANSPORTATION
Airway  Performed by: Larry Giraldo CRNA  Authorized by: Loly Gray MD     Final Airway Type:  Endotracheal airway  Final Endotracheal Airway*:  ETT  ETT Size (mm)*:  7.0  Cuff*:  Regular  Technique Used for Successful ETT Placement:  Direct laryngoscopy  Intubation Procedure*:  ETCO2, Preoxygenation, Atraumatic and Dentition Unchanged  Blade Type*:  MAC  Blade Size*:  4  Measured from*:  Gums  Placement Verified by: auscultation, capnometry and equal breath sounds    Glottic View*:  1 - full view of glottis  Attempts*:  1  Ventilation Between Attempts:  None  Number of Other Approaches Attempted:  0   Patient Identified, Procedure confirmed, Emergency equipment available and Safety protocols followed  Location:  OR  Urgency:  Elective  Difficult Airway: No    Indications for Airway Management:  Anesthesia  Spontaneous Ventilation: absent    Sedation Level:  Anesthetized  Manual In-line Stabilization Maintained Throughout: Yes    Performed By:  ESAU  CRNA:  Larry Giraldo CRNA  Start Time: 11/15/2022 8:31 AM     no

## 2023-01-31 NOTE — DIETITIAN NUTRITION RISK NOTIFICATION - PHYSICAL ASSESSMENT TEMPLES
no lesions,  no deformities, chest wall non-tender,  no masses present, breathing is unlabored without accessory muscle use,normal breath sounds mild

## 2023-02-13 NOTE — PROVIDER CONTACT NOTE (OTHER) - ACTION/TREATMENT ORDERED:
Name band;
Stat cbc/c7, stat ivf bolus ns 1000ml
No new orders at this time
stat 1000 ml iv NS bolus

## 2023-05-03 NOTE — ED ADULT NURSE NOTE - INTEGUMENTARY WDL
Therapist contacted patient's parent to schedule initial appt. Left voice mail with contact info and available times to schedule. Requested call back at earliest convenience.     
Color consistent with ethnicity/race, warm, dry intact, resilient.

## 2023-05-08 NOTE — PHYSICAL THERAPY INITIAL EVALUATION ADULT - REFERRING PHYSICIAN, REHAB EVAL
Please call the patient and ensure she is well.  Her glucose on SMA was 50, very low.  Felicia Alcaraz

## 2023-05-17 NOTE — PROGRESS NOTE ADULT - ASSESSMENT
86 y/o male w/ a PMHx of sepsis, neuropathy, prostate CA, Crohn's disease, hypothyroidism, depression, intervertebral disc disorder, DM, obesity, HTN, hypercholesterolemia, CAD w/ stents, macular degeneration, hearing loss and colon cancer s/p laparoscopic ileocolic resection on 6/29/22 with Dr. Ardon. pt presents to the ED with increased drainage from the surgery site. No fevers, chills, changes in appetite. Imaging remarkable for large intraperitoneal thick wall abscess 17.5 x 11.8 x 10.8, IR consulted s/p IR drainage on 7/22, 40 cc pus drained, has drain in place, was given IV zosyn.    1. abdominal wall abscess d/t anastomotic leak/fistula s/p drainage. laparascopic ileocolic resection. diarrhea. colon cancer. crohns disease. PCN allergy  - s/p IR drainage 7/22; body fluid cx growing c. diff/e faecalis - c diff growth in cx d/t anastomotic leak/fistula  - colorectal surgery f/u noted, repeat Ct abd/pelvis reviewed from 7/25  - plan for OR for drainage of collection 7/27  - on IV zosyn [id number] #6-7  - on IV flagyl [id number] for c diff coverage #2  - on oral vancomycin 515to3v #3  - continue with above antibiotic coverage  - c diff pcr (-)  - does not have true pcn allergy - tolerating abx without issues currently  - fu cbc  - supportive care  - tolerating abx well so far; no side effects noted  - reason for abx use and side effects reviewed with patient    2. other issues - care per medicine  88 y/o male w/ a PMHx of sepsis, neuropathy, prostate CA, Crohn's disease, hypothyroidism, depression, intervertebral disc disorder, DM, obesity, HTN, hypercholesterolemia, CAD w/ stents, macular degeneration, hearing loss and colon cancer s/p laparoscopic ileocolic resection on 6/29/22 with Dr. Ardon. pt presents to the ED with increased drainage from the surgery site. No fevers, chills, changes in appetite. Imaging remarkable for large intraperitoneal thick wall abscess 17.5 x 11.8 x 10.8, IR consulted s/p IR drainage on 7/22, 40 cc pus drained, has drain in place, was given IV zosyn.    1. abdominal wall abscess d/t anastomotic leak/fistula s/p drainage/washout/ileostomy. s/p laparascopic ileocolic resection. colon cancer. crohns disease. PCN allergy  - s/p IR drainage 7/22; body fluid cx growing c. diff/e faecalis - c diff growth in cx d/t anastomotic leak/fistula  - s/p exlap, abdominal washout, diverting ileostomy 7/27  - on IV zosyn 3.375 adjust dose d/t Cr to q12h #8-9  - on IV flagyl [id number] for c diff coverage #4  - on oral vancomycin 144uv4a #5  - continue with above antibiotic coverage  - c diff pcr (-)  - case d/w colorectal surgery  - does not have true pcn allergy - tolerating abx without issues currently  - fu cbc  - supportive care  - tolerating abx well so far; no side effects noted  - reason for abx use and side effects reviewed with patient    2. other issues - care per medicine  negative

## 2023-07-25 NOTE — DISCHARGE NOTE NURSING/CASE MANAGEMENT/SOCIAL WORK - NSDPDISTO_GEN_ALL_CORE
Sub-Acute rehab Localized Dermabrasion Text: The patient was draped in routine manner.  Localized dermabrasion using 3 x 17 mm wire brush was performed in routine manner to papillary dermis. This spot dermabrasion is being performed to complete skin cancer reconstruction. It also will eliminate the other sun damaged precancerous cells that are known to be part of the regional effect of a lifetime's worth of sun exposure. This localized dermabrasion is therapeutic and should not be considered cosmetic in any regard. Localized Dermabrasion With Wire Brush Text: The patient was draped in routine manner.  Localized dermabrasion using 3 x 17 mm wire brush was performed in routine manner to papillary dermis. This spot dermabrasion is being performed to complete skin cancer reconstruction. It also will eliminate the other sun damaged precancerous cells that are known to be part of the regional effect of a lifetime's worth of sun exposure. This localized dermabrasion is therapeutic and should not be considered cosmetic in any regard.

## 2023-08-23 NOTE — PHYSICAL THERAPY INITIAL EVALUATION ADULT - PHYSICAL ASSIST/NONPHYSICAL ASSIST: SIT/SUPINE, REHAB EVAL
[Normal] : normoactive bowel sounds, soft and nontender, no hepatosplenomegaly or masses appreciated [de-identified] : well preserved male in no acute distress.  [de-identified] : irregular.  supervision/verbal cues/2 person assist

## 2023-08-25 NOTE — PATIENT PROFILE ADULT - FUNCTIONAL ASSESSMENT - DAILY ACTIVITY 4.
Spoke with [facility].  They are starting Sunday 8/27.  Aries Birch's  updated.    4 = No assist / stand by assistance

## 2023-09-08 NOTE — CONSULT NOTE ADULT - ASSESSMENT
----- Message from IESHA Cervantes sent at 9/8/2023 12:38 PM EDT -----  Yeast overgrowth detected, treatment sent to pharmacy, follow up with persistent or worsening symptoms or concerns.   87 CKD III sepsis, neuropathy, prostate CA, Crohn's disease, hypothyroidism, depression, intervertebral disc disorder, DM, obesity, HTN, hypercholesterolemia, CAD w/ stents, macular degeneration, hearing loss and colon cancer s/p laparoscopic ileocolic resection on 6/29/22 with Dr. Ardon. pt presents to the ED with increased drainage from the surgery site.    Yesenia on CKD  -Renal stabilizing, baseline 1.4-1.6.Likely was a mild pre-renal state  -IVF, can stop by Am if po at goal  -may see rise in function from [facility] in next 1-2 days  -No nsaids, avoid further contrast IV if able    Wound drainage  -Surgery, fu cxs  -Broad spectrum abx    HTN  -ACE ok for now, CCB  -Will consider hold of former if rise in renal function    thanks, will follow with you  d/c with RN staff  Seen this Am note now

## 2023-10-14 NOTE — PATIENT PROFILE ADULT - DATE OF SECOND COVID-19 BOOSTER
27-Jun-2022

## 2023-11-01 NOTE — PROGRESS NOTE ADULT - SUBJECTIVE AND OBJECTIVE BOX
Please return to the ED with new or worsening symptoms. Follow up with PCP, pain management, and/or neurosurgery for recheck. JONATHAN SUMMERS is a yMale , patient examined and chart reviewed. Patient seen and examined today being followed for possible post  op infection following laminectomy       INTERVAL HPI/ OVERNIGHT EVENTS: Events noted, doing well, OOB to chair . Afebrile . Feels well, denies any cough . had I & D of epidural hematoma , POD # 2. So far all sepsis work up is negative . Abdominal pain resolved     PAST MEDICAL & SURGICAL HISTORY:  Prostate CA  Crohn disease  Hypothyroidism  Depression  Intervertebral disc disorder  Diabetes mellitus  Obesity  Hypercholesterolemia  HTN (hypertension)  Stented coronary artery: 2 stents, 20 yrs ago, 15 yrs ago  CAD (coronary artery disease)  Macular degeneration: right eye  Hearing loss: left hearing aid, right ear deaf  S/P colon resection  S/P lumbar fusion  History of hip replacement, total, right: Revision  History of hip replacement, total, right      For details regarding the patient's social history, family history, and other miscellaneous elements, please refer the initial infectious diseases consultation and/or the admitting history and physical examination for this admission.      ROS:  CONSTITUTIONAL:  Negative fever or chills, feels well, good appetite  EYES:  Negative  blurry vision or double vision  CARDIOVASCULAR:  Negative for chest pain or palpitations  RESPIRATORY:  Negative for cough, wheezing, or SOB   GASTROINTESTINAL:  Negative for nausea, vomiting, diarrhea, constipation, or abdominal pain  GENITOURINARY:  Negative frequency, urgency or dysuria  NEUROLOGIC:  No headache, confusion, dizziness, lightheadedness  All other systems were reviewed and are negative     Allergies :  penicillin (Hives)      Current inpatient medications :    ANTIBIOTICS/RELEVANT:  BACItracin 50,000 Unit(s) Injectable (Rx Quick Charge) 025261 Unit(s) IntraMuscular   meropenem IVPB 1000 milliGRAM(s) IV Intermittent every 8 hours  meropenem IVPB          acetaminophen   Tablet 650 milliGRAM(s) Oral every 6 hours PRN  ascorbic acid 500 milliGRAM(s) Oral two times a day  benzocaine 15 mG/menthol 3.6 mG Lozenge 1 Lozenge Oral every 4 hours PRN  calcium carbonate 1250 mG + Vitamin D (OsCal 500 + D) 1 Tablet(s) Oral three times a day  cyclobenzaprine 10 milliGRAM(s) Oral every 8 hours  dextrose 5%. 1000 milliLiter(s) IV Continuous <Continuous>  dextrose 50% Injectable 12.5 Gram(s) IV Push once  dextrose 50% Injectable 25 Gram(s) IV Push once  dextrose 50% Injectable 25 Gram(s) IV Push once  dextrose Gel 1 Dose(s) Oral once PRN  diphenhydrAMINE   Capsule 25 milliGRAM(s) Oral every 4 hours PRN  docusate sodium 100 milliGRAM(s) Oral three times a day  folic acid 1 milliGRAM(s) Oral daily  glucagon  Injectable 1 milliGRAM(s) IntraMuscular once PRN  HYDROmorphone  Injectable 1 milliGRAM(s) SubCutaneous every 3 hours PRN  insulin lispro (HumaLOG) corrective regimen sliding scale   SubCutaneous three times a day before meals  insulin lispro (HumaLOG) corrective regimen sliding scale   SubCutaneous at bedtime  levothyroxine Injectable 18.75 MICROGram(s) IV Push daily  magnesium hydroxide Suspension 30 milliLiter(s) Oral every 12 hours PRN  morphine  - Injectable 1 milliGRAM(s) IV Push every 10 minutes PRN  multivitamin 1 Tablet(s) Oral daily  ondansetron Injectable 4 milliGRAM(s) IV Push once PRN  oxyCODONE    IR 5 milliGRAM(s) Oral every 4 hours PRN  oxyCODONE    IR 10 milliGRAM(s) Oral every 4 hours PRN  pantoprazole    Tablet 40 milliGRAM(s) Oral before breakfast  senna 2 Tablet(s) Oral at bedtime  verapamil  milliGRAM(s) Oral daily      Objective:    11-05 @ 07:01  -  11-06 @ 07:00  --------------------------------------------------------  IN: 1245 mL / OUT: 603 mL / NET: 642 mL    11-06 @ 07:01  -  11-06 @ 11:21  --------------------------------------------------------  IN: 0 mL / OUT: 70 mL / NET: -70 mL      T(C): 37 (11-06-17 @ 09:30), Max: 37.3 (11-05-17 @ 12:01)  HR: 86 (11-06-17 @ 09:30) (82 - 102)  BP: 125/60 (11-06-17 @ 09:30) (104/55 - 149/98)  RR: 23 (11-06-17 @ 09:30) (16 - 23)  SpO2: 98% (11-06-17 @ 09:30) (92% - 100%)  Wt(kg): --      Physical Exam:  General: well developed well nourished, in no acute distress  Eyes: sclera anicteric, pupils equal and reactive to light  ENMT: buccal mucosa moist, pharynx not injected  Neck: supple, trachea midline  Lungs: clear, no wheeze/rhonchi  Cardiovascular: regular rate and rhythm, S1 S2  Abdomen: soft, nontender, no organomegaly present, bowel sounds normal  Neurological: alert and oriented x3, Cranial Nerves II-XII grossly intact, weakness left leg which is chronic   Skin: no increased ecchymosis/petechiae/purpura  Lymph Nodes: no palpable cervical/supraclavicular lymph nodes enlargements  Extremities: no cyanosis/clubbing/edema            LABS:                          8.5    9.1   )-----------( 368      ( 06 Nov 2017 06:03 )             26.7       11-06    141  |  105  |  24<H>  ----------------------------<  124<H>  4.0   |  26  |  0.95    Ca    8.3<L>      06 Nov 2017 06:03  Phos  1.6     11-06  Mg     1.9     11-06        Vancomycin Level, Trough: 16.0 ug/mL (11-05 @ 05:51)    Procalcitonin, Serum (11.03.17 @ 16:46)    Procalcitonin, Serum: 4.07:     Sedimentation Rate, Erythrocyte (11.03.17 @ 16:46)    Sedimentation Rate, Erythrocyte: 34 mm/hr    C-Reactive Protein, Serum (11.03.17 @ 20:59)    C-Reactive Protein, Serum: 6.60 mg/dL          RECENT CULTURES:    Culture - Surgical Swab (collected 04 Nov 2017 21:34)  Source: .Surgical Swab deep cultrue #2  Preliminary Report (05 Nov 2017 17:12):    No growth to date    Culture - Surgical Swab (collected 04 Nov 2017 21:33)  Source: .Surgical Swab deep culture #3  Preliminary Report (05 Nov 2017 17:14):    No growth to date    Culture - Surgical Swab (collected 04 Nov 2017 21:33)  Source: .Surgical Swab deep cutlture #[id number] EPIDURA  Preliminary Report (05 Nov 2017 17:13):    No growth to date        RADIOLOGY & ADDITIONAL STUDIES:   Xray Lumbosacral Spine (11.04.17 @ 15:51) >  Single portable lateral lumbar spine for use in the OR.    Status post posterior metallic fusion with transpedicularscrews and   posterior rods L2-S1. Interbody spacers at these levels. Hardware as   visualized on this projection is intact. There is grade 1 anterolisthesis   of L4 on L5. Multilevel degenerative spondylosis.      Assessment :      82 yr old PMHx CAD, Crohn's dz, DM2, HTN, HLD, s/p coronary stents '02/1997, Hypothyroid, OA recent hospitalization at [facility] on 10/19/17 , underwent lumbar laminectomy L2 - L5 , his post op course complicated by acute blood loss  and was transfused . he was sent ot [facility] on 10/27/17 on PO Doxycycline and taper steroids ,    he is now admitted with AMS, sent to ER from [facility] , noted ot have leukocytosis not associated with fever . He was given sedation due ot agitation and also for MRI, became hypoxic so transferred to MICU . MRI of lumbar spine with contrast shows collection in the post op bed ? hematoma or infection.     Sepsis work up from admission is negative . To note he was on PO steroids on dc from hospital to rehab , his wbc on day of dc was 15.7     He has I & D  post op epidural  hematoma , no sign of infection , all OR cs are negative      Possibility of aspiration pneumonia should be considered       Plan :   - will complete total 7 days of antibiotics for pneumonia then DC  - Repeat CXR today   - avoid sedatives and narcotics  - trend CBC and serial CXR   - aspiration precautions    - increase activity     Continue with present regime .  Appropriate use of antibiotics and adverse effects reviewed.    I have discussed the above plan of care with patient in detail. He expressed understanding of the  treatment plan . Risks, benefits and alternatives discussed in detail. I have asked if he has any questions or concerns and appropriately addressed them to the best of my ability .    > 35 minutes were spent in direct patient care reviewing notes, medications ,labs data/ imaging , discussion with multidisciplinary team.    Thank you for allowing me to participate in care of your patient .    Elio Rowell MD  [phone]

## 2023-11-14 NOTE — PROGRESS NOTE ADULT - REASON FOR ADMISSION
----- Message from Fidelina Lozoya sent at 11/14/2023  4:14 PM CST -----  Regarding: advice  Contact: patient  Type: Needs Medical Advice  Who Called:  patient  Symptoms (please be specific):    How long has patient had these symptoms:    Pharmacy name and phone #:    Best Call Back Number: [phone]    Additional Information: Please call patient concerning his appointment for 11/15/23 he will not be able to make the appointment.  Patient needs surgical clearance.  Thanks!        
Please advise: pt has upcoming surgery with podiatry and an EGD. Will he need to be seen for clearance? Last OV 4/28 with Cordelia and 4/17 with forrest  
abdominal wall abscess

## 2024-03-12 NOTE — ED STATDOCS - NSCAREINITIATED _GEN_ER
Myron Hilario(Attending) [Well Developed] : well developed [Well Nourished] : well nourished [No Acute Distress] : no acute distress [Normal Conjunctiva] : normal conjunctiva [Normal Venous Pressure] : normal venous pressure [No Carotid Bruit] : no carotid bruit [Normal S1, S2] : normal S1, S2 [No Murmur] : no murmur [No Rub] : no rub [No Gallop] : no gallop [Clear Lung Fields] : clear lung fields [Good Air Entry] : good air entry [No Respiratory Distress] : no respiratory distress  [Soft] : abdomen soft [Non Tender] : non-tender [No Masses/organomegaly] : no masses/organomegaly [Normal Bowel Sounds] : normal bowel sounds [Normal Gait] : normal gait [No Edema] : no edema [No Cyanosis] : no cyanosis [No Clubbing] : no clubbing [No Varicosities] : no varicosities [No Rash] : no rash [No Skin Lesions] : no skin lesions [Moves all extremities] : moves all extremities [No Focal Deficits] : no focal deficits [Normal Speech] : normal speech [Alert and Oriented] : alert and oriented [Normal memory] : normal memory [de-identified] : /72 HR 80, 120 lbs O2 sat 98%,

## 2024-04-19 NOTE — PATIENT PROFILE ADULT - NS PRO AD ANY ON CHART
Bleeding that does not stop/Pain not relieved by Medications/Fever greater than (need to indicate Fahrenheit or Celsius)/Wound/Surgical Site with redness, or foul smelling discharge or pus/Nausea and vomiting that does not stop
pt states prev filled out thinks this is in the computer/No

## 2024-04-26 NOTE — PATIENT PROFILE ADULT. - SOURCE OF INFORMATION, PROFILE
Left msg for pt to call back to notify surgery can be done in ASC but she will need PCP clearance.   is out of office today and will f/u with dates on Monday.   patient

## 2024-12-30 NOTE — DISCHARGE NOTE ADULT - CARE PROVIDERS DIRECT ADDRESSES
General Sunscreen Counseling: Sun screen (SPF 35 or greater) should be applied when outdoors for more than 10-15 minutes, especially during peak hours (between 10am and 2pm). If swimming or exercising, sunscreen needs to be reapplied every 2 hours. Physical blocking sunscreens block a wider range of UV radiation. Detail Level: Detailed ,DirectAddress_Unknown

## 2025-05-14 NOTE — DISCHARGE NOTE PROVIDER - NSDCHHCONTACT_GEN_ALL_CORE_FT
As certified below, I, or a nurse practitioner or physician assistant working with me, had a face-to-face encounter that meets the physician face-to-face encounter requirements.
14

## 2025-06-06 NOTE — PROGRESS NOTE ADULT - ASSESSMENT
[facility]  [place] [id number]  [facility] [address] [phone]       Sagar Tomas is a 64 y.o. male who presents today for his medical conditions/complaints as noted below.  Sagar Tomas is c/o of 6 Month Follow-Up (Pt is here for 6 month follow up on HTN. Pt states his BP has been running good. Not fasting. )        HPI:     History of Present Illness  The patient presents for evaluation of hypertension.    He reports that blood pressure readings at home have been elevated, with systolic values typically below 130 and diastolic values below 80. He does not monitor his blood pressure daily but has observed high readings on his home device. His current medication regimen includes daily doses of lisinopril 20 mg and Bystolic 5 mg, which he takes in the morning along with green tea.    He has discontinued the use of Spiriva, citing an unpleasant taste reminiscent of antifreeze. He reports no respiratory issues and was able to perform yard work without experiencing shortness of breath or discomfort. However, he did note some difficulty with humidity this morning.    He has been to the dentist three times in the past three weeks, receiving two crowns and two fillings. A cracked tooth, which had previously undergone a root canal, was treated with a new crown on 06/03/2025.    SOCIAL HISTORY  He maintains an active lifestyle, engaging in physical exercise for approximately 20 minutes daily.       Chief Complaint   Patient presents with    6 Month Follow-Up     Pt is here for 6 month follow up on HTN. Pt states his BP has been running good. Not fasting.      Past Medical History:   Diagnosis Date    History of kidney stones     Hypertension     Osteoarthritis       Past Surgical History:   Procedure Laterality Date    COLONOSCOPY N/A 11/21/2023    Dr Tai, Severe diverticulosis w patchy spring diverticular erythema in left colon, int hem Gr 1 wo bleeding,  87 M with hx of Crohn's disease and colon cancer POD 3 s/p lap sigmoidectomy and ileocolic resection, ileus, abd distended    pain control  monitor for bowel function  ambulation  incentive spirometry  c/w full liquid diet  Half IVF  May d/c caballero at midnight if making adequate urine today  c/w PPN  DVT/GI ppx  Nephrology recommendations appreciated  F/u Renal US    Plan discussed with CRS team

## 2025-06-20 NOTE — ED ADULT NURSE NOTE - FALL HARM RISK TYPE OF ASSESSMENT
Post-Anesthesia Evaluation and Assessment    Patient: Theorn Sterling Jr. MRN: [id number]  SSN: [id number]    YOB: 1950  Age: 75 y.o.  Sex: male      I have evaluated the patient and they are stable and ready for discharge from the PACU.     Cardiovascular Function/Vital Signs  Visit Vitals  /68   Pulse 65   Temp 97.5 °F (36.4 °C) (Oral)   Resp 16   Wt 85.3 kg (188 lb 0.8 oz)   SpO2 100%   BMI 24.14 kg/m²       Patient is status post Monitor Anesthesia Care anesthesia for Procedure(s):  COLONOSCOPY FLEXIBLE DECOMPRESSION.    Nausea/Vomiting: None    Postoperative hydration reviewed and adequate.    Pain:  Managed    Neurological Status:   At baseline    Mental Status, Level of Consciousness: Alert and  oriented to person, place, and time    Pulmonary Status:   Adequate oxygenation and airway patent    Complications related to anesthesia: None    Post-anesthesia assessment completed. No concerns    Signed By: Rebecca Shah DO     June 20, 2025      
Admission

## 2025-06-24 NOTE — DIETITIAN INITIAL EVALUATION ADULT. - PHYSICAL APPEARANCE
BMI 25
For information on Fall & Injury Prevention, visit: [email or web address] OR  [email or web address] OR  https://www.cdc.gov/steadi/patient.html